# Patient Record
Sex: MALE | Race: WHITE | NOT HISPANIC OR LATINO | Employment: OTHER | URBAN - METROPOLITAN AREA
[De-identification: names, ages, dates, MRNs, and addresses within clinical notes are randomized per-mention and may not be internally consistent; named-entity substitution may affect disease eponyms.]

---

## 2020-10-14 ENCOUNTER — OFFICE VISIT (OUTPATIENT)
Dept: FAMILY MEDICINE | Facility: OTHER | Age: 75
End: 2020-10-14
Payer: MEDICARE

## 2020-10-14 VITALS
OXYGEN SATURATION: 97 % | RESPIRATION RATE: 16 BRPM | DIASTOLIC BLOOD PRESSURE: 84 MMHG | HEIGHT: 68 IN | BODY MASS INDEX: 29.25 KG/M2 | HEART RATE: 69 BPM | TEMPERATURE: 98 F | WEIGHT: 193 LBS | SYSTOLIC BLOOD PRESSURE: 128 MMHG

## 2020-10-14 DIAGNOSIS — L81.9 ATYPICAL PIGMENTED SKIN LESION: Primary | ICD-10-CM

## 2020-10-14 PROCEDURE — 99203 OFFICE O/P NEW LOW 30 MIN: CPT | Performed by: PHYSICIAN ASSISTANT

## 2020-10-14 RX ORDER — TRIAMCINOLONE ACETONIDE 1 MG/G
CREAM TOPICAL 2 TIMES DAILY
Qty: 30 G | Refills: 0 | Status: SHIPPED | OUTPATIENT
Start: 2020-10-14 | End: 2021-01-29

## 2020-10-14 RX ORDER — FLUVOXAMINE MALEATE 100 MG
TABLET ORAL
COMMUNITY
Start: 2020-10-14

## 2020-10-14 RX ORDER — QUETIAPINE FUMARATE 300 MG/1
150 TABLET, FILM COATED ORAL AT BEDTIME
COMMUNITY
Start: 2020-10-14

## 2020-10-14 ASSESSMENT — MIFFLIN-ST. JEOR: SCORE: 1598.91

## 2020-10-14 NOTE — PATIENT INSTRUCTIONS
Will have you see dermatology to further evaluate the skin lesion as I am unsure what this is but that fact that it is growing is concerning.  They will call to schedule ([facility] in [place]).  Will try some Kenalog steroid cream to help with the itching (up to twice daily).

## 2020-10-14 NOTE — PROGRESS NOTES
"Subjective     Ahmet Coleman is a 75 year old male who presents to clinic today for the following health issues:    HPI      Patient in clinic today to discuss spot on head. He says it's been there for a while (20-30 years), but it's starting to burn, itch, and sometimes feels \"like a bee sting all over his head.\" It is black in color. He previously had it biopsied in 1992 and \"it was benign at that time.\" It has been growing over the past year and he does not know what it is. [facility] but has come to this clinic in the past for other concerns.     Review of Systems   Constitutional, HEENT, cardiovascular, pulmonary, skin, gi and gu systems are negative, except as otherwise noted.      Objective    /84   Pulse 69   Temp 98  F (36.7  C) (Temporal)   Resp 16   Ht 1.734 m (5' 8.25\")   Wt 87.5 kg (193 lb)   SpO2 97%   BMI 29.13 kg/m    Body mass index is 29.13 kg/m .  Physical Exam   GENERAL: healthy, alert and no distress  RESP: lungs clear to auscultation - no rales, rhonchi or wheezes  CV: regular rate and rhythm, normal S1 S2, no S3 or S4, no murmur, click or rub  SKIN: 6.5 x 7.0 cm maculopapular plaque over the left side of the scalp into the hairline that is a dark grey in color. There are small papules throughout the plaque but most of it is macular without blanching. There are also several small, pinpint, black/purplish macules surrounding this lesion. There is mild tenderness when the lesion is palpated but no fluctuance, warm, erythema or discharge.        Assessment & Plan       ICD-10-CM    1. Atypical pigmented skin lesion  L81.9 DERMATOLOGY ADULT REFERRAL     triamcinolone (KENALOG) 0.1 % external cream       I am quite concerned about this skin lesion especially since it is so large and has been growing over the past year. It is honestly like nothing I have ever seen before so I have a fairly high suspicion for malignancy given its appearance  Will " get him set up with [facility] ASAP for evaluation and biopsy.  In the meantime, will prescribe low dose Kenalog to use as needed for itching.     Elias Alonzo PA-C  [facility]

## 2020-10-26 ENCOUNTER — TRANSFERRED RECORDS (OUTPATIENT)
Dept: HEALTH INFORMATION MANAGEMENT | Facility: CLINIC | Age: 75
End: 2020-10-26

## 2020-11-09 ENCOUNTER — TRANSFERRED RECORDS (OUTPATIENT)
Dept: HEALTH INFORMATION MANAGEMENT | Facility: CLINIC | Age: 75
End: 2020-11-09

## 2020-11-10 ENCOUNTER — TRANSCRIBE ORDERS (OUTPATIENT)
Dept: OTHER | Age: 75
End: 2020-11-10

## 2020-11-10 DIAGNOSIS — C43.4 MALIGNANT MELANOMA OF SCALP (H): Primary | ICD-10-CM

## 2020-11-12 ENCOUNTER — TELEPHONE (OUTPATIENT)
Dept: OTOLARYNGOLOGY | Facility: CLINIC | Age: 75
End: 2020-11-12

## 2020-11-12 NOTE — TELEPHONE ENCOUNTER
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 [phone]  Fx. [phone] 10/26/2020 note from Dr Niurka Skinner    10/25/2020 left central parietal scalp shave (Case: [id number])     *trackin Scanned in Epic    req     Received     [facility] imaging  2019 CT Head   2018 CT Chest Abdomen Pelvis [facility] everywhere  req 2020    req     PACS                             2020 2:01PM sent a fax to [facility] for images and [facility] for path send out - Amay   2020 11:46AM path slides received and sent to 5th floor [facility] with consult form. Still waiting on [facility] images - Amay   2020 9:32AM sent a 2nd fax to [facility] for images - Amay   *images in PACS - Amay

## 2020-11-12 NOTE — TELEPHONE ENCOUNTER
[facility] Call Center    Phone Message    May a detailed message be left on voicemail: yes     Reason for Call: Other: Called patient and scheduled first available with Dr. Garcia. The appointment is currently scheduled more than 5 business days out. Per our protocols, we are to send a message for that appointment to be moved up. Please advise.     Action Taken: Message routed to:  [facility] ([facility] ENT    Travel Screening: Not Applicable

## 2020-11-16 ENCOUNTER — TELEPHONE (OUTPATIENT)
Dept: ONCOLOGY | Facility: CLINIC | Age: 75
End: 2020-11-16

## 2020-11-16 NOTE — TELEPHONE ENCOUNTER
[facility] Call Center    Phone Message    May a detailed message be left on voicemail: yes     Reason for Call: Other: referring doctor is following up on 11/10 referral to oncology.  Please contact Pt daughter to schedule at [phone]    This is in addition to the ENT referral    Action Taken: Message routed to:  [facility] oncology    Travel Screening: Not Applicable

## 2020-11-17 ENCOUNTER — TELEPHONE (OUTPATIENT)
Dept: OTOLARYNGOLOGY | Facility: CLINIC | Age: 75
End: 2020-11-17

## 2020-11-19 PROCEDURE — 999N001032 HC STATISTIC REVIEW OUTSIDE SLIDES TC 88321: Performed by: OTOLARYNGOLOGY

## 2020-11-19 PROCEDURE — 88321 CONSLTJ&REPRT SLD PREP ELSWR: CPT | Performed by: DERMATOLOGY

## 2020-11-19 PROCEDURE — 88360 TUMOR IMMUNOHISTOCHEM/MANUAL: CPT | Mod: 26 | Performed by: DERMATOLOGY

## 2020-11-19 PROCEDURE — 88360 TUMOR IMMUNOHISTOCHEM/MANUAL: CPT | Mod: TC | Performed by: OTOLARYNGOLOGY

## 2020-11-19 PROCEDURE — 999N001020 HC STATISTIC H-SEND OUTS PREP: Performed by: OTOLARYNGOLOGY

## 2020-11-25 ENCOUNTER — OFFICE VISIT (OUTPATIENT)
Dept: OTOLARYNGOLOGY | Facility: CLINIC | Age: 75
End: 2020-11-25
Payer: MEDICARE

## 2020-11-25 VITALS
RESPIRATION RATE: 18 BRPM | TEMPERATURE: 98.9 F | HEIGHT: 68 IN | SYSTOLIC BLOOD PRESSURE: 165 MMHG | WEIGHT: 195 LBS | DIASTOLIC BLOOD PRESSURE: 91 MMHG | OXYGEN SATURATION: 97 % | BODY MASS INDEX: 29.55 KG/M2 | HEART RATE: 56 BPM

## 2020-11-25 DIAGNOSIS — C43.4 MALIGNANT MELANOMA OF SCALP (H): Primary | ICD-10-CM

## 2020-11-25 PROCEDURE — 99203 OFFICE O/P NEW LOW 30 MIN: CPT | Mod: 25 | Performed by: OTOLARYNGOLOGY

## 2020-11-25 PROCEDURE — 11104 PUNCH BX SKIN SINGLE LESION: CPT | Performed by: OTOLARYNGOLOGY

## 2020-11-25 PROCEDURE — 88342 IMHCHEM/IMCYTCHM 1ST ANTB: CPT | Performed by: DERMATOLOGY

## 2020-11-25 PROCEDURE — 88305 TISSUE EXAM BY PATHOLOGIST: CPT | Performed by: DERMATOLOGY

## 2020-11-25 PROCEDURE — 11105 PUNCH BX SKIN EA SEP/ADDL: CPT | Performed by: OTOLARYNGOLOGY

## 2020-11-25 PROCEDURE — 88341 IMHCHEM/IMCYTCHM EA ADD ANTB: CPT | Performed by: DERMATOLOGY

## 2020-11-25 RX ORDER — ACETAMINOPHEN 325 MG/1
TABLET ORAL
Status: ON HOLD | COMMUNITY
Start: 2019-07-17 | End: 2021-05-28

## 2020-11-25 RX ORDER — POLYETHYLENE GLYCOL 3350 17 G/17G
POWDER, FOR SOLUTION ORAL
COMMUNITY
Start: 2020-05-15 | End: 2021-01-29

## 2020-11-25 RX ORDER — DOCUSATE SODIUM 100 MG/1
CAPSULE, LIQUID FILLED ORAL
COMMUNITY
Start: 2020-05-15 | End: 2021-01-29

## 2020-11-25 RX ORDER — PREGABALIN 150 MG/1
CAPSULE ORAL
COMMUNITY
Start: 2020-06-24 | End: 2021-01-29

## 2020-11-25 ASSESSMENT — PATIENT HEALTH QUESTIONNAIRE - PHQ9: SUM OF ALL RESPONSES TO PHQ QUESTIONS 1-9: 14

## 2020-11-25 ASSESSMENT — PAIN SCALES - GENERAL: PAINLEVEL: MILD PAIN (3)

## 2020-11-25 ASSESSMENT — MIFFLIN-ST. JEOR: SCORE: 1604.01

## 2020-11-25 NOTE — PROGRESS NOTES
Depression Response    Patient completed the PHQ-9 assessment for depression and scored >9? Yes  Question 9 on the PHQ-9 was positive for suicidality? No  Does patient have current mental health provider? Yes    Is this a virtual visit? Yes   Does patient have suicidal ideation (positive question 9)? No - offer to place Mental Health Referral.  Patient declined referral/not needed    I personally notified the following: clinic nurse       Patient PHQ 9 score is 14. Denies suicidal ideation. Currently has telephone visits with a mental health professional.  Mauri Jarquin LPN

## 2020-11-25 NOTE — NURSING NOTE
"Chief Complaint   Patient presents with     Consult     melanoma of scalp      Blood pressure (!) 165/91, pulse 56, temperature 98.9  F (37.2  C), resp. rate 18, height 1.727 m (5' 8\"), weight 88.5 kg (195 lb), SpO2 97 %.    Mauri Jarquin LPN    "

## 2020-11-25 NOTE — LETTER
11/25/2020       RE: Ahmet Coleman  [address] [address]     Dear Colleague,    Thank you for referring your patient, Ahmet Coleman, to the [facility] [place] [facility]. Please see a copy of my visit note below.    Depression Response    Patient completed the PHQ-9 assessment for depression and scored >9? Yes  Question 9 on the PHQ-9 was positive for suicidality? No  Does patient have current mental health provider? Yes    Is this a virtual visit? Yes   Does patient have suicidal ideation (positive question 9)? No - offer to place Mental Health Referral.  Patient declined referral/not needed    I personally notified the following: clinic nurse       Patient PHQ 9 score is 14. Denies suicidal ideation. Currently has telephone visits with a mental health professional.  Mauri Jarquin LPN                      November 25, 2020          Niurka Skinner MD, MS, FAAD   [facility]   [address] [address]         Dear Dr. Skinner,      Thank you for this consultation on Ahmet Coleman.  As you know, he is a patient who had a lesion on his scalp for some time that he describes as quite small, but over the last several months has grown dramatically in size.  It is now approximately 6 x 9 cm in size, starting at the midline of his vertex scalp and going all the way down to the lateral temporal scalp.  He had a punch biopsy was performed in four sites, three of which were positive.  The patient himself has not noticed any lumps or bumps in his neck and the biopsies were noted to have a depth of at least 1.2 mm without ulceration and mitoses that varied from 1-3 per mm2.  The patient has never had a skin cancer in the past.  He is generally otherwise healthy.  No significant history of sun exposure.     Past Medical History:   Diagnosis Date     Anxiety      Benign hypertension  12/11/2013     Cervicalgia      Depressive disorder, not elsewhere classified      Esophageal reflux      Lumbago      Migraine, unspecified, without mention of intractable migraine without mention of status migrainosus      Other kyphoscoliosis and scoliosis      Other specified gastritis without mention of hemorrhage      PTSD (post-traumatic stress disorder)      Tobacco abuse since 1965    on and off     Past Surgical History:   Procedure Laterality Date     C APPENDECTOMY       COLONOSCOPY N/A 6/8/2016    Procedure: COMBINED COLONOSCOPY, SINGLE OR MULTIPLE BIOPSY/POLYPECTOMY BY BIOPSY;  Surgeon: Mahesh Amanda MD;  Location: [facility]     ESOPHAGOSCOPY, GASTROSCOPY, DUODENOSCOPY (EGD), COMBINED  9/6/2011    Procedure:COMBINED ESOPHAGOSCOPY, GASTROSCOPY, DUODENOSCOPY (EGD), BIOPSY SINGLE OR MULTIPLE; Esophagogastroduodenoscopy with multiple Biopsy's; Surgeon:MARINA DYE; Location:[facility]     ZZC NONSPECIFIC PROCEDURE      nasal septal fx and a devitation needing plastic surgery       Current Outpatient Medications:      acetaminophen (TYLENOL) 325 MG tablet, TAKE TWO TABLETS BY MOUTH THREE TIMES A DAY AS NEEDED, Disp: , Rfl:      albuterol (PROAIR HFA, PROVENTIL HFA, VENTOLIN HFA) 108 (90 BASE) MCG/ACT inhaler, Inhale  into the lungs as needed., Disp: , Rfl:      cholecalciferol (VITAMIN D3) 1000 UNIT tablet, Take  by mouth daily., Disp: , Rfl:      diclofenac (VOLTAREN) 1 % topical gel, Apply 4 g topically, Disp: , Rfl:      docusate sodium (DSS) 100 MG capsule, TAKE ONE CAPSULE BY MOUTH EVERY DAY FOR STOOL SOFTENING, Disp: , Rfl:      fluvoxaMINE (LUVOX) 100 MG tablet, Take 150 mg at bedtime, Disp: , Rfl:      hydrochlorothiazide (HYDRODIURIL) 25 MG tablet, Take 25 mg by mouth daily., Disp: , Rfl:      Hypromellose (ARTIFICIAL TEARS OP), Apply  to eye. 2 drops in each eye twice a day as needed, Disp: , Rfl:      Lactobacillus-Inulin ([employer] DIGESTIVE HEALTH) CAPS, 1 tab daily, Disp: 30 capsule, Rfl: 1      lisinopril (PRINIVIL,ZESTRIL) 10 MG tablet, Take 10 mg by mouth daily., Disp: , Rfl:      omeprazole (PRILOSEC) 20 MG capsule, Take 1 capsule by mouth 2 times daily., Disp: 60 capsule, Rfl: prn     polyethylene glycol (MIRALAX) 17 GM/Dose powder, TAKE 17 GRAMS BY MOUTH EVERY DAY ** MIXED IN JUICE OR WATER AS DIRECTED ** USE THE COVER AS A MEASURE **, Disp: , Rfl:      pregabalin (LYRICA) 150 MG capsule, TAKE 1 CAPSULE BY MOUTH TWICE A DAY, Disp: , Rfl:      QUEtiapine (SEROQUEL) 25 MG tablet, 1/2 tablet nightly, Disp: , Rfl:      simvastatin (ZOCOR) 20 MG tablet, Take  by mouth At Bedtime., Disp: , Rfl:      TRAZodone (DESYREL) 50 MG tablet, Take 50 mg by mouth At Bedtime., Disp: , Rfl:      triamcinolone (KENALOG) 0.1 % external cream, Apply topically 2 times daily, Disp: 30 g, Rfl: 0     vortioxetine (TRINTELLIX) 20 MG tablet, TAKE ONE TABLET BY MOUTH EVERY MORNING, Disp: , Rfl:   Allergies   Allergen Reactions     Aspirin      Motrin [Ibuprofen Micronized]      And ASA  Cramps  diarrhea     Social History     Tobacco Use     Smoking status: Former Smoker     Packs/day: 0.50     Years: 45.00     Pack years: 22.50     Types: Cigarettes     Quit date: 2013     Years since quittin.3     Smokeless tobacco: Never Used     Tobacco comment: since , on and off in there 1/2 pack per day   Substance Use Topics     Alcohol use: No     Review Of Systems  Skin: negative  Eyes: negative  Ears/Nose/Throat: negative  Respiratory: No shortness of breath, dyspnea on exertion, cough, or hemoptysis  Cardiovascular: negative  Gastrointestinal: negative  Genitourinary: negative  Musculoskeletal: negative  Neurologic: negative  Psychiatric: negative  Hematologic/Lymphatic/Immunologic: negative  Endocrine: negative        PHYSICAL EXAMINATION:  He is well-appearing, in no distress.  Skin exam reveals he has a large darkly pigmented, but with irregular borders lesion of the nearly 70% of the left side of his scalp.  Some of  it is covered with hair, but he does also appear to have some satellite pigmented lesions.  I palpated his parotid and neck.  I do not palpate any lymphadenopathy.  Facial nerve function is normal.        Please note, I did take photographs of the lesion and the possible satellite and they are stored in [facility].      PROCEDURE:  Biopsy of satellites to determine what the actual margins of surgery need to be.  We took a biopsy at three different sites, one was anterior to the lesion in the temporal scalp.  The second one was posterior to the lesion just off the midline to the left and the last one was in the left occipital scalp.  These were all taken with 3 mm punch biopsies and closed with chromic and nylon stitches and sent for permanent pathology.      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      PLAN:   1.  The patient will require a large resection of the scalp and depending on the nature of the biopsies we took today, he may require a larger resection to get around the satellite that we can see.  The reconstruction could either be free tissue or skin graft.  The question of how to treat his neck is an open question.  I do not believe sentinel lymph node biopsy will be efficacious in this case because the injection would be over such a wide area that we would end up having to make multiple incision sites and biopsy sites, in which case an elective neck dissection may be the most appropriate option in this case.   2.  I would like to get a PET scan on him.   3.  We will wait for the biopsy results and determine the nature of surgery. If we do need free tissue reconstruction,  I will ask Dr. Machado to assist with that.      Thank you for allowing me to participate in the care of this patient.  If you have any further questions, please do not hesitate to contact me.       Sincerely,         Paulo Garcia M.D.         Otolaryngology-Head & Neck Surgery    [phone]

## 2020-11-25 NOTE — PATIENT INSTRUCTIONS
1. We will call you to arrange PET scan and call you after tumor board next Friday.   2. Please call the ENT clinic with any questions,concerns, new or worsening symptoms.    -Clinic number is [phone]   - Rekha's direct line (Dr. Garcia's nurse) [phone]

## 2020-11-25 NOTE — PROGRESS NOTES
November 25, 2020          Niurka Skinner MD, MS, FAAD   [facility]   [address] [facility]   [address] [place] [address]         Dear Dr. Skinner,      Thank you for this consultation on Ahmet Coleman.  As you know, he is a patient who had a lesion on his scalp for some time that he describes as quite small, but over the last several months has grown dramatically in size.  It is now approximately 6 x 9 cm in size, starting at the midline of his vertex scalp and going all the way down to the lateral temporal scalp.  He had a punch biopsy was performed in four sites, three of which were positive.  The patient himself has not noticed any lumps or bumps in his neck and the biopsies were noted to have a depth of at least 1.2 mm without ulceration and mitoses that varied from 1-3 per mm2.  The patient has never had a skin cancer in the past.  He is generally otherwise healthy.  No significant history of sun exposure.             Past Medical History:   Diagnosis Date     Anxiety      Benign hypertension 12/11/2013     Cervicalgia      Depressive disorder, not elsewhere classified      Esophageal reflux      Lumbago      Migraine, unspecified, without mention of intractable migraine without mention of status migrainosus      Other kyphoscoliosis and scoliosis      Other specified gastritis without mention of hemorrhage      PTSD (post-traumatic stress disorder)      Tobacco abuse since 1965    on and off     Past Surgical History:   Procedure Laterality Date     C APPENDECTOMY       COLONOSCOPY N/A 6/8/2016    Procedure: COMBINED COLONOSCOPY, SINGLE OR MULTIPLE BIOPSY/POLYPECTOMY BY BIOPSY;  Surgeon: Mahesh Amanda MD;  Location: [facility]     ESOPHAGOSCOPY, GASTROSCOPY, DUODENOSCOPY (EGD), COMBINED  9/6/2011    Procedure:COMBINED ESOPHAGOSCOPY, GASTROSCOPY, DUODENOSCOPY (EGD), BIOPSY SINGLE OR MULTIPLE; Esophagogastroduodenoscopy with multiple Biopsy's; Surgeon:MARINA DYE; Location:[facility]  NONSPECIFIC PROCEDURE      nasal septal fx and a devitation needing plastic surgery       Current Outpatient Medications:      acetaminophen (TYLENOL) 325 MG tablet, TAKE TWO TABLETS BY MOUTH THREE TIMES A DAY AS NEEDED, Disp: , Rfl:      albuterol (PROAIR HFA, PROVENTIL HFA, VENTOLIN HFA) 108 (90 BASE) MCG/ACT inhaler, Inhale  into the lungs as needed., Disp: , Rfl:      cholecalciferol (VITAMIN D3) 1000 UNIT tablet, Take  by mouth daily., Disp: , Rfl:      diclofenac (VOLTAREN) 1 % topical gel, Apply 4 g topically, Disp: , Rfl:      docusate sodium (DSS) 100 MG capsule, TAKE ONE CAPSULE BY MOUTH EVERY DAY FOR STOOL SOFTENING, Disp: , Rfl:      fluvoxaMINE (LUVOX) 100 MG tablet, Take 150 mg at bedtime, Disp: , Rfl:      hydrochlorothiazide (HYDRODIURIL) 25 MG tablet, Take 25 mg by mouth daily., Disp: , Rfl:      Hypromellose (ARTIFICIAL TEARS OP), Apply  to eye. 2 drops in each eye twice a day as needed, Disp: , Rfl:      Lactobacillus-Inulin ([facility] DIGESTIVE HEALTH) CAPS, 1 tab daily, Disp: 30 capsule, Rfl: 1     lisinopril (PRINIVIL,ZESTRIL) 10 MG tablet, Take 10 mg by mouth daily., Disp: , Rfl:      omeprazole (PRILOSEC) 20 MG capsule, Take 1 capsule by mouth 2 times daily., Disp: 60 capsule, Rfl: prn     polyethylene glycol (MIRALAX) 17 GM/Dose powder, TAKE 17 GRAMS BY MOUTH EVERY DAY ** MIXED IN JUICE OR WATER AS DIRECTED ** USE THE COVER AS A MEASURE **, Disp: , Rfl:      pregabalin (LYRICA) 150 MG capsule, TAKE 1 CAPSULE BY MOUTH TWICE A DAY, Disp: , Rfl:      QUEtiapine (SEROQUEL) 25 MG tablet, 1/2 tablet nightly, Disp: , Rfl:      simvastatin (ZOCOR) 20 MG tablet, Take  by mouth At Bedtime., Disp: , Rfl:      TRAZodone (DESYREL) 50 MG tablet, Take 50 mg by mouth At Bedtime., Disp: , Rfl:      triamcinolone (KENALOG) 0.1 % external cream, Apply topically 2 times daily, Disp: 30 g, Rfl: 0     vortioxetine (TRINTELLIX) 20 MG tablet, TAKE ONE TABLET BY MOUTH EVERY MORNING, Disp: , Rfl:   Allergies    Allergen Reactions     Aspirin      Motrin [Ibuprofen Micronized]      And ASA  Cramps  diarrhea     Social History     Tobacco Use     Smoking status: Former Smoker     Packs/day: 0.50     Years: 45.00     Pack years: 22.50     Types: Cigarettes     Quit date: 2013     Years since quittin.3     Smokeless tobacco: Never Used     Tobacco comment: since , on and off in there 1/2 pack per day   Substance Use Topics     Alcohol use: No     Review Of Systems  Skin: negative  Eyes: negative  Ears/Nose/Throat: negative  Respiratory: No shortness of breath, dyspnea on exertion, cough, or hemoptysis  Cardiovascular: negative  Gastrointestinal: negative  Genitourinary: negative  Musculoskeletal: negative  Neurologic: negative  Psychiatric: negative  Hematologic/Lymphatic/Immunologic: negative  Endocrine: negative        PHYSICAL EXAMINATION:  He is well-appearing, in no distress.  Skin exam reveals he has a large darkly pigmented, but with irregular borders lesion of the nearly 70% of the left side of his scalp.  Some of it is covered with hair, but he does also appear to have some satellite pigmented lesions.  I palpated his parotid and neck.  I do not palpate any lymphadenopathy.  Facial nerve function is normal.        Please note, I did take photographs of the lesion and the possible satellite and they are stored in [facility].      PROCEDURE:  Biopsy of satellites to determine what the actual margins of surgery need to be.  We took a biopsy at three different sites, one was anterior to the lesion in the temporal scalp.  The second one was posterior to the lesion just off the midline to the left and the last one was in the left occipital scalp.  These were all taken with 3 mm punch biopsies and closed with chromic and nylon stitches and sent for permanent pathology.      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      PLAN:   1.  The patient will  require a large resection of the scalp and depending on the nature of the biopsies we took today, he may require a larger resection to get around the satellite that we can see.  The reconstruction could either be free tissue or skin graft.  The question of how to treat his neck is an open question.  I do not believe sentinel lymph node biopsy will be efficacious in this case because the injection would be over such a wide area that we would end up having to make multiple incision sites and biopsy sites, in which case an elective neck dissection may be the most appropriate option in this case.   2.  I would like to get a PET scan on him.   3.  We will wait for the biopsy results and determine the nature of surgery. If we do need free tissue reconstruction,  I will ask Dr. Machado to assist with that.      Thank you for allowing me to participate in the care of this patient.  If you have any further questions, please do not hesitate to contact me.       Sincerely,         Paulo Garcia M.D.        Otolaryngology-Head & Neck Surgery    [phone]

## 2020-11-30 NOTE — PROGRESS NOTES
Head & Neck Tumor Conference Note        Status: New  Staff: Dr. Garcia    Tumor Site: Left scalp  Tumor Pathology: Melanoma  Tumor Stage: [id number]  Tumor Treatment: None    Reason for Review: Review imaging, path, and POC    Brief History: This is a 75 year old male recently diagnosed with a melanoma of the scalp. He reports an area of discoloration on the left scalp for the past year that more recently has started to grow in size. He was seen by a dermatologist, and a 12 cm x 7 cm lesion was identified. Biopsies were obtained and were noted to be positive for melanoma. He was referred to Dr. Garcia for definitive management. There was concern for satellitosis and additional biopsies were taken. Staging imaging was obtained as well.     Pertinent PMH:   Past Medical History:   Diagnosis Date     Anxiety      Benign hypertension 2013     Cervicalgia      Depressive disorder, not elsewhere classified      Esophageal reflux      Lumbago      Migraine, unspecified, without mention of intractable migraine without mention of status migrainosus      Other kyphoscoliosis and scoliosis      Other specified gastritis without mention of hemorrhage      PTSD (post-traumatic stress disorder)      Tobacco abuse since     on and off        Smoking Hx:   Social History     Tobacco Use     Smoking status: Former Smoker     Packs/day: 0.50     Years: 45.00     Pack years: 22.50     Types: Cigarettes     Quit date: 2013     Years since quittin.3     Smokeless tobacco: Never Used     Tobacco comment: since , on and off in there 1/2 pack per day   Substance Use Topics     Alcohol use: No     Drug use: Yes     Types: Marijuana     Comment: Marijuana       Imaging:   PET/CT Neck: (20):  Pending    PET/CT Whole Body (20):  Pending     Pathology:   Pathology (20):  Pending    Tumor Board Recommendation:   Discussion: Reviewed photos, much of the left side of the scalp is involved with melanoma and  there is concern for satellite disease. PET/CT is pending for staging. Biopsies of satellite lesions pending as well.    Plan:   - discuss when PET/CT and biopsies are finalized    Irineo Cook MD PGY-3  Otolaryngology- Head and Neck Surgery    Documentation / Disclaimer Cancer Tumor Board Note  Cancer tumor board recommendations do not override what is determined to be reasonable care and treatment, which is dependent on the circumstances of a patient's case; the patient's medical, social, and personal concerns; and the clinical judgment of the oncologist [physician].

## 2020-12-02 ENCOUNTER — PRE VISIT (OUTPATIENT)
Dept: OTOLARYNGOLOGY | Facility: CLINIC | Age: 75
End: 2020-12-02

## 2020-12-04 ENCOUNTER — TUMOR CONFERENCE (OUTPATIENT)
Dept: ONCOLOGY | Facility: CLINIC | Age: 75
End: 2020-12-04
Payer: MEDICARE

## 2020-12-05 DIAGNOSIS — C43.9 MELANOMA OF SKIN (H): Primary | ICD-10-CM

## 2020-12-07 LAB — COPATH REPORT: NORMAL

## 2020-12-07 NOTE — PROGRESS NOTES
Called and spoke with patient regarding possibility of clinical trial with medical oncology. Reviewed with patient that pathology results from in clinic biopsies are still pending as well as he will need to proceed with PET scan scheduled on 12/8. He will receive a call from clinic trial coordinator to arrange appointment with Dr. Hernández. Patient in agreement with plan and we will contact patient to further discuss following PET scan, pathology results and consult with Dr. Hernández.     Patient has direct line to call with further questions or concerns.     Rekha Reynolds, RN, BSN

## 2020-12-08 ENCOUNTER — HOSPITAL ENCOUNTER (OUTPATIENT)
Dept: PET IMAGING | Facility: CLINIC | Age: 75
End: 2020-12-08
Attending: OTOLARYNGOLOGY
Payer: MEDICARE

## 2020-12-08 DIAGNOSIS — C43.4 MALIGNANT MELANOMA OF SCALP (H): ICD-10-CM

## 2020-12-08 LAB
CREAT BLD-MCNC: 1.4 MG/DL (ref 0.66–1.25)
GFR SERPL CREATININE-BSD FRML MDRD: 50 ML/MIN/{1.73_M2}

## 2020-12-08 PROCEDURE — 250N000011 HC RX IP 250 OP 636: Performed by: OTOLARYNGOLOGY

## 2020-12-08 PROCEDURE — 71260 CT THORAX DX C+: CPT | Mod: 26 | Performed by: RADIOLOGY

## 2020-12-08 PROCEDURE — 74177 CT ABD & PELVIS W/CONTRAST: CPT | Mod: 26 | Performed by: RADIOLOGY

## 2020-12-08 PROCEDURE — 343N000001 HC RX 343: Performed by: OTOLARYNGOLOGY

## 2020-12-08 PROCEDURE — 71260 CT THORAX DX C+: CPT

## 2020-12-08 PROCEDURE — 78813 PET IMAGE FULL BODY: CPT | Mod: 26 | Performed by: RADIOLOGY

## 2020-12-08 PROCEDURE — 70491 CT SOFT TISSUE NECK W/DYE: CPT | Mod: 26 | Performed by: RADIOLOGY

## 2020-12-08 PROCEDURE — 70491 CT SOFT TISSUE NECK W/DYE: CPT

## 2020-12-08 PROCEDURE — 82565 ASSAY OF CREATININE: CPT

## 2020-12-08 PROCEDURE — A9552 F18 FDG: HCPCS | Performed by: OTOLARYNGOLOGY

## 2020-12-08 RX ORDER — IOPAMIDOL 755 MG/ML
45-135 INJECTION, SOLUTION INTRAVASCULAR ONCE
Status: COMPLETED | OUTPATIENT
Start: 2020-12-08 | End: 2020-12-08

## 2020-12-08 RX ADMIN — FLUDEOXYGLUCOSE F-18 11.66 MCI.: 500 INJECTION, SOLUTION INTRAVENOUS at 14:30

## 2020-12-08 RX ADMIN — IOPAMIDOL 119 ML: 755 INJECTION, SOLUTION INTRAVENOUS at 15:35

## 2020-12-10 ENCOUNTER — PATIENT OUTREACH (OUTPATIENT)
Dept: OTOLARYNGOLOGY | Facility: CLINIC | Age: 75
End: 2020-12-10

## 2020-12-10 NOTE — PROGRESS NOTES
Called and left message for patient to review PET scan results. Left direct line for return call to discuss.     Rekha Reynolds, RN, BSN

## 2020-12-10 NOTE — PROGRESS NOTES
Called patient with the following PET scan results:    Impression:      FDG avid irregular skin thickening overlying the left parietal region  compatible with diagnosis of malignant melanoma. No FDG avid  metastatic lymphadenopathy in the neck.    IMPRESSION: In this patient with a recent diagnosis of malignant  melanoma of the scalp:     1. No evidence of metastatic disease in the body.     2. Incidental findings include colonic diverticulosis without evidence  of acute diverticulitis.     3. Please see separate dictation for the high resolution PET-CT of the  head and neck performed at the same time for further discussion.         Reviewed with patient that he should proceed with Dr Hernández appointment next week to discuss clinical trial. Patient agreeable and was encouraged to call with further questions or concerns.     Rekha Reynolds, RN, BSN

## 2020-12-15 ENCOUNTER — APPOINTMENT (OUTPATIENT)
Dept: LAB | Facility: CLINIC | Age: 75
End: 2020-12-15
Attending: INTERNAL MEDICINE
Payer: MEDICARE

## 2020-12-15 PROCEDURE — 81445 SO NEO GSAP 5-50DNA/DNA&RNA: CPT | Mod: GZ | Performed by: INTERNAL MEDICINE

## 2020-12-15 PROCEDURE — G0452 MOLECULAR PATHOLOGY INTERPR: HCPCS | Mod: 26 | Performed by: PATHOLOGY

## 2020-12-16 LAB — COPATH REPORT: NORMAL

## 2020-12-17 LAB — PD-L1 BY IMMUNOHISTOCHEMISTRY: NORMAL

## 2020-12-21 ENCOUNTER — ONCOLOGY VISIT (OUTPATIENT)
Dept: ONCOLOGY | Facility: CLINIC | Age: 75
End: 2020-12-21
Attending: INTERNAL MEDICINE
Payer: MEDICARE

## 2020-12-21 VITALS
DIASTOLIC BLOOD PRESSURE: 78 MMHG | HEART RATE: 63 BPM | TEMPERATURE: 98.7 F | RESPIRATION RATE: 18 BRPM | WEIGHT: 195.3 LBS | BODY MASS INDEX: 29.7 KG/M2 | OXYGEN SATURATION: 93 % | SYSTOLIC BLOOD PRESSURE: 132 MMHG

## 2020-12-21 DIAGNOSIS — C43.4 MALIGNANT MELANOMA OF SCALP (H): ICD-10-CM

## 2020-12-21 PROCEDURE — 99205 OFFICE O/P NEW HI 60 MIN: CPT | Performed by: INTERNAL MEDICINE

## 2020-12-21 PROCEDURE — G0463 HOSPITAL OUTPT CLINIC VISIT: HCPCS

## 2020-12-21 ASSESSMENT — PAIN SCALES - GENERAL: PAINLEVEL: MODERATE PAIN (4)

## 2020-12-21 NOTE — PROGRESS NOTES
MEDICAL ONCOLOGY CONSULT  Melanoma Clinic  Dec 21, 2020      ASSESSMENT AND PLAN  #1 Cutaneous melanoma, scalp primary, pT2a cN1c, clinical Stage IIIB  It was a pleasure to meet Mr. Coleman today. He is a 75 year old  with agent orange cutaneous exposures in the [place] war and a diagnosis of malignant melanoma arising from a pre-existing pigmented lesion on the scalp. He has clinical evidence of satellitosis with separation of a left temporal satellite from the main tumor mass, and possibly in-transit metastasis to the helix of the left ear. He was reviewed for participation in the NeoActivate study, but he does not meet criteria as he does not have regional lymph node disease.    I reviewed other potential options including neoadjuvant approaches using checkpoint inhibitors like nivolumab and pembrolizumab. We also discussed the potential use of BRAF/MEK inhbition strategies in the event of a somatic BRAF mutation. Patient is interested in these approaches.    Neoadjuvant nivolumab or pembrolizumab are associated with pCR or pnCR rates of 35-45%. We reviewed the impact of negative PD-L1 expression, which is generally associated with decreased responses approaching 25% rather than 40-50% for PD-L1 positive advanced melanoma. Higher overall response would be expected for neoadjuvant ipi/nivo, though notably, that study excluded those with in-transit disease.    BRAF V600K mutation, a course of pre-operative oral targeted therapy would also be reasonable. Studies have typically used dabrafenib/trametinib for 8-12 weeks pre-operatively followed by adjuvant therapy for the balance of 1 year.     At the time of our visit not all results had yet returned, so we planned to speak again in early January to re-review, discuss the side effects associated with the approaches, and establish a start date for treatment and potential surgery.    Multiple questions answered.    Oksana Peñaloza M.D.  Assistant  Professor of Medicine  Hematology, Oncology and Transplantation      ------------------------------------------------------------    CHIEF COMPLAINT: Scalp melanoma    HISTORY OF PRESENT ILLNESS  Ahmet Coleman is a 75 year old  with agent orange skin exposures in [place]. He is referred by Dr. Garcia for evaluation of an enlargening malignant melanoma of the scalp.    The lesion has been present for over 30 years, but was much smaller. The lesion was biopsied in 1992 and was benign. However, in October 2020 he presented to [facility] with 1 year of progressive enlargement of the lesion and new onset burning, itching, and stinging sensation in the affected scalp.    He was seen at [facility] and he had shave biopsies of A. left central parietal scalp, B. left central occipital scalp, C. Left posterior parietal scalp, and D. punch biopsy of the left superior occipital scalp on 10/26/20. Pathology (specimen: [id number]) showed a nodular and nevoid type melanoma, non-ulcerated, Breslow depth up to 1.3 mm, Saurabh's level IV, and up to 3 mitoses per mm2. All margins were involved. Ki-67 10-20%. Sox10 stain is positive and highlights an atypical compound melanocytic proliferation with significant overlying confluence and pagetosis of intraepidermal melanocytes. T-stage: at least pT2a. PD-L1 staining shows PD-L1 TPS <1%. 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    On 11/25/20 he was seen by Dr. Garcia. He took three 3mm punch biopsies, which returned as dermal melanocytic proliferation with melanophages and fibrosis, consistent with locoregional metastatic melanoma.    On 12/8/2020 he had PET-CT, which showed FDG avid irregular skin thickening overlying the left parietal region, with no FDG avid lymphadenopathy in the neck and no evidence of metastasis elsewhere in the body.    Patient is a former smoker and quit in 2013. Today he feels physically well aside from the growing,  painful scalp tumor. He notes baseline PTSD from [place] War, but denies any shortness of breath, chest pain, nausea, vomiting, fevers or chills. He has had no significant weight loss.       No results found for any visits on 12/21/20.      REVIEW OF SYSTEMS  A 12-point ROS negative except as in HPI    Current Outpatient Medications   Medication Sig Dispense Refill     acetaminophen (TYLENOL) 325 MG tablet TAKE TWO TABLETS BY MOUTH THREE TIMES A DAY AS NEEDED       albuterol (PROAIR HFA, PROVENTIL HFA, VENTOLIN HFA) 108 (90 BASE) MCG/ACT inhaler Inhale  into the lungs as needed.       cholecalciferol (VITAMIN D3) 1000 UNIT tablet Take  by mouth daily.       diclofenac (VOLTAREN) 1 % topical gel Apply 4 g topically       docusate sodium (DSS) 100 MG capsule TAKE ONE CAPSULE BY MOUTH EVERY DAY FOR STOOL SOFTENING       fluvoxaMINE (LUVOX) 100 MG tablet Take 150 mg at bedtime       hydrochlorothiazide (HYDRODIURIL) 25 MG tablet Take 25 mg by mouth daily.       Hypromellose (ARTIFICIAL TEARS OP) Apply  to eye. 2 drops in each eye twice a day as needed       Lactobacillus-Inulin ([facility] DIGESTIVE [facility]) CAPS 1 tab daily 30 capsule 1     lisinopril (PRINIVIL,ZESTRIL) 10 MG tablet Take 10 mg by mouth daily.       omeprazole (PRILOSEC) 20 MG capsule Take 1 capsule by mouth 2 times daily. 60 capsule prn     polyethylene glycol (MIRALAX) 17 GM/Dose powder TAKE 17 GRAMS BY MOUTH EVERY DAY ** MIXED IN JUICE OR WATER AS DIRECTED ** USE THE COVER AS A MEASURE **       pregabalin (LYRICA) 150 MG capsule TAKE 1 CAPSULE BY MOUTH TWICE A DAY       QUEtiapine (SEROQUEL) 25 MG tablet 1/2 tablet nightly       simvastatin (ZOCOR) 20 MG tablet Take  by mouth At Bedtime.       TRAZodone (DESYREL) 50 MG tablet Take 50 mg by mouth At Bedtime.       triamcinolone (KENALOG) 0.1 % external cream Apply topically 2 times daily 30 g 0     vortioxetine (TRINTELLIX) 20 MG tablet TAKE ONE TABLET BY MOUTH EVERY MORNING         Allergies    Allergen Reactions     Aspirin      Motrin [Ibuprofen Micronized]      And ASA  Cramps  diarrhea     Immunization History   Administered Date(s) Administered     Influenza (IIV3) PF 10/24/2012     Pneumo Conj 13-V (2010&after) 02/01/2016     Pneumococcal 23 valent 04/07/2015     TDAP Vaccine (Adacel) 07/29/2011       Past Medical History:   Diagnosis Date     Anxiety      Benign hypertension 12/11/2013     Cervicalgia      Depressive disorder, not elsewhere classified      Diverticulitis of colon 7/10/2013     Esophageal reflux      Irritable bowel syndrome 10/3/2003     Lumbago      Migraine, unspecified, without mention of intractable migraine without mention of status migrainosus      Other kyphoscoliosis and scoliosis      Other specified gastritis without mention of hemorrhage      PTSD (post-traumatic stress disorder)      Tobacco abuse since 1965    on and off       Past Surgical History:   Procedure Laterality Date     C APPENDECTOMY       COLONOSCOPY N/A 6/8/2016    Procedure: COMBINED COLONOSCOPY, SINGLE OR MULTIPLE BIOPSY/POLYPECTOMY BY BIOPSY;  Surgeon: Mahesh Amanda MD;  Location: [facility]     ESOPHAGOSCOPY, GASTROSCOPY, DUODENOSCOPY (EGD), COMBINED  9/6/2011    Procedure:COMBINED ESOPHAGOSCOPY, GASTROSCOPY, DUODENOSCOPY (EGD), BIOPSY SINGLE OR MULTIPLE; Esophagogastroduodenoscopy with multiple Biopsy's; Surgeon:MARINA DYE; Location:[facility]     ZZC NONSPECIFIC PROCEDURE      nasal septal fx and a devitation needing plastic surgery       SOCIAL HISTORY  History   Smoking Status     Former Smoker     Packs/day: 0.50     Years: 45.00     Types: Cigarettes     Quit date: 7/26/2013   Smokeless Tobacco     Never Used     Comment: since 1965, on and off in there 1/2 pack per day    Social History    Substance and Sexual Activity      Alcohol use: No     History   Drug Use     Types: Marijuana     Comment: Marijuana       FAMILY HISTORY  No family history of melanoma. Father had BCC and prostate cancer,  and  at 94 yo from heart failure. Mother is alive, currently 92 yo.  Family History   Problem Relation Age of Onset     Diabetes Father      Heart Disease Brother        PHYSICAL EXAMINATION  /78   Pulse 63   Temp 98.7  F (37.1  C) (Oral)   Resp 18   Wt 88.6 kg (195 lb 4.8 oz)   SpO2 93%   BMI 29.70 kg/m    Wt Readings from Last 2 Encounters:   20 88.6 kg (195 lb 4.8 oz)   20 88.5 kg (195 lb)     GENERAL: Healthy, alert and no distress  EYES: Eyes grossly normal to inspection.  No discharge or erythema, or obvious scleral/conjunctival abnormalities.  HENT: The large mid-scalp pigmented lesion extends from right of midline to the left side of the head, roughly measuring 10 x 5 cm, though a smaller separate satellite lesion measures 3 x 1.5 cm in size in the left temporal scalp. Left ear pinna lesion measures 2mm, possible in-transit metastasis. Otherwise, external ears, nose and mouth without ulcers or lesions.  No nasal drainage visible.  NECK: No asymmetry, visible masses or scars  RESP: No audible wheeze, cough, or visible cyanosis.  No visible retractions or increased work of breathing.    CV: regular rates and rhythm, normal S1 S2, no S3 or S4, no murmur, click or rub and no peripheral edema  MS: No gross musculoskeletal defects noted.  Normal range of motion.  No visible edema.  NEURO: Cranial nerves grossly intact.  Mentation and speech appropriate for age.  PSYCH: Mentation appears normal, affect normal/bright, judgement and insight intact, normal speech and appearance well-groomed.    The rest of a comprehensive physical examination is deferred due to PHE (public health emergency) video visit restrictions.

## 2020-12-21 NOTE — LETTER
12/21/2020         RE: Ahmet Coleman  [address] [place] [id number]        Dear Colleague,    Thank you for referring your patient, Ahmet Coleman, to the [facility]. Please see a copy of my visit note below.    MEDICAL ONCOLOGY CONSULT  Melanoma Clinic  Dec 21, 2020      ASSESSMENT AND PLAN  #1 Cutaneous melanoma, scalp primary, pT2a cN1c, clinical Stage IIIB  It was a pleasure to meet Mr. Coleman today. He is a 75 year old  with agent orange cutaneous exposures in the [place] and a diagnosis of malignant melanoma arising from a pre-existing pigmented lesion on the scalp. He has clinical evidence of satellitosis with separation of a left temporal satellite from the main tumor mass, and possibly in-transit metastasis to the helix of the left ear. He was reviewed for participation in the NeoActivate study, but he does not meet criteria as he does not have regional lymph node disease.    I reviewed other potential options including neoadjuvant approaches using checkpoint inhibitors like nivolumab and pembrolizumab. We also discussed the potential use of BRAF/MEK inhbition strategies in the event of a somatic BRAF mutation. Patient is interested in these approaches.    Neoadjuvant nivolumab or pembrolizumab are associated with pCR or pnCR rates of 35-45%. We reviewed the impact of negative PD-L1 expression, which is generally associated with decreased responses approaching 25% rather than 40-50% for PD-L1 positive advanced melanoma. Higher overall response would be expected for neoadjuvant ipi/nivo, though notably, that study excluded those with in-transit disease.    BRAF V600K mutation, a course of pre-operative oral targeted therapy would also be reasonable. Studies have typically used dabrafenib/trametinib for 8-12 weeks pre-operatively followed by adjuvant therapy for the balance of 1 year.     At the time of our visit not all results had yet  returned, so we planned to speak again in early January to re-review, discuss the side effects associated with the approaches, and establish a start date for treatment and potential surgery.    Multiple questions answered.    Oksana Peñaloza M.D.   of Medicine  Hematology, Oncology and Transplantation      ------------------------------------------------------------    CHIEF COMPLAINT: Scalp melanoma    HISTORY OF PRESENT ILLNESS  Ahmet Coleman is a 75 year old  with agent orange skin exposures in [place]. He is referred by Dr. Garcia for evaluation of an enlargening malignant melanoma of the scalp.    The lesion has been present for over 30 years, but was much smaller. The lesion was biopsied in 1992 and was benign. However, in October 2020 he presented to [facility] with 1 year of progressive enlargement of the lesion and new onset burning, itching, and stinging sensation in the affected scalp.    He was seen at [facility] and he had shave biopsies of A. left central parietal scalp, B. left central occipital scalp, C. Left posterior parietal scalp, and D. punch biopsy of the left superior occipital scalp on 10/26/20. Pathology (specimen: [id number]) showed a nodular and nevoid type melanoma, non-ulcerated, Breslow depth up to 1.3 mm, Saurabh's level IV, and up to 3 mitoses per mm2. All margins were involved. Ki-67 10-20%. Sox10 stain is positive and highlights an atypical compound melanocytic proliferation with significant overlying confluence and pagetosis of intraepidermal melanocytes. T-stage: at least pT2a. PD-L1 staining shows PD-L1 TPS <1%. 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    On 11/25/20 he was seen by Dr. Garcia. He took three 3mm punch biopsies, which returned as dermal melanocytic proliferation with melanophages and fibrosis, consistent with locoregional metastatic melanoma.    On 12/8/2020 he had PET-CT, which showed FDG  avid irregular skin thickening overlying the left parietal region, with no FDG avid lymphadenopathy in the neck and no evidence of metastasis elsewhere in the body.    Patient is a former smoker and quit in 2013. Today he feels physically well aside from the growing, painful scalp tumor. He notes baseline PTSD from [place], but denies any shortness of breath, chest pain, nausea, vomiting, fevers or chills. He has had no significant weight loss.       No results found for any visits on 12/21/20.      REVIEW OF SYSTEMS  A 12-point ROS negative except as in HPI    Current Outpatient Medications   Medication Sig Dispense Refill     acetaminophen (TYLENOL) 325 MG tablet TAKE TWO TABLETS BY MOUTH THREE TIMES A DAY AS NEEDED       albuterol (PROAIR HFA, PROVENTIL HFA, VENTOLIN HFA) 108 (90 BASE) MCG/ACT inhaler Inhale  into the lungs as needed.       cholecalciferol (VITAMIN D3) 1000 UNIT tablet Take  by mouth daily.       diclofenac (VOLTAREN) 1 % topical gel Apply 4 g topically       docusate sodium (DSS) 100 MG capsule TAKE ONE CAPSULE BY MOUTH EVERY DAY FOR STOOL SOFTENING       fluvoxaMINE (LUVOX) 100 MG tablet Take 150 mg at bedtime       hydrochlorothiazide (HYDRODIURIL) 25 MG tablet Take 25 mg by mouth daily.       Hypromellose (ARTIFICIAL TEARS OP) Apply  to eye. 2 drops in each eye twice a day as needed       Lactobacillus-Inulin ([facility] DIGESTIVE [facility]) CAPS 1 tab daily 30 capsule 1     lisinopril (PRINIVIL,ZESTRIL) 10 MG tablet Take 10 mg by mouth daily.       omeprazole (PRILOSEC) 20 MG capsule Take 1 capsule by mouth 2 times daily. 60 capsule prn     polyethylene glycol (MIRALAX) 17 GM/Dose powder TAKE 17 GRAMS BY MOUTH EVERY DAY ** MIXED IN JUICE OR WATER AS DIRECTED ** USE THE COVER AS A MEASURE **       pregabalin (LYRICA) 150 MG capsule TAKE 1 CAPSULE BY MOUTH TWICE A DAY       QUEtiapine (SEROQUEL) 25 MG tablet 1/2 tablet nightly       simvastatin (ZOCOR) 20 MG tablet Take  by mouth At Bedtime.        TRAZodone (DESYREL) 50 MG tablet Take 50 mg by mouth At Bedtime.       triamcinolone (KENALOG) 0.1 % external cream Apply topically 2 times daily 30 g 0     vortioxetine (TRINTELLIX) 20 MG tablet TAKE ONE TABLET BY MOUTH EVERY MORNING         Allergies   Allergen Reactions     Aspirin      Motrin [Ibuprofen Micronized]      And ASA  Cramps  diarrhea     Immunization History   Administered Date(s) Administered     Influenza (IIV3) PF 10/24/2012     Pneumo Conj 13-V (2010&after) 02/01/2016     Pneumococcal 23 valent 04/07/2015     TDAP Vaccine (Adacel) 07/29/2011       Past Medical History:   Diagnosis Date     Anxiety      Benign hypertension 12/11/2013     Cervicalgia      Depressive disorder, not elsewhere classified      Diverticulitis of colon 7/10/2013     Esophageal reflux      Irritable bowel syndrome 10/3/2003     Lumbago      Migraine, unspecified, without mention of intractable migraine without mention of status migrainosus      Other kyphoscoliosis and scoliosis      Other specified gastritis without mention of hemorrhage      PTSD (post-traumatic stress disorder)      Tobacco abuse since 1965    on and off       Past Surgical History:   Procedure Laterality Date     C APPENDECTOMY       COLONOSCOPY N/A 6/8/2016    Procedure: COMBINED COLONOSCOPY, SINGLE OR MULTIPLE BIOPSY/POLYPECTOMY BY BIOPSY;  Surgeon: Mahesh Amanda MD;  Location: [facility]     ESOPHAGOSCOPY, GASTROSCOPY, DUODENOSCOPY (EGD), COMBINED  9/6/2011    Procedure:COMBINED ESOPHAGOSCOPY, GASTROSCOPY, DUODENOSCOPY (EGD), BIOPSY SINGLE OR MULTIPLE; Esophagogastroduodenoscopy with multiple Biopsy's; Surgeon:MARINA DYE; Location:[facility]     ZZC NONSPECIFIC PROCEDURE      nasal septal fx and a devitation needing plastic surgery       SOCIAL HISTORY  History   Smoking Status     Former Smoker     Packs/day: 0.50     Years: 45.00     Types: Cigarettes     Quit date: 7/26/2013   Smokeless Tobacco     Never Used     Comment: since 1965, on and  off in there 1/2 pack per day    Social History    Substance and Sexual Activity      Alcohol use: No     History   Drug Use     Types: Marijuana     Comment: Marijuana       FAMILY HISTORY  No family history of melanoma. Father had BCC and prostate cancer, and  at 94 yo from heart failure. Mother is alive, currently 94 yo.  Family History   Problem Relation Age of Onset     Diabetes Father      Heart Disease Brother        PHYSICAL EXAMINATION  /78   Pulse 63   Temp 98.7  F (37.1  C) (Oral)   Resp 18   Wt 88.6 kg (195 lb 4.8 oz)   SpO2 93%   BMI 29.70 kg/m    Wt Readings from Last 2 Encounters:   20 88.6 kg (195 lb 4.8 oz)   20 88.5 kg (195 lb)     GENERAL: Healthy, alert and no distress  EYES: Eyes grossly normal to inspection.  No discharge or erythema, or obvious scleral/conjunctival abnormalities.  HENT: The large mid-scalp pigmented lesion extends from right of midline to the left side of the head, roughly measuring 10 x 5 cm, though a smaller separate satellite lesion measures 3 x 1.5 cm in size in the left temporal scalp. Left ear pinna lesion measures 2mm, possible in-transit metastasis. Otherwise, external ears, nose and mouth without ulcers or lesions.  No nasal drainage visible.  NECK: No asymmetry, visible masses or scars  RESP: No audible wheeze, cough, or visible cyanosis.  No visible retractions or increased work of breathing.    CV: regular rates and rhythm, normal S1 S2, no S3 or S4, no murmur, click or rub and no peripheral edema  MS: No gross musculoskeletal defects noted.  Normal range of motion.  No visible edema.  NEURO: Cranial nerves grossly intact.  Mentation and speech appropriate for age.  PSYCH: Mentation appears normal, affect normal/bright, judgement and insight intact, normal speech and appearance well-groomed.    The rest of a comprehensive physical examination is deferred due to PHE (public health emergency) video visit restrictions.                    Again, thank you for allowing me to participate in the care of your patient.        Sincerely,        Oksana Hernández MD

## 2020-12-21 NOTE — NURSING NOTE
"Oncology Rooming Note    December 21, 2020 12:49 PM   Ahmet Coleman is a 75 year old male who presents for:    Chief Complaint   Patient presents with     Oncology Clinic Visit     Pt is here for a new Eval for      Initial Vitals: Blood Pressure 132/78   Pulse 63   Temperature 98.7  F (37.1  C) (Oral)   Respiration 18   Weight 88.6 kg (195 lb 4.8 oz)   Oxygen Saturation 93%   Body Mass Index 29.70 kg/m   Estimated body mass index is 29.7 kg/m  as calculated from the following:    Height as of 11/25/20: 1.727 m (5' 8\").    Weight as of this encounter: 88.6 kg (195 lb 4.8 oz). Body surface area is 2.06 meters squared.  Moderate Pain (4) Comment: Data Unavailable   No LMP for male patient.  Allergies reviewed: Yes  Medications reviewed: Yes    Medications: Medication refills not needed today.  Pharmacy name entered into [employer]:    [facility] [address] [place], [place] - [address] [facility] [id number] [place], [place] - [address] [facility]    Clinical concerns: none       Jyoti Bolivar MA            "

## 2020-12-22 LAB — COPATH REPORT: NORMAL

## 2020-12-29 PROBLEM — H26.9 CATARACT: Status: ACTIVE | Noted: 2020-12-29

## 2020-12-29 PROBLEM — H52.4 PRESBYOPIA: Status: ACTIVE | Noted: 2020-12-29

## 2020-12-29 PROBLEM — H90.3 SENSORINEURAL HEARING LOSS (SNHL) OF BOTH EARS: Status: ACTIVE | Noted: 2020-12-29

## 2020-12-29 PROBLEM — K57.30 DIVERTICULOSIS OF COLON: Status: ACTIVE | Noted: 2020-12-29

## 2020-12-29 PROBLEM — E78.1 PURE HYPERTRIGLYCERIDEMIA: Status: ACTIVE | Noted: 2020-12-29

## 2020-12-29 PROBLEM — Z87.828: Status: ACTIVE | Noted: 2020-12-29

## 2020-12-29 PROBLEM — F51.04 PSYCHOPHYSIOLOGIC INSOMNIA: Status: ACTIVE | Noted: 2020-12-29

## 2020-12-29 PROBLEM — N52.9 IMPOTENCE OF ORGANIC ORIGIN: Status: ACTIVE | Noted: 2020-12-29

## 2020-12-29 PROBLEM — F32.9 MAJOR DEPRESSIVE DISORDER WITH SINGLE EPISODE: Status: ACTIVE | Noted: 2020-12-29

## 2020-12-29 PROBLEM — E78.00 PURE HYPERCHOLESTEROLEMIA: Status: ACTIVE | Noted: 2020-12-29

## 2020-12-29 PROBLEM — F60.9 PERSONALITY DISORDER (H): Status: ACTIVE | Noted: 2020-12-29

## 2020-12-29 PROBLEM — K52.9 OTHER AND UNSPECIFIED NONINFECTIOUS GASTROENTERITIS AND COLITIS: Status: ACTIVE | Noted: 2020-12-29

## 2020-12-29 PROBLEM — R69 OTHER ILL-DEFINED AND UNKNOWN CAUSES OF MORBIDITY AND MORTALITY: Status: ACTIVE | Noted: 2020-12-29

## 2020-12-29 PROBLEM — D12.6 BENIGN NEOPLASM OF COLON: Status: RESOLVED | Noted: 2020-12-29 | Resolved: 2020-12-29

## 2020-12-29 PROBLEM — M19.049 ARTHROPATHY OF HAND: Status: ACTIVE | Noted: 2020-12-29

## 2020-12-29 PROBLEM — R10.9 ABDOMINAL DISCOMFORT: Status: ACTIVE | Noted: 2020-12-29

## 2020-12-29 PROBLEM — D12.6 BENIGN NEOPLASM OF COLON: Status: ACTIVE | Noted: 2020-12-29

## 2020-12-29 PROBLEM — F12.10 CANNABIS ABUSE, EPISODIC USE: Status: ACTIVE | Noted: 2020-12-29

## 2020-12-29 PROBLEM — M47.812 SPONDYLOSIS OF CERVICAL SPINE WITHOUT MYELOPATHY: Status: ACTIVE | Noted: 2020-12-29

## 2020-12-29 PROBLEM — B02.30 HERPES ZOSTER OPHTHALMICUS OF LEFT EYE: Status: ACTIVE | Noted: 2018-04-26

## 2020-12-29 PROBLEM — Z83.3 FAMILY HISTORY OF TYPE 2 DIABETES MELLITUS: Status: ACTIVE | Noted: 2020-12-29

## 2020-12-29 PROBLEM — H93.19 SUBJECTIVE TINNITUS: Status: ACTIVE | Noted: 2020-12-29

## 2020-12-29 PROBLEM — M79.7 FIBROMYOSITIS: Status: ACTIVE | Noted: 2020-12-29

## 2020-12-29 PROBLEM — M25.50 ARTHRALGIA: Status: ACTIVE | Noted: 2020-12-29

## 2020-12-29 RX ORDER — MAGNESIUM OXIDE 420 MG/1
420 TABLET ORAL DAILY
COMMUNITY
Start: 2020-12-15

## 2021-01-06 ENCOUNTER — VIRTUAL VISIT (OUTPATIENT)
Dept: ONCOLOGY | Facility: CLINIC | Age: 76
End: 2021-01-06
Attending: INTERNAL MEDICINE
Payer: MEDICARE

## 2021-01-06 DIAGNOSIS — C43.4 MALIGNANT MELANOMA OF SCALP (H): Primary | ICD-10-CM

## 2021-01-06 PROCEDURE — 99442 PR PHYSICIAN TELEPHONE EVALUATION 11-20 MIN: CPT | Mod: 95 | Performed by: INTERNAL MEDICINE

## 2021-01-06 PROCEDURE — 999N001193 HC VIDEO/TELEPHONE VISIT; NO CHARGE

## 2021-01-06 NOTE — LETTER
"    1/6/2021         RE: Ahmet Coleman  [address] [place] [id number]        Dear Colleague,    Thank you for referring your patient, Ahmet Coleman, to [facility]. Please see a copy of my visit note below.    Ahmet Coleman is a 75 year old male who is being evaluated via a billable telephone visit.      What phone number would you like to be contacted at? [phone]  How would you like to obtain your AVS? Mail a copy     Vitals - Patient Reported  Weight (Patient Reported): 88.5 kg (195 lb)  Height (Patient Reported): 173.4 cm (5' 8.25\")  BMI (Based on Pt Reported Ht/Wt): 29.43  Pain Score: No Pain (0)    Tess COELLO  Phone call duration: 25 minutes    MEDICAL ONCOLOGY PROGRESS NOTE  Melanoma Clinic  Jan 6, 2021    CHIEF COMPLAINT: Scalp melanoma    Melanoma History:  1. He has a small pigmented scalp lesion present for over 30 years. The lesion was biopsied in 1992 and was benign.  2. October 2020, he presented to [facility] with 1 year of progressive enlargement of the lesion and new onset burning, itching, and stinging sensation in the affected scalp.  3. 10/26/20, He was seen at [facility] and he had shave biopsies of A. left central parietal scalp, B. left central occipital scalp, C. Left posterior parietal scalp, and D. punch biopsy of the left superior occipital scalp. Pathology (specimen: [id number]) showed a nodular and nevoid type melanoma, non-ulcerated, Breslow depth up to 1.3 mm, Saurabh's level IV, and up to 3 mitoses per mm2. All margins were involved. Ki-67 10-20%. Sox10 stain is positive and highlights an atypical compound melanocytic proliferation with significant overlying confluence and pagetosis of intraepidermal melanocytes. T-stage: at least pT2a. PD-L1 staining shows PD-L1 TPS <1%. 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  4. 11/25/20, he was seen by Dr. Garcia and he took three 3mm punch " biopsies, which returned as dermal melanocytic proliferations with melanophages and fibrosis, consistent with locoregional metastatic melanoma.  5. 12/8/2020 he had PET-CT, which showed FDG avid irregular skin thickening overlying the left parietal region, with no FDG avid lymphadenopathy in the neck and no evidence of metastasis elsewhere in the body.    HISTORY OF PRESENT ILLNESS  Ahmet Coleman is a 75 year old  with agent orange skin exposures in [place]. He is referred by Dr. Garcia for evaluation of an enlargening malignant melanoma of the scalp.    He is doing well, but anxious to start on his treatments. We reviewed that his molecular testing result returned with a BRAF mutation, making him eligible for BRAF inhibitor therapy. He denies any shortness of breath, chest pain, nausea, vomiting, fevers or chills. He has had no significant weight loss. No labs obtained today.    Performance status is 0.    REVIEW OF SYSTEMS  A 12-point ROS negative except as in HPI    Current Outpatient Medications   Medication Sig Dispense Refill     acetaminophen (TYLENOL) 325 MG tablet TAKE TWO TABLETS BY MOUTH THREE TIMES A DAY AS NEEDED       albuterol (PROAIR HFA, PROVENTIL HFA, VENTOLIN HFA) 108 (90 BASE) MCG/ACT inhaler Inhale  into the lungs as needed.       cholecalciferol (VITAMIN D3) 1000 UNIT tablet Take  by mouth daily.       diclofenac (VOLTAREN) 1 % topical gel Apply 4 g topically       docusate sodium (DSS) 100 MG capsule TAKE ONE CAPSULE BY MOUTH EVERY DAY FOR STOOL SOFTENING       fluvoxaMINE (LUVOX) 100 MG tablet Take 150 mg at bedtime       hydrochlorothiazide (HYDRODIURIL) 25 MG tablet Take 25 mg by mouth daily.       Hypromellose (ARTIFICIAL TEARS OP) Apply  to eye. 2 drops in each eye twice a day as needed       Lactobacillus-Inulin ([facility] DIGESTIVE [facility]) CAPS 1 tab daily 30 capsule 1     lisinopril (PRINIVIL,ZESTRIL) 10 MG tablet Take 10 mg by mouth daily.       Magnesium Oxide 420 MG  TABS Take 420 mg by mouth daily       omeprazole (PRILOSEC) 20 MG capsule Take 1 capsule by mouth 2 times daily. 60 capsule prn     polyethylene glycol (MIRALAX) 17 GM/Dose powder TAKE 17 GRAMS BY MOUTH EVERY DAY ** MIXED IN JUICE OR WATER AS DIRECTED ** USE THE COVER AS A MEASURE **       pregabalin (LYRICA) 150 MG capsule TAKE 1 CAPSULE BY MOUTH TWICE A DAY       QUEtiapine (SEROQUEL) 25 MG tablet 1/2 tablet nightly       simvastatin (ZOCOR) 20 MG tablet Take  by mouth At Bedtime.       TRAZodone (DESYREL) 50 MG tablet Take 50 mg by mouth At Bedtime.       triamcinolone (KENALOG) 0.1 % external cream Apply topically 2 times daily 30 g 0     vortioxetine (TRINTELLIX) 20 MG tablet TAKE ONE TABLET BY MOUTH EVERY MORNING         Past Medical History:   Diagnosis Date     Anxiety      Benign hypertension 12/11/2013     Cervicalgia      Depressive disorder, not elsewhere classified      Diverticulitis of colon 7/10/2013     Esophageal reflux      Irritable bowel syndrome 10/3/2003     Lumbago      Malignant melanoma of scalp (H) 12/29/2020     Migraine, unspecified, without mention of intractable migraine without mention of status migrainosus      Other kyphoscoliosis and scoliosis      Other specified gastritis without mention of hemorrhage      PTSD (post-traumatic stress disorder)      Tobacco abuse since 1965    on and off       Past Surgical History:   Procedure Laterality Date     C APPENDECTOMY       COLONOSCOPY N/A 6/8/2016    Procedure: COMBINED COLONOSCOPY, SINGLE OR MULTIPLE BIOPSY/POLYPECTOMY BY BIOPSY;  Surgeon: Mahesh Amanda MD;  Location: [facility]     ESOPHAGOSCOPY, GASTROSCOPY, DUODENOSCOPY (EGD), COMBINED  9/6/2011    Procedure:COMBINED ESOPHAGOSCOPY, GASTROSCOPY, DUODENOSCOPY (EGD), BIOPSY SINGLE OR MULTIPLE; Esophagogastroduodenoscopy with multiple Biopsy's; Surgeon:MARINA DYE; Location: GI     ZZC NONSPECIFIC PROCEDURE      nasal septal fx and a devitation needing plastic surgery        PHYSICAL EXAMINATION  There were no vitals taken for this visit.  Wt Readings from Last 2 Encounters:   12/21/20 88.6 kg (195 lb 4.8 oz)   11/25/20 88.5 kg (195 lb)     GENERAL: Healthy, alert and no distress  EYES: Eyes grossly normal to inspection.  No discharge or erythema, or obvious scleral/conjunctival abnormalities.  HENT: The large mid-scalp pigmented lesion extends from right of midline to the left side of the head, roughly measuring 10 x 5 cm. External ears, nose and mouth without ulcers or lesions.  No nasal drainage visible.  NECK: No asymmetry, visible masses or scars  RESP: No audible wheeze, cough, or visible cyanosis.  No visible retractions or increased work of breathing.    CV: regular rates and rhythm, normal S1 S2, no S3 or S4, no murmur, click or rub and no peripheral edema  MS: No gross musculoskeletal defects noted.  Normal range of motion.  No visible edema.  NEURO: Cranial nerves grossly intact.  Mentation and speech appropriate for age.  PSYCH: Mentation appears normal, affect normal/bright, judgement and insight intact, normal speech and appearance well-groomed.    The rest of a comprehensive physical examination is deferred due to PHE (public health emergency) video visit restrictions.      ASSESSMENT AND PLAN  #1 Cutaneous melanoma, scalp primary, pT2a cN1c, clinical Stage IIIB, advanced unresectable  It was a pleasure to meet Mr. Coleman today. He is a 75 year old  with agent orange cutaneous exposures in the [place] war and a diagnosis of malignant melanoma arising from a pre-existing pigmented lesion on the scalp. He has clinical evidence of satellitosis with separation of a left temporal satellite from the main tumor mass, and possibly in-transit metastasis to the helix of the left ear.    We again discussed systemic therapy. We will start with systemic therapy for advanced unresectable disease and watch his response for the potential of aattempting surgery. While surgery  would not necessarily be technically any more difficult, it may allow the surgeon to obtain clear margins.    We discussed checkpoint inhibitor monotherapy, with rates of response of about 20-25% for PD-L1 negative disease, and BRAF/MEK-inhibition with response rates of 75-80%. We also discussed the recently FDA approved combination of atezolizumab, vemurafenib, and cobimetinib for advanced unresectable or metastatic disease.  This is the same therapy being studies in the NeoActivate study and has had success rate of 75-80% in line with rates for BRAF/MEKi, but with potential for long term disease free survival.    He elects to proceed with atezolizumab, vemurafenib, and cobimetinib. We will plan to see him back virtually about 2  weeks into therapy (MD/LEVY) to assess tolerability, and then I will see him back prior to atezolizumab infusion 1 month after starting his treatments.    Multiple questions answered.     Oksana Peñaloza M.D.   of Medicine  Hematology, Oncology and Transplantation

## 2021-01-06 NOTE — PROGRESS NOTES
"Ahmet Coleman is a 75 year old male who is being evaluated via a billable telephone visit.      What phone number would you like to be contacted at? [phone]  How would you like to obtain your AVS? Mail a copy     Vitals - Patient Reported  Weight (Patient Reported): 88.5 kg (195 lb)  Height (Patient Reported): 173.4 cm (5' 8.25\")  BMI (Based on Pt Reported Ht/Wt): 29.43  Pain Score: No Pain (0)    Tess COELLO  Phone call duration: 25 minutes    MEDICAL ONCOLOGY PROGRESS NOTE  Melanoma Clinic  Jan 6, 2021    CHIEF COMPLAINT: Scalp melanoma    Melanoma History:  1. He has a small pigmented scalp lesion present for over 30 years. The lesion was biopsied in 1992 and was benign.  2. October 2020, he presented to [facility] with 1 year of progressive enlargement of the lesion and new onset burning, itching, and stinging sensation in the affected scalp.  3. 10/26/20, He was seen at [facility] and he had shave biopsies of A. left central parietal scalp, B. left central occipital scalp, C. Left posterior parietal scalp, and D. punch biopsy of the left superior occipital scalp. Pathology (specimen: [id number]) showed a nodular and nevoid type melanoma, non-ulcerated, Breslow depth up to 1.3 mm, Saurabh's level IV, and up to 3 mitoses per mm2. All margins were involved. Ki-67 10-20%. Sox10 stain is positive and highlights an atypical compound melanocytic proliferation with significant overlying confluence and pagetosis of intraepidermal melanocytes. T-stage: at least pT2a. PD-L1 staining shows PD-L1 TPS <1%. 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  4. 11/25/20, he was seen by Dr. Garcia and he took three 3mm punch biopsies, which returned as dermal melanocytic proliferations with melanophages and fibrosis, consistent with locoregional metastatic melanoma.  5. 12/8/2020 he had PET-CT, which showed FDG avid irregular skin thickening overlying the left parietal region, with no FDG " avid lymphadenopathy in the neck and no evidence of metastasis elsewhere in the body.    HISTORY OF PRESENT ILLNESS  Ahmet Coleman is a 75 year old  with agent orange skin exposures in [place]. He is referred by Dr. Garcia for evaluation of an enlargening malignant melanoma of the scalp.    He is doing well, but anxious to start on his treatments. We reviewed that his molecular testing result returned with a BRAF mutation, making him eligible for BRAF inhibitor therapy. He denies any shortness of breath, chest pain, nausea, vomiting, fevers or chills. He has had no significant weight loss. No labs obtained today.    Performance status is 0.    REVIEW OF SYSTEMS  A 12-point ROS negative except as in HPI    Current Outpatient Medications   Medication Sig Dispense Refill     acetaminophen (TYLENOL) 325 MG tablet TAKE TWO TABLETS BY MOUTH THREE TIMES A DAY AS NEEDED       albuterol (PROAIR HFA, PROVENTIL HFA, VENTOLIN HFA) 108 (90 BASE) MCG/ACT inhaler Inhale  into the lungs as needed.       cholecalciferol (VITAMIN D3) 1000 UNIT tablet Take  by mouth daily.       diclofenac (VOLTAREN) 1 % topical gel Apply 4 g topically       docusate sodium (DSS) 100 MG capsule TAKE ONE CAPSULE BY MOUTH EVERY DAY FOR STOOL SOFTENING       fluvoxaMINE (LUVOX) 100 MG tablet Take 150 mg at bedtime       hydrochlorothiazide (HYDRODIURIL) 25 MG tablet Take 25 mg by mouth daily.       Hypromellose (ARTIFICIAL TEARS OP) Apply  to eye. 2 drops in each eye twice a day as needed       Lactobacillus-Inulin ([facility] DIGESTIVE [facility]) CAPS 1 tab daily 30 capsule 1     lisinopril (PRINIVIL,ZESTRIL) 10 MG tablet Take 10 mg by mouth daily.       Magnesium Oxide 420 MG TABS Take 420 mg by mouth daily       omeprazole (PRILOSEC) 20 MG capsule Take 1 capsule by mouth 2 times daily. 60 capsule prn     polyethylene glycol (MIRALAX) 17 GM/Dose powder TAKE 17 GRAMS BY MOUTH EVERY DAY ** MIXED IN JUICE OR WATER AS DIRECTED ** USE THE COVER AS  A MEASURE **       pregabalin (LYRICA) 150 MG capsule TAKE 1 CAPSULE BY MOUTH TWICE A DAY       QUEtiapine (SEROQUEL) 25 MG tablet 1/2 tablet nightly       simvastatin (ZOCOR) 20 MG tablet Take  by mouth At Bedtime.       TRAZodone (DESYREL) 50 MG tablet Take 50 mg by mouth At Bedtime.       triamcinolone (KENALOG) 0.1 % external cream Apply topically 2 times daily 30 g 0     vortioxetine (TRINTELLIX) 20 MG tablet TAKE ONE TABLET BY MOUTH EVERY MORNING         Past Medical History:   Diagnosis Date     Anxiety      Benign hypertension 12/11/2013     Cervicalgia      Depressive disorder, not elsewhere classified      Diverticulitis of colon 7/10/2013     Esophageal reflux      Irritable bowel syndrome 10/3/2003     Lumbago      Malignant melanoma of scalp (H) 12/29/2020     Migraine, unspecified, without mention of intractable migraine without mention of status migrainosus      Other kyphoscoliosis and scoliosis      Other specified gastritis without mention of hemorrhage      PTSD (post-traumatic stress disorder)      Tobacco abuse since 1965    on and off       Past Surgical History:   Procedure Laterality Date     C APPENDECTOMY       COLONOSCOPY N/A 6/8/2016    Procedure: COMBINED COLONOSCOPY, SINGLE OR MULTIPLE BIOPSY/POLYPECTOMY BY BIOPSY;  Surgeon: Mahesh Amanda MD;  Location: [facility]     ESOPHAGOSCOPY, GASTROSCOPY, DUODENOSCOPY (EGD), COMBINED  9/6/2011    Procedure:COMBINED ESOPHAGOSCOPY, GASTROSCOPY, DUODENOSCOPY (EGD), BIOPSY SINGLE OR MULTIPLE; Esophagogastroduodenoscopy with multiple Biopsy's; Surgeon:MARINA DYE; Location:[facility]     ZZC NONSPECIFIC PROCEDURE      nasal septal fx and a devitation needing plastic surgery       PHYSICAL EXAMINATION  There were no vitals taken for this visit.  Wt Readings from Last 2 Encounters:   12/21/20 88.6 kg (195 lb 4.8 oz)   11/25/20 88.5 kg (195 lb)     GENERAL: Healthy, alert and no distress  EYES: Eyes grossly normal to inspection.  No discharge or erythema,  or obvious scleral/conjunctival abnormalities.  HENT: The large mid-scalp pigmented lesion extends from right of midline to the left side of the head, roughly measuring 10 x 5 cm. External ears, nose and mouth without ulcers or lesions.  No nasal drainage visible.  NECK: No asymmetry, visible masses or scars  RESP: No audible wheeze, cough, or visible cyanosis.  No visible retractions or increased work of breathing.    CV: regular rates and rhythm, normal S1 S2, no S3 or S4, no murmur, click or rub and no peripheral edema  MS: No gross musculoskeletal defects noted.  Normal range of motion.  No visible edema.  NEURO: Cranial nerves grossly intact.  Mentation and speech appropriate for age.  PSYCH: Mentation appears normal, affect normal/bright, judgement and insight intact, normal speech and appearance well-groomed.    The rest of a comprehensive physical examination is deferred due to PHE (public health emergency) video visit restrictions.      ASSESSMENT AND PLAN  #1 Cutaneous melanoma, scalp primary, pT2a cN1c, clinical Stage IIIB, advanced unresectable  It was a pleasure to meet Mr. Coleman today. He is a 75 year old  with agent orange cutaneous exposures in the [place] war and a diagnosis of malignant melanoma arising from a pre-existing pigmented lesion on the scalp. He has clinical evidence of satellitosis with separation of a left temporal satellite from the main tumor mass, and possibly in-transit metastasis to the helix of the left ear.    We again discussed systemic therapy. We will start with systemic therapy for advanced unresectable disease and watch his response for the potential of aattempting surgery. While surgery would not necessarily be technically any more difficult, it may allow the surgeon to obtain clear margins.    We discussed checkpoint inhibitor monotherapy, with rates of response of about 20-25% for PD-L1 negative disease, and BRAF/MEK-inhibition with response rates of 75-80%. We  also discussed the recently FDA approved combination of atezolizumab, vemurafenib, and cobimetinib for advanced unresectable or metastatic disease.  This is the same therapy being studies in the NeoActivate study and has had success rate of 75-80% in line with rates for BRAF/MEKi, but with potential for long term disease free survival.    He elects to proceed with atezolizumab, vemurafenib, and cobimetinib. We will plan to see him back virtually about 2  weeks into therapy (MD/LEVY) to assess tolerability, and then I will see him back prior to atezolizumab infusion 1 month after starting his treatments.    Multiple questions answered.     Oksana Peñaloza M.D.   of Medicine  Hematology, Oncology and Transplantation

## 2021-01-07 DIAGNOSIS — C43.4 MALIGNANT MELANOMA OF SCALP (H): Primary | ICD-10-CM

## 2021-01-07 DIAGNOSIS — E78.00 PURE HYPERCHOLESTEROLEMIA: ICD-10-CM

## 2021-01-07 DIAGNOSIS — D12.6 BENIGN NEOPLASM OF COLON: ICD-10-CM

## 2021-01-07 DIAGNOSIS — C43.9 METASTATIC MELANOMA (H): ICD-10-CM

## 2021-01-08 ENCOUNTER — TELEPHONE (OUTPATIENT)
Dept: ONCOLOGY | Facility: CLINIC | Age: 76
End: 2021-01-08

## 2021-01-08 DIAGNOSIS — C43.4 MALIGNANT MELANOMA OF SCALP (H): Primary | ICD-10-CM

## 2021-01-08 RX ORDER — PROCHLORPERAZINE MALEATE 10 MG
10 TABLET ORAL EVERY 6 HOURS PRN
Qty: 30 TABLET | Refills: 2 | Status: SHIPPED | OUTPATIENT
Start: 2021-01-13 | End: 2023-07-24

## 2021-01-08 NOTE — ORAL ONC MGMT
"Oral Chemotherapy Monitoring Program    Lab Monitoring Plan  CMP/CBC/Mg/Phos monthly; EKG at baseline and d15 and then monthly x3 and then q3m; CPK at baseline and periodically; LVEF monitoring at baseline, 1m, and q3m   Labs drawn outside of [facility]: labs with infusion appointments after first month  Subjective/Objective:  Ahmet Coleman is a 75 year old male contacted by phone for an initial visit for oral chemotherapy education.     ORAL CHEMOTHERAPY 1/8/2021 1/8/2021   Assessment Type New Teach -   Diagnosis Code Melanoma -   Providers Dr. Bowen Hernández -   Clinic Name/Location [facility] -   Drug Name Zelboraf (Vemurafenib) Cotellic (Cobimetinib)   Dose (No Data) 60 mg   Current Schedule BID Daily   Cycle Details Continuous 3 weeks on, 1 week off       Last PHQ-2 Score on record:   PHQ-2 ( 1999 Pf[employer]) 11/25/2020 2/1/2016   Q1: Little interest or pleasure in doing things 1 1   Q2: Feeling down, depressed or hopeless 3 1   PHQ-2 Score 4 2       Vitals:  BP:   BP Readings from Last 1 Encounters:   12/21/20 132/78     Wt Readings from Last 1 Encounters:   12/21/20 88.6 kg (195 lb 4.8 oz)     Estimated body surface area is 2.06 meters squared as calculated from the following:    Height as of 11/25/20: 1.727 m (5' 8\").    Weight as of 12/21/20: 88.6 kg (195 lb 4.8 oz).    Labs:  No results found for NA within last 30 days.     No results found for K within last 30 days.     No results found for CA within last 30 days.     No results found for Mag within last 30 days.     No results found for Phos within last 30 days.     No results found for ALBUMIN within last 30 days.     No results found for BUN within last 30 days.     No results found for CR within last 30 days.     No results found for AST within last 30 days.     No results found for ALT within last 30 days.     No results found for BILITOTAL within last 30 days.     No results found for WBC within last 30 days.     No results found for HGB within last 30 " days.     No results found for PLT within last 30 days.     No results found for ANC within last 30 days.       Assessment:  Patient is appropriate to start therapy pending baseline labs, ECHO and EKG.    Plan:  Basic chemotherapy teaching was reviewed with the patient including indication, start date of therapy, dose, administration, adverse effects, missed doses, food and drug interactions, monitoring, side effect management, office contact information, and safe handling. Written materials were provided and all questions answered.    Follow-Up:  One week after starting treatment to assess tolerance     Haley Payne, PharmD, [facility]  Hematology/Oncology Clinical Pharmacist  [facility]  [facility]  [phone]

## 2021-01-12 ENCOUNTER — ANCILLARY PROCEDURE (OUTPATIENT)
Dept: CARDIOLOGY | Facility: CLINIC | Age: 76
End: 2021-01-12
Attending: INTERNAL MEDICINE
Payer: MEDICARE

## 2021-01-12 DIAGNOSIS — E78.00 PURE HYPERCHOLESTEROLEMIA: ICD-10-CM

## 2021-01-12 DIAGNOSIS — C43.4 MALIGNANT MELANOMA OF SCALP (H): ICD-10-CM

## 2021-01-12 DIAGNOSIS — C43.9 METASTATIC MELANOMA (H): ICD-10-CM

## 2021-01-12 DIAGNOSIS — D12.6 BENIGN NEOPLASM OF COLON: ICD-10-CM

## 2021-01-12 LAB
ALBUMIN SERPL-MCNC: 3.8 G/DL (ref 3.4–5)
ALP SERPL-CCNC: 53 U/L (ref 40–150)
ALT SERPL W P-5'-P-CCNC: 23 U/L (ref 0–70)
ANION GAP SERPL CALCULATED.3IONS-SCNC: 1 MMOL/L (ref 3–14)
AST SERPL W P-5'-P-CCNC: 13 U/L (ref 0–45)
BASOPHILS # BLD AUTO: 0 10E9/L (ref 0–0.2)
BASOPHILS NFR BLD AUTO: 0.6 %
BILIRUB SERPL-MCNC: 0.4 MG/DL (ref 0.2–1.3)
BUN SERPL-MCNC: 23 MG/DL (ref 7–30)
CALCIUM SERPL-MCNC: 9.5 MG/DL (ref 8.5–10.1)
CHLORIDE SERPL-SCNC: 104 MMOL/L (ref 94–109)
CK SERPL-CCNC: 78 U/L (ref 30–300)
CO2 SERPL-SCNC: 35 MMOL/L (ref 20–32)
CREAT SERPL-MCNC: 1.27 MG/DL (ref 0.66–1.25)
DIFFERENTIAL METHOD BLD: ABNORMAL
EOSINOPHIL # BLD AUTO: 0.1 10E9/L (ref 0–0.7)
EOSINOPHIL NFR BLD AUTO: 2.2 %
ERYTHROCYTE [DISTWIDTH] IN BLOOD BY AUTOMATED COUNT: 12.4 % (ref 10–15)
GFR SERPL CREATININE-BSD FRML MDRD: 55 ML/MIN/{1.73_M2}
GGT SERPL-CCNC: 18 U/L (ref 0–75)
GLUCOSE SERPL-MCNC: 81 MG/DL (ref 70–99)
HCT VFR BLD AUTO: 38.7 % (ref 40–53)
HGB BLD-MCNC: 13.6 G/DL (ref 13.3–17.7)
IMM GRANULOCYTES # BLD: 0 10E9/L (ref 0–0.4)
IMM GRANULOCYTES NFR BLD: 0.2 %
LYMPHOCYTES # BLD AUTO: 1.9 10E9/L (ref 0.8–5.3)
LYMPHOCYTES NFR BLD AUTO: 37.3 %
MAGNESIUM SERPL-MCNC: 2.1 MG/DL (ref 1.6–2.3)
MCH RBC QN AUTO: 29.2 PG (ref 26.5–33)
MCHC RBC AUTO-ENTMCNC: 35.1 G/DL (ref 31.5–36.5)
MCV RBC AUTO: 83 FL (ref 78–100)
MONOCYTES # BLD AUTO: 0.5 10E9/L (ref 0–1.3)
MONOCYTES NFR BLD AUTO: 10.2 %
NEUTROPHILS # BLD AUTO: 2.5 10E9/L (ref 1.6–8.3)
NEUTROPHILS NFR BLD AUTO: 49.5 %
PHOSPHATE SERPL-MCNC: 3.3 MG/DL (ref 2.5–4.5)
PLATELET # BLD AUTO: 219 10E9/L (ref 150–450)
POTASSIUM SERPL-SCNC: 4.1 MMOL/L (ref 3.4–5.3)
PROT SERPL-MCNC: 7.3 G/DL (ref 6.8–8.8)
RBC # BLD AUTO: 4.66 10E12/L (ref 4.4–5.9)
SODIUM SERPL-SCNC: 140 MMOL/L (ref 133–144)
TSH SERPL DL<=0.005 MIU/L-ACNC: 1.71 MU/L (ref 0.4–4)
WBC # BLD AUTO: 5 10E9/L (ref 4–11)

## 2021-01-12 PROCEDURE — 36415 COLL VENOUS BLD VENIPUNCTURE: CPT | Performed by: INTERNAL MEDICINE

## 2021-01-12 PROCEDURE — 82550 ASSAY OF CK (CPK): CPT | Performed by: INTERNAL MEDICINE

## 2021-01-12 PROCEDURE — 85025 COMPLETE CBC W/AUTO DIFF WBC: CPT | Performed by: INTERNAL MEDICINE

## 2021-01-12 PROCEDURE — 82977 ASSAY OF GGT: CPT | Performed by: INTERNAL MEDICINE

## 2021-01-12 PROCEDURE — 84443 ASSAY THYROID STIM HORMONE: CPT | Performed by: INTERNAL MEDICINE

## 2021-01-12 PROCEDURE — 93306 TTE W/DOPPLER COMPLETE: CPT | Performed by: STUDENT IN AN ORGANIZED HEALTH CARE EDUCATION/TRAINING PROGRAM

## 2021-01-12 PROCEDURE — 84100 ASSAY OF PHOSPHORUS: CPT | Performed by: INTERNAL MEDICINE

## 2021-01-12 PROCEDURE — 80053 COMPREHEN METABOLIC PANEL: CPT | Performed by: INTERNAL MEDICINE

## 2021-01-12 PROCEDURE — 83735 ASSAY OF MAGNESIUM: CPT | Performed by: INTERNAL MEDICINE

## 2021-01-12 RX ADMIN — Medication 5 ML: at 14:00

## 2021-01-13 ENCOUNTER — TELEPHONE (OUTPATIENT)
Dept: ONCOLOGY | Facility: CLINIC | Age: 76
End: 2021-01-13

## 2021-01-13 DIAGNOSIS — C43.4 MALIGNANT MELANOMA OF SCALP (H): Primary | ICD-10-CM

## 2021-01-13 NOTE — TELEPHONE ENCOUNTER
This is not used as the prescription - rx will be e-scribed. This is for enrollment purposes only.    Rigo Gant CPhT  [facility]  Oncology Pharmacy Liaison   [email or web address]  [phone] (phone  [phone] (fax

## 2021-01-22 ENCOUNTER — TELEPHONE (OUTPATIENT)
Dept: ONCOLOGY | Facility: CLINIC | Age: 76
End: 2021-01-22

## 2021-01-22 NOTE — TELEPHONE ENCOUNTER
"Oral Chemotherapy Monitoring Program    Subjective/Objective:  Ahmet Coleman is a 75 year old male contacted by phone for a follow-up visit for oral chemotherapy.  Pt states he started the Zelboraf/Cotellic today.  He took two tablets of Zelboraf and 2 tablets of Cotellic this morning.  He states that he read the number of tablets on the bottle and got overwhelmed with the number of tablets it was telling him to take, and thought it must be a typo.  He was very grateful for the call to clarify dosing.    ORAL CHEMOTHERAPY 1/8/2021 1/8/2021 1/22/2021   Assessment Type New Teach - Initial Follow up   Diagnosis Code Melanoma - Melanoma   Providers Dr. Bowen Hernández - Dr. Bowen Hernández   [facility]   Drug Name Zelboraf (Vemurafenib) Cotellic (Cobimetinib) Cotellic (Cobimetinib)   Dose (No Data) 60 mg 60 mg   Current Schedule BID Daily Daily   Cycle Details Continuous 3 weeks on, 1 week off 3 weeks on, 1 week off   Start Date of Last Cycle - - 1/22/2021       Last PHQ-2 Score on record:   PHQ-2 ( 1999 [employer]) 11/25/2020 2/1/2016   Q1: Little interest or pleasure in doing things 1 1   Q2: Feeling down, depressed or hopeless 3 1   PHQ-2 Score 4 2       Vitals:  BP:   BP Readings from Last 1 Encounters:   12/21/20 132/78     Wt Readings from Last 1 Encounters:   12/21/20 88.6 kg (195 lb 4.8 oz)     Estimated body surface area is 2.06 meters squared as calculated from the following:    Height as of 11/25/20: 1.727 m (5' 8\").    Weight as of 12/21/20: 88.6 kg (195 lb 4.8 oz).    Labs:  _  Result Component Current Result Ref Range   Sodium 140 (1/12/2021) 133 - 144 mmol/L     _  Result Component Current Result Ref Range   Potassium 4.1 (1/12/2021) 3.4 - 5.3 mmol/L     _  Result Component Current Result Ref Range   Calcium 9.5 (1/12/2021) 8.5 - 10.1 mg/dL     _  Result Component Current Result Ref Range   Magnesium 2.1 (1/12/2021) 1.6 - 2.3 mg/dL     _  Result Component Current Result Ref " Range   Phosphorus 3.3 (1/12/2021) 2.5 - 4.5 mg/dL     _  Result Component Current Result Ref Range   Albumin 3.8 (1/12/2021) 3.4 - 5.0 g/dL     _  Result Component Current Result Ref Range   Urea Nitrogen 23 (1/12/2021) 7 - 30 mg/dL     _  Result Component Current Result Ref Range   Creatinine 1.27 (H) (1/12/2021) 0.66 - 1.25 mg/dL     _  Result Component Current Result Ref Range   AST 13 (1/12/2021) 0 - 45 U/L     _  Result Component Current Result Ref Range   ALT 23 (1/12/2021) 0 - 70 U/L     _  Result Component Current Result Ref Range   Bilirubin Total 0.4 (1/12/2021) 0.2 - 1.3 mg/dL     _  Result Component Current Result Ref Range   WBC 5.0 (1/12/2021) 4.0 - 11.0 10e9/L     _  Result Component Current Result Ref Range   Hemoglobin 13.6 (1/12/2021) 13.3 - 17.7 g/dL     _  Result Component Current Result Ref Range   Platelet Count 219 (1/12/2021) 150 - 450 10e9/L     _  Result Component Current Result Ref Range   Absolute Neutrophil 2.5 (1/12/2021) 1.6 - 8.3 10e9/L     Assessment/Plan:  Pt started Zelboraf and Cotellic today.  Spoke at length about the appropriate dosing for each medication.  Pt will take the full PM dose for Zelboraf tonight (4 tablets), and will take the full dose of Cotellic starting tomorrow (3 tablets)    Zelboraf: 960mg (4 tablets) twice a day for 21 days, then 720mg (3 tablets) twice a day for 7 days  Cotellic: 60mg (3 tablets) once daily for 21 days.    Gave pt the phone number for the [facility] pharmacist line again and encouraged him to call with any questions he has about the dosing for these two medications.    Follow-Up:  01/29: Initial follow-up assessment    Grace Myhre, PharmD  Hematology/Oncology Pharmacist  [facility]  [phone]

## 2021-01-29 ENCOUNTER — APPOINTMENT (OUTPATIENT)
Dept: LAB | Facility: CLINIC | Age: 76
End: 2021-01-29
Attending: INTERNAL MEDICINE
Payer: MEDICARE

## 2021-01-29 ENCOUNTER — TELEPHONE (OUTPATIENT)
Dept: ONCOLOGY | Facility: CLINIC | Age: 76
End: 2021-01-29

## 2021-01-29 ENCOUNTER — ONCOLOGY VISIT (OUTPATIENT)
Dept: ONCOLOGY | Facility: CLINIC | Age: 76
End: 2021-01-29
Attending: PHYSICIAN ASSISTANT
Payer: MEDICARE

## 2021-01-29 VITALS
DIASTOLIC BLOOD PRESSURE: 98 MMHG | TEMPERATURE: 98.1 F | RESPIRATION RATE: 16 BRPM | OXYGEN SATURATION: 96 % | WEIGHT: 199.7 LBS | HEART RATE: 56 BPM | SYSTOLIC BLOOD PRESSURE: 167 MMHG | BODY MASS INDEX: 30.36 KG/M2

## 2021-01-29 DIAGNOSIS — H04.123 DRY EYES: ICD-10-CM

## 2021-01-29 DIAGNOSIS — N18.9 CHRONIC KIDNEY DISEASE, UNSPECIFIED CKD STAGE: ICD-10-CM

## 2021-01-29 DIAGNOSIS — K62.89 RECTAL IRRITATION: ICD-10-CM

## 2021-01-29 DIAGNOSIS — C43.4 MALIGNANT MELANOMA OF SCALP (H): Primary | ICD-10-CM

## 2021-01-29 DIAGNOSIS — R19.7 DIARRHEA, UNSPECIFIED TYPE: ICD-10-CM

## 2021-01-29 DIAGNOSIS — I10 ESSENTIAL HYPERTENSION: ICD-10-CM

## 2021-01-29 LAB
ALBUMIN SERPL-MCNC: 3.5 G/DL (ref 3.4–5)
ALP SERPL-CCNC: 76 U/L (ref 40–150)
ALT SERPL W P-5'-P-CCNC: 25 U/L (ref 0–70)
ANION GAP SERPL CALCULATED.3IONS-SCNC: 5 MMOL/L (ref 3–14)
AST SERPL W P-5'-P-CCNC: 16 U/L (ref 0–45)
BASOPHILS # BLD AUTO: 0.1 10E9/L (ref 0–0.2)
BASOPHILS NFR BLD AUTO: 1.3 %
BILIRUB SERPL-MCNC: 0.6 MG/DL (ref 0.2–1.3)
BUN SERPL-MCNC: 21 MG/DL (ref 7–30)
CALCIUM SERPL-MCNC: 8.4 MG/DL (ref 8.5–10.1)
CHLORIDE SERPL-SCNC: 101 MMOL/L (ref 94–109)
CO2 SERPL-SCNC: 31 MMOL/L (ref 20–32)
CREAT SERPL-MCNC: 1.56 MG/DL (ref 0.66–1.25)
DIFFERENTIAL METHOD BLD: ABNORMAL
EOSINOPHIL # BLD AUTO: 0.1 10E9/L (ref 0–0.7)
EOSINOPHIL NFR BLD AUTO: 2.2 %
ERYTHROCYTE [DISTWIDTH] IN BLOOD BY AUTOMATED COUNT: 12.3 % (ref 10–15)
GFR SERPL CREATININE-BSD FRML MDRD: 43 ML/MIN/{1.73_M2}
GLUCOSE SERPL-MCNC: 92 MG/DL (ref 70–99)
HCT VFR BLD AUTO: 37.8 % (ref 40–53)
HGB BLD-MCNC: 13.3 G/DL (ref 13.3–17.7)
IMM GRANULOCYTES # BLD: 0 10E9/L (ref 0–0.4)
IMM GRANULOCYTES NFR BLD: 0 %
LYMPHOCYTES # BLD AUTO: 2 10E9/L (ref 0.8–5.3)
LYMPHOCYTES NFR BLD AUTO: 44.4 %
MCH RBC QN AUTO: 29 PG (ref 26.5–33)
MCHC RBC AUTO-ENTMCNC: 35.2 G/DL (ref 31.5–36.5)
MCV RBC AUTO: 82 FL (ref 78–100)
MONOCYTES # BLD AUTO: 0.3 10E9/L (ref 0–1.3)
MONOCYTES NFR BLD AUTO: 6.3 %
NEUTROPHILS # BLD AUTO: 2 10E9/L (ref 1.6–8.3)
NEUTROPHILS NFR BLD AUTO: 45.8 %
NRBC # BLD AUTO: 0 10*3/UL
NRBC BLD AUTO-RTO: 0 /100
PLATELET # BLD AUTO: 204 10E9/L (ref 150–450)
POTASSIUM SERPL-SCNC: 3.9 MMOL/L (ref 3.4–5.3)
PROT SERPL-MCNC: 6.9 G/DL (ref 6.8–8.8)
RBC # BLD AUTO: 4.59 10E12/L (ref 4.4–5.9)
SODIUM SERPL-SCNC: 137 MMOL/L (ref 133–144)
WBC # BLD AUTO: 4.5 10E9/L (ref 4–11)

## 2021-01-29 PROCEDURE — 99214 OFFICE O/P EST MOD 30 MIN: CPT | Performed by: PHYSICIAN ASSISTANT

## 2021-01-29 PROCEDURE — 85025 COMPLETE CBC W/AUTO DIFF WBC: CPT | Performed by: INTERNAL MEDICINE

## 2021-01-29 PROCEDURE — G0463 HOSPITAL OUTPT CLINIC VISIT: HCPCS

## 2021-01-29 PROCEDURE — 36415 COLL VENOUS BLD VENIPUNCTURE: CPT

## 2021-01-29 PROCEDURE — 80053 COMPREHEN METABOLIC PANEL: CPT | Performed by: INTERNAL MEDICINE

## 2021-01-29 ASSESSMENT — PAIN SCALES - GENERAL: PAINLEVEL: NO PAIN (0)

## 2021-01-29 NOTE — NURSING NOTE
"Oncology Rooming Note    January 29, 2021 1:30 PM   Ahmet Coleman is a 75 year old male who presents for:    Chief Complaint   Patient presents with     Blood Draw     Labs drawn via  by RN. VS taken     Oncology Clinic Visit     Malignant melanoma of scalp (H)      Initial Vitals: BP (!) 167/98   Pulse 56   Temp 98.1  F (36.7  C) (Oral)   Resp 16   Wt 90.6 kg (199 lb 11.2 oz)   SpO2 96%   BMI 30.36 kg/m   Estimated body mass index is 30.36 kg/m  as calculated from the following:    Height as of 11/25/20: 1.727 m (5' 8\").    Weight as of this encounter: 90.6 kg (199 lb 11.2 oz). Body surface area is 2.08 meters squared.  No Pain (0) Comment: Data Unavailable   No LMP for male patient.  Allergies reviewed: Yes  Medications reviewed: Yes    Medications: Medication refills not needed today.  Pharmacy name entered into [employer]:    [id number] - [place], [place] - [address] [id number] - [place], [place] - [address] [facility] - [place], [place] - [address]  [facility] - [place], [place] - [address]    Clinical concerns: Pt states that today he has been feeling dizzy and tired. Also has been having diarrhea for 3 days.       Cinthia Pierce MA            "

## 2021-01-29 NOTE — TELEPHONE ENCOUNTER
Oral Chemotherapy Monitoring Program     Placed call to Ahmet Coleman in follow up of Zelboraf/Cotellic oral chemotherapy.     Left a message requesting a call back. No drug names were mentioned in the voicemail.       Giovani WalkerD  Oral Chemotherapy Monitoring Program  [facility]  [phone]  January 29, 2021

## 2021-01-29 NOTE — PROGRESS NOTES
MEDICAL ONCOLOGY CONSULT  Melanoma Clinic  Jan 29, 2021    CHIEF COMPLAINT: Scalp melanoma    Melanoma History:  1. He has a small pigmented scalp lesion present for over 30 years. The lesion was biopsied in 1992 and was benign.  2. October 2020, he presented to [facility] with 1 year of progressive enlargement of the lesion and new onset burning, itching, and stinging sensation in the affected scalp.  3. 10/26/20, He was seen at [facility] and he had shave biopsies of A. left central parietal scalp, B. left central occipital scalp, C. Left posterior parietal scalp, and D. punch biopsy of the left superior occipital scalp. Pathology (specimen: [id number]) showed a nodular and nevoid type melanoma, non-ulcerated, Breslow depth up to 1.3 mm, Saurabh's level IV, and up to 3 mitoses per mm2. All margins were involved. Ki-67 10-20%. Sox10 stain is positive and highlights an atypical compound melanocytic proliferation with significant overlying confluence and pagetosis of intraepidermal melanocytes. T-stage: at least pT2a. PD-L1 staining shows PD-L1 TPS <1%. 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  4. 11/25/20, he was seen by Dr. Garcia and he took three 3mm punch biopsies, which returned as dermal melanocytic proliferations with melanophages and fibrosis, consistent with locoregional metastatic melanoma.  5. 12/8/2020 he had PET-CT, which showed FDG avid irregular skin thickening overlying the left parietal region, with no FDG avid lymphadenopathy in the neck and no evidence of metastasis elsewhere in the body.  6. 1/22/2021 start vemurafenib and cobimetinib.    INTERVAL HISTORY  Patient reports that he has a history of irritable bowel with alternating constipation and diarrhea.  2 nights ago while he was sleeping, he did have an episode of stool incontinence.  He did have 2 loose stools yesterday and then took Imodium.  He has not had any further loose stools since yesterday.  He has  been drinking about 64 ounces of fluid per day.  He reports that his appetite is fair.  He does have some abdominal bloating.  He reports that he has a home care nurse come out every 2 weeks and that his home blood pressure has been in the 130s over 70s.  He has mild headaches that resolve on their own.  He has dry eyes which have gotten a little bit worse and is using eyedrops for this.  He also has some light sensitivity.  His energy is fair and unchanged.  He has a history of nightmares chronically that are unchanged.  He denies any rashes or itchy skin.  He does note that he has some discomfort in his rectal area following diarrhea.  Patient does admit to [place] . He denies other concerns.    Current Outpatient Medications   Medication Sig Dispense Refill     cholecalciferol (VITAMIN D3) 1000 UNIT tablet Take  by mouth daily.       cobimetinib (COTELLIC) 20 MG tablet Take 3 tablets (60 mg) by mouth daily for 21 days Take on Days 1 through 21 of each 28 day cycle. 63 tablet 0     fluvoxaMINE (LUVOX) 100 MG tablet Take 150 mg at bedtime       hydrochlorothiazide (HYDRODIURIL) 25 MG tablet Take 25 mg by mouth daily.       Hypromellose (ARTIFICIAL TEARS OP) Apply  to eye. 2 drops in each eye twice a day as needed       Lactobacillus-Inulin (CULTURELLE DIGESTIVE HEALTH) CAPS 1 tab daily 30 capsule 1     lisinopril (PRINIVIL,ZESTRIL) 10 MG tablet Take 10 mg by mouth daily.       Magnesium Oxide 420 MG TABS Take 420 mg by mouth daily       omeprazole (PRILOSEC) 20 MG capsule Take 1 capsule by mouth 2 times daily. 60 capsule prn     prochlorperazine (COMPAZINE) 10 MG tablet Take 1 tablet (10 mg) by mouth every 6 hours as needed (Nausea/Vomiting) 30 tablet 2     QUEtiapine (SEROQUEL) 25 MG tablet 1/2 tablet nightly       simvastatin (ZOCOR) 20 MG tablet Take  by mouth At Bedtime.       TRAZodone (DESYREL) 50 MG tablet Take 50 mg by mouth At Bedtime.       vemurafenib (ZELBORAF) 240  MG TABS tablet Take 4 tablets (960 mg) by mouth every 12 hours for 21 days, THEN 3 tablets (720 mg) every 12 hours for 7 days. 210 tablet 0     acetaminophen (TYLENOL) 325 MG tablet TAKE TWO TABLETS BY MOUTH THREE TIMES A DAY AS NEEDED       albuterol (PROAIR HFA, PROVENTIL HFA, VENTOLIN HFA) 108 (90 BASE) MCG/ACT inhaler Inhale  into the lungs as needed.       diclofenac (VOLTAREN) 1 % topical gel Apply 4 g topically       docusate sodium (DSS) 100 MG capsule TAKE ONE CAPSULE BY MOUTH EVERY DAY FOR STOOL SOFTENING       polyethylene glycol (MIRALAX) 17 GM/Dose powder TAKE 17 GRAMS BY MOUTH EVERY DAY ** MIXED IN JUICE OR WATER AS DIRECTED ** USE THE COVER AS A MEASURE **       pregabalin (LYRICA) 150 MG capsule TAKE 1 CAPSULE BY MOUTH TWICE A DAY       triamcinolone (KENALOG) 0.1 % external cream Apply topically 2 times daily (Patient not taking: Reported on 1/29/2021) 30 g 0     vortioxetine (TRINTELLIX) 20 MG tablet TAKE ONE TABLET BY MOUTH EVERY MORNING       PHYSICAL EXAMINATION  General: The patient is a pleasant male in no acute distress.  BP (!) 167/98   Pulse 56   Temp 98.1  F (36.7  C) (Oral)   Resp 16   Wt 90.6 kg (199 lb 11.2 oz)   SpO2 96%   BMI 30.36 kg/m    Wt Readings from Last 10 Encounters:   01/29/21 90.6 kg (199 lb 11.2 oz)   12/21/20 88.6 kg (195 lb 4.8 oz)   11/25/20 88.5 kg (195 lb)   10/14/20 87.5 kg (193 lb)   06/09/16 74.4 kg (164 lb)   05/31/16 75.8 kg (167 lb)   04/13/16 75.8 kg (167 lb)   04/06/16 75.1 kg (165 lb 8 oz)   02/01/16 76.3 kg (168 lb 4.8 oz)   12/18/15 70.3 kg (155 lb)   HEENT: EOMI, PERRL. Sclerae are anicteric.   Lymph: Neck is supple with no lymphadenopathy in the cervical or supraclavicular areas.   Heart: Regular rate and rhythm.   Lungs: Clear to auscultation bilaterally.   Abdomen: Bowel sounds present, soft, nontender with no palpable hepatosplenomegaly or masses.   Extremities: No lower extremity edema noted bilaterally.   Neuro: Cranial nerves II through XII  are grossly intact.  Skin: The large mid-scalp pigmented lesion extends from right of midline to the left side of the head, roughly measuring 10 x 5 cm, though a smaller separate satellite lesion measures 3 x 1.5 cm in size in the left temporal scalp. Left ear pinna lesion measures 2mm, possible in-transit metastasis.     LABS   1/29/2021 13:23   Sodium 137   Potassium 3.9   Chloride 101   Carbon Dioxide 31   Urea Nitrogen 21   Creatinine 1.56 (H)   GFR Estimate 43 (L)   GFR Estimate If Black 50 (L)   Calcium 8.4 (L)   Anion Gap 5   Albumin 3.5   Protein Total 6.9   Bilirubin Total 0.6   Alkaline Phosphatase 76   ALT 25   AST 16   Glucose 92   WBC 4.5   Hemoglobin 13.3   Hematocrit 37.8 (L)   Platelet Count 204   RBC Count 4.59   MCV 82   MCH 29.0   MCHC 35.2   RDW 12.3   Diff Method Automated Method   % Neutrophils 45.8   % Lymphocytes 44.4   % Monocytes 6.3   % Eosinophils 2.2   % Basophils 1.3   % Immature Granulocytes 0.0   Nucleated RBCs 0   Absolute Neutrophil 2.0   Absolute Lymphocytes 2.0   Absolute Monocytes 0.3   Absolute Eosinophils 0.1   Absolute Basophils 0.1   Abs Immature Granulocytes 0.0   Absolute Nucleated RBC 0.0     ASSESSMENT AND PLAN  Cutaneous melanoma, scalp primary, pT2a cN1c, clinical Stage IIIB. It was a pleasure to meet Mr. Coleman today. He is a 75 year old  with agent orange cutaneous exposures in the [place] war and a diagnosis of malignant melanoma arising from a pre-existing pigmented lesion on the scalp. He has clinical evidence of satellitosis with separation of a left temporal satellite from the main tumor mass, and possibly in-transit metastasis to the helix of the left ear. He was reviewed for participation in the NeoActivate study, but he does not meet criteria as he does not have regional lymph node disease. He started on neoadjuvant vemurafenib and cobimetinib on 1/22/21. He is tolerating this fairly well thus far with diarrhea as his main concern. He will start on  atezolizumab in addition to vemurafenib and cobimetinib on 2/12/21. We will plan to get an EKG at that time. I will see him back at that time. The goal is to try to get a sufficient response to allow for surgical resection with clear margins. We discussed that his sun exposure in high younger years with sunburns is a risk factor for him developing melanoma. He would like to get some of his infusions in [place]. Will tentatively plan for his next imaging the end of April 2021.     Diarrhea. Seems most likely secondary to his treatment, complicated by possible IBS. Recommend taking 1 Imodium every morning to try to prevent the diarrhea and then as needed during the day, up to 8 tablets/day.     Rectal irritation. Secondary to diarrhea. Recommend using Desitin, soaking rectal area in the tub 1-2 times per day, and using witch hazel wipes.     CKD. Suspect secondary to hypertension. Patient creatinine appears slightly worse than his recent baseline, upon review of care everywhere. Suspect secondary to recent diarrhea. Recommend pushing fluids with a goal of 64-80 oz of fluid per day.     Hypertension. BP is elevated today in clinic, but patient reports good control at home.  He remains on hydrochlorothiazide and lisinopril. Recommend [facility] since [facility] was more than 1 year ago, per his report. Will continue to monitor in clinic.     Dry eyes. Had at baseline, but worse with vem/jackson. Recommend continuing with lubricating eye drops like Refresh, Systane, or Blink.    Sherine Cardoza PA-C  [facility]  [address] [place] [address]  [phone]    35 minutes spent on the date of the encounter doing chart review, review of test results, interpretation of tests, patient visit and documentation

## 2021-01-29 NOTE — PATIENT INSTRUCTIONS
Diarrhea and skin management  Use Imodium as needed along with 1 Imodium every morning, preventatively.  Apply Desitin (zinc oxide) to bottom  Try soaking bottom tub daily to twice daily until pain resolves.  Try witch hazel wipes (Tucks) to bottom.   -Drink at least 64 oz fluid/day.     Some other tips:   It may be helpful to   - Eat small, frequent meals   - Avoid greasy foods, dairy, bran, raw fruits and vegetables, and caffeine   - Hydrate adequately and eat foods that are high in potassium, such as bananas   - Eat small amounts of foods that are easy to digest, such as rice, bananas, applesauce, yogurt,mashed potatoes, and dry toast, when diarrhea starts to improve   -Avoid milk or milk products if they seem to make diarrhea worse

## 2021-01-29 NOTE — LETTER
1/29/2021         RE: Ahmet Coleman  [address] [place] [id number]      MEDICAL ONCOLOGY CONSULT  Melanoma Clinic  Jan 29, 2021    CHIEF COMPLAINT: Scalp melanoma    Melanoma History:  1. He has a small pigmented scalp lesion present for over 30 years. The lesion was biopsied in 1992 and was benign.  2. October 2020, he presented to [facility] with 1 year of progressive enlargement of the lesion and new onset burning, itching, and stinging sensation in the affected scalp.  3. 10/26/20, He was seen at [facility] and he had shave biopsies of A. left central parietal scalp, B. left central occipital scalp, C. Left posterior parietal scalp, and D. punch biopsy of the left superior occipital scalp. Pathology (specimen: [id number]) showed a nodular and nevoid type melanoma, non-ulcerated, Breslow depth up to 1.3 mm, Saurabh's level IV, and up to 3 mitoses per mm2. All margins were involved. Ki-67 10-20%. Sox10 stain is positive and highlights an atypical compound melanocytic proliferation with significant overlying confluence and pagetosis of intraepidermal melanocytes. T-stage: at least pT2a. PD-L1 staining shows PD-L1 TPS <1%. 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  4. 11/25/20, he was seen by Dr. Garcia and he took three 3mm punch biopsies, which returned as dermal melanocytic proliferations with melanophages and fibrosis, consistent with locoregional metastatic melanoma.  5. 12/8/2020 he had PET-CT, which showed FDG avid irregular skin thickening overlying the left parietal region, with no FDG avid lymphadenopathy in the neck and no evidence of metastasis elsewhere in the body.  6. 1/22/2021 start vemurafenib and cobimetinib.    INTERVAL HISTORY  Patient reports that he has a history of irritable bowel with alternating constipation and diarrhea.  2 nights ago while he was sleeping, he did have an episode of stool incontinence.  He did have 2 loose stools  yesterday and then took Imodium.  He has not had any further loose stools since yesterday.  He has been drinking about 64 ounces of fluid per day.  He reports that his appetite is fair.  He does have some abdominal bloating.  He reports that he has a home care nurse come out every 2 weeks and that his home blood pressure has been in the 130s over 70s.  He has mild headaches that resolve on their own.  He has dry eyes which have gotten a little bit worse and is using eyedrops for this.  He also has some light sensitivity.  His energy is fair and unchanged.  He has a history of nightmares chronically that are unchanged.  He denies any rashes or itchy skin.  He does note that he has some discomfort in his rectal area following diarrhea.  Patient does admit to several [place] . He denies other concerns.    Current Outpatient Medications   Medication Sig Dispense Refill     cholecalciferol (VITAMIN D3) 1000 UNIT tablet Take  by mouth daily.       cobimetinib (COTELLIC) 20 MG tablet Take 3 tablets (60 mg) by mouth daily for 21 days Take on Days 1 through 21 of each 28 day cycle. 63 tablet 0     fluvoxaMINE (LUVOX) 100 MG tablet Take 150 mg at bedtime       hydrochlorothiazide (HYDRODIURIL) 25 MG tablet Take 25 mg by mouth daily.       Hypromellose (ARTIFICIAL TEARS OP) Apply  to eye. 2 drops in each eye twice a day as needed       Lactobacillus-Inulin (CULTURELLE DIGESTIVE HEALTH) CAPS 1 tab daily 30 capsule 1     lisinopril (PRINIVIL,ZESTRIL) 10 MG tablet Take 10 mg by mouth daily.       Magnesium Oxide 420 MG TABS Take 420 mg by mouth daily       omeprazole (PRILOSEC) 20 MG capsule Take 1 capsule by mouth 2 times daily. 60 capsule prn     prochlorperazine (COMPAZINE) 10 MG tablet Take 1 tablet (10 mg) by mouth every 6 hours as needed (Nausea/Vomiting) 30 tablet 2     QUEtiapine (SEROQUEL) 25 MG tablet 1/2 tablet nightly       simvastatin (ZOCOR) 20 MG tablet Take  by mouth At Bedtime.        TRAZodone (DESYREL) 50 MG tablet Take 50 mg by mouth At Bedtime.       vemurafenib (ZELBORAF) 240 MG TABS tablet Take 4 tablets (960 mg) by mouth every 12 hours for 21 days, THEN 3 tablets (720 mg) every 12 hours for 7 days. 210 tablet 0     acetaminophen (TYLENOL) 325 MG tablet TAKE TWO TABLETS BY MOUTH THREE TIMES A DAY AS NEEDED       albuterol (PROAIR HFA, PROVENTIL HFA, VENTOLIN HFA) 108 (90 BASE) MCG/ACT inhaler Inhale  into the lungs as needed.       diclofenac (VOLTAREN) 1 % topical gel Apply 4 g topically       docusate sodium (DSS) 100 MG capsule TAKE ONE CAPSULE BY MOUTH EVERY DAY FOR STOOL SOFTENING       polyethylene glycol (MIRALAX) 17 GM/Dose powder TAKE 17 GRAMS BY MOUTH EVERY DAY ** MIXED IN JUICE OR WATER AS DIRECTED ** USE THE COVER AS A MEASURE **       pregabalin (LYRICA) 150 MG capsule TAKE 1 CAPSULE BY MOUTH TWICE A DAY       triamcinolone (KENALOG) 0.1 % external cream Apply topically 2 times daily (Patient not taking: Reported on 1/29/2021) 30 g 0     vortioxetine (TRINTELLIX) 20 MG tablet TAKE ONE TABLET BY MOUTH EVERY MORNING       PHYSICAL EXAMINATION  General: The patient is a pleasant male in no acute distress.  BP (!) 167/98   Pulse 56   Temp 98.1  F (36.7  C) (Oral)   Resp 16   Wt 90.6 kg (199 lb 11.2 oz)   SpO2 96%   BMI 30.36 kg/m    Wt Readings from Last 10 Encounters:   01/29/21 90.6 kg (199 lb 11.2 oz)   12/21/20 88.6 kg (195 lb 4.8 oz)   11/25/20 88.5 kg (195 lb)   10/14/20 87.5 kg (193 lb)   06/09/16 74.4 kg (164 lb)   05/31/16 75.8 kg (167 lb)   04/13/16 75.8 kg (167 lb)   04/06/16 75.1 kg (165 lb 8 oz)   02/01/16 76.3 kg (168 lb 4.8 oz)   12/18/15 70.3 kg (155 lb)   HEENT: EOMI, PERRL. Sclerae are anicteric.   Lymph: Neck is supple with no lymphadenopathy in the cervical or supraclavicular areas.   Heart: Regular rate and rhythm.   Lungs: Clear to auscultation bilaterally.   Abdomen: Bowel sounds present, soft, nontender with no palpable hepatosplenomegaly or  masses.   Extremities: No lower extremity edema noted bilaterally.   Neuro: Cranial nerves II through XII are grossly intact.  Skin: The large mid-scalp pigmented lesion extends from right of midline to the left side of the head, roughly measuring 10 x 5 cm, though a smaller separate satellite lesion measures 3 x 1.5 cm in size in the left temporal scalp. Left ear pinna lesion measures 2mm, possible in-transit metastasis.     LABS   1/29/2021 13:23   Sodium 137   Potassium 3.9   Chloride 101   Carbon Dioxide 31   Urea Nitrogen 21   Creatinine 1.56 (H)   GFR Estimate 43 (L)   GFR Estimate If Black 50 (L)   Calcium 8.4 (L)   Anion Gap 5   Albumin 3.5   Protein Total 6.9   Bilirubin Total 0.6   Alkaline Phosphatase 76   ALT 25   AST 16   Glucose 92   WBC 4.5   Hemoglobin 13.3   Hematocrit 37.8 (L)   Platelet Count 204   RBC Count 4.59   MCV 82   MCH 29.0   MCHC 35.2   RDW 12.3   Diff Method Automated Method   % Neutrophils 45.8   % Lymphocytes 44.4   % Monocytes 6.3   % Eosinophils 2.2   % Basophils 1.3   % Immature Granulocytes 0.0   Nucleated RBCs 0   Absolute Neutrophil 2.0   Absolute Lymphocytes 2.0   Absolute Monocytes 0.3   Absolute Eosinophils 0.1   Absolute Basophils 0.1   Abs Immature Granulocytes 0.0   Absolute Nucleated RBC 0.0     ASSESSMENT AND PLAN  Cutaneous melanoma, scalp primary, pT2a cN1c, clinical Stage IIIB. It was a pleasure to meet Mr. Coleman today. He is a 75 year old  with agent orange cutaneous exposures in the [place] war and a diagnosis of malignant melanoma arising from a pre-existing pigmented lesion on the scalp. He has clinical evidence of satellitosis with separation of a left temporal satellite from the main tumor mass, and possibly in-transit metastasis to the helix of the left ear. He was reviewed for participation in the NeoActivate study, but he does not meet criteria as he does not have regional lymph node disease. He started on neoadjuvant vemurafenib and cobimetinib on  1/22/21. He is tolerating this fairly well thus far with diarrhea as his main concern. He will start on atezolizumab in addition to vemurafenib and cobimetinib on 2/12/21. We will plan to get an EKG at that time. I will see him back at that time. The goal is to try to get a sufficient response to allow for surgical resection with clear margins. We discussed that his sun exposure in high younger years with sunburns is a risk factor for him developing melanoma. He would like to get some of his infusions in [place]. Will tentatively plan for his next imaging the end of April 2021.     Diarrhea. Seems most likely secondary to his treatment, complicated by possible IBS. Recommend taking 1 Imodium every morning to try to prevent the diarrhea and then as needed during the day, up to 8 tablets/day.     Rectal irritation. Secondary to diarrhea. Recommend using Desitin, soaking rectal area in the tub 1-2 times per day, and using witch hazel wipes.     CKD. Suspect secondary to hypertension. Patient creatinine appears slightly worse than his recent baseline, upon review of care everywhere. Suspect secondary to recent diarrhea. Recommend pushing fluids with a goal of 64-80 oz of fluid per day.     Hypertension. BP is elevated today in clinic, but patient reports good control at home.  He remains on hydrochlorothiazide and lisinopril. [facility] was more than 1 year ago, per his report. Will continue to monitor in clinic.     Dry eyes. Had at baseline, but worse with vem/jackson. Recommend continuing with lubricating eye drops like Refresh, Systane, or Blink.    Sherine Cardoza PA-C  [facility]  [address] [place] [id number]  [phone]    35 minutes spent on the date of the encounter doing chart review, review of test results, interpretation of tests, patient visit and documentation           Sherine Cardoza PA-C

## 2021-02-02 ENCOUNTER — TELEPHONE (OUTPATIENT)
Dept: ONCOLOGY | Facility: CLINIC | Age: 76
End: 2021-02-02

## 2021-02-02 NOTE — ORAL ONC MGMT
Oral Chemotherapy Monitoring Program    Subjective/Objective:  Ahmet Coleman is a 75 year old male contacted by phone for a follow-up visit for oral chemotherapy. Ahmet confirms taking Zelboraf 960 mg twice daily - he knows that he will take this for 3 weeks, then decrease the dose to 720 mg twice daily. Also taking Cotellic 60 mg daily for 3 weeks on, then 1 week off. He states that he is tolerating these well overall - his only adverse effect is mild diarrhea, which he takes loperamide for. He does not feel the need for any other intervention at this time. No recent medication changes or missed doses of either Zelboraf or Cotellic.      ORAL CHEMOTHERAPY 1/8/2021 1/8/2021 1/22/2021 1/29/2021 1/29/2021 2/2/2021 2/2/2021   Assessment Type New Teach - Initial Follow up Other Other Initial Follow up Initial Follow up   Diagnosis Code Melanoma - Melanoma Melanoma Melanoma Melanoma Melanoma   Providers Dr. Bowen Hernández - Dr. Bowen Hernández   [facility]   Drug Name Zelboraf (Vemurafenib) Cotellic (Cobimetinib) Cotellic (Cobimetinib) Zelboraf (Vemurafenib) Cotellic (Cobimetinib) Zelboraf (Vemurafenib) Cotellic (Cobimetinib)   Dose (No Data) 60 mg 60 mg (No Data) 60 mg (No Data) 60 mg   Current Schedule BID Daily Daily Daily Daily BID Daily   Cycle Details Continuous 3 weeks on, 1 week off 3 weeks on, 1 week off Continuous 3 weeks on, 1 week off Continuous 3 weeks on, 1 week off   Start Date of Last Cycle - - 1/22/2021 - - 1/22/2021 1/22/2021   Planned next cycle start date - - - - - 2/19/2021 2/19/2021   Doses missed in last 2 weeks - - - - - 0 0   Adherence Assessment - - - - - Adherent -   Adverse Effects - - - - - Diarrhea -   Diarrhea - - - - - Grade 1 -   Pharmacist Intervention(diarrhea) - - - - - Yes -   Intervention(s) - - - - - Patient education -   Any new drug  "interactions? - - - - - No -   Is the dose as ordered appropriate for the patient? - - - - - Yes -       Last PHQ-2 Score on record:   PHQ-2 ( 1999 Pf[employer]) 11/25/2020 2/1/2016   Q1: Little interest or pleasure in doing things 1 1   Q2: Feeling down, depressed or hopeless 3 1   PHQ-2 Score 4 2       Vitals:  BP:   BP Readings from Last 1 Encounters:   01/29/21 (!) 167/98     Wt Readings from Last 1 Encounters:   01/29/21 90.6 kg (199 lb 11.2 oz)     Estimated body surface area is 2.08 meters squared as calculated from the following:    Height as of 11/25/20: 1.727 m (5' 8\").    Weight as of 1/29/21: 90.6 kg (199 lb 11.2 oz).    Labs:  _  Result Component Current Result Ref Range   Sodium 137 (1/29/2021) 133 - 144 mmol/L     _  Result Component Current Result Ref Range   Potassium 3.9 (1/29/2021) 3.4 - 5.3 mmol/L     _  Result Component Current Result Ref Range   Calcium 8.4 (L) (1/29/2021) 8.5 - 10.1 mg/dL     _  Result Component Current Result Ref Range   Magnesium 2.1 (1/12/2021) 1.6 - 2.3 mg/dL     _  Result Component Current Result Ref Range   Phosphorus 3.3 (1/12/2021) 2.5 - 4.5 mg/dL     _  Result Component Current Result Ref Range   Albumin 3.5 (1/29/2021) 3.4 - 5.0 g/dL     _  Result Component Current Result Ref Range   Urea Nitrogen 21 (1/29/2021) 7 - 30 mg/dL     _  Result Component Current Result Ref Range   Creatinine 1.56 (H) (1/29/2021) 0.66 - 1.25 mg/dL     _  Result Component Current Result Ref Range   AST 16 (1/29/2021) 0 - 45 U/L     _  Result Component Current Result Ref Range   ALT 25 (1/29/2021) 0 - 70 U/L     _  Result Component Current Result Ref Range   Bilirubin Total 0.6 (1/29/2021) 0.2 - 1.3 mg/dL     _  Result Component Current Result Ref Range   WBC 4.5 (1/29/2021) 4.0 - 11.0 10e9/L     _  Result Component Current Result Ref Range   Hemoglobin 13.3 (1/29/2021) 13.3 - 17.7 g/dL     _  Result Component Current Result Ref Range   Platelet Count 204 (1/29/2021) 150 - 450 10e9/L     _  Result " Component Current Result Ref Range   Absolute Neutrophil 2.0 (1/29/2021) 1.6 - 8.3 10e9/L         Assessment/Plan:  Ahmet is tolerating Zelboraf and Cotellic well. Continue current therapy (knows to decrease Zelboraf dose after first 3 weeks of therapy).    Follow-Up:  2/12: labs, [facility] appt, and infusion appt    Refill Due:  Next cycle starts 2/19    Haley Payne, PharmD, BCOP  Hematology/Oncology Clinical Pharmacist  [facility]  [facility]  [phone]

## 2021-02-03 ENCOUNTER — DOCUMENTATION ONLY (OUTPATIENT)
Dept: ONCOLOGY | Facility: CLINIC | Age: 76
End: 2021-02-03

## 2021-02-03 NOTE — PROGRESS NOTES
Family Leave forms received via pt from [facility]       Forms to be completed and put in folder for provider to approve.    Fax #:  [phone]  Claim:     Jyoti Bolivar MA

## 2021-02-04 DIAGNOSIS — Z15.89 MONOALLELIC MUTATION OF BRAF GENE: Primary | ICD-10-CM

## 2021-02-04 DIAGNOSIS — Z15.09 MONOALLELIC MUTATION OF BRAF GENE: Primary | ICD-10-CM

## 2021-02-04 DIAGNOSIS — C43.4 MALIGNANT MELANOMA OF SCALP (H): ICD-10-CM

## 2021-02-04 RX ORDER — ALBUTEROL SULFATE 90 UG/1
1-2 AEROSOL, METERED RESPIRATORY (INHALATION)
Status: CANCELLED
Start: 2021-02-18

## 2021-02-04 RX ORDER — EPINEPHRINE 1 MG/ML
0.3 INJECTION, SOLUTION INTRAMUSCULAR; SUBCUTANEOUS EVERY 5 MIN PRN
Status: CANCELLED | OUTPATIENT
Start: 2021-02-18

## 2021-02-04 RX ORDER — SODIUM CHLORIDE 9 MG/ML
1000 INJECTION, SOLUTION INTRAVENOUS CONTINUOUS PRN
Status: CANCELLED
Start: 2021-02-18

## 2021-02-04 RX ORDER — METHYLPREDNISOLONE SODIUM SUCCINATE 125 MG/2ML
125 INJECTION, POWDER, LYOPHILIZED, FOR SOLUTION INTRAMUSCULAR; INTRAVENOUS
Status: CANCELLED
Start: 2021-03-04

## 2021-02-04 RX ORDER — ALBUTEROL SULFATE 0.83 MG/ML
2.5 SOLUTION RESPIRATORY (INHALATION)
Status: CANCELLED | OUTPATIENT
Start: 2021-03-04

## 2021-02-04 RX ORDER — EPINEPHRINE 1 MG/ML
0.3 INJECTION, SOLUTION INTRAMUSCULAR; SUBCUTANEOUS EVERY 5 MIN PRN
Status: CANCELLED | OUTPATIENT
Start: 2021-03-04

## 2021-02-04 RX ORDER — DIPHENHYDRAMINE HYDROCHLORIDE 50 MG/ML
50 INJECTION INTRAMUSCULAR; INTRAVENOUS
Status: CANCELLED
Start: 2021-03-04

## 2021-02-04 RX ORDER — SODIUM CHLORIDE 9 MG/ML
1000 INJECTION, SOLUTION INTRAVENOUS CONTINUOUS PRN
Status: CANCELLED
Start: 2021-03-04

## 2021-02-04 RX ORDER — NALOXONE HYDROCHLORIDE 0.4 MG/ML
.1-.4 INJECTION, SOLUTION INTRAMUSCULAR; INTRAVENOUS; SUBCUTANEOUS
Status: CANCELLED | OUTPATIENT
Start: 2021-02-18

## 2021-02-04 RX ORDER — DIPHENHYDRAMINE HYDROCHLORIDE 50 MG/ML
50 INJECTION INTRAMUSCULAR; INTRAVENOUS
Status: CANCELLED
Start: 2021-02-18

## 2021-02-04 RX ORDER — NALOXONE HYDROCHLORIDE 0.4 MG/ML
.1-.4 INJECTION, SOLUTION INTRAMUSCULAR; INTRAVENOUS; SUBCUTANEOUS
Status: CANCELLED | OUTPATIENT
Start: 2021-03-04

## 2021-02-04 RX ORDER — MEPERIDINE HYDROCHLORIDE 25 MG/ML
25 INJECTION INTRAMUSCULAR; INTRAVENOUS; SUBCUTANEOUS EVERY 30 MIN PRN
Status: CANCELLED | OUTPATIENT
Start: 2021-03-04

## 2021-02-04 RX ORDER — ALBUTEROL SULFATE 90 UG/1
1-2 AEROSOL, METERED RESPIRATORY (INHALATION)
Status: CANCELLED
Start: 2021-03-04

## 2021-02-04 RX ORDER — ALBUTEROL SULFATE 0.83 MG/ML
2.5 SOLUTION RESPIRATORY (INHALATION)
Status: CANCELLED | OUTPATIENT
Start: 2021-02-18

## 2021-02-04 RX ORDER — MEPERIDINE HYDROCHLORIDE 25 MG/ML
25 INJECTION INTRAMUSCULAR; INTRAVENOUS; SUBCUTANEOUS EVERY 30 MIN PRN
Status: CANCELLED | OUTPATIENT
Start: 2021-02-18

## 2021-02-04 RX ORDER — METHYLPREDNISOLONE SODIUM SUCCINATE 125 MG/2ML
125 INJECTION, POWDER, LYOPHILIZED, FOR SOLUTION INTRAMUSCULAR; INTRAVENOUS
Status: CANCELLED
Start: 2021-02-18

## 2021-02-04 NOTE — TELEPHONE ENCOUNTER
FUTURE VISIT INFORMATION      FUTURE VISIT INFORMATION:    Date: 2.5.21    Time: 1:00    Location: [facility]  REFERRAL INFORMATION:    Referring provider:  Dr. Oksana Hernández    Referring providers clinic: [facility]    Reason for visit/diagnosis  New: Malignant melanoma of scalp     RECORDS REQUESTED FROM:       Clinic name Comments Records Status Photos Status   [facility] 1.29.21  Sherine Cardoza PA-C    1.6.21, 12.21.20  Dr. Hernández [facility] 11.25.20  Dr. Paulo Garcia [facility]   [facility] 10.26.20  Dr. Dorothy Skinner [facility] 10.14.20  Elias Alonzo PA-C Epic na

## 2021-02-05 ENCOUNTER — PRE VISIT (OUTPATIENT)
Dept: SURGERY | Facility: CLINIC | Age: 76
End: 2021-02-05

## 2021-02-05 ENCOUNTER — OFFICE VISIT (OUTPATIENT)
Dept: SURGERY | Facility: CLINIC | Age: 76
End: 2021-02-05
Attending: INTERNAL MEDICINE
Payer: MEDICARE

## 2021-02-05 VITALS
RESPIRATION RATE: 16 BRPM | SYSTOLIC BLOOD PRESSURE: 107 MMHG | OXYGEN SATURATION: 95 % | DIASTOLIC BLOOD PRESSURE: 76 MMHG | HEART RATE: 72 BPM | WEIGHT: 196.3 LBS | HEIGHT: 68 IN | BODY MASS INDEX: 29.75 KG/M2

## 2021-02-05 DIAGNOSIS — L82.1 SEBORRHEIC KERATOSIS: ICD-10-CM

## 2021-02-05 DIAGNOSIS — C43.4 MALIGNANT MELANOMA OF SCALP (H): Primary | ICD-10-CM

## 2021-02-05 DIAGNOSIS — L81.4 SOLAR LENTIGO: ICD-10-CM

## 2021-02-05 DIAGNOSIS — L57.8 SUN-DAMAGED SKIN: ICD-10-CM

## 2021-02-05 DIAGNOSIS — R23.8 VENOUS LAKE: ICD-10-CM

## 2021-02-05 DIAGNOSIS — D18.01 CHERRY ANGIOMA: ICD-10-CM

## 2021-02-05 DIAGNOSIS — D22.9 MULTIPLE BENIGN NEVI: ICD-10-CM

## 2021-02-05 DIAGNOSIS — D48.5 NEOPLASM OF UNCERTAIN BEHAVIOR OF SKIN: ICD-10-CM

## 2021-02-05 PROCEDURE — 11103 TANGNTL BX SKIN EA SEP/ADDL: CPT | Performed by: DERMATOLOGY

## 2021-02-05 PROCEDURE — 88341 IMHCHEM/IMCYTCHM EA ADD ANTB: CPT | Mod: 26 | Performed by: DERMATOLOGY

## 2021-02-05 PROCEDURE — 250N000009 HC RX 250: Performed by: DERMATOLOGY

## 2021-02-05 PROCEDURE — 11102 TANGNTL BX SKIN SINGLE LES: CPT | Performed by: DERMATOLOGY

## 2021-02-05 PROCEDURE — 88341 IMHCHEM/IMCYTCHM EA ADD ANTB: CPT | Mod: TC | Performed by: DERMATOLOGY

## 2021-02-05 PROCEDURE — G0463 HOSPITAL OUTPT CLINIC VISIT: HCPCS | Mod: 25

## 2021-02-05 PROCEDURE — 88342 IMHCHEM/IMCYTCHM 1ST ANTB: CPT | Mod: 26 | Performed by: DERMATOLOGY

## 2021-02-05 PROCEDURE — 88305 TISSUE EXAM BY PATHOLOGIST: CPT | Mod: 26 | Performed by: DERMATOLOGY

## 2021-02-05 PROCEDURE — 88342 IMHCHEM/IMCYTCHM 1ST ANTB: CPT | Mod: TC | Performed by: DERMATOLOGY

## 2021-02-05 PROCEDURE — 88305 TISSUE EXAM BY PATHOLOGIST: CPT | Mod: TC | Performed by: DERMATOLOGY

## 2021-02-05 PROCEDURE — G0463 HOSPITAL OUTPT CLINIC VISIT: HCPCS

## 2021-02-05 PROCEDURE — 99204 OFFICE O/P NEW MOD 45 MIN: CPT | Mod: 25 | Performed by: DERMATOLOGY

## 2021-02-05 RX ORDER — LIDOCAINE HYDROCHLORIDE AND EPINEPHRINE 10; 10 MG/ML; UG/ML
1.5 INJECTION, SOLUTION INFILTRATION; PERINEURAL ONCE
Status: COMPLETED | OUTPATIENT
Start: 2021-02-05 | End: 2021-02-05

## 2021-02-05 RX ADMIN — LIDOCAINE HYDROCHLORIDE,EPINEPHRINE BITARTRATE 1.5 ML: 10; .01 INJECTION, SOLUTION INFILTRATION; PERINEURAL at 14:16

## 2021-02-05 ASSESSMENT — MIFFLIN-ST. JEOR: SCORE: 1609.91

## 2021-02-05 NOTE — NURSING NOTE
"Oncology Rooming Note    February 5, 2021 12:51 PM   Ahmet Coleman is a 75 year old male who presents for:    Chief Complaint   Patient presents with     Oncology Clinic Visit     MELANOMA OF THE SCALP      Initial Vitals: /76   Pulse 72   Resp 16   Ht 1.727 m (5' 8\")   Wt 89 kg (196 lb 4.8 oz)   SpO2 95%   BMI 29.85 kg/m   Estimated body mass index is 29.85 kg/m  as calculated from the following:    Height as of this encounter: 1.727 m (5' 8\").    Weight as of this encounter: 89 kg (196 lb 4.8 oz). Body surface area is 2.07 meters squared.  Data Unavailable Comment: Data Unavailable   No LMP for male patient.  Allergies reviewed: Yes  Medications reviewed: Yes    Medications: Medication refills not needed today.  Pharmacy name entered into [employer]:    [id number] - [place], [place] - [address] [id number] - [place], [place] - [address] [facility] - [place], [place] - [address] [facility]  [facility], [place] - [address]    Clinical concerns: No new concerns today  Dr Esteban  was NOT notified.      Kiesha Munson            "

## 2021-02-05 NOTE — PROGRESS NOTES
[facility] Dermatology Note  Encounter Date: Feb 5, 2021  Office Visit     Dermatology Problem List:  1. Melanoma, left side of the scalp, stage IIIB  - Biopsies at [facility] on 10/26/20 of the A. left central parietal scalp, B. left central occipital scalp, C. Left posterior parietal scalp, and D. left superior occipital scalp which showed nodular and nevoid melanoma, no ulceration, Breslow 1.3mm (deep and lateral margins positive), 3 mitoses per mm2. PD-L1 TPS <1%. BRAF V600K mutation.  - Biopsies by Dr. Garcia 11/25/20 showed locoregional metastatic melanoma  - PET negative for distant disease 12/8/20  - Start neoadjuvant vemurafenib and cobimetinib 1/202. Plan to add on atezolizumab in February 2021. The goal is to try to get a sufficient response to allow for surgical resection with clear margins.   2. NUB x2, s/p biopsies 2/5/21  - Left superior shoulder (concerning for more distant in transit met)  - central inferior abdomen (concerning for MM, BCC, SK)  3. Possible in transit met on left earlobe - will monitor clinically and consider biopsy if does not resolve with neoadjuvant treatment    ____________________________________________    Assessment & Plan:     #. Melanoma, stage IIIB, left scalp. Currently on neoadjuvant treatment with goal to get melanoma smaller in size so surgical resection with clear margins is possible. Explained role of dermatology in care - to help with cutaneous side effects of medications and screen for new primary melanomas. Explained routine schedule for follow up examinations in office and need for self skin checks monthly.   - ABCDs of melanoma were discussed and self skin checks were advised.   - Sun precaution was advised including the use of sunscreens of SPF 30 or higher, sun protective clothing, and avoidance of tanning beds.  - Recommended first degree relatives get yearly skin exams as well.  - *Reviewed last onc note from 1/29/21  - *Reviewed ENT  note from 11/25/20  - *Reviewed most recent imaging PET and CT on 12/8/20 - neg for distant metastatic disease  - No skin findings concerning for adverse reactions to current melanoma meds  - Next skin check in 3 months.    # Sun damaged skin with solar lentigines: Chronic, stable.  - Recommend sunscreens SPF #30 or greater, protective clothing and avoidance of tanning beds.    # Benign skin findings including: seborrheic keratoses, cherry angioma - minor, self limited problem  - No further intervention required. Patient to report changes.   - Patient reassured of the benign nature of these lesions.    # Multiple clinically benign nevi: Chronic, stable  - No further intervention required. Patient to report changes.   - Patient reassured of the benign nature of these lesions.    # Possible in transit met, left earlobe: Considered biopsy, but likely would not . Will monitor and consider biopsy if fails to improve with neoadjuvant treatment.    # NUB, left superior shoulder: This appears concerning for in transit met. This is more distant from scalp (>5cm), so may change prognosis. For this reason, I recommended biopsy  - See procedure note below    # NUB, central inferior abdomen: Concerning for melanoma primary vs pigmented BCC vs flat SK.  - See procedure note below    Procedures Performed:   - Shave biopsy procedure note, location(s): left superior shoulder, central inferior abdomen. After discussion of benefits and risks including but not limited to bleeding, infection, scar, incomplete removal, recurrence, and non-diagnostic biopsy, written consent and photographs were obtained. The area was cleaned with isopropyl alcohol. 2mL of 1% lidocaine with epinephrine was injected to obtain adequate anesthesia of lesion(s). Shave biopsy at site(s) performed. Hemostasis was achieved with aluminium chloride. Petrolatum ointment and a sterile dressing were applied. The patient tolerated the procedure and no  "complications were noted. The patient was provided with verbal and written post care instructions.     Follow-up: 3 month(s) in-person, or earlier for new or changing lesions    Staff:     Sidra Esteban MD    Department of Dermatology  [facility]: Phone: [phone], Fax:[phone]  [facility]: Phone: [phone], Fax: [phone]    ____________________________________________    CC: Oncology Clinic Visit (MELANOMA OF THE SCALP )    HPI:  Mr. Ahmet Coleman is a(n) 75 year old male who presents today as a new patient for skin check.    He is new to our department. He has seen a dermatologist prior - Dr. Skinner at [facility] did initial biopsies in diagnosis of melanoma. He has no history of skin cancer, atypical moles, or precancerous lesions to his knowledge prior to the melanoma on the scalp.    Today, the patient notes concerns about no specific skin lesions. He notes he has a lot of elevated brown spots which he thinks are age spots. No tender or bleeding skin lesions.    Having a lot of diarrhea and some chills with melanoma meds.    Patient is otherwise feeling well, without additional concerns.     Labs:  NA    Physical Exam:  Vitals: /76   Pulse 72   Resp 16   Ht 1.727 m (5' 8\")   Wt 89 kg (196 lb 4.8 oz)   SpO2 95%   BMI 29.85 kg/m    SKIN: Total skin excluding the undergarment areas was performed. The exam included the head/face, neck, both arms, chest, back, abdomen, both legs, digits and/or nails. Buttocks examined. Declined genital concerns.  - Ortiz Type II  - Scattered brown macules on sun exposed areas.  - On the left frontal, parietal, and temporal scalp, there is a well defined blue-black plaque. There is a nickel sized blue black plaque on the left parietal scalp that is separate from larger plaque.  - Blue black macule at left lateral " earlobe.  - Venous lake on left superior helix.  - Blue gray macule at the left superior medial shoulder.  - There are dome shaped bright red papules on the trunk.   - Multiple regular brown pigmented macules and papules are identified on the body. About 40 nevi in all. None have significant atypia except below.  - 5mm blue black macule with lobules of pigment on the central inferior abdomen.   - There are waxy stuck on tan to brown papules on the trunk, extremities.  - No other lesions of concern on areas examined.   LYMPH NODES: No submandibular, cervical, supraclavicular, axillary, or inguinal lymphadenopathy palpable on examination.       Medications:  Current Outpatient Medications   Medication     acetaminophen (TYLENOL) 325 MG tablet     cholecalciferol (VITAMIN D3) 1000 UNIT tablet     fluvoxaMINE (LUVOX) 100 MG tablet     hydrochlorothiazide (HYDRODIURIL) 25 MG tablet     Hypromellose (ARTIFICIAL TEARS OP)     Lactobacillus-Inulin (CULTURELLE DIGESTIVE HEALTH) CAPS     lisinopril (PRINIVIL,ZESTRIL) 10 MG tablet     Magnesium Oxide 420 MG TABS     omeprazole (PRILOSEC) 20 MG capsule     prochlorperazine (COMPAZINE) 10 MG tablet     QUEtiapine (SEROQUEL) 25 MG tablet     simvastatin (ZOCOR) 20 MG tablet     TRAZodone (DESYREL) 50 MG tablet     vemurafenib (ZELBORAF) 240 MG TABS tablet     vortioxetine (TRINTELLIX) 20 MG tablet     Current Facility-Administered Medications   Medication     lidocaine 1% with EPINEPHrine 1:100,000 injection 1.5 mL     lidocaine 1% with EPINEPHrine 1:100,000 injection 1.5 mL     Facility-Administered Medications Ordered in Other Visits   Medication     sodium chloride (PF) 0.9% PF flush 10 mL      Past Medical History:   Patient Active Problem List   Diagnosis     Musculoskeletal disorder and symptoms referable to neck     Degeneration of intervertebral disc of cervical region     Displacement of cervical intervertebral disc without myelopathy     Traumatic shock (H)      Irritable bowel syndrome     Hiatal hernia     GERD (gastroesophageal reflux disease)     Personal history of tobacco use, presenting hazards to health     PTSD (post-traumatic stress disorder)     CARDIOVASCULAR SCREENING; LDL GOAL LESS THAN 130     Obesity     Advanced directives, counseling/discussion     Major depressive disorder, recurrent episode, moderate (H)     Anxiety     Amnesia     Impaired fasting glucose     History of colonic polyps     Hypertension     Hyperlipidemia     Diverticulosis of colon     Cataract     Cannabis abuse, episodic use     Benign neoplasm of colon     Arthropathy of hand     Arthralgia     Other ill-defined and unknown causes of morbidity and mortality     Subjective tinnitus     Spondylosis of cervical spine without myelopathy     Sensorineural hearing loss (SNHL) of both ears     Pure hypertriglyceridemia     Pure hypercholesterolemia     Psychophysiologic insomnia     Presbyopia     Personality disorder (H)     Other and unspecified noninfectious gastroenteritis and colitis     Impotence of organic origin     History of other injury     History of cervical fracture     Malignant melanoma of scalp (H)     Fibromyositis     Herpes zoster ophthalmicus of left eye     Family history of type 2 diabetes mellitus     Monoallelic mutation of BRAF gene     Past Medical History:   Diagnosis Date     Anxiety      Benign hypertension 12/11/2013     Cervicalgia      Depressive disorder, not elsewhere classified      Diverticulitis of colon 7/10/2013     Esophageal reflux      Irritable bowel syndrome 10/3/2003     Lumbago      Malignant melanoma of scalp (H) 12/29/2020     Migraine, unspecified, without mention of intractable migraine without mention of status migrainosus      Other kyphoscoliosis and scoliosis      Other specified gastritis without mention of hemorrhage      PTSD (post-traumatic stress disorder)      Tobacco abuse since 1965    on and off       CC Oksana Hernández,  [place] [address] on close of this encounter.

## 2021-02-05 NOTE — PROGRESS NOTES
STD paperwork completed, checked for accuracy, signed and faxed to [facility] @ [phone]. A copy was made, sent to scanning and original mailed to patient at home address.    Successful transmission verified in Right Fax.      Jyoti Bolivar MA

## 2021-02-05 NOTE — LETTER
2/5/2021         RE: Ahmet Coleman  [address] [id number]        Dear Colleague,    Thank you for referring your patient, Ahmet Coleman, to the [facility]. Please see a copy of my visit note below.    [facility] Dermatology Note  Encounter Date: Feb 5, 2021  Office Visit     Dermatology Problem List:  1. Melanoma, left side of the scalp, stage IIIB  - Biopsies at [facility] on 10/26/20 of the A. left central parietal scalp, B. left central occipital scalp, C. Left posterior parietal scalp, and D. left superior occipital scalp which showed nodular and nevoid melanoma, no ulceration, Breslow 1.3mm (deep and lateral margins positive), 3 mitoses per mm2. PD-L1 TPS <1%. BRAF V600K mutation.  - Biopsies by Dr. Garcia 11/25/20 showed locoregional metastatic melanoma  - PET negative for distant disease 12/8/20  - Start neoadjuvant vemurafenib and cobimetinib 1/202. Plan to add on atezolizumab in February 2021. The goal is to try to get a sufficient response to allow for surgical resection with clear margins.   2. NUB x2, s/p biopsies 2/5/21  - Left superior shoulder (concerning for more distant in transit met)  - central inferior abdomen (concerning for MM, BCC, SK)  3. Possible in transit met on left earlobe - will monitor clinically and consider biopsy if does not resolve with neoadjuvant treatment    ____________________________________________    Assessment & Plan:     #. Melanoma, stage IIIB, left scalp. Currently on neoadjuvant treatment with goal to get melanoma smaller in size so surgical resection with clear margins is possible. Explained role of dermatology in care - to help with cutaneous side effects of medications and screen for new primary melanomas. Explained routine schedule for follow up examinations in office and need for self skin checks monthly.   - ABCDs of melanoma were discussed and self skin checks were advised.    - Sun precaution was advised including the use of sunscreens of SPF 30 or higher, sun protective clothing, and avoidance of tanning beds.  - Recommended first degree relatives get yearly skin exams as well.  - *Reviewed last onc note from 1/29/21  - *Reviewed ENT note from 11/25/20  - *Reviewed most recent imaging PET and CT on 12/8/20 - neg for distant metastatic disease  - No skin findings concerning for adverse reactions to current melanoma meds  - Next skin check in 3 months.    # Sun damaged skin with solar lentigines: Chronic, stable.  - Recommend sunscreens SPF #30 or greater, protective clothing and avoidance of tanning beds.    # Benign skin findings including: seborrheic keratoses, cherry angioma - minor, self limited problem  - No further intervention required. Patient to report changes.   - Patient reassured of the benign nature of these lesions.    # Multiple clinically benign nevi: Chronic, stable  - No further intervention required. Patient to report changes.   - Patient reassured of the benign nature of these lesions.    # Possible in transit met, left earlobe: Considered biopsy, but likely would not . Will monitor and consider biopsy if fails to improve with neoadjuvant treatment.    # NUB, left superior shoulder: This appears concerning for in transit met. This is more distant from scalp (>5cm), so may change prognosis. For this reason, I recommended biopsy  - See procedure note below    # NUB, central inferior abdomen: Concerning for melanoma primary vs pigmented BCC vs flat SK.  - See procedure note below    Procedures Performed:   - Shave biopsy procedure note, location(s): left superior shoulder, central inferior abdomen. After discussion of benefits and risks including but not limited to bleeding, infection, scar, incomplete removal, recurrence, and non-diagnostic biopsy, written consent and photographs were obtained. The area was cleaned with isopropyl alcohol. 2mL of 1%  "lidocaine with epinephrine was injected to obtain adequate anesthesia of lesion(s). Shave biopsy at site(s) performed. Hemostasis was achieved with aluminium chloride. Petrolatum ointment and a sterile dressing were applied. The patient tolerated the procedure and no complications were noted. The patient was provided with verbal and written post care instructions.     Follow-up: 3 month(s) in-person, or earlier for new or changing lesions    Staff:     Sidra Esteban MD    Department of Dermatology  [facility]: Phone: [phone], Fax:[phone]  [facility]: Phone: [phone], Fax: [phone]    ____________________________________________    CC: Oncology Clinic Visit (MELANOMA OF THE SCALP )    HPI:  Mr. Ahmet Coleman is a(n) 75 year old male who presents today as a new patient for skin check.    He is new to our department. He has seen a dermatologist prior - Dr. Skinner at [facility] did initial biopsies in diagnosis of melanoma. He has no history of skin cancer, atypical moles, or precancerous lesions to his knowledge prior to the melanoma on the scalp.    Today, the patient notes concerns about no specific skin lesions. He notes he has a lot of elevated brown spots which he thinks are age spots. No tender or bleeding skin lesions.    Having a lot of diarrhea and some chills with melanoma meds.    Patient is otherwise feeling well, without additional concerns.     Labs:  NA    Physical Exam:  Vitals: /76   Pulse 72   Resp 16   Ht 1.727 m (5' 8\")   Wt 89 kg (196 lb 4.8 oz)   SpO2 95%   BMI 29.85 kg/m    SKIN: Total skin excluding the undergarment areas was performed. The exam included the head/face, neck, both arms, chest, back, abdomen, both legs, digits and/or nails. Buttocks examined. Declined genital concerns.  - Ortiz Type II  - Scattered brown " macules on sun exposed areas.  - On the left frontal, parietal, and temporal scalp, there is a well defined blue-black plaque. There is a nickel sized blue black plaque on the left parietal scalp that is separate from larger plaque.  - Blue black macule at left lateral earlobe.  - Venous lake on left superior helix.  - Blue gray macule at the left superior medial shoulder.  - There are dome shaped bright red papules on the trunk.   - Multiple regular brown pigmented macules and papules are identified on the body. About 40 nevi in all. None have significant atypia except below.  - 5mm blue black macule with lobules of pigment on the central inferior abdomen.   - There are waxy stuck on tan to brown papules on the trunk, extremities.  - No other lesions of concern on areas examined.   LYMPH NODES: No submandibular, cervical, supraclavicular, axillary, or inguinal lymphadenopathy palpable on examination.       Medications:  Current Outpatient Medications   Medication     acetaminophen (TYLENOL) 325 MG tablet     cholecalciferol (VITAMIN D3) 1000 UNIT tablet     fluvoxaMINE (LUVOX) 100 MG tablet     hydrochlorothiazide (HYDRODIURIL) 25 MG tablet     Hypromellose (ARTIFICIAL TEARS OP)     Lactobacillus-Inulin (CULTURELLE DIGESTIVE HEALTH) CAPS     lisinopril (PRINIVIL,ZESTRIL) 10 MG tablet     Magnesium Oxide 420 MG TABS     omeprazole (PRILOSEC) 20 MG capsule     prochlorperazine (COMPAZINE) 10 MG tablet     QUEtiapine (SEROQUEL) 25 MG tablet     simvastatin (ZOCOR) 20 MG tablet     TRAZodone (DESYREL) 50 MG tablet     vemurafenib (ZELBORAF) 240 MG TABS tablet     vortioxetine (TRINTELLIX) 20 MG tablet     Current Facility-Administered Medications   Medication     lidocaine 1% with EPINEPHrine 1:100,000 injection 1.5 mL     lidocaine 1% with EPINEPHrine 1:100,000 injection 1.5 mL     Facility-Administered Medications Ordered in Other Visits   Medication     sodium chloride (PF) 0.9% PF flush 10 mL      Past Medical  History:   Patient Active Problem List   Diagnosis     Musculoskeletal disorder and symptoms referable to neck     Degeneration of intervertebral disc of cervical region     Displacement of cervical intervertebral disc without myelopathy     Traumatic shock (H)     Irritable bowel syndrome     Hiatal hernia     GERD (gastroesophageal reflux disease)     Personal history of tobacco use, presenting hazards to health     PTSD (post-traumatic stress disorder)     CARDIOVASCULAR SCREENING; LDL GOAL LESS THAN 130     Obesity     Advanced directives, counseling/discussion     Major depressive disorder, recurrent episode, moderate (H)     Anxiety     Amnesia     Impaired fasting glucose     History of colonic polyps     Hypertension     Hyperlipidemia     Diverticulosis of colon     Cataract     Cannabis abuse, episodic use     Benign neoplasm of colon     Arthropathy of hand     Arthralgia     Other ill-defined and unknown causes of morbidity and mortality     Subjective tinnitus     Spondylosis of cervical spine without myelopathy     Sensorineural hearing loss (SNHL) of both ears     Pure hypertriglyceridemia     Pure hypercholesterolemia     Psychophysiologic insomnia     Presbyopia     Personality disorder (H)     Other and unspecified noninfectious gastroenteritis and colitis     Impotence of organic origin     History of other injury     History of cervical fracture     Malignant melanoma of scalp (H)     Fibromyositis     Herpes zoster ophthalmicus of left eye     Family history of type 2 diabetes mellitus     Monoallelic mutation of BRAF gene     Past Medical History:   Diagnosis Date     Anxiety      Benign hypertension 12/11/2013     Cervicalgia      Depressive disorder, not elsewhere classified      Diverticulitis of colon 7/10/2013     Esophageal reflux      Irritable bowel syndrome 10/3/2003     Lumbago      Malignant melanoma of scalp (H) 12/29/2020     Migraine, unspecified, without mention of intractable  migraine without mention of status migrainosus      Other kyphoscoliosis and scoliosis      Other specified gastritis without mention of hemorrhage      PTSD (post-traumatic stress disorder)      Tobacco abuse since 1965    on and off       CC Oksana Hernández MD  [address] [place] [phone] on close of this encounter.        Again, thank you for allowing me to participate in the care of your patient.        Sincerely,        Sidra Esteban MD

## 2021-02-05 NOTE — PATIENT INSTRUCTIONS

## 2021-02-09 ENCOUNTER — PATIENT OUTREACH (OUTPATIENT)
Dept: ONCOLOGY | Facility: CLINIC | Age: 76
End: 2021-02-09

## 2021-02-09 DIAGNOSIS — C43.4 MALIGNANT MELANOMA OF SCALP (H): Primary | ICD-10-CM

## 2021-02-09 NOTE — PROGRESS NOTES
Patient called in yesterday c/o 10-15 diarrhea episodes per day.  They are also keeping him up at night.  Dr. Hernández consulted.  C-diff and enteric stool studies ordered and chemotherapy will

## 2021-02-10 DIAGNOSIS — C43.4 MALIGNANT MELANOMA OF SCALP (H): ICD-10-CM

## 2021-02-10 LAB
C DIFF TOX B STL QL: NEGATIVE
SPECIMEN SOURCE: NORMAL

## 2021-02-10 PROCEDURE — 87493 C DIFF AMPLIFIED PROBE: CPT | Mod: 59 | Performed by: INTERNAL MEDICINE

## 2021-02-10 PROCEDURE — 87506 IADNA-DNA/RNA PROBE TQ 6-11: CPT | Performed by: INTERNAL MEDICINE

## 2021-02-12 ENCOUNTER — PATIENT OUTREACH (OUTPATIENT)
Dept: ONCOLOGY | Facility: CLINIC | Age: 76
End: 2021-02-12

## 2021-02-12 DIAGNOSIS — C43.4 MALIGNANT MELANOMA OF SCALP (H): Primary | ICD-10-CM

## 2021-02-12 RX ORDER — PREDNISONE 20 MG/1
TABLET ORAL
Qty: 158 TABLET | Refills: 0 | Status: SHIPPED | OUTPATIENT
Start: 2021-02-12 | End: 2021-02-18

## 2021-02-15 NOTE — PROGRESS NOTES
Connected with patient regarding diarrhea.  Per Dr. Hernández, patient had been taken off of his chemo meds and stool studies were ordered.  These came back negative.  Prednisone taper started by Sherine Cardoza PA-C.  Rx ordered and sent to pharmacy of choice.  Patient advised of plan.  Patient indicated understanding.      Paige Ambriz MBA, MSN, RN, ONC  RN Care Coordinator  [facility]

## 2021-02-16 LAB — COPATH REPORT: NORMAL

## 2021-02-18 ENCOUNTER — INFUSION THERAPY VISIT (OUTPATIENT)
Dept: ONCOLOGY | Facility: CLINIC | Age: 76
End: 2021-02-18
Attending: PHYSICIAN ASSISTANT
Payer: MEDICARE

## 2021-02-18 ENCOUNTER — ONCOLOGY VISIT (OUTPATIENT)
Dept: ONCOLOGY | Facility: CLINIC | Age: 76
End: 2021-02-18
Attending: INTERNAL MEDICINE
Payer: MEDICARE

## 2021-02-18 ENCOUNTER — APPOINTMENT (OUTPATIENT)
Dept: LAB | Facility: CLINIC | Age: 76
End: 2021-02-18
Attending: INTERNAL MEDICINE
Payer: MEDICARE

## 2021-02-18 VITALS
BODY MASS INDEX: 29.53 KG/M2 | WEIGHT: 194.2 LBS | SYSTOLIC BLOOD PRESSURE: 146 MMHG | HEART RATE: 82 BPM | DIASTOLIC BLOOD PRESSURE: 83 MMHG | TEMPERATURE: 98.1 F | OXYGEN SATURATION: 97 % | RESPIRATION RATE: 16 BRPM

## 2021-02-18 DIAGNOSIS — Z15.09 MONOALLELIC MUTATION OF BRAF GENE: ICD-10-CM

## 2021-02-18 DIAGNOSIS — Z15.89 MONOALLELIC MUTATION OF BRAF GENE: ICD-10-CM

## 2021-02-18 DIAGNOSIS — R19.7 DIARRHEA, UNSPECIFIED TYPE: ICD-10-CM

## 2021-02-18 DIAGNOSIS — C43.4 MALIGNANT MELANOMA OF SCALP (H): Primary | ICD-10-CM

## 2021-02-18 DIAGNOSIS — I10 ESSENTIAL HYPERTENSION: ICD-10-CM

## 2021-02-18 DIAGNOSIS — C43.4 MALIGNANT MELANOMA OF SCALP (H): ICD-10-CM

## 2021-02-18 DIAGNOSIS — K62.89 RECTAL IRRITATION: ICD-10-CM

## 2021-02-18 DIAGNOSIS — E83.39 HYPOPHOSPHATEMIA: ICD-10-CM

## 2021-02-18 LAB
ALBUMIN SERPL-MCNC: 3.1 G/DL (ref 3.4–5)
ALP SERPL-CCNC: 75 U/L (ref 40–150)
ALT SERPL W P-5'-P-CCNC: 40 U/L (ref 0–70)
ANION GAP SERPL CALCULATED.3IONS-SCNC: 4 MMOL/L (ref 3–14)
AST SERPL W P-5'-P-CCNC: 24 U/L (ref 0–45)
BASOPHILS # BLD AUTO: 0 10E9/L (ref 0–0.2)
BASOPHILS NFR BLD AUTO: 0.7 %
BILIRUB SERPL-MCNC: 0.6 MG/DL (ref 0.2–1.3)
BUN SERPL-MCNC: 21 MG/DL (ref 7–30)
CALCIUM SERPL-MCNC: 8.8 MG/DL (ref 8.5–10.1)
CHLORIDE SERPL-SCNC: 102 MMOL/L (ref 94–109)
CO2 SERPL-SCNC: 34 MMOL/L (ref 20–32)
CREAT SERPL-MCNC: 1.16 MG/DL (ref 0.66–1.25)
DIFFERENTIAL METHOD BLD: ABNORMAL
EOSINOPHIL # BLD AUTO: 0.1 10E9/L (ref 0–0.7)
EOSINOPHIL NFR BLD AUTO: 1.7 %
ERYTHROCYTE [DISTWIDTH] IN BLOOD BY AUTOMATED COUNT: 12.3 % (ref 10–15)
GFR SERPL CREATININE-BSD FRML MDRD: 62 ML/MIN/{1.73_M2}
GLUCOSE SERPL-MCNC: 113 MG/DL (ref 70–99)
HCT VFR BLD AUTO: 37.3 % (ref 40–53)
HGB BLD-MCNC: 12.8 G/DL (ref 13.3–17.7)
IMM GRANULOCYTES # BLD: 0 10E9/L (ref 0–0.4)
IMM GRANULOCYTES NFR BLD: 0.3 %
LYMPHOCYTES # BLD AUTO: 1.5 10E9/L (ref 0.8–5.3)
LYMPHOCYTES NFR BLD AUTO: 26.1 %
MAGNESIUM SERPL-MCNC: 2.3 MG/DL (ref 1.6–2.3)
MCH RBC QN AUTO: 28.9 PG (ref 26.5–33)
MCHC RBC AUTO-ENTMCNC: 34.3 G/DL (ref 31.5–36.5)
MCV RBC AUTO: 84 FL (ref 78–100)
MONOCYTES # BLD AUTO: 0.5 10E9/L (ref 0–1.3)
MONOCYTES NFR BLD AUTO: 7.8 %
NEUTROPHILS # BLD AUTO: 3.7 10E9/L (ref 1.6–8.3)
NEUTROPHILS NFR BLD AUTO: 63.4 %
NRBC # BLD AUTO: 0 10*3/UL
NRBC BLD AUTO-RTO: 0 /100
PHOSPHATE SERPL-MCNC: 1.9 MG/DL (ref 2.5–4.5)
PLATELET # BLD AUTO: 304 10E9/L (ref 150–450)
POTASSIUM SERPL-SCNC: 3.4 MMOL/L (ref 3.4–5.3)
PROT SERPL-MCNC: 7 G/DL (ref 6.8–8.8)
RBC # BLD AUTO: 4.43 10E12/L (ref 4.4–5.9)
SODIUM SERPL-SCNC: 140 MMOL/L (ref 133–144)
WBC # BLD AUTO: 5.8 10E9/L (ref 4–11)

## 2021-02-18 PROCEDURE — 99207 PR NO CHARGE NURSE ONLY: CPT

## 2021-02-18 PROCEDURE — 250N000011 HC RX IP 250 OP 636: Performed by: INTERNAL MEDICINE

## 2021-02-18 PROCEDURE — 83735 ASSAY OF MAGNESIUM: CPT | Performed by: INTERNAL MEDICINE

## 2021-02-18 PROCEDURE — G0463 HOSPITAL OUTPT CLINIC VISIT: HCPCS

## 2021-02-18 PROCEDURE — 258N000003 HC RX IP 258 OP 636: Performed by: INTERNAL MEDICINE

## 2021-02-18 PROCEDURE — 96413 CHEMO IV INFUSION 1 HR: CPT

## 2021-02-18 PROCEDURE — 99214 OFFICE O/P EST MOD 30 MIN: CPT | Performed by: PHYSICIAN ASSISTANT

## 2021-02-18 PROCEDURE — 84100 ASSAY OF PHOSPHORUS: CPT | Performed by: INTERNAL MEDICINE

## 2021-02-18 PROCEDURE — 85025 COMPLETE CBC W/AUTO DIFF WBC: CPT | Performed by: INTERNAL MEDICINE

## 2021-02-18 PROCEDURE — 999N000127 HC STATISTIC PERIPHERAL IV START W US GUIDANCE

## 2021-02-18 PROCEDURE — 80053 COMPREHEN METABOLIC PANEL: CPT | Performed by: INTERNAL MEDICINE

## 2021-02-18 RX ORDER — DIPHENOXYLATE HCL/ATROPINE 2.5-.025MG
1-2 TABLET ORAL 4 TIMES DAILY PRN
Qty: 120 TABLET | Refills: 5 | Status: SHIPPED | OUTPATIENT
Start: 2021-02-18 | End: 2022-06-23

## 2021-02-18 RX ADMIN — ATEZOLIZUMAB 840 MG: 840 INJECTION, SOLUTION INTRAVENOUS at 15:48

## 2021-02-18 NOTE — Clinical Note
2/18/2021         RE: Ahmet Coleman  [address] [place] [id number]        Dear Colleague,    Thank you for referring your patient, Ahmet Coleman, to the [facility]. Please see a copy of my visit note below.    MEDICAL ONCOLOGY CONSULT  Melanoma Clinic  Feb 18, 2021    CHIEF COMPLAINT: Scalp melanoma    Melanoma History:  1. He has a small pigmented scalp lesion present for over 30 years. The lesion was biopsied in 1992 and was benign.  2. October 2020, he presented to [facility] with 1 year of progressive enlargement of the lesion and new onset burning, itching, and stinging sensation in the affected scalp.  3. 10/26/20, He was seen at [facility] and he had shave biopsies of A. left central parietal scalp, B. left central occipital scalp, C. Left posterior parietal scalp, and D. punch biopsy of the left superior occipital scalp. Pathology (specimen: [id number]) showed a nodular and nevoid type melanoma, non-ulcerated, Breslow depth up to 1.3 mm, Saurabh's level IV, and up to 3 mitoses per mm2. All margins were involved. Ki-67 10-20%. Sox10 stain is positive and highlights an atypical compound melanocytic proliferation with significant overlying confluence and pagetosis of intraepidermal melanocytes. T-stage: at least pT2a. PD-L1 staining shows PD-L1 TPS <1%. 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  4. 11/25/20, he was seen by Dr. Garcia and he took three 3mm punch biopsies, which returned as dermal melanocytic proliferations with melanophages and fibrosis, consistent with locoregional metastatic melanoma.  5. 12/8/2020 he had PET-CT, which showed FDG avid irregular skin thickening overlying the left parietal region, with no FDG avid lymphadenopathy in the neck and no evidence of metastasis elsewhere in the body.  6. 1/22/2021 start vemurafenib and cobimetinib.    INTERVAL HISTORY  Patient reports that he has a history of  irritable bowel with alternating constipation and diarrhea.  2 nights ago while he was sleeping, he did have an episode of stool incontinence.  He did have 2 loose stools yesterday and then took Imodium.  He has not had any further loose stools since yesterday.  He has been drinking about 64 ounces of fluid per day.  He reports that his appetite is fair.  He does have some abdominal bloating.  He reports that he has a home care nurse come out every 2 weeks and that his home blood pressure has been in the 130s over 70s.  He has mild headaches that resolve on their own.  He has dry eyes which have gotten a little bit worse and is using eyedrops for this.  He also has some light sensitivity.  His energy is fair and unchanged.  He has a history of nightmares chronically that are unchanged.  He denies any rashes or itchy skin.  He does note that he has some discomfort in his rectal area following diarrhea.  Patient does admit to several [place] . He denies other concerns.    Current Outpatient Medications   Medication Sig Dispense Refill     acetaminophen (TYLENOL) 325 MG tablet TAKE TWO TABLETS BY MOUTH THREE TIMES A DAY AS NEEDED       cholecalciferol (VITAMIN D3) 1000 UNIT tablet Take  by mouth daily.       fluvoxaMINE (LUVOX) 100 MG tablet Take 150 mg at bedtime       hydrochlorothiazide (HYDRODIURIL) 25 MG tablet Take 25 mg by mouth daily.       Hypromellose (ARTIFICIAL TEARS OP) Apply  to eye. 2 drops in each eye twice a day as needed       Lactobacillus-Inulin (C[employer]URE[employer]LE DIGESTIVE HEALTH) CAPS 1 tab daily 30 capsule 1     lisinopril (PRINIVIL,ZESTRIL) 10 MG tablet Take 10 mg by mouth daily.       Magnesium Oxide 420 MG TABS Take 420 mg by mouth daily       omeprazole (PRILOSEC) 20 MG capsule Take 1 capsule by mouth 2 times daily. 60 capsule prn     predniSONE (DELTASONE) 20 MG tablet Take 4.5 tablets (90 mg) by mouth daily for 7 days, THEN 4 tablets (80 mg) daily for 7 days, THEN  3.5 tablets (70 mg) daily for 7 days, THEN 3 tablets (60 mg) daily for 7 days, THEN 2.5 tablets (50 mg) daily for 7 days, THEN 2 tablets (40 mg) daily for 7 days, THEN 1.5 tablets (30 mg) daily for 7 days, THEN 1 tablet (20 mg) daily for 7 days, THEN 0.5 tablets (10 mg) daily for 7 days. 158 tablet 0     prochlorperazine (COMPAZINE) 10 MG tablet Take 1 tablet (10 mg) by mouth every 6 hours as needed (Nausea/Vomiting) 30 tablet 2     QUEtiapine (SEROQUEL) 25 MG tablet 1/2 tablet nightly       simvastatin (ZOCOR) 20 MG tablet Take  by mouth At Bedtime.       TRAZodone (DESYREL) 50 MG tablet Take 50 mg by mouth At Bedtime.       vortioxetine (TRINTELLIX) 20 MG tablet TAKE ONE TABLET BY MOUTH EVERY MORNING       vemurafenib (ZELBORAF) 240 MG TABS tablet Take 4 tablets (960 mg) by mouth every 12 hours for 21 days, THEN 3 tablets (720 mg) every 12 hours for 7 days. 210 tablet 0     PHYSICAL EXAMINATION  General: The patient is a pleasant male in no acute distress.  BP (!) 146/83   Pulse 82   Temp 98.1  F (36.7  C) (Tympanic)   Resp 16   Wt 88.1 kg (194 lb 3.2 oz)   SpO2 97%   BMI 29.53 kg/m    Wt Readings from Last 10 Encounters:   02/18/21 88.1 kg (194 lb 3.2 oz)   02/05/21 89 kg (196 lb 4.8 oz)   01/29/21 90.6 kg (199 lb 11.2 oz)   12/21/20 88.6 kg (195 lb 4.8 oz)   11/25/20 88.5 kg (195 lb)   10/14/20 87.5 kg (193 lb)   06/09/16 74.4 kg (164 lb)   05/31/16 75.8 kg (167 lb)   04/13/16 75.8 kg (167 lb)   04/06/16 75.1 kg (165 lb 8 oz)   HEENT: EOMI, PERRL. Sclerae are anicteric.   Lymph: Neck is supple with no lymphadenopathy in the cervical or supraclavicular areas.   Heart: Regular rate and rhythm.   Lungs: Clear to auscultation bilaterally.   Abdomen: Bowel sounds present, soft, nontender with no palpable hepatosplenomegaly or masses.   Extremities: No lower extremity edema noted bilaterally.   Neuro: Cranial nerves II through XII are grossly intact.  Skin: The large mid-scalp pigmented lesion extends from right  of midline to the left side of the head, roughly measuring 10 x 5 cm, though a smaller separate satellite lesion measures 3 x 1.5 cm in size in the left temporal scalp. Left ear pinna lesion measures 2mm, possible in-transit metastasis.     LABS    ASSESSMENT AND PLAN  Cutaneous melanoma, scalp primary, pT2a cN1c, clinical Stage IIIB. It was a pleasure to meet Mr. Coleman today. He is a 75 year old  with agent orange cutaneous exposures in the [place] war and a diagnosis of malignant melanoma arising from a pre-existing pigmented lesion on the scalp. He has clinical evidence of satellitosis with separation of a left temporal satellite from the main tumor mass, and possibly in-transit metastasis to the helix of the left ear. He was reviewed for participation in the NeoActivate study, but he does not meet criteria as he does not have regional lymph node disease. He started on neoadjuvant vemurafenib and cobimetinib on 1/22/21. He is tolerating this fairly well thus far with diarrhea as his main concern. He will start on atezolizumab in addition to vemurafenib and cobimetinib on 2/12/21. We will plan to get an EKG at that time. I will see him back at that time. The goal is to try to get a sufficient response to allow for surgical resection with clear margins. We discussed that his sun exposure in high younger years with sunburns is a risk factor for him developing melanoma. He would like to get some of his infusions in [place]. Will tentatively plan for his next imaging the end of April 2021.     Therefore, he/she is starting on adjuvant immune therapy with nivolumab today. We reviewed the potential for fatigue, mild nausea, and the potential risk for immune mediated side effects including inflammation of the pituitary gland, thyroid, lungs, heart, colon, pancreas, liver, kidneys, or skin. We reviewed that he/she should call if he develops profound fatigue, dyspnea, cough, chest pain, diarrhea, abdominal  pain, decrease in urine output, or very itchy skin.       Diarrhea. Seems most likely secondary to his treatment, complicated by possible IBS. Recommend taking 1 Imodium every morning to try to prevent the diarrhea and then as needed during the day, up to 8 tablets/day.   -Lomotil    Rectal irritation. Secondary to diarrhea. Recommend using Desitin, soaking rectal area in the tub 1-2 times per day, and using witch hazel wipes.     CKD. Suspect secondary to hypertension. Patient creatinine appears slightly worse than his recent baseline, upon review of care everywhere. Suspect secondary to recent diarrhea. Recommend pushing fluids with a goal of 64-80 oz of fluid per day.     Hypertension. BP is elevated today in clinic, but patient reports good control at home.  He remains on hydrochlorothiazide and lisinopril. [facility] more than 1 year [facility], per his report. Will continue to monitor in clinic.     Dry eyes. Had at baseline, but worse with vem/jackson. Recommend continuing with lubricating eye drops like Refresh, Systane, or Blink.    Sherine Cardoza PA-C  [facility]  [address] [place] [id number]  [phone]        Again, thank you for allowing me to participate in the care of your patient.        Sincerely,        Sherine Cardoza PA-C

## 2021-02-18 NOTE — NURSING NOTE
"Oncology Rooming Note    February 18, 2021 2:03 PM   Ahmet Coleman is a 75 year old male who presents for:    Chief Complaint   Patient presents with     Blood Draw     Labs drawn via PIV placed by RN in lab. IV did not flush. Dc'd.     Oncology Clinic Visit     Patient with Malignant melanoma of scalp here for provider visit      Initial Vitals: BP (!) 146/83   Pulse 82   Temp 98.1  F (36.7  C) (Tympanic)   Resp 16   Wt 88.1 kg (194 lb 3.2 oz)   SpO2 97%   BMI 29.53 kg/m   Estimated body mass index is 29.53 kg/m  as calculated from the following:    Height as of 2/5/21: 1.727 m (5' 8\").    Weight as of this encounter: 88.1 kg (194 lb 3.2 oz). Body surface area is 2.06 meters squared.  Data Unavailable Comment: Data Unavailable   No LMP for male patient.  Allergies reviewed: Yes  Medications reviewed: Yes    Medications: Medication refills not needed today.  Pharmacy name entered into [employer]:    [facility] - [place], [place] - [address] [id number] - [place], [place] - [address] [facility] - [place], [place] - [address]  [facility] - [place], [place] - [address]    Clinical concerns:      Aretha Dailey CMA              " Infant assessment WNL, VSS. Infant voiding and stooling, attempting breastfeeding q2-3 hours, supplementing with Similac if no latch obtained. MOB is pumping to protect milk supply. Bulb syringe in crib. Cuddles verified activated with lights flashing, will continue to provide  care.

## 2021-02-18 NOTE — PROGRESS NOTES
MEDICAL ONCOLOGY CONSULT  Melanoma Clinic  Feb 18, 2021    CHIEF COMPLAINT: Scalp melanoma    Melanoma History:  1. He has a small pigmented scalp lesion present for over 30 years. The lesion was biopsied in 1992 and was benign.  2. October 2020, he presented to [facility] with 1 year of progressive enlargement of the lesion and new onset burning, itching, and stinging sensation in the affected scalp.  3. 10/26/20, He was seen at [facility] and he had shave biopsies of A. left central parietal scalp, B. left central occipital scalp, C. Left posterior parietal scalp, and D. punch biopsy of the left superior occipital scalp. Pathology (specimen: [id number]) showed a nodular and nevoid type melanoma, non-ulcerated, Breslow depth up to 1.3 mm, Saurabh's level IV, and up to 3 mitoses per mm2. All margins were involved. Ki-67 10-20%. Sox10 stain is positive and highlights an atypical compound melanocytic proliferation with significant overlying confluence and pagetosis of intraepidermal melanocytes. T-stage: at least pT2a. PD-L1 staining shows PD-L1 TPS <1%. 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  4. 11/25/20, he was seen by Dr. Garcia and he took three 3mm punch biopsies, which returned as dermal melanocytic proliferations with melanophages and fibrosis, consistent with locoregional metastatic melanoma.  5. 12/8/2020 he had PET-CT, which showed FDG avid irregular skin thickening overlying the left parietal region, with no FDG avid lymphadenopathy in the neck and no evidence of metastasis elsewhere in the body.  6. 1/22/2021 start vemurafenib and cobimetinib, ~2/12/21 held for diarrhea.    INTERVAL HISTORY  Patient reports that his mood has been down for the past few days.  He reports some life stressors.  He has been holding his chemo pills due to diarrhea.  He was having 7-8 stools per day and taking 1 Imodium per day.  Since holding his pills, he has now not had a bowel movement in  the last 3 to 4 days.  He is still passing gas.  He has had some rectal irritation for which she has been applying cortisone.  He reports drinking about 4 pints of juice per day.  He notes his home blood pressure has been good around 114/70.  He has been using lubricating eyedrops for his dry eyes.  He notes that his last psych appointment was about 4 weeks ago.  He denies other concerns.    Current Outpatient Medications   Medication Sig Dispense Refill     acetaminophen (TYLENOL) 325 MG tablet TAKE TWO TABLETS BY MOUTH THREE TIMES A DAY AS NEEDED       cholecalciferol (VITAMIN D3) 1000 UNIT tablet Take  by mouth daily.       diphenoxylate-atropine (LOMOTIL) 2.5-0.025 MG tablet Take 1-2 tablets by mouth 4 times daily as needed for diarrhea 120 tablet 5     fluvoxaMINE (LUVOX) 100 MG tablet Take 150 mg at bedtime       hydrochlorothiazide (HYDRODIURIL) 25 MG tablet Take 25 mg by mouth daily.       Hypromellose (ARTIFICIAL TEARS OP) Apply  to eye. 2 drops in each eye twice a day as needed       Lactobacillus-Inulin ([facility] DIGESTIVE [facility]) CAPS 1 tab daily 30 capsule 1     lisinopril (PRINIVIL,ZESTRIL) 10 MG tablet Take 10 mg by mouth daily.       Magnesium Oxide 420 MG TABS Take 420 mg by mouth daily       omeprazole (PRILOSEC) 20 MG capsule Take 1 capsule by mouth 2 times daily. 60 capsule prn     potassium phosphate, monobasic, (K-PHOS) 500 MG tablet Take 1 tablet (500 mg) by mouth 2 times daily 60 tablet 3     prochlorperazine (COMPAZINE) 10 MG tablet Take 1 tablet (10 mg) by mouth every 6 hours as needed (Nausea/Vomiting) 30 tablet 2     QUEtiapine (SEROQUEL) 25 MG tablet 1/2 tablet nightly       simvastatin (ZOCOR) 20 MG tablet Take  by mouth At Bedtime.       TRAZodone (DESYREL) 50 MG tablet Take 50 mg by mouth At Bedtime.       vortioxetine (TRINTELLIX) 20 MG tablet TAKE ONE TABLET BY MOUTH EVERY MORNING       cobimetinib (COTELLIC) 20 MG tablet Take 2 tablets (40 mg) by mouth daily for 21 days Take on  Days 1 through 21 of each 28 day cycle. 42 tablet 0     vemurafenib (ZELBORAF) 240 MG TABS tablet Take 3 tablets (720 mg) by mouth every 12 hours for 28 days 168 tablet 0     vemurafenib (ZELBORAF) 240 MG TABS tablet Take 4 tablets (960 mg) by mouth every 12 hours for 21 days, THEN 3 tablets (720 mg) every 12 hours for 7 days. 210 tablet 0     PHYSICAL EXAMINATION  General: The patient is a pleasant male in no acute distress.  BP (!) 146/83   Pulse 82   Temp 98.1  F (36.7  C) (Tympanic)   Resp 16   Wt 88.1 kg (194 lb 3.2 oz)   SpO2 97%   BMI 29.53 kg/m    Wt Readings from Last 10 Encounters:   02/18/21 88.1 kg (194 lb 3.2 oz)   02/05/21 89 kg (196 lb 4.8 oz)   01/29/21 90.6 kg (199 lb 11.2 oz)   12/21/20 88.6 kg (195 lb 4.8 oz)   11/25/20 88.5 kg (195 lb)   10/14/20 87.5 kg (193 lb)   06/09/16 74.4 kg (164 lb)   05/31/16 75.8 kg (167 lb)   04/13/16 75.8 kg (167 lb)   04/06/16 75.1 kg (165 lb 8 oz)   HEENT: EOMI, PERRL. Sclerae are anicteric.   Lymph: Neck is supple with no lymphadenopathy in the cervical or supraclavicular areas.   Heart: Regular rate and rhythm.   Lungs: Clear to auscultation bilaterally.   Abdomen: Bowel sounds present, soft, nontender with no palpable hepatosplenomegaly or masses.   Extremities: No lower extremity edema noted bilaterally.   Neuro: Cranial nerves II through XII are grossly intact.  Skin: The large mid-scalp pigmented lesion extends from right of midline to the left side of the head, roughly measuring 10 x 5 cm, though a smaller separate satellite lesion measures 3 x 1.5 cm in size in the left temporal scalp. Left ear pinna lesion measures 2mm, possible in-transit metastasis.         LABS   2/18/2021 13:32   Sodium 140   Potassium 3.4   Chloride 102   Carbon Dioxide 34 (H)   Urea Nitrogen 21   Creatinine 1.16   GFR Estimate 62   GFR Estimate If Black 72   Calcium 8.8   Anion Gap 4   Magnesium 2.3   Phosphorus 1.9 (L)   Albumin 3.1 (L)   Protein Total 7.0   Bilirubin Total 0.6    Alkaline Phosphatase 75   ALT 40   AST 24   Glucose 113 (H)   WBC 5.8   Hemoglobin 12.8 (L)   Hematocrit 37.3 (L)   Platelet Count 304   RBC Count 4.43   MCV 84   MCH 28.9   MCHC 34.3   RDW 12.3   Diff Method Automated Method   % Neutrophils 63.4   % Lymphocytes 26.1   % Monocytes 7.8   % Eosinophils 1.7   % Basophils 0.7   % Immature Granulocytes 0.3   Nucleated RBCs 0   Absolute Neutrophil 3.7   Absolute Lymphocytes 1.5   Absolute Monocytes 0.5   Absolute Eosinophils 0.1   Absolute Basophils 0.0   Abs Immature Granulocytes 0.0   Absolute Nucleated RBC 0.0     Dermatopathology from 2/5/21 shows the following:  A. Skin, left superior shoulder, shave:   - Band-like collection of superficial dermal melanophages consistent with   tumoral melanosis    B. Skin, central inferior abdomen, shave:   - Band-like collection of superficial dermal melanophages consistent with   tumoral melanosis    ASSESSMENT AND PLAN  Cutaneous melanoma, scalp primary, pT2a cN1c, clinical Stage IIIB. It was a pleasure to meet Mr. Coleman today. He is a 75 year old  with agent orange cutaneous exposures in the [place] war and a diagnosis of malignant melanoma arising from a pre-existing pigmented lesion on the scalp. He has clinical evidence of satellitosis with separation of a left temporal satellite from the main tumor mass, and possibly in-transit metastasis to the helix of the left ear. He was reviewed for participation in the NeoActivate study, but he does not meet criteria as he does not have regional lymph node disease. He started on neoadjuvant vemurafenib and cobimetinib on 1/22/21. He was tolerating this fairly well, but then developed worsening diarrhea, so his treatment has been on hold. He will now resume vemurafenib at 720 mg and cobimetinib at 40 mg reduced dosing due to the diarrhea. He will also start on atezolizumab today.  We reviewed the potential for fatigue, mild nausea, and the potential risk for immune mediated  side effects including inflammation of the pituitary gland, thyroid, lungs, heart, colon, pancreas, liver, kidneys, or skin. We reviewed that he should call if he develops profound fatigue, dyspnea, cough, chest pain, diarrhea, abdominal pain, decrease in urine output, or very itchy skin. He will follow-up with me in 2 weeks virtually. He would like to get some of his infusions in [place]. Will tentatively plan for his next imaging the end of April 2021.  Of note, he did have a distant site on his abdomen biopsied that showed tumoral melanosis. We will need to keep this in mid as we consider future plans for surgery.     Diarrhea. Seems most likely secondary to his treatment, complicated by possible IBS. Will try Lomotil in place of Imodium and recommend taking with each loose stool.     Rectal irritation. Secondary to diarrhea. Recommend using Desitin, soaking rectal area in the tub 1-2 times per day, and using witch hazel wipes.     CKD. Suspect secondary to hypertension. Better today. Will continue to monitor. Seems easily affected by mild dehydration with diarrhea.     Hypertension. BP is under reasonably control. He remains on hydrochlorothiazide and lisinopril. [facility] was more than 1 year ago, per his report. Will continue to monitor in clinic.     Dry eyes. Had at baseline, but worse with vem/jackson. Recommend continuing with lubricating eye drops like Refresh, Systane, or Blink.    Hypophosphatemia. Secondary to cobimetinib (68% reported). Will start him on KPhos 500 mg bid and recheck again in 2 weeks.     Sherine Cardoza PA-C  [facility]  [address], [place] [id number] [phone]

## 2021-02-18 NOTE — PATIENT INSTRUCTIONS
Side Effect Management of Immune Therapy    Fatigue  Exercise daily. Call if fatigue so severe unable to get out of bed. Limit naps to less than 1 hour per day to avoid affecting nighttime sleep quality.    Loose stools or diarrhea  Okay to take Imodium (loperamide) up to 8 tablets/day. If having 4 or more loose stools per day or if having blood in stool or dark/tarry stools, call the clinic.     Dry or itchy skin  Try Aveeno lotion or Eucerin cream at least twice daily. Okay to take Benadryl orally or use topically. Avoid topical steroids if possible (hydrocortisone). Call if these measures ineffective.    Joint or muscle aches  Okay to try Tylenol (acetaminophen) no more than 1000 mg three times/day or Advil (ibuprofen) with food no more than 800 mg three times/day. Okay to try topical lidocaine patches or cream or creams like TigerBalm or IcyHot. May also try ice or heat to affected areas.  Call clinic if ineffective.    Nausea  Eat small, frequent meals. Avoid spicy and acidic foods. Some people find a benefit from wearing SeaBands (wrist bands with acupressure) or drinking gordon tea. Take prescribed nausea medications. If still nauseated despite these measures, call clinic.    Call if you develop  -Difficulty breathing  -Persistent dry cough  -Chest pain  -Moderate to severe abdominal pain  -Decrease in urine output despite good fluid intake  -Fever of 100.4F or higher     Call [phone] and ask to speak with triage nurse for any medical concerns. Use [employer] messaging only for non-urgent concerns and not for symptom concerns.

## 2021-02-18 NOTE — PROGRESS NOTES
Infusion Nursing Note:  Ahmet Coleman presents today for Cycle 2 Day 1 Atezolizumab.    Patient seen by provider today: Yes: Sherine Cardoza PA-C   present during visit today: Not Applicable.    Note:   Patient is new to the infusion room today and is receiving Atezolizumab for the first time.  Patient oriented to infusion room, location of bathrooms and nutrition stations, and call light.  Verified that patient recieved written chemotherapy information previously.  Verbally reviewed chemotherapy teaching, side effects, take-home medications, and follow-up schedule with patient. Patient instructed to call triage with any questions or if he experiences a temperature >100.5, shaking chills, uncontrolled nausea/vomiting/diarrhea, dizziness, shortness of breath, bleeding not relieved with pressure, or with any other concerns.  Instructed patient to call the after hours nurse line or [phone] on nights/weekends/holidays.    Intravenous Access:  Peripheral IV placed by vascular access.    Treatment Conditions:  Lab Results   Component Value Date    HGB 12.8 02/18/2021     Lab Results   Component Value Date    WBC 5.8 02/18/2021      Lab Results   Component Value Date    ANEU 3.7 02/18/2021     Lab Results   Component Value Date     02/18/2021      Lab Results   Component Value Date     02/18/2021                   Lab Results   Component Value Date    POTASSIUM 3.4 02/18/2021           Lab Results   Component Value Date    MAG 2.3 02/18/2021            Lab Results   Component Value Date    CR 1.16 02/18/2021                   Lab Results   Component Value Date    STEPHEN 8.8 02/18/2021                Lab Results   Component Value Date    BILITOTAL 0.6 02/18/2021           Lab Results   Component Value Date    ALBUMIN 3.1 02/18/2021                    Lab Results   Component Value Date    ALT 40 02/18/2021           Lab Results   Component Value Date    AST 24 02/18/2021       Results reviewed, labs  MET treatment parameters, ok to proceed with treatment.    Post Infusion Assessment:  Patient tolerated infusion without incident.  Blood return noted pre and post infusion.  Site patent and intact, free from redness, edema or discomfort.  No evidence of extravasations.  Access discontinued per protocol.     Per patient, Sherine/Dr. Hernández may be decreasing his PO chemotherapy doses.  Writer nba Riley in pharmacy send a message to the oral chemotherapy team as doses had not yet been adjusted and orders haven't been released.    Discharge Plan:   Patient declined prescription refills.  Discharge instructions reviewed with: Patient.  Patient and/or family verbalized understanding of discharge instructions and all questions answered.  Copy of AVS reviewed with patient and/or family.  Patient will return in 2 weeks for next appointment (message sent to scheduling to schedule patient per orders from Sherine Cardoza today).  Patient discharged in stable condition accompanied by: self.  Departure Mode: Ambulatory.  Face to Face time: 0.    KAVON JENNINGS RN

## 2021-02-18 NOTE — NURSING NOTE
Chief Complaint   Patient presents with     Blood Draw     Labs drawn via PIV placed by RN in lab. IV did not flush. Dc'd.     Labs drawn from PIV placed by RN. Line flushed with saline. Vitals taken. Pt checked in for appointment(s).    Aleida HUBBARD RN PHN BSN  BMT/Oncology Lab

## 2021-02-18 NOTE — PATIENT INSTRUCTIONS
Contact Numbers    [facility] Main Line (for Scheduling/Triage/After Hours Nurse Line): [phone]    Please call the [facility] nurse triage or the after hours nurse line if you experience a temperature greater than or equal to 100.5, shaking chills, have uncontrolled nausea, vomiting and/or diarrhea, dizziness, lightheadedness, shortness of breath, chest pain, bleeding, unexplained bruising, or if you have any other new/concerning symptoms, questions or concerns.     If you are having any concerning symptoms or wish to speak to a provider before your next infusion visit, please call your care coordinator or triage to notify them so we can adequately serve you.     If you need any refills on medications (narcotics or other medications), please call before your infusion appointment.       February 2021 Sunday Monday Tuesday Wednesday Thursday Friday Saturday        1     2     3     4     5    [facility] NEW  12:45 PM   (30 min.)   Sidra Esteban MD   [facility] 6       7     8     9     10    LAB   4:30 PM   (15 min.)   [facility] 11     12     13       14     15     16     17     18    [facility] NURSE VISIT   1:00 PM   (10 min.)   Nurse,  Oncology   [facility]    LAB PERIPHERAL   1:30 PM   (15 min.)   [facility] LAB DRAW   [facility] RETURN   1:55 PM   (50 min.)   Sherine Cardoza PA-C   [facility] ONC INFUSION 60   3:30 PM   (60 min.)   [facility]   [facility] 19     20       21     22     23     24     25     26     27       28                                               March 2021 Sunday Monday Tuesday Wednesday Thursday Friday Saturday        1     2     3     4     5     6       7     8     9     10     11     12     13       14     15     16     17     18     19     20       21     22     23      24  Happy Birthday!     25     26     27       28     29     30     31                                   Recent Results (from the past 24 hour(s))   EKG 12-lead complete w/read - Clinics    Collection Time: 02/18/21  1:09 PM   Result Value Ref Range    Interpretation ECG Click View Image link to view waveform and result    Comprehensive metabolic panel    Collection Time: 02/18/21  1:32 PM   Result Value Ref Range    Sodium 140 133 - 144 mmol/L    Potassium 3.4 3.4 - 5.3 mmol/L    Chloride 102 94 - 109 mmol/L    Carbon Dioxide 34 (H) 20 - 32 mmol/L    Anion Gap 4 3 - 14 mmol/L    Glucose 113 (H) 70 - 99 mg/dL    Urea Nitrogen 21 7 - 30 mg/dL    Creatinine 1.16 0.66 - 1.25 mg/dL    GFR Estimate 62 >60 mL/min/[1.73_m2]    GFR Estimate If Black 72 >60 mL/min/[1.73_m2]    Calcium 8.8 8.5 - 10.1 mg/dL    Bilirubin Total 0.6 0.2 - 1.3 mg/dL    Albumin 3.1 (L) 3.4 - 5.0 g/dL    Protein Total 7.0 6.8 - 8.8 g/dL    Alkaline Phosphatase 75 40 - 150 U/L    ALT 40 0 - 70 U/L    AST 24 0 - 45 U/L   *CBC with platelets differential    Collection Time: 02/18/21  1:32 PM   Result Value Ref Range    WBC 5.8 4.0 - 11.0 10e9/L    RBC Count 4.43 4.4 - 5.9 10e12/L    Hemoglobin 12.8 (L) 13.3 - 17.7 g/dL    Hematocrit 37.3 (L) 40.0 - 53.0 %    MCV 84 78 - 100 fl    MCH 28.9 26.5 - 33.0 pg    MCHC 34.3 31.5 - 36.5 g/dL    RDW 12.3 10.0 - 15.0 %    Platelet Count 304 150 - 450 10e9/L    Diff Method Automated Method     % Neutrophils 63.4 %    % Lymphocytes 26.1 %    % Monocytes 7.8 %    % Eosinophils 1.7 %    % Basophils 0.7 %    % Immature Granulocytes 0.3 %    Nucleated RBCs 0 0 /100    Absolute Neutrophil 3.7 1.6 - 8.3 10e9/L    Absolute Lymphocytes 1.5 0.8 - 5.3 10e9/L    Absolute Monocytes 0.5 0.0 - 1.3 10e9/L    Absolute Eosinophils 0.1 0.0 - 0.7 10e9/L    Absolute Basophils 0.0 0.0 - 0.2 10e9/L    Abs Immature Granulocytes 0.0 0 - 0.4 10e9/L    Absolute Nucleated RBC 0.0    Magnesium    Collection Time: 02/18/21  1:32 PM   Result  Value Ref Range    Magnesium 2.3 1.6 - 2.3 mg/dL   Phosphorus    Collection Time: 02/18/21  1:32 PM   Result Value Ref Range    Phosphorus 1.9 (L) 2.5 - 4.5 mg/dL

## 2021-02-18 NOTE — LETTER
2/18/2021         RE: Ahmet Coleman  [address] [place] [id number]      MEDICAL ONCOLOGY CONSULT  Melanoma Clinic  Feb 18, 2021    CHIEF COMPLAINT: Scalp melanoma    Melanoma History:  1. He has a small pigmented scalp lesion present for over 30 years. The lesion was biopsied in 1992 and was benign.  2. October 2020, he presented to [facility] with 1 year of progressive enlargement of the lesion and new onset burning, itching, and stinging sensation in the affected scalp.  3. 10/26/20, He was seen at [facility] and he had shave biopsies of A. left central parietal scalp, B. left central occipital scalp, C. Left posterior parietal scalp, and D. punch biopsy of the left superior occipital scalp. Pathology (specimen: [id number]) showed a nodular and nevoid type melanoma, non-ulcerated, Breslow depth up to 1.3 mm, Saurabh's level IV, and up to 3 mitoses per mm2. All margins were involved. Ki-67 10-20%. Sox10 stain is positive and highlights an atypical compound melanocytic proliferation with significant overlying confluence and pagetosis of intraepidermal melanocytes. T-stage: at least pT2a. PD-L1 staining shows PD-L1 TPS <1%. 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  4. 11/25/20, he was seen by Dr. Garcia and he took three 3mm punch biopsies, which returned as dermal melanocytic proliferations with melanophages and fibrosis, consistent with locoregional metastatic melanoma.  5. 12/8/2020 he had PET-CT, which showed FDG avid irregular skin thickening overlying the left parietal region, with no FDG avid lymphadenopathy in the neck and no evidence of metastasis elsewhere in the body.  6. 1/22/2021 start vemurafenib and cobimetinib, ~2/12/21 held for diarrhea.    INTERVAL HISTORY  Patient reports that his mood has been down for the past few days.  He reports some life stressors.  He has been holding his chemo pills due to diarrhea.  He was having 7-8 stools per day  and taking 1 Imodium per day.  Since holding his pills, he has now not had a bowel movement in the last 3 to 4 days.  He is still passing gas.  He has had some rectal irritation for which she has been applying cortisone.  He reports drinking about 4 pints of juice per day.  He notes his home blood pressure has been good around 114/70.  He has been using lubricating eyedrops for his dry eyes.  He notes that his last psych appointment was about 4 weeks ago.  He denies other concerns.    Current Outpatient Medications   Medication Sig Dispense Refill     acetaminophen (TYLENOL) 325 MG tablet TAKE TWO TABLETS BY MOUTH THREE TIMES A DAY AS NEEDED       cholecalciferol (VITAMIN D3) 1000 UNIT tablet Take  by mouth daily.       diphenoxylate-atropine (LOMOTIL) 2.5-0.025 MG tablet Take 1-2 tablets by mouth 4 times daily as needed for diarrhea 120 tablet 5     fluvoxaMINE (LUVOX) 100 MG tablet Take 150 mg at bedtime       hydrochlorothiazide (HYDRODIURIL) 25 MG tablet Take 25 mg by mouth daily.       Hypromellose (ARTIFICIAL TEARS OP) Apply  to eye. 2 drops in each eye twice a day as needed       Lactobacillus-Inulin ([facility] DIGESTIVE [facility]) CAPS 1 tab daily 30 capsule 1     lisinopril (PRINIVIL,ZESTRIL) 10 MG tablet Take 10 mg by mouth daily.       Magnesium Oxide 420 MG TABS Take 420 mg by mouth daily       omeprazole (PRILOSEC) 20 MG capsule Take 1 capsule by mouth 2 times daily. 60 capsule prn     potassium phosphate, monobasic, (K-PHOS) 500 MG tablet Take 1 tablet (500 mg) by mouth 2 times daily 60 tablet 3     prochlorperazine (COMPAZINE) 10 MG tablet Take 1 tablet (10 mg) by mouth every 6 hours as needed (Nausea/Vomiting) 30 tablet 2     QUEtiapine (SEROQUEL) 25 MG tablet 1/2 tablet nightly       simvastatin (ZOCOR) 20 MG tablet Take  by mouth At Bedtime.       TRAZodone (DESYREL) 50 MG tablet Take 50 mg by mouth At Bedtime.       vortioxetine (TRINTELLIX) 20 MG tablet TAKE ONE TABLET BY MOUTH EVERY MORNING        cobimetinib (COTELLIC) 20 MG tablet Take 2 tablets (40 mg) by mouth daily for 21 days Take on Days 1 through 21 of each 28 day cycle. 42 tablet 0     vemurafenib (ZELBORAF) 240 MG TABS tablet Take 3 tablets (720 mg) by mouth every 12 hours for 28 days 168 tablet 0     vemurafenib (ZELBORAF) 240 MG TABS tablet Take 4 tablets (960 mg) by mouth every 12 hours for 21 days, THEN 3 tablets (720 mg) every 12 hours for 7 days. 210 tablet 0     PHYSICAL EXAMINATION  General: The patient is a pleasant male in no acute distress.  BP (!) 146/83   Pulse 82   Temp 98.1  F (36.7  C) (Tympanic)   Resp 16   Wt 88.1 kg (194 lb 3.2 oz)   SpO2 97%   BMI 29.53 kg/m    Wt Readings from Last 10 Encounters:   02/18/21 88.1 kg (194 lb 3.2 oz)   02/05/21 89 kg (196 lb 4.8 oz)   01/29/21 90.6 kg (199 lb 11.2 oz)   12/21/20 88.6 kg (195 lb 4.8 oz)   11/25/20 88.5 kg (195 lb)   10/14/20 87.5 kg (193 lb)   06/09/16 74.4 kg (164 lb)   05/31/16 75.8 kg (167 lb)   04/13/16 75.8 kg (167 lb)   04/06/16 75.1 kg (165 lb 8 oz)   HEENT: EOMI, PERRL. Sclerae are anicteric.   Lymph: Neck is supple with no lymphadenopathy in the cervical or supraclavicular areas.   Heart: Regular rate and rhythm.   Lungs: Clear to auscultation bilaterally.   Abdomen: Bowel sounds present, soft, nontender with no palpable hepatosplenomegaly or masses.   Extremities: No lower extremity edema noted bilaterally.   Neuro: Cranial nerves II through XII are grossly intact.  Skin: The large mid-scalp pigmented lesion extends from right of midline to the left side of the head, roughly measuring 10 x 5 cm, though a smaller separate satellite lesion measures 3 x 1.5 cm in size in the left temporal scalp. Left ear pinna lesion measures 2mm, possible in-transit metastasis.         LABS   2/18/2021 13:32   Sodium 140   Potassium 3.4   Chloride 102   Carbon Dioxide 34 (H)   Urea Nitrogen 21   Creatinine 1.16   GFR Estimate 62   GFR Estimate If Black 72   Calcium 8.8   Anion Gap 4    Magnesium 2.3   Phosphorus 1.9 (L)   Albumin 3.1 (L)   Protein Total 7.0   Bilirubin Total 0.6   Alkaline Phosphatase 75   ALT 40   AST 24   Glucose 113 (H)   WBC 5.8   Hemoglobin 12.8 (L)   Hematocrit 37.3 (L)   Platelet Count 304   RBC Count 4.43   MCV 84   MCH 28.9   MCHC 34.3   RDW 12.3   Diff Method Automated Method   % Neutrophils 63.4   % Lymphocytes 26.1   % Monocytes 7.8   % Eosinophils 1.7   % Basophils 0.7   % Immature Granulocytes 0.3   Nucleated RBCs 0   Absolute Neutrophil 3.7   Absolute Lymphocytes 1.5   Absolute Monocytes 0.5   Absolute Eosinophils 0.1   Absolute Basophils 0.0   Abs Immature Granulocytes 0.0   Absolute Nucleated RBC 0.0     Dermatopathology from 2/5/21 shows the following:  A. Skin, left superior shoulder, shave:   - Band-like collection of superficial dermal melanophages consistent with   tumoral melanosis    B. Skin, central inferior abdomen, shave:   - Band-like collection of superficial dermal melanophages consistent with   tumoral melanosis    ASSESSMENT AND PLAN  Cutaneous melanoma, scalp primary, pT2a cN1c, clinical Stage IIIB. It was a pleasure to meet Mr. Coleman today. He is a 75 year old  with agent orange cutaneous exposures in the [place] war and a diagnosis of malignant melanoma arising from a pre-existing pigmented lesion on the scalp. He has clinical evidence of satellitosis with separation of a left temporal satellite from the main tumor mass, and possibly in-transit metastasis to the helix of the left ear. He was reviewed for participation in the NeoActivate study, but he does not meet criteria as he does not have regional lymph node disease. He started on neoadjuvant vemurafenib and cobimetinib on 1/22/21. He was tolerating this fairly well, but then developed worsening diarrhea, so his treatment has been on hold. He will now resume vemurafenib at 720 mg and cobimetinib at 40 mg reduced dosing due to the diarrhea. He will also start on atezolizumab today.   We reviewed the potential for fatigue, mild nausea, and the potential risk for immune mediated side effects including inflammation of the pituitary gland, thyroid, lungs, heart, colon, pancreas, liver, kidneys, or skin. We reviewed that he should call if he develops profound fatigue, dyspnea, cough, chest pain, diarrhea, abdominal pain, decrease in urine output, or very itchy skin. He will follow-up with me in 2 weeks virtually. He would like to get some of his infusions in [place]. Will tentatively plan for his next imaging the end of April 2021.  Of note, he did have a distant site on his abdomen biopsied that showed tumoral melanosis. We will need to keep this in mid as we consider future plans for surgery.     Diarrhea. Seems most likely secondary to his treatment, complicated by possible IBS. Will try Lomotil in place of Imodium and recommend taking with each loose stool.     Rectal irritation. Secondary to diarrhea. Recommend using Desitin, soaking rectal area in the tub 1-2 times per day, and using witch hazel wipes.     CKD. Suspect secondary to hypertension. Better today. Will continue to monitor. Seems easily affected by mild dehydration with diarrhea.     Hypertension. BP is under reasonably control. He remains on hydrochlorothiazide and lisinopril. Recommend [facility] was more than 1 year ago, per his report. Will continue to monitor in clinic.     Dry eyes. Had at baseline, but worse with vem/jackson. Recommend continuing with lubricating eye drops like Refresh, Systane, or Blink.    Hypophosphatemia. Secondary to cobimetinib (68% reported). Will start him on KPhos 500 mg bid and recheck again in 2 weeks.     Sherine Cardoza PA-C  [facility]  [address] [place] [address] [phone]

## 2021-02-18 NOTE — PROGRESS NOTES
Chief Complaint   Patient presents with     EKG     Patient with Malignant melanoma of scalp here for an EKG     EKG was done and submitted per order. Patient tolerated EKG with no incident.      Aretha Dailey CMA February 18, 2021

## 2021-02-19 ENCOUNTER — TELEPHONE (OUTPATIENT)
Dept: ONCOLOGY | Facility: CLINIC | Age: 76
End: 2021-02-19

## 2021-02-19 DIAGNOSIS — C43.4 MALIGNANT MELANOMA OF SCALP (H): ICD-10-CM

## 2021-02-19 DIAGNOSIS — Z15.09 MONOALLELIC MUTATION OF BRAF GENE: Primary | ICD-10-CM

## 2021-02-19 DIAGNOSIS — Z15.89 MONOALLELIC MUTATION OF BRAF GENE: Primary | ICD-10-CM

## 2021-02-19 LAB — INTERPRETATION ECG - MUSE: NORMAL

## 2021-02-19 NOTE — TELEPHONE ENCOUNTER
Oral Chemotherapy Monitoring Program     Placed call to Ahmet Coleman in follow up of Cotellix and Zelboraf oral chemotherapy.     Per Sherine Cardoza PA-C patient will be dose decreasing his chemotherapy due to diarrhea. Emphasized new dosing regimen with the patient who was understanding of the plan. Reiterated importance of taking Lomotil therapy with the first episode of diarrhea as he was limiting use of loperamide previously due to cost concerns.     Will reach out to patient in 1 week to assess dose changes and status of diarrhea episodes.       Makenzie Carr, PharmD  Oral Chemotherapy Monitoring Program  [facility]  [phone]  February 19, 2021

## 2021-02-26 ENCOUNTER — TELEPHONE (OUTPATIENT)
Dept: ONCOLOGY | Facility: CLINIC | Age: 76
End: 2021-02-26

## 2021-02-26 DIAGNOSIS — C43.4 MALIGNANT MELANOMA OF SCALP (H): Primary | ICD-10-CM

## 2021-02-26 NOTE — TELEPHONE ENCOUNTER
Oral Chemotherapy Monitoring Program     Placed call to patient in follow up of oral chemotherapy. Calling to assess how reduced Cotellic and Zelboraf dosing is going for him. Ahmet lets me know he never received the new doses of the medications and has not been taking them. His diarrhea has improved since he has been off of the medications.      It appears the prescriptions were sent to [facility] on 2/19. Asked Rigo to investigate what is going on.     Routing to Dr. Peñaloza and team as FYI that patient has been off med.     Sherine Wiley, PharmD  Hematology/Oncology Clinical Pharmacist  [facility]  [facility]

## 2021-03-02 ENCOUNTER — TELEPHONE (OUTPATIENT)
Dept: ONCOLOGY | Facility: CLINIC | Age: 76
End: 2021-03-02

## 2021-03-02 DIAGNOSIS — C43.4 MALIGNANT MELANOMA OF SCALP (H): ICD-10-CM

## 2021-03-02 LAB
ALBUMIN SERPL-MCNC: 3.8 G/DL (ref 3.4–5)
ALP SERPL-CCNC: 77 U/L (ref 40–150)
ALT SERPL W P-5'-P-CCNC: 25 U/L (ref 0–70)
ANION GAP SERPL CALCULATED.3IONS-SCNC: 3 MMOL/L (ref 3–14)
AST SERPL W P-5'-P-CCNC: 12 U/L (ref 0–45)
BASOPHILS # BLD AUTO: 0 10E9/L (ref 0–0.2)
BASOPHILS NFR BLD AUTO: 0.9 %
BILIRUB SERPL-MCNC: 0.5 MG/DL (ref 0.2–1.3)
BUN SERPL-MCNC: 20 MG/DL (ref 7–30)
CALCIUM SERPL-MCNC: 9.1 MG/DL (ref 8.5–10.1)
CHLORIDE SERPL-SCNC: 103 MMOL/L (ref 94–109)
CK SERPL-CCNC: 44 U/L (ref 30–300)
CO2 SERPL-SCNC: 32 MMOL/L (ref 20–32)
CREAT SERPL-MCNC: 1.22 MG/DL (ref 0.66–1.25)
DIFFERENTIAL METHOD BLD: NORMAL
EOSINOPHIL # BLD AUTO: 0.1 10E9/L (ref 0–0.7)
EOSINOPHIL NFR BLD AUTO: 2 %
ERYTHROCYTE [DISTWIDTH] IN BLOOD BY AUTOMATED COUNT: 13.6 % (ref 10–15)
GFR SERPL CREATININE-BSD FRML MDRD: 58 ML/MIN/{1.73_M2}
GLUCOSE SERPL-MCNC: 102 MG/DL (ref 70–99)
HCT VFR BLD AUTO: 40.2 % (ref 40–53)
HGB BLD-MCNC: 13.5 G/DL (ref 13.3–17.7)
LYMPHOCYTES # BLD AUTO: 1.6 10E9/L (ref 0.8–5.3)
LYMPHOCYTES NFR BLD AUTO: 34.9 %
MAGNESIUM SERPL-MCNC: 2 MG/DL (ref 1.6–2.3)
MCH RBC QN AUTO: 29.5 PG (ref 26.5–33)
MCHC RBC AUTO-ENTMCNC: 33.6 G/DL (ref 31.5–36.5)
MCV RBC AUTO: 88 FL (ref 78–100)
MONOCYTES # BLD AUTO: 0.5 10E9/L (ref 0–1.3)
MONOCYTES NFR BLD AUTO: 10.9 %
NEUTROPHILS # BLD AUTO: 2.4 10E9/L (ref 1.6–8.3)
NEUTROPHILS NFR BLD AUTO: 51.3 %
PHOSPHATE SERPL-MCNC: 3.2 MG/DL (ref 2.5–4.5)
PLATELET # BLD AUTO: 238 10E9/L (ref 150–450)
POTASSIUM SERPL-SCNC: 4.3 MMOL/L (ref 3.4–5.3)
PROT SERPL-MCNC: 7.5 G/DL (ref 6.8–8.8)
RBC # BLD AUTO: 4.57 10E12/L (ref 4.4–5.9)
SODIUM SERPL-SCNC: 138 MMOL/L (ref 133–144)
WBC # BLD AUTO: 4.6 10E9/L (ref 4–11)

## 2021-03-02 PROCEDURE — 36415 COLL VENOUS BLD VENIPUNCTURE: CPT | Performed by: INTERNAL MEDICINE

## 2021-03-02 PROCEDURE — 80053 COMPREHEN METABOLIC PANEL: CPT | Performed by: INTERNAL MEDICINE

## 2021-03-02 PROCEDURE — 83735 ASSAY OF MAGNESIUM: CPT | Performed by: INTERNAL MEDICINE

## 2021-03-02 PROCEDURE — 84100 ASSAY OF PHOSPHORUS: CPT | Performed by: INTERNAL MEDICINE

## 2021-03-02 PROCEDURE — 82550 ASSAY OF CK (CPK): CPT | Performed by: INTERNAL MEDICINE

## 2021-03-02 PROCEDURE — 85025 COMPLETE CBC W/AUTO DIFF WBC: CPT | Performed by: INTERNAL MEDICINE

## 2021-03-03 ENCOUNTER — VIRTUAL VISIT (OUTPATIENT)
Dept: ONCOLOGY | Facility: CLINIC | Age: 76
End: 2021-03-03
Attending: INTERNAL MEDICINE
Payer: MEDICARE

## 2021-03-03 DIAGNOSIS — C43.4 MALIGNANT MELANOMA OF SCALP (H): Primary | ICD-10-CM

## 2021-03-03 PROCEDURE — 999N001193 HC VIDEO/TELEPHONE VISIT; NO CHARGE

## 2021-03-03 PROCEDURE — 99214 OFFICE O/P EST MOD 30 MIN: CPT | Mod: 95 | Performed by: PHYSICIAN ASSISTANT

## 2021-03-03 NOTE — Clinical Note
"    3/3/2021         RE: Ahmet Coleman  [address] [place] [id number]        Dear Colleague,    Thank you for referring your patient, Ahmet Coleman, to the [facility]. Please see a copy of my visit note below.    Ahmet is a 75 year old who is being evaluated via a billable video visit.      How would you like to obtain your AVS? MyChart  If the video visit is dropped, the invitation should be resent by: Send to e-mail at: [email or web address]  Will anyone else be joining your video visit? No    I have reviewed and updated the patient's allergies and medication list.    Concerns: No new concerns.   Refills: None needed.     Vitals - Patient Reported  Weight (Patient Reported): 88 kg (194 lb)  Height (Patient Reported): 172.7 cm (5' 8\")  BMI (Based on Pt Reported Ht/Wt): 29.5  Pain Score: No Pain (0)      Judit Marsh CMA      Video-Visit Details    Type of service:  Video Visit      Video Start Time: 11:11 AM    Video End Time:11:30 AM    Originating Location (pt. Location): Home    Distant Location (provider location):  [facility]     Platform used for Video Visit: [facility]     MEDICAL ONCOLOGY CONSULT  Melanoma Clinic  Mar 3, 2021    CHIEF COMPLAINT: Scalp melanoma    Melanoma History:  1. He has a small pigmented scalp lesion present for over 30 years. The lesion was biopsied in 1992 and was benign.  2. October 2020, he presented to [facility] with 1 year of progressive enlargement of the lesion and new onset burning, itching, and stinging sensation in the affected scalp.  3. 10/26/20, He was seen at [facility] and he had shave biopsies of A. left central parietal scalp, B. left central occipital scalp, C. Left posterior parietal scalp, and D. punch biopsy of the left superior occipital scalp. Pathology (specimen: [id number]) showed a nodular and nevoid type melanoma, non-ulcerated, Breslow depth up to 1.3 " mm, Saurabh's level IV, and up to 3 mitoses per mm2. All margins were involved. Ki-67 10-20%. Sox10 stain is positive and highlights an atypical compound melanocytic proliferation with significant overlying confluence and pagetosis of intraepidermal melanocytes. T-stage: at least pT2a. PD-L1 staining shows PD-L1 TPS <1%. 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  4. 11/25/20, he was seen by Dr. Garcia and he took three 3mm punch biopsies, which returned as dermal melanocytic proliferations with melanophages and fibrosis, consistent with locoregional metastatic melanoma.  5. 12/8/2020 he had PET-CT, which showed FDG avid irregular skin thickening overlying the left parietal region, with no FDG avid lymphadenopathy in the neck and no evidence of metastasis elsewhere in the body.  6. 1/22/2021 start vemurafenib and cobimetinib, ~2/12/21 held for diarrhea.    INTERVAL HISTORY  -Has been feeling okay.  -Stools have been doing okay. Has stools every couple of days.   -Denies any new lumps or bumps.  -Has felt tired. Occasionally takes naps.   -Not getting regular exercise.   -Notes appetite has been low. Weight was down to 184 lb.  -Has been trying to eat some snacks.  -Had a headache yesterday, managed with rest.   -Has intermittent issues with sleep chronically.   -Has occasional lightheadedness that is mild.       Review of Systems:  Patient denies any of the following except if noted above: fevers, chills, vision or hearing changes, chest pain, dyspnea, abdominal pain, nausea, vomiting, diarrhea, constipation, urinary concerns, headaches, numbness, tingling, issues with sleep or mood. He also denies rashes or skin lesions, bleeding or bruising issues.      Daily walks  Declines dietician      Current Outpatient Medications   Medication Sig Dispense Refill     acetaminophen (TYLENOL) 325 MG tablet TAKE TWO TABLETS BY MOUTH THREE TIMES A DAY AS NEEDED       cholecalciferol (VITAMIN D3) 1000 UNIT tablet Take  by  mouth daily.       cobimetinib (COTELLIC) 20 MG tablet Take 2 tablets (40 mg) by mouth daily for 21 days Take on Days 1 through 21 of each 28 day cycle. 42 tablet 0     diphenoxylate-atropine (LOMOTIL) 2.5-0.025 MG tablet Take 1-2 tablets by mouth 4 times daily as needed for diarrhea 120 tablet 5     fluvoxaMINE (LUVOX) 100 MG tablet Take 150 mg at bedtime       hydrochlorothiazide (HYDRODIURIL) 25 MG tablet Take 25 mg by mouth daily.       Hypromellose (ARTIFICIAL TEARS OP) Apply  to eye. 2 drops in each eye twice a day as needed       Lactobacillus-Inulin (CULTURELLE DIGESTIVE HEALTH) CAPS 1 tab daily 30 capsule 1     lisinopril (PRINIVIL,ZESTRIL) 10 MG tablet Take 10 mg by mouth daily.       Magnesium Oxide 420 MG TABS Take 420 mg by mouth daily       omeprazole (PRILOSEC) 20 MG capsule Take 1 capsule by mouth 2 times daily. 60 capsule prn     potassium phosphate, monobasic, (K-PHOS) 500 MG tablet Take 1 tablet (500 mg) by mouth 2 times daily 60 tablet 3     prochlorperazine (COMPAZINE) 10 MG tablet Take 1 tablet (10 mg) by mouth every 6 hours as needed (Nausea/Vomiting) 30 tablet 2     QUEtiapine (SEROQUEL) 25 MG tablet 1/2 tablet nightly       simvastatin (ZOCOR) 20 MG tablet Take  by mouth At Bedtime.       TRAZodone (DESYREL) 50 MG tablet Take 50 mg by mouth At Bedtime.       vemurafenib (ZELBORAF) 240 MG TABS tablet Take 3 tablets (720 mg) by mouth every 12 hours for 28 days 168 tablet 0     vortioxetine (TRINTELLIX) 20 MG tablet TAKE ONE TABLET BY MOUTH EVERY MORNING       vemurafenib (ZELBORAF) 240 MG TABS tablet Take 4 tablets (960 mg) by mouth every 12 hours for 21 days, THEN 3 tablets (720 mg) every 12 hours for 7 days. 210 tablet 0     Video physical exam  General: Patient appears well in no acute distress.   Skin: No visualized rash or lesions on visualized skin  Eyes: EOMI, no erythema, sclera icterus or discharge noted  Resp: Appears to be breathing comfortably without accessory muscle usage,  speaking in full sentences, no cough  MSK: Appears to have normal range of motion based on visualized movements  Neurologic: No apparent tremors, facial movements symmetric  Psych: affect normal, alert and oriented    The rest of a comprehensive physical examination is deferred due to PHE (public health emergency) video restrictions      LABS   3/2/2021 14:09   Sodium 138   Potassium 4.3   Chloride 103   Carbon Dioxide 32   Urea Nitrogen 20   Creatinine 1.22   GFR Estimate 58 (L)   GFR Estimate If Black 67   Calcium 9.1   Anion Gap 3   Magnesium 2.0   Phosphorus 3.2   Albumin 3.8   Protein Total 7.5   Bilirubin Total 0.5   Alkaline Phosphatase 77   ALT 25   AST 12   CK Total 44   Glucose 102 (H)   WBC 4.6   Hemoglobin 13.5   Hematocrit 40.2   Platelet Count 238   RBC Count 4.57   MCV 88   MCH 29.5   MCHC 33.6   RDW 13.6   Diff Method Automated Method   % Neutrophils 51.3   % Lymphocytes 34.9   % Monocytes 10.9   % Eosinophils 2.0   % Basophils 0.9   Absolute Neutrophil 2.4   Absolute Lymphocytes 1.6   Absolute Monocytes 0.5   Absolute Eosinophils 0.1   Absolute Basophils 0.0       ASSESSMENT AND PLAN  Cutaneous melanoma, scalp primary, pT2a cN1c, clinical Stage IIIB. It was a pleasure to meet Mr. Coleman today. He is a 75 year old  with agent orange cutaneous exposures in the [place] war and a diagnosis of malignant melanoma arising from a pre-existing pigmented lesion on the scalp. He has clinical evidence of satellitosis with separation of a left temporal satellite from the main tumor mass, and possibly in-transit metastasis to the helix of the left ear. He was reviewed for participation in the NeoActivate study, but he does not meet criteria as he does not have regional lymph node disease. He started on neoadjuvant vemurafenib and cobimetinib on 1/22/21. He was tolerating this fairly well, but then developed worsening diarrhea, so his treatment has been on hold. He will now resume vemurafenib at 720 mg and  cobimetinib at 40 mg reduced dosing due to the diarrhea. He will also start on atezolizumab today.  We reviewed the potential for fatigue, mild nausea, and the potential risk for immune mediated side effects including inflammation of the pituitary gland, thyroid, lungs, heart, colon, pancreas, liver, kidneys, or skin. We reviewed that he should call if he develops profound fatigue, dyspnea, cough, chest pain, diarrhea, abdominal pain, decrease in urine output, or very itchy skin. He will follow-up with me in 2 weeks virtually. He would like to get some of his infusions in [place]. Will tentatively plan for his next imaging the end of April 2021.  Of note, he did have a distant site on his abdomen biopsied that showed tumoral melanosis. We will need to keep this in mid as we consider future plans for surgery.     Diarrhea. Seems most likely secondary to his treatment, complicated by possible IBS. Will try Lomotil in place of Imodium and recommend taking with each loose stool.     Rectal irritation. Secondary to diarrhea. Recommend using Desitin, soaking rectal area in the tub 1-2 times per day, and using witch hazel wipes.     CKD. Suspect secondary to hypertension. Better today. Will continue to monitor. Seems easily affected by mild dehydration with diarrhea.     Hypertension. BP is under reasonably control. Yesterday 114/74.  He remains on hydrochlorothiazide and lisinopril. Recommend [facility], since last visit was more than 1 year ago, per his report. Will continue to monitor in clinic.     Dry eyes. Had at baseline, but worse with vem/jackson. Recommend continuing with lubricating eye drops like Refresh, Systane, or Blink.    Hypophosphatemia. Secondary to cobimetinib (68% reported). Will start him on KPhos 500 mg bid and recheck again in 2 weeks. Decrease Kphos to qday    Add TSH  Restart vem/jackson  [facility]    Sherine Cardoza PA-C  [facility]  [address] [address]  [phone]        Again, thank you for allowing me to participate in the care of your patient.        Sincerely,        Sherine Cardoza PA-C

## 2021-03-03 NOTE — PROGRESS NOTES
"Ahmet is a 75 year old who is being evaluated via a billable video visit.      How would you like to obtain your AVS? MyChart  If the video visit is dropped, the invitation should be resent by: Send to e-mail at: [email or web address]  Will anyone else be joining your video visit? No    I have reviewed and updated the patient's allergies and medication list.    Concerns: No new concerns.   Refills: None needed.     Vitals - Patient Reported  Weight (Patient Reported): 88 kg (194 lb)  Height (Patient Reported): 172.7 cm (5' 8\")  BMI (Based on Pt Reported Ht/Wt): 29.5  Pain Score: No Pain (0)      Judit Marsh CMA      Video-Visit Details    Type of service:  Video Visit      Video Start Time: 11:11 AM    Video End Time:11:30 AM    Originating Location (pt. Location): Home    Distant Location (provider location):  [facility]     Platform used for Video Visit: [facility] ONCOLOGY CONSULT  Melanoma Clinic  Mar 3, 2021    CHIEF COMPLAINT: Scalp melanoma    Melanoma History:  1. He has a small pigmented scalp lesion present for over 30 years. The lesion was biopsied in 1992 and was benign.  2. October 2020, he presented to [facility] with 1 year of progressive enlargement of the lesion and new onset burning, itching, and stinging sensation in the affected scalp.  3. 10/26/20, He was seen at [facility] and he had shave biopsies of A. left central parietal scalp, B. left central occipital scalp, C. Left posterior parietal scalp, and D. punch biopsy of the left superior occipital scalp. Pathology (specimen: [id number]) showed a nodular and nevoid type melanoma, non-ulcerated, Breslow depth up to 1.3 mm, Saurabh's level IV, and up to 3 mitoses per mm2. All margins were involved. Ki-67 10-20%. Sox10 stain is positive and highlights an atypical compound melanocytic proliferation with significant overlying confluence and pagetosis of intraepidermal melanocytes. " T-stage: at least pT2a. PD-L1 staining shows PD-L1 TPS <1%. 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  4. 11/25/20, he was seen by Dr. Garcia and he took three 3mm punch biopsies, which returned as dermal melanocytic proliferations with melanophages and fibrosis, consistent with locoregional metastatic melanoma.  5. 12/8/2020 he had PET-CT, which showed FDG avid irregular skin thickening overlying the left parietal region, with no FDG avid lymphadenopathy in the neck and no evidence of metastasis elsewhere in the body.  6. 1/22/2021 start vemurafenib and cobimetinib, ~2/12/21 held for diarrhea.    INTERVAL HISTORY  -Has been feeling okay.  -Stools have been doing okay. Has stools every couple of days.   -Denies any new lumps or bumps.  -Has felt tired. Occasionally takes naps.   -Not getting regular exercise.   -Notes appetite has been low. Weight was down to 184 lb.  -Has been trying to eat some snacks.  -Had a headache yesterday, managed with rest.   -Has intermittent issues with sleep chronically.   -Has occasional lightheadedness that is mild.     Review of Systems:  Patient denies any of the following except if noted above: fevers, chills, vision or hearing changes, chest pain, dyspnea, abdominal pain, nausea, vomiting, diarrhea, constipation, urinary concerns, headaches, numbness, tingling, issues with sleep or mood. He also denies rashes or skin lesions, bleeding or bruising issues.    Current Outpatient Medications   Medication Sig Dispense Refill     acetaminophen (TYLENOL) 325 MG tablet TAKE TWO TABLETS BY MOUTH THREE TIMES A DAY AS NEEDED       cholecalciferol (VITAMIN D3) 1000 UNIT tablet Take  by mouth daily.       cobimetinib (COTELLIC) 20 MG tablet Take 2 tablets (40 mg) by mouth daily for 21 days Take on Days 1 through 21 of each 28 day cycle. 42 tablet 0     diphenoxylate-atropine (LOMOTIL) 2.5-0.025 MG tablet Take 1-2 tablets by mouth 4 times daily as needed for diarrhea 120 tablet 5      fluvoxaMINE (LUVOX) 100 MG tablet Take 150 mg at bedtime       hydrochlorothiazide (HYDRODIURIL) 25 MG tablet Take 25 mg by mouth daily.       Hypromellose (ARTIFICIAL TEARS OP) Apply  to eye. 2 drops in each eye twice a day as needed       Lactobacillus-Inulin ([facility] DIGESTIVE [facility]) CAPS 1 tab daily 30 capsule 1     lisinopril (PRINIVIL,ZESTRIL) 10 MG tablet Take 10 mg by mouth daily.       Magnesium Oxide 420 MG TABS Take 420 mg by mouth daily       omeprazole (PRILOSEC) 20 MG capsule Take 1 capsule by mouth 2 times daily. 60 capsule prn     potassium phosphate, monobasic, (K-PHOS) 500 MG tablet Take 1 tablet (500 mg) by mouth 2 times daily 60 tablet 3     prochlorperazine (COMPAZINE) 10 MG tablet Take 1 tablet (10 mg) by mouth every 6 hours as needed (Nausea/Vomiting) 30 tablet 2     QUEtiapine (SEROQUEL) 25 MG tablet 1/2 tablet nightly       simvastatin (ZOCOR) 20 MG tablet Take  by mouth At Bedtime.       TRAZodone (DESYREL) 50 MG tablet Take 50 mg by mouth At Bedtime.       vemurafenib (ZELBORAF) 240 MG TABS tablet Take 3 tablets (720 mg) by mouth every 12 hours for 28 days 168 tablet 0     vortioxetine (TRINTELLIX) 20 MG tablet TAKE ONE TABLET BY MOUTH EVERY MORNING       vemurafenib (ZELBORAF) 240 MG TABS tablet Take 4 tablets (960 mg) by mouth every 12 hours for 21 days, THEN 3 tablets (720 mg) every 12 hours for 7 days. 210 tablet 0     Video physical exam  General: Patient appears well in no acute distress.   Skin: No visualized rash or lesions on visualized skin  Eyes: EOMI, no erythema, sclera icterus or discharge noted  Resp: Appears to be breathing comfortably without accessory muscle usage, speaking in full sentences, no cough  MSK: Appears to have normal range of motion based on visualized movements  Neurologic: No apparent tremors, facial movements symmetric  Psych: affect normal, alert and oriented    The rest of a comprehensive physical examination is deferred due to PHE (public health  emergency) video restrictions      LABS   3/2/2021 14:09   Sodium 138   Potassium 4.3   Chloride 103   Carbon Dioxide 32   Urea Nitrogen 20   Creatinine 1.22   GFR Estimate 58 (L)   GFR Estimate If Black 67   Calcium 9.1   Anion Gap 3   Magnesium 2.0   Phosphorus 3.2   Albumin 3.8   Protein Total 7.5   Bilirubin Total 0.5   Alkaline Phosphatase 77   ALT 25   AST 12   CK Total 44   Glucose 102 (H)   WBC 4.6   Hemoglobin 13.5   Hematocrit 40.2   Platelet Count 238   RBC Count 4.57   MCV 88   MCH 29.5   MCHC 33.6   RDW 13.6   Diff Method Automated Method   % Neutrophils 51.3   % Lymphocytes 34.9   % Monocytes 10.9   % Eosinophils 2.0   % Basophils 0.9   Absolute Neutrophil 2.4   Absolute Lymphocytes 1.6   Absolute Monocytes 0.5   Absolute Eosinophils 0.1   Absolute Basophils 0.0       ASSESSMENT AND PLAN  Cutaneous melanoma, scalp primary, pT2a cN1c, clinical Stage IIIB. It was a pleasure to meet Mr. Coleman today. He is a 75 year old  with agent orange cutaneous exposures in the [place] war and a diagnosis of malignant melanoma arising from a pre-existing pigmented lesion on the scalp. He has clinical evidence of satellitosis with separation of a left temporal satellite from the main tumor mass, and possibly in-transit metastasis to the helix of the left ear. He was reviewed for participation in the NeoActivate study, but he does not meet criteria as he does not have regional lymph node disease. He started on neoadjuvant vemurafenib and cobimetinib on 1/22/21. He was tolerating this fairly well, but then developed worsening diarrhea, so his treatment has been on hold. He will now resume vemurafenib at 720 mg and cobimetinib at 40 mg reduced dosing due to the diarrhea. He also started on atezolizumab on 2/18/21 and tolerated this well.  He will continue with cycle 1 day 15 later this week. He would like to get some of his infusions in [place]. Will tentatively plan for his next imaging the end of April 2021.   Of note, he did have a distant site on his abdomen biopsied that showed tumoral melanosis. We will need to keep this in mid as we consider future plans for surgery.     Diarrhea. Seems most likely secondary to his treatment, complicated by possible IBS. Encouraged him to use Lomotil and/or Imodium as needed.    CKD. Suspect secondary to hypertension. Better the last 2 checks. Will continue to monitor. Seems easily affected by mild dehydration with diarrhea.     Hypertension. BP is under reasonably control.  He remains on hydrochlorothiazide and lisinopril. [facility], since last visit was more than 1 year ago. Will continue to monitor in clinic.     Dry eyes. Had at baseline, but worse with vem/jackson. Recommend continuing with lubricating eye drops like Refresh, Systane, or Blink.    Hypophosphatemia. Secondary to cobimetinib (68% reported). Now improving. Will decrease Kphos to 500 mg daily.    COVID vaccine. Discussed okay to get once available for him. [facility] be [facility].     Deconditioning. Recommend daily walks.    Weight loss. Declines dietician referral. Recommend nutritional supplements. Will continue to monitor.     Sherine Cardoza PA-C  [facility]  [address] [place] [address] [phone]

## 2021-03-03 NOTE — LETTER
"    3/3/2021         RE: Ahmet Coleman  [address] [place] [id number]      Ahmet is a 75 year old who is being evaluated via a billable video visit.      How would you like to obtain your AVS? MyChart  If the video visit is dropped, the invitation should be resent by: Send to e-mail at: [email or web address]  Will anyone else be joining your video visit? No    I have reviewed and updated the patient's allergies and medication list.    Concerns: No new concerns.   Refills: None needed.     Vitals - Patient Reported  Weight (Patient Reported): 88 kg (194 lb)  Height (Patient Reported): 172.7 cm (5' 8\")  BMI (Based on Pt Reported Ht/Wt): 29.5  Pain Score: No Pain (0)      Judit Marsh CMA      Video-Visit Details    Type of service:  Video Visit      Video Start Time: 11:11 AM    Video End Time:11:30 AM    Originating Location (pt. Location): Home    Distant Location (provider location):  [facility] CANCER [facility]     Platform used for Video Visit: [facility] ONCOLOGY CONSULT  Melanoma Clinic  Mar 3, 2021    CHIEF COMPLAINT: Scalp melanoma    Melanoma History:  1. He has a small pigmented scalp lesion present for over 30 years. The lesion was biopsied in 1992 and was benign.  2. October 2020, he presented to [facility] with 1 year of progressive enlargement of the lesion and new onset burning, itching, and stinging sensation in the affected scalp.  3. 10/26/20, He was seen at [facility] and he had shave biopsies of A. left central parietal scalp, B. left central occipital scalp, C. Left posterior parietal scalp, and D. punch biopsy of the left superior occipital scalp. Pathology (specimen: [id number]) showed a nodular and nevoid type melanoma, non-ulcerated, Breslow depth up to 1.3 mm, Saurabh's level IV, and up to 3 mitoses per mm2. All margins were involved. Ki-67 10-20%. Sox10 stain is positive and highlights an atypical compound melanocytic " proliferation with significant overlying confluence and pagetosis of intraepidermal melanocytes. T-stage: at least pT2a. PD-L1 staining shows PD-L1 TPS <1%. 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  4. 11/25/20, he was seen by Dr. Garcia and he took three 3mm punch biopsies, which returned as dermal melanocytic proliferations with melanophages and fibrosis, consistent with locoregional metastatic melanoma.  5. 12/8/2020 he had PET-CT, which showed FDG avid irregular skin thickening overlying the left parietal region, with no FDG avid lymphadenopathy in the neck and no evidence of metastasis elsewhere in the body.  6. 1/22/2021 start vemurafenib and cobimetinib, ~2/12/21 held for diarrhea.    INTERVAL HISTORY  -Has been feeling okay.  -Stools have been doing okay. Has stools every couple of days.   -Denies any new lumps or bumps.  -Has felt tired. Occasionally takes naps.   -Not getting regular exercise.   -Notes appetite has been low. Weight was down to 184 lb.  -Has been trying to eat some snacks.  -Had a headache yesterday, managed with rest.   -Has intermittent issues with sleep chronically.   -Has occasional lightheadedness that is mild.     Review of Systems:  Patient denies any of the following except if noted above: fevers, chills, vision or hearing changes, chest pain, dyspnea, abdominal pain, nausea, vomiting, diarrhea, constipation, urinary concerns, headaches, numbness, tingling, issues with sleep or mood. He also denies rashes or skin lesions, bleeding or bruising issues.    Current Outpatient Medications   Medication Sig Dispense Refill     acetaminophen (TYLENOL) 325 MG tablet TAKE TWO TABLETS BY MOUTH THREE TIMES A DAY AS NEEDED       cholecalciferol (VITAMIN D3) 1000 UNIT tablet Take  by mouth daily.       cobimetinib (COTELLIC) 20 MG tablet Take 2 tablets (40 mg) by mouth daily for 21 days Take on Days 1 through 21 of each 28 day cycle. 42 tablet 0     diphenoxylate-atropine (LOMOTIL)  2.5-0.025 MG tablet Take 1-2 tablets by mouth 4 times daily as needed for diarrhea 120 tablet 5     fluvoxaMINE (LUVOX) 100 MG tablet Take 150 mg at bedtime       hydrochlorothiazide (HYDRODIURIL) 25 MG tablet Take 25 mg by mouth daily.       Hypromellose (ARTIFICIAL TEARS OP) Apply  to eye. 2 drops in each eye twice a day as needed       Lactobacillus-Inulin ([facility] DIGESTIVE [facility]) CAPS 1 tab daily 30 capsule 1     lisinopril (PRINIVIL,ZESTRIL) 10 MG tablet Take 10 mg by mouth daily.       Magnesium Oxide 420 MG TABS Take 420 mg by mouth daily       omeprazole (PRILOSEC) 20 MG capsule Take 1 capsule by mouth 2 times daily. 60 capsule prn     potassium phosphate, monobasic, (K-PHOS) 500 MG tablet Take 1 tablet (500 mg) by mouth 2 times daily 60 tablet 3     prochlorperazine (COMPAZINE) 10 MG tablet Take 1 tablet (10 mg) by mouth every 6 hours as needed (Nausea/Vomiting) 30 tablet 2     QUEtiapine (SEROQUEL) 25 MG tablet 1/2 tablet nightly       simvastatin (ZOCOR) 20 MG tablet Take  by mouth At Bedtime.       TRAZodone (DESYREL) 50 MG tablet Take 50 mg by mouth At Bedtime.       vemurafenib (ZELBORAF) 240 MG TABS tablet Take 3 tablets (720 mg) by mouth every 12 hours for 28 days 168 tablet 0     vortioxetine (TRINTELLIX) 20 MG tablet TAKE ONE TABLET BY MOUTH EVERY MORNING       vemurafenib (ZELBORAF) 240 MG TABS tablet Take 4 tablets (960 mg) by mouth every 12 hours for 21 days, THEN 3 tablets (720 mg) every 12 hours for 7 days. 210 tablet 0     Video physical exam  General: Patient appears well in no acute distress.   Skin: No visualized rash or lesions on visualized skin  Eyes: EOMI, no erythema, sclera icterus or discharge noted  Resp: Appears to be breathing comfortably without accessory muscle usage, speaking in full sentences, no cough  MSK: Appears to have normal range of motion based on visualized movements  Neurologic: No apparent tremors, facial movements symmetric  Psych: affect normal, alert and  oriented    The rest of a comprehensive physical examination is deferred due to PHE (public health emergency) video restrictions      LABS   3/2/2021 14:09   Sodium 138   Potassium 4.3   Chloride 103   Carbon Dioxide 32   Urea Nitrogen 20   Creatinine 1.22   GFR Estimate 58 (L)   GFR Estimate If Black 67   Calcium 9.1   Anion Gap 3   Magnesium 2.0   Phosphorus 3.2   Albumin 3.8   Protein Total 7.5   Bilirubin Total 0.5   Alkaline Phosphatase 77   ALT 25   AST 12   CK Total 44   Glucose 102 (H)   WBC 4.6   Hemoglobin 13.5   Hematocrit 40.2   Platelet Count 238   RBC Count 4.57   MCV 88   MCH 29.5   MCHC 33.6   RDW 13.6   Diff Method Automated Method   % Neutrophils 51.3   % Lymphocytes 34.9   % Monocytes 10.9   % Eosinophils 2.0   % Basophils 0.9   Absolute Neutrophil 2.4   Absolute Lymphocytes 1.6   Absolute Monocytes 0.5   Absolute Eosinophils 0.1   Absolute Basophils 0.0       ASSESSMENT AND PLAN  Cutaneous melanoma, scalp primary, pT2a cN1c, clinical Stage IIIB. It was a pleasure to meet Mr. Coleman today. He is a 75 year old  with agent orange cutaneous exposures in the [place] war and a diagnosis of malignant melanoma arising from a pre-existing pigmented lesion on the scalp. He has clinical evidence of satellitosis with separation of a left temporal satellite from the main tumor mass, and possibly in-transit metastasis to the helix of the left ear. He was reviewed for participation in the NeoActivate study, but he does not meet criteria as he does not have regional lymph node disease. He started on neoadjuvant vemurafenib and cobimetinib on 1/22/21. He was tolerating this fairly well, but then developed worsening diarrhea, so his treatment has been on hold. He will now resume vemurafenib at 720 mg and cobimetinib at 40 mg reduced dosing due to the diarrhea. He also started on atezolizumab on 2/18/21 and tolerated this well.  He will continue with cycle 1 day 15 later this week. He would like to get some  of his infusions in [place]. Will tentatively plan for his next imaging the end of April 2021.  Of note, he did have a distant site on his abdomen biopsied that showed tumoral melanosis. We will need to keep this in mid as we consider future plans for surgery.     Diarrhea. Seems most likely secondary to his treatment, complicated by possible IBS. Encouraged him to use Lomotil and/or Imodium as needed.    CKD. Suspect secondary to hypertension. Better the last 2 checks. Will continue to monitor. Seems easily affected by mild dehydration with diarrhea.     Hypertension. BP is under reasonably control.  He remains on hydrochlorothiazide and lisinopril. [facility] since last visit was more than 1 year ago. Will continue to monitor in clinic.     Dry eyes. Had at baseline, but worse with vem/jackson. Recommend continuing with lubricating eye drops like Refresh, Systane, or Blink.    Hypophosphatemia. Secondary to cobimetinib (68% reported). Now improving. Will decrease Kphos to 500 mg daily.    COVID vaccine. Discussed okay to get once available for him. [facility] may be [facility].     Deconditioning. Recommend daily walks.    Weight loss. Declines dietician referral. Recommend nutritional supplements. Will continue to monitor.     Sherine Cardoza PA-C  [facility]  [address] [place] [address] [phone]

## 2021-03-05 ENCOUNTER — INFUSION THERAPY VISIT (OUTPATIENT)
Dept: ONCOLOGY | Facility: CLINIC | Age: 76
End: 2021-03-05
Attending: PHYSICIAN ASSISTANT
Payer: MEDICARE

## 2021-03-05 ENCOUNTER — TELEPHONE (OUTPATIENT)
Dept: ONCOLOGY | Facility: CLINIC | Age: 76
End: 2021-03-05

## 2021-03-05 ENCOUNTER — ALLIED HEALTH/NURSE VISIT (OUTPATIENT)
Dept: ONCOLOGY | Facility: CLINIC | Age: 76
End: 2021-03-05
Attending: INTERNAL MEDICINE
Payer: MEDICARE

## 2021-03-05 VITALS
OXYGEN SATURATION: 95 % | SYSTOLIC BLOOD PRESSURE: 116 MMHG | DIASTOLIC BLOOD PRESSURE: 88 MMHG | TEMPERATURE: 98 F | HEART RATE: 64 BPM | RESPIRATION RATE: 16 BRPM

## 2021-03-05 DIAGNOSIS — Z15.09 MONOALLELIC MUTATION OF BRAF GENE: ICD-10-CM

## 2021-03-05 DIAGNOSIS — Z15.89 MONOALLELIC MUTATION OF BRAF GENE: ICD-10-CM

## 2021-03-05 DIAGNOSIS — C43.9 MELANOMA OF SKIN (H): Primary | ICD-10-CM

## 2021-03-05 DIAGNOSIS — C43.4 MALIGNANT MELANOMA OF SCALP (H): ICD-10-CM

## 2021-03-05 LAB
INTERPRETATION ECG - MUSE: NORMAL
TSH SERPL DL<=0.005 MIU/L-ACNC: 0.88 MU/L (ref 0.4–4)

## 2021-03-05 PROCEDURE — 999N000127 HC STATISTIC PERIPHERAL IV START W US GUIDANCE

## 2021-03-05 PROCEDURE — 250N000011 HC RX IP 250 OP 636: Performed by: INTERNAL MEDICINE

## 2021-03-05 PROCEDURE — 258N000003 HC RX IP 258 OP 636: Performed by: INTERNAL MEDICINE

## 2021-03-05 PROCEDURE — 84443 ASSAY THYROID STIM HORMONE: CPT | Performed by: PHYSICIAN ASSISTANT

## 2021-03-05 PROCEDURE — 96413 CHEMO IV INFUSION 1 HR: CPT

## 2021-03-05 PROCEDURE — 93005 ELECTROCARDIOGRAM TRACING: CPT

## 2021-03-05 RX ADMIN — ATEZOLIZUMAB 840 MG: 840 INJECTION, SOLUTION INTRAVENOUS at 14:25

## 2021-03-05 NOTE — ORAL ONC MGMT
Oral Chemotherapy Monitoring Program     Placed call to patient in follow up of  therapy.     Left message to please call back in follow-up of therapy. No patient or drug names were mentioned.     Justice Sellers Pharm D.  Clinical Oncology Pharmacist- Oral Chemotherapy Monitoring Program  [phone]    March 5, 2021

## 2021-03-05 NOTE — PATIENT INSTRUCTIONS
[facility] Triage and after hours / weekends / holidays:  [phone]    Please call the triage or after hours line if you experience a temperature greater than or equal to 100.5, shaking chills, have uncontrolled nausea, vomiting and/or diarrhea, dizziness, shortness of breath, chest pain, bleeding, unexplained bruising, or if you have any other new/concerning symptoms, questions or concerns.      If you are having any concerning symptoms or wish to speak to a provider before your next infusion visit, please call your care coordinator or triage to notify them so we can adequately serve you.     If you need a refill on a narcotic prescription or other medication, please call before your infusion appointment.                 March 2021 Sunday Monday Tuesday Wednesday Thursday Friday Saturday        1     2    LAB   2:00 PM   (15 min.)   [facility]    VIDEO VISIT RETURN  10:55 AM   (50 min.)   Sherine Cardoza PA-C   [facility] 4     5    [facility] NURSE VISIT   1:20 PM   (10 min.)   Nurse,  Oncology   [facility] ONC INFUSION 60   2:00 PM   (60 min.)    ONCOLOGY INFUSION   [facility] 6       7     8     9     10     11     12     13       14     15     16     17    VIDEO VISIT RETURN  10:05 AM   (50 min.)   Emmanuel Miranda PA   [facility] 18    LAB   1:30 PM   (15 min.)   LAB ONC [facility] Laboratory    LEVEL 1   2:00 PM   (60 min.)   BAY 6 INFUSION   [facility] 19     20       21     22     23     24  Happy Birthday!     25     26     27       28     29     30     31 April 2021 Sunday Monday Tuesday Wednesday Thursday Friday Saturday                       1     2     3       4     5     6     7     8     9     10       11     12      13     14     15     16     17       18     19     20     21     22     23     24       25     26     27     28     29     30                          Lab Results:  Recent Results (from the past 12 hour(s))   EKG 12-lead complete w/read - Clinics    Collection Time: 03/05/21  1:17 PM   Result Value Ref Range    Interpretation ECG Click View Image link to view waveform and result

## 2021-03-05 NOTE — PROGRESS NOTES
Infusion Nursing Note:  Ahmet Coleman presents today for Day 15 Cycle 2 Tecentriq.    Patient seen by provider today: No   present during visit today: Not Applicable.    Note: Patient presents to infusion feeling ok. Patient states continued fatigue requiring naps throughout the day and decrease appetite do to food tasting different; issues discussed with Sherine FERNANDO. Patient denies acute discomfort and states no acute complaints or concerns not addressed during virtual visit with Sherine FERNANDO on 3/3/2021. Specifically, patient denies s/s of infection such as fever, sore throat, cough, chest pain, shortness of breath, or changes in taste/smell. Patient did meet criteria for an asymptomatic covid-19 PCR test in infusion today. Patient declined the covid-19 test today.    Intravenous Access:  Peripheral IV placed via Vascular Access    Treatment Conditions:  Lab Results   Component Value Date     03/02/2021                   Lab Results   Component Value Date    POTASSIUM 4.3 03/02/2021           Lab Results   Component Value Date    MAG 2.0 03/02/2021            Lab Results   Component Value Date    CR 1.22 03/02/2021                   Lab Results   Component Value Date    STEPHEN 9.1 03/02/2021                Lab Results   Component Value Date    BILITOTAL 0.5 03/02/2021           Lab Results   Component Value Date    ALBUMIN 3.8 03/02/2021                    Lab Results   Component Value Date    ALT 25 03/02/2021           Lab Results   Component Value Date    AST 12 03/02/2021       Results reviewed from 3/2, labs MET treatment parameters, ok to proceed with treatment.      Post Infusion Assessment:  Patient tolerated infusion over 30 minutes without incident  Blood return noted pre and post infusion.  Site patent and intact, free from redness, edema or discomfort.  No evidence of extravasations.  Access discontinued per protocol.       Discharge Plan:   Patient declined prescription  refills.  Discharge instructions reviewed with: Patient.  Patient and/or family verbalized understanding of discharge instructions and all questions answered.  Copy of AVS reviewed with patient and/or family.  Patient will return 3/18 for next appointment In [place]  Patient discharged in stable condition accompanied by: self.  Departure Mode: Ambulatory.  Face to Face time: 0 minutes.    Bandar Giraldo RN

## 2021-03-10 ENCOUNTER — TELEPHONE (OUTPATIENT)
Dept: ONCOLOGY | Facility: CLINIC | Age: 76
End: 2021-03-10

## 2021-03-10 NOTE — TELEPHONE ENCOUNTER
"Oral Chemotherapy Monitoring Program    Subjective/Objective:  Ahmet Coleman is a 75 year old male contacted by phone for a follow-up visit for oral chemotherapy.  Pt confirms taking the appropriate dose of Zelboraf, 720mg (3 tablets) twice daily continuously and Cotellic 40mg (2 tablets) daily for 21 days on, 7 days off. Denies missed doses, and recent hospital or ED visits. Patient has not had any recent medication changes.     Pt states his diarrhea is much improved on this dose.  He has needed to take lomotil twice since starting, once over the weekend and then once today.  He says this is much improved from previous.  Pt states he has been feeling some fatigue on the medications, but is able to stay active and work through it with some rest. Pt also states that these medications give him something of a \"buzz\" or \"high\" that he describes as a \"euphoric feeling\".  He is more talkative and feels more optimistic when he takes these medications.      ORAL CHEMOTHERAPY 2/19/2021 2/19/2021 2/26/2021 2/26/2021 3/5/2021 3/10/2021 3/10/2021   Assessment Type Refill Refill Other Other Other Initial Follow up Initial Follow up   Diagnosis Code Melanoma Melanoma Melanoma Melanoma Melanoma Melanoma Melanoma   Providers Dr. Bowen Hernández   [facility]   Drug Name Cotellic (Cobimetinib) Zelboraf (Vemurafenib) Zelboraf (Vemurafenib) Cotellic (Cobimetinib) - Zelboraf (Vemurafenib) Cotellic (Cobimetinib)   Dose 40 mg 720 mg 720 mg 40 mg - 720 mg 40 mg   Current Schedule Daily BID BID Daily - BID Daily   Cycle Details 3 weeks on, 1 week off Continuous Continuous 3 weeks on, 1 week off - Continuous 3 weeks on, 1 week off   Start Date of Last Cycle - - - - - 3/6/2021 3/6/2021   Planned next cycle start date - - - - - 4/3/2021 4/3/2021   Doses " "missed in last 2 weeks - - - - - 0 -   Adherence Assessment - - - - - Adherent -   Adverse Effects - - - - - Fatigue Fatigue   Diarrhea - - - - - Grade 1 Grade 1   Pharmacist Intervention(diarrhea) - - - - - No No   Intervention(s) - - - - - - -   Fatigue - - - - - Grade 1 Grade 1   Pharmacist Intervention(fatigue) - - - - - No No   Any new drug interactions? - - - - - No No   Is the dose as ordered appropriate for the patient? - - - - - Yes Yes   Since the last time we talked, have you been hospitalized or used the emergency room? - - - - - No No       Last PHQ-2 Score on record:   PHQ-2 ( 1999 Pfizer) 11/25/2020 2/1/2016   Q1: Little interest or pleasure in doing things 1 1   Q2: Feeling down, depressed or hopeless 3 1   PHQ-2 Score 4 2       Vitals:  BP:   BP Readings from Last 1 Encounters:   03/05/21 116/88     Wt Readings from Last 1 Encounters:   02/18/21 88.1 kg (194 lb 3.2 oz)     Estimated body surface area is 2.06 meters squared as calculated from the following:    Height as of 2/5/21: 1.727 m (5' 8\").    Weight as of 2/18/21: 88.1 kg (194 lb 3.2 oz).    Labs:  _  Result Component Current Result Ref Range   Sodium 138 (3/2/2021) 133 - 144 mmol/L     _  Result Component Current Result Ref Range   Potassium 4.3 (3/2/2021) 3.4 - 5.3 mmol/L     _  Result Component Current Result Ref Range   Calcium 9.1 (3/2/2021) 8.5 - 10.1 mg/dL     _  Result Component Current Result Ref Range   Magnesium 2.0 (3/2/2021) 1.6 - 2.3 mg/dL     _  Result Component Current Result Ref Range   Phosphorus 3.2 (3/2/2021) 2.5 - 4.5 mg/dL     _  Result Component Current Result Ref Range   Albumin 3.8 (3/2/2021) 3.4 - 5.0 g/dL     _  Result Component Current Result Ref Range   Urea Nitrogen 20 (3/2/2021) 7 - 30 mg/dL     _  Result Component Current Result Ref Range   Creatinine 1.22 (3/2/2021) 0.66 - 1.25 mg/dL     _  Result Component Current Result Ref Range   AST 12 (3/2/2021) 0 - 45 U/L     _  Result Component Current Result Ref Range "   ALT 25 (3/2/2021) 0 - 70 U/L     _  Result Component Current Result Ref Range   Bilirubin Total 0.5 (3/2/2021) 0.2 - 1.3 mg/dL     _  Result Component Current Result Ref Range   WBC 4.6 (3/2/2021) 4.0 - 11.0 10e9/L     _  Result Component Current Result Ref Range   Hemoglobin 13.5 (3/2/2021) 13.3 - 17.7 g/dL     _  Result Component Current Result Ref Range   Platelet Count 238 (3/2/2021) 150 - 450 10e9/L     _  Result Component Current Result Ref Range   Absolute Neutrophil 2.4 (3/2/2021) 1.6 - 8.3 10e9/L     Assessment/Plan:  Pt is tolerating the reduced dose of Zelboraf and Cotellic well with improved diarrhea and some fatigue. Continue current therapy as planned.    Follow-Up:  03/18: labs and review appt with Alexis Grace Myhre, PharmD  Hematology/Oncology Pharmacist  [facility]  [phone]

## 2021-03-13 ENCOUNTER — HEALTH MAINTENANCE LETTER (OUTPATIENT)
Age: 76
End: 2021-03-13

## 2021-03-15 NOTE — PROGRESS NOTES
Ahmet is a 75 year old who is being evaluated via a billable video visit.      How would you like to obtain your AVS? MyChart  If the video visit is dropped, the invitation should be resent by: Send to e-mail at: [email or web address]  Will anyone else be joining your video visit? No      Video Start Time:10:20am    Video-Visit Details    Type of service:  Video Visit    Video End Time: 10:30am    Originating Location (pt. Location): Home    Distant Location (provider location):  [facility]     Platform used for Video Visit: [facility]    Oncology/Hematology Visit Note  Mar 17, 2021    Reason for Visit: Follow up of scalp melanoma clinical stage IIIB    History of Present Illness: Ahmet Coleman is a 75 year old male with PMH anxiety, HTN, GERD, IBS, migraines with scalp melanoma. His history is as follows:    Melanoma History:  1. He has a small pigmented scalp lesion present for over 30 years. The lesion was biopsied in 1992 and was benign.  2. October 2020, he presented to [facility] with 1 year of progressive enlargement of the lesion and new onset burning, itching, and stinging sensation in the affected scalp.  3. 10/26/20, He was seen at [facility] and he had shave biopsies of A. left central parietal scalp, B. left central occipital scalp, C. Left posterior parietal scalp, and D. punch biopsy of the left superior occipital scalp. Pathology (specimen: [id number]) showed a nodular and nevoid type melanoma, non-ulcerated, Breslow depth up to 1.3 mm, Saurabh's level IV, and up to 3 mitoses per mm2. All margins were involved. Ki-67 10-20%. Sox10 stain is positive and highlights an atypical compound melanocytic proliferation with significant overlying confluence and pagetosis of intraepidermal melanocytes. T-stage: at least pT2a. PD-L1 staining shows PD-L1 TPS <1%. 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  4. 11/25/20, he was seen by Dr. Garcia and he took  three 3mm punch biopsies, which returned as dermal melanocytic proliferations with melanophages and fibrosis, consistent with locoregional metastatic melanoma.  5. 12/8/2020 he had PET-CT, which showed FDG avid irregular skin thickening overlying the left parietal region, with no FDG avid lymphadenopathy in the neck and no evidence of metastasis elsewhere in the body.  6. 1/22/2021 started neoadjuvant vemurafenib and cobimetinib, ~2/12/21 held for diarrhea. Restarted 2/18/21 at reduced dose. Added Atezolizumab 2/18/21.     Interval History:  Mr. Coleman was called today for oncology follow-up. He is overall feeling well. Diarrhea is much better with dose reduction, now only 1 episode per day and using Lomotil PRN. He still has dry eyes but well managed with eye drops. He has some fatigue but is still able to do his day to day activities. No issues after Atezolizumab. He denies fevers, severe headaches, dizziness (has some lightheadedness and we discussed pushing fluids), chest pain, SOB, cough, nausea, vomiting, abdominal pain, urinary issues, edema, new skin concerns. He is eating and drinking well.    Current Outpatient Medications   Medication Sig Dispense Refill     acetaminophen (TYLENOL) 325 MG tablet TAKE TWO TABLETS BY MOUTH THREE TIMES A DAY AS NEEDED       cholecalciferol (VITAMIN D3) 1000 UNIT tablet Take  by mouth daily.       diphenoxylate-atropine (LOMOTIL) 2.5-0.025 MG tablet Take 1-2 tablets by mouth 4 times daily as needed for diarrhea 120 tablet 5     fluvoxaMINE (LUVOX) 100 MG tablet Take 150 mg at bedtime       hydrochlorothiazide (HYDRODIURIL) 25 MG tablet Take 25 mg by mouth daily.       Hypromellose (ARTIFICIAL TEARS OP) Apply  to eye. 2 drops in each eye twice a day as needed       Lactobacillus-Inulin ([facility] DIGESTIVE [facility]) CAPS 1 tab daily 30 capsule 1     lisinopril (PRINIVIL,ZESTRIL) 10 MG tablet Take 10 mg by mouth daily.       Magnesium Oxide 420 MG TABS Take 420 mg by mouth daily        omeprazole (PRILOSEC) 20 MG capsule Take 1 capsule by mouth 2 times daily. 60 capsule prn     potassium phosphate, monobasic, (K-PHOS) 500 MG tablet Take 1 tablet (500 mg) by mouth 2 times daily (Patient taking differently: Take 500 mg by mouth daily ) 60 tablet 3     prochlorperazine (COMPAZINE) 10 MG tablet Take 1 tablet (10 mg) by mouth every 6 hours as needed (Nausea/Vomiting) 30 tablet 2     QUEtiapine (SEROQUEL) 25 MG tablet 1/2 tablet nightly       simvastatin (ZOCOR) 20 MG tablet Take  by mouth At Bedtime.       TRAZodone (DESYREL) 50 MG tablet Take 50 mg by mouth At Bedtime.       vemurafenib (ZELBORAF) 240 MG TABS tablet Take 3 tablets (720 mg) by mouth every 12 hours for 28 days 168 tablet 0     vortioxetine (TRINTELLIX) 20 MG tablet TAKE ONE TABLET BY MOUTH EVERY MORNING       Physical Examination:  There were no vitals taken for this visit.  Wt Readings from Last 10 Encounters:   02/18/21 88.1 kg (194 lb 3.2 oz)   02/05/21 89 kg (196 lb 4.8 oz)   01/29/21 90.6 kg (199 lb 11.2 oz)   12/21/20 88.6 kg (195 lb 4.8 oz)   11/25/20 88.5 kg (195 lb)   10/14/20 87.5 kg (193 lb)   06/09/16 74.4 kg (164 lb)   05/31/16 75.8 kg (167 lb)   04/13/16 75.8 kg (167 lb)   04/06/16 75.1 kg (165 lb 8 oz)     Video physical exam  General: Patient appears well in no acute distress.   Skin: No visualized rash or lesions on visualized skin  Eyes: EOMI, no erythema, sclera icterus or discharge noted  Resp: Appears to be breathing comfortably without accessory muscle usage, speaking in full sentences, no cough  MSK: Appears to have normal range of motion based on visualized movements  Neurologic: No apparent tremors, facial movements symmetric  Psych: affect normal, alert and oriented    The rest of a comprehensive physical examination is deferred due to PHE (public health emergency) video restrictions    Laboratory Data:  Results for SADIA FUENTES (MRN [id number]) as of 3/17/2021 10:58   3/2/2021 14:09 3/5/2021 13:17  3/5/2021 14:30   Sodium 138     Potassium 4.3     Chloride 103     Carbon Dioxide 32     Urea Nitrogen 20     Creatinine 1.22     GFR Estimate 58 (L)     GFR Estimate If Black 67     Calcium 9.1     Anion Gap 3     Magnesium 2.0     Phosphorus 3.2     Albumin 3.8     Protein Total 7.5     Bilirubin Total 0.5     Alkaline Phosphatase 77     ALT 25     AST 12     CK Total 44     TSH   0.88   Glucose 102 (H)     WBC 4.6     Hemoglobin 13.5     Hematocrit 40.2     Platelet Count 238     RBC Count 4.57     MCV 88     MCH 29.5     MCHC 33.6     RDW 13.6     Diff Method Automated Method     % Neutrophils 51.3     % Lymphocytes 34.9     % Monocytes 10.9     % Eosinophils 2.0     % Basophils 0.9     Absolute Neutrophil 2.4     Absolute Lymphocytes 1.6     Absolute Monocytes 0.5     Absolute Eosinophils 0.1     Absolute Basophils 0.0       3/5/21 QTc 438    Assessment and Plan:  1. Onc  Cutaneous melanoma, scalp primary, pT2a cN1c, clinical Stage IIIB. He was reviewed for participation in the NeoActivate study, but he does not meet criteria as he does not have regional lymph node disease. He started on neoadjuvant vemurafenib and cobimetinib on 1/22/21, required dose reduction for diarrhea, now on vemurafenib at 720 mg and cobimetinib at 40 mg. Tolerating well with improvement in diarrhea. Added Atezolizumab 2/18/21 and tolerating well. Will receive cycle 3 day 1 this week.    Will continue treatment and message Dr. Hernández about timing of next imaging, likely end of April per prior notes.    2. GI  Diarrhea 2/2 treatment complicated by possible IBS, improved with dose reduction. Continue Lomotil PRN.     3. Renal  CKD, likely 2/2 HTN. Has been better. Discussed pushing fluids. Continue to monitor.    4. Cards  HTN, BP under good control on hydrochlorothiazide and lisinopril. EKG reviewed and QTc WNL.    5. Optho  Dry eyes well controlled with eye drops. Monitor with vem/jackson for any worsening symptoms.    6. FEN  Monitor  phos, has been improved on PO phos supplement 500mg daily.     7. Other  [facility] COVID vaccine 4/10/21 and [facility]    30 minutes spent on the date of the encounter doing chart review, review of test results, patient visit and documentation     Emmanuel Miranda PA-C  [facility]  [address], [place] [address] [phone]

## 2021-03-17 ENCOUNTER — VIRTUAL VISIT (OUTPATIENT)
Dept: ONCOLOGY | Facility: CLINIC | Age: 76
End: 2021-03-17
Attending: INTERNAL MEDICINE
Payer: MEDICARE

## 2021-03-17 DIAGNOSIS — Z15.89 MONOALLELIC MUTATION OF BRAF GENE: ICD-10-CM

## 2021-03-17 DIAGNOSIS — Z15.09 MONOALLELIC MUTATION OF BRAF GENE: ICD-10-CM

## 2021-03-17 DIAGNOSIS — C43.4 MALIGNANT MELANOMA OF SCALP (H): Primary | ICD-10-CM

## 2021-03-17 PROCEDURE — 99214 OFFICE O/P EST MOD 30 MIN: CPT | Mod: 95 | Performed by: PHYSICIAN ASSISTANT

## 2021-03-17 PROCEDURE — 999N001193 HC VIDEO/TELEPHONE VISIT; NO CHARGE

## 2021-03-17 RX ORDER — ALBUTEROL SULFATE 0.83 MG/ML
2.5 SOLUTION RESPIRATORY (INHALATION)
Status: CANCELLED | OUTPATIENT
Start: 2021-03-18

## 2021-03-17 RX ORDER — METHYLPREDNISOLONE SODIUM SUCCINATE 125 MG/2ML
125 INJECTION, POWDER, LYOPHILIZED, FOR SOLUTION INTRAMUSCULAR; INTRAVENOUS
Status: CANCELLED
Start: 2021-03-18

## 2021-03-17 RX ORDER — ALBUTEROL SULFATE 90 UG/1
1-2 AEROSOL, METERED RESPIRATORY (INHALATION)
Status: CANCELLED
Start: 2021-04-01

## 2021-03-17 RX ORDER — SODIUM CHLORIDE 9 MG/ML
1000 INJECTION, SOLUTION INTRAVENOUS CONTINUOUS PRN
Status: CANCELLED
Start: 2021-04-01

## 2021-03-17 RX ORDER — MEPERIDINE HYDROCHLORIDE 25 MG/ML
25 INJECTION INTRAMUSCULAR; INTRAVENOUS; SUBCUTANEOUS EVERY 30 MIN PRN
Status: CANCELLED | OUTPATIENT
Start: 2021-03-18

## 2021-03-17 RX ORDER — ALBUTEROL SULFATE 90 UG/1
1-2 AEROSOL, METERED RESPIRATORY (INHALATION)
Status: CANCELLED
Start: 2021-03-18

## 2021-03-17 RX ORDER — DIPHENHYDRAMINE HYDROCHLORIDE 50 MG/ML
50 INJECTION INTRAMUSCULAR; INTRAVENOUS
Status: CANCELLED
Start: 2021-04-01

## 2021-03-17 RX ORDER — SODIUM CHLORIDE 9 MG/ML
1000 INJECTION, SOLUTION INTRAVENOUS CONTINUOUS PRN
Status: CANCELLED
Start: 2021-03-18

## 2021-03-17 RX ORDER — DIPHENHYDRAMINE HYDROCHLORIDE 50 MG/ML
50 INJECTION INTRAMUSCULAR; INTRAVENOUS
Status: CANCELLED
Start: 2021-03-18

## 2021-03-17 RX ORDER — NALOXONE HYDROCHLORIDE 0.4 MG/ML
.1-.4 INJECTION, SOLUTION INTRAMUSCULAR; INTRAVENOUS; SUBCUTANEOUS
Status: CANCELLED | OUTPATIENT
Start: 2021-04-01

## 2021-03-17 RX ORDER — NALOXONE HYDROCHLORIDE 0.4 MG/ML
.1-.4 INJECTION, SOLUTION INTRAMUSCULAR; INTRAVENOUS; SUBCUTANEOUS
Status: CANCELLED | OUTPATIENT
Start: 2021-03-18

## 2021-03-17 RX ORDER — MEPERIDINE HYDROCHLORIDE 25 MG/ML
25 INJECTION INTRAMUSCULAR; INTRAVENOUS; SUBCUTANEOUS EVERY 30 MIN PRN
Status: CANCELLED | OUTPATIENT
Start: 2021-04-01

## 2021-03-17 RX ORDER — ALBUTEROL SULFATE 0.83 MG/ML
2.5 SOLUTION RESPIRATORY (INHALATION)
Status: CANCELLED | OUTPATIENT
Start: 2021-04-01

## 2021-03-17 RX ORDER — EPINEPHRINE 1 MG/ML
0.3 INJECTION, SOLUTION INTRAMUSCULAR; SUBCUTANEOUS EVERY 5 MIN PRN
Status: CANCELLED | OUTPATIENT
Start: 2021-04-01

## 2021-03-17 RX ORDER — EPINEPHRINE 1 MG/ML
0.3 INJECTION, SOLUTION INTRAMUSCULAR; SUBCUTANEOUS EVERY 5 MIN PRN
Status: CANCELLED | OUTPATIENT
Start: 2021-03-18

## 2021-03-17 RX ORDER — METHYLPREDNISOLONE SODIUM SUCCINATE 125 MG/2ML
125 INJECTION, POWDER, LYOPHILIZED, FOR SOLUTION INTRAMUSCULAR; INTRAVENOUS
Status: CANCELLED
Start: 2021-04-01

## 2021-03-17 NOTE — LETTER
3/17/2021         RE: Ahmet Coleman  [address] [place] [id number]        Dear Colleague,    Thank you for referring your patient, Ahmet Coleman, to the [facility]ER [facility]. Please see a copy of my visit note below.    Ahmet is a 75 year old who is being evaluated via a billable video visit.      How would you like to obtain your AVS? MyChart  If the video visit is dropped, the invitation should be resent by: Send to e-mail at: [email or web address]  Will anyone else be joining your video visit? No      Video Start Time:10:20am    Video-Visit Details    Type of service:  Video Visit    Video End Time: 10:30am    Originating Location (pt. Location): Home    Distant Location (provider location):  [facility]     Platform used for Video Visit: [employer]    Oncology/Hematology Visit Note  Mar 17, 2021    Reason for Visit: Follow up of scalp melanoma clinical stage IIIB    History of Present Illness: Ahmet Coleman is a 75 year old male with PMH anxiety, HTN, GERD, IBS, migraines with scalp melanoma. His history is as follows:    Melanoma History:  1. He has a small pigmented scalp lesion present for over 30 years. The lesion was biopsied in 1992 and was benign.  2. October 2020, he presented to [facility] with 1 year of progressive enlargement of the lesion and new onset burning, itching, and stinging sensation in the affected scalp.  3. 10/26/20, He was seen at [facility] and he had shave biopsies of A. left central parietal scalp, B. left central occipital scalp, C. Left posterior parietal scalp, and D. punch biopsy of the left superior occipital scalp. Pathology (specimen: [id number]) showed a nodular and nevoid type melanoma, non-ulcerated, Breslow depth up to 1.3 mm, Saurabh's level IV, and up to 3 mitoses per mm2. All margins were involved. Ki-67 10-20%. Sox10 stain is positive and highlights an atypical compound  melanocytic proliferation with significant overlying confluence and pagetosis of intraepidermal melanocytes. T-stage: at least pT2a. PD-L1 staining shows PD-L1 TPS <1%. 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  4. 11/25/20, he was seen by Dr. Garcia and he took three 3mm punch biopsies, which returned as dermal melanocytic proliferations with melanophages and fibrosis, consistent with locoregional metastatic melanoma.  5. 12/8/2020 he had PET-CT, which showed FDG avid irregular skin thickening overlying the left parietal region, with no FDG avid lymphadenopathy in the neck and no evidence of metastasis elsewhere in the body.  6. 1/22/2021 started neoadjuvant vemurafenib and cobimetinib, ~2/12/21 held for diarrhea. Restarted 2/18/21 at reduced dose. Added Atezolizumab 2/18/21.     Interval History:  Mr. Coleman was called today for oncology follow-up. He is overall feeling well. Diarrhea is much better with dose reduction, now only 1 episode per day and using Lomotil PRN. He still has dry eyes but well managed with eye drops. He has some fatigue but is still able to do his day to day activities. No issues after Atezolizumab. He denies fevers, severe headaches, dizziness (has some lightheadedness and we discussed pushing fluids), chest pain, SOB, cough, nausea, vomiting, abdominal pain, urinary issues, edema, new skin concerns. He is eating and drinking well.    Current Outpatient Medications   Medication Sig Dispense Refill     acetaminophen (TYLENOL) 325 MG tablet TAKE TWO TABLETS BY MOUTH THREE TIMES A DAY AS NEEDED       cholecalciferol (VITAMIN D3) 1000 UNIT tablet Take  by mouth daily.       diphenoxylate-atropine (LOMOTIL) 2.5-0.025 MG tablet Take 1-2 tablets by mouth 4 times daily as needed for diarrhea 120 tablet 5     fluvoxaMINE (LUVOX) 100 MG tablet Take 150 mg at bedtime       hydrochlorothiazide (HYDRODIURIL) 25 MG tablet Take 25 mg by mouth daily.       Hypromellose (ARTIFICIAL TEARS OP) Apply   to eye. 2 drops in each eye twice a day as needed       Lactobacillus-Inulin ([facility] DIGESTIVE [facility]) CAPS 1 tab daily 30 capsule 1     lisinopril (PRINIVIL,ZESTRIL) 10 MG tablet Take 10 mg by mouth daily.       Magnesium Oxide 420 MG TABS Take 420 mg by mouth daily       omeprazole (PRILOSEC) 20 MG capsule Take 1 capsule by mouth 2 times daily. 60 capsule prn     potassium phosphate, monobasic, (K-PHOS) 500 MG tablet Take 1 tablet (500 mg) by mouth 2 times daily (Patient taking differently: Take 500 mg by mouth daily ) 60 tablet 3     prochlorperazine (COMPAZINE) 10 MG tablet Take 1 tablet (10 mg) by mouth every 6 hours as needed (Nausea/Vomiting) 30 tablet 2     QUEtiapine (SEROQUEL) 25 MG tablet 1/2 tablet nightly       simvastatin (ZOCOR) 20 MG tablet Take  by mouth At Bedtime.       TRAZodone (DESYREL) 50 MG tablet Take 50 mg by mouth At Bedtime.       vemurafenib (ZELBORAF) 240 MG TABS tablet Take 3 tablets (720 mg) by mouth every 12 hours for 28 days 168 tablet 0     vortioxetine (TRINTELLIX) 20 MG tablet TAKE ONE TABLET BY MOUTH EVERY MORNING       Physical Examination:  There were no vitals taken for this visit.  Wt Readings from Last 10 Encounters:   02/18/21 88.1 kg (194 lb 3.2 oz)   02/05/21 89 kg (196 lb 4.8 oz)   01/29/21 90.6 kg (199 lb 11.2 oz)   12/21/20 88.6 kg (195 lb 4.8 oz)   11/25/20 88.5 kg (195 lb)   10/14/20 87.5 kg (193 lb)   06/09/16 74.4 kg (164 lb)   05/31/16 75.8 kg (167 lb)   04/13/16 75.8 kg (167 lb)   04/06/16 75.1 kg (165 lb 8 oz)     Video physical exam  General: Patient appears well in no acute distress.   Skin: No visualized rash or lesions on visualized skin  Eyes: EOMI, no erythema, sclera icterus or discharge noted  Resp: Appears to be breathing comfortably without accessory muscle usage, speaking in full sentences, no cough  MSK: Appears to have normal range of motion based on visualized movements  Neurologic: No apparent tremors, facial movements symmetric  Psych:  affect normal, alert and oriented    The rest of a comprehensive physical examination is deferred due to PHE (public health emergency) video restrictions    Laboratory Data:  Results for SADIA FUENTES (MRN [id number]) as of 3/17/2021 10:58   3/2/2021 14:09 3/5/2021 13:17 3/5/2021 14:30   Sodium 138     Potassium 4.3     Chloride 103     Carbon Dioxide 32     Urea Nitrogen 20     Creatinine 1.22     GFR Estimate 58 (L)     GFR Estimate If Black 67     Calcium 9.1     Anion Gap 3     Magnesium 2.0     Phosphorus 3.2     Albumin 3.8     Protein Total 7.5     Bilirubin Total 0.5     Alkaline Phosphatase 77     ALT 25     AST 12     CK Total 44     TSH   0.88   Glucose 102 (H)     WBC 4.6     Hemoglobin 13.5     Hematocrit 40.2     Platelet Count 238     RBC Count 4.57     MCV 88     MCH 29.5     MCHC 33.6     RDW 13.6     Diff Method Automated Method     % Neutrophils 51.3     % Lymphocytes 34.9     % Monocytes 10.9     % Eosinophils 2.0     % Basophils 0.9     Absolute Neutrophil 2.4     Absolute Lymphocytes 1.6     Absolute Monocytes 0.5     Absolute Eosinophils 0.1     Absolute Basophils 0.0       3/5/21 QTc 438    Assessment and Plan:  1. Onc  Cutaneous melanoma, scalp primary, pT2a cN1c, clinical Stage IIIB. He was reviewed for participation in the NeoActivate study, but he does not meet criteria as he does not have regional lymph node disease. He started on neoadjuvant vemurafenib and cobimetinib on 1/22/21, required dose reduction for diarrhea, now on vemurafenib at 720 mg and cobimetinib at 40 mg. Tolerating well with improvement in diarrhea. Added Atezolizumab 2/18/21 and tolerating well. Will receive cycle 3 day 1 this week.    Will continue treatment and message Dr. Hernández about timing of next imaging, likely end of April per prior notes.    2. GI  Diarrhea 2/2 treatment complicated by possible IBS, improved with dose reduction. Continue Lomotil PRN.     3. Renal  CKD, likely 2/2 HTN. Has been better.  Discussed pushing fluids. Continue to monitor.    4. Cards  HTN, BP under good control on hydrochlorothiazide and lisinopril. EKG reviewed and QTc WNL.    5. Optho  Dry eyes well controlled with eye drops. Monitor with vem/jackson for any worsening symptoms.    6. FEN  Monitor phos, has been improved on PO phos supplement 500mg daily.     7. Other  [facility] COVID vaccine 4/10/21 and [facility]    30 minutes spent on the date of the encounter doing chart review, review of test results, patient visit and documentation     Emmanuel Miranda PA-C  [facility]  [address], [place] [id number] [phone]

## 2021-03-18 ENCOUNTER — INFUSION THERAPY VISIT (OUTPATIENT)
Dept: INFUSION THERAPY | Facility: CLINIC | Age: 76
End: 2021-03-18
Payer: MEDICARE

## 2021-03-18 ENCOUNTER — DOCUMENTATION ONLY (OUTPATIENT)
Dept: ONCOLOGY | Facility: CLINIC | Age: 76
End: 2021-03-18

## 2021-03-18 VITALS
BODY MASS INDEX: 29.66 KG/M2 | TEMPERATURE: 98.2 F | WEIGHT: 195.1 LBS | RESPIRATION RATE: 14 BRPM | OXYGEN SATURATION: 96 % | DIASTOLIC BLOOD PRESSURE: 79 MMHG | HEART RATE: 54 BPM | SYSTOLIC BLOOD PRESSURE: 143 MMHG

## 2021-03-18 DIAGNOSIS — C43.4 MALIGNANT MELANOMA OF SCALP (H): ICD-10-CM

## 2021-03-18 DIAGNOSIS — Z15.89 MONOALLELIC MUTATION OF BRAF GENE: Primary | ICD-10-CM

## 2021-03-18 DIAGNOSIS — Z15.09 MONOALLELIC MUTATION OF BRAF GENE: Primary | ICD-10-CM

## 2021-03-18 LAB
ALBUMIN SERPL-MCNC: 3.6 G/DL (ref 3.4–5)
ALP SERPL-CCNC: 86 U/L (ref 40–150)
ALT SERPL W P-5'-P-CCNC: 25 U/L (ref 0–70)
ANION GAP SERPL CALCULATED.3IONS-SCNC: 2 MMOL/L (ref 3–14)
AST SERPL W P-5'-P-CCNC: 11 U/L (ref 0–45)
BASOPHILS # BLD AUTO: 0 10E9/L (ref 0–0.2)
BASOPHILS NFR BLD AUTO: 0.8 %
BILIRUB SERPL-MCNC: 0.6 MG/DL (ref 0.2–1.3)
BUN SERPL-MCNC: 19 MG/DL (ref 7–30)
CALCIUM SERPL-MCNC: 8.7 MG/DL (ref 8.5–10.1)
CHLORIDE SERPL-SCNC: 103 MMOL/L (ref 94–109)
CK SERPL-CCNC: 96 U/L (ref 30–300)
CO2 SERPL-SCNC: 32 MMOL/L (ref 20–32)
CREAT SERPL-MCNC: 1.5 MG/DL (ref 0.66–1.25)
DIFFERENTIAL METHOD BLD: ABNORMAL
EOSINOPHIL # BLD AUTO: 0.1 10E9/L (ref 0–0.7)
EOSINOPHIL NFR BLD AUTO: 1.3 %
ERYTHROCYTE [DISTWIDTH] IN BLOOD BY AUTOMATED COUNT: 13 % (ref 10–15)
GFR SERPL CREATININE-BSD FRML MDRD: 45 ML/MIN/{1.73_M2}
GGT SERPL-CCNC: 17 U/L (ref 0–75)
GLUCOSE SERPL-MCNC: 99 MG/DL (ref 70–99)
HCT VFR BLD AUTO: 38.9 % (ref 40–53)
HGB BLD-MCNC: 13.1 G/DL (ref 13.3–17.7)
IMM GRANULOCYTES # BLD: 0 10E9/L (ref 0–0.4)
IMM GRANULOCYTES NFR BLD: 0 %
LYMPHOCYTES # BLD AUTO: 1.7 10E9/L (ref 0.8–5.3)
LYMPHOCYTES NFR BLD AUTO: 43.1 %
MAGNESIUM SERPL-MCNC: 2.3 MG/DL (ref 1.6–2.3)
MCH RBC QN AUTO: 29 PG (ref 26.5–33)
MCHC RBC AUTO-ENTMCNC: 33.7 G/DL (ref 31.5–36.5)
MCV RBC AUTO: 86 FL (ref 78–100)
MONOCYTES # BLD AUTO: 0.4 10E9/L (ref 0–1.3)
MONOCYTES NFR BLD AUTO: 10 %
NEUTROPHILS # BLD AUTO: 1.8 10E9/L (ref 1.6–8.3)
NEUTROPHILS NFR BLD AUTO: 44.8 %
PHOSPHATE SERPL-MCNC: 2.6 MG/DL (ref 2.5–4.5)
PLATELET # BLD AUTO: 186 10E9/L (ref 150–450)
POTASSIUM SERPL-SCNC: 3.9 MMOL/L (ref 3.4–5.3)
PROT SERPL-MCNC: 7.1 G/DL (ref 6.8–8.8)
RBC # BLD AUTO: 4.52 10E12/L (ref 4.4–5.9)
SODIUM SERPL-SCNC: 137 MMOL/L (ref 133–144)
TSH SERPL DL<=0.005 MIU/L-ACNC: 2.58 MU/L (ref 0.4–4)
WBC # BLD AUTO: 3.9 10E9/L (ref 4–11)

## 2021-03-18 PROCEDURE — 82550 ASSAY OF CK (CPK): CPT | Performed by: PHYSICIAN ASSISTANT

## 2021-03-18 PROCEDURE — 83735 ASSAY OF MAGNESIUM: CPT | Performed by: PHYSICIAN ASSISTANT

## 2021-03-18 PROCEDURE — 80053 COMPREHEN METABOLIC PANEL: CPT | Performed by: PHYSICIAN ASSISTANT

## 2021-03-18 PROCEDURE — 82977 ASSAY OF GGT: CPT | Performed by: PHYSICIAN ASSISTANT

## 2021-03-18 PROCEDURE — 36415 COLL VENOUS BLD VENIPUNCTURE: CPT | Performed by: PHYSICIAN ASSISTANT

## 2021-03-18 PROCEDURE — 84100 ASSAY OF PHOSPHORUS: CPT | Performed by: PHYSICIAN ASSISTANT

## 2021-03-18 PROCEDURE — 84443 ASSAY THYROID STIM HORMONE: CPT | Performed by: PHYSICIAN ASSISTANT

## 2021-03-18 PROCEDURE — 99207 PR NO CHARGE LOS: CPT

## 2021-03-18 PROCEDURE — 96413 CHEMO IV INFUSION 1 HR: CPT | Performed by: INTERNAL MEDICINE

## 2021-03-18 PROCEDURE — 85025 COMPLETE CBC W/AUTO DIFF WBC: CPT | Performed by: PHYSICIAN ASSISTANT

## 2021-03-18 NOTE — PROGRESS NOTES
Oral Chemotherapy Monitoring Program  Lab Follow Up    Reviewed lab results from today and sent a [employer] message to the patient on the results.    ORAL CHEMOTHERAPY 2/26/2021 2/26/2021 3/5/2021 3/10/2021 3/10/2021 3/18/2021 3/18/2021   Assessment Type Other Other Other Initial Follow up Initial Follow up Lab Monitoring Lab Monitoring   Diagnosis Code Melanoma Melanoma Melanoma Melanoma Melanoma Melanoma Melanoma   Providers Dr. Bowen Hernández   [facility]   Drug Name Zelboraf (Vemurafenib) Cotellic (Cobimetinib) - Zelboraf (Vemurafenib) Cotellic (Cobimetinib) Zelboraf (vemurafenib) Cotellic (cobimetinib)   Dose 720 mg 40 mg - 720 mg 40 mg 720 mg 40 mg   Current Schedule BID Daily - BID Daily BID Daily   Cycle Details Continuous 3 weeks on, 1 week off - Continuous 3 weeks on, 1 week off Continuous 3 weeks on, 1 week off   Start Date of Last Cycle - - - 3/6/2021 3/6/2021 3/6/2021 3/6/2021   Planned next cycle start date - - - 4/3/2021 4/3/2021 4/3/2021 4/3/2021   Doses missed in last 2 weeks - - - 0 - - -   Adherence Assessment - - - Adherent - - -   Adverse Effects - - - Fatigue Fatigue - -   Diarrhea - - - Grade 1 Grade 1 - -   Pharmacist Intervention(diarrhea) - - - No No - -   Intervention(s) - - - - - - -   Fatigue - - - Grade 1 Grade 1 - -   Pharmacist Intervention(fatigue) - - - No No - -   Any new drug interactions? - - - No No - -   Is the dose as ordered appropriate for the patient? - - - Yes Yes - -   Since the last time we talked, have you been hospitalized or used the emergency room? - - - No No - -       Labs:  _  Result Component Current Result Ref Range   Sodium 137 (3/18/2021) 133 - 144 mmol/L     _  Result Component Current Result Ref Range   Potassium 3.9 (3/18/2021) 3.4 - 5.3 mmol/L     _  Result Component Current  Result Ref Range   Calcium 8.7 (3/18/2021) 8.5 - 10.1 mg/dL     _  Result Component Current Result Ref Range   Magnesium 2.3 (3/18/2021) 1.6 - 2.3 mg/dL     _  Result Component Current Result Ref Range   Phosphorus 2.6 (3/18/2021) 2.5 - 4.5 mg/dL     _  Result Component Current Result Ref Range   Albumin 3.6 (3/18/2021) 3.4 - 5.0 g/dL     _  Result Component Current Result Ref Range   Urea Nitrogen 19 (3/18/2021) 7 - 30 mg/dL     _  Result Component Current Result Ref Range   Creatinine 1.50 (H) (3/18/2021) 0.66 - 1.25 mg/dL     _  Result Component Current Result Ref Range   AST 11 (3/18/2021) 0 - 45 U/L     _  Result Component Current Result Ref Range   ALT 25 (3/18/2021) 0 - 70 U/L     _  Result Component Current Result Ref Range   Bilirubin Total 0.6 (3/18/2021) 0.2 - 1.3 mg/dL     _  Result Component Current Result Ref Range   WBC 3.9 (L) (3/18/2021) 4.0 - 11.0 10e9/L     _  Result Component Current Result Ref Range   Hemoglobin 13.1 (L) (3/18/2021) 13.3 - 17.7 g/dL     _  Result Component Current Result Ref Range   Platelet Count 186 (3/18/2021) 150 - 450 10e9/L     _  Result Component Current Result Ref Range   Absolute Neutrophil 1.8 (3/18/2021) 1.6 - 8.3 10e9/L       Assessment & Plan:  No concerning abnormalities. Continue Zelboraf and Cotellic therapy as planned.     Follow-Up:  4/1: labs      Makenzie Carr, PharmD, BCACP  Oral Chemotherapy Monitoring Program  [facility]  [phone]

## 2021-03-18 NOTE — PROGRESS NOTES
Infusion Nursing Note:  Ahmet Coleman presents today for C3D1 Atezolizumab.    Patient seen by provider today: No   present during visit today: Not Applicable.    Note:   Reminded patient about side effect of photosensitivity and to limit sun exposure.    Discussed elevated creat with pharmacists.  Okay to proceed with today's infusion.  Encouraged patient to drink 6-8 glasses of water a day.  Patient admits to probably not drinking enough water a day.    Intravenous Access:  Peripheral IV placed. Patient states he is contemplating getting a port a cath.  Talked with patient about what to expect with a port on the day of surgery, pros (able to draw labs and give infusion through with one needle stick,) cons (risk of infection or clot.)  Patient states he will think about this further and let his oncologist know if he would like to proceed with this.    Treatment Conditions:  Not Applicable.      Post Infusion Assessment:  Patient tolerated infusion without incident.  Blood return noted pre and post infusion.  Site patent and intact, free from redness, edema or discomfort.  No evidence of extravasations.  Access discontinued per protocol.       Discharge Plan:   AVS to patient via MYCHART.  Patient will return 4/1/21 for next appointment.   Patient discharged in stable condition accompanied by: self. Son in law is .  Departure Mode: Ambulatory.    Misty Talavera RN

## 2021-03-23 DIAGNOSIS — C43.4 MALIGNANT MELANOMA OF SCALP (H): ICD-10-CM

## 2021-03-23 DIAGNOSIS — Z15.89 MONOALLELIC MUTATION OF BRAF GENE: Primary | ICD-10-CM

## 2021-03-23 DIAGNOSIS — Z15.09 MONOALLELIC MUTATION OF BRAF GENE: Primary | ICD-10-CM

## 2021-03-25 DIAGNOSIS — C43.4 MALIGNANT MELANOMA OF SCALP (H): Primary | ICD-10-CM

## 2021-03-26 ENCOUNTER — ANCILLARY PROCEDURE (OUTPATIENT)
Dept: PET IMAGING | Facility: CLINIC | Age: 76
End: 2021-03-26
Attending: INTERNAL MEDICINE
Payer: MEDICARE

## 2021-03-26 DIAGNOSIS — C43.4 MALIGNANT MELANOMA OF SCALP (H): ICD-10-CM

## 2021-03-26 LAB
CREAT BLD-MCNC: 1.7 MG/DL (ref 0.66–1.25)
GFR SERPL CREATININE-BSD FRML MDRD: 40 ML/MIN/{1.73_M2}

## 2021-03-26 PROCEDURE — G1004 CDSM NDSC: HCPCS

## 2021-03-26 PROCEDURE — 78816 PET IMAGE W/CT FULL BODY: CPT | Mod: MG

## 2021-03-26 PROCEDURE — A9552 F18 FDG: HCPCS

## 2021-03-26 PROCEDURE — 71250 CT THORAX DX C-: CPT | Mod: 59

## 2021-03-26 PROCEDURE — 74176 CT ABD & PELVIS W/O CONTRAST: CPT | Mod: 59

## 2021-03-26 RX ORDER — IOPAMIDOL 755 MG/ML
10-135 INJECTION, SOLUTION INTRAVASCULAR ONCE
Status: DISCONTINUED | OUTPATIENT
Start: 2021-03-26 | End: 2021-03-26 | Stop reason: CLARIF

## 2021-04-01 ENCOUNTER — INFUSION THERAPY VISIT (OUTPATIENT)
Dept: INFUSION THERAPY | Facility: CLINIC | Age: 76
End: 2021-04-01
Payer: MEDICARE

## 2021-04-01 VITALS
HEIGHT: 69 IN | TEMPERATURE: 98 F | DIASTOLIC BLOOD PRESSURE: 74 MMHG | WEIGHT: 188.2 LBS | HEART RATE: 75 BPM | OXYGEN SATURATION: 95 % | SYSTOLIC BLOOD PRESSURE: 112 MMHG | BODY MASS INDEX: 27.88 KG/M2 | RESPIRATION RATE: 16 BRPM

## 2021-04-01 DIAGNOSIS — C43.4 MALIGNANT MELANOMA OF SCALP (H): ICD-10-CM

## 2021-04-01 DIAGNOSIS — Z15.89 MONOALLELIC MUTATION OF BRAF GENE: Primary | ICD-10-CM

## 2021-04-01 DIAGNOSIS — C43.4 MALIGNANT MELANOMA OF SCALP (H): Primary | ICD-10-CM

## 2021-04-01 DIAGNOSIS — Z15.09 MONOALLELIC MUTATION OF BRAF GENE: Primary | ICD-10-CM

## 2021-04-01 LAB
BASOPHILS # BLD AUTO: 0 10E9/L (ref 0–0.2)
BASOPHILS NFR BLD AUTO: 1 %
DIFFERENTIAL METHOD BLD: ABNORMAL
EOSINOPHIL # BLD AUTO: 0.1 10E9/L (ref 0–0.7)
EOSINOPHIL NFR BLD AUTO: 4.6 %
ERYTHROCYTE [DISTWIDTH] IN BLOOD BY AUTOMATED COUNT: 13 % (ref 10–15)
HCT VFR BLD AUTO: 39.3 % (ref 40–53)
HGB BLD-MCNC: 13.4 G/DL (ref 13.3–17.7)
IMM GRANULOCYTES # BLD: 0 10E9/L (ref 0–0.4)
IMM GRANULOCYTES NFR BLD: 0 %
LYMPHOCYTES # BLD AUTO: 1 10E9/L (ref 0.8–5.3)
LYMPHOCYTES NFR BLD AUTO: 32.7 %
MCH RBC QN AUTO: 29.1 PG (ref 26.5–33)
MCHC RBC AUTO-ENTMCNC: 34.1 G/DL (ref 31.5–36.5)
MCV RBC AUTO: 85 FL (ref 78–100)
MONOCYTES # BLD AUTO: 0.5 10E9/L (ref 0–1.3)
MONOCYTES NFR BLD AUTO: 15 %
NEUTROPHILS # BLD AUTO: 1.4 10E9/L (ref 1.6–8.3)
NEUTROPHILS NFR BLD AUTO: 46.7 %
PHOSPHATE SERPL-MCNC: 2.7 MG/DL (ref 2.5–4.5)
PLATELET # BLD AUTO: 180 10E9/L (ref 150–450)
RBC # BLD AUTO: 4.61 10E12/L (ref 4.4–5.9)
TSH SERPL DL<=0.005 MIU/L-ACNC: 1.6 MU/L (ref 0.4–4)
WBC # BLD AUTO: 3.1 10E9/L (ref 4–11)

## 2021-04-01 PROCEDURE — 36415 COLL VENOUS BLD VENIPUNCTURE: CPT | Performed by: PHYSICIAN ASSISTANT

## 2021-04-01 PROCEDURE — 84443 ASSAY THYROID STIM HORMONE: CPT | Performed by: PHYSICIAN ASSISTANT

## 2021-04-01 PROCEDURE — 99207 PR NO CHARGE NURSE ONLY: CPT

## 2021-04-01 PROCEDURE — 99207 PR NO CHARGE LOS: CPT

## 2021-04-01 PROCEDURE — 96413 CHEMO IV INFUSION 1 HR: CPT | Performed by: NURSE PRACTITIONER

## 2021-04-01 PROCEDURE — 84100 ASSAY OF PHOSPHORUS: CPT | Performed by: PHYSICIAN ASSISTANT

## 2021-04-01 PROCEDURE — 85025 COMPLETE CBC W/AUTO DIFF WBC: CPT | Performed by: PHYSICIAN ASSISTANT

## 2021-04-01 ASSESSMENT — PAIN SCALES - GENERAL: PAINLEVEL: MILD PAIN (2)

## 2021-04-01 ASSESSMENT — MIFFLIN-ST. JEOR: SCORE: 1584.05

## 2021-04-01 NOTE — PROGRESS NOTES
Infusion Nursing Note:  Ahmet Coleman presents today for C4D1 Tecentriq.    Patient seen by provider today: No   present during visit today: Not Applicable.    Note: N/A.  Patient did meet criteria for an asymptomatic covid-19 PCR test in infusion today. Patient declined the covid-19 test.    Intravenous Access:  Peripheral IV placed.    Treatment Conditions:  Not Applicable.    Post Infusion Assessment:  Patient tolerated infusion without incident.  Blood return noted pre and post infusion.  Site patent and intact, free from redness, edema or discomfort.  No evidence of extravasations.  Access discontinued per protocol.       Discharge Plan:   Patient will return 4/15/2021 for next appointment.   Patient discharged in stable condition accompanied by: self.  Departure Mode: Ambulatory.    Elaine Sheikh RN-BSN, PHN, OCN  [facility]

## 2021-04-01 NOTE — NURSING NOTE
Patient here today for an EKG.  EKG completed and sent to Cardiology to be read.    Edith Merrill CMA

## 2021-04-01 NOTE — PROGRESS NOTES
"Infusion Nursing Note:  Ahmet Coleman presents today for ***.    Patient seen by provider today: {YES (EXPLAIN)/NO:266931}   present during visit today: {UMANGUAGE:571768}    Note: {Not Applicable or free text:551444:s:\"N/A\"}.  Patient *** did/did not meet criteria for an asymptomatic covid-19 PCR test in infusion today. Patient *** accepted/declined the covid-19 test.    Intravenous Access:  {UMHIVACCESS:835419}    Treatment Conditions:  {UMHTXCONDITIONS:614763}    Post Infusion Assessment:  {UMHPOSTINFUSION:769736}       Discharge Plan:   Patient will return *** for next appointment.   Patient discharged in stable condition accompanied by: {umhindividuals:122841}.  Departure Mode: {umeparture:810478}.    Elaine Sheikh RN-BSN, PHN, OCN  [facility]  "

## 2021-04-02 ENCOUNTER — ONCOLOGY VISIT (OUTPATIENT)
Dept: ONCOLOGY | Facility: CLINIC | Age: 76
End: 2021-04-02
Attending: INTERNAL MEDICINE
Payer: MEDICARE

## 2021-04-02 VITALS
BODY MASS INDEX: 27.81 KG/M2 | TEMPERATURE: 98.5 F | SYSTOLIC BLOOD PRESSURE: 137 MMHG | WEIGHT: 188.3 LBS | HEART RATE: 80 BPM | DIASTOLIC BLOOD PRESSURE: 83 MMHG | OXYGEN SATURATION: 97 %

## 2021-04-02 DIAGNOSIS — Z15.89 MONOALLELIC MUTATION OF BRAF GENE: Primary | ICD-10-CM

## 2021-04-02 DIAGNOSIS — C43.4 MALIGNANT MELANOMA OF SCALP (H): ICD-10-CM

## 2021-04-02 DIAGNOSIS — Z15.09 MONOALLELIC MUTATION OF BRAF GENE: Primary | ICD-10-CM

## 2021-04-02 PROCEDURE — 99214 OFFICE O/P EST MOD 30 MIN: CPT | Performed by: INTERNAL MEDICINE

## 2021-04-02 PROCEDURE — G0463 HOSPITAL OUTPT CLINIC VISIT: HCPCS

## 2021-04-02 ASSESSMENT — PAIN SCALES - GENERAL: PAINLEVEL: NO PAIN (0)

## 2021-04-02 NOTE — LETTER
4/2/2021         RE: Ahmet Coleman  [address] [place] [id number]        Dear Colleague,    Thank you for referring your patient, Ahmet Coleman, to the [facility]. Please see a copy of my visit note below.    MEDICAL ONCOLOGY PROGRESS NOTE  Melanoma Clinic  Apr 2, 2021    CHIEF COMPLAINT: Scalp melanoma    Oncologic History:  1. The lesion is present for over 30 years, but was much smaller. Biopsied in 1992 and benign.  2. October 2020, he presented to [facility] with 1 year of progressive enlargement of the lesion and new onset burning, itching, and stinging sensation in the affected scalp.  3. 10/26/20, seen at [facility] and he had shave biopsies of A. left central parietal scalp, B. left central occipital scalp, C. Left posterior parietal scalp, and D. punch biopsy of the left superior occipital scalp on . Pathology (specimen: [id number]) showed a nodular and nevoid type melanoma, non-ulcerated, Breslow depth up to 1.3 mm, Saurabh's level IV, and up to 3 mitoses per mm2. All margins involved. Ki-67 10-20%. Sox10 stain positive and highlights an atypical compound melanocytic proliferation with significant overlying confluence and pagetosis of intraepidermal melanocytes. T-stage: at least pT2a. PD-L1 staining shows PD-L1 TPS <1%. 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  4. 11/25/20, seen by Dr. Garcia. He took three 3mm punch biopsies, which returned as dermal melanocytic proliferation with melanophages and fibrosis, consistent with locoregional metastatic melanoma.  5. 12/8/2020, PET-CT shows FDG avid irregular skin thickening overlying the left parietal region, with no FDG avid lymphadenopathy in the neck and no evidence of metastasis elsewhere in the body.  6. 1/22/2021 started vemurafenib and cobimetinib, ~2/12/21 held for diarrhea. Restarted 2/18/21 at reduced dose of 40 mg Cotellic daily. Added Atezolizumab 2/18/21.      HISTORY OF PRESENT ILLNESS  Ahmet Coleman is a 76 year old  with agent orange skin exposures in [place]. He is referred by Dr. Garcia. He started vemurafenib and cobimetinib in January, and we added in atezolizumab in February. He is currently midway through cycle 3.    Pain and tenderness in the tumor has improved. He and family can also tell it has improved somewhat. On vem 720 bid/jackson 40 qday he is still noticing some side effects, but they are more manageable. No major additional toxicity noticeable after addition of the atezolizumab except fatigue.    He is taking 2 Lomitils every few days to slow down his diarrhea, and he has a loose stool every 1-2 days. In general, food does not taste good, and all that seems good is sweet in taste, so he has a lot of grape jelly and toast. He still enjoys apple cider, apple sauce, rice pudding and other sweets, too. He has supper a few times a week, and he has maintained his weight.    Since starting treatment with these medications he notes an increase in depressive and sad thoughts. He does not have thoughts of hurting himself or others. He is taking Trintellix and Trazodone. He notes intermittent muscle pains have worsened, whereas joint pains are about the same as they have been chronically. He is able to tend to ADLs and no concerns there. He denies any vision changes, rashes, shortness of breath, chest pain, nausea, vomiting, fevers or chills.    He has been getting EKGs and last EKG from 3/5/21 was stable, QTc 438. Most recent labs shows TSH of 1.6. WBC 3.1 with ANC 1.4, hemoglobin 13.4, and platelet count of 180.    No results found for any visits on 04/02/21.      REVIEW OF SYSTEMS  A 12-point ROS negative except as in HPI    Current Outpatient Medications   Medication Sig Dispense Refill     acetaminophen (TYLENOL) 325 MG tablet TAKE TWO TABLETS BY MOUTH THREE TIMES A DAY AS NEEDED       cholecalciferol (VITAMIN D3) 1000 UNIT tablet Take  by  mouth daily.       cobimetinib (COTELLIC) 20 MG tablet Take 2 tablets (40 mg) by mouth daily for 21 days Take on Days 1 through 21 of each 28 day cycle. 42 tablet 0     diphenoxylate-atropine (LOMOTIL) 2.5-0.025 MG tablet Take 1-2 tablets by mouth 4 times daily as needed for diarrhea 120 tablet 5     fluvoxaMINE (LUVOX) 100 MG tablet Take 150 mg at bedtime       hydrochlorothiazide (HYDRODIURIL) 25 MG tablet Take 25 mg by mouth daily.       Hypromellose (ARTIFICIAL TEARS OP) Apply  to eye. 2 drops in each eye twice a day as needed       Lactobacillus-Inulin (CULTURELLE DIGESTIVE HEALTH) CAPS 1 tab daily 30 capsule 1     lisinopril (PRINIVIL,ZESTRIL) 10 MG tablet Take 10 mg by mouth daily.       Magnesium Oxide 420 MG TABS Take 420 mg by mouth daily       omeprazole (PRILOSEC) 20 MG capsule Take 1 capsule by mouth 2 times daily. 60 capsule prn     potassium phosphate, monobasic, (K-PHOS) 500 MG tablet Take 1 tablet (500 mg) by mouth 2 times daily 60 tablet 3     prochlorperazine (COMPAZINE) 10 MG tablet Take 1 tablet (10 mg) by mouth every 6 hours as needed (Nausea/Vomiting) 30 tablet 2     QUEtiapine (SEROQUEL) 25 MG tablet 1/2 tablet nightly       simvastatin (ZOCOR) 20 MG tablet Take  by mouth At Bedtime.       TRAZodone (DESYREL) 50 MG tablet Take 50 mg by mouth At Bedtime.       vemurafenib (ZELBORAF) 240 MG TABS tablet Take 3 tablets (720 mg) by mouth every 12 hours for 28 days 168 tablet 0     vortioxetine (TRINTELLIX) 20 MG tablet TAKE ONE TABLET BY MOUTH EVERY MORNING         Allergies   Allergen Reactions     Aspirin      Motrin [Ibuprofen Micronized]      And ASA  Cramps  diarrhea     Immunization History   Administered Date(s) Administered     Flu, Unspecified 11/05/1996, 09/07/2012, 10/27/2015     Influenza (High Dose) 3 valent vaccine 10/01/2014     Influenza (IIV3) PF 10/24/2012     Pneumo Conj 13-V (2010&after) 02/01/2016, 02/01/2018     Pneumococcal 23 valent 04/07/2015     Pneumococcal, Unspecified  2015     TDAP Vaccine (Adacel) 2011     Tdap (Adult) Unspecified Formulation 2005, 2013     Zoster vaccine, live 2012       Past Medical History:   Diagnosis Date     Anxiety      Benign hypertension 2013     Cervicalgia      Depressive disorder, not elsewhere classified      Diverticulitis of colon 7/10/2013     Esophageal reflux      Irritable bowel syndrome 10/3/2003     Lumbago      Malignant melanoma of scalp (H) 2020     Migraine, unspecified, without mention of intractable migraine without mention of status migrainosus      Other kyphoscoliosis and scoliosis      Other specified gastritis without mention of hemorrhage      PTSD (post-traumatic stress disorder)      Tobacco abuse since 1965    on and off       Past Surgical History:   Procedure Laterality Date     C APPENDECTOMY       COLONOSCOPY N/A 2016    Procedure: COMBINED COLONOSCOPY, SINGLE OR MULTIPLE BIOPSY/POLYPECTOMY BY BIOPSY;  Surgeon: Mahesh Amanda MD;  Location: [facility]     ESOPHAGOSCOPY, GASTROSCOPY, DUODENOSCOPY (EGD), COMBINED  2011    Procedure:COMBINED ESOPHAGOSCOPY, GASTROSCOPY, DUODENOSCOPY (EGD), BIOPSY SINGLE OR MULTIPLE; Esophagogastroduodenoscopy with multiple Biopsy's; Surgeon:MARINA DYE; Location:[facility]     ZZC NONSPECIFIC PROCEDURE      nasal septal fx and a devitation needing plastic surgery       SOCIAL HISTORY  He is a former smoker and quit in . Baseline PTSD from [place] War.  History   Smoking Status     Former Smoker     Packs/day: 0.50     Years: 45.00     Types: Cigarettes     Quit date: 2013   Smokeless Tobacco     Never Used     Comment: since , on and off in there 1/2 pack per day    Social History    Substance and Sexual Activity      Alcohol use: No     History   Drug Use     Types: Marijuana     Comment: Marijuana       FAMILY HISTORY  No family history of melanoma. Father had BCC and prostate cancer, and  at 96 yo from heart failure. Mother is  alive, currently 92 yo.     Family History   Problem Relation Age of Onset     Diabetes Father      Heart Disease Brother        PHYSICAL EXAMINATION  /83   Pulse 80   Temp 98.5  F (36.9  C) (Oral)   Wt 85.4 kg (188 lb 4.8 oz)   SpO2 97%   BMI 27.81 kg/m    Wt Readings from Last 2 Encounters:   04/02/21 85.4 kg (188 lb 4.8 oz)   04/01/21 85.4 kg (188 lb 3.2 oz)   GENERAL: Healthy, alert and no distress  EYES: Eyes grossly normal to inspection.  No discharge or erythema, or obvious scleral/conjunctival abnormalities.  HENT: The large mid-scalp pigmented lesion extends from right of midline to the left side of the head, roughly measuring 10 x 5 cm, though a smaller separate satellite lesion measures 3 x 1.5 cm in size in the left temporal scalp. Overall dimensions are unchanged. Left ear pinna lesion measures 2mm, possible in-transit metastasis. Otherwise, external ears, nose and mouth without ulcers or lesions.  No nasal drainage visible.  NECK: No asymmetry, visible masses or scars  RESP: No audible wheeze, cough, or visible cyanosis.  No visible retractions or increased work of breathing.    CV: regular rates and rhythm, normal S1 S2, no S3 or S4, no murmur, click or rub and no peripheral edema  MS: No gross musculoskeletal defects noted.  Normal range of motion.  No visible edema.  NEURO: Cranial nerves grossly intact.  Mentation and speech appropriate for age.  PSYCH: Mentation appears normal, affect normal/bright, judgement and insight intact, normal speech and appearance well-groomed.    LABS  Orders Only on 04/01/2021   Component Date Value Ref Range Status     WBC 04/01/2021 3.1* 4.0 - 11.0 10e9/L Final     RBC Count 04/01/2021 4.61  4.4 - 5.9 10e12/L Final     Hemoglobin 04/01/2021 13.4  13.3 - 17.7 g/dL Final     Hematocrit 04/01/2021 39.3* 40.0 - 53.0 % Final     MCV 04/01/2021 85  78 - 100 fl Final     MCH 04/01/2021 29.1  26.5 - 33.0 pg Final     MCHC 04/01/2021 34.1  31.5 - 36.5 g/dL Final      RDW 04/01/2021 13.0  10.0 - 15.0 % Final     Platelet Count 04/01/2021 180  150 - 450 10e9/L Final     Diff Method 04/01/2021 Automated Method   Final     % Neutrophils 04/01/2021 46.7  % Final     % Lymphocytes 04/01/2021 32.7  % Final     % Monocytes 04/01/2021 15.0  % Final     % Eosinophils 04/01/2021 4.6  % Final     % Basophils 04/01/2021 1.0  % Final     % Immature Granulocytes 04/01/2021 0.0  % Final     Absolute Neutrophil 04/01/2021 1.4* 1.6 - 8.3 10e9/L Final     Absolute Lymphocytes 04/01/2021 1.0  0.8 - 5.3 10e9/L Final     Absolute Monocytes 04/01/2021 0.5  0.0 - 1.3 10e9/L Final     Absolute Eosinophils 04/01/2021 0.1  0.0 - 0.7 10e9/L Final     Absolute Basophils 04/01/2021 0.0  0.0 - 0.2 10e9/L Final     Abs Immature Granulocytes 04/01/2021 0.0  0 - 0.4 10e9/L Final     TSH 04/01/2021 1.60  0.40 - 4.00 mU/L Final     Phosphorus 04/01/2021 2.7  2.5 - 4.5 mg/dL Final         IMAGES        12/21/2020 4/1//2021         ASSESSMENT AND PLAN    #1 Cutaneous melanoma, scalp primary, pT2a cN1c, clinical Stage IIIB    It was a pleasure to meet Mr. Coleman today. He is a 75 year old  with agent orange cutaneous exposures in the [place] war and a diagnosis of malignant melanoma arising from a pre-existing pigmented lesion on the scalp. He has clinical evidence of satellitosis with separation of a left temporal satellite from the main tumor mass, and possibly in-transit metastasis to the helix of the left ear.     He started treatment with vemurafenib and cobimetinib plus atezolizumab. He is currently C3D16, and he has had a partial response to therapy with decrease in thickening of the large scalp tumor and lightening of the periphery and in the middle part of the tumor. Overall dimensions, however, are currently about the same.     I will send a message to Dr. Garcia to gauge resectability now versus after some additional time on therapy. I will plan to keep him on triplet therapy  neoadjuvantly, but de-escalate to atezolizumab monotherapy after surgery for the balance of a year of therapy.    #2 Diarrhea, grade 1, likely due to cobimetinib  #3 History of IBS  This has improved from severe to grade 1 diarrhea with dose reductions and use of Imodium. Possible contribution from IBS.  -continue Lomotil as needed    #4 Neutropenia, mild, ANC 1400  Monitor, and consider dose reducing vemurafenib to 480 mg bid for any additional decrease in neutrophil counts.    Multiple questions answered.    Oksana Peañloza M.D.   of Medicine  Hematology, Oncology and Transplantation      Again, thank you for allowing me to participate in the care of your patient.      Sincerely,    Oksana Hernández MD

## 2021-04-02 NOTE — PROGRESS NOTES
MEDICAL ONCOLOGY PROGRESS NOTE  Melanoma Clinic  Apr 2, 2021    CHIEF COMPLAINT: Scalp melanoma    Oncologic History:  1. The lesion is present for over 30 years, but was much smaller. Biopsied in 1992 and benign.  2. October 2020, he presented to [facility] with 1 year of progressive enlargement of the lesion and new onset burning, itching, and stinging sensation in the affected scalp.  3. 10/26/20, seen at [facility] and he had shave biopsies of A. left central parietal scalp, B. left central occipital scalp, C. Left posterior parietal scalp, and D. punch biopsy of the left superior occipital scalp on . Pathology (specimen: [id number]) showed a nodular and nevoid type melanoma, non-ulcerated, Breslow depth up to 1.3 mm, Saurabh's level IV, and up to 3 mitoses per mm2. All margins involved. Ki-67 10-20%. Sox10 stain positive and highlights an atypical compound melanocytic proliferation with significant overlying confluence and pagetosis of intraepidermal melanocytes. T-stage: at least pT2a. PD-L1 staining shows PD-L1 TPS <1%. 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  4. 11/25/20, seen by Dr. Garcia. He took three 3mm punch biopsies, which returned as dermal melanocytic proliferation with melanophages and fibrosis, consistent with locoregional metastatic melanoma.  5. 12/8/2020, PET-CT shows FDG avid irregular skin thickening overlying the left parietal region, with no FDG avid lymphadenopathy in the neck and no evidence of metastasis elsewhere in the body.  6. 1/22/2021 started vemurafenib and cobimetinib, ~2/12/21 held for diarrhea. Restarted 2/18/21 at reduced dose of 40 mg Cotellic daily. Added Atezolizumab 2/18/21.     HISTORY OF PRESENT ILLNESS  Ahmet Coleman is a 76 year old  with agent orange skin exposures in [place]. He is referred by Dr. Garcia. He started vemurafenib and cobimetinib in January, and we added in atezolizumab in February. He is currently midway  through cycle 3.    Pain and tenderness in the tumor has improved. He and family can also tell it has improved somewhat. On vem 720 bid/jackson 40 qday he is still noticing some side effects, but they are more manageable. No major additional toxicity noticeable after addition of the atezolizumab except fatigue.    He is taking 2 Lomitils every few days to slow down his diarrhea, and he has a loose stool every 1-2 days. In general, food does not taste good, and all that seems good is sweet in taste, so he has a lot of grape jelly and toast. He still enjoys apple cider, apple sauce, rice pudding and other sweets, too. He has supper a few times a week, and he has maintained his weight.    Since starting treatment with these medications he notes an increase in depressive and sad thoughts. He does not have thoughts of hurting himself or others. He is taking Trintellix and Trazodone. He notes intermittent muscle pains have worsened, whereas joint pains are about the same as they have been chronically. He is able to tend to ADLs and no concerns there. He denies any vision changes, rashes, shortness of breath, chest pain, nausea, vomiting, fevers or chills.    He has been getting EKGs and last EKG from 3/5/21 was stable, QTc 438. Most recent labs shows TSH of 1.6. WBC 3.1 with ANC 1.4, hemoglobin 13.4, and platelet count of 180.    No results found for any visits on 04/02/21.      REVIEW OF SYSTEMS  A 12-point ROS negative except as in HPI    Current Outpatient Medications   Medication Sig Dispense Refill     acetaminophen (TYLENOL) 325 MG tablet TAKE TWO TABLETS BY MOUTH THREE TIMES A DAY AS NEEDED       cholecalciferol (VITAMIN D3) 1000 UNIT tablet Take  by mouth daily.       cobimetinib (COTELLIC) 20 MG tablet Take 2 tablets (40 mg) by mouth daily for 21 days Take on Days 1 through 21 of each 28 day cycle. 42 tablet 0     diphenoxylate-atropine (LOMOTIL) 2.5-0.025 MG tablet Take 1-2 tablets by mouth 4 times daily as needed  for diarrhea 120 tablet 5     fluvoxaMINE (LUVOX) 100 MG tablet Take 150 mg at bedtime       hydrochlorothiazide (HYDRODIURIL) 25 MG tablet Take 25 mg by mouth daily.       Hypromellose (ARTIFICIAL TEARS OP) Apply  to eye. 2 drops in each eye twice a day as needed       Lactobacillus-Inulin ([facility] DIGESTIVE [facility]) CAPS 1 tab daily 30 capsule 1     lisinopril (PRINIVIL,ZESTRIL) 10 MG tablet Take 10 mg by mouth daily.       Magnesium Oxide 420 MG TABS Take 420 mg by mouth daily       omeprazole (PRILOSEC) 20 MG capsule Take 1 capsule by mouth 2 times daily. 60 capsule prn     potassium phosphate, monobasic, (K-PHOS) 500 MG tablet Take 1 tablet (500 mg) by mouth 2 times daily 60 tablet 3     prochlorperazine (COMPAZINE) 10 MG tablet Take 1 tablet (10 mg) by mouth every 6 hours as needed (Nausea/Vomiting) 30 tablet 2     QUEtiapine (SEROQUEL) 25 MG tablet 1/2 tablet nightly       simvastatin (ZOCOR) 20 MG tablet Take  by mouth At Bedtime.       TRAZodone (DESYREL) 50 MG tablet Take 50 mg by mouth At Bedtime.       vemurafenib (ZELBORAF) 240 MG TABS tablet Take 3 tablets (720 mg) by mouth every 12 hours for 28 days 168 tablet 0     vortioxetine (TRINTELLIX) 20 MG tablet TAKE ONE TABLET BY MOUTH EVERY MORNING         Allergies   Allergen Reactions     Aspirin      Motrin [Ibuprofen Micronized]      And ASA  Cramps  diarrhea     Immunization History   Administered Date(s) Administered     Flu, Unspecified 11/05/1996, 09/07/2012, 10/27/2015     Influenza (High Dose) 3 valent vaccine 10/01/2014     Influenza (IIV3) PF 10/24/2012     Pneumo Conj 13-V (2010&after) 02/01/2016, 02/01/2018     Pneumococcal 23 valent 04/07/2015     Pneumococcal, Unspecified 04/07/2015     TDAP Vaccine (Adacel) 07/29/2011     Tdap (Adult) Unspecified Formulation 04/08/2005, 05/08/2013     Zoster vaccine, live 01/17/2012       Past Medical History:   Diagnosis Date     Anxiety      Benign hypertension 12/11/2013     Cervicalgia      Depressive  disorder, not elsewhere classified      Diverticulitis of colon 7/10/2013     Esophageal reflux      Irritable bowel syndrome 10/3/2003     Lumbago      Malignant melanoma of scalp (H) 2020     Migraine, unspecified, without mention of intractable migraine without mention of status migrainosus      Other kyphoscoliosis and scoliosis      Other specified gastritis without mention of hemorrhage      PTSD (post-traumatic stress disorder)      Tobacco abuse since 1965    on and off       Past Surgical History:   Procedure Laterality Date     C APPENDECTOMY       COLONOSCOPY N/A 2016    Procedure: COMBINED COLONOSCOPY, SINGLE OR MULTIPLE BIOPSY/POLYPECTOMY BY BIOPSY;  Surgeon: Mahesh Amanda MD;  Location: [facility]     ESOPHAGOSCOPY, GASTROSCOPY, DUODENOSCOPY (EGD), COMBINED  2011    Procedure:COMBINED ESOPHAGOSCOPY, GASTROSCOPY, DUODENOSCOPY (EGD), BIOPSY SINGLE OR MULTIPLE; Esophagogastroduodenoscopy with multiple Biopsy's; Surgeon:MARINA DYE; Location:[facility]     ZZC NONSPECIFIC PROCEDURE      nasal septal fx and a devitation needing plastic surgery       SOCIAL HISTORY  He is a former smoker and quit in . Baseline PTSD from [place].  History   Smoking Status     Former Smoker     Packs/day: 0.50     Years: 45.00     Types: Cigarettes     Quit date: 2013   Smokeless Tobacco     Never Used     Comment: since , on and off in there 1/2 pack per day    Social History    Substance and Sexual Activity      Alcohol use: No     History   Drug Use     Types: Marijuana     Comment: Marijuana       FAMILY HISTORY  No family history of melanoma. Father had BCC and prostate cancer, and  at 94 yo from heart failure. Mother is alive, currently 92 yo.     Family History   Problem Relation Age of Onset     Diabetes Father      Heart Disease Brother        PHYSICAL EXAMINATION  /83   Pulse 80   Temp 98.5  F (36.9  C) (Oral)   Wt 85.4 kg (188 lb 4.8 oz)   SpO2 97%   BMI 27.81 kg/m    Wt  Readings from Last 2 Encounters:   04/02/21 85.4 kg (188 lb 4.8 oz)   04/01/21 85.4 kg (188 lb 3.2 oz)   GENERAL: Healthy, alert and no distress  EYES: Eyes grossly normal to inspection.  No discharge or erythema, or obvious scleral/conjunctival abnormalities.  HENT: The large mid-scalp pigmented lesion extends from right of midline to the left side of the head, roughly measuring 10 x 5 cm, though a smaller separate satellite lesion measures 3 x 1.5 cm in size in the left temporal scalp. Overall dimensions are unchanged. Left ear pinna lesion measures 2mm, possible in-transit metastasis. Otherwise, external ears, nose and mouth without ulcers or lesions.  No nasal drainage visible.  NECK: No asymmetry, visible masses or scars  RESP: No audible wheeze, cough, or visible cyanosis.  No visible retractions or increased work of breathing.    CV: regular rates and rhythm, normal S1 S2, no S3 or S4, no murmur, click or rub and no peripheral edema  MS: No gross musculoskeletal defects noted.  Normal range of motion.  No visible edema.  NEURO: Cranial nerves grossly intact.  Mentation and speech appropriate for age.  PSYCH: Mentation appears normal, affect normal/bright, judgement and insight intact, normal speech and appearance well-groomed.    LABS  Orders Only on 04/01/2021   Component Date Value Ref Range Status     WBC 04/01/2021 3.1* 4.0 - 11.0 10e9/L Final     RBC Count 04/01/2021 4.61  4.4 - 5.9 10e12/L Final     Hemoglobin 04/01/2021 13.4  13.3 - 17.7 g/dL Final     Hematocrit 04/01/2021 39.3* 40.0 - 53.0 % Final     MCV 04/01/2021 85  78 - 100 fl Final     MCH 04/01/2021 29.1  26.5 - 33.0 pg Final     MCHC 04/01/2021 34.1  31.5 - 36.5 g/dL Final     RDW 04/01/2021 13.0  10.0 - 15.0 % Final     Platelet Count 04/01/2021 180  150 - 450 10e9/L Final     Diff Method 04/01/2021 Automated Method   Final     % Neutrophils 04/01/2021 46.7  % Final     % Lymphocytes 04/01/2021 32.7  % Final     % Monocytes 04/01/2021 15.0   % Final     % Eosinophils 04/01/2021 4.6  % Final     % Basophils 04/01/2021 1.0  % Final     % Immature Granulocytes 04/01/2021 0.0  % Final     Absolute Neutrophil 04/01/2021 1.4* 1.6 - 8.3 10e9/L Final     Absolute Lymphocytes 04/01/2021 1.0  0.8 - 5.3 10e9/L Final     Absolute Monocytes 04/01/2021 0.5  0.0 - 1.3 10e9/L Final     Absolute Eosinophils 04/01/2021 0.1  0.0 - 0.7 10e9/L Final     Absolute Basophils 04/01/2021 0.0  0.0 - 0.2 10e9/L Final     Abs Immature Granulocytes 04/01/2021 0.0  0 - 0.4 10e9/L Final     TSH 04/01/2021 1.60  0.40 - 4.00 mU/L Final     Phosphorus 04/01/2021 2.7  2.5 - 4.5 mg/dL Final         IMAGES        12/21/2020 4/1//2021         ASSESSMENT AND PLAN    #1 Cutaneous melanoma, scalp primary, pT2a cN1c, clinical Stage IIIB    It was a pleasure to meet Mr. Coleman today. He is a 75 year old  with agent orange cutaneous exposures in the [place] war and a diagnosis of malignant melanoma arising from a pre-existing pigmented lesion on the scalp. He has clinical evidence of satellitosis with separation of a left temporal satellite from the main tumor mass, and possibly in-transit metastasis to the helix of the left ear.     He started treatment with vemurafenib and cobimetinib plus atezolizumab. He is currently C3D16, and he has had a partial response to therapy with decrease in thickening of the large scalp tumor and lightening of the periphery and in the middle part of the tumor. Overall dimensions, however, are currently about the same.     I will send a message to Dr. Garcia to gauge resectability now versus after some additional time on therapy. I will plan to keep him on triplet therapy neoadjuvantly, but de-escalate to atezolizumab monotherapy after surgery for the balance of a year of therapy.    #2 Diarrhea, grade 1, likely due to cobimetinib  #3 History of IBS  This has improved from severe to grade 1 diarrhea with dose reductions and use of Imodium. Possible  contribution from IBS.  -continue Lomotil as needed    #4 Neutropenia, mild, ANC 1400  Monitor, and consider dose reducing vemurafenib to 480 mg bid for any additional decrease in neutrophil counts.    Multiple questions answered.    Oksana Peñaloza M.D.   of Medicine  Hematology, Oncology and Transplantation

## 2021-04-02 NOTE — NURSING NOTE
"Oncology Rooming Note    April 2, 2021 12:57 PM   Ahmet Coleman is a 76 year old male who presents for:    Chief Complaint   Patient presents with     Oncology Clinic Visit     malignant melanoma of scalp     Initial Vitals: /83   Pulse 80   Temp 98.5  F (36.9  C) (Oral)   Wt 85.4 kg (188 lb 4.8 oz)   SpO2 97%   BMI 27.81 kg/m   Estimated body mass index is 27.81 kg/m  as calculated from the following:    Height as of 4/1/21: 1.753 m (5' 9\").    Weight as of this encounter: 85.4 kg (188 lb 4.8 oz). Body surface area is 2.04 meters squared.  No Pain (0) Comment: Data Unavailable   No LMP for male patient.  Allergies reviewed: Yes  Medications reviewed: Yes    Medications: Medication refills not needed today.  Pharmacy name entered into Kuddle:    [id number] - [place], [place] - [address] [facility]  [id number] - [place], [place] - [address] [facility] - [place], [place] - [address] [facility]  [facility] - [place], [place] - [facility]    Clinical concerns: none       Candelaria Birch CMA            "

## 2021-04-11 RX ORDER — MEPERIDINE HYDROCHLORIDE 25 MG/ML
25 INJECTION INTRAMUSCULAR; INTRAVENOUS; SUBCUTANEOUS EVERY 30 MIN PRN
Status: CANCELLED | OUTPATIENT
Start: 2021-04-15

## 2021-04-11 RX ORDER — DIPHENHYDRAMINE HYDROCHLORIDE 50 MG/ML
50 INJECTION INTRAMUSCULAR; INTRAVENOUS
Status: CANCELLED
Start: 2021-04-15

## 2021-04-11 RX ORDER — NALOXONE HYDROCHLORIDE 0.4 MG/ML
.1-.4 INJECTION, SOLUTION INTRAMUSCULAR; INTRAVENOUS; SUBCUTANEOUS
Status: CANCELLED | OUTPATIENT
Start: 2021-04-29

## 2021-04-11 RX ORDER — EPINEPHRINE 1 MG/ML
0.3 INJECTION, SOLUTION INTRAMUSCULAR; SUBCUTANEOUS EVERY 5 MIN PRN
Status: CANCELLED | OUTPATIENT
Start: 2021-04-15

## 2021-04-11 RX ORDER — MEPERIDINE HYDROCHLORIDE 25 MG/ML
25 INJECTION INTRAMUSCULAR; INTRAVENOUS; SUBCUTANEOUS EVERY 30 MIN PRN
Status: CANCELLED | OUTPATIENT
Start: 2021-04-29

## 2021-04-11 RX ORDER — ALBUTEROL SULFATE 0.83 MG/ML
2.5 SOLUTION RESPIRATORY (INHALATION)
Status: CANCELLED | OUTPATIENT
Start: 2021-04-15

## 2021-04-11 RX ORDER — METHYLPREDNISOLONE SODIUM SUCCINATE 125 MG/2ML
125 INJECTION, POWDER, LYOPHILIZED, FOR SOLUTION INTRAMUSCULAR; INTRAVENOUS
Status: CANCELLED
Start: 2021-04-15

## 2021-04-11 RX ORDER — ALBUTEROL SULFATE 90 UG/1
1-2 AEROSOL, METERED RESPIRATORY (INHALATION)
Status: CANCELLED
Start: 2021-04-29

## 2021-04-11 RX ORDER — EPINEPHRINE 1 MG/ML
0.3 INJECTION, SOLUTION INTRAMUSCULAR; SUBCUTANEOUS EVERY 5 MIN PRN
Status: CANCELLED | OUTPATIENT
Start: 2021-04-29

## 2021-04-11 RX ORDER — ALBUTEROL SULFATE 0.83 MG/ML
2.5 SOLUTION RESPIRATORY (INHALATION)
Status: CANCELLED | OUTPATIENT
Start: 2021-04-29

## 2021-04-11 RX ORDER — SODIUM CHLORIDE 9 MG/ML
1000 INJECTION, SOLUTION INTRAVENOUS CONTINUOUS PRN
Status: CANCELLED
Start: 2021-04-15

## 2021-04-11 RX ORDER — NALOXONE HYDROCHLORIDE 0.4 MG/ML
.1-.4 INJECTION, SOLUTION INTRAMUSCULAR; INTRAVENOUS; SUBCUTANEOUS
Status: CANCELLED | OUTPATIENT
Start: 2021-04-15

## 2021-04-11 RX ORDER — SODIUM CHLORIDE 9 MG/ML
1000 INJECTION, SOLUTION INTRAVENOUS CONTINUOUS PRN
Status: CANCELLED
Start: 2021-04-29

## 2021-04-11 RX ORDER — METHYLPREDNISOLONE SODIUM SUCCINATE 125 MG/2ML
125 INJECTION, POWDER, LYOPHILIZED, FOR SOLUTION INTRAMUSCULAR; INTRAVENOUS
Status: CANCELLED
Start: 2021-04-29

## 2021-04-11 RX ORDER — ALBUTEROL SULFATE 90 UG/1
1-2 AEROSOL, METERED RESPIRATORY (INHALATION)
Status: CANCELLED
Start: 2021-04-15

## 2021-04-11 RX ORDER — DIPHENHYDRAMINE HYDROCHLORIDE 50 MG/ML
50 INJECTION INTRAMUSCULAR; INTRAVENOUS
Status: CANCELLED
Start: 2021-04-29

## 2021-04-13 NOTE — PROGRESS NOTES
MEDICAL ONCOLOGY CONSULT  Melanoma Clinic  Apr 14, 2021    CHIEF COMPLAINT: Scalp melanoma    Melanoma History:  1. He has a small pigmented scalp lesion present for over 30 years. The lesion was biopsied in 1992 and was benign.  2. October 2020, he presented to [facility] with 1 year of progressive enlargement of the lesion and new onset burning, itching, and stinging sensation in the affected scalp.  3. 10/26/20, He was seen at [facility] and he had shave biopsies of A. left central parietal scalp, B. left central occipital scalp, C. Left posterior parietal scalp, and D. punch biopsy of the left superior occipital scalp. Pathology (specimen: [id number]) showed a nodular and nevoid type melanoma, non-ulcerated, Breslow depth up to 1.3 mm, Saurabh's level IV, and up to 3 mitoses per mm2. All margins were involved. Ki-67 10-20%. Sox10 stain is positive and highlights an atypical compound melanocytic proliferation with significant overlying confluence and pagetosis of intraepidermal melanocytes. T-stage: at least pT2a. PD-L1 staining shows PD-L1 TPS <1%. 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  4. 11/25/20, he was seen by Dr. Garcia and he took three 3mm punch biopsies, which returned as dermal melanocytic proliferations with melanophages and fibrosis, consistent with locoregional metastatic melanoma.  5. 12/8/2020 he had PET-CT, which showed FDG avid irregular skin thickening overlying the left parietal region, with no FDG avid lymphadenopathy in the neck and no evidence of metastasis elsewhere in the body.  6. 1/22/2021 start vemurafenib and cobimetinib, ~2/12/21 held for diarrhea.    INTERVAL HISTORY  Patient reports that he received his first Covid vaccine last Saturday.  He did develop headache and eye pain since yesterday that improved with Tylenol.  He has also noticed some light sensitivity.  He denies a history of migraine headaches.  He has stools about every other day that  are loose and managed with Lomotil.  He reports eating and drinking okay.  He does have some dry mouth that he manages with drinking water.  He reports his energy is fair and he is taking occasional naps.  He reports a normal blood pressure at home around 120s over 70s.  He has been taking has phosphorus supplement.  He has not been going for walks recently.  He denies other concerns.    Review of Systems:  Patient denies any of the following except if noted above: fevers, chills, vision or hearing changes, chest pain, dyspnea, abdominal pain, nausea, vomiting, constipation, urinary concerns, numbness, tingling, issues with sleep or mood. He also denies rashes or new skin lesions, bleeding or bruising issues.    Current Outpatient Medications   Medication Sig Dispense Refill     acetaminophen (TYLENOL) 325 MG tablet TAKE TWO TABLETS BY MOUTH THREE TIMES A DAY AS NEEDED       cholecalciferol (VITAMIN D3) 1000 UNIT tablet Take  by mouth daily.       cobimetinib (COTELLIC) 20 MG tablet Take 2 tablets (40 mg) by mouth daily for 21 days Take on Days 1 through 21 of each 28 day cycle. 42 tablet 0     diphenoxylate-atropine (LOMOTIL) 2.5-0.025 MG tablet Take 1-2 tablets by mouth 4 times daily as needed for diarrhea 120 tablet 5     fluvoxaMINE (LUVOX) 100 MG tablet Take 150 mg at bedtime       hydrochlorothiazide (HYDRODIURIL) 25 MG tablet Take 25 mg by mouth daily.       Hypromellose (ARTIFICIAL TEARS OP) Apply  to eye. 2 drops in each eye twice a day as needed       Lactobacillus-Inulin ([facility] DIG[facility]) CAPS 1 tab daily 30 capsule 1     lisinopril (PRINIVIL,ZESTRIL) 10 MG tablet Take 10 mg by mouth daily.       Magnesium Oxide 420 MG TABS Take 420 mg by mouth daily       omeprazole (PRILOSEC) 20 MG capsule Take 1 capsule by mouth 2 times daily. 60 capsule prn     potassium phosphate, monobasic, (K-PHOS) 500 MG tablet Take 1 tablet (500 mg) by mouth 2 times daily 60 tablet 3     prochlorperazine (COMPAZINE)  10 MG tablet Take 1 tablet (10 mg) by mouth every 6 hours as needed (Nausea/Vomiting) 30 tablet 2     QUEtiapine (SEROQUEL) 25 MG tablet 1/2 tablet nightly       simvastatin (ZOCOR) 20 MG tablet Take  by mouth At Bedtime.       TRAZodone (DESYREL) 50 MG tablet Take 50 mg by mouth At Bedtime.       vemurafenib (ZELBORAF) 240 MG TABS tablet Take 3 tablets (720 mg) by mouth every 12 hours for 28 days 168 tablet 0     vortioxetine (TRINTELLIX) 20 MG tablet TAKE ONE TABLET BY MOUTH EVERY MORNING       Physical Exam:  General: The patient is a pleasant male in no acute distress.  /78   Pulse 57   Temp 98.1  F (36.7  C) (Oral)   Resp 16   Wt 86.2 kg (190 lb)   SpO2 96%   BMI 28.06 kg/m    Wt Readings from Last 10 Encounters:   04/15/21 85.7 kg (188 lb 14.4 oz)   04/14/21 86.2 kg (190 lb)   04/02/21 85.4 kg (188 lb 4.8 oz)   04/01/21 85.4 kg (188 lb 3.2 oz)   03/18/21 88.5 kg (195 lb 1.6 oz)   02/18/21 88.1 kg (194 lb 3.2 oz)   02/05/21 89 kg (196 lb 4.8 oz)   01/29/21 90.6 kg (199 lb 11.2 oz)   12/21/20 88.6 kg (195 lb 4.8 oz)   11/25/20 88.5 kg (195 lb)   HEENT: EOMI, PERRL. Sclerae are anicteric. Oral mucosa is pink and moist with no lesions or thrush.   Lymph: Neck is supple with no lymphadenopathy in the cervical or supraclavicular areas.   Heart: Regular rate and rhythm.   Lungs: Clear to auscultation bilaterally.   Abdomen: Bowel sounds present, soft, nontender with no palpable hepatosplenomegaly or masses.   Extremities: No lower extremity edema noted bilaterally.   Neuro: Cranial nerves II through XII are grossly intact.  Skin: The large mid-scalp pigmented lesion extends from right of midline to the left side of the head, roughly measuring 10 x 5 cm, though a smaller separate satellite lesion measures 3 x 1.5 cm in size in the left temporal scalp. Left ear pinna lesion measures 2mm, possible in-transit metastasis.     LABS  Will review later this week.     ASSESSMENT AND PLAN  Cutaneous melanoma, scalp  primary, pT2a cN1c, clinical Stage IIIB. It was a pleasure to see Mr. Coleman today. He is a 76 year old  with agent orange cutaneous exposures in the [place] war and a diagnosis of malignant melanoma arising from a pre-existing pigmented lesion on the scalp. He has clinical evidence of satellitosis with separation of a left temporal satellite from the main tumor mass, and possibly in-transit metastasis to the helix of the left ear. He was reviewed for participation in the NeoActivate study, but he does not meet criteria as he does not have regional lymph node disease. He started on neoadjuvant vemurafenib and cobimetinib on 1/22/21. He was tolerating this fairly well now, but then developed worsening diarrhea, so his treatment was held and then dose reduced. He is tolerating treatment better with vemurafenib at 720 mg and cobimetinib at 40 mg.  He also started on atezolizumab on 2/18/21 and tolerated this well.  He will continue with cycle 4 day 1 later this week. He would like to get some of his infusions in [place]. He has had a partial response to therapy with decrease in thickening of the large scalp tumor and lightening of the periphery and in the middle part of the tumor. Overall dimensions, however, are currently about the same. This was reviewed with Dr. Garcia. Plan is to complete cycle 4 through 5/12 and then to hold until surgery around 6/1. Will reach out to the team about getting this scheduled.     Of note, he did have a distant site on his abdomen biopsied that showed tumoral melanosis. We will need to keep this in mid as we consider future plans for surgery. Will likely repeat imaging the end of June. Will plan to deescalate therapy to atezolizumab monotherapy after surgery to complete one year of treatment.    Diarrhea. Under control. Seems most likely secondary to his treatment, complicated by possible IBS. Encouraged him to use Lomotil and/or Imodium as needed.    CKD. Suspect  secondary to hypertension. Will continue to monitor. Seems easily affected by mild dehydration with diarrhea. Encouraged pushing fluids.     Hypertension. BP is under reasonably control.  He remains on hydrochlorothiazide and lisinopril. [facility] Will continue to monitor in clinic.     Dry eyes. Had at baseline, but worse with vem/jackson. Recommend continuing with lubricating eye drops like Refresh, Systane, or Blink.    Hypophosphatemia. Secondary to cobimetinib (68% reported). Now improved. Okay to stop Kphos.     COVID vaccine. [facility].    Deconditioning. Recommend daily walks.    Weight loss. Declines dietician referral. Now stabilized. Previosly, recommended nutritional supplements. Will continue to monitor.     Sherine Cardoza PA-C  [facility]  [address], [place] [address] [phone]

## 2021-04-14 ENCOUNTER — OFFICE VISIT (OUTPATIENT)
Dept: ONCOLOGY | Facility: CLINIC | Age: 76
End: 2021-04-14
Attending: PHYSICIAN ASSISTANT
Payer: MEDICARE

## 2021-04-14 VITALS
DIASTOLIC BLOOD PRESSURE: 78 MMHG | WEIGHT: 190 LBS | SYSTOLIC BLOOD PRESSURE: 123 MMHG | OXYGEN SATURATION: 96 % | BODY MASS INDEX: 28.06 KG/M2 | RESPIRATION RATE: 16 BRPM | HEART RATE: 57 BPM | TEMPERATURE: 98.1 F

## 2021-04-14 DIAGNOSIS — E83.39 HYPOPHOSPHATEMIA: ICD-10-CM

## 2021-04-14 DIAGNOSIS — C43.4 MALIGNANT MELANOMA OF SCALP (H): Primary | ICD-10-CM

## 2021-04-14 DIAGNOSIS — H04.123 DRY EYES: ICD-10-CM

## 2021-04-14 DIAGNOSIS — N18.9 CHRONIC KIDNEY DISEASE, UNSPECIFIED CKD STAGE: ICD-10-CM

## 2021-04-14 DIAGNOSIS — R19.7 DIARRHEA, UNSPECIFIED TYPE: ICD-10-CM

## 2021-04-14 PROCEDURE — G0463 HOSPITAL OUTPT CLINIC VISIT: HCPCS

## 2021-04-14 PROCEDURE — 99214 OFFICE O/P EST MOD 30 MIN: CPT | Performed by: PHYSICIAN ASSISTANT

## 2021-04-14 ASSESSMENT — PAIN SCALES - GENERAL: PAINLEVEL: MILD PAIN (2)

## 2021-04-14 NOTE — LETTER
4/14/2021         RE: Ahmet Coleman  [address] [place] [id number]      MEDICAL ONCOLOGY CONSULT  Melanoma Clinic  Apr 14, 2021    CHIEF COMPLAINT: Scalp melanoma    Melanoma History:  1. He has a small pigmented scalp lesion present for over 30 years. The lesion was biopsied in 1992 and was benign.  2. October 2020, he presented to [facility] with 1 year of progressive enlargement of the lesion and new onset burning, itching, and stinging sensation in the affected scalp.  3. 10/26/20, He was seen at [facility] and he had shave biopsies of A. left central parietal scalp, B. left central occipital scalp, C. Left posterior parietal scalp, and D. punch biopsy of the left superior occipital scalp. Pathology (specimen: [id number]) showed a nodular and nevoid type melanoma, non-ulcerated, Breslow depth up to 1.3 mm, Saurabh's level IV, and up to 3 mitoses per mm2. All margins were involved. Ki-67 10-20%. Sox10 stain is positive and highlights an atypical compound melanocytic proliferation with significant overlying confluence and pagetosis of intraepidermal melanocytes. T-stage: at least pT2a. PD-L1 staining shows PD-L1 TPS <1%. 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  4. 11/25/20, he was seen by Dr. Garcia and he took three 3mm punch biopsies, which returned as dermal melanocytic proliferations with melanophages and fibrosis, consistent with locoregional metastatic melanoma.  5. 12/8/2020 he had PET-CT, which showed FDG avid irregular skin thickening overlying the left parietal region, with no FDG avid lymphadenopathy in the neck and no evidence of metastasis elsewhere in the body.  6. 1/22/2021 start vemurafenib and cobimetinib, ~2/12/21 held for diarrhea.    INTERVAL HISTORY  Patient reports that he received his first Covid vaccine last Saturday.  He did develop headache and eye pain since yesterday that improved with Tylenol.  He has also noticed some light  sensitivity.  He denies a history of migraine headaches.  He has stools about every other day that are loose and managed with Lomotil.  He reports eating and drinking okay.  He does have some dry mouth that he manages with drinking water.  He reports his energy is fair and he is taking occasional naps.  He reports a normal blood pressure at home around 120s over 70s.  He has been taking has phosphorus supplement.  He has not been going for walks recently.  He denies other concerns.    Review of Systems:  Patient denies any of the following except if noted above: fevers, chills, vision or hearing changes, chest pain, dyspnea, abdominal pain, nausea, vomiting, constipation, urinary concerns, numbness, tingling, issues with sleep or mood. He also denies rashes or new skin lesions, bleeding or bruising issues.    Current Outpatient Medications   Medication Sig Dispense Refill     acetaminophen (TYLENOL) 325 MG tablet TAKE TWO TABLETS BY MOUTH THREE TIMES A DAY AS NEEDED       cholecalciferol (VITAMIN D3) 1000 UNIT tablet Take  by mouth daily.       cobimetinib (COTELLIC) 20 MG tablet Take 2 tablets (40 mg) by mouth daily for 21 days Take on Days 1 through 21 of each 28 day cycle. 42 tablet 0     diphenoxylate-atropine (LOMOTIL) 2.5-0.025 MG tablet Take 1-2 tablets by mouth 4 times daily as needed for diarrhea 120 tablet 5     fluvoxaMINE (LUVOX) 100 MG tablet Take 150 mg at bedtime       hydrochlorothiazide (HYDRODIURIL) 25 MG tablet Take 25 mg by mouth daily.       Hypromellose (ARTIFICIAL TEARS OP) Apply  to eye. 2 drops in each eye twice a day as needed       Lactobacillus-Inulin ([facility] DIGESTIVE [facility]) CAPS 1 tab daily 30 capsule 1     lisinopril (PRINIVIL,ZESTRIL) 10 MG tablet Take 10 mg by mouth daily.       Magnesium Oxide 420 MG TABS Take 420 mg by mouth daily       omeprazole (PRILOSEC) 20 MG capsule Take 1 capsule by mouth 2 times daily. 60 capsule prn     potassium phosphate, monobasic, (K-PHOS) 500 MG  tablet Take 1 tablet (500 mg) by mouth 2 times daily 60 tablet 3     prochlorperazine (COMPAZINE) 10 MG tablet Take 1 tablet (10 mg) by mouth every 6 hours as needed (Nausea/Vomiting) 30 tablet 2     QUEtiapine (SEROQUEL) 25 MG tablet 1/2 tablet nightly       simvastatin (ZOCOR) 20 MG tablet Take  by mouth At Bedtime.       TRAZodone (DESYREL) 50 MG tablet Take 50 mg by mouth At Bedtime.       vemurafenib (ZELBORAF) 240 MG TABS tablet Take 3 tablets (720 mg) by mouth every 12 hours for 28 days 168 tablet 0     vortioxetine (TRINTELLIX) 20 MG tablet TAKE ONE TABLET BY MOUTH EVERY MORNING       Physical Exam:  General: The patient is a pleasant male in no acute distress.  /78   Pulse 57   Temp 98.1  F (36.7  C) (Oral)   Resp 16   Wt 86.2 kg (190 lb)   SpO2 96%   BMI 28.06 kg/m    Wt Readings from Last 10 Encounters:   04/15/21 85.7 kg (188 lb 14.4 oz)   04/14/21 86.2 kg (190 lb)   04/02/21 85.4 kg (188 lb 4.8 oz)   04/01/21 85.4 kg (188 lb 3.2 oz)   03/18/21 88.5 kg (195 lb 1.6 oz)   02/18/21 88.1 kg (194 lb 3.2 oz)   02/05/21 89 kg (196 lb 4.8 oz)   01/29/21 90.6 kg (199 lb 11.2 oz)   12/21/20 88.6 kg (195 lb 4.8 oz)   11/25/20 88.5 kg (195 lb)   HEENT: EOMI, PERRL. Sclerae are anicteric. Oral mucosa is pink and moist with no lesions or thrush.   Lymph: Neck is supple with no lymphadenopathy in the cervical or supraclavicular areas.   Heart: Regular rate and rhythm.   Lungs: Clear to auscultation bilaterally.   Abdomen: Bowel sounds present, soft, nontender with no palpable hepatosplenomegaly or masses.   Extremities: No lower extremity edema noted bilaterally.   Neuro: Cranial nerves II through XII are grossly intact.  Skin: The large mid-scalp pigmented lesion extends from right of midline to the left side of the head, roughly measuring 10 x 5 cm, though a smaller separate satellite lesion measures 3 x 1.5 cm in size in the left temporal scalp. Left ear pinna lesion measures 2mm, possible in-transit  metastasis.     LABS  Will review later this week.     ASSESSMENT AND PLAN  Cutaneous melanoma, scalp primary, pT2a cN1c, clinical Stage IIIB. It was a pleasure to see Mr. Coleman today. He is a 76 year old  with agent orange cutaneous exposures in the [place] war and a diagnosis of malignant melanoma arising from a pre-existing pigmented lesion on the scalp. He has clinical evidence of satellitosis with separation of a left temporal satellite from the main tumor mass, and possibly in-transit metastasis to the helix of the left ear. He was reviewed for participation in the NeoActivate study, but he does not meet criteria as he does not have regional lymph node disease. He started on neoadjuvant vemurafenib and cobimetinib on 1/22/21. He was tolerating this fairly well now, but then developed worsening diarrhea, so his treatment was held and then dose reduced. He is tolerating treatment better with vemurafenib at 720 mg and cobimetinib at 40 mg.  He also started on atezolizumab on 2/18/21 and tolerated this well.  He will continue with cycle 4 day 1 later this week. He would like to get some of his infusions in [place]. He has had a partial response to therapy with decrease in thickening of the large scalp tumor and lightening of the periphery and in the middle part of the tumor. Overall dimensions, however, are currently about the same. This was reviewed with Dr. Garcia. Plan is to complete cycle 4 through 5/12 and then to hold until surgery around 6/1. Will reach out to the team about getting this scheduled.     Of note, he did have a distant site on his abdomen biopsied that showed tumoral melanosis. We will need to keep this in mid as we consider future plans for surgery. Will likely repeat imaging the end of June. Will plan to deescalate therapy to atezolizumab monotherapy after surgery to complete one year of treatment.    Diarrhea. Under control. Seems most likely secondary to his treatment,  complicated by possible IBS. Encouraged him to use Lomotil and/or Imodium as needed.    CKD. Suspect secondary to hypertension. Will continue to monitor. Seems easily affected by mild dehydration with diarrhea. Encouraged pushing fluids.     Hypertension. BP is under reasonably control.  He remains on hydrochlorothiazide and lisinopril. [facility] Will continue to monitor in clinic.     Dry eyes. Had at baseline, but worse with vem/jackson. Recommend continuing with lubricating eye drops like Refresh, Systane, or Blink.    Hypophosphatemia. Secondary to cobimetinib (68% reported). Now improved. Okay to stop Kphos.     COVID vaccine. [facility].    Deconditioning. Recommend daily walks.    Weight loss. Declines dietician referral. Now stabilized. Previosly, recommended nutritional supplements. Will continue to monitor.     Sherine Cardoza PA-C  [facility]  [address] [place] [address]  [phone]        Sherine Cardoza PA-C

## 2021-04-14 NOTE — Clinical Note
4/14/2021         RE: Ahmet Coleman  [address] [place] [id number]        Dear Colleague,    Thank you for referring your patient, Ahmet Coleman, to the [facility]. Please see a copy of my visit note below.    MEDICAL ONCOLOGY CONSULT  Melanoma Clinic  Apr 14, 2021    CHIEF COMPLAINT: Scalp melanoma    Melanoma History:  1. He has a small pigmented scalp lesion present for over 30 years. The lesion was biopsied in 1992 and was benign.  2. October 2020, he presented to [facility] with 1 year of progressive enlargement of the lesion and new onset burning, itching, and stinging sensation in the affected scalp.  3. 10/26/20, He was seen at [facility] and he had shave biopsies of A. left central parietal scalp, B. left central occipital scalp, C. Left posterior parietal scalp, and D. punch biopsy of the left superior occipital scalp. Pathology (specimen: [id number]) showed a nodular and nevoid type melanoma, non-ulcerated, Breslow depth up to 1.3 mm, Saurabh's level IV, and up to 3 mitoses per mm2. All margins were involved. Ki-67 10-20%. Sox10 stain is positive and highlights an atypical compound melanocytic proliferation with significant overlying confluence and pagetosis of intraepidermal melanocytes. T-stage: at least pT2a. PD-L1 staining shows PD-L1 TPS <1%. 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  4. 11/25/20, he was seen by Dr. Garcia and he took three 3mm punch biopsies, which returned as dermal melanocytic proliferations with melanophages and fibrosis, consistent with locoregional metastatic melanoma.  5. 12/8/2020 he had PET-CT, which showed FDG avid irregular skin thickening overlying the left parietal region, with no FDG avid lymphadenopathy in the neck and no evidence of metastasis elsewhere in the body.  6. 1/22/2021 start vemurafenib and cobimetinib, ~2/12/21 held for diarrhea.    INTERVAL HISTORY            -Has  been feeling okay.  -Stools have been doing okay. Has stools every couple of days.   -Denies any new lumps or bumps.  -Has felt tired. Occasionally takes naps.   -Not getting regular exercise.   -Notes appetite has been low. Weight was down to 184 lb.  -Has been trying to eat some snacks.  -Had a headache yesterday, managed with rest.   -Has intermittent issues with sleep chronically.   -Has occasional lightheadedness that is mild.     Review of Systems:  Patient denies any of the following except if noted above: fevers, chills, vision or hearing changes, chest pain, dyspnea, abdominal pain, nausea, vomiting, diarrhea, constipation, urinary concerns, headaches, numbness, tingling, issues with sleep or mood. He also denies rashes or skin lesions, bleeding or bruising issues.    Current Outpatient Medications   Medication Sig Dispense Refill     acetaminophen (TYLENOL) 325 MG tablet TAKE TWO TABLETS BY MOUTH THREE TIMES A DAY AS NEEDED       cholecalciferol (VITAMIN D3) 1000 UNIT tablet Take  by mouth daily.       cobimetinib (COTELLIC) 20 MG tablet Take 2 tablets (40 mg) by mouth daily for 21 days Take on Days 1 through 21 of each 28 day cycle. 42 tablet 0     diphenoxylate-atropine (LOMOTIL) 2.5-0.025 MG tablet Take 1-2 tablets by mouth 4 times daily as needed for diarrhea 120 tablet 5     fluvoxaMINE (LUVOX) 100 MG tablet Take 150 mg at bedtime       hydrochlorothiazide (HYDRODIURIL) 25 MG tablet Take 25 mg by mouth daily.       Hypromellose (ARTIFICIAL TEARS OP) Apply  to eye. 2 drops in each eye twice a day as needed       Lactobacillus-Inulin ([facility] DIGESTIVE [facility]) CAPS 1 tab daily 30 capsule 1     lisinopril (PRINIVIL,ZESTRIL) 10 MG tablet Take 10 mg by mouth daily.       Magnesium Oxide 420 MG TABS Take 420 mg by mouth daily       omeprazole (PRILOSEC) 20 MG capsule Take 1 capsule by mouth 2 times daily. 60 capsule prn     potassium phosphate, monobasic, (K-PHOS) 500 MG tablet Take 1 tablet (500 mg) by  mouth 2 times daily 60 tablet 3     prochlorperazine (COMPAZINE) 10 MG tablet Take 1 tablet (10 mg) by mouth every 6 hours as needed (Nausea/Vomiting) 30 tablet 2     QUEtiapine (SEROQUEL) 25 MG tablet 1/2 tablet nightly       simvastatin (ZOCOR) 20 MG tablet Take  by mouth At Bedtime.       TRAZodone (DESYREL) 50 MG tablet Take 50 mg by mouth At Bedtime.       vemurafenib (ZELBORAF) 240 MG TABS tablet Take 3 tablets (720 mg) by mouth every 12 hours for 28 days 168 tablet 0     vortioxetine (TRINTELLIX) 20 MG tablet TAKE ONE TABLET BY MOUTH EVERY MORNING       Physical Exam:  General: The patient is a pleasant male in no acute distress.  There were no vitals taken for this visit.  Wt Readings from Last 10 Encounters:   04/02/21 85.4 kg (188 lb 4.8 oz)   04/01/21 85.4 kg (188 lb 3.2 oz)   03/18/21 88.5 kg (195 lb 1.6 oz)   02/18/21 88.1 kg (194 lb 3.2 oz)   02/05/21 89 kg (196 lb 4.8 oz)   01/29/21 90.6 kg (199 lb 11.2 oz)   12/21/20 88.6 kg (195 lb 4.8 oz)   11/25/20 88.5 kg (195 lb)   10/14/20 87.5 kg (193 lb)   06/09/16 74.4 kg (164 lb)   HEENT: EOMI, PERRL. Sclerae are anicteric. Oral mucosa is pink and moist with no lesions or thrush.   Lymph: Neck is supple with no lymphadenopathy in the cervical or supraclavicular areas.   Heart: Regular rate and rhythm.   Lungs: Clear to auscultation bilaterally.   Abdomen: Bowel sounds present, soft, nontender with no palpable hepatosplenomegaly or masses.   Extremities: No lower extremity edema noted bilaterally.   Neuro: Cranial nerves II through XII are grossly intact.  Skin: No rashes, petechiae, or bruising noted on exposed skin.      LABS      ASSESSMENT AND PLAN  Cutaneous melanoma, scalp primary, pT2a cN1c, clinical Stage IIIB. It was a pleasure to meet Mr. Coleman today. He is a 76 year old  with agent orange cutaneous exposures in the [place] war and a diagnosis of malignant melanoma arising from a pre-existing pigmented lesion on the scalp. He has clinical  evidence of satellitosis with separation of a left temporal satellite from the main tumor mass, and possibly in-transit metastasis to the helix of the left ear. He was reviewed for participation in the NeoActivate study, but he does not meet criteria as he does not have regional lymph node disease. He started on neoadjuvant vemurafenib and cobimetinib on 1/22/21. He was tolerating this fairly well, but then developed worsening diarrhea, so his treatment has been on hold. He will now resume vemurafenib at 720 mg and cobimetinib at 40 mg reduced dosing due to the diarrhea. He also started on atezolizumab on 2/18/21 and tolerated this well.  He will continue with cycle 1 day 15 later this week. He would like to get some of his infusions in [place]. Will tentatively plan for his next imaging the end of April 2021.  Of note, he did have a distant site on his abdomen biopsied that showed tumoral melanosis. We will need to keep this in mid as we consider future plans for surgery.     Diarrhea. Seems most likely secondary to his treatment, complicated by possible IBS. Encouraged him to use Lomotil and/or Imodium as needed.    CKD. Suspect secondary to hypertension. Better the last 2 checks. Will continue to monitor. Seems easily affected by mild dehydration with diarrhea.     Hypertension. BP is under reasonably control.  He remains on hydrochlorothiazide and lisinopril. [facility] since last visit was more than 1 year ago. Will continue to monitor in clinic.     Dry eyes. Had at baseline, but worse with vem/jackson. Recommend continuing with lubricating eye drops like Refresh, Systane, or Blink.    Hypophosphatemia. Secondary to cobimetinib (68% reported). Now improving. Will decrease Kphos to 500 mg daily.    COVID vaccine. Discussed okay to get once available for him. [facility].     Deconditioning. Recommend daily walks.    Weight loss. Declines  dietician referral. Recommend nutritional supplements. Will continue to monitor.     Sherine Cardoza PA-C  [facility] [address] [address] [phone]      Again, thank you for allowing me to participate in the care of your patient.        Sincerely,        Sherine Cardoza PA-C

## 2021-04-15 ENCOUNTER — INFUSION THERAPY VISIT (OUTPATIENT)
Dept: INFUSION THERAPY | Facility: CLINIC | Age: 76
End: 2021-04-15
Payer: MEDICARE

## 2021-04-15 VITALS
HEART RATE: 51 BPM | WEIGHT: 188.9 LBS | TEMPERATURE: 98.4 F | DIASTOLIC BLOOD PRESSURE: 85 MMHG | SYSTOLIC BLOOD PRESSURE: 151 MMHG | OXYGEN SATURATION: 95 % | RESPIRATION RATE: 14 BRPM | BODY MASS INDEX: 27.9 KG/M2

## 2021-04-15 DIAGNOSIS — C43.4 MALIGNANT MELANOMA OF SCALP (H): ICD-10-CM

## 2021-04-15 DIAGNOSIS — Z11.59 ENCOUNTER FOR SCREENING FOR OTHER VIRAL DISEASES: Primary | ICD-10-CM

## 2021-04-15 DIAGNOSIS — Z15.09 MONOALLELIC MUTATION OF BRAF GENE: ICD-10-CM

## 2021-04-15 DIAGNOSIS — Z15.89 MONOALLELIC MUTATION OF BRAF GENE: ICD-10-CM

## 2021-04-15 DIAGNOSIS — Z15.09 MONOALLELIC MUTATION OF BRAF GENE: Primary | ICD-10-CM

## 2021-04-15 DIAGNOSIS — Z15.89 MONOALLELIC MUTATION OF BRAF GENE: Primary | ICD-10-CM

## 2021-04-15 LAB
ALBUMIN SERPL-MCNC: 3.6 G/DL (ref 3.4–5)
ALP SERPL-CCNC: 111 U/L (ref 40–150)
ALT SERPL W P-5'-P-CCNC: 32 U/L (ref 0–70)
ANION GAP SERPL CALCULATED.3IONS-SCNC: 3 MMOL/L (ref 3–14)
AST SERPL W P-5'-P-CCNC: 18 U/L (ref 0–45)
BASOPHILS # BLD AUTO: 0 10E9/L (ref 0–0.2)
BASOPHILS NFR BLD AUTO: 0.3 %
BILIRUB SERPL-MCNC: 0.6 MG/DL (ref 0.2–1.3)
BUN SERPL-MCNC: 20 MG/DL (ref 7–30)
CALCIUM SERPL-MCNC: 8.7 MG/DL (ref 8.5–10.1)
CHLORIDE SERPL-SCNC: 102 MMOL/L (ref 94–109)
CK SERPL-CCNC: 119 U/L (ref 30–300)
CO2 SERPL-SCNC: 33 MMOL/L (ref 20–32)
CREAT SERPL-MCNC: 1.63 MG/DL (ref 0.66–1.25)
DIFFERENTIAL METHOD BLD: ABNORMAL
EOSINOPHIL # BLD AUTO: 0.1 10E9/L (ref 0–0.7)
EOSINOPHIL NFR BLD AUTO: 2.3 %
ERYTHROCYTE [DISTWIDTH] IN BLOOD BY AUTOMATED COUNT: 13.1 % (ref 10–15)
GFR SERPL CREATININE-BSD FRML MDRD: 41 ML/MIN/{1.73_M2}
GGT SERPL-CCNC: 46 U/L (ref 0–75)
GLUCOSE SERPL-MCNC: 114 MG/DL (ref 70–99)
HCT VFR BLD AUTO: 37.6 % (ref 40–53)
HGB BLD-MCNC: 13.1 G/DL (ref 13.3–17.7)
LYMPHOCYTES # BLD AUTO: 1.4 10E9/L (ref 0.8–5.3)
LYMPHOCYTES NFR BLD AUTO: 40.5 %
MAGNESIUM SERPL-MCNC: 2.3 MG/DL (ref 1.6–2.3)
MCH RBC QN AUTO: 29.8 PG (ref 26.5–33)
MCHC RBC AUTO-ENTMCNC: 34.8 G/DL (ref 31.5–36.5)
MCV RBC AUTO: 86 FL (ref 78–100)
MONOCYTES # BLD AUTO: 0.3 10E9/L (ref 0–1.3)
MONOCYTES NFR BLD AUTO: 9 %
NEUTROPHILS # BLD AUTO: 1.6 10E9/L (ref 1.6–8.3)
NEUTROPHILS NFR BLD AUTO: 47.9 %
PHOSPHATE SERPL-MCNC: 3 MG/DL (ref 2.5–4.5)
PLATELET # BLD AUTO: 200 10E9/L (ref 150–450)
POTASSIUM SERPL-SCNC: 4.1 MMOL/L (ref 3.4–5.3)
PROT SERPL-MCNC: 7.3 G/DL (ref 6.8–8.8)
RBC # BLD AUTO: 4.4 10E12/L (ref 4.4–5.9)
SODIUM SERPL-SCNC: 138 MMOL/L (ref 133–144)
TSH SERPL DL<=0.005 MIU/L-ACNC: 1.79 MU/L (ref 0.4–4)
WBC # BLD AUTO: 3.4 10E9/L (ref 4–11)

## 2021-04-15 PROCEDURE — 84100 ASSAY OF PHOSPHORUS: CPT | Performed by: INTERNAL MEDICINE

## 2021-04-15 PROCEDURE — U0005 INFEC AGEN DETEC AMPLI PROBE: HCPCS | Performed by: INTERNAL MEDICINE

## 2021-04-15 PROCEDURE — U0003 INFECTIOUS AGENT DETECTION BY NUCLEIC ACID (DNA OR RNA); SEVERE ACUTE RESPIRATORY SYNDROME CORONAVIRUS 2 (SARS-COV-2) (CORONAVIRUS DISEASE [COVID-19]), AMPLIFIED PROBE TECHNIQUE, MAKING USE OF HIGH THROUGHPUT TECHNOLOGIES AS DESCRIBED BY CMS-2020-01-R: HCPCS | Performed by: INTERNAL MEDICINE

## 2021-04-15 PROCEDURE — 84443 ASSAY THYROID STIM HORMONE: CPT | Performed by: INTERNAL MEDICINE

## 2021-04-15 PROCEDURE — 96413 CHEMO IV INFUSION 1 HR: CPT | Performed by: INTERNAL MEDICINE

## 2021-04-15 PROCEDURE — 82550 ASSAY OF CK (CPK): CPT | Performed by: INTERNAL MEDICINE

## 2021-04-15 PROCEDURE — 36415 COLL VENOUS BLD VENIPUNCTURE: CPT | Performed by: INTERNAL MEDICINE

## 2021-04-15 PROCEDURE — 99207 PR NO CHARGE LOS: CPT

## 2021-04-15 PROCEDURE — 85025 COMPLETE CBC W/AUTO DIFF WBC: CPT | Performed by: INTERNAL MEDICINE

## 2021-04-15 PROCEDURE — 83735 ASSAY OF MAGNESIUM: CPT | Performed by: INTERNAL MEDICINE

## 2021-04-15 PROCEDURE — 80053 COMPREHEN METABOLIC PANEL: CPT | Performed by: INTERNAL MEDICINE

## 2021-04-15 PROCEDURE — 82977 ASSAY OF GGT: CPT | Performed by: INTERNAL MEDICINE

## 2021-04-15 NOTE — PROGRESS NOTES
Infusion Nursing Note:  Ahmet Coleman presents today for C4D1 Tecentriq.    Patient seen by provider today: No   present during visit today: Not Applicable.    Note:   Patient saw Sherine Cardoza PA-C yesterday.  Notes not completed yet.  Paged Sherine Cardoza PA-C whom states okay to proceed with today's treatment..    Patient met criteria for an asymptomatic covid-19 PCR test in infusion today. Patient accepted the covid-19 test. Specimen collected and delivered to lab.    Patient states he might do surgery to his scalp in June.  States he has hesitation about this.    Intravenous Access:  Peripheral IV placed.    Treatment Conditions:  Lab Results   Component Value Date    HGB 13.1 04/15/2021     Lab Results   Component Value Date    WBC 3.4 04/15/2021      Lab Results   Component Value Date    ANEU 1.6 04/15/2021     Lab Results   Component Value Date     04/15/2021      Lab Results   Component Value Date    NA PENDING 04/15/2021                   Lab Results   Component Value Date    POTASSIUM 4.1 04/15/2021           Lab Results   Component Value Date    MAG 2.3 04/15/2021            Lab Results   Component Value Date    CR 1.63 04/15/2021                   Lab Results   Component Value Date    STEPHEN 8.7 04/15/2021                Lab Results   Component Value Date    BILITOTAL 0.6 04/15/2021           Lab Results   Component Value Date    ALBUMIN 3.6 04/15/2021                    Lab Results   Component Value Date    ALT 32 04/15/2021           Lab Results   Component Value Date    AST 18 04/15/2021       Results reviewed, labs MET treatment parameters, ok to proceed with treatment.      Post Infusion Assessment:  Patient tolerated infusion without incident.  Blood return noted pre and post infusion.  Site patent and intact, free from redness, edema or discomfort.  No evidence of extravasations.  Access discontinued per protocol.       Discharge Plan:   AVS to patient via [employer].  Patient will  return 4/29/21 for next appointment.   Patient discharged in stable condition accompanied by: self. Daughter is .  Departure Mode: Ambulatory.    Misty Talavera RN

## 2021-04-16 LAB
SARS-COV-2 RNA RESP QL NAA+PROBE: NOT DETECTED
SPECIMEN SOURCE: NORMAL

## 2021-04-20 DIAGNOSIS — Z15.09 MONOALLELIC MUTATION OF BRAF GENE: Primary | ICD-10-CM

## 2021-04-20 DIAGNOSIS — Z15.89 MONOALLELIC MUTATION OF BRAF GENE: Primary | ICD-10-CM

## 2021-04-20 DIAGNOSIS — C43.4 MALIGNANT MELANOMA OF SCALP (H): ICD-10-CM

## 2021-04-24 ENCOUNTER — TELEPHONE (OUTPATIENT)
Dept: ONCOLOGY | Facility: CLINIC | Age: 76
End: 2021-04-24

## 2021-04-29 ENCOUNTER — PATIENT OUTREACH (OUTPATIENT)
Dept: OTOLARYNGOLOGY | Facility: CLINIC | Age: 76
End: 2021-04-29

## 2021-04-29 ENCOUNTER — INFUSION THERAPY VISIT (OUTPATIENT)
Dept: INFUSION THERAPY | Facility: CLINIC | Age: 76
End: 2021-04-29
Payer: MEDICARE

## 2021-04-29 ENCOUNTER — APPOINTMENT (OUTPATIENT)
Dept: ONCOLOGY | Facility: CLINIC | Age: 76
End: 2021-04-29
Payer: MEDICARE

## 2021-04-29 VITALS
HEART RATE: 57 BPM | OXYGEN SATURATION: 94 % | DIASTOLIC BLOOD PRESSURE: 78 MMHG | TEMPERATURE: 98.2 F | RESPIRATION RATE: 16 BRPM | SYSTOLIC BLOOD PRESSURE: 149 MMHG

## 2021-04-29 DIAGNOSIS — Z15.89 MONOALLELIC MUTATION OF BRAF GENE: Primary | ICD-10-CM

## 2021-04-29 DIAGNOSIS — C43.4 MALIGNANT MELANOMA OF SCALP (H): ICD-10-CM

## 2021-04-29 DIAGNOSIS — Z15.09 MONOALLELIC MUTATION OF BRAF GENE: Primary | ICD-10-CM

## 2021-04-29 LAB
ALBUMIN SERPL-MCNC: 3.7 G/DL (ref 3.4–5)
ALP SERPL-CCNC: 110 U/L (ref 40–150)
ALT SERPL W P-5'-P-CCNC: 30 U/L (ref 0–70)
ANION GAP SERPL CALCULATED.3IONS-SCNC: 4 MMOL/L (ref 3–14)
AST SERPL W P-5'-P-CCNC: 16 U/L (ref 0–45)
BASOPHILS # BLD AUTO: 0 10E9/L (ref 0–0.2)
BASOPHILS NFR BLD AUTO: 0.6 %
BILIRUB SERPL-MCNC: 0.7 MG/DL (ref 0.2–1.3)
BUN SERPL-MCNC: 20 MG/DL (ref 7–30)
CALCIUM SERPL-MCNC: 8.8 MG/DL (ref 8.5–10.1)
CHLORIDE SERPL-SCNC: 102 MMOL/L (ref 94–109)
CO2 SERPL-SCNC: 32 MMOL/L (ref 20–32)
CREAT SERPL-MCNC: 1.62 MG/DL (ref 0.66–1.25)
DIFFERENTIAL METHOD BLD: ABNORMAL
EOSINOPHIL # BLD AUTO: 0.2 10E9/L (ref 0–0.7)
EOSINOPHIL NFR BLD AUTO: 4.9 %
ERYTHROCYTE [DISTWIDTH] IN BLOOD BY AUTOMATED COUNT: 12.9 % (ref 10–15)
GFR SERPL CREATININE-BSD FRML MDRD: 41 ML/MIN/{1.73_M2}
GLUCOSE SERPL-MCNC: 102 MG/DL (ref 70–99)
HCT VFR BLD AUTO: 38.5 % (ref 40–53)
HGB BLD-MCNC: 13.1 G/DL (ref 13.3–17.7)
IMM GRANULOCYTES # BLD: 0 10E9/L (ref 0–0.4)
IMM GRANULOCYTES NFR BLD: 0.3 %
LYMPHOCYTES # BLD AUTO: 0.9 10E9/L (ref 0.8–5.3)
LYMPHOCYTES NFR BLD AUTO: 29.1 %
MCH RBC QN AUTO: 29 PG (ref 26.5–33)
MCHC RBC AUTO-ENTMCNC: 34 G/DL (ref 31.5–36.5)
MCV RBC AUTO: 85 FL (ref 78–100)
MONOCYTES # BLD AUTO: 0.4 10E9/L (ref 0–1.3)
MONOCYTES NFR BLD AUTO: 13.6 %
NEUTROPHILS # BLD AUTO: 1.6 10E9/L (ref 1.6–8.3)
NEUTROPHILS NFR BLD AUTO: 51.5 %
PHOSPHATE SERPL-MCNC: 2.5 MG/DL (ref 2.5–4.5)
PLATELET # BLD AUTO: 193 10E9/L (ref 150–450)
POTASSIUM SERPL-SCNC: 3.9 MMOL/L (ref 3.4–5.3)
PROT SERPL-MCNC: 7.4 G/DL (ref 6.8–8.8)
RBC # BLD AUTO: 4.52 10E12/L (ref 4.4–5.9)
SODIUM SERPL-SCNC: 138 MMOL/L (ref 133–144)
TSH SERPL DL<=0.005 MIU/L-ACNC: 2.43 MU/L (ref 0.4–4)
WBC # BLD AUTO: 3.1 10E9/L (ref 4–11)

## 2021-04-29 PROCEDURE — 96413 CHEMO IV INFUSION 1 HR: CPT | Performed by: NURSE PRACTITIONER

## 2021-04-29 PROCEDURE — 84100 ASSAY OF PHOSPHORUS: CPT | Performed by: INTERNAL MEDICINE

## 2021-04-29 PROCEDURE — 80050 GENERAL HEALTH PANEL: CPT | Performed by: INTERNAL MEDICINE

## 2021-04-29 PROCEDURE — 99207 PR NO CHARGE LOS: CPT

## 2021-04-29 PROCEDURE — 93000 ELECTROCARDIOGRAM COMPLETE: CPT | Performed by: NURSE PRACTITIONER

## 2021-04-29 PROCEDURE — 36415 COLL VENOUS BLD VENIPUNCTURE: CPT | Performed by: INTERNAL MEDICINE

## 2021-04-29 ASSESSMENT — PAIN SCALES - GENERAL: PAINLEVEL: NO PAIN (0)

## 2021-04-29 NOTE — PROGRESS NOTES
Called and spoke with patient regarding plan on scheduling surgery with Dr. Garcia. Advised patient he should return to clinic to discuss and plan for upcoming surgery. Patient in agreement and will return to clinic on 5/5 with Dr. Garcia.     Discussed with patient that we are potentially planning for surgery at the end of May pending his chemo plan. Patient verbalized understanding and will call with further questions or concerns.       Rekha Reynolds, RN, BSN

## 2021-04-29 NOTE — PROGRESS NOTES
Infusion Nursing Note:  Ahmet Coleman presents today for: Tecentriq.    Patient seen by provider today: No   present during visit today: Not Applicable.    Note: patient noting more fatigue recently.  Resting more often.  No other new symptoms.      Intravenous Access:  Peripheral IV placed.    Treatment Conditions:  Results reviewed, labs MET treatment parameters, ok to proceed with treatment.      Post Infusion Assessment:  Patient tolerated infusion without incident.       Discharge Plan:   RTC as scheduled for provider visit .    BARRY COLE RN

## 2021-05-05 ENCOUNTER — OFFICE VISIT (OUTPATIENT)
Dept: OTOLARYNGOLOGY | Facility: CLINIC | Age: 76
End: 2021-05-05
Payer: MEDICARE

## 2021-05-05 VITALS
BODY MASS INDEX: 28.07 KG/M2 | RESPIRATION RATE: 15 BRPM | WEIGHT: 185.19 LBS | TEMPERATURE: 98.8 F | HEIGHT: 68 IN | HEART RATE: 65 BPM | OXYGEN SATURATION: 96 % | SYSTOLIC BLOOD PRESSURE: 154 MMHG | DIASTOLIC BLOOD PRESSURE: 89 MMHG

## 2021-05-05 DIAGNOSIS — C43.4 MALIGNANT MELANOMA OF SCALP (H): Primary | ICD-10-CM

## 2021-05-05 PROCEDURE — 99213 OFFICE O/P EST LOW 20 MIN: CPT | Performed by: OTOLARYNGOLOGY

## 2021-05-05 ASSESSMENT — MIFFLIN-ST. JEOR: SCORE: 1554.5

## 2021-05-05 ASSESSMENT — PAIN SCALES - GENERAL: PAINLEVEL: NO PAIN (0)

## 2021-05-05 NOTE — NURSING NOTE
Photodocumentation obtained.    Virtual visit scheduled with Dr. Machado on 5/10 @ 8193.    Rozina Ascencio RN  5/5/2021 5:21 PM

## 2021-05-05 NOTE — NURSING NOTE
"Chief Complaint   Patient presents with     RECHECK     melanoma of scalp      Blood pressure (!) 154/89, pulse 65, temperature 98.8  F (37.1  C), resp. rate 15, height 1.727 m (5' 8\"), weight 84 kg (185 lb 3 oz), SpO2 96 %.    Mauri Jarquin LPN    "

## 2021-05-05 NOTE — PROGRESS NOTES
May 5, 2021    Niurka Skinner MD  [facility]  [address], [address]  [place]   [address]    Oksana [place] [address]    Dear Doctors:    Mr. Coleman returns. He is a patient with a large left scalp melanoma approximately 6 x 9 cm in size.  He also had satellite lesions at the time of presentation with me.  When I biopsied him in November, he did have an occipital satellite.  Two of the others came back as dermal melanocytic proliferations with melanophages and fibrosis, but these were still considered suspicious for local regional metastases.  The patient was then seen by Dr. Hernández who put the patient on a trial for vemurafenib and Cobimetinib with a plan for surgery after approximately four months of treatment, which would mean surgery at the end of May of this year.  The patient tolerated the treatments fairly well.  He had a PET scan in March which showed a decrease in FDG uptake and no obvious distant metastasis.  The patient is here today to discuss surgery.      PHYSICAL EXAMINATION:  On examination, the pigmented lesion on his left scalp remains pigmented.  It is not ulcerative grossly.  The satellites are present both laterally and posteriorly with a small one anteriorly as well.  I do not palpate any lymphadenopathy in his parotid or his neck and carefully checked his occipital area as well.    IMPRESSION:  Large scalp melanoma, status post neoadjuvant therapy and now getting prepared for surgical intervention.    PLAN:      1.  The patient will require wide local excision of this area, but I think I would likely hold off on parotidectomy or neck dissection at this point given that he has no evidence of positive nodes, and he is going to be getting more adjuvant therapy after surgery.  I would like to get a CT neck prior to surgery to confirm that there is no evidence of any suspicious lymph nodes which would change the plan at the  time of surgery.  2.  I would like to meet with one of the reconstructive surgeons to discuss the reconstructive effort which could consist of a latissimus and a skin graft.  This will be scheduled in the near future.    Thank you for allowing me to participate in the care of this patient.  If you have any questions or concerns, please do not hesitate to contact me.    Sincerely,    Paulo Garcia M.D.      Otolaryngology-Head & Neck Surgery   [phone]

## 2021-05-05 NOTE — LETTER
5/5/2021       RE: Ahmet Coleman  [address] [address]     Dear Colleague,    Thank you for referring your patient, Ahmet Coleman, to the [facility] [place] at [facility]. Please see a copy of my visit note below.    May 5, 2021    Niurka Skinner MD  [facility]  [address] [facility], [address] [place], [place] [address]    Oksana Peñaloza MD  [place] [id number]    Dear Doctors:    Mr. Coleman returns. He is a patient with a large left scalp melanoma approximately 6 x 9 cm in size.  He also had satellite lesions at the time of presentation with me.  When I biopsied him in November, he did have an occipital satellite.  Two of the others came back as dermal melanocytic proliferations with melanophages and fibrosis, but these were still considered suspicious for local regional metastases.  The patient was then seen by Dr. Hernández who put the patient on a trial for vemurafenib and Cobimetinib with a plan for surgery after approximately four months of treatment, which would mean surgery at the end of May of this year.  The patient tolerated the treatments fairly well.  He had a PET scan in March which showed a decrease in FDG uptake and no obvious distant metastasis.  The patient is here today to discuss surgery.      PHYSICAL EXAMINATION:  On examination, the pigmented lesion on his left scalp remains pigmented.  It is not ulcerative grossly.  The satellites are present both laterally and posteriorly with a small one anteriorly as well.  I do not palpate any lymphadenopathy in his parotid or his neck and carefully checked his occipital area as well.    IMPRESSION:  Large scalp melanoma, status post neoadjuvant therapy and now getting prepared for surgical intervention.    PLAN:      1.  The patient will require wide local excision of this area, but I think I would  likely hold off on parotidectomy or neck dissection at this point given that he has no evidence of positive nodes, and he is going to be getting more adjuvant therapy after surgery.  I would like to get a CT neck prior to surgery to confirm that there is no evidence of any suspicious lymph nodes which would change the plan at the time of surgery.  2.  I would like to meet with one of the reconstructive surgeons to discuss the reconstructive effort which could consist of a latissimus and a skin graft.  This will be scheduled in the near future.    Thank you for allowing me to participate in the care of this patient.  If you have any questions or concerns, please do not hesitate to contact me.    Sincerely,    Paulo Garcia M.D.      Otolaryngology-Head & Neck Surgery   [phone]

## 2021-05-06 RX ORDER — LIDOCAINE HYDROCHLORIDE AND EPINEPHRINE 10; 10 MG/ML; UG/ML
1.5 INJECTION, SOLUTION INFILTRATION; PERINEURAL ONCE
Status: DISCONTINUED | OUTPATIENT
Start: 2021-05-07 | End: 2021-05-07

## 2021-05-06 NOTE — PROGRESS NOTES
[facility] Dermatology Note  Encounter Date: May 7, 2021  Office Visit     Dermatology Problem List:  1. Melanoma, left side of the scalp, stage IIIB  - Biopsies at [facility] on 10/26/20 of the A. left central parietal scalp, B. left central occipital scalp, C. Left posterior parietal scalp, and D. left superior occipital scalp which showed nodular and nevoid melanoma, no ulceration, Breslow 1.3mm (deep and lateral margins positive), 3 mitoses per mm2. PD-L1 TPS <1%. BRAF V600K mutation.  - Biopsies by Dr. Garcia 11/25/20 showed locoregional metastatic melanoma  - PET negative for distant disease 12/8/20  - Started neoadjuvant vemurafenib and cobimetinib 1/2021. Dose reduced due to diarrhea. Atezolizumab added on in February 2021. The goal is to try to get a sufficient response to allow for surgical resection with clear margins. Surgery planned tentatively for 6/2021  2. 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 Possible in transit met on left earlobe - flat - suspect tumor melanosis at present    Social history: .  Family history: Father had skin cancer, unsure of type.  ____________________________________________    Assessment & Plan:     # Melanoma, stage IIIB, left scalp. Currently on neoadjuvant treatment with goal to get melanoma smaller in size so surgical resection with clear margins is possible. Current on BRAF + MEK inhibitors + immunotherapy. Plan for surgery in June 2021. Explained role of dermatology in care - to help with cutaneous side effects of medications and screen for new primary melanomas. Explained routine schedule for follow up examinations in office and need for self skin checks monthly.   - ABCDs of melanoma were discussed and self skin checks were advised.   - Sun precaution was advised including the use of sunscreens of SPF 30 or higher, sun protective clothing, and avoidance of tanning beds.  - Recommended  first degree relatives get yearly skin exams as well.  - *Reviewed last onc note from 4/14/21  - *Reviewed ENT note from 5/5/21  - *Reviewed PET and CT from 3/26/21. Showed positive response to treatment with possible residual disease at the high left cranial vertex, no evidence of metastatic disease   - No skin findings concerning for adverse reactions to current melanoma meds  - Next skin check in 3 months.     # Sun damaged skin with solar lentigines: Chronic, stable.  - Recommend sunscreens SPF #30 or greater, protective clothing and avoidance of tanning beds.     # Benign skin findings including: seborrheic keratoses, cherry angioma - minor, self limited problem  - No further intervention required. Patient to report changes.   - Patient reassured of the benign nature of these lesions.     # Multiple clinically benign nevi: Chronic, stable  - No further intervention required. Patient to report changes.   - Patient reassured of the benign nature of these lesions.     # Possible in transit met, left earlobe: Flat today, appearance similar to two spots biopsied at last visit. Suspect tumor melanosis at present. I do not think biopsy will . Will continue to monitor.    Procedures Performed:   None    Follow-up: 3 month(s) in-person, or earlier for new or changing lesions    Staff:     Sidra Esetban MD    Department of Dermatology  [facility]: Phone: [phone], Fax:[phone]  [facility]: Phone: [phone], Fax: [phone]    ____________________________________________    CC: No chief complaint on file.    HPI:  Mr. Ahmet Coleman is a(n) 76 year old male who presents today as a return patient for skin check.    Last visit 2/5/21. Biopsies at that time showed tumor melanosis on left superior shoulder and central inferior abdomen.    Saw Dr. Garcia yesterday.  "Plan for WLE +/- parotidectomy or neck dissection depending on what preop CT shows.    Today, patient notes today that he does not have any skin concerns but feels a bit off. He wonders if this is from his COVID vaccine second shot Saturday. He has been a bit dizzy, had a bit of trouble finding words and focusing, had some light sensitivity. He says all are minor.    Patient is otherwise feeling well, without additional skin concerns. Denies any fevers, chills, cough, shortness of breath, or weight loss.       Labs Reviewed:  N/A    Physical Exam:  Vitals: /72 (BP Location: Right arm, Patient Position: Sitting, Cuff Size: Adult Regular)   Pulse 73   Temp 97.7  F (36.5  C) (Tympanic)   Resp 16   Ht 1.727 m (5' 8\")   Wt 84.9 kg (187 lb 3.2 oz)   SpO2 97%   BMI 28.46 kg/m    SKIN: Full skin, which includes the head/face, both arms, chest, back, abdomen,both legs, genitalia and/or groin buttocks, digits and/or nails, was examined.  - Ortiz Type II  - Scattered brown macules on sun exposed areas.  - On the left frontal, parietal, and temporal scalp, there is a well defined blue-black patch with only central slight elevation to plaque. There is a nickel sized blue black plaque on the left parietal scalp that is separate from larger patch.  - Blue black macule at left lateral earlobe.  - Venous lake on left superior helix.  - There are dome shaped bright red papules on the trunk.   - Multiple regular brown pigmented macules and papules are identified on the body. About 40 nevi in all. None have significant atypia. There is a two component mole on the central lower back.  - There are waxy stuck on tan to brown papules on the trunk, extremities.  - No other lesions of concern on areas examined.   LYMPH NODES: No submandibular, cervical, supraclavicular, axillary, or inguinal lymphadenopathy palpable on examination.       Medications:  Current Outpatient Medications   Medication     acetaminophen (TYLENOL) " 325 MG tablet     cholecalciferol (VITAMIN D3) 1000 UNIT tablet     cobimetinib (COTELLIC) 20 MG tablet     diphenoxylate-atropine (LOMOTIL) 2.5-0.025 MG tablet     fluvoxaMINE (LUVOX) 100 MG tablet     hydrochlorothiazide (HYDRODIURIL) 25 MG tablet     Hypromellose (ARTIFICIAL TEARS OP)     Lactobacillus-Inulin (CULTURELLE DIGESTIVE HEALTH) CAPS     lisinopril (PRINIVIL,ZESTRIL) 10 MG tablet     Magnesium Oxide 420 MG TABS     omeprazole (PRILOSEC) 20 MG capsule     potassium phosphate, monobasic, (K-PHOS) 500 MG tablet     prochlorperazine (COMPAZINE) 10 MG tablet     QUEtiapine (SEROQUEL) 25 MG tablet     simvastatin (ZOCOR) 20 MG tablet     TRAZodone (DESYREL) 50 MG tablet     vemurafenib (ZELBORAF) 240 MG TABS tablet     vortioxetine (TRINTELLIX) 20 MG tablet     Current Facility-Administered Medications   Medication     lidocaine 1% with EPINEPHrine 1:100,000 injection 1.5 mL     lidocaine 1% with EPINEPHrine 1:100,000 injection 1.5 mL     Facility-Administered Medications Ordered in Other Visits   Medication     sodium chloride (PF) 0.9% PF flush 10 mL      Past Medical History:   Patient Active Problem List   Diagnosis     Musculoskeletal disorder and symptoms referable to neck     Degeneration of intervertebral disc of cervical region     Displacement of cervical intervertebral disc without myelopathy     Traumatic shock (H)     Irritable bowel syndrome     Hiatal hernia     GERD (gastroesophageal reflux disease)     Personal history of tobacco use, presenting hazards to health     PTSD (post-traumatic stress disorder)     CARDIOVASCULAR SCREENING; LDL GOAL LESS THAN 130     Obesity     Advanced directives, counseling/discussion     Major depressive disorder, recurrent episode, moderate (H)     Anxiety     Amnesia     Impaired fasting glucose     History of colonic polyps     Hypertension     Hyperlipidemia     Diverticulosis of colon     Cataract     Cannabis abuse, episodic use     Benign neoplasm of  colon     Arthropathy of hand     Arthralgia     Other ill-defined and unknown causes of morbidity and mortality     Subjective tinnitus     Spondylosis of cervical spine without myelopathy     Sensorineural hearing loss (SNHL) of both ears     Pure hypertriglyceridemia     Pure hypercholesterolemia     Psychophysiologic insomnia     Presbyopia     Personality disorder (H)     Other and unspecified noninfectious gastroenteritis and colitis     Impotence of organic origin     History of other injury     History of cervical fracture     Malignant melanoma of scalp (H)     Fibromyositis     Herpes zoster ophthalmicus of left eye     Family history of type 2 diabetes mellitus     Monoallelic mutation of BRAF gene     Past Medical History:   Diagnosis Date     Anxiety      Benign hypertension 12/11/2013     Cervicalgia      Depressive disorder, not elsewhere classified      Diverticulitis of colon 7/10/2013     Esophageal reflux      Irritable bowel syndrome 10/3/2003     Lumbago      Malignant melanoma of scalp (H) 12/29/2020     Migraine, unspecified, without mention of intractable migraine without mention of status migrainosus      Other kyphoscoliosis and scoliosis      Other specified gastritis without mention of hemorrhage      PTSD (post-traumatic stress disorder)      Tobacco abuse since 1965    on and off       CC Oksana Hernández MD  [address] [place] [address] on close of this encounter.

## 2021-05-06 NOTE — TELEPHONE ENCOUNTER
FUTURE VISIT INFORMATION      SURGERY INFORMATION:    Date:  BETI Garcia/Serjio    Consult: ov 21    RECORDS REQUESTED FROM:       [facility]    Pertinent Medical History: hypertension    Most recent EKG+ Tracin21    Most recent ECHO: 21    Most recent Cardiac Stress Test: 14

## 2021-05-07 ENCOUNTER — PREP FOR PROCEDURE (OUTPATIENT)
Dept: OTOLARYNGOLOGY | Facility: CLINIC | Age: 76
End: 2021-05-07

## 2021-05-07 ENCOUNTER — OFFICE VISIT (OUTPATIENT)
Dept: SURGERY | Facility: CLINIC | Age: 76
End: 2021-05-07
Attending: DERMATOLOGY
Payer: MEDICARE

## 2021-05-07 VITALS
HEART RATE: 73 BPM | BODY MASS INDEX: 28.37 KG/M2 | SYSTOLIC BLOOD PRESSURE: 108 MMHG | HEIGHT: 68 IN | TEMPERATURE: 97.7 F | OXYGEN SATURATION: 97 % | DIASTOLIC BLOOD PRESSURE: 72 MMHG | RESPIRATION RATE: 16 BRPM | WEIGHT: 187.2 LBS

## 2021-05-07 DIAGNOSIS — L82.1 SEBORRHEIC KERATOSIS: ICD-10-CM

## 2021-05-07 DIAGNOSIS — D18.01 CHERRY ANGIOMA: ICD-10-CM

## 2021-05-07 DIAGNOSIS — L57.8 SUN-DAMAGED SKIN: ICD-10-CM

## 2021-05-07 DIAGNOSIS — C43.9 METASTATIC MELANOMA (H): Primary | ICD-10-CM

## 2021-05-07 DIAGNOSIS — C43.4 MELANOMA OF SCALP (H): Primary | ICD-10-CM

## 2021-05-07 DIAGNOSIS — D22.9 MULTIPLE BENIGN NEVI: ICD-10-CM

## 2021-05-07 DIAGNOSIS — L81.4 SOLAR LENTIGO: ICD-10-CM

## 2021-05-07 PROCEDURE — 99214 OFFICE O/P EST MOD 30 MIN: CPT | Performed by: DERMATOLOGY

## 2021-05-07 PROCEDURE — G0463 HOSPITAL OUTPT CLINIC VISIT: HCPCS

## 2021-05-07 ASSESSMENT — PAIN SCALES - GENERAL: PAINLEVEL: NO PAIN (0)

## 2021-05-07 ASSESSMENT — MIFFLIN-ST. JEOR: SCORE: 1563.63

## 2021-05-07 NOTE — NURSING NOTE
"Oncology Rooming Note    May 7, 2021 1:17 PM   Ahmet Coleman is a 76 year old male who presents for:    Chief Complaint   Patient presents with     Oncology Clinic Visit     Return: Metastatic melanoma     Initial Vitals: /72 (BP Location: Right arm, Patient Position: Sitting, Cuff Size: Adult Regular)   Pulse 73   Temp 97.7  F (36.5  C) (Tympanic)   Resp 16   Ht 1.727 m (5' 8\")   Wt 84.9 kg (187 lb 3.2 oz)   SpO2 97%   BMI 28.46 kg/m   Estimated body mass index is 28.46 kg/m  as calculated from the following:    Height as of this encounter: 1.727 m (5' 8\").    Weight as of this encounter: 84.9 kg (187 lb 3.2 oz). Body surface area is 2.02 meters squared.  No Pain (0) Comment: Data Unavailable   No LMP for male patient.  Allergies reviewed: Yes  Medications reviewed: Yes    Medications: Medication refills not needed today.  Pharmacy name entered into [facility]:    [id number] - [place], [place] - [address] [id number] - [place], [place] - [address] [facility] - [place], [place] - [address]  [facility] - [place], [place] - [address]    Clinical concerns: N/A       Tess Goodman CMA              "

## 2021-05-07 NOTE — LETTER
5/7/2021         RE: Ahmet Coleman  [address] [address]        Dear Colleague,    Thank you for referring your patient, Ahmet Coleman, to the [facility]. Please see a copy of my visit note below.    [facility] Dermatology Note  Encounter Date: May 7, 2021  Office Visit     Dermatology Problem List:  1. Melanoma, left side of the scalp, stage IIIB  - Biopsies at [facility] on 10/26/20 of the A. left central parietal scalp, B. left central occipital scalp, C. Left posterior parietal scalp, and D. left superior occipital scalp which showed nodular and nevoid melanoma, no ulceration, Breslow 1.3mm (deep and lateral margins positive), 3 mitoses per mm2. PD-L1 TPS <1%. BRAF V600K mutation.  - Biopsies by Dr. Garcia 11/25/20 showed locoregional metastatic melanoma  - PET negative for distant disease 12/8/20  - Started neoadjuvant vemurafenib and cobimetinib 1/2021. Dose reduced due to diarrhea. Atezolizumab added on in February 2021. The goal is to try to get a sufficient response to allow for surgical resection with clear margins. Surgery planned tentatively for 6/2021  2. 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 Possible in transit met on left earlobe - flat - suspect tumor melanosis at present    Social history: [place].  Family history: Father had skin cancer, unsure of type.  ____________________________________________    Assessment & Plan:     # Melanoma, stage IIIB, left scalp. Currently on neoadjuvant treatment with goal to get melanoma smaller in size so surgical resection with clear margins is possible. Current on BRAF + MEK inhibitors + immunotherapy. Plan for surgery in June 2021. Explained role of dermatology in care - to help with cutaneous side effects of medications and screen for new primary melanomas. Explained routine schedule for follow up examinations in office  and need for self skin checks monthly.   - ABCDs of melanoma were discussed and self skin checks were advised.   - Sun precaution was advised including the use of sunscreens of SPF 30 or higher, sun protective clothing, and avoidance of tanning beds.  - Recommended first degree relatives get yearly skin exams as well.  - *Reviewed last onc note from 4/14/21  - *Reviewed ENT note from 5/5/21  - *Reviewed PET and CT from 3/26/21. Showed positive response to treatment with possible residual disease at the high left cranial vertex, no evidence of metastatic disease   - No skin findings concerning for adverse reactions to current melanoma meds  - Next skin check in 3 months.     # Sun damaged skin with solar lentigines: Chronic, stable.  - Recommend sunscreens SPF #30 or greater, protective clothing and avoidance of tanning beds.     # Benign skin findings including: seborrheic keratoses, cherry angioma - minor, self limited problem  - No further intervention required. Patient to report changes.   - Patient reassured of the benign nature of these lesions.     # Multiple clinically benign nevi: Chronic, stable  - No further intervention required. Patient to report changes.   - Patient reassured of the benign nature of these lesions.     # Possible in transit met, left earlobe: Flat today, appearance similar to two spots biopsied at last visit. Suspect tumor melanosis at present. I do not think biopsy will . Will continue to monitor.    Procedures Performed:   None    Follow-up: 3 month(s) in-person, or earlier for new or changing lesions    Staff:     Sidra Esteban MD    Department of Dermatology  [facility]: Phone: [phone], Fax:[phone]  [facility]: Phone: [phone], Fax: [phone]    ____________________________________________    CC: No chief complaint on  "file.    HPI:  Mr. Ahmet Coleman is a(n) 76 year old male who presents today as a return patient for skin check.    Last visit 2/5/21. Biopsies at that time showed tumor melanosis on left superior shoulder and central inferior abdomen.    Saw Dr. Garcia yesterday. Plan for WLE +/- parotidectomy or neck dissection depending on what preop CT shows.    Today, patient notes today that he does not have any skin concerns but feels a bit off. He wonders if this is from his COVID vaccine second shot Saturday. He has been a bit dizzy, had a bit of trouble finding words and focusing, had some light sensitivity. He says all are minor.    Patient is otherwise feeling well, without additional skin concerns. Denies any fevers, chills, cough, shortness of breath, or weight loss.       Labs Reviewed:  N/A    Physical Exam:  Vitals: /72 (BP Location: Right arm, Patient Position: Sitting, Cuff Size: Adult Regular)   Pulse 73   Temp 97.7  F (36.5  C) (Tympanic)   Resp 16   Ht 1.727 m (5' 8\")   Wt 84.9 kg (187 lb 3.2 oz)   SpO2 97%   BMI 28.46 kg/m    SKIN: Full skin, which includes the head/face, both arms, chest, back, abdomen,both legs, genitalia and/or groin buttocks, digits and/or nails, was examined.  - Ortiz Type II  - Scattered brown macules on sun exposed areas.  - On the left frontal, parietal, and temporal scalp, there is a well defined blue-black patch with only central slight elevation to plaque. There is a nickel sized blue black plaque on the left parietal scalp that is separate from larger patch.  - Blue black macule at left lateral earlobe.  - Venous lake on left superior helix.  - There are dome shaped bright red papules on the trunk.   - Multiple regular brown pigmented macules and papules are identified on the body. About 40 nevi in all. None have significant atypia. There is a two component mole on the central lower back.  - There are waxy stuck on tan to brown papules on the trunk, " extremities.  - No other lesions of concern on areas examined.   LYMPH NODES: No submandibular, cervical, supraclavicular, axillary, or inguinal lymphadenopathy palpable on examination.       Medications:  Current Outpatient Medications   Medication     acetaminophen (TYLENOL) 325 MG tablet     cholecalciferol (VITAMIN D3) 1000 UNIT tablet     cobimetinib (COTELLIC) 20 MG tablet     diphenoxylate-atropine (LOMOTIL) 2.5-0.025 MG tablet     fluvoxaMINE (LUVOX) 100 MG tablet     hydrochlorothiazide (HYDRODIURIL) 25 MG tablet     Hypromellose (ARTIFICIAL TEARS OP)     Lactobacillus-Inulin (CULTURELLE DIGESTIVE HEALTH) CAPS     lisinopril (PRINIVIL,ZESTRIL) 10 MG tablet     Magnesium Oxide 420 MG TABS     omeprazole (PRILOSEC) 20 MG capsule     potassium phosphate, monobasic, (K-PHOS) 500 MG tablet     prochlorperazine (COMPAZINE) 10 MG tablet     QUEtiapine (SEROQUEL) 25 MG tablet     simvastatin (ZOCOR) 20 MG tablet     TRAZodone (DESYREL) 50 MG tablet     vemurafenib (ZELBORAF) 240 MG TABS tablet     vortioxetine (TRINTELLIX) 20 MG tablet     Current Facility-Administered Medications   Medication     lidocaine 1% with EPINEPHrine 1:100,000 injection 1.5 mL     lidocaine 1% with EPINEPHrine 1:100,000 injection 1.5 mL     Facility-Administered Medications Ordered in Other Visits   Medication     sodium chloride (PF) 0.9% PF flush 10 mL      Past Medical History:   Patient Active Problem List   Diagnosis     Musculoskeletal disorder and symptoms referable to neck     Degeneration of intervertebral disc of cervical region     Displacement of cervical intervertebral disc without myelopathy     Traumatic shock (H)     Irritable bowel syndrome     Hiatal hernia     GERD (gastroesophageal reflux disease)     Personal history of tobacco use, presenting hazards to health     PTSD (post-traumatic stress disorder)     CARDIOVASCULAR SCREENING; LDL GOAL LESS THAN 130     Obesity     Advanced directives, counseling/discussion      Major depressive disorder, recurrent episode, moderate (H)     Anxiety     Amnesia     Impaired fasting glucose     History of colonic polyps     Hypertension     Hyperlipidemia     Diverticulosis of colon     Cataract     Cannabis abuse, episodic use     Benign neoplasm of colon     Arthropathy of hand     Arthralgia     Other ill-defined and unknown causes of morbidity and mortality     Subjective tinnitus     Spondylosis of cervical spine without myelopathy     Sensorineural hearing loss (SNHL) of both ears     Pure hypertriglyceridemia     Pure hypercholesterolemia     Psychophysiologic insomnia     Presbyopia     Personality disorder (H)     Other and unspecified noninfectious gastroenteritis and colitis     Impotence of organic origin     History of other injury     History of cervical fracture     Malignant melanoma of scalp (H)     Fibromyositis     Herpes zoster ophthalmicus of left eye     Family history of type 2 diabetes mellitus     Monoallelic mutation of BRAF gene     Past Medical History:   Diagnosis Date     Anxiety      Benign hypertension 12/11/2013     Cervicalgia      Depressive disorder, not elsewhere classified      Diverticulitis of colon 7/10/2013     Esophageal reflux      Irritable bowel syndrome 10/3/2003     Lumbago      Malignant melanoma of scalp (H) 12/29/2020     Migraine, unspecified, without mention of intractable migraine without mention of status migrainosus      Other kyphoscoliosis and scoliosis      Other specified gastritis without mention of hemorrhage      PTSD (post-traumatic stress disorder)      Tobacco abuse since 1965    on and off       CC Oksana Hernández MD  [address] [place] [id number] on close of this encounter.                Again, thank you for allowing me to participate in the care of your patient.        Sincerely,        Sidra Esteban MD

## 2021-05-09 DIAGNOSIS — Z11.59 ENCOUNTER FOR SCREENING FOR OTHER VIRAL DISEASES: Primary | ICD-10-CM

## 2021-05-10 ENCOUNTER — DOCUMENTATION ONLY (OUTPATIENT)
Dept: ONCOLOGY | Facility: CLINIC | Age: 76
End: 2021-05-10

## 2021-05-12 ENCOUNTER — HOSPITAL ENCOUNTER (OUTPATIENT)
Dept: CT IMAGING | Facility: CLINIC | Age: 76
End: 2021-05-12
Attending: OTOLARYNGOLOGY
Payer: MEDICARE

## 2021-05-12 ENCOUNTER — ONCOLOGY VISIT (OUTPATIENT)
Dept: ONCOLOGY | Facility: CLINIC | Age: 76
End: 2021-05-12
Attending: PHYSICIAN ASSISTANT
Payer: MEDICARE

## 2021-05-12 VITALS
HEART RATE: 66 BPM | OXYGEN SATURATION: 96 % | BODY MASS INDEX: 28.55 KG/M2 | DIASTOLIC BLOOD PRESSURE: 82 MMHG | TEMPERATURE: 98 F | WEIGHT: 187.8 LBS | SYSTOLIC BLOOD PRESSURE: 139 MMHG

## 2021-05-12 DIAGNOSIS — N18.9 CHRONIC KIDNEY DISEASE, UNSPECIFIED CKD STAGE: ICD-10-CM

## 2021-05-12 DIAGNOSIS — C43.4 MALIGNANT MELANOMA OF SCALP (H): Primary | ICD-10-CM

## 2021-05-12 DIAGNOSIS — H57.12 LEFT EYE PAIN: ICD-10-CM

## 2021-05-12 DIAGNOSIS — C43.4 MALIGNANT MELANOMA OF SCALP (H): ICD-10-CM

## 2021-05-12 DIAGNOSIS — H04.123 DRY EYES: ICD-10-CM

## 2021-05-12 PROCEDURE — 70491 CT SOFT TISSUE NECK W/DYE: CPT | Mod: 26 | Performed by: RADIOLOGY

## 2021-05-12 PROCEDURE — G0463 HOSPITAL OUTPT CLINIC VISIT: HCPCS

## 2021-05-12 PROCEDURE — 250N000011 HC RX IP 250 OP 636

## 2021-05-12 PROCEDURE — G1004 CDSM NDSC: HCPCS | Performed by: RADIOLOGY

## 2021-05-12 PROCEDURE — G1004 CDSM NDSC: HCPCS

## 2021-05-12 PROCEDURE — 99214 OFFICE O/P EST MOD 30 MIN: CPT | Performed by: PHYSICIAN ASSISTANT

## 2021-05-12 PROCEDURE — G0463 HOSPITAL OUTPT CLINIC VISIT: HCPCS | Mod: 25

## 2021-05-12 RX ORDER — IOPAMIDOL 755 MG/ML
100 INJECTION, SOLUTION INTRAVASCULAR ONCE
Status: COMPLETED | OUTPATIENT
Start: 2021-05-12 | End: 2021-05-12

## 2021-05-12 RX ADMIN — IOPAMIDOL 100 ML: 755 INJECTION, SOLUTION INTRAVENOUS at 11:04

## 2021-05-12 ASSESSMENT — PAIN SCALES - GENERAL: PAINLEVEL: NO PAIN (0)

## 2021-05-12 NOTE — LETTER
5/12/2021         RE: Ahmet Coleman  [address] [place] [id number]      MEDICAL ONCOLOGY CONSULT  Melanoma Clinic  May 12, 2021    CHIEF COMPLAINT: Scalp melanoma    Melanoma History:  1. He has a small pigmented scalp lesion present for over 30 years. The lesion was biopsied in 1992 and was benign.  2. October 2020, he presented to [facility] with 1 year of progressive enlargement of the lesion and new onset burning, itching, and stinging sensation in the affected scalp.  3. 10/26/20, He was seen at [facility] and he had shave biopsies of A. left central parietal scalp, B. left central occipital scalp, C. Left posterior parietal scalp, and D. punch biopsy of the left superior occipital scalp. Pathology (specimen: [id number]) showed a nodular and nevoid type melanoma, non-ulcerated, Breslow depth up to 1.3 mm, Saurabh's level IV, and up to 3 mitoses per mm2. All margins were involved. Ki-67 10-20%. Sox10 stain is positive and highlights an atypical compound melanocytic proliferation with significant overlying confluence and pagetosis of intraepidermal melanocytes. T-stage: at least pT2a. PD-L1 staining shows PD-L1 TPS <1%. 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  4. 11/25/20, he was seen by Dr. Garcia and he took three 3mm punch biopsies, which returned as dermal melanocytic proliferations with melanophages and fibrosis, consistent with locoregional metastatic melanoma.  5. 12/8/2020 he had PET-CT, which showed FDG avid irregular skin thickening overlying the left parietal region, with no FDG avid lymphadenopathy in the neck and no evidence of metastasis elsewhere in the body.  6. 1/22/2021 start vemurafenib and cobimetinib, ~2/12/21 held for diarrhea.    INTERVAL HISTORY  Patient reports that overall he is doing okay.  He has noticed some scratchiness in his left eye over the last 3 days.  He has not really found relief from eyedrops.  He denies any  drainage or vision changes.  He reports having bowel movements 1-2 times per week with occasional loose stools managed with Lomotil.  He reports eating and drinking fair.  He notes his appetite is low and he has taste changes that impacts his ability to eat.  He reports his energy remains low, but he is not taking any naps.  His sleep quality varies.  He describes his mood is flat over the last 2 weeks as he is anticipating his upcoming surgery.  He denies other concerns.    Review of Systems:  Patient denies any of the following except if noted above: fevers, chills, vision or hearing changes, chest pain, dyspnea, abdominal pain, nausea, vomiting, urinary concerns, numbness, or tingling. He also denies rashes or new skin lesions, bleeding or bruising issues.    Current Outpatient Medications   Medication Sig Dispense Refill     acetaminophen (TYLENOL) 325 MG tablet TAKE TWO TABLETS BY MOUTH THREE TIMES A DAY AS NEEDED       cholecalciferol (VITAMIN D3) 1000 UNIT tablet Take  by mouth daily.       diphenoxylate-atropine (LOMOTIL) 2.5-0.025 MG tablet Take 1-2 tablets by mouth 4 times daily as needed for diarrhea 120 tablet 5     fluvoxaMINE (LUVOX) 100 MG tablet Take 150 mg at bedtime       hydrochlorothiazide (HYDRODIURIL) 25 MG tablet Take 25 mg by mouth daily.       Hypromellose (ARTIFICIAL TEARS OP) Apply  to eye. 2 drops in each eye twice a day as needed       Lactobacillus-Inulin ([facility] DIGESTIVE [facility]) CAPS 1 tab daily 30 capsule 1     lisinopril (PRINIVIL,ZESTRIL) 10 MG tablet Take 10 mg by mouth daily.       Magnesium Oxide 420 MG TABS Take 420 mg by mouth daily       omeprazole (PRILOSEC) 20 MG capsule Take 1 capsule by mouth 2 times daily. 60 capsule prn     potassium phosphate, monobasic, (K-PHOS) 500 MG tablet Take 1 tablet (500 mg) by mouth 2 times daily 60 tablet 3     prochlorperazine (COMPAZINE) 10 MG tablet Take 1 tablet (10 mg) by mouth every 6 hours as needed (Nausea/Vomiting) 30 tablet 2      QUEtiapine (SEROQUEL) 25 MG tablet 1/2 tablet nightly       simvastatin (ZOCOR) 20 MG tablet Take  by mouth At Bedtime.       TRAZodone (DESYREL) 50 MG tablet Take 50 mg by mouth At Bedtime.       vemurafenib (ZELBORAF) 240 MG TABS tablet Take 3 tablets (720 mg) by mouth every 12 hours for 28 days 168 tablet 0     vortioxetine (TRINTELLIX) 20 MG tablet TAKE ONE TABLET BY MOUTH EVERY MORNING       Physical Exam:  General: The patient is a pleasant male in no acute distress.  /82   Pulse 66   Temp 98  F (36.7  C) (Oral)   Wt 85.2 kg (187 lb 12.8 oz)   SpO2 96%   BMI 28.55 kg/m    Wt Readings from Last 10 Encounters:   05/12/21 85.2 kg (187 lb 12.8 oz)   05/07/21 84.9 kg (187 lb 3.2 oz)   05/05/21 84 kg (185 lb 3 oz)   04/15/21 85.7 kg (188 lb 14.4 oz)   04/14/21 86.2 kg (190 lb)   04/02/21 85.4 kg (188 lb 4.8 oz)   04/01/21 85.4 kg (188 lb 3.2 oz)   03/18/21 88.5 kg (195 lb 1.6 oz)   02/18/21 88.1 kg (194 lb 3.2 oz)   02/05/21 89 kg (196 lb 4.8 oz)   HEENT: EOMI, PERRL. Sclerae are anicteric. Left sclera is mildly erythematous. No drainage noted from either eye. Multiple very small brown dots in left sclera (chronic per patient). Oral mucosa is pink and moist with no lesions or thrush.   Lymph: Neck is supple with no lymphadenopathy in the cervical or supraclavicular areas.   Heart: Regular rate and rhythm.   Lungs: Clear to auscultation bilaterally.   Abdomen: Bowel sounds present, soft, nontender with no palpable hepatosplenomegaly or masses.   Extremities: No lower extremity edema noted bilaterally.   Neuro: Cranial nerves II through XII are grossly intact.  Skin: The large mid-scalp pigmented lesion extends from right of midline to the left side of the head with a smaller separate satellite lesion. Left ear pinna lesion measures 2mm, possible in-transit metastasis. See photos below of scalp and lower abdominal lesion.            LABS   4/29/2021 13:57   Sodium 138   Potassium 3.9   Chloride 102    Carbon Dioxide 32   Urea Nitrogen 20   Creatinine 1.62 (H)   GFR Estimate 41 (L)   GFR Estimate If Black 48 (L)   Calcium 8.8   Anion Gap 4   Phosphorus 2.5   Albumin 3.7   Protein Total 7.4   Bilirubin Total 0.7   Alkaline Phosphatase 110   ALT 30   AST 16   TSH 2.43   Glucose 102 (H)   WBC 3.1 (L)   Hemoglobin 13.1 (L)   Hematocrit 38.5 (L)   Platelet Count 193   RBC Count 4.52   MCV 85   MCH 29.0   MCHC 34.0   RDW 12.9   Diff Method Automated Method   % Neutrophils 51.5   % Lymphocytes 29.1   % Monocytes 13.6   % Eosinophils 4.9   % Basophils 0.6   % Immature Granulocytes 0.3   Absolute Neutrophil 1.6   Absolute Lymphocytes 0.9   Absolute Monocytes 0.4   Absolute Eosinophils 0.2   Absolute Basophils 0.0   Abs Immature Granulocytes 0.0     ASSESSMENT AND PLAN  Cutaneous melanoma, scalp primary, pT2a cN1c, clinical Stage IIIB. It was a pleasure to see Mr. Coleman today. He is a 76 year old  with agent orange cutaneous exposures in the [place] war and a diagnosis of malignant melanoma arising from a pre-existing pigmented lesion on the scalp. He has clinical evidence of satellitosis with separation of a left temporal satellite from the main tumor mass, and possibly in-transit metastasis to the helix of the left ear. He was reviewed for participation in the NeoActivate study, but he does not meet criteria as he does not have regional lymph node disease. He started on neoadjuvant vemurafenib and cobimetinib on 1/22/21. He was tolerating this fairly well now, but then developed worsening diarrhea, so his treatment was held and then dose reduced. He is tolerating treatment better with vemurafenib at 720 mg and cobimetinib at 40 mg.  He also started on atezolizumab on 2/18/21 and tolerated this well.  He completed 4 cycles of atezolizumab, last on 4/29/21.  He has had a partial response to therapy with decrease in thickening of the large scalp tumor and lightening of the periphery and in the middle part of the  tumor. He will hold vem/jackson now in anticipation of surgery. He will undergo surgical resection the end of May and have follow-up with Dr. Hernández in June.     Of note, he did have a distant site on his abdomen biopsied that showed tumoral melanosis. We will need to keep this in mid as we consider future plans for surgery. Will plan to deescalate therapy to atezolizumab monotherapy after surgery to complete one year of treatment. This plan was reviewed with the patient again today.     Diarrhea. Under control. Seems most likely secondary to his treatment, complicated by possible IBS. Encouraged him to use Lomotil and/or Imodium as needed.    CKD. Suspect secondary to hypertension. Will continue to monitor. Seems easily affected by mild dehydration with diarrhea. Previously, encouraged pushing fluids.     Hypertension. BP is under reasonably control.  He remains on hydrochlorothiazide and lisinopril. [facility] Will continue to monitor in clinic.     Dry eyes, now with left eye pain. Had at baseline, but worse with vem/jackson. Again, recommend continuing with lubricating eye drops like Refresh, Systane, or Blink. Given new eye pain, recommend seeing an eye doctor to assess further. Vemurafenib can cause iritis and uveitis.     COVID vaccine. [facility]. Did not confirm that he received the second dose. Will check again in the future.     Sherine Cardoza PA-C  [facility]  [address] [place] [address]  [phone]        Sherine Cardoza PA-C

## 2021-05-12 NOTE — PROGRESS NOTES
MEDICAL ONCOLOGY CONSULT  Melanoma Clinic  May 12, 2021    CHIEF COMPLAINT: Scalp melanoma    Melanoma History:  1. He has a small pigmented scalp lesion present for over 30 years. The lesion was biopsied in 1992 and was benign.  2. October 2020, he presented to [facility] with 1 year of progressive enlargement of the lesion and new onset burning, itching, and stinging sensation in the affected scalp.  3. 10/26/20, He was seen at [facility] and he had shave biopsies of A. left central parietal scalp, B. left central occipital scalp, C. Left posterior parietal scalp, and D. punch biopsy of the left superior occipital scalp. Pathology (specimen: [id number]) showed a nodular and nevoid type melanoma, non-ulcerated, Breslow depth up to 1.3 mm, Saurabh's level IV, and up to 3 mitoses per mm2. All margins were involved. Ki-67 10-20%. Sox10 stain is positive and highlights an atypical compound melanocytic proliferation with significant overlying confluence and pagetosis of intraepidermal melanocytes. T-stage: at least pT2a. PD-L1 staining shows PD-L1 TPS <1%. 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  4. 11/25/20, he was seen by Dr. Garcia and he took three 3mm punch biopsies, which returned as dermal melanocytic proliferations with melanophages and fibrosis, consistent with locoregional metastatic melanoma.  5. 12/8/2020 he had PET-CT, which showed FDG avid irregular skin thickening overlying the left parietal region, with no FDG avid lymphadenopathy in the neck and no evidence of metastasis elsewhere in the body.  6. 1/22/2021 start vemurafenib and cobimetinib, ~2/12/21 held for diarrhea.    INTERVAL HISTORY  Patient reports that overall he is doing okay.  He has noticed some scratchiness in his left eye over the last 3 days.  He has not really found relief from eyedrops.  He denies any drainage or vision changes.  He reports having bowel movements 1-2 times per week with occasional loose  stools managed with Lomotil.  He reports eating and drinking fair.  He notes his appetite is low and he has taste changes that impacts his ability to eat.  He reports his energy remains low, but he is not taking any naps.  His sleep quality varies.  He describes his mood is flat over the last 2 weeks as he is anticipating his upcoming surgery.  He denies other concerns.    Review of Systems:  Patient denies any of the following except if noted above: fevers, chills, vision or hearing changes, chest pain, dyspnea, abdominal pain, nausea, vomiting, urinary concerns, numbness, or tingling. He also denies rashes or new skin lesions, bleeding or bruising issues.    Current Outpatient Medications   Medication Sig Dispense Refill     acetaminophen (TYLENOL) 325 MG tablet TAKE TWO TABLETS BY MOUTH THREE TIMES A DAY AS NEEDED       cholecalciferol (VITAMIN D3) 1000 UNIT tablet Take  by mouth daily.       diphenoxylate-atropine (LOMOTIL) 2.5-0.025 MG tablet Take 1-2 tablets by mouth 4 times daily as needed for diarrhea 120 tablet 5     fluvoxaMINE (LUVOX) 100 MG tablet Take 150 mg at bedtime       hydrochlorothiazide (HYDRODIURIL) 25 MG tablet Take 25 mg by mouth daily.       Hypromellose (ARTIFICIAL TEARS OP) Apply  to eye. 2 drops in each eye twice a day as needed       Lactobacillus-Inulin ([facility] DIG[facility]) CAPS 1 tab daily 30 capsule 1     lisinopril (PRINIVIL,ZESTRIL) 10 MG tablet Take 10 mg by mouth daily.       Magnesium Oxide 420 MG TABS Take 420 mg by mouth daily       omeprazole (PRILOSEC) 20 MG capsule Take 1 capsule by mouth 2 times daily. 60 capsule prn     potassium phosphate, monobasic, (K-PHOS) 500 MG tablet Take 1 tablet (500 mg) by mouth 2 times daily 60 tablet 3     prochlorperazine (COMPAZINE) 10 MG tablet Take 1 tablet (10 mg) by mouth every 6 hours as needed (Nausea/Vomiting) 30 tablet 2     QUEtiapine (SEROQUEL) 25 MG tablet 1/2 tablet nightly       simvastatin (ZOCOR) 20 MG tablet Take  by  mouth At Bedtime.       TRAZodone (DESYREL) 50 MG tablet Take 50 mg by mouth At Bedtime.       vemurafenib (ZELBORAF) 240 MG TABS tablet Take 3 tablets (720 mg) by mouth every 12 hours for 28 days 168 tablet 0     vortioxetine (TRINTELLIX) 20 MG tablet TAKE ONE TABLET BY MOUTH EVERY MORNING       Physical Exam:  General: The patient is a pleasant male in no acute distress.  /82   Pulse 66   Temp 98  F (36.7  C) (Oral)   Wt 85.2 kg (187 lb 12.8 oz)   SpO2 96%   BMI 28.55 kg/m    Wt Readings from Last 10 Encounters:   05/12/21 85.2 kg (187 lb 12.8 oz)   05/07/21 84.9 kg (187 lb 3.2 oz)   05/05/21 84 kg (185 lb 3 oz)   04/15/21 85.7 kg (188 lb 14.4 oz)   04/14/21 86.2 kg (190 lb)   04/02/21 85.4 kg (188 lb 4.8 oz)   04/01/21 85.4 kg (188 lb 3.2 oz)   03/18/21 88.5 kg (195 lb 1.6 oz)   02/18/21 88.1 kg (194 lb 3.2 oz)   02/05/21 89 kg (196 lb 4.8 oz)   HEENT: EOMI, PERRL. Sclerae are anicteric. Left sclera is mildly erythematous. No drainage noted from either eye. Multiple very small brown dots in left sclera (chronic per patient). Oral mucosa is pink and moist with no lesions or thrush.   Lymph: Neck is supple with no lymphadenopathy in the cervical or supraclavicular areas.   Heart: Regular rate and rhythm.   Lungs: Clear to auscultation bilaterally.   Abdomen: Bowel sounds present, soft, nontender with no palpable hepatosplenomegaly or masses.   Extremities: No lower extremity edema noted bilaterally.   Neuro: Cranial nerves II through XII are grossly intact.  Skin: The large mid-scalp pigmented lesion extends from right of midline to the left side of the head with a smaller separate satellite lesion. Left ear pinna lesion measures 2mm, possible in-transit metastasis. See photos below of scalp and lower abdominal lesion.            LABS   4/29/2021 13:57   Sodium 138   Potassium 3.9   Chloride 102   Carbon Dioxide 32   Urea Nitrogen 20   Creatinine 1.62 (H)   GFR Estimate 41 (L)   GFR Estimate If Black 48  (L)   Calcium 8.8   Anion Gap 4   Phosphorus 2.5   Albumin 3.7   Protein Total 7.4   Bilirubin Total 0.7   Alkaline Phosphatase 110   ALT 30   AST 16   TSH 2.43   Glucose 102 (H)   WBC 3.1 (L)   Hemoglobin 13.1 (L)   Hematocrit 38.5 (L)   Platelet Count 193   RBC Count 4.52   MCV 85   MCH 29.0   MCHC 34.0   RDW 12.9   Diff Method Automated Method   % Neutrophils 51.5   % Lymphocytes 29.1   % Monocytes 13.6   % Eosinophils 4.9   % Basophils 0.6   % Immature Granulocytes 0.3   Absolute Neutrophil 1.6   Absolute Lymphocytes 0.9   Absolute Monocytes 0.4   Absolute Eosinophils 0.2   Absolute Basophils 0.0   Abs Immature Granulocytes 0.0     ASSESSMENT AND PLAN  Cutaneous melanoma, scalp primary, pT2a cN1c, clinical Stage IIIB. It was a pleasure to see Mr. Coleman today. He is a 76 year old  with agent orange cutaneous exposures in the [place] war and a diagnosis of malignant melanoma arising from a pre-existing pigmented lesion on the scalp. He has clinical evidence of satellitosis with separation of a left temporal satellite from the main tumor mass, and possibly in-transit metastasis to the helix of the left ear. He was reviewed for participation in the NeoActivate study, but he does not meet criteria as he does not have regional lymph node disease. He started on neoadjuvant vemurafenib and cobimetinib on 1/22/21. He was tolerating this fairly well now, but then developed worsening diarrhea, so his treatment was held and then dose reduced. He is tolerating treatment better with vemurafenib at 720 mg and cobimetinib at 40 mg.  He also started on atezolizumab on 2/18/21 and tolerated this well.  He completed 4 cycles of atezolizumab, last on 4/29/21.  He has had a partial response to therapy with decrease in thickening of the large scalp tumor and lightening of the periphery and in the middle part of the tumor. He will hold vem/jackson now in anticipation of surgery. He will undergo surgical resection the end of May  and have follow-up with Dr. Hernández in June.     Of note, he did have a distant site on his abdomen biopsied that showed tumoral melanosis. We will need to keep this in mid as we consider future plans for surgery. Will plan to deescalate therapy to atezolizumab monotherapy after surgery to complete one year of treatment. This plan was reviewed with the patient again today.     Diarrhea. Under control. Seems most likely secondary to his treatment, complicated by possible IBS. Encouraged him to use Lomotil and/or Imodium as needed.    CKD. Suspect secondary to hypertension. Will continue to monitor. Seems easily affected by mild dehydration with diarrhea. Previously, encouraged pushing fluids.     Hypertension. BP is under reasonably control.  He remains on hydrochlorothiazide and lisinopril. [facility] Will continue to monitor in clinic.     Dry eyes, now with left eye pain. Had at baseline, but worse with vem/jackson. Again, recommend continuing with lubricating eye drops like Refresh, Systane, or Blink. Given new eye pain, recommend seeing an eye doctor to assess further. Vemurafenib can cause iritis and uveitis.     COVID vaccine. [facility]. Did not confirm that he received the second dose. Will check again in the future.     Sherine Cardoza PA-C  [facility]  [address] [place] [address] [phone]

## 2021-05-12 NOTE — NURSING NOTE
"Oncology Rooming Note    May 12, 2021 9:55 AM   Ahmet Coleman is a 76 year old male who presents for:    Chief Complaint   Patient presents with     Oncology Clinic Visit     malignant melanoma of scalp     Initial Vitals: /82   Pulse 66   Temp 98  F (36.7  C) (Oral)   Wt 85.2 kg (187 lb 12.8 oz)   SpO2 96%   BMI 28.55 kg/m   Estimated body mass index is 28.55 kg/m  as calculated from the following:    Height as of 5/7/21: 1.727 m (5' 8\").    Weight as of this encounter: 85.2 kg (187 lb 12.8 oz). Body surface area is 2.02 meters squared.  No Pain (0) Comment: Data Unavailable   No LMP for male patient.  Allergies reviewed: Yes  Medications reviewed: Yes    Medications: Medication refills not needed today.  Pharmacy name entered into EQ works:    [id number] - [place], [place] - [address] [facility] [id number] - [place], [place] - [address] [facility] - [place], [place] - [facility]  [facility] - [place], [place] - [facility]    Clinical concerns: none       Candelaria Birch CMA            "

## 2021-05-13 ENCOUNTER — ANESTHESIA EVENT (OUTPATIENT)
Dept: SURGERY | Facility: CLINIC | Age: 76
DRG: 578 | End: 2021-05-13
Payer: MEDICARE

## 2021-05-13 ENCOUNTER — PRE VISIT (OUTPATIENT)
Dept: SURGERY | Facility: CLINIC | Age: 76
End: 2021-05-13

## 2021-05-13 ENCOUNTER — DOCUMENTATION ONLY (OUTPATIENT)
Dept: ONCOLOGY | Facility: CLINIC | Age: 76
End: 2021-05-13

## 2021-05-13 ASSESSMENT — LIFESTYLE VARIABLES: TOBACCO_USE: 1

## 2021-05-13 NOTE — H&P (VIEW-ONLY)
Pre-Operative H & P     CC:  Preoperative exam to assess for increased cardiopulmonary risk while undergoing surgery and anesthesia.    Date of Encounter: 2021  Primary Care Physician:  No Ref-Primary, Physician  Reason for visit: Melanoma of scalp (H) [C43.4]  HPI  Ahmet Coleman is a 76 year old male who presents for pre-operative H & P in preparation for wide local excision of scalp melanoma, left latissimus free flap with Maggy Garcia and Carter on 21 at [facility]. History is obtained from the patient and medical records.    At the beginning of this visit patient ID was confirmed using name and .     Video-Visit Details    Type of service:  Video Visit    Patient verbally consented to video service today: YES      Video Start Time: 8:56am  Video End Time (time video stopped): 9:08am    Originating Location (pt. Location): Home    Distant Location (provider location):  [facility]OPER[facility]    Mode of Communication:  Video Conference via [employer]    Patient who was recently evaluated by Dr. Garcia for a large left scalp melanoma, approximately 6 X 9 cm in size with satellite lesions. He was evaluated by Oncology and underwent a trial with vemurfenib, and cobimetinib which he tolerated generally well. A repeat PET scan in 3/2021 showed a decreased in the FDG uptake and no obvious distant metastasis. He has been counseled for above surgical resection by Dr. Garcia with left latissimus free flap by Dr. Machado.     Patient s history is otherwise significant for HLD, HTN, GERD, migraines, neuropathy, CRI, anxiety, depression, and PTSD. Today patient denies fever, cough, shortness of breath, chest pain, irregular HR, or ankle edema. He denies pain. When asked about stroke history he describes an episode several years ago where he was taken to the ED with concern for stroke. He had a work up but reports that he was never  told anything. Shingles involving his eye was discovered at that time. Review of available records with nothing that matches the description exactly. He has been seen for syncopal episodes in the past. Available brain scans were negative for stroke. He reports that afterwards he had neuralgias or neuropathy of legs and feet. He has been followed by unsteady gait. He takes Lyrica.       Past Medical History  Past Medical History:   Diagnosis Date     Anxiety      Benign hypertension 12/11/2013     Cervicalgia      Chronic kidney disease      Depressive disorder, not elsewhere classified      Diverticulitis of colon 07/10/2013     Esophageal reflux      Irritable bowel syndrome 10/03/2003     Lumbago      Malignant melanoma of scalp (H) 12/29/2020     Migraine, unspecified, without mention of intractable migraine without mention of status migrainosus      Other kyphoscoliosis and scoliosis      Other specified gastritis without mention of hemorrhage      PTSD (post-traumatic stress disorder)      Tobacco abuse since 1965    on and off       Past Surgical History  Past Surgical History:   Procedure Laterality Date     C APPENDECTOMY       COLONOSCOPY N/A 6/8/2016    Procedure: COMBINED COLONOSCOPY, SINGLE OR MULTIPLE BIOPSY/POLYPECTOMY BY BIOPSY;  Surgeon: Mahesh Amanda MD;  Location:  GI     ESOPHAGOSCOPY, GASTROSCOPY, DUODENOSCOPY (EGD), COMBINED  9/6/2011    Procedure:COMBINED ESOPHAGOSCOPY, GASTROSCOPY, DUODENOSCOPY (EGD), BIOPSY SINGLE OR MULTIPLE; Esophagogastroduodenoscopy with multiple Biopsy's; Surgeon:MARINA DYE; Location: GI     [facility] NONSPECIFIC PROCEDURE      nasal septal fx and a devitation needing plastic surgery       Hx of Blood transfusions/reactions: Denies.      Hx of abnormal bleeding or anti-platelet use: Denies.     Menstrual history: No LMP for male patient.    Steroid use in the last year: Denies.     Personal or FH with difficulty with Anesthesia:  Denies.     Prior to Admission  Medications  Current Outpatient Medications   Medication Sig Dispense Refill     acetaminophen (TYLENOL) 325 MG tablet TAKE TWO TABLETS BY MOUTH THREE TIMES A DAY AS NEEDED       cholecalciferol (VITAMIN D3) 1000 UNIT tablet Take 1,000 Units by mouth daily        fluvoxaMINE (LUVOX) 100 MG tablet Take 150 mg at bedtime       hydrochlorothiazide (HYDRODIURIL) 25 MG tablet Take 25 mg by mouth daily.       Hypromellose (ARTIFICIAL TEARS OP) Apply  to eye. 2 drops in each eye twice a day as needed       Lactobacillus-Inulin ([facility] DIGESTIVE [facility]) CAPS 1 tab daily (Patient taking differently: Take 1 tablet by mouth daily ) 30 capsule 1     lisinopril (ZESTRIL) 40 MG tablet Take 40 mg by mouth daily        Magnesium Oxide 420 MG TABS Take 420 mg by mouth daily       omeprazole (PRILOSEC) 20 MG capsule Take 1 capsule by mouth 2 times daily. 60 capsule prn     potassium phosphate, monobasic, (K-PHOS) 500 MG tablet Take 1 tablet (500 mg) by mouth 2 times daily (Patient taking differently: Take 500 mg by mouth daily ) 60 tablet 3     pregabalin (LYRICA) 150 MG capsule Take 150 mg by mouth 2 times daily       QUEtiapine (SEROQUEL) 300 MG tablet Take 150 mg by mouth At Bedtime        simvastatin (ZOCOR) 80 MG tablet Take 40 mg by mouth At Bedtime        traZODone (DESYREL) 100 MG tablet Take 100 mg by mouth At Bedtime        vortioxetine (TRINTELLIX) 20 MG tablet TAKE ONE TABLET BY MOUTH EVERY MORNING       diphenoxylate-atropine (LOMOTIL) 2.5-0.025 MG tablet Take 1-2 tablets by mouth 4 times daily as needed for diarrhea (Patient not taking: Reported on 5/12/2021) 120 tablet 5     prochlorperazine (COMPAZINE) 10 MG tablet Take 1 tablet (10 mg) by mouth every 6 hours as needed (Nausea/Vomiting) (Patient not taking: Reported on 5/12/2021) 30 tablet 2     vemurafenib (ZELBORAF) 240 MG TABS tablet Take 3 tablets (720 mg) by mouth every 12 hours for 28 days (Patient not taking: Reported on 5/12/2021) 168 tablet 0        Allergies  Allergies   Allergen Reactions     Aspirin      Motrin [Ibuprofen Micronized]      And ASA  Cramps  diarrhea       Social History  Social History     Socioeconomic History     Marital status:      Spouse name: Not on file     Number of children: Not on file     Years of education: Not on file     Highest education level: Not on file   Occupational History     Not on file   Social Needs     Financial resource strain: Not on file     Food insecurity     Worry: Not on file     Inability: Not on file     Transportation needs     Medical: Not on file     Non-medical: Not on file   Tobacco Use     Smoking status: Former Smoker     Packs/day: 0.50     Years: 45.00     Pack years: 22.50     Types: Cigarettes     Quit date: 2013     Years since quittin.8     Smokeless tobacco: Never Used     Tobacco comment: since , on and off in there 1/2 pack per day   Substance and Sexual Activity     Alcohol use: No     Drug use: Not Currently     Types: Marijuana     Sexual activity: Never     Partners: Female     Comment: not currently active   Lifestyle     Physical activity     Days per week: Not on file     Minutes per session: Not on file     Stress: Not on file   Relationships     Social connections     Talks on phone: Not on file     Gets together: Not on file     Attends Mandaen service: Not on file     Active member of club or organization: Not on file     Attends meetings of clubs or organizations: Not on file     Relationship status: Not on file     Intimate partner violence     Fear of current or ex partner: Not on file     Emotionally abused: Not on file     Physically abused: Not on file     Forced sexual activity: Not on file   Other Topics Concern     Parent/sibling w/ CABG, MI or angioplasty before 65F 55M? No   Social History Narrative     Not on file       Family History  Family History   Problem Relation Age of Onset     Diabetes Father      Heart Disease Brother        ROS/MED  HISTORY  The complete review of systems is negative other than noted in the HPI or here.    ENT/Pulmonary:     (+) tobacco use, Past use,     Neurologic: Comment: Cervicalgia  Unsteady gait, neuropathy    (+) migraines,     Cardiovascular:     (+) Dyslipidemia hypertension-----Previous cardiac testing   Echo: Date: 2019 Results:    Stress Test: Date: 2014 Results:    ECG Reviewed: Date: 4/29/21 Results:  SB, nonspecific anterior T wave abnormality, no changes from previous  Cath: Date: Results:   (-) taking anticoagulants/antiplatelets   METS/Exercise Tolerance: 3 - Able to walk 1-2 blocks without stopping    Hematologic:  - neg hematologic  ROS     Musculoskeletal:  - neg musculoskeletal ROS     GI/Hepatic:     (+) GERD, Asymptomatic on medication, hiatal hernia,     Renal/Genitourinary:  - neg Renal ROS     Endo:  - neg endo ROS     Psychiatric/Substance Use:     (+) psychiatric history anxiety, depression and other (comment) (PTSD)     Infectious Disease:  - neg infectious disease ROS     Malignancy:   (+) Malignancy, History of Skin.Skin CA Active status post Surgery.        Other:  - neg other ROS        LABS: Personally reviewed  CBC:   Lab Results   Component Value Date    WBC 3.1 (L) 04/29/2021    WBC 3.4 (L) 04/15/2021    HGB 13.1 (L) 04/29/2021    HGB 13.1 (L) 04/15/2021    HCT 38.5 (L) 04/29/2021    HCT 37.6 (L) 04/15/2021     04/29/2021     04/15/2021     BMP:   Lab Results   Component Value Date     04/29/2021     04/15/2021    POTASSIUM 3.9 04/29/2021    POTASSIUM 4.1 04/15/2021    CHLORIDE 102 04/29/2021    CHLORIDE 102 04/15/2021    CO2 32 04/29/2021    CO2 33 (H) 04/15/2021    BUN 20 04/29/2021    BUN 20 04/15/2021    CR 1.62 (H) 04/29/2021    CR 1.63 (H) 04/15/2021     (H) 04/29/2021     (H) 04/15/2021     COAGS: No results found for: PTT, INR, FIBR  POC: No results found for: BGM, HCG, HCGS  HEPATIC:   Lab Results   Component Value Date    ALBUMIN 3.7  04/29/2021    PROTTOTAL 7.4 04/29/2021    ALT 30 04/29/2021    AST 16 04/29/2021    GGT 46 04/15/2021    ALKPHOS 110 04/29/2021    BILITOTAL 0.7 04/29/2021     OTHER:   Lab Results   Component Value Date    LACT 0.8 08/11/2011    STEPHEN 8.8 04/29/2021    PHOS 2.5 04/29/2021    MAG 2.3 04/15/2021    LIPASE 138 04/13/2016    TSH 2.43 04/29/2021    CRP <2.9 04/13/2016    SED 9 04/13/2016        Physical Exam  Constitutional: Awake, alert, no apparent distress, and appears stated age.  HENT: Long beard.  Respiratory: No cough or obvious dyspnea.  Neuropsychiatric: Calm, cooperative. Normal affect.   Please refer to the physical examination documented by the anesthesiologist in the anesthesia record on the day of surgery    EKG: Personally reviewed 4/29/21 Sinus bradycardia, nonspecific anterior T wave abnormality, no changes from previous  Cardiac echo: 1/12/21  Interpretation Summary     Technically difficult study. Poor acoustic windows.  Global and regional left ventricular function is normal with an EF of 60-65%.  Global right ventricular function is normal. The right ventricle is normal  size.  The valvular examination was very limited due to the limited acoustic windows.     IVC diameter <2.1 cm collapsing >50% with sniff suggests a normal RA pressure  of 3 mmHg.  This study was compared with the study from 10/03/2011. There has been no  change.  Stress test: 2014 Stress echo  Interpretation Summary  Normal resting wall motion and no stress-induced wall motion abnormality.   This was a normal stress echocardiogram.    CT Soft tissue neck 5/12/21                                                                   Impression:  Normal CT study of the neck with contrast.  No evident mass or  adenopathy within the neck.     CT CAP 3/26/21                                                                  IMPRESSION: In this patient with a history of malignant melanoma of  the scalp undergoing chemotherapy:     1. Diffusely  decreased uptake and skin thickening in the left parietal  scalp consistent with positive response to therapy. There a small  focus of residual skin thickening at the high left cranial vertex,  possibly mild residual disease.     2. No evidence of metastatic disease.     3. Incidental findings include colonic diverticulosis without evidence  of acute diverticulitis.    PET Oncology 3/26/21  IMPRESSION: In this patient with a history of malignant melanoma of  the scalp undergoing chemotherapy:     1. Diffusely decreased uptake and skin thickening in the left parietal  scalp consistent with positive response to therapy. There a small  focus of residual skin thickening at the high left cranial vertex,  possibly mild residual disease.     2. No evidence of metastatic disease.     3. Incidental findings include colonic diverticulosis without evidence  of acute diverticulitis.    Imaging and cardiac testing reviewed by this provider      Outside records reviewed from: Care Everywhere    ASSESSMENT and PLAN  Ahmet Coleman is a 76 year old male scheduled to undergo wide local excision of scalp melanoma, left latissimus free flap with Maggy Garcia and Carter on 5/24/21. He has the following specific operative considerations:   - RCRI : No serious cardiac risks.    - Anesthesia considerations:  Refer to PAC assessment in anesthesia records  - VTE risk: 3%  - NEELIMA # of risks 4/8 = Intermediate risk  - Risk of PONV score = 2.  If > 2, anti-emetic intervention recommended.    --Melanoma of scalp, s/p trial with vemurfenib and cobimetinib. Now preparing for above surgeries.   --No history of problems with anesthesia.  --HLD simvastatin at HS. HTN. Will hold HCTZ and lisinopril on DOS. No other cardiac history, symptoms or meds. EKG SB, Last echo EF 60-65%, limited exam due to poor acoustic windows, last stress test normal 2014. Able to walk some distance.   --Former smoker Quit in 2013. Denies pulmonary symptoms.   --GERD  Will take omeprazole on DOS.   --History of migraines. No recent issues. History of unsteady gait, neuropathy. Will take Lyrica on DOS.  --CRI Cr 1.62.   --Anxiety/depression/PTSD. [place]. Will take Trintellix on DOS. Seroquel and Luvox at HS.   --Denies history of blood transfusion. Type and screen will be drawn on DOS.    Arrival time, NPO, shower and medication instructions provided by nursing staff today.       Patient was discussed with Dr Teague.    RAE Ross CNS  Preoperative Assessment Center  [facility]  Phone: [phone]  Fax: [phone]

## 2021-05-14 ENCOUNTER — PATIENT OUTREACH (OUTPATIENT)
Dept: OTOLARYNGOLOGY | Facility: CLINIC | Age: 76
End: 2021-05-14

## 2021-05-17 ENCOUNTER — PRE VISIT (OUTPATIENT)
Dept: SURGERY | Facility: CLINIC | Age: 76
End: 2021-05-17

## 2021-05-21 DIAGNOSIS — Z11.59 ENCOUNTER FOR SCREENING FOR OTHER VIRAL DISEASES: ICD-10-CM

## 2021-05-21 LAB
LABORATORY COMMENT REPORT: NORMAL
SARS-COV-2 RNA RESP QL NAA+PROBE: NEGATIVE
SARS-COV-2 RNA RESP QL NAA+PROBE: NORMAL
SPECIMEN SOURCE: NORMAL
SPECIMEN SOURCE: NORMAL

## 2021-05-21 PROCEDURE — U0005 INFEC AGEN DETEC AMPLI PROBE: HCPCS | Performed by: OTOLARYNGOLOGY

## 2021-05-21 PROCEDURE — U0003 INFECTIOUS AGENT DETECTION BY NUCLEIC ACID (DNA OR RNA); SEVERE ACUTE RESPIRATORY SYNDROME CORONAVIRUS 2 (SARS-COV-2) (CORONAVIRUS DISEASE [COVID-19]), AMPLIFIED PROBE TECHNIQUE, MAKING USE OF HIGH THROUGHPUT TECHNOLOGIES AS DESCRIBED BY CMS-2020-01-R: HCPCS | Performed by: OTOLARYNGOLOGY

## 2021-05-21 RX ORDER — CEFAZOLIN SODIUM 2 G/100ML
2 INJECTION, SOLUTION INTRAVENOUS
Status: CANCELLED | OUTPATIENT
Start: 2021-05-21

## 2021-05-21 RX ORDER — CEFAZOLIN SODIUM 2 G/100ML
2 INJECTION, SOLUTION INTRAVENOUS SEE ADMIN INSTRUCTIONS
Status: CANCELLED | OUTPATIENT
Start: 2021-05-21

## 2021-05-21 NOTE — PROGRESS NOTES
Called and spoke with patient regarding the following CT scan results:                                                                    Impression:  Normal CT study of the neck with contrast.  No evident mass or  adenopathy within the neck.        Reviewed with patient that CT scan of the neck shows no evidence of any lymphadenopathy or concerning findings. Reviewed with patient that there is no indication for parotidectomy or neck dissection at this time.  Dr. Garcia will plan to proceed as scheduled on 5/24 with scalp resection and Dr. Machado will proceed with reconstruction.     Patient verbalized understanding and was encouraged to call with any questions or concerns.     Rekha Reynolds, RN, BSN

## 2021-05-24 ENCOUNTER — HOSPITAL ENCOUNTER (INPATIENT)
Facility: CLINIC | Age: 76
LOS: 4 days | Discharge: HOME HEALTH | DRG: 578 | End: 2021-05-28
Attending: OTOLARYNGOLOGY | Admitting: OTOLARYNGOLOGY
Payer: MEDICARE

## 2021-05-24 ENCOUNTER — ANESTHESIA (OUTPATIENT)
Dept: SURGERY | Facility: CLINIC | Age: 76
DRG: 578 | End: 2021-05-24
Payer: MEDICARE

## 2021-05-24 DIAGNOSIS — C43.4 MELANOMA OF SCALP (H): ICD-10-CM

## 2021-05-24 LAB
ABO + RH BLD: NORMAL
ABO + RH BLD: NORMAL
ANION GAP SERPL CALCULATED.3IONS-SCNC: 5 MMOL/L (ref 3–14)
BASE EXCESS BLDA CALC-SCNC: 3.4 MMOL/L
BASE EXCESS BLDA CALC-SCNC: 4.5 MMOL/L
BLD GP AB SCN SERPL QL: NORMAL
BLOOD BANK CMNT PATIENT-IMP: NORMAL
BUN SERPL-MCNC: 14 MG/DL (ref 7–30)
CA-I BLD-MCNC: 4.4 MG/DL (ref 4.4–5.2)
CA-I BLD-MCNC: 4.6 MG/DL (ref 4.4–5.2)
CALCIUM SERPL-MCNC: 8 MG/DL (ref 8.5–10.1)
CHLORIDE SERPL-SCNC: 105 MMOL/L (ref 94–109)
CO2 SERPL-SCNC: 29 MMOL/L (ref 20–32)
CREAT SERPL-MCNC: 1.12 MG/DL (ref 0.66–1.25)
CREAT SERPL-MCNC: 1.3 MG/DL (ref 0.66–1.25)
ERYTHROCYTE [DISTWIDTH] IN BLOOD BY AUTOMATED COUNT: 13.2 % (ref 10–15)
GFR SERPL CREATININE-BSD FRML MDRD: 54 ML/MIN/{1.73_M2}
GFR SERPL CREATININE-BSD FRML MDRD: 64 ML/MIN/{1.73_M2}
GLUCOSE BLD-MCNC: 132 MG/DL (ref 70–99)
GLUCOSE BLD-MCNC: 142 MG/DL (ref 70–99)
GLUCOSE BLDC GLUCOMTR-MCNC: 112 MG/DL (ref 70–99)
GLUCOSE BLDC GLUCOMTR-MCNC: 128 MG/DL (ref 70–99)
GLUCOSE SERPL-MCNC: 119 MG/DL (ref 70–99)
HCO3 BLD-SCNC: 28 MMOL/L (ref 21–28)
HCO3 BLD-SCNC: 30 MMOL/L (ref 21–28)
HCT VFR BLD AUTO: 27.2 % (ref 40–53)
HGB BLD-MCNC: 10.7 G/DL (ref 13.3–17.7)
HGB BLD-MCNC: 12 G/DL (ref 13.3–17.7)
HGB BLD-MCNC: 13 G/DL (ref 13.3–17.7)
HGB BLD-MCNC: 9.4 G/DL (ref 13.3–17.7)
LACTATE BLD-SCNC: 1 MMOL/L (ref 0.7–2)
LACTATE BLD-SCNC: 1.1 MMOL/L (ref 0.7–2)
LACTATE BLD-SCNC: 1.5 MMOL/L (ref 0.7–2)
MAGNESIUM SERPL-MCNC: 1.5 MG/DL (ref 1.6–2.3)
MCH RBC QN AUTO: 29.3 PG (ref 26.5–33)
MCHC RBC AUTO-ENTMCNC: 34.6 G/DL (ref 31.5–36.5)
MCV RBC AUTO: 85 FL (ref 78–100)
O2/TOTAL GAS SETTING VFR VENT: 40 %
O2/TOTAL GAS SETTING VFR VENT: 40 %
OXYHGB MFR BLD: 97 % (ref 92–100)
OXYHGB MFR BLD: 97 % (ref 92–100)
PCO2 BLD: 43 MM HG (ref 35–45)
PCO2 BLD: 47 MM HG (ref 35–45)
PH BLD: 7.41 PH (ref 7.35–7.45)
PH BLD: 7.42 PH (ref 7.35–7.45)
PHOSPHATE SERPL-MCNC: 3.1 MG/DL (ref 2.5–4.5)
PLATELET # BLD AUTO: 146 10E9/L (ref 150–450)
PO2 BLD: 153 MM HG (ref 80–105)
PO2 BLD: 171 MM HG (ref 80–105)
POTASSIUM BLD-SCNC: 3.1 MMOL/L (ref 3.4–5.3)
POTASSIUM BLD-SCNC: 3.4 MMOL/L (ref 3.4–5.3)
POTASSIUM SERPL-SCNC: 3.4 MMOL/L (ref 3.4–5.3)
POTASSIUM SERPL-SCNC: 3.4 MMOL/L (ref 3.4–5.3)
RBC # BLD AUTO: 3.21 10E12/L (ref 4.4–5.9)
SODIUM BLD-SCNC: 141 MMOL/L (ref 133–144)
SODIUM BLD-SCNC: 141 MMOL/L (ref 133–144)
SODIUM SERPL-SCNC: 139 MMOL/L (ref 133–144)
SPECIMEN EXP DATE BLD: NORMAL
WBC # BLD AUTO: 4.6 10E9/L (ref 4–11)

## 2021-05-24 PROCEDURE — 88305 TISSUE EXAM BY PATHOLOGIST: CPT | Mod: 26 | Performed by: PATHOLOGY

## 2021-05-24 PROCEDURE — 84100 ASSAY OF PHOSPHORUS: CPT | Performed by: STUDENT IN AN ORGANIZED HEALTH CARE EDUCATION/TRAINING PROGRAM

## 2021-05-24 PROCEDURE — 84295 ASSAY OF SERUM SODIUM: CPT

## 2021-05-24 PROCEDURE — 278N000051 HC OR IMPLANT GENERAL: Performed by: OTOLARYNGOLOGY

## 2021-05-24 PROCEDURE — 83605 ASSAY OF LACTIC ACID: CPT

## 2021-05-24 PROCEDURE — 250N000011 HC RX IP 250 OP 636: Performed by: OTOLARYNGOLOGY

## 2021-05-24 PROCEDURE — 250N000011 HC RX IP 250 OP 636: Performed by: NURSE ANESTHETIST, CERTIFIED REGISTERED

## 2021-05-24 PROCEDURE — 250N000013 HC RX MED GY IP 250 OP 250 PS 637: Performed by: STUDENT IN AN ORGANIZED HEALTH CARE EDUCATION/TRAINING PROGRAM

## 2021-05-24 PROCEDURE — 250N000011 HC RX IP 250 OP 636: Performed by: STUDENT IN AN ORGANIZED HEALTH CARE EDUCATION/TRAINING PROGRAM

## 2021-05-24 PROCEDURE — 36415 COLL VENOUS BLD VENIPUNCTURE: CPT | Performed by: CLINICAL NURSE SPECIALIST

## 2021-05-24 PROCEDURE — 0KTG0ZZ RESECTION OF LEFT TRUNK MUSCLE, OPEN APPROACH: ICD-10-PCS | Performed by: OTOLARYNGOLOGY

## 2021-05-24 PROCEDURE — 999N001017 HC STATISTIC GLUCOSE BY METER IP

## 2021-05-24 PROCEDURE — 84132 ASSAY OF SERUM POTASSIUM: CPT

## 2021-05-24 PROCEDURE — 82803 BLOOD GASES ANY COMBINATION: CPT

## 2021-05-24 PROCEDURE — 84132 ASSAY OF SERUM POTASSIUM: CPT | Performed by: STUDENT IN AN ORGANIZED HEALTH CARE EDUCATION/TRAINING PROGRAM

## 2021-05-24 PROCEDURE — 0JB00ZZ EXCISION OF SCALP SUBCUTANEOUS TISSUE AND FASCIA, OPEN APPROACH: ICD-10-PCS | Performed by: OTOLARYNGOLOGY

## 2021-05-24 PROCEDURE — 82330 ASSAY OF CALCIUM: CPT

## 2021-05-24 PROCEDURE — 87640 STAPH A DNA AMP PROBE: CPT | Performed by: STUDENT IN AN ORGANIZED HEALTH CARE EDUCATION/TRAINING PROGRAM

## 2021-05-24 PROCEDURE — 86900 BLOOD TYPING SEROLOGIC ABO: CPT | Performed by: CLINICAL NURSE SPECIALIST

## 2021-05-24 PROCEDURE — 250N000012 HC RX MED GY IP 250 OP 636 PS 637: Performed by: OTOLARYNGOLOGY

## 2021-05-24 PROCEDURE — 36415 COLL VENOUS BLD VENIPUNCTURE: CPT | Performed by: ANESTHESIOLOGY

## 2021-05-24 PROCEDURE — 82565 ASSAY OF CREATININE: CPT | Performed by: ANESTHESIOLOGY

## 2021-05-24 PROCEDURE — 88305 TISSUE EXAM BY PATHOLOGIST: CPT | Mod: TC | Performed by: OTOLARYNGOLOGY

## 2021-05-24 PROCEDURE — 82947 ASSAY GLUCOSE BLOOD QUANT: CPT

## 2021-05-24 PROCEDURE — 85027 COMPLETE CBC AUTOMATED: CPT | Performed by: STUDENT IN AN ORGANIZED HEALTH CARE EDUCATION/TRAINING PROGRAM

## 2021-05-24 PROCEDURE — 85018 HEMOGLOBIN: CPT | Performed by: ANESTHESIOLOGY

## 2021-05-24 PROCEDURE — P9041 ALBUMIN (HUMAN),5%, 50ML: HCPCS | Performed by: STUDENT IN AN ORGANIZED HEALTH CARE EDUCATION/TRAINING PROGRAM

## 2021-05-24 PROCEDURE — 370N000017 HC ANESTHESIA TECHNICAL FEE, PER MIN: Performed by: OTOLARYNGOLOGY

## 2021-05-24 PROCEDURE — 999N000128 HC STATISTIC PERIPHERAL IV START W/O US GUIDANCE

## 2021-05-24 PROCEDURE — 82810 BLOOD GASES O2 SAT ONLY: CPT

## 2021-05-24 PROCEDURE — 87641 MR-STAPH DNA AMP PROBE: CPT | Performed by: STUDENT IN AN ORGANIZED HEALTH CARE EDUCATION/TRAINING PROGRAM

## 2021-05-24 PROCEDURE — 250N000024 HC ISOFLURANE, PER MIN: Performed by: OTOLARYNGOLOGY

## 2021-05-24 PROCEDURE — 250N000009 HC RX 250: Performed by: OTOLARYNGOLOGY

## 2021-05-24 PROCEDURE — 250N000009 HC RX 250: Performed by: NURSE ANESTHETIST, CERTIFIED REGISTERED

## 2021-05-24 PROCEDURE — 999N000141 HC STATISTIC PRE-PROCEDURE NURSING ASSESSMENT: Performed by: OTOLARYNGOLOGY

## 2021-05-24 PROCEDURE — 258N000003 HC RX IP 258 OP 636: Performed by: OTOLARYNGOLOGY

## 2021-05-24 PROCEDURE — 360N000078 HC SURGERY LEVEL 5, PER MIN: Performed by: OTOLARYNGOLOGY

## 2021-05-24 PROCEDURE — 0JR007Z REPLACEMENT OF SCALP SUBCUTANEOUS TISSUE AND FASCIA WITH AUTOLOGOUS TISSUE SUBSTITUTE, OPEN APPROACH: ICD-10-PCS | Performed by: OTOLARYNGOLOGY

## 2021-05-24 PROCEDURE — 0HB6XZZ EXCISION OF BACK SKIN, EXTERNAL APPROACH: ICD-10-PCS | Performed by: OTOLARYNGOLOGY

## 2021-05-24 PROCEDURE — 83605 ASSAY OF LACTIC ACID: CPT | Performed by: STUDENT IN AN ORGANIZED HEALTH CARE EDUCATION/TRAINING PROGRAM

## 2021-05-24 PROCEDURE — 258N000003 HC RX IP 258 OP 636: Performed by: STUDENT IN AN ORGANIZED HEALTH CARE EDUCATION/TRAINING PROGRAM

## 2021-05-24 PROCEDURE — 86850 RBC ANTIBODY SCREEN: CPT | Performed by: CLINICAL NURSE SPECIALIST

## 2021-05-24 PROCEDURE — 0HR0X73 REPLACEMENT OF SCALP SKIN WITH AUTOLOGOUS TISSUE SUBSTITUTE, FULL THICKNESS, EXTERNAL APPROACH: ICD-10-PCS | Performed by: OTOLARYNGOLOGY

## 2021-05-24 PROCEDURE — 200N000002 HC R&B ICU UMMC

## 2021-05-24 PROCEDURE — 88342 IMHCHEM/IMCYTCHM 1ST ANTB: CPT | Mod: TC | Performed by: OTOLARYNGOLOGY

## 2021-05-24 PROCEDURE — 80048 BASIC METABOLIC PNL TOTAL CA: CPT | Performed by: STUDENT IN AN ORGANIZED HEALTH CARE EDUCATION/TRAINING PROGRAM

## 2021-05-24 PROCEDURE — 88342 IMHCHEM/IMCYTCHM 1ST ANTB: CPT | Mod: 26 | Performed by: PATHOLOGY

## 2021-05-24 PROCEDURE — 272N000001 HC OR GENERAL SUPPLY STERILE: Performed by: OTOLARYNGOLOGY

## 2021-05-24 PROCEDURE — 83735 ASSAY OF MAGNESIUM: CPT | Performed by: STUDENT IN AN ORGANIZED HEALTH CARE EDUCATION/TRAINING PROGRAM

## 2021-05-24 PROCEDURE — 258N000003 HC RX IP 258 OP 636: Performed by: NURSE ANESTHETIST, CERTIFIED REGISTERED

## 2021-05-24 PROCEDURE — P9041 ALBUMIN (HUMAN),5%, 50ML: HCPCS | Performed by: NURSE ANESTHETIST, CERTIFIED REGISTERED

## 2021-05-24 PROCEDURE — 86901 BLOOD TYPING SEROLOGIC RH(D): CPT | Performed by: CLINICAL NURSE SPECIALIST

## 2021-05-24 PROCEDURE — 84132 ASSAY OF SERUM POTASSIUM: CPT | Performed by: ANESTHESIOLOGY

## 2021-05-24 DEVICE — IMP PROBE DOPPLER FLOW STD CUFF DP-SDP001: Type: IMPLANTABLE DEVICE | Site: CRANIAL | Status: FUNCTIONAL

## 2021-05-24 DEVICE — IMP DEVICE ANASTOMOTIC 2.0MM COUPLER GREEN GEM2752: Type: IMPLANTABLE DEVICE | Site: CRANIAL | Status: FUNCTIONAL

## 2021-05-24 RX ORDER — PROPOFOL 10 MG/ML
INJECTION, EMULSION INTRAVENOUS PRN
Status: DISCONTINUED | OUTPATIENT
Start: 2021-05-24 | End: 2021-05-24

## 2021-05-24 RX ORDER — LABETALOL HYDROCHLORIDE 5 MG/ML
INJECTION, SOLUTION INTRAVENOUS PRN
Status: DISCONTINUED | OUTPATIENT
Start: 2021-05-24 | End: 2021-05-24

## 2021-05-24 RX ORDER — PROCHLORPERAZINE MALEATE 5 MG
5 TABLET ORAL EVERY 6 HOURS PRN
Status: DISCONTINUED | OUTPATIENT
Start: 2021-05-24 | End: 2021-05-28 | Stop reason: HOSPADM

## 2021-05-24 RX ORDER — NALOXONE HYDROCHLORIDE 0.4 MG/ML
0.4 INJECTION, SOLUTION INTRAMUSCULAR; INTRAVENOUS; SUBCUTANEOUS
Status: DISCONTINUED | OUTPATIENT
Start: 2021-05-24 | End: 2021-05-28 | Stop reason: HOSPADM

## 2021-05-24 RX ORDER — PAPAVERINE HYDROCHLORIDE 30 MG/ML
INJECTION INTRAMUSCULAR; INTRAVENOUS PRN
Status: DISCONTINUED | OUTPATIENT
Start: 2021-05-24 | End: 2021-05-24 | Stop reason: HOSPADM

## 2021-05-24 RX ORDER — PROCHLORPERAZINE 25 MG
12.5 SUPPOSITORY, RECTAL RECTAL EVERY 12 HOURS PRN
Status: DISCONTINUED | OUTPATIENT
Start: 2021-05-24 | End: 2021-05-28 | Stop reason: HOSPADM

## 2021-05-24 RX ORDER — SODIUM CHLORIDE, SODIUM LACTATE, POTASSIUM CHLORIDE, CALCIUM CHLORIDE 600; 310; 30; 20 MG/100ML; MG/100ML; MG/100ML; MG/100ML
INJECTION, SOLUTION INTRAVENOUS CONTINUOUS
Status: DISCONTINUED | OUTPATIENT
Start: 2021-05-24 | End: 2021-05-24

## 2021-05-24 RX ORDER — LIDOCAINE 40 MG/G
CREAM TOPICAL
Status: DISCONTINUED | OUTPATIENT
Start: 2021-05-24 | End: 2021-05-24 | Stop reason: HOSPADM

## 2021-05-24 RX ORDER — SODIUM CHLORIDE, SODIUM LACTATE, POTASSIUM CHLORIDE, CALCIUM CHLORIDE 600; 310; 30; 20 MG/100ML; MG/100ML; MG/100ML; MG/100ML
INJECTION, SOLUTION INTRAVENOUS CONTINUOUS
Status: DISCONTINUED | OUTPATIENT
Start: 2021-05-24 | End: 2021-05-25

## 2021-05-24 RX ORDER — ONDANSETRON 2 MG/ML
INJECTION INTRAMUSCULAR; INTRAVENOUS PRN
Status: DISCONTINUED | OUTPATIENT
Start: 2021-05-24 | End: 2021-05-24

## 2021-05-24 RX ORDER — NICOTINE POLACRILEX 4 MG
15-30 LOZENGE BUCCAL
Status: DISCONTINUED | OUTPATIENT
Start: 2021-05-24 | End: 2021-05-28 | Stop reason: HOSPADM

## 2021-05-24 RX ORDER — ASPIRIN 325 MG
325 TABLET ORAL ONCE
Status: COMPLETED | OUTPATIENT
Start: 2021-05-24 | End: 2021-05-24

## 2021-05-24 RX ORDER — AMPICILLIN AND SULBACTAM 2; 1 G/1; G/1
3 INJECTION, POWDER, FOR SOLUTION INTRAMUSCULAR; INTRAVENOUS EVERY 6 HOURS
Status: COMPLETED | OUTPATIENT
Start: 2021-05-24 | End: 2021-05-26

## 2021-05-24 RX ORDER — FENTANYL CITRATE 50 UG/ML
25-50 INJECTION, SOLUTION INTRAMUSCULAR; INTRAVENOUS
Status: DISCONTINUED | OUTPATIENT
Start: 2021-05-24 | End: 2021-05-24

## 2021-05-24 RX ORDER — AMOXICILLIN 250 MG
1 CAPSULE ORAL 2 TIMES DAILY PRN
Status: DISCONTINUED | OUTPATIENT
Start: 2021-05-24 | End: 2021-05-27

## 2021-05-24 RX ORDER — ONDANSETRON 2 MG/ML
4 INJECTION INTRAMUSCULAR; INTRAVENOUS EVERY 30 MIN PRN
Status: DISCONTINUED | OUTPATIENT
Start: 2021-05-24 | End: 2021-05-24

## 2021-05-24 RX ORDER — DEXTROSE MONOHYDRATE 25 G/50ML
25-50 INJECTION, SOLUTION INTRAVENOUS
Status: DISCONTINUED | OUTPATIENT
Start: 2021-05-24 | End: 2021-05-28 | Stop reason: HOSPADM

## 2021-05-24 RX ORDER — CALCIUM CHLORIDE 100 MG/ML
INJECTION INTRAVENOUS; INTRAVENTRICULAR PRN
Status: DISCONTINUED | OUTPATIENT
Start: 2021-05-24 | End: 2021-05-24

## 2021-05-24 RX ORDER — ONDANSETRON 4 MG/1
4 TABLET, ORALLY DISINTEGRATING ORAL EVERY 30 MIN PRN
Status: DISCONTINUED | OUTPATIENT
Start: 2021-05-24 | End: 2021-05-24

## 2021-05-24 RX ORDER — LIDOCAINE HYDROCHLORIDE 20 MG/ML
INJECTION, SOLUTION INFILTRATION; PERINEURAL PRN
Status: DISCONTINUED | OUTPATIENT
Start: 2021-05-24 | End: 2021-05-24

## 2021-05-24 RX ORDER — NALOXONE HYDROCHLORIDE 0.4 MG/ML
0.2 INJECTION, SOLUTION INTRAMUSCULAR; INTRAVENOUS; SUBCUTANEOUS
Status: DISCONTINUED | OUTPATIENT
Start: 2021-05-24 | End: 2021-05-28 | Stop reason: HOSPADM

## 2021-05-24 RX ORDER — HYDROMORPHONE HYDROCHLORIDE 1 MG/ML
.3-.5 INJECTION, SOLUTION INTRAMUSCULAR; INTRAVENOUS; SUBCUTANEOUS
Status: DISCONTINUED | OUTPATIENT
Start: 2021-05-24 | End: 2021-05-26

## 2021-05-24 RX ORDER — SODIUM CHLORIDE, SODIUM LACTATE, POTASSIUM CHLORIDE, CALCIUM CHLORIDE 600; 310; 30; 20 MG/100ML; MG/100ML; MG/100ML; MG/100ML
INJECTION, SOLUTION INTRAVENOUS CONTINUOUS PRN
Status: DISCONTINUED | OUTPATIENT
Start: 2021-05-24 | End: 2021-05-24

## 2021-05-24 RX ORDER — OXYCODONE HYDROCHLORIDE 5 MG/1
5-10 TABLET ORAL EVERY 4 HOURS PRN
Status: DISCONTINUED | OUTPATIENT
Start: 2021-05-24 | End: 2021-05-26

## 2021-05-24 RX ORDER — ESMOLOL HYDROCHLORIDE 10 MG/ML
INJECTION INTRAVENOUS PRN
Status: DISCONTINUED | OUTPATIENT
Start: 2021-05-24 | End: 2021-05-24

## 2021-05-24 RX ORDER — AMPICILLIN AND SULBACTAM 2; 1 G/1; G/1
3 INJECTION, POWDER, FOR SOLUTION INTRAMUSCULAR; INTRAVENOUS EVERY 6 HOURS
Status: DISCONTINUED | OUTPATIENT
Start: 2021-05-24 | End: 2021-05-24

## 2021-05-24 RX ORDER — GLYCOPYRROLATE 0.2 MG/ML
INJECTION, SOLUTION INTRAMUSCULAR; INTRAVENOUS PRN
Status: DISCONTINUED | OUTPATIENT
Start: 2021-05-24 | End: 2021-05-24

## 2021-05-24 RX ORDER — ALBUMIN, HUMAN INJ 5% 5 %
500 SOLUTION INTRAVENOUS ONCE
Status: COMPLETED | OUTPATIENT
Start: 2021-05-24 | End: 2021-05-24

## 2021-05-24 RX ORDER — ONDANSETRON 2 MG/ML
4 INJECTION INTRAMUSCULAR; INTRAVENOUS EVERY 6 HOURS PRN
Status: DISCONTINUED | OUTPATIENT
Start: 2021-05-24 | End: 2021-05-28 | Stop reason: HOSPADM

## 2021-05-24 RX ORDER — ACETAMINOPHEN 500 MG
1000 TABLET ORAL 3 TIMES DAILY
Status: DISCONTINUED | OUTPATIENT
Start: 2021-05-24 | End: 2021-05-28 | Stop reason: HOSPADM

## 2021-05-24 RX ORDER — HYDROMORPHONE HYDROCHLORIDE 1 MG/ML
.3-.5 INJECTION, SOLUTION INTRAMUSCULAR; INTRAVENOUS; SUBCUTANEOUS EVERY 5 MIN PRN
Status: DISCONTINUED | OUTPATIENT
Start: 2021-05-24 | End: 2021-05-24

## 2021-05-24 RX ORDER — EPHEDRINE SULFATE 50 MG/ML
INJECTION, SOLUTION INTRAMUSCULAR; INTRAVENOUS; SUBCUTANEOUS PRN
Status: DISCONTINUED | OUTPATIENT
Start: 2021-05-24 | End: 2021-05-24

## 2021-05-24 RX ORDER — ALBUMIN, HUMAN INJ 5% 5 %
SOLUTION INTRAVENOUS CONTINUOUS PRN
Status: DISCONTINUED | OUTPATIENT
Start: 2021-05-24 | End: 2021-05-24

## 2021-05-24 RX ORDER — AMPICILLIN AND SULBACTAM 2; 1 G/1; G/1
3 INJECTION, POWDER, FOR SOLUTION INTRAMUSCULAR; INTRAVENOUS
Status: COMPLETED | OUTPATIENT
Start: 2021-05-24 | End: 2021-05-24

## 2021-05-24 RX ORDER — FENTANYL CITRATE 50 UG/ML
INJECTION, SOLUTION INTRAMUSCULAR; INTRAVENOUS PRN
Status: DISCONTINUED | OUTPATIENT
Start: 2021-05-24 | End: 2021-05-24

## 2021-05-24 RX ORDER — AMOXICILLIN 250 MG
2 CAPSULE ORAL 2 TIMES DAILY PRN
Status: DISCONTINUED | OUTPATIENT
Start: 2021-05-24 | End: 2021-05-27

## 2021-05-24 RX ORDER — ONDANSETRON 4 MG/1
4 TABLET, ORALLY DISINTEGRATING ORAL EVERY 6 HOURS PRN
Status: DISCONTINUED | OUTPATIENT
Start: 2021-05-24 | End: 2021-05-28 | Stop reason: HOSPADM

## 2021-05-24 RX ORDER — AMPICILLIN AND SULBACTAM 1; .5 G/1; G/1
INJECTION, POWDER, FOR SOLUTION INTRAMUSCULAR; INTRAVENOUS PRN
Status: DISCONTINUED | OUTPATIENT
Start: 2021-05-24 | End: 2021-05-24

## 2021-05-24 RX ADMIN — ALBUMIN HUMAN 500 ML: 0.05 INJECTION, SOLUTION INTRAVENOUS at 23:27

## 2021-05-24 RX ADMIN — ROCURONIUM BROMIDE 80 MG: 10 INJECTION INTRAVENOUS at 10:26

## 2021-05-24 RX ADMIN — HYDROMORPHONE HYDROCHLORIDE 0.5 MG: 1 INJECTION, SOLUTION INTRAMUSCULAR; INTRAVENOUS; SUBCUTANEOUS at 22:40

## 2021-05-24 RX ADMIN — FENTANYL CITRATE 100 MCG: 50 INJECTION, SOLUTION INTRAMUSCULAR; INTRAVENOUS at 11:31

## 2021-05-24 RX ADMIN — LABETALOL HYDROCHLORIDE 10 MG: 5 INJECTION INTRAVENOUS at 16:59

## 2021-05-24 RX ADMIN — GLYCOPYRROLATE 0.2 MG: 0.2 INJECTION, SOLUTION INTRAMUSCULAR; INTRAVENOUS at 12:56

## 2021-05-24 RX ADMIN — HYDROMORPHONE HYDROCHLORIDE 0.5 MG: 1 INJECTION, SOLUTION INTRAMUSCULAR; INTRAVENOUS; SUBCUTANEOUS at 17:01

## 2021-05-24 RX ADMIN — FENTANYL CITRATE 150 MCG: 50 INJECTION, SOLUTION INTRAMUSCULAR; INTRAVENOUS at 13:28

## 2021-05-24 RX ADMIN — PHENYLEPHRINE HYDROCHLORIDE 50 MCG: 10 INJECTION INTRAVENOUS at 11:44

## 2021-05-24 RX ADMIN — LIDOCAINE HYDROCHLORIDE 60 MG: 20 INJECTION, SOLUTION INFILTRATION; PERINEURAL at 10:26

## 2021-05-24 RX ADMIN — AMPICILLIN SODIUM AND SULBACTAM SODIUM 1.5 G: 1; .5 INJECTION, POWDER, FOR SOLUTION INTRAMUSCULAR; INTRAVENOUS at 15:04

## 2021-05-24 RX ADMIN — Medication 10 MG: at 12:25

## 2021-05-24 RX ADMIN — SODIUM CHLORIDE, POTASSIUM CHLORIDE, SODIUM LACTATE AND CALCIUM CHLORIDE: 600; 310; 30; 20 INJECTION, SOLUTION INTRAVENOUS at 17:48

## 2021-05-24 RX ADMIN — ONDANSETRON 4 MG: 2 INJECTION INTRAMUSCULAR; INTRAVENOUS at 19:59

## 2021-05-24 RX ADMIN — PROPOFOL 50 MG: 10 INJECTION, EMULSION INTRAVENOUS at 11:35

## 2021-05-24 RX ADMIN — ROCURONIUM BROMIDE 20 MG: 10 INJECTION INTRAVENOUS at 11:30

## 2021-05-24 RX ADMIN — FENTANYL CITRATE 50 MCG: 50 INJECTION, SOLUTION INTRAMUSCULAR; INTRAVENOUS at 14:52

## 2021-05-24 RX ADMIN — ACETAMINOPHEN 1000 MG: 500 TABLET, FILM COATED ORAL at 20:26

## 2021-05-24 RX ADMIN — ASPIRIN 325 MG ORAL TABLET 325 MG: 325 PILL ORAL at 20:26

## 2021-05-24 RX ADMIN — FENTANYL CITRATE 100 MCG: 50 INJECTION, SOLUTION INTRAMUSCULAR; INTRAVENOUS at 10:40

## 2021-05-24 RX ADMIN — PHENYLEPHRINE HYDROCHLORIDE 100 MCG: 10 INJECTION INTRAVENOUS at 11:42

## 2021-05-24 RX ADMIN — ALBUMIN (HUMAN): 12.5 SOLUTION INTRAVENOUS at 13:14

## 2021-05-24 RX ADMIN — FENTANYL CITRATE 50 MCG: 50 INJECTION, SOLUTION INTRAMUSCULAR; INTRAVENOUS at 16:35

## 2021-05-24 RX ADMIN — AMPICILLIN AND SULBACTAM 3 G: 1; 2 INJECTION, POWDER, FOR SOLUTION INTRAMUSCULAR; INTRAVENOUS at 11:10

## 2021-05-24 RX ADMIN — AMPICILLIN SODIUM AND SULBACTAM SODIUM 1.5 G: 1; .5 INJECTION, POWDER, FOR SOLUTION INTRAMUSCULAR; INTRAVENOUS at 17:01

## 2021-05-24 RX ADMIN — CALCIUM CHLORIDE 500 MG: 100 INJECTION INTRAVENOUS; INTRAVENTRICULAR at 15:45

## 2021-05-24 RX ADMIN — FENTANYL CITRATE 50 MCG: 50 INJECTION, SOLUTION INTRAMUSCULAR; INTRAVENOUS at 13:06

## 2021-05-24 RX ADMIN — SODIUM CHLORIDE, POTASSIUM CHLORIDE, SODIUM LACTATE AND CALCIUM CHLORIDE: 600; 310; 30; 20 INJECTION, SOLUTION INTRAVENOUS at 11:30

## 2021-05-24 RX ADMIN — ALBUMIN (HUMAN): 12.5 SOLUTION INTRAVENOUS at 13:30

## 2021-05-24 RX ADMIN — SODIUM CHLORIDE, POTASSIUM CHLORIDE, SODIUM LACTATE AND CALCIUM CHLORIDE: 600; 310; 30; 20 INJECTION, SOLUTION INTRAVENOUS at 10:05

## 2021-05-24 RX ADMIN — LABETALOL HYDROCHLORIDE 15 MG: 5 INJECTION INTRAVENOUS at 17:41

## 2021-05-24 RX ADMIN — FENTANYL CITRATE 50 MCG: 50 INJECTION, SOLUTION INTRAMUSCULAR; INTRAVENOUS at 13:18

## 2021-05-24 RX ADMIN — Medication 5 MG: at 15:48

## 2021-05-24 RX ADMIN — AMPICILLIN SODIUM AND SULBACTAM SODIUM 3 G: 2; 1 INJECTION, POWDER, FOR SOLUTION INTRAMUSCULAR; INTRAVENOUS at 22:56

## 2021-05-24 RX ADMIN — MIDAZOLAM 2 MG: 1 INJECTION INTRAMUSCULAR; INTRAVENOUS at 10:05

## 2021-05-24 RX ADMIN — SODIUM CHLORIDE, POTASSIUM CHLORIDE, SODIUM LACTATE AND CALCIUM CHLORIDE: 600; 310; 30; 20 INJECTION, SOLUTION INTRAVENOUS at 14:01

## 2021-05-24 RX ADMIN — SODIUM CHLORIDE, POTASSIUM CHLORIDE, SODIUM LACTATE AND CALCIUM CHLORIDE 500 ML: 600; 310; 30; 20 INJECTION, SOLUTION INTRAVENOUS at 22:51

## 2021-05-24 RX ADMIN — LABETALOL HYDROCHLORIDE 15 MG: 5 INJECTION INTRAVENOUS at 17:13

## 2021-05-24 RX ADMIN — Medication 5 MG: at 11:44

## 2021-05-24 RX ADMIN — PROPOFOL 150 MG: 10 INJECTION, EMULSION INTRAVENOUS at 10:26

## 2021-05-24 RX ADMIN — PROPOFOL 30 MG: 10 INJECTION, EMULSION INTRAVENOUS at 14:23

## 2021-05-24 RX ADMIN — HYDROMORPHONE HYDROCHLORIDE 0.5 MG: 1 INJECTION, SOLUTION INTRAMUSCULAR; INTRAVENOUS; SUBCUTANEOUS at 16:34

## 2021-05-24 RX ADMIN — Medication 10 MG: at 11:41

## 2021-05-24 RX ADMIN — ESMOLOL HYDROCHLORIDE 40 MG: 10 INJECTION, SOLUTION INTRAVENOUS at 10:27

## 2021-05-24 RX ADMIN — HYDROMORPHONE HYDROCHLORIDE 0.3 MG: 1 INJECTION, SOLUTION INTRAMUSCULAR; INTRAVENOUS; SUBCUTANEOUS at 19:16

## 2021-05-24 RX ADMIN — FENTANYL CITRATE 50 MCG: 50 INJECTION, SOLUTION INTRAMUSCULAR; INTRAVENOUS at 11:35

## 2021-05-24 RX ADMIN — AMPICILLIN SODIUM AND SULBACTAM SODIUM 1.5 G: 1; .5 INJECTION, POWDER, FOR SOLUTION INTRAMUSCULAR; INTRAVENOUS at 13:10

## 2021-05-24 RX ADMIN — ONDANSETRON 4 MG: 2 INJECTION INTRAMUSCULAR; INTRAVENOUS at 16:57

## 2021-05-24 RX ADMIN — LABETALOL HYDROCHLORIDE 10 MG: 5 INJECTION INTRAVENOUS at 17:46

## 2021-05-24 RX ADMIN — FENTANYL CITRATE 50 MCG: 50 INJECTION, SOLUTION INTRAMUSCULAR; INTRAVENOUS at 15:19

## 2021-05-24 RX ADMIN — Medication 5 MG: at 15:47

## 2021-05-24 RX ADMIN — Medication 10 MG: at 12:00

## 2021-05-24 ASSESSMENT — MIFFLIN-ST. JEOR
SCORE: 1566.5
SCORE: 1573.5

## 2021-05-24 ASSESSMENT — ACTIVITIES OF DAILY LIVING (ADL): ADLS_ACUITY_SCORE: 17

## 2021-05-24 NOTE — PROGRESS NOTES
[facility] Note  5/24/2021    A/P: 76M s/p WLE scalp melanoma and Left latissimus free flap    Neuro:  - Schedule tylenol; PRN oxy  HEENT:   - ENT flap checks per protocol.   - HOB 30 degrees, head/neck in midline position. Room temp 72.   - No ties around neck   - Half strength H202 or saline to incisoin PRN per nursing.   - Bacitracin 24h, then aquaphor q8h. LLOYD drain cares.   Resp:   - ORA   CV:   - SBP >90, preferably >110, keep MAPs >60.   - No vasopressors and if required, please contact ENT resident on call.   FEN/GI:   - Regular diet  :   - Ashraf out when able  Endo:   - Monitor BG  ID:   - Unasyn 48hr postop  Heme:   - Monitor CBC  MSK:   - Left latissimus free flap. Ok for PT consult, limited ROM of Left arm  Ppx:   - lovenox 40mg/d POD1   Consults:   - PT/OT   AM Labs: TSH, albumin, pre-albumin, CBC, BMP, Mg, Phos  Dispo: ICU for 72 hours postop, then 6A    Sumaya Ortiz MD  [facility] PGY5

## 2021-05-24 NOTE — H&P
SURGICAL ICU ADMISSION NOTE  5/24/2021    PRIMARY TEAM: ENT  PRIMARY PHYSICIAN: Dr. Paulo Garcia    REASON FOR CRITICAL CARE ADMISSION: Flap monitoring  ADMITTING PHYSICIAN: Dr. David Navarro    ASSESSMENT: Ahmet Coleman is a 76 year old male with PMH of HLD, HTN, GERD, chronic renal insufficiency (not dialysis dependent) and neuropathy that is now s/p wide excision of melanoma of the scalp with left latissimus free flap on 5/24/2021.     PLAN:    Neuro/ pain/ sedation:  #hx of anxiety  #hx of depression  #hx of PTSD  #hx of migraines  #hx of neuropathy  -Monitor neurological status. Notify the MD for any acute changes in exam.  - give PTA fluvoxamine, vortioxetine  - Held PTA meds: trazodone 100mg qHS pregabalin 150mg BID, quetiapine 150mg at bedtime  -S. Tylenol, oxycodone PRN, dilaudid PRN     HEENT  #status post wide excision of melanoma of the scalp w/left latissimus flap  -Flap cares per ENT  -scalp tissue pathology pending     Pulmonary care:   -Nasal cannula on 5 LPM  -Titrate SpO2 to above 92 %.     Cardiovascular:    #HLD/HTN  -Monitor hemodynamic status.   -Held PTA meds: hydrochlorothiazide 25mg daily, simvastatin 40mg at bedtime, lisinopril 40 mg daily  -MAP goal >65 mm Hg  - Needed phenyl intraoperatively, no post surgical pressor needs at this time     GI/Nutrition:   #GERD    -regular  -PTA omeprazole 20mg BID     Renal/FEN:    #hx of chronic renal insufficiency, baseline creatinine at ~1.7  -ICU electrolyte replacement protocol  -BMP daily     Endocrine:    -Sliding scale insulin for diabetes management.      ID/ Antibiotics:  -Complete perioperative unasyn  -Monitor white count and temperature.     Heme:     -Hemoglobin stable. Monitor for HgB <7.0 g/dl.  -CBC daily     Prophylaxis:    -Mechanical prophylaxis for DVT. Bilateral SCDs.  -Start lovenox tomorrow AM  -Hold ASA d/t listed allergy     MSK:    -PT and OT consulted. Appreciate recs.     Lines/ tubes/ drains:  -Ashraf  -JPx2 in left  posterolateral chest wall     Disposition:  -Surgical ICU.     Patient seen, findings and plan discussed with surgical ICU staff.    Sg Miller, MS4    Angel Luis Girard MD, [facility]  Plastic Surgery PGY-2  Pager: [phone]      - - - - - - - - - - - - - - - - - - - - - - - - - - - - - - - - - - - - - - - - - - - - - - - - - - - - - - - - - - - - - - - - - - - - - - - -     HISTORY PRESENTING ILLNESS: Ahmet Coleman is a 76 year old male with PMH of HLD/HTN, GERD, renal insufficiency, and neuropathy who was recently evaluated for a large left scalp melanoma approximately 6 x 9 cm in size with satellite lesions. He received a trial of vemurfenib and cobimetinib prior to surgery. PET scan on 3/2021 showed decreased FDG uptake from previous PET with no obvious metastasis. He also has a psychiatric history signficiant for anxiety, depression and PTSD. He takes lyrica for neuralgias/neuropathy of legs and feet. He is now s/p wide excision of melanoma with left latissimus free flap with Dr. Garcia and Dr. Machado.      REVIEW OF SYSTEMS: 10 point ROS neg other than the symptoms noted above in the HPI.    PAST MEDICAL HISTORY:    has a past medical history of Anxiety, Benign hypertension (12/11/2013), Cervicalgia, Chronic kidney disease, Depressive disorder, not elsewhere classified, Diverticulitis of colon (07/10/2013), Esophageal reflux, Irritable bowel syndrome (10/03/2003), Lumbago, Malignant melanoma of scalp (H) (12/29/2020), Migraine, unspecified, without mention of intractable migraine without mention of status migrainosus, Other kyphoscoliosis and scoliosis, Other specified gastritis without mention of hemorrhage, PTSD (post-traumatic stress disorder), and Tobacco abuse (since 1965).    SURGICAL HISTORY:    has a past surgical history that includes APPENDECTOMY; NONSPECIFIC PROCEDURE; Esophagoscopy, gastroscopy, duodenoscopy (EGD), combined (9/6/2011); and Colonoscopy (N/A, 6/8/2016).    SOCIAL HISTORY:     reports that he quit smoking about 7 years ago. His smoking use included cigarettes. He has a 22.50 pack-year smoking history. He has never used smokeless tobacco. He reports previous drug use. Drug: Marijuana. He reports that he does not drink alcohol.    FAMILY HISTORY: No bleeding/clotting disorders nor problems with anesthesia.     ALLERGIES:      Allergies   Allergen Reactions     Aspirin      Motrin [Ibuprofen Micronized]      And ASA  Cramps  diarrhea       MEDICATIONS:  sodium chloride (PF) 0.9% PF flush 10 mL    acetaminophen (TYLENOL) 325 MG tablet, TAKE TWO TABLETS BY MOUTH THREE TIMES A DAY AS NEEDED  cholecalciferol (VITAMIN D3) 1000 UNIT tablet, Take 1,000 Units by mouth daily   diphenoxylate-atropine (LOMOTIL) 2.5-0.025 MG tablet, Take 1-2 tablets by mouth 4 times daily as needed for diarrhea  fluvoxaMINE (LUVOX) 100 MG tablet, Take 150 mg at bedtime  hydrochlorothiazide (HYDRODIURIL) 25 MG tablet, Take 25 mg by mouth daily.  Hypromellose (ARTIFICIAL TEARS OP), Apply  to eye. 2 drops in each eye twice a day as needed  Lactobacillus-Inulin ([facility] DIGESTIVE [facility]) CAPS, 1 tab daily (Patient taking differently: Take 1 tablet by mouth daily )  lisinopril (ZESTRIL) 40 MG tablet, Take 40 mg by mouth daily   Magnesium Oxide 420 MG TABS, Take 420 mg by mouth daily  omeprazole (PRILOSEC) 20 MG capsule, Take 1 capsule by mouth 2 times daily.  potassium phosphate, monobasic, (K-PHOS) 500 MG tablet, Take 1 tablet (500 mg) by mouth 2 times daily (Patient taking differently: Take 500 mg by mouth daily )  QUEtiapine (SEROQUEL) 300 MG tablet, Take 150 mg by mouth At Bedtime   simvastatin (ZOCOR) 80 MG tablet, Take 40 mg by mouth At Bedtime   traZODone (DESYREL) 100 MG tablet, Take 100 mg by mouth At Bedtime   vortioxetine (TRINTELLIX) 20 MG tablet, TAKE ONE TABLET BY MOUTH EVERY MORNING  prochlorperazine (COMPAZINE) 10 MG tablet, Take 1 tablet (10 mg) by mouth every 6 hours as needed (Nausea/Vomiting)  (Patient not taking: Reported on 5/12/2021)  vemurafenib (ZELBORAF) 240 MG TABS tablet, Take 3 tablets (720 mg) by mouth every 12 hours for 28 days (Patient not taking: Reported on 5/12/2021)      PHYSICAL EXAMINATION:  Temp:  [98.1  F (36.7  C)] 98.1  F (36.7  C)  Pulse:  [64] 64  Resp:  [18] 18  BP: (129)/(90) 129/90  SpO2:  [97 %] 97 %    Exam:   Constitutional: alert, no distress, and cooperative  Head: Flap pink and well perfused. Pulsatile flow to flap demonstrated by continuous doppler.   Cardiovascular: noncyanotic, RRR by tele  Chest: JPx2 in left posterolateral chest wall with serosanguinous drainage. Incision c/d/i  Respiratory: Normal work of breathing  Gastrointestinal: Abdomen soft, non-tender.  No masses, organomegaly.  : Ashraf  Musculoskeletal: extremities normal- no gross deformities noted, normal, and peripheral pulses normal  Skin: no suspicious lesions or rashes    LABS: Reviewed.   Arterial Blood Gases   No lab results found in last 7 days.  Complete Blood Count   Recent Labs   Lab 05/24/21  0917   HGB 13.0*     Basic Metabolic Panel  Recent Labs   Lab 05/24/21  0917   POTASSIUM 3.4   CR 1.30*     Liver Function Tests  No lab results found in last 7 days.  Pancreatic Enzymes  No lab results found in last 7 days.  Coagulation Profile  No lab results found in last 7 days.  Lactate  Invalid input(s): LACTATE    IMAGING:  No results found for this or any previous visit (from the past 24 hour(s)).    Sg Miller, MS4

## 2021-05-24 NOTE — BRIEF OP NOTE
[facility]    Brief Operative Note    Pre-operative diagnosis: Melanoma of scalp (H) [C43.4]  Post-operative diagnosis Same as pre-operative diagnosis    Procedure: Procedure(s):  wide local excision of scalp melanoma  left latissimus free flap for scalp reconstruction\. skin graft from left back to scalp  Surgeon: Surgeon(s) and Role:  Panel 1:     * Paulo Garcia MD - Primary     * Jackson Sen MD - Resident - Assisting  Panel 2:     * Kathryn Machado MD - Primary     * Kelechi Jiménez MD - Fellow - Assisting  Anesthesia: General   Estimated blood loss: 200 ml  Drains: [place]  Specimens:   ID Type Source Tests Collected by Time Destination   A : Wide Local Excision Left Scalp, double green anterior, blue posterior, long black lateral, short black medial Tissue Scalp SURGICAL PATHOLOGY EXAM Paulo Garcia MD 5/24/2021 11:44 AM      Findings:   Left lat free flap; aquaplast splint sutured to scalp reconstruction.  Complications: None.  Implants:   Implant Name Type Inv. Item Serial No.  Lot No. LRB No. Used Action   IMP DEVICE ANASTOMOTIC 2.0MM  GREEN [id number] - [id number] Other IMP DEVICE ANASTOMOTIC 2.0MM  GREEN [id number]  [employer] [id number] Left 1 Implanted   IMP PROBE DOPPLER FLOW STD CUFF [id number] - [id number] Other IMP PROBE DOPPLER FLOW STD CUFF [id number]  [employer] [id number] Left 1 Implanted

## 2021-05-24 NOTE — ANESTHESIA CARE TRANSFER NOTE
Patient: Ahmet Coleman    Procedure(s):  wide local excision of scalp melanoma  left latissimus free flap for scalp reconstruction\. skin graft from left back to scalp    Diagnosis: Melanoma of scalp (H) [C43.4]  Diagnosis Additional Information: No value filed.    Anesthesia Type:   General     Note:    Oropharynx: oropharynx clear of all foreign objects and spontaneously breathing  Level of Consciousness: awake  Oxygen Supplementation: nasal cannula  Level of Supplemental Oxygen (L/min / FiO2): 4  Independent Airway: airway patency satisfactory and stable  Dentition: dentition unchanged  Vital Signs Stable: post-procedure vital signs reviewed and stable  Report to RN Given: handoff report given  Patient transferred to: ICU  Comments: Extubated after awake in room. VSS. No c/o,  waited 15 mins for 4A bed before leaving OR9.   ICU Handoff: Call for PAUSE to initiate/utilize ICU HANDOFF, Identified Patient, Identified Responsible Provider, Reviewed the Pertinent Medical History, Discussed Surgical Course, Reviewed Intra-OP Anesthesia Management and Issues during Anesthesia, Set Expectations for Post Procedure Period and Allowed Opportunity for Questions and Acknowledgement of Understanding      Vitals: (Last set prior to Anesthesia Care Transfer)  CRNA VITALS  5/24/2021 1708 - 5/24/2021 1758      5/24/2021             ART BP:  164/82    ART Mean:  116        Electronically Signed By: RAE Leon CRNA  May 24, 2021  5:58 PM

## 2021-05-24 NOTE — ANESTHESIA POSTPROCEDURE EVALUATION
Patient: Ahmet Coleman    Procedure(s):  wide local excision of scalp melanoma  left latissimus free flap for scalp reconstruction\. skin graft from left back to scalp    Diagnosis:Melanoma of scalp (H) [C43.4]  Diagnosis Additional Information: No value filed.    Anesthesia Type:  General    Note:  Disposition: Admission   Postop Pain Control: Uneventful            Sign Out: Well controlled pain   PONV: No   Neuro/Psych: Uneventful            Sign Out: Acceptable/Baseline neuro status   Airway/Respiratory: Uneventful            Sign Out: Acceptable/Baseline resp. status   CV/Hemodynamics: Uneventful            Sign Out: Acceptable CV status; No obvious hypovolemia; No obvious fluid overload   Other NRE: NONE   DID A NON-ROUTINE EVENT OCCUR? No           Last vitals:  Vitals:    05/24/21 0836 05/24/21 1800   BP: (!) 129/90 (!) 113/92   Pulse: 64 81   Resp: 18 10   Temp: 36.7  C (98.1  F) 36.4  C (97.5  F)   SpO2: 97% 100%       Last vitals prior to Anesthesia Care Transfer:  CRNA VITALS  5/24/2021 1708 - 5/24/2021 1808      5/24/2021             ART BP:  164/82    ART Mean:  116          Electronically Signed By: Kelechi Rome MD  May 24, 2021  6:57 PM

## 2021-05-25 ENCOUNTER — APPOINTMENT (OUTPATIENT)
Dept: OCCUPATIONAL THERAPY | Facility: CLINIC | Age: 76
DRG: 578 | End: 2021-05-25
Attending: STUDENT IN AN ORGANIZED HEALTH CARE EDUCATION/TRAINING PROGRAM
Payer: MEDICARE

## 2021-05-25 LAB
ALBUMIN SERPL-MCNC: 3.3 G/DL (ref 3.4–5)
ANION GAP SERPL CALCULATED.3IONS-SCNC: 7 MMOL/L (ref 3–14)
BUN SERPL-MCNC: 13 MG/DL (ref 7–30)
CALCIUM SERPL-MCNC: 8 MG/DL (ref 8.5–10.1)
CHLORIDE SERPL-SCNC: 102 MMOL/L (ref 94–109)
CO2 SERPL-SCNC: 30 MMOL/L (ref 20–32)
CREAT SERPL-MCNC: 1.21 MG/DL (ref 0.66–1.25)
ERYTHROCYTE [DISTWIDTH] IN BLOOD BY AUTOMATED COUNT: 13.2 % (ref 10–15)
GFR SERPL CREATININE-BSD FRML MDRD: 59 ML/MIN/{1.73_M2}
GLUCOSE BLDC GLUCOMTR-MCNC: 105 MG/DL (ref 70–99)
GLUCOSE SERPL-MCNC: 119 MG/DL (ref 70–99)
HCT VFR BLD AUTO: 24.7 % (ref 40–53)
HGB BLD-MCNC: 8.6 G/DL (ref 13.3–17.7)
MAGNESIUM SERPL-MCNC: 1.5 MG/DL (ref 1.6–2.3)
MCH RBC QN AUTO: 30.1 PG (ref 26.5–33)
MCHC RBC AUTO-ENTMCNC: 34.8 G/DL (ref 31.5–36.5)
MCV RBC AUTO: 86 FL (ref 78–100)
MRSA DNA SPEC QL NAA+PROBE: NEGATIVE
PHOSPHATE SERPL-MCNC: 2.5 MG/DL (ref 2.5–4.5)
PLATELET # BLD AUTO: 144 10E9/L (ref 150–450)
POTASSIUM SERPL-SCNC: 3.4 MMOL/L (ref 3.4–5.3)
POTASSIUM SERPL-SCNC: 3.4 MMOL/L (ref 3.4–5.3)
PREALB SERPL IA-MCNC: 15 MG/DL (ref 15–45)
RBC # BLD AUTO: 2.86 10E12/L (ref 4.4–5.9)
SODIUM SERPL-SCNC: 139 MMOL/L (ref 133–144)
SPECIMEN SOURCE: NORMAL
TSH SERPL DL<=0.005 MIU/L-ACNC: 0.68 MU/L (ref 0.4–4)
WBC # BLD AUTO: 4.1 10E9/L (ref 4–11)

## 2021-05-25 PROCEDURE — 85027 COMPLETE CBC AUTOMATED: CPT | Performed by: STUDENT IN AN ORGANIZED HEALTH CARE EDUCATION/TRAINING PROGRAM

## 2021-05-25 PROCEDURE — 97165 OT EVAL LOW COMPLEX 30 MIN: CPT | Mod: GO | Performed by: OCCUPATIONAL THERAPIST

## 2021-05-25 PROCEDURE — 250N000013 HC RX MED GY IP 250 OP 250 PS 637: Performed by: STUDENT IN AN ORGANIZED HEALTH CARE EDUCATION/TRAINING PROGRAM

## 2021-05-25 PROCEDURE — 80069 RENAL FUNCTION PANEL: CPT | Performed by: STUDENT IN AN ORGANIZED HEALTH CARE EDUCATION/TRAINING PROGRAM

## 2021-05-25 PROCEDURE — 97530 THERAPEUTIC ACTIVITIES: CPT | Mod: GO | Performed by: OCCUPATIONAL THERAPIST

## 2021-05-25 PROCEDURE — 83735 ASSAY OF MAGNESIUM: CPT | Performed by: STUDENT IN AN ORGANIZED HEALTH CARE EDUCATION/TRAINING PROGRAM

## 2021-05-25 PROCEDURE — 99233 SBSQ HOSP IP/OBS HIGH 50: CPT | Mod: 24 | Performed by: SURGERY

## 2021-05-25 PROCEDURE — 250N000011 HC RX IP 250 OP 636: Performed by: STUDENT IN AN ORGANIZED HEALTH CARE EDUCATION/TRAINING PROGRAM

## 2021-05-25 PROCEDURE — 84134 ASSAY OF PREALBUMIN: CPT | Performed by: STUDENT IN AN ORGANIZED HEALTH CARE EDUCATION/TRAINING PROGRAM

## 2021-05-25 PROCEDURE — 250N000013 HC RX MED GY IP 250 OP 250 PS 637: Performed by: DIETITIAN, REGISTERED

## 2021-05-25 PROCEDURE — 97535 SELF CARE MNGMENT TRAINING: CPT | Mod: GO | Performed by: OCCUPATIONAL THERAPIST

## 2021-05-25 PROCEDURE — 250N000009 HC RX 250: Performed by: OTOLARYNGOLOGY

## 2021-05-25 PROCEDURE — 84132 ASSAY OF SERUM POTASSIUM: CPT | Performed by: OTOLARYNGOLOGY

## 2021-05-25 PROCEDURE — 84443 ASSAY THYROID STIM HORMONE: CPT | Performed by: STUDENT IN AN ORGANIZED HEALTH CARE EDUCATION/TRAINING PROGRAM

## 2021-05-25 PROCEDURE — 999N001017 HC STATISTIC GLUCOSE BY METER IP

## 2021-05-25 PROCEDURE — 200N000002 HC R&B ICU UMMC

## 2021-05-25 PROCEDURE — 250N000009 HC RX 250: Performed by: STUDENT IN AN ORGANIZED HEALTH CARE EDUCATION/TRAINING PROGRAM

## 2021-05-25 PROCEDURE — 250N000011 HC RX IP 250 OP 636: Performed by: OTOLARYNGOLOGY

## 2021-05-25 PROCEDURE — 258N000003 HC RX IP 258 OP 636: Performed by: OTOLARYNGOLOGY

## 2021-05-25 PROCEDURE — 250N000013 HC RX MED GY IP 250 OP 250 PS 637: Performed by: OTOLARYNGOLOGY

## 2021-05-25 RX ORDER — ASPIRIN 325 MG
325 TABLET ORAL DAILY
Status: DISCONTINUED | OUTPATIENT
Start: 2021-05-25 | End: 2021-05-28 | Stop reason: HOSPADM

## 2021-05-25 RX ORDER — SIMVASTATIN 20 MG
40 TABLET ORAL AT BEDTIME
Status: DISCONTINUED | OUTPATIENT
Start: 2021-05-25 | End: 2021-05-28 | Stop reason: HOSPADM

## 2021-05-25 RX ORDER — MAGNESIUM OXIDE 400 MG/1
400 TABLET ORAL 3 TIMES DAILY
Status: DISCONTINUED | OUTPATIENT
Start: 2021-05-25 | End: 2021-05-25

## 2021-05-25 RX ORDER — PREGABALIN 75 MG/1
150 CAPSULE ORAL 2 TIMES DAILY
Status: DISCONTINUED | OUTPATIENT
Start: 2021-05-25 | End: 2021-05-28 | Stop reason: HOSPADM

## 2021-05-25 RX ORDER — POTASSIUM CHLORIDE 750 MG/1
40 TABLET, EXTENDED RELEASE ORAL ONCE
Status: COMPLETED | OUTPATIENT
Start: 2021-05-25 | End: 2021-05-25

## 2021-05-25 RX ORDER — FLUVOXAMINE MALEATE 50 MG
150 TABLET ORAL AT BEDTIME
Status: DISCONTINUED | OUTPATIENT
Start: 2021-05-25 | End: 2021-05-28 | Stop reason: HOSPADM

## 2021-05-25 RX ORDER — GINSENG 100 MG
CAPSULE ORAL 2 TIMES DAILY
Status: DISCONTINUED | OUTPATIENT
Start: 2021-05-25 | End: 2021-05-28 | Stop reason: HOSPADM

## 2021-05-25 RX ORDER — MAGNESIUM SULFATE HEPTAHYDRATE 40 MG/ML
4 INJECTION, SOLUTION INTRAVENOUS ONCE
Status: COMPLETED | OUTPATIENT
Start: 2021-05-25 | End: 2021-05-25

## 2021-05-25 RX ADMIN — OXYCODONE HYDROCHLORIDE 5 MG: 5 TABLET ORAL at 03:24

## 2021-05-25 RX ADMIN — OXYCODONE HYDROCHLORIDE 10 MG: 5 TABLET ORAL at 16:52

## 2021-05-25 RX ADMIN — AMPICILLIN SODIUM AND SULBACTAM SODIUM 3 G: 2; 1 INJECTION, POWDER, FOR SOLUTION INTRAMUSCULAR; INTRAVENOUS at 23:26

## 2021-05-25 RX ADMIN — AMPICILLIN SODIUM AND SULBACTAM SODIUM 3 G: 2; 1 INJECTION, POWDER, FOR SOLUTION INTRAMUSCULAR; INTRAVENOUS at 11:37

## 2021-05-25 RX ADMIN — HYDROMORPHONE HYDROCHLORIDE 0.5 MG: 1 INJECTION, SOLUTION INTRAMUSCULAR; INTRAVENOUS; SUBCUTANEOUS at 23:45

## 2021-05-25 RX ADMIN — SIMVASTATIN 40 MG: 20 TABLET, FILM COATED ORAL at 22:40

## 2021-05-25 RX ADMIN — AMPICILLIN SODIUM AND SULBACTAM SODIUM 3 G: 2; 1 INJECTION, POWDER, FOR SOLUTION INTRAMUSCULAR; INTRAVENOUS at 05:21

## 2021-05-25 RX ADMIN — OXYCODONE HYDROCHLORIDE 10 MG: 5 TABLET ORAL at 08:34

## 2021-05-25 RX ADMIN — OMEPRAZOLE 20 MG: 20 CAPSULE, DELAYED RELEASE ORAL at 16:52

## 2021-05-25 RX ADMIN — PREGABALIN 150 MG: 100 CAPSULE ORAL at 11:31

## 2021-05-25 RX ADMIN — ACETAMINOPHEN 1000 MG: 500 TABLET, FILM COATED ORAL at 08:31

## 2021-05-25 RX ADMIN — ACETAMINOPHEN 1000 MG: 500 TABLET, FILM COATED ORAL at 20:02

## 2021-05-25 RX ADMIN — HYDROMORPHONE HYDROCHLORIDE 0.5 MG: 1 INJECTION, SOLUTION INTRAMUSCULAR; INTRAVENOUS; SUBCUTANEOUS at 05:21

## 2021-05-25 RX ADMIN — ACETAMINOPHEN 1000 MG: 500 TABLET, FILM COATED ORAL at 14:54

## 2021-05-25 RX ADMIN — ONDANSETRON 4 MG: 2 INJECTION INTRAMUSCULAR; INTRAVENOUS at 03:24

## 2021-05-25 RX ADMIN — MAGNESIUM SULFATE IN WATER 4 G: 40 INJECTION, SOLUTION INTRAVENOUS at 15:28

## 2021-05-25 RX ADMIN — AMPICILLIN SODIUM AND SULBACTAM SODIUM 3 G: 2; 1 INJECTION, POWDER, FOR SOLUTION INTRAMUSCULAR; INTRAVENOUS at 18:39

## 2021-05-25 RX ADMIN — ENOXAPARIN SODIUM 40 MG: 40 INJECTION, SOLUTION INTRAVENOUS; SUBCUTANEOUS at 08:33

## 2021-05-25 RX ADMIN — QUETIAPINE FUMARATE 150 MG: 100 TABLET ORAL at 22:39

## 2021-05-25 RX ADMIN — OXYCODONE HYDROCHLORIDE 5 MG: 5 TABLET ORAL at 04:15

## 2021-05-25 RX ADMIN — PREGABALIN 150 MG: 100 CAPSULE ORAL at 20:02

## 2021-05-25 RX ADMIN — POTASSIUM CHLORIDE 40 MEQ: 750 TABLET, EXTENDED RELEASE ORAL at 14:53

## 2021-05-25 RX ADMIN — HYDROMORPHONE HYDROCHLORIDE 0.5 MG: 1 INJECTION, SOLUTION INTRAMUSCULAR; INTRAVENOUS; SUBCUTANEOUS at 20:02

## 2021-05-25 RX ADMIN — HYDROMORPHONE HYDROCHLORIDE 0.3 MG: 1 INJECTION, SOLUTION INTRAMUSCULAR; INTRAVENOUS; SUBCUTANEOUS at 15:22

## 2021-05-25 RX ADMIN — ONDANSETRON 4 MG: 4 TABLET, ORALLY DISINTEGRATING ORAL at 08:32

## 2021-05-25 RX ADMIN — ASPIRIN 325 MG ORAL TABLET 325 MG: 325 PILL ORAL at 08:31

## 2021-05-25 RX ADMIN — VORTIOXETINE 20 MG: 20 TABLET, FILM COATED ORAL at 11:31

## 2021-05-25 RX ADMIN — OXYCODONE HYDROCHLORIDE 10 MG: 5 TABLET ORAL at 21:12

## 2021-05-25 RX ADMIN — FLUVOXAMINE MALEATE 150 MG: 50 TABLET, COATED ORAL at 22:40

## 2021-05-25 RX ADMIN — BACITRACIN ZINC: 500 OINTMENT TOPICAL at 20:02

## 2021-05-25 RX ADMIN — POTASSIUM & SODIUM PHOSPHATES POWDER PACK 280-160-250 MG 1 PACKET: 280-160-250 PACK at 08:34

## 2021-05-25 RX ADMIN — POTASSIUM CHLORIDE 40 MEQ: 750 TABLET, EXTENDED RELEASE ORAL at 05:21

## 2021-05-25 RX ADMIN — Medication 400 MG: at 08:31

## 2021-05-25 RX ADMIN — BACITRACIN ZINC: 500 OINTMENT TOPICAL at 08:38

## 2021-05-25 RX ADMIN — OMEPRAZOLE 20 MG: 20 CAPSULE, DELAYED RELEASE ORAL at 08:30

## 2021-05-25 RX ADMIN — SODIUM PHOSPHATE, MONOBASIC, MONOHYDRATE 9 MMOL: 276; 142 INJECTION, SOLUTION INTRAVENOUS at 15:36

## 2021-05-25 RX ADMIN — POTASSIUM & SODIUM PHOSPHATES POWDER PACK 280-160-250 MG 1 PACKET: 280-160-250 PACK at 20:02

## 2021-05-25 RX ADMIN — OXYCODONE HYDROCHLORIDE 10 MG: 5 TABLET ORAL at 12:48

## 2021-05-25 ASSESSMENT — ACTIVITIES OF DAILY LIVING (ADL)
ADLS_ACUITY_SCORE: 19
ADLS_ACUITY_SCORE: 19
PREVIOUS_RESPONSIBILITIES: MEAL PREP;HOUSEKEEPING;LAUNDRY;SHOPPING;MEDICATION MANAGEMENT;DRIVING
ADLS_ACUITY_SCORE: 19
ADLS_ACUITY_SCORE: 18
IADL_COMMENTS: FAMILY ABLE TO ASSIST PRN
ADLS_ACUITY_SCORE: 20
ADLS_ACUITY_SCORE: 20

## 2021-05-25 ASSESSMENT — MIFFLIN-ST. JEOR: SCORE: 1580.5

## 2021-05-25 NOTE — PROGRESS NOTES
05/25/21 0951   Quick Adds   Type of Visit Initial Occupational Therapy Evaluation       Present no   Language english   Living Environment   People in home child(cliff), adult;grandchild(cliff)   Current Living Arrangements house   Home Accessibility stairs to enter home;stairs within home   Number of Stairs, Main Entrance 2   Stair Railings, Main Entrance   (yes)   Number of Stairs, Within Home, Primary   (split level home)   Stair Railings, Within Home, Primary   (yes)   Transportation Anticipated car, drives self;family or friend will provide   Living Environment Comments Pt reports that he lives in a split level home with his daughter, son-in-law and grandchildren, drives at baseline, was able to easily access all areas of the home without concern   Self-Care   Usual Activity Tolerance good   Current Activity Tolerance fair   Regular Exercise No   Equipment Currently Used at Home none   Activity/Exercise/Self-Care Comment Pt reports being independent with all mobility and basic self cares at baseline.  pt is retired.  Pt daughter works from home and son-in-law owns his own [employer] business and has flexibility to assist prn.  Pt served in the [employer].  Reports he has had some deconditioning but could still complete daily activity, errands, etc. without concern   Disability/Function   Hearing Difficulty or Deaf no   Wear Glasses or Blind yes   Vision Management glasses   Concentrating, Remembering or Making Decisions Difficulty no   Difficulty Communicating no   Difficulty Eating/Swallowing no   Walking or Climbing Stairs Difficulty no   Dressing/Bathing Difficulty no   Toileting issues no   Doing Errands Independently Difficulty (such as shopping) no   Fall history within last six months no   Change in Functional Status Since Onset of Current Illness/Injury yes   General Information   Onset of Illness/Injury or Date of Surgery 05/24/21   Referring Physician Angel Luis Girard MD    Patient/Family Therapy Goal Statement (OT) Return to home   Additional Occupational Profile Info/Pertinent History of Current Problem pt is a 76M s/p WLE scalp melanoma and Left latissimus free flap   Existing Precautions/Restrictions   (limit LUE ROM; head neutral)   General Observations and Info Activity: up ad josh   Cognitive Status Examination   Orientation Status orientation to person, place and time   Affect/Mental Status (Cognitive) WNL   Follows Commands WFL   Cognitive Status Comments pt is alert, oriented, appropriate in conversation; no acute cognitive concerns noted   Visual Perception   Impact of Vision Impairment on Function (Vision) Pt denies acute visual changes; wears glasses at baseline   Sensory   Sensory Comments medical chart indicates BLE neuropathy baseline; however, pt denies n/t and reports he feels his neuropathy has resolved   Pain Assessment   Patient Currently in Pain   (yes; incisional pain)   Range of Motion Comprehensive   Comment, General Range of Motion RUE WFL; LUE elbow/wrist/hand WFL - L shoulder ROM not assesed 2/2 post surgical precautions   Strength Comprehensive (MMT)   Comment, General Manual Muscle Testing (MMT) Assessment not formally assessed 2/2 post surgical precautions; pt demonstrates appropriate functional strength in all extremities; reports below baseline activity tolerance   Bed Mobility   Bed Mobility supine-sit   Supine-Sit [place] (Bed Mobility) moderate assist (50% patient effort)   Transfers   Transfers bed-chair transfer;sit-stand transfer   Transfer Skill: Bed to Chair/Chair to Bed   Bed-Chair [place] (Transfers) contact guard   Sit-Stand Transfer   Sit-Stand [place] (Transfers) contact guard   Balance   Balance Comments no LOB with standing and transfers at bedside   Activities of Daily Living   BADL Assessment/Intervention lower body dressing   Lower Body Dressing Assessment/Training   [place]k Level (Lower Body Dressing) moderate  assist (50% patient effort)   Instrumental Activities of Daily Living (IADL)   Previous Responsibilities meal prep;housekeeping;laundry;shopping;medication management;driving   IADL Comments family able to assist prn   Clinical Impression   Criteria for Skilled Therapeutic Interventions Met (OT) yes;meets criteria   OT Diagnosis decreased activity tolerance and independence with ADLs   OT Problem List-Impairments impacting ADL problems related to;activity tolerance impaired;mobility;range of motion (ROM);strength;pain;post-surgical precautions   Assessment of Occupational Performance 5 or more Performance Deficits   Identified Performance Deficits bed mobility, transfers, toileting, dressing, bathing, mobility, home management   Planned Therapy Interventions (OT) ADL retraining;IADL retraining;bed mobility training;ROM;strengthening;transfer training;home program guidelines   Clinical Decision Making Complexity (OT) low complexity   Therapy Frequency (OT) 5x/week   Predicted Duration of Therapy 5/31/21   Risk & Benefits of therapy have been explained evaluation/treatment results reviewed;care plan/treatment goals reviewed;current/potential barriers reviewed;participants voiced agreement with care plan;participants included;patient   OT Discharge Planning    OT Discharge Recommendation (DC Rec) Home with assist   OT Rationale for DC Rec While pt is currently below baseline with regards to mobility and independence with ADLs; pt is progressing as expected post op and anticipate with continued skilled therapy intervention pt will be able to safely discharge to home once medically appropriate   OT Brief overview of current status  Mod A supine > EOB, CGA sit to stand and bedside transfers   Total Evaluation Time (Minutes)   Total Evaluation Time (Minutes) 5

## 2021-05-25 NOTE — PROGRESS NOTES
"Otolaryngology - Head & Neck Surgery Flap Check Note  5/25/2021 - 0145    S: No acute events since last flap check. More comfortable after Dilaudid administration. Given 500 mL LR bolus at 2251 and 500 mL albumin bolus at 2327 for soft pressures. MAPs 59-70 by arterial line since last bolus.    O:  /57   Pulse 89   Temp 99.3  F (37.4  C) (Oral)   Resp (!) 5   Ht 1.727 m (5' 8\")   Wt 85.2 kg (187 lb 13.3 oz)   SpO2 96%   BMI 28.56 kg/m     Gen - resting comfortably in bed, rousable  HEENT - aquaplast splint sutured in place, all sutures intact. Small amount of bloody oozing on pillow, no terrence bleeding. Left pre-auricular incision sutures intact, no adjacent fluctuance or induration. Implantable Doppler is secured to the skin, strong arterial signal present. Strong handheld Doppler signal can be found at the superior aspect of the pre-auricular incision. Nasal cannula in place.  MSK - left flank incision clean, dry, sutures intact. Minimal amount of bruising present. Two LLOYD drains with small amount of mostly sanguinous drainage. No fluctuance or evidence of hematoma.    Drains:  LLOYD #1: 90 mL  LLOYD #2: 65 mL    A/P: 75 yo M POD #0 WLE scalp melanoma and left latissimus free flap reconstruction.    - Stable flap.  - Next MD flap check on AM rounds.    Diane Sesay MD PGY-3  Otolaryngology - Head & Neck Surgery  [phone]  "

## 2021-05-25 NOTE — PHARMACY-ADMISSION MEDICATION HISTORY
Patient's medication history was obtained pre-operatively, see below:    Preoperative Assessment Center Medication History Note    Medication history completed on May 12, 2021 by this writer. See Epic admission navigator for prior to admission medications. Operating room staff will still need to confirm medications and last dose information on day of surgery.      Medication history interview sources:  Patient Interview     Changes made to PTA medication list (reason)  Added:   -- pregabalin      Deleted: None     Changed:   -- updated lisinopril [facility] list   -- udpated quetiapine dose per [facility] medication list  -- updated simvastatin dose per [facility] medication list  -- updated trazodone [facility] medication list     Additional medication history information (including reliability of information, actions taken by pharmacist):  -- No recent (within 30 days) course of antibiotics  -- No recent (within 30 days) course of systemic steroids  -- Patient declines being on any other prescription or over-the-counter medications  -- patient was told by his oncologist to stop vemurafenib and cobimetinib for his upcoming procedure             Prior to Admission medications    Medication Sig Last Dose Taking? Auth Provider   acetaminophen (TYLENOL) 325 MG tablet TAKE TWO TABLETS BY MOUTH THREE TIMES A DAY AS NEEDED Taking Yes Reported, Patient   cholecalciferol (VITAMIN D3) 1000 UNIT tablet Take 1,000 Units by mouth daily  Taking Yes Reported, Patient   fluvoxaMINE (LUVOX) 100 MG tablet Take 150 mg at bedtime Taking Yes Elias Alonzo PA-C   hydrochlorothiazide (HYDRODIURIL) 25 MG tablet Take 25 mg by mouth daily. Taking Yes Reported, Patient   Hypromellose (ARTIFICIAL TEARS OP) Apply  to eye. 2 drops in each eye twice a day as needed Taking Yes Reported, Patient   Lactobacillus-Inulin ([facility]) CAPS 1 tab daily  Patient taking differently: Take 1 tablet by mouth daily  Taking Yes Mckayla Rockwell,  MD   lisinopril (ZESTRIL) 40 MG tablet Take 40 mg by mouth daily  Taking Yes Reported, Patient   Magnesium Oxide 420 MG TABS Take 420 mg by mouth daily Taking Yes Reported, Patient   omeprazole (PRILOSEC) 20 MG capsule Take 1 capsule by mouth 2 times daily. Taking Yes Mckayla Rockwell MD   potassium phosphate, monobasic, (K-PHOS) 500 MG tablet Take 1 tablet (500 mg) by mouth 2 times daily Taking Yes Sherine Cardoza PA-C   pregabalin (LYRICA) 150 MG capsule Take 150 mg by mouth 2 times daily Taking Yes Unknown, Entered By History   QUEtiapine (SEROQUEL) 300 MG tablet Take 150 mg by mouth At Bedtime  Taking Yes Elias Alonzo PA-C   simvastatin (ZOCOR) 80 MG tablet Take 40 mg by mouth At Bedtime  Taking Yes Reported, Patient   traZODone (DESYREL) 100 MG tablet Take 100 mg by mouth At Bedtime  Taking Yes Reported, Patient   vortioxetine (TRINTELLIX) 20 MG tablet TAKE ONE TABLET BY MOUTH EVERY MORNING Taking Yes Reported, Patient   diphenoxylate-atropine (LOMOTIL) 2.5-0.025 MG tablet Take 1-2 tablets by mouth 4 times daily as needed for diarrhea  Patient not taking: Reported on 5/12/2021 Not Taking   Sherine Cardoza PA-C   prochlorperazine (COMPAZINE) 10 MG tablet Take 1 tablet (10 mg) by mouth every 6 hours as needed (Nausea/Vomiting)  Patient not taking: Reported on 5/12/2021 Not Taking   Oksana Ashton MD   vemurafenib (ZELBORAF) 240 MG TABS tablet Take 3 tablets (720 mg) by mouth every 12 hours for 28 days  Patient not taking: Reported on 5/12/2021 Not Taking   Oksana Ashton MD     Medication history completed by: Vin Sarmiento, PharmD  Reviewed by: Elsi Marin, PharmD

## 2021-05-25 NOTE — PLAN OF CARE
Reason for admission: S/P flap 5/24  Shift events: Pt hypotensive start of shift, MAPs in 50s - 500cc of albumin & 500cc LR given with improvement in BPs.     Neuro: Intact, a/o x4, follows all commands. Good equal strength in all extremities. Pupils equal/reactive.   CV: HR mostly in 70s, sinus rhythm. MAP goal>60, MAPs improved after fluid administration last PM. BPs noted to be slightly softer this AM but still above goal. Afebrile.   Respiratory: 2L NC, LS clear/equal bilaterally.   Pain: Oxycodone given x1 with minimal relief, PRN dilaudid then administered with good relief. C/o headache pain and also slight back incisional pain.   GI/: Ashraf in place, good UO. No BM, pt reports passing no gas. Regular diet, drinks PO fluid adequately.   Lines/Gtts:   LR infusing @ 130cc/hr. IV abx also administered.   Skin: Flap on L head appears WDL. Mesh intact on skull, sutured in place. Back incision appears WDL, scant amt of  Bloody drainage.   Drains: x2 LLOYD drains on back, small/moderate output from each.   Labs: K+ replace, recheck ordered later this AM. Phos  & Mag to be replaced today.     Recommendations/POC: Monitor flap. Follow POC & notify MD w/ concerns.     For vital signs and complete assessments, please see documentation flowsheets.

## 2021-05-25 NOTE — PLAN OF CARE
Admitted/transferred from: direct from OR  Reason for admission/transfer: post operative cares of flap  2 RN skin assessment: completed by Sherlyn SCHAEFER and Sergio SAEED  Result of skin assessment and interventions/actions: sacral mepilex applied  Height, weight, drug calc weight: Done  Patient belongings (see Flowsheet)  MDRO education added to care planN/A  ?  D/I: patient remains in the Surgical/Neuro ICU.   Shift Events  Writer assumed cares [id number]  Q1hr flap checks  O2 weaned from 4L to 2L  Hypotension noted around 2200 -MD notified, 500cc LR bolus given    Neuro- AAOx4. PERRLA. Moves all extremities with equal strength. Denies numbness/tingling  CV- sinus rhtyjm on monitor, HR 70s. BP 90s-110/40s-60s, -MD notified of low BP/MAP, goal parameters defined as MAP >60. Around 2200 BP 78/46 MAP 56 -MD notified, 500cc LR bolus given per orders  Pulm- lungs clear. 2L O2 via nasal cannula -sats drop to 87% on room air while sleeping. Good cough with encouragement  GI- clear liquid diet -tolerating well. Passed bedside swallow eval by this RN. Reports history of nausea/vomiting/diarrhea with Acetaminophen and Asprin, premedicated with 4mg Zofran prior to PO meds, currently denies nausea. Reports last BM 5/22/21  - bo in place, urine output around 60cc/hr  Skin- left back incision -CDI. Left face and head flap -warm, pink/red, soft.  Gtts- LR @ 130cc/hr  Labs- Hgb 9.4. .  Pain- reports pain well controlled with scheduled Acetaminophen and 0.3mg PRN Dilaudid x1  Activity- bedrest overnight with frequent turns.   Drains- two left JPs in place to bulb suction with small amounts of sanguinous output  Social- daughter updated x1 via telephone. Questions answered, reassurance provided.  See flow sheets for further details and assessments.  A: stable  P: continue to monitor, notify MD of significant changes. Q1hr flap checks. Advance diet as tolerated

## 2021-05-25 NOTE — PLAN OF CARE
PT 4A: Hold- Per discussion with OT, patient is mobilizing well post-op, may only require one therapy discipline while inpatient. PT will hold at this time. OT will further assess mobility at next session and notify PT to initiate if indicated.

## 2021-05-25 NOTE — PROGRESS NOTES
"Otolaryngology - Head & Neck Surgery Flap Check Note  5/24/2021 - 2230    S: No acute events since arrival to the SICU. MAPs slowly drifted down to mid-50's with arterial line and cuff pressures correlating. 500 mL bolus of LR was discussed and then held due to improvement in pressures. MAPs again steady in the high 50's. UOP 50-60 mL/hr. Patient is doing well overall. He reports 7/10 pain, most significantly of the left flank. No nausea or vomiting. No lightheadedness.    O:  BP (!) 86/52   Pulse 84   Temp 98.5  F (36.9  C) (Oral)   Resp (!) 6   Ht 1.727 m (5' 8\")   Wt 85.2 kg (187 lb 13.3 oz)   SpO2 97%   BMI 28.56 kg/m     Gen - resting comfortably in bed, awakens easily and responds appropriately to questions  HEENT - aquaplast splint sutured in place, all sutures intact. Left pre-auricular incision sutures intact, no adjacent fluctuance or induration. Implantable Doppler is secured to the skin, strong arterial signal present. Strong handheld Doppler signal can be found at the superior aspect of the pre-auricular incision. Nasal cannula in place. HB I/VI bilaterally.  MSK - left flank incision clean, dry, sutures intact. Minimal amount of bruising present. Two LLOYD drains with mostly sanguinous drainage. No fluctuance or evidence of hematoma.    A/P: 77 yo M POD #0 WLE scalp melanoma and left latissimus free flap reconstruction.    - Stable flap with good arterial and handheld Doppler signal.  - MAPs trending slightly below goal with LR bolus planned, please notify ENT as further boluses are given (preferably LR or albumin).    Diane Sesay MD PGY-3  Otolaryngology - Head & Neck Surgery  [phone]  "

## 2021-05-25 NOTE — PROGRESS NOTES
"Otolaryngology Progress Note       May 25, 2021    S: No acute events overnight. MAP 56 so 500cc LR given and 500 ml albumin given with subsequent improvement into 60s. Hemodynamically stable on 2L NC overnight. Pain adequately controlled with Tylenol and Oxycodone. Strong flap signals overnight. Regular diet ordered but no PO food intake yet; last BM prior to surgery.     O: Tm 99.3, MAPs >60 other than one episode at 56. HR 80.   /57   Pulse 79   Temp 98.9  F (37.2  C) (Oral)   Resp 12   Ht 1.727 m (5' 8\")   Wt 86.6 kg (190 lb 14.7 oz)   SpO2 95%   BMI 29.03 kg/m       Gen - resting comfortably in bed, arousable  HEENT - aquaplast splint sutured in place, all sutures intact. Left pre-auricular incision sutures intact, no adjacent fluctuance or induration. Implantable Doppler is secured to the skin, strong arterial signal present. Strong handheld Doppler signal can be found at the superior aspect of the pre-auricular incision. Nasal cannula in place.  MSK - Left chest incision clean, dry, sutures intact. Minimal amount of bruising present. Two LLOYD drains with sanguinous drainage. No fluctuance or evidence of hematoma.    LLOYD drain output(s): (last 24 hours)/(last shift)  1 L chest: 90 90  2 L chest 65 23     LABS: WBC 4.1, Hgb 8.6, Plt 144, Na 139, K 3.4, Glu 119, Cr 1.21, Mg 1.5 (L), phos 2.5, albumin 3.3, pre-albumin pending, TSH 0.68    A/P: 76M s/p WLE scalp melanoma and Left latissimus free flap 5/24 recovering as expected.      Neuro: Schedule tylenol; PRN oxycodone/dilaudid.   HEENT: ENT flap checks per protocol.   - HOB 30 degrees, head/neck in midline position. Room temp 72.   - No ties around neck   - Half strength H202 or saline to incisions PRN.  - Bacitracin 24h, then aquaphor q8h. LLOYD drain cares.   Resp: O2 to keep sats >92%.  CV: SBP >90, preferably >110, keep MAPs >60.   - No vasopressors and if required, please contact ENT resident on call.   FEN/GI: clear liquid diet tolerating, can go " to regular diet from ENT standpoint. LBM 5/22. IVF per ICU0- /h. Mg replaced.   : Ashraf out when able  Endo:   - Monitor BG  ID: Unasyn 48hr postop  Heme: Monitor hgb.  MSK: Left latissimus free flap. Ok for PT consult, limited ROM of Left arm  Ppx: lovenox 40mg/d POD1,  daily   Consults: PT/OT   Dispo: ICU for 72 hours postop, then 6A    Patient and above plan discussed with Dr. Garcia and Dr. Carter Delgadillo MD  Otolaryngology-Head & Neck Surgery PGY-3    Please contact ENT with questions by dialing [phone] and entering job code [phone] when prompted.

## 2021-05-25 NOTE — PROGRESS NOTES
SURGICAL ICU PROGRESS NOTE  May 25, 2021      CO-MORBIDITIES:   Melanoma of scalp (H)  Melanoma of scalp (H)    ASSESSMENT: Ahmet Coleman is a 76 year old male with PMH of HLD, HTN, GERD, chronic renal insufficiency (not dialysis dependent) and neuropathy that is now s/p wide excision of melanoma of the scalp with left latissimus free flap reconstruction on 5/24/2021.     Events overnight  BP below MAP goal- 500 mL bolus of albumin/500 mL bolus of LR with good response      TODAY'S PROGRESS/PLANS:   Start lovenox/ASA today   Resume PTA psych medications  Resume PTA statin  Remove arterial line.  Keep in bed   Discontinued MIVF  PT/OT    PLAN:  Neuro/ pain/ sedation:  #hx of anxiety  #hx of depression  #hx of PTSD  #hx of migraines  #hx of neuropathy  -Monitor neurological status. Notify the MD for any acute changes in exam.  - Held PTA meds: trazodone 100mg qHS  - S. Tylenol, oxycodone PRN, dilaudid PRN  - PTA pregabalin 150mg BID, quetiapine 150mg at bedtime, fluvoxamine, vortioxetine      HEENT  #status post wide excision of melanoma of the scalp w/left free latissimus flap  -Flap viable on exam   -Flap cares per ENT:   - HOB 30 degrees, head/neck in midline position. Room temp 72.    - No ties around neck    - Half strength H202 or saline to incisoin PRN per nursing.    - Bacitracin 24h, then aquaphor q8h. LLOYD drain cares  -scalp tissue pathology pending  -start ASA 325mg this morning      Pulmonary care:   -Nasal cannula on 2 LPM  -Titrate SpO2 to above 92 %.      Cardiovascular:    #HLD/HTN  -MAP goal >65 mm Hg  -500/500 mL bolus of LR and Albumin to meet MAP goals overnight with good response   -Another 500mL bolus of LR given in AM due to soft pressures  -Held PTA meds: hydrochlorothiazide 25mg daily, lisinopril 40 mg daily  -, simvistatin 40mg qHS started 5/25  -keep in bed for now      GI/Nutrition:   #GERD    -Regular diet   -PTA omeprazole 20mg BID     Renal/FEN:    #hx of chronic renal  insufficiency, baseline creatinine at ~1.7  -Cr 1.21 (1.12)   -ICU electrolyte replacement protocol  -BMP daily     Endocrine:    - Glucose 119, 119, 132 (no need for insulin)       ID/ Antibiotics:  -Complete perioperative unasyn(through 5/27)  -Afebrile, WBC 4.1      Heme:     -Hemoglobin 8.6 (9.4)  -CBC daily      Prophylaxis:    -Mechanical prophylaxis for DVT. Bilateral SCDs.  -Start lovenox (5/25)      MSK:    -Up ad josh  -PT and OT consulted. Appreciate recs.      Lines/ tubes/ drains:  -Ashraf  -JPx2 in left posterolateral chest wall      Disposition:  -Surgical ICU for 72 hours for flap monitoring       Patient seen, findings and plan discussed with surgical ICU staff.    Sg Miller, MS4    Efrain Wilkins MD   Surgery PGY-1        ====================================    SUBJECTIVE:   -NAD. Reports headache and back consistent with incisional pain. Expresses difficulty with moving around due to pain. Otherwise no concerns. Has not yet ambulated. Ashraf in place. No BMs yet. Has not had a meal since surgery.       OBJECTIVE:   1. VITAL SIGNS:   Temp:  [97.5  F (36.4  C)-99.3  F (37.4  C)] 98.9  F (37.2  C)  Pulse:  [64-91] 79  Resp:  [5-30] 12  BP: ()/(52-92) 101/57  MAP:  [56 mmHg-120 mmHg] 61 mmHg  Arterial Line BP: ()/() 91/43  SpO2:  [91 %-100 %] 95 %  Resp: 12      2. INTAKE/ OUTPUT:   I/O last 3 completed shifts:  In: 6558.67 [P.O.:560; I.V.:4498.67; IV Piggyback:500]  Out: 2303 [Urine:2035; Drains:268]    3. PHYSICAL EXAMINATION:   Constitutional: Cooperate, alert  Head: Flap pink and well perfused. Pulsatile flow to flap demonstrated by continuous doppler. Reports light headness when sitting in chair.  Cardiovascular: noncyanotic, RRR. Fluid responsive responsive based on passive leg raise.  Chest: JPx2 in left posterolateral chest wall with serosanguinous drainage. Incision c/d/i  Respiratory: Normal work of breathing, CTAB  Gastrointestinal: Abdomen soft, non-tender.  No masses,  organomegaly.  : Ashraf  Musculoskeletal: extremities normal- no gross deformities noted, normal, and peripheral pulses normal  Skin: no suspicious lesions or rashes    4. INVESTIGATIONS:   Arterial Blood Gases   Recent Labs   Lab 05/24/21  1521 05/24/21  1255   PH 7.41 7.42   PCO2 47* 43   PO2 171* 153*   HCO3 30* 28     Complete Blood Count   Recent Labs   Lab 05/25/21  0334 05/24/21  2034 05/24/21  1521 05/24/21  1255   WBC 4.1 4.6  --   --    HGB 8.6* 9.4* 10.7* 12.0*   * 146*  --   --      Basic Metabolic Panel  Recent Labs   Lab 05/25/21  0334 05/24/21 2101 05/24/21  1521 05/24/21  1255 05/24/21  0917    139 141 141  --    POTASSIUM 3.4 3.4 3.4 3.1* 3.4   CHLORIDE 102 105  --   --   --    CO2 30 29  --   --   --    BUN 13 14  --   --   --    CR 1.21 1.12  --   --  1.30*   * 119* 132* 142*  --      Liver Function Tests  Recent Labs   Lab 05/25/21  0334   ALBUMIN 3.3*     Pancreatic Enzymes  No lab results found in last 7 days.  Coagulation Profile  No lab results found in last 7 days.  Lactate  Invalid input(s): LACTATE    5. RADIOLOGY:   No results found for this or any previous visit (from the past 24 hour(s)).    =========================================      Patient seen, findings and plan discussed with surgical ICU staff Dr. Navarro.

## 2021-05-25 NOTE — PROGRESS NOTES
"CLINICAL NUTRITION SERVICES - ASSESSMENT NOTE     Nutrition Prescription    RECOMMENDATIONS FOR MDs/PROVIDERS TO ORDER:  Continue to encourage PO intakes and higher protein foods for wound healing.     Malnutrition Status:    None    Recommendations already ordered by Registered Dietitian (RD):  None at this time    Future/Additional Recommendations:  Continue to monitor PO intakes and need for calorie counts and supplements. Pt somehwat confused at this time and having difficulty answering RD questions.      REASON FOR ASSESSMENT  Ahmet Coleman is a/an 76 year old male assessed by the dietitian for Admission Nutrition Risk Screen for positive    NUTRITION HISTORY  Pt reports that he did not eat the day of his surgery, but was eating well PTA. Reports no decreased appetite or PO intakes PTA, nor any weight loss. Pt is slightly confused this AM. He reports that he knows what is going on but is having trouble expressing what he wants to say. He was drinking water upon RD visit with some soft foods at bedside. Reports no appetite this AM.     CURRENT NUTRITION ORDERS  Diet: Regular    LABS  Labs reviewed    MEDICATIONS  Medications reviewed  Pt is on home K-phos. Replacement protocol ordered.     ANTHROPOMETRICS  Height: 172.7 cm (5' 8\")  Most Recent Weight: 86.6 kg (190 lb 14.7 oz)    IBW: 70 kg  BMI: Overweight BMI 25-29.9  Weight History: weight fluctuations noted over the past 3 months, from 185-195#.  Wt Readings from Last 10 Encounters:   05/25/21 86.6 kg (190 lb 14.7 oz)   05/12/21 85.2 kg (187 lb 12.8 oz)   05/07/21 84.9 kg (187 lb 3.2 oz)   05/05/21 84 kg (185 lb 3 oz)   04/15/21 85.7 kg (188 lb 14.4 oz)   04/14/21 86.2 kg (190 lb)   04/02/21 85.4 kg (188 lb 4.8 oz)   04/01/21 85.4 kg (188 lb 3.2 oz)   03/18/21 88.5 kg (195 lb 1.6 oz)   02/18/21 88.1 kg (194 lb 3.2 oz)     Dosing Weight: 74 kg (adjusted based on weight of 86.6 kg)    ASSESSED NUTRITION NEEDS  Estimated Energy Needs: 5831-8156 kcals/day (25 " - 30 kcals/kg)  Justification: Maintenance  Estimated Protein Needs:  grams protein/day (1.3 - 1.8 grams of pro/kg)  Justification: Increased needs and Post-op  Estimated Fluid Needs: 1 mL/kcal/day   Justification: Maintenance and Per provider pending fluid status    PHYSICAL FINDINGS  See malnutrition section below.    MALNUTRITION  % Intake: No decreased intake noted  % Weight Loss: None noted  Subcutaneous Fat Loss: None observed  Muscle Loss: None observed  Fluid Accumulation/Edema: Does not meet criteria  Malnutrition Diagnosis: Patient does not meet two of the established criteria necessary for diagnosing malnutrition but is at risk for malnutrition    NUTRITION DIAGNOSIS  Predicted inadequate nutrient intake kcals/pro related to post op surgery possibly inhibiting [place] PO intakes/ decrease appetite.      INTERVENTIONS  Implementation  Nutrition Education: Provided education on role of RD and nutrition POC     Goals  Patient to consume % of nutritionally adequate meal trays TID, or the equivalent with supplements/snacks.     Monitoring/Evaluation  Progress toward goals will be monitored and evaluated per protocol.      Christianne Izquierdo RD, MS, LD  SICU: [id number]

## 2021-05-26 ENCOUNTER — APPOINTMENT (OUTPATIENT)
Dept: OCCUPATIONAL THERAPY | Facility: CLINIC | Age: 76
DRG: 578 | End: 2021-05-26
Attending: OTOLARYNGOLOGY
Payer: MEDICARE

## 2021-05-26 LAB
ANION GAP SERPL CALCULATED.3IONS-SCNC: 1 MMOL/L (ref 3–14)
BUN SERPL-MCNC: 9 MG/DL (ref 7–30)
CALCIUM SERPL-MCNC: 7.8 MG/DL (ref 8.5–10.1)
CHLORIDE SERPL-SCNC: 107 MMOL/L (ref 94–109)
CO2 SERPL-SCNC: 32 MMOL/L (ref 20–32)
CREAT SERPL-MCNC: 1.29 MG/DL (ref 0.66–1.25)
ERYTHROCYTE [DISTWIDTH] IN BLOOD BY AUTOMATED COUNT: 13.8 % (ref 10–15)
GFR SERPL CREATININE-BSD FRML MDRD: 54 ML/MIN/{1.73_M2}
GLUCOSE SERPL-MCNC: 117 MG/DL (ref 70–99)
HCT VFR BLD AUTO: 23.9 % (ref 40–53)
HGB BLD-MCNC: 8.1 G/DL (ref 13.3–17.7)
MAGNESIUM SERPL-MCNC: 2.4 MG/DL (ref 1.6–2.3)
MCH RBC QN AUTO: 29.8 PG (ref 26.5–33)
MCHC RBC AUTO-ENTMCNC: 33.9 G/DL (ref 31.5–36.5)
MCV RBC AUTO: 88 FL (ref 78–100)
PHOSPHATE SERPL-MCNC: 2.6 MG/DL (ref 2.5–4.5)
PLATELET # BLD AUTO: 145 10E9/L (ref 150–450)
POTASSIUM SERPL-SCNC: 3.6 MMOL/L (ref 3.4–5.3)
RBC # BLD AUTO: 2.72 10E12/L (ref 4.4–5.9)
SODIUM SERPL-SCNC: 140 MMOL/L (ref 133–144)
WBC # BLD AUTO: 3.6 10E9/L (ref 4–11)

## 2021-05-26 PROCEDURE — 258N000003 HC RX IP 258 OP 636: Performed by: STUDENT IN AN ORGANIZED HEALTH CARE EDUCATION/TRAINING PROGRAM

## 2021-05-26 PROCEDURE — 120N000002 HC R&B MED SURG/OB UMMC

## 2021-05-26 PROCEDURE — 250N000011 HC RX IP 250 OP 636: Performed by: OTOLARYNGOLOGY

## 2021-05-26 PROCEDURE — 250N000013 HC RX MED GY IP 250 OP 250 PS 637: Performed by: OTOLARYNGOLOGY

## 2021-05-26 PROCEDURE — 80048 BASIC METABOLIC PNL TOTAL CA: CPT | Performed by: OTOLARYNGOLOGY

## 2021-05-26 PROCEDURE — 250N000013 HC RX MED GY IP 250 OP 250 PS 637: Performed by: STUDENT IN AN ORGANIZED HEALTH CARE EDUCATION/TRAINING PROGRAM

## 2021-05-26 PROCEDURE — 97530 THERAPEUTIC ACTIVITIES: CPT | Mod: GO | Performed by: OCCUPATIONAL THERAPIST

## 2021-05-26 PROCEDURE — 250N000013 HC RX MED GY IP 250 OP 250 PS 637: Performed by: DIETITIAN, REGISTERED

## 2021-05-26 PROCEDURE — 85027 COMPLETE CBC AUTOMATED: CPT | Performed by: OTOLARYNGOLOGY

## 2021-05-26 PROCEDURE — 84100 ASSAY OF PHOSPHORUS: CPT | Performed by: OTOLARYNGOLOGY

## 2021-05-26 PROCEDURE — 83735 ASSAY OF MAGNESIUM: CPT | Performed by: OTOLARYNGOLOGY

## 2021-05-26 PROCEDURE — 250N000011 HC RX IP 250 OP 636: Performed by: STUDENT IN AN ORGANIZED HEALTH CARE EDUCATION/TRAINING PROGRAM

## 2021-05-26 PROCEDURE — 36415 COLL VENOUS BLD VENIPUNCTURE: CPT | Performed by: OTOLARYNGOLOGY

## 2021-05-26 RX ORDER — MAGNESIUM OXIDE 400 MG/1
420 TABLET ORAL DAILY
Status: DISCONTINUED | OUTPATIENT
Start: 2021-05-27 | End: 2021-05-28 | Stop reason: HOSPADM

## 2021-05-26 RX ORDER — METHOCARBAMOL 500 MG/1
500 TABLET, FILM COATED ORAL 3 TIMES DAILY
Status: DISCONTINUED | OUTPATIENT
Start: 2021-05-26 | End: 2021-05-28 | Stop reason: HOSPADM

## 2021-05-26 RX ORDER — TRAZODONE HYDROCHLORIDE 100 MG/1
100 TABLET ORAL AT BEDTIME
Status: DISCONTINUED | OUTPATIENT
Start: 2021-05-26 | End: 2021-05-28 | Stop reason: HOSPADM

## 2021-05-26 RX ORDER — POTASSIUM CHLORIDE 750 MG/1
20 TABLET, EXTENDED RELEASE ORAL ONCE
Status: COMPLETED | OUTPATIENT
Start: 2021-05-26 | End: 2021-05-26

## 2021-05-26 RX ADMIN — SIMVASTATIN 40 MG: 20 TABLET, FILM COATED ORAL at 21:36

## 2021-05-26 RX ADMIN — OMEPRAZOLE 20 MG: 20 CAPSULE, DELAYED RELEASE ORAL at 15:44

## 2021-05-26 RX ADMIN — ACETAMINOPHEN 1000 MG: 500 TABLET, FILM COATED ORAL at 20:43

## 2021-05-26 RX ADMIN — OMEPRAZOLE 20 MG: 20 CAPSULE, DELAYED RELEASE ORAL at 08:36

## 2021-05-26 RX ADMIN — QUETIAPINE FUMARATE 150 MG: 100 TABLET ORAL at 21:36

## 2021-05-26 RX ADMIN — POTASSIUM & SODIUM PHOSPHATES POWDER PACK 280-160-250 MG 1 PACKET: 280-160-250 PACK at 20:41

## 2021-05-26 RX ADMIN — POTASSIUM & SODIUM PHOSPHATES POWDER PACK 280-160-250 MG 1 PACKET: 280-160-250 PACK at 08:33

## 2021-05-26 RX ADMIN — TRAZODONE HYDROCHLORIDE 100 MG: 100 TABLET ORAL at 21:36

## 2021-05-26 RX ADMIN — AMPICILLIN SODIUM AND SULBACTAM SODIUM 3 G: 2; 1 INJECTION, POWDER, FOR SOLUTION INTRAMUSCULAR; INTRAVENOUS at 05:14

## 2021-05-26 RX ADMIN — HYDROMORPHONE HYDROCHLORIDE 0.5 MG: 1 INJECTION, SOLUTION INTRAMUSCULAR; INTRAVENOUS; SUBCUTANEOUS at 06:36

## 2021-05-26 RX ADMIN — ENOXAPARIN SODIUM 40 MG: 40 INJECTION, SOLUTION INTRAVENOUS; SUBCUTANEOUS at 08:27

## 2021-05-26 RX ADMIN — OXYCODONE HYDROCHLORIDE 5 MG: 5 TABLET ORAL at 10:11

## 2021-05-26 RX ADMIN — ASPIRIN 325 MG ORAL TABLET 325 MG: 325 PILL ORAL at 08:23

## 2021-05-26 RX ADMIN — FLUVOXAMINE MALEATE 150 MG: 50 TABLET, COATED ORAL at 21:36

## 2021-05-26 RX ADMIN — METHOCARBAMOL 500 MG: 500 TABLET, FILM COATED ORAL at 13:03

## 2021-05-26 RX ADMIN — POTASSIUM CHLORIDE 20 MEQ: 750 TABLET, EXTENDED RELEASE ORAL at 05:14

## 2021-05-26 RX ADMIN — PREGABALIN 150 MG: 100 CAPSULE ORAL at 08:17

## 2021-05-26 RX ADMIN — VORTIOXETINE 20 MG: 20 TABLET, FILM COATED ORAL at 08:25

## 2021-05-26 RX ADMIN — BACITRACIN ZINC: 500 OINTMENT TOPICAL at 20:41

## 2021-05-26 RX ADMIN — AMPICILLIN SODIUM AND SULBACTAM SODIUM 3 G: 2; 1 INJECTION, POWDER, FOR SOLUTION INTRAMUSCULAR; INTRAVENOUS at 10:24

## 2021-05-26 RX ADMIN — PREGABALIN 150 MG: 100 CAPSULE ORAL at 20:41

## 2021-05-26 RX ADMIN — ACETAMINOPHEN 1000 MG: 500 TABLET, FILM COATED ORAL at 08:35

## 2021-05-26 RX ADMIN — SODIUM CHLORIDE, POTASSIUM CHLORIDE, SODIUM LACTATE AND CALCIUM CHLORIDE 500 ML: 600; 310; 30; 20 INJECTION, SOLUTION INTRAVENOUS at 03:17

## 2021-05-26 RX ADMIN — Medication 1 MG: at 20:58

## 2021-05-26 RX ADMIN — ACETAMINOPHEN 1000 MG: 500 TABLET, FILM COATED ORAL at 13:03

## 2021-05-26 RX ADMIN — METHOCARBAMOL 500 MG: 500 TABLET, FILM COATED ORAL at 20:41

## 2021-05-26 ASSESSMENT — VISUAL ACUITY
OU: NOT TESTABLE
OU: GLASSES
OU: NOT TESTABLE

## 2021-05-26 ASSESSMENT — ACTIVITIES OF DAILY LIVING (ADL)
ADLS_ACUITY_SCORE: 20
ADLS_ACUITY_SCORE: 19
ADLS_ACUITY_SCORE: 20
ADLS_ACUITY_SCORE: 19
ADLS_ACUITY_SCORE: 20
ADLS_ACUITY_SCORE: 20

## 2021-05-26 ASSESSMENT — MIFFLIN-ST. JEOR: SCORE: 1594.02

## 2021-05-26 NOTE — PROGRESS NOTES
"Otolaryngology - Head & Neck Surgery Flap Check Note  5/25/2021 - 2345    S: No acute events since last flap check. No concerns with the flap per nursing. Having on and off issues with pain control. He feels Dilaudid works better for him than oxycodone, which he doesn't notice much effect from. No shortness of breath, nausea, or vomiting. MAPs appropriate currently. His bedside RN has noted an errant surgical staple in the posterior neck.    O:  /59   Pulse 76   Temp 98.2  F (36.8  C)   Resp 10   Ht 1.727 m (5' 8\")   Wt 86.6 kg (190 lb 14.7 oz)   SpO2 97%   BMI 29.03 kg/m     Gen - resting comfortably in bed, awake, alert.  HEENT - aquaplast splint sutured in place, all sutures intact. Minimal drainage, no terrence bleeding. Left pre-auricular incision sutures intact, no adjacent fluctuance or induration. Implantable Doppler is secured to the skin, strong arterial signal present. Strong handheld Doppler signal can be found at the superior aspect of the pre-auricular incision. Posterior neck staple removed.  MSK - left flank incision clean, dry, sutures intact. Minimal amount of bruising present. Two LLOYD drains with appropriate amounts of serosanguinous drainage. No fluctuance or evidence of hematoma.    A/P: 77 yo M POD #1 WLE scalp melanoma and left latissimus free flap reconstruction.    - Stable flap.  - Next MD flap check on AM rounds.    Diane Sesay MD PGY-3  Otolaryngology - Head & Neck Surgery  [phone]  "

## 2021-05-26 NOTE — PROVIDER NOTIFICATION
Updated SICU at bedside, notified that patient seems confused/CAM-ICU +. Also notified that had fever of 101.1F axillary at 0800. MD verbalized understanding, no new orders at this time.

## 2021-05-26 NOTE — PLAN OF CARE
Major Shift Events:  Pt pain control. Given prn pain meds. OOB to void to bathroom.  Plan: Pain management, q  1 hour flap checks.   For vital signs and complete assessments, please see documentation flowsheets.

## 2021-05-26 NOTE — PLAN OF CARE
PT4AB: PT EVAL CX AND DEFER- per discussion with OT, pt up SBA with IV pole in bruno around entire unit. No concerns for balance. OT will continue to follow to progress functional activity, PT will complete orders.

## 2021-05-26 NOTE — PROGRESS NOTES
"Otolaryngology Progress Note       May 26, 2021    S: No issues overnight, afebrile and vitally stable. Tolerating PO, endorses minimal appetite.     O: Tm 99.3, MAPs >60 other than one episode at 56. HR 80.   /63   Pulse 92   Temp 101.1  F (38.4  C) (Axillary)   Resp 15   Ht 1.727 m (5' 8\")   Wt 88 kg (193 lb 14.4 oz)   SpO2 91%   BMI 29.48 kg/m       Gen - resting comfortably in bed, arousable  HEENT - aquaplast splint sutured in place, all sutures intact. Left pre-auricular incision sutures intact, no adjacent fluctuance or induration. Implantable Doppler is secured to the skin, strong arterial signal present. Strong handheld Doppler signal can be found at the superior aspect of the pre-auricular incision. Nasal cannula in place.  MSK - Left chest incision clean, dry, sutures intact. Minimal amount of bruising present. Two LLOYD drains with sanguinous drainage. No fluctuance or evidence of hematoma.    LLOYD drain output(s): (last 24 hours)/(last shift)  1 L chest: 90 / 68 / 54  2 L chest 23 / 62 / 37    LABS:   Na: 140  K: 3.6  M.4  Phos: 2.6  Cr: 1.29  Ca: 7.8    A/P: 76M s/p WLE scalp melanoma and Left latissimus free flap  recovering as expected.      Neuro:   - Schedule tylenol; PRN oxycodone/dilaudid.   HEENT: ENT flap checks per protocol.   - HOB 30 degrees, head/neck in midline position. Room temp 72.   - No ties around neck   - Half strength H202 or saline to incisions PRN.  - Bacitracin 24h, then aquaphor q8h. LLOYD drain cares.   Resp:   - O2 to keep sats >92%.  CV:   - SBP >90, preferably >110, keep MAPs >60.   - No vasopressors and if required, please contact ENT resident on call.   FEN/GI:  -  clear liquid diet tolerating, can go to regular diet from ENT standpoint.   - Encourage increased PO today  -  LBM .  - Recommend IVF given UO and decreased PO  :   -  Ashraf out when able  Endo:   - Monitor BG  ID:   - Unasyn 48hr postop  Heme:   - Monitor hgb.  MSK:   - Left latissimus free " flap. Ok for PT consult, limited ROM of Left arm x 1 week  Ppx:   - lovenox 40mg/d POD1,  daily   Consults:   - PT/OT   Dispo:   - ICU for 48 hours postop. Ok for transfer to  this afternoon (5pm)    Patient and above plan discussed with Dr. Garcia and Dr. Carter Ortiz MD  OtoHNS PGY5  Please contact ENT with questions by dialing [phone] and entering job code [phone] when prompted.

## 2021-05-26 NOTE — PROGRESS NOTES
SURGICAL ICU PROGRESS NOTE  May 25, 2021      CO-MORBIDITIES:   Melanoma of scalp (H)  Melanoma of scalp (H)    ASSESSMENT: Ahmet Coleman is a 76 year old male with PMH of HLD, HTN, GERD, chronic renal insufficiency (not dialysis dependent) and neuropathy that is now s/p wide excision of melanoma of the scalp with left latissimus free flap reconstruction on 5/24/2021.     Events overnight  500mL bolus LR overnight    TODAY'S PROGRESS/PLANS:   -schedule robaxin  -switched IV dilaudid to PO  -okay to transfer to floor later this PM per ENT     PLAN:  Neuro/ pain/ sedation:  #hx of anxiety  #hx of depression  #hx of PTSD  #hx of migraines  #hx of neuropathy  -Monitor neurological status. Notify the MD for any acute changes in exam.  - Held PTA meds: trazodone 100mg qHS  - S. Tylenol, S. Robaxin, Dilaudid PO PRN  - PTA pregabalin 150mg BID, quetiapine 150mg at bedtime, fluvoxamine, vortioxetine     HEENT  #status post wide excision of melanoma of the scalp w/left free latissimus flap  -Flap viable on exam   -Flap cares per ENT:   - HOB 30 degrees, head/neck in midline position. Room temp 72.    - No ties around neck    - Half strength H202 or saline to incisoin PRN per nursing.    - Bacitracin 24h, then aquaphor q8h. LLOYD drain cares  -scalp tissue pathology pending  -start ASA 325mg this morning      Pulmonary care:   -Room air  -Titrate SpO2 to above 92 %.      Cardiovascular:    #HLD/HTN  -MAP goal >65 mm Hg  -500mL bolus of LR given in AM due to soft pressures  -Held PTA meds: hydrochlorothiazide 25mg daily, lisinopril 40 mg daily  -, simvistatin 40mg qHS started 5/25     GI/Nutrition:   #GERD    -Regular diet   -PTA omeprazole 20mg BID     Renal/FEN:    #hx of chronic renal insufficiency, baseline creatinine at ~1.7  -Cr 1.29 (1.21)   -ICU electrolyte replacement protocol  -BMP daily     Endocrine:    - no need for blood glucose management     ID/ Antibiotics:  -Complete perioperative unasyn(through  "5/27)  -Afebrile, WBC 4.1      Heme:     -Hemoglobin 8.6 (9.4)  -CBC daily      Prophylaxis:    -Mechanical prophylaxis for DVT. Bilateral SCDs.  -Lovenox      MSK:    -Up ad josh  -PT and OT consulted. Appreciate recs.      Lines/ tubes/ drains:  -JPx2 in left posterolateral chest wall      Disposition:  -Transfer to floor this afternoon     Plan discussed with surgical ICU staff, Dr. Navarro.    Sg Miller, MS4    I, Efrain Wilkins MD, have personally seen and examined the patient. I have read and edited the medical student's documentation where appropriate, and agree with the stated findings and plan.    Efrain Wilkins MD   Surgery PGY-1        ====================================    SUBJECTIVE:   Somnolent, feels \"foggy\". Has not been eating a lot. He reports trying to drink water more. Using IS adequately. Voiding appropriately with the urinal.       OBJECTIVE:   1. VITAL SIGNS:   Temp:  [97.9  F (36.6  C)-101.1  F (38.4  C)] 101.1  F (38.4  C)  Pulse:  [71-99] 92  Resp:  [8-29] 15  BP: ()/(50-78) 112/63  SpO2:  [90 %-100 %] 91 %  Resp: 18      2. INTAKE/ OUTPUT:   I/O last 3 completed shifts:  In: 1090 [P.O.:300; I.V.:290; IV Piggyback:500]  Out: 2466 [Urine:2205; Drains:261]    3. PHYSICAL EXAMINATION:   Constitutional: Cooperate, somnolent  Head: Flap pink and well perfused. Pulsatile flow to flap demonstrated by continuous dopplerCardiovascular: noncyanotic, RRR.   Chest: JPx2 in left posterolateral chest wall with minimal serosanguinous drainage. Incision c/d/i.  Respiratory: Normal work of breathing on RA, CTAB  Gastrointestinal: Abdomen soft, non-tender.    Musculoskeletal: extremities normal- no gross deformities noted, normal, and peripheral pulses normal. Edematous lower extremities  Skin: no suspicious lesions or rashes    4. INVESTIGATIONS:   Arterial Blood Gases   Recent Labs   Lab 05/24/21  1521 05/24/21  1255   PH 7.41 7.42   PCO2 47* 43   PO2 171* 153*   HCO3 30* 28     Complete Blood " Count   Recent Labs   Lab 05/26/21  0323 05/25/21  0334 05/24/21 2034 05/24/21  1521   WBC 3.6* 4.1 4.6  --    HGB 8.1* 8.6* 9.4* 10.7*   * 144* 146*  --      Basic Metabolic Panel  Recent Labs   Lab 05/26/21  0323 05/25/21  1052 05/25/21  0334 05/24/21  2101 05/24/21  1521 05/24/21  0917 05/24/21 0917     --  139 139 141   < >  --    POTASSIUM 3.6 3.4 3.4 3.4 3.4   < > 3.4   CHLORIDE 107  --  102 105  --   --   --    CO2 32  --  30 29  --   --   --    BUN 9  --  13 14  --   --   --    CR 1.29*  --  1.21 1.12  --   --  1.30*   *  --  119* 119* 132*   < >  --     < > = values in this interval not displayed.     Liver Function Tests  Recent Labs   Lab 05/25/21 0334   ALBUMIN 3.3*     Pancreatic Enzymes  No lab results found in last 7 days.  Coagulation Profile  No lab results found in last 7 days.  Lactate  Invalid input(s): LACTATE    5. RADIOLOGY:   No results found for this or any previous visit (from the past 24 hour(s)).    =========================================      Patient seen, findings and plan discussed with surgical ICU staff Dr. Navarro.

## 2021-05-26 NOTE — PLAN OF CARE
"Reason for admission: S/P flap 5/24   Shift events: Pt  hypotensive around 0300, SBP <80 & MAP=60. 500cc bolus LR administered, BPs have appeared to resolve since.     Neuro: A/o x4, follows all commands, pupils equal/reactive, equal strength all extremities. Pt appearing slightly more lethargic this AM.   CV: MAP goal >60. BPs slightly soft, but still above MAP goal. HR 70s-80s sinus rhythm. Afebrile.   Respiratory: Fluctuated between RA & intermittently needing 1L NC. LS diminished bilaterally. IS given to pt, using appropriately.   Pain: Pt states oxycodone doesn't help with pain much, only given x1 last PM. IV dilaudid given x2 last PM w/ good relief. Pt states he has back pain that feels like it is \"ripping\", also intermittent headache. States pain is manageable when still but aggravated w/ movement.   GI/: No PO food intake, only had a few sips of H2o Overnight. Voiding appropriately w/ urinal at bedside. No BM overnight.   Lines/Gtts:  TKO w/ IV abx, PIV X2.   Skin:  Flap on left head appearing WDL, checks continue q1h. Dressing still sutured on top of scalp, scant sanguinous drainage. Incision site on back appearing WNL. x2 LLOYD drains on back with appropriate bright bloody output.   Labs: K+ replaced this AM, phos to be replaced today. Cr slightly elevated.     Recommendations/POC: Continue to monitor flap. Follow POC & notify MD w/ concerns.     For vital signs and complete assessments, please see documentation flowsheets.     "

## 2021-05-26 NOTE — PROGRESS NOTES
Arrived from:  4A  Belongings/meds:  Glasses, two bag with clothing, shoes  2 RN Skin Assessment Completed by:  Alec  Non-intact findings documented (yes/no/NA): Head flap with mesh covering, doppler site near L ear, preventative mepilex on coccyx (no redness underneath), L back incision sutured with LLOYD x2

## 2021-05-26 NOTE — PLAN OF CARE
Major Shift Events:  Flap pale pink, warm, strong doppler signals. Ate 2 meals, voiding. Utilizing call light.  For vital signs and complete assessments, please see documentation flowsheets.    Transferred to: 6A  Belongings: two bags sent with patient.  Chart and medications sent with patient.

## 2021-05-27 ENCOUNTER — APPOINTMENT (OUTPATIENT)
Dept: OCCUPATIONAL THERAPY | Facility: CLINIC | Age: 76
DRG: 578 | End: 2021-05-27
Attending: OTOLARYNGOLOGY
Payer: MEDICARE

## 2021-05-27 ENCOUNTER — PATIENT OUTREACH (OUTPATIENT)
Dept: OTOLARYNGOLOGY | Facility: CLINIC | Age: 76
End: 2021-05-27

## 2021-05-27 LAB
ANION GAP SERPL CALCULATED.3IONS-SCNC: 4 MMOL/L (ref 3–14)
BUN SERPL-MCNC: 9 MG/DL (ref 7–30)
CALCIUM SERPL-MCNC: 8.3 MG/DL (ref 8.5–10.1)
CHLORIDE SERPL-SCNC: 108 MMOL/L (ref 94–109)
CO2 SERPL-SCNC: 28 MMOL/L (ref 20–32)
CREAT SERPL-MCNC: 1.23 MG/DL (ref 0.66–1.25)
GFR SERPL CREATININE-BSD FRML MDRD: 57 ML/MIN/{1.73_M2}
GLUCOSE SERPL-MCNC: 100 MG/DL (ref 70–99)
MAGNESIUM SERPL-MCNC: 2.3 MG/DL (ref 1.6–2.3)
POTASSIUM SERPL-SCNC: 3.9 MMOL/L (ref 3.4–5.3)
POTASSIUM SERPL-SCNC: 4 MMOL/L (ref 3.4–5.3)
SODIUM SERPL-SCNC: 140 MMOL/L (ref 133–144)

## 2021-05-27 PROCEDURE — 250N000013 HC RX MED GY IP 250 OP 250 PS 637: Performed by: STUDENT IN AN ORGANIZED HEALTH CARE EDUCATION/TRAINING PROGRAM

## 2021-05-27 PROCEDURE — 36415 COLL VENOUS BLD VENIPUNCTURE: CPT | Performed by: OTOLARYNGOLOGY

## 2021-05-27 PROCEDURE — 120N000002 HC R&B MED SURG/OB UMMC

## 2021-05-27 PROCEDURE — 250N000011 HC RX IP 250 OP 636: Performed by: STUDENT IN AN ORGANIZED HEALTH CARE EDUCATION/TRAINING PROGRAM

## 2021-05-27 PROCEDURE — 97530 THERAPEUTIC ACTIVITIES: CPT | Mod: GO

## 2021-05-27 PROCEDURE — 250N000013 HC RX MED GY IP 250 OP 250 PS 637: Performed by: DIETITIAN, REGISTERED

## 2021-05-27 PROCEDURE — 250N000013 HC RX MED GY IP 250 OP 250 PS 637: Performed by: PHYSICIAN ASSISTANT

## 2021-05-27 PROCEDURE — 80048 BASIC METABOLIC PNL TOTAL CA: CPT | Performed by: PHYSICIAN ASSISTANT

## 2021-05-27 PROCEDURE — 97535 SELF CARE MNGMENT TRAINING: CPT | Mod: GO

## 2021-05-27 PROCEDURE — 83735 ASSAY OF MAGNESIUM: CPT | Performed by: OTOLARYNGOLOGY

## 2021-05-27 PROCEDURE — 250N000013 HC RX MED GY IP 250 OP 250 PS 637: Performed by: OTOLARYNGOLOGY

## 2021-05-27 RX ORDER — POLYETHYLENE GLYCOL 3350 17 G/17G
17 POWDER, FOR SOLUTION ORAL DAILY
Status: DISCONTINUED | OUTPATIENT
Start: 2021-05-27 | End: 2021-05-28 | Stop reason: HOSPADM

## 2021-05-27 RX ORDER — AMOXICILLIN 250 MG
2 CAPSULE ORAL 2 TIMES DAILY
Status: DISCONTINUED | OUTPATIENT
Start: 2021-05-27 | End: 2021-05-28 | Stop reason: HOSPADM

## 2021-05-27 RX ORDER — BISACODYL 10 MG
10 SUPPOSITORY, RECTAL RECTAL ONCE
Status: COMPLETED | OUTPATIENT
Start: 2021-05-27 | End: 2021-05-27

## 2021-05-27 RX ORDER — AMOXICILLIN 250 MG
1 CAPSULE ORAL 2 TIMES DAILY
Status: DISCONTINUED | OUTPATIENT
Start: 2021-05-27 | End: 2021-05-28 | Stop reason: HOSPADM

## 2021-05-27 RX ADMIN — BACITRACIN ZINC: 500 OINTMENT TOPICAL at 07:54

## 2021-05-27 RX ADMIN — METHOCARBAMOL 500 MG: 500 TABLET, FILM COATED ORAL at 07:43

## 2021-05-27 RX ADMIN — TRAZODONE HYDROCHLORIDE 100 MG: 100 TABLET ORAL at 22:18

## 2021-05-27 RX ADMIN — QUETIAPINE FUMARATE 150 MG: 100 TABLET ORAL at 22:18

## 2021-05-27 RX ADMIN — Medication 2 MG: at 23:35

## 2021-05-27 RX ADMIN — ACETAMINOPHEN 1000 MG: 500 TABLET, FILM COATED ORAL at 20:05

## 2021-05-27 RX ADMIN — METHOCARBAMOL 500 MG: 500 TABLET, FILM COATED ORAL at 20:05

## 2021-05-27 RX ADMIN — Medication 2 MG: at 20:27

## 2021-05-27 RX ADMIN — DOCUSATE SODIUM 50 MG AND SENNOSIDES 8.6 MG 2 TABLET: 8.6; 5 TABLET, FILM COATED ORAL at 20:11

## 2021-05-27 RX ADMIN — Medication 2 MG: at 07:43

## 2021-05-27 RX ADMIN — ACETAMINOPHEN 1000 MG: 500 TABLET, FILM COATED ORAL at 07:48

## 2021-05-27 RX ADMIN — VORTIOXETINE 20 MG: 20 TABLET, FILM COATED ORAL at 07:44

## 2021-05-27 RX ADMIN — OMEPRAZOLE 20 MG: 20 CAPSULE, DELAYED RELEASE ORAL at 17:01

## 2021-05-27 RX ADMIN — ASPIRIN 325 MG ORAL TABLET 325 MG: 325 PILL ORAL at 07:44

## 2021-05-27 RX ADMIN — METHOCARBAMOL 500 MG: 500 TABLET, FILM COATED ORAL at 14:10

## 2021-05-27 RX ADMIN — BACITRACIN ZINC: 500 OINTMENT TOPICAL at 20:11

## 2021-05-27 RX ADMIN — FLUVOXAMINE MALEATE 150 MG: 50 TABLET, COATED ORAL at 22:19

## 2021-05-27 RX ADMIN — PREGABALIN 150 MG: 100 CAPSULE ORAL at 20:05

## 2021-05-27 RX ADMIN — SIMVASTATIN 40 MG: 20 TABLET, FILM COATED ORAL at 22:18

## 2021-05-27 RX ADMIN — ENOXAPARIN SODIUM 40 MG: 40 INJECTION, SOLUTION INTRAVENOUS; SUBCUTANEOUS at 07:43

## 2021-05-27 RX ADMIN — OMEPRAZOLE 20 MG: 20 CAPSULE, DELAYED RELEASE ORAL at 07:43

## 2021-05-27 RX ADMIN — BISACODYL 10 MG: 10 SUPPOSITORY RECTAL at 13:02

## 2021-05-27 RX ADMIN — POLYETHYLENE GLYCOL 3350 17 G: 17 POWDER, FOR SOLUTION ORAL at 07:44

## 2021-05-27 RX ADMIN — ACETAMINOPHEN 1000 MG: 500 TABLET, FILM COATED ORAL at 14:11

## 2021-05-27 RX ADMIN — PREGABALIN 150 MG: 100 CAPSULE ORAL at 07:43

## 2021-05-27 RX ADMIN — Medication 2 MG: at 04:22

## 2021-05-27 RX ADMIN — Medication 400 MG: at 07:43

## 2021-05-27 RX ADMIN — DOCUSATE SODIUM 50 MG AND SENNOSIDES 8.6 MG 2 TABLET: 8.6; 5 TABLET, FILM COATED ORAL at 07:43

## 2021-05-27 ASSESSMENT — ACTIVITIES OF DAILY LIVING (ADL)
ADLS_ACUITY_SCORE: 19

## 2021-05-27 ASSESSMENT — VISUAL ACUITY: OU: GLASSES;BASELINE

## 2021-05-27 NOTE — ANESTHESIA PROCEDURE NOTES
Arterial Line Procedure Note  Pre-Procedure   Staff -        Anesthesiologist:  Samson Villatoro DO       Performed By: anesthesiologist       Location: OR       Pre-Anesthestic Checklist: patient identified, IV checked, risks and benefits discussed, informed consent, monitors and equipment checked, pre-op evaluation and at physician/surgeon's request  Timeout:       Correct Patient: Yes        Correct Procedure: Yes        Correct Site: Yes        Correct Position: Yes   Procedure   Procedure: arterial line       Laterality: right       Insertion Site: radial.  Sterile Prep        Standard elements of sterile barrier followed       Skin prep: Chloraprep  Insertion/Injection        1. Ultrasound was used to evaluate the access site.       2. Artery evaluated via ultrasound for patency/adequacy.       3. Using real-time ultrasound the needle/catheter was observed entering the artery/vein.       5. The visualized structures were anatomically normal.       6. There were no apparent abnormal pathologic findings.       Catheter Type/Size: 20 G, 1.75 in/4.5 cm quick cath (integral wire)  Narrative        Tegaderm dressing used.       Complications: None apparent,        Arterial waveform: Yes        IBP within 10% of NIBP: Yes

## 2021-05-27 NOTE — TELEPHONE ENCOUNTER
Received a call from Patient's daughter Huma indicating that she needs additional letter stating she will be out of work tomorrow and next week for the care of her father after he is discharged from the hospital.     Letter written and emailed to Huma per her request. She was encouraged to call with any further questions or concerns.       Rekha Reynolds RN, BSN

## 2021-05-27 NOTE — PROGRESS NOTES
"Otolaryngology - Head & Neck Surgery Flap Check Note  5/27/2021 - 0030    S: No acute events since arrival to . Flap checks stable. PTA magnesium oxide and trazodone re-added to patient's medication list. Pain under control currently.    O:  /57 (BP Location: Left arm)   Pulse 78   Temp 98.4  F (36.9  C) (Oral)   Resp 16   Ht 1.727 m (5' 8\")   Wt 88 kg (193 lb 14.4 oz)   SpO2 94%   BMI 29.48 kg/m     Gen - resting comfortably in bed, rousable.  HEENT - aquaplast splint sutured in place, all sutures intact. Left pre-auricular incision sutures intact, no adjacent fluctuance or induration. Implantable Doppler is secured to the skin, strong arterial signal present. Strong handheld Doppler signal can be found at the superior aspect of the pre-auricular incision.  MSK - left flank incision clean, dry, sutures intact. Minimal amount of bruising present. Two small LLOYD drains with appropriate amounts of serosanguinous drainage. No fluctuance or evidence of hematoma.    Drains:  LLOYD #1: 11 / 50 / 29 mL  LLOYD #2: 29 / 38 / 52 mL    A/P: 75 yo M POD #3 WLE scalp melanoma and left latissimus free flap reconstruction.    - Stable flap.  - Next MD flap check on AM rounds.    Diane Sesay MD PGY-3  Otolaryngology - Head & Neck Surgery  [phone]  "

## 2021-05-27 NOTE — PROGRESS NOTES
"Otolaryngology Progress Note       May 27, 2021    S: No issues overnight, afebrile and vitally stable. Tolerating PO (ate a complete dinner). Pain adequately controlled.     O: Tm 99.3, MAPs >60 other than one episode at 56. HR 80.   /67 (BP Location: Left arm)   Pulse 78   Temp 98.7  F (37.1  C) (Oral)   Resp 16   Ht 1.727 m (5' 8\")   Wt 88 kg (193 lb 14.4 oz)   SpO2 95%   BMI 29.48 kg/m       Gen - resting comfortably in bed, arousable  HEENT - aquaplast splint sutured in place, all sutures intact. Left pre-auricular incision sutures intact, no adjacent fluctuance or induration. Implantable Doppler is secured to the skin, strong arterial signal present. Strong handheld Doppler signal can be found at the superior aspect of the pre-auricular incision. Nasal cannula in place.  MSK - Left chest incision clean, dry, sutures intact. Minimal amount of bruising present. Two LLOYD drains with sanguinous drainage. No fluctuance or evidence of hematoma.    LLOYD drain output(s): (last 24 hours)/(last shift)  P 1 - 50/29/30; LLOYD 2 - 38/52/14    LABS:   Pending    A/P: 76M s/p WLE scalp melanoma and Left latissimus free flap 5/24 recovering as expected.      Neuro:   - Schedule tylenol; PRN oxycodone/dilaudid.   HEENT: ENT flap checks per protocol.   - HOB 30 degrees, head/neck in midline position. Room temp 72.   - No ties around neck   - Half strength H202 or saline to incisions PRN.  - Bacitracin 24h, then aquaphor q8h. LLOYD drain cares.   Resp:   - O2 to keep sats >92%.  CV:   - SBP >90, preferably >110, keep MAPs >60.   - No vasopressors and if required, please contact ENT resident on call.   FEN/GI:  -  Regular diet  -  Increased bowel meds    :   -  Voiding spontaneously     Endo:   - Monitor BG  ID:   - Unasyn 48hr postop completed    Heme:   - Monitor hgb.  MSK:   - Left latissimus free flap. Ok for PT consult, limited ROM of Left arm x 1 week  Ppx:   - lovenox 40mg/d POD1,  daily   Consults:   - " PT/OT   Dispo:   - Likely discharge to home tomorrow    Patient and above plan discussed with Dr. Garcia and Dr. Carter Ramirez MD  [facility] PGY-1  Please contact ENT with questions by dialing [phone] and entering job code [phone] when prompted.

## 2021-05-27 NOTE — OP NOTE
Procedure Date: 05/24/2021    PRIMARY SURGEON:  Paulo Garcia MD    RESIDENT SURGEON:  Jackson Sen MD    PREOPERATIVE DIAGNOSIS:  Scalp melanoma.    POSTOPERATIVE DIAGNOSIS:  Scalp melanoma.    PROCEDURE:    1. Wide local excision of scalp melanoma.  2.  Left face/neck exploration for vessels.    ANESTHESIA:  General.    ESTIMATED BLOOD LOSS:  50 mL.    INDICATIONS FOR PROCEDURE:  Mr. Ahmet Coleman is a 76-year-old male who was seen in the ENT clinic regarding melanoma of the scalp.  He had a significant melanoma noted at his initial evaluation, with satellite lesions.  Imaging did not show any nika metastases within the neck or distantly.  At the time, given the size of the lesion and the mass, it was determined that he should undergo neoadjuvant treatment prior to undergoing surgical management.  After he had undergone neoadjuvant treatment, there was notable shrinkage in size of the scalp lesion; thus, we decided to proceed with surgery.  Of note, given the negativity of the nika disease within the neck, parotid and neck dissections were not indicated at this time.  Given the size of the defect, it was determined that he should have soft tissue reconstruction with our colleague, Dr. Kathryn Machado.  Please see her dictation for her portions of this procedure.    FINDINGS:  There was a large, approximately 9 x 7 cm melanotic lesion on the left scalp at the parietal vertex regions.  We resected this, with a total defect measuring approximately 15 x 13 cm, down to the level of the periosteum.    DESCRIPTION OF PROCEDURE:  The patient was met in the preoperative area, and informed consent was obtained.  The patient was brought back to the operating room and placed supine on the operating table.  General anesthesia was induced, and the patient was orotracheally intubated.  Head of bed was rotated 180 degrees counterclockwise.  The patient was then prepped and draped in normal sterile fashion in the  lateral decubitus position for the left latissimus dorsi free flap.  Once the patient was fully draped, a timeout was performed to correctly identify the patient and the procedure.  Next, we identified the lesion on the scalp.  We marked out 2 cm margins all around the lesion.  Next, we then made an incision down through the galea to the level of the areolar fascia, not violating the periosteum.  Once we made the incision, we then used a 15 blade to dissect in the loose areolar plane and removed the lesion.  This was then demarcated and sent off to Surgical Pathology for further evaluation.  Hemostasis was then obtained using bipolar cautery.  Next, we then made an incision in the left preauricular region and identified the superficial temporal artery and veins.  Once these were identified and demarcated, this concluded our portion of the procedure.  At this point in time, Dr. Kathryn Machado took over for her portion of the reconstructive aspect of the case.  Dr. Paulo Garcia was present for all portions of the procedure.    Paulo Garcia MD    As Dictated by PIERRE OCONNELL MD        D: 2021   T: 2021   MT: BISI    Name:     SADIA FUENTES  MRN:      [id number]        Account:        [id number]   :      1945           Procedure Date: 2021     Document: [id number]

## 2021-05-27 NOTE — PLAN OF CARE
Status: Pt transferred to 6A today POD #2 s/p wide excision of melanoma of the scalp with L latissimus free flap reconstruction 5/24.  Vitals: VSS on RA.   Neuros: A&O x4. Denies N/T. Strengths 4/5.   IV: PIV SL.   Resp/trach: LS clear/dim. Denies SOB.   Diet: Regular, good intake.   Bowel status: BS+. Last BM 5/22 per report.   : Voids spontaneously via bedside urinal.   Skin: See previous note.   Pain: C/o HA and incisional pain, scheduled meds and PRN PO Dilaudid given, partially effective.   Activity: SBA. Not OOB since transfer.   Social: No visitors this shift.   Plan: Possible discharge tomorrow. Continue to monitor and follow plan of care.   Updates this shift: Pt upset about medications this evening. Stated that he has worked hard with his psychiatrist to find medications that work, and he was upset that we were not giving them to him. After re-listing medications ordered, it was determined that trazodone and magnesium were the only missing medications, and they were ordered.

## 2021-05-27 NOTE — LETTER
May 27, 2021      RE:Ahmet Coleman  [address]  [place] [address]        To Whom It May Concern:    Huma Rachel will be caring for her father Ahmet Coleman following a large scalp surgery and reconstruction that was done on May 24, 2021. Due to medical care and support needed following surgery, Huma Rachel is requesting to be out of work until June 7, 2021.    Please contact our clinic if you need any additional documentation to support this leave.        Sincerely,          Paulo Garcia MD

## 2021-05-27 NOTE — PLAN OF CARE
Status: wide excision of melanoma of the scalp with L latissimus free flap reconstruction 5/24  Vitals: VSS, soft BPs  Neuros: intact  IV: PIV saline locked  Labs/Electrolytes: WNL  Resp/trach: WNL  Diet: tolerated regular diet without difficulty, fair PO intake  Bowel status: no BM since 5/22, supp given this afternoon with no effectiveness  : voiding without difficulty, uses urinal  Skin: head flap covered with moist dressing, bacitracin to incisions, cont doppler and manual spot check doppler sites intact. LLOYD x 2  Pain: rates pain high consistently, treated effectively with PO dilaudid, scheduled tylenol and robaxin as well as scheduled lyrica.  Activity: up with 1, GB, walker  Social: updates family independently  Plan: discharging to home vs TCU  Updates this shift: pt more ataxic during OT this shift, continue to monitor and attempt to manage pain with 1 mg dilaudid instead of 2 mg if possible.

## 2021-05-27 NOTE — PLAN OF CARE
Alert and oriented x 4. Neuro's intact. Strength on extremities 4/5. Complained of headache and was given dilaudid 2 mg x 1. [place] on left ear. Doppler on left ear flap positive for pulse. Also has the continuous doppler machine on left side of neck going strong. Incisions cleaned with normal saline. Voiding in the urinal. Bed alarm on. Call light in reach. Tele in NSR.

## 2021-05-28 ENCOUNTER — APPOINTMENT (OUTPATIENT)
Dept: OCCUPATIONAL THERAPY | Facility: CLINIC | Age: 76
DRG: 578 | End: 2021-05-28
Attending: OTOLARYNGOLOGY
Payer: MEDICARE

## 2021-05-28 VITALS
OXYGEN SATURATION: 97 % | HEIGHT: 68 IN | HEART RATE: 75 BPM | RESPIRATION RATE: 16 BRPM | WEIGHT: 193.9 LBS | SYSTOLIC BLOOD PRESSURE: 129 MMHG | BODY MASS INDEX: 29.39 KG/M2 | DIASTOLIC BLOOD PRESSURE: 76 MMHG | TEMPERATURE: 98.4 F

## 2021-05-28 LAB
MAGNESIUM SERPL-MCNC: 2.2 MG/DL (ref 1.6–2.3)
POTASSIUM SERPL-SCNC: 4 MMOL/L (ref 3.4–5.3)

## 2021-05-28 PROCEDURE — 250N000013 HC RX MED GY IP 250 OP 250 PS 637: Performed by: STUDENT IN AN ORGANIZED HEALTH CARE EDUCATION/TRAINING PROGRAM

## 2021-05-28 PROCEDURE — 84132 ASSAY OF SERUM POTASSIUM: CPT | Performed by: OTOLARYNGOLOGY

## 2021-05-28 PROCEDURE — 999N000147 HC STATISTIC PT IP EVAL DEFER

## 2021-05-28 PROCEDURE — 250N000013 HC RX MED GY IP 250 OP 250 PS 637: Performed by: DIETITIAN, REGISTERED

## 2021-05-28 PROCEDURE — 36415 COLL VENOUS BLD VENIPUNCTURE: CPT | Performed by: OTOLARYNGOLOGY

## 2021-05-28 PROCEDURE — 250N000011 HC RX IP 250 OP 636: Performed by: STUDENT IN AN ORGANIZED HEALTH CARE EDUCATION/TRAINING PROGRAM

## 2021-05-28 PROCEDURE — 83735 ASSAY OF MAGNESIUM: CPT | Performed by: OTOLARYNGOLOGY

## 2021-05-28 PROCEDURE — 97530 THERAPEUTIC ACTIVITIES: CPT | Mod: GO

## 2021-05-28 RX ORDER — HYDROMORPHONE HYDROCHLORIDE 2 MG/1
1-2 TABLET ORAL
Qty: 30 TABLET | Refills: 0 | Status: SHIPPED | OUTPATIENT
Start: 2021-05-28 | End: 2021-05-28

## 2021-05-28 RX ORDER — ASPIRIN 325 MG
325 TABLET ORAL DAILY
Qty: 30 TABLET | Refills: 0 | Status: SHIPPED | OUTPATIENT
Start: 2021-05-29 | End: 2021-06-28

## 2021-05-28 RX ORDER — AMOXICILLIN 250 MG
1 CAPSULE ORAL 2 TIMES DAILY
Qty: 30 TABLET | Refills: 0 | Status: SHIPPED | OUTPATIENT
Start: 2021-05-28

## 2021-05-28 RX ORDER — OXYCODONE HYDROCHLORIDE 5 MG/1
5-10 TABLET ORAL EVERY 4 HOURS PRN
Qty: 30 TABLET | Refills: 0 | Status: SHIPPED | OUTPATIENT
Start: 2021-05-28 | End: 2021-05-31

## 2021-05-28 RX ORDER — METHOCARBAMOL 500 MG/1
500 TABLET, FILM COATED ORAL 3 TIMES DAILY PRN
Qty: 10 TABLET | Refills: 0 | Status: SHIPPED | OUTPATIENT
Start: 2021-05-28

## 2021-05-28 RX ORDER — POLYETHYLENE GLYCOL 3350 17 G/17G
17 POWDER, FOR SOLUTION ORAL DAILY
Qty: 510 G | Refills: 0 | Status: SHIPPED | OUTPATIENT
Start: 2021-05-28

## 2021-05-28 RX ORDER — ACETAMINOPHEN 500 MG
1000 TABLET ORAL EVERY 8 HOURS PRN
Qty: 100 TABLET | Refills: 0 | Status: SHIPPED | OUTPATIENT
Start: 2021-05-28

## 2021-05-28 RX ADMIN — ENOXAPARIN SODIUM 40 MG: 40 INJECTION, SOLUTION INTRAVENOUS; SUBCUTANEOUS at 08:28

## 2021-05-28 RX ADMIN — ACETAMINOPHEN 1000 MG: 500 TABLET, FILM COATED ORAL at 13:52

## 2021-05-28 RX ADMIN — METHOCARBAMOL 500 MG: 500 TABLET, FILM COATED ORAL at 08:28

## 2021-05-28 RX ADMIN — OMEPRAZOLE 20 MG: 20 CAPSULE, DELAYED RELEASE ORAL at 08:28

## 2021-05-28 RX ADMIN — POLYETHYLENE GLYCOL 3350 17 G: 17 POWDER, FOR SOLUTION ORAL at 08:29

## 2021-05-28 RX ADMIN — VORTIOXETINE 20 MG: 20 TABLET, FILM COATED ORAL at 08:28

## 2021-05-28 RX ADMIN — PREGABALIN 150 MG: 100 CAPSULE ORAL at 08:28

## 2021-05-28 RX ADMIN — ASPIRIN 325 MG ORAL TABLET 325 MG: 325 PILL ORAL at 08:28

## 2021-05-28 RX ADMIN — BACITRACIN ZINC: 500 OINTMENT TOPICAL at 08:29

## 2021-05-28 RX ADMIN — Medication 400 MG: at 08:28

## 2021-05-28 RX ADMIN — METHOCARBAMOL 500 MG: 500 TABLET, FILM COATED ORAL at 13:52

## 2021-05-28 RX ADMIN — DOCUSATE SODIUM 50 MG AND SENNOSIDES 8.6 MG 2 TABLET: 8.6; 5 TABLET, FILM COATED ORAL at 08:28

## 2021-05-28 RX ADMIN — ACETAMINOPHEN 1000 MG: 500 TABLET, FILM COATED ORAL at 08:28

## 2021-05-28 ASSESSMENT — ACTIVITIES OF DAILY LIVING (ADL)
ADLS_ACUITY_SCORE: 19

## 2021-05-28 NOTE — DISCHARGE SUMMARY
Discharge Summary  Ahmet Coleman  [id number]  1945    Date of Admission: 5/24/2021  Date of Discharge: 5/28/2021    Admission Diagnosis: Melanoma of scalp (H) [C43.4]  Discharge Diagnosis: Same    Procedures:  Date: 5/24/2021  Procedure(s):  wide local excision of scalp melanoma  left latissimus free flap for scalp reconstruction\. skin graft from left back to scalp    Pathology: Pending.     HPI:      Mr. Ahmet Coleman is a 76-year-old male who was seen in the ENT clinic regarding melanoma of the scalp.  He had a significant melanoma noted at his initial evaluation, with satellite lesions.  Imaging did not show any nika metastases within the neck or distantly.  At the time, given the size of the lesion and the mass, it was determined that he should undergo neoadjuvant treatment prior to undergoing surgical management.  After he had undergone neoadjuvant treatment, there was notable shrinkage in size of the scalp lesion; thus, we decided to proceed with wide local excision and subsequent free flap reconstruction. The risks, benefits, and alternatives of the procedure were discussed with the patient and written consent was obtained.     Hospital Course: The patient was admitted to the hospital and underwent the above mentioned procedure. He tolerated the procedure without any intra- or devan-operative complications. Please see the operative report for full details of the procedure. The patient was admitted to the ICU for routine postoperative monitoring. His flap demonstrated strong signals on handheld Doppler and based on visual inspection throughout his hospital course. He was transferred out of the ICU on postoperative day 2.  By the day of discharge, he was tolerating an oral diet, voiding spontaneously, and his pain was well controlled. He was deemed medically stable for discharge to home.     Discharge Exam:  Vitals:    05/27/21 2313 05/28/21 0447 05/28/21 0732 05/28/21 0804   BP: 114/66 110/61 129/76     BP Location: Left arm Left arm Right arm    Pulse: 79 70 75    Resp: 16 16 16    Temp: 97.9  F (36.6  C) 98.1  F (36.7  C)  98.4  F (36.9  C)   TempSrc: Oral Oral  Oral   SpO2: 95% 94% 97%    Weight:       Height:         General - resting comfortably in bed, arousable  HEENT - aquaplast splint sutured in place, all sutures intact. Left pre-auricular incision sutures intact, no adjacent fluctuance or induration. Implantable Doppler is secured to the skin, strong arterial signal present. Strong handheld Doppler signal can be found at the superior aspect of the pre-auricular incision. Nasal cannula in place.  MSK - Left chest incision clean, dry, sutures intact. Minimal amount of bruising present. Two LLOYD drains with sanguinous drainage. No fluctuance or evidence of hematoma.    Discharge Medications:   Ahmet Coleman   Home Medication Instructions NICOLAS:[id number]    Printed on:05/28/21 6770   Medication Information                      acetaminophen (TYLENOL) 325 MG tablet  TAKE TWO TABLETS BY MOUTH THREE TIMES A DAY AS NEEDED             cholecalciferol (VITAMIN D3) 1000 UNIT tablet  Take 1,000 Units by mouth daily              diphenoxylate-atropine (LOMOTIL) 2.5-0.025 MG tablet  Take 1-2 tablets by mouth 4 times daily as needed for diarrhea             fluvoxaMINE (LUVOX) 100 MG tablet  Take 150 mg at bedtime             hydrochlorothiazide (HYDRODIURIL) 25 MG tablet  Take 25 mg by mouth daily.             Hypromellose (ARTIFICIAL TEARS OP)  Apply  to eye. 2 drops in each eye twice a day as needed             Lactobacillus-Inulin ([facility] DIGESTIVE [facility]) CAPS  1 tab daily             lisinopril (ZESTRIL) 40 MG tablet  Take 40 mg by mouth daily              Magnesium Oxide 420 MG TABS  Take 420 mg by mouth daily             omeprazole (PRILOSEC) 20 MG capsule  Take 1 capsule by mouth 2 times daily.             potassium phosphate, monobasic, (K-PHOS) 500 MG tablet  Take 1 tablet (500 mg) by mouth 2 times  daily             pregabalin (LYRICA) 150 MG capsule  Take 150 mg by mouth 2 times daily             prochlorperazine (COMPAZINE) 10 MG tablet  Take 1 tablet (10 mg) by mouth every 6 hours as needed (Nausea/Vomiting)             QUEtiapine (SEROQUEL) 300 MG tablet  Take 150 mg by mouth At Bedtime              simvastatin (ZOCOR) 80 MG tablet  Take 40 mg by mouth At Bedtime              traZODone (DESYREL) 100 MG tablet  Take 100 mg by mouth At Bedtime              vemurafenib (ZELBORAF) 240 MG TABS tablet  Take 3 tablets (720 mg) by mouth every 12 hours for 28 days             vortioxetine (TRINTELLIX) 20 MG tablet  TAKE ONE TABLET BY MOUTH EVERY MORNING                 Discharge Procedure Orders   Home Care OT Referral for Hospital Discharge   Referral Priority: Routine   Number of Visits Requested: 1     Home care nursing referral   Referral Priority: Routine Referral Type: Home Health Therapies & Aides   Number of Visits Requested: 1     Reason for your hospital stay   Order Comments: Post-operative care     Adult [facility] Follow-up and recommended labs and tests   Order Comments: Follow up in ENT clinic with Dr. Machado on 6/2/21 at 3:00PM. Please call the clinic with questions/concerns: [phone].    Otolaryngology/ENT Clinic:  [facility]  [address] [place] [address]      Appointments on [place] and/or [facility] (with [facility] or [facility] provider or service). Call [phone] if you haven't heard regarding these appointments within 7 days of discharge.     Activity   Order Comments: Your activity upon discharge: No heavy lifting greater than 10 lbs and no strenuous exercise for 2 weeks or until follow up appointment. No driving while taking narcotic pain medications.     Order Specific Question Answer Comments   Is discharge order? Yes      When to contact your care team   Order Comments: Please notify your doctor if you experience  "wound breakdown, sustained bleeding from the wound site, or increasing redness, swelling, and/or purulent malorodorous discharge from the wound site which may indicate infection. If you feel it is acute, or experience sudden changes in breathing, chest pain, or excessive sleepiness/somnolence please return to the emergency department or call 911. If you have questions or concerns during the day please call ENT clinic and [phone]. If at night you can call [facility] at [phone] and ask for the \"ENT resident on call\".     Wound care and dressings   Order Comments: Instructions to care for your wound at home: Keep incisions clean and dry. Apply Aquaphor ointment to incision in front  incisions three times daily to keep moist. You may shower, do not soak, scrub, or submerge incisions under water. If you have a surgical drain, do not get the drain site wet until 24 hours after the drain has been removed.     Monitor and record   Order Comments: You are going home with surgical drains in place. Empty the drains and record output 3 times per day. Squeeze the bulb of the drain and replace cap.  If you have multiple drains, record the outputs separately. Once output is less than 30 mL in 24 hours, please contact the ENT clinic to schedule an appointment for drain removal.     MD face to face encounter   Order Comments: Documentation of Face to Face and Certification for Home Health Services    I certify that patient: Ahmet Coleman is under my care and that I, or a nurse practitioner or physician's assistant working with me, had a face-to-face encounter that meets the physician face-to-face encounter requirements with this patient on: 5/28/2021.    This encounter with the patient was in whole, or in part, for the following medical condition, which is the primary reason for home health care: physical deconditioning, melanoma of the scalp.    I certify that, based on my findings, the following services are " medically necessary home health services: Nursing and Occupational Therapy.    My clinical findings support the need for the above services because: Nurse is needed: For complex aftercare of surgical procedures because the patient needs instruction and cannot perform care on their own due to: limited range of motion of the left arm and inability to fully evaluate incision site independently.. and Occupational Therapy Services are needed to assess and treat cognitive ability and address ADL safety due to impairment in physical deconditioning.    Further, I certify that my clinical findings support that this patient is homebound (i.e. absences from home require considerable and taxing effort and are for medical reasons or [facility] services or infrequently or of short duration when for other reasons) because: Requires assistance of another person or specialized equipment to access medical services because patient: Has prohibitive pain during ambulation. and Range of motion limitations prevents ability to exit home safely...    Based on the above findings. I certify that this patient is confined to the home and needs intermittent skilled nursing care, physical therapy and/or speech therapy.  The patient is under my care, and I have initiated the establishment of the plan of care.  This patient will be followed by a physician who will periodically review the plan of care.  Physician/Provider to provide follow up care: Adama Pacheco    Attending hospital physician (the Medicare certified [facility] provider): Paulo Garcia MD  Physician Signature: See electronic signature associated with these discharge orders.  Date: 5/28/2021     Discharge Instructions   Order Comments: You may resume your hydrochlorothiazide medication. Continue to hold lisinopril until follow up with your primary care provider for a blood pressure check. Your blood pressure was low-normal range in the hospital and this medication is taken only for  high blood pressure.     Full Code     Order Specific Question Answer Comments   Code status determined by: Discussion with patient/ legal decision maker      Diet   Order Comments: Follow this diet upon discharge: Regular diet     Order Specific Question Answer Comments   Is discharge order? Yes        Dispo: To home in good condition. All of the patient's questions/concerns have been addressed at this time.     Gus Ramirez MD  Otolaryngology-Head & Neck Surgery, PGY-1  Please contact ENT by dialing [phone] and entering job code [phone].

## 2021-05-28 NOTE — PLAN OF CARE
"/65 (BP Location: Left arm)   Pulse 72   Temp 98.3  F (36.8  C) (Oral)   Resp 16   Ht 1.727 m (5' 8\")   Wt 88 kg (193 lb 14.4 oz)   SpO2 95%   BMI 29.48 kg/m      1930 - 2330    Neuro: A&Ox4.   Cardiac: SR. VSS.  On Tele.  Respiratory: Sating 95% on RA.  GI/:No bm.  Adequate urine output.   Diet/appetite: Tolerating regular diet.  Activity:  Stand by assist, up to chair and bathroom.  Pain: At acceptable level on current regimen.   Skin: No new deficits noted.  LDA's:PIV saline locked. Incision c/di, pulse present.  Plan: Continue with POC. Notify primary team with changes.  "

## 2021-05-28 NOTE — PLAN OF CARE
PT 6A: Deferral.  Per OT, pt mobilizing well, near baseline.  Will have family support going home.

## 2021-05-28 NOTE — PLAN OF CARE
[id number]  Status: Pt on 6A POD #3 s/p wide excision of melanoma of the scalp with L latissimus free flap reconstruction 5/24.  Vitals: VSS on RA.   Neuros: A&O x4. Denies N/T. Strengths 4/5.   IV: PIV SL.   Resp/trach: LS clear/dim. Denies SOB.   Diet: Regular, good intake.   Bowel status: BS+. Last BM 5/22 per report.   : Voids spontaneously via bedside urinal.   Skin: Head flap with mesh covering sutured in place. L back incision sutured with LLOYD x2. One continuous doppler, one spot check near L ear, both intact.   Pain: Denies.  Activity: SBA. Encourage more activity. No weight bearing or abduction past 90 degrees on L upper extremity until POD 7.  Social: No visitors this shift.   Plan: Possible discharge tomorrow, home vs TCU. Continue to monitor and follow plan of care.

## 2021-05-28 NOTE — PLAN OF CARE
Occupational Therapy Discharge Summary    Reason for therapy discharge:    Discharged to home with home therapy.    Progress towards therapy goal(s). See goals on Care Plan in [facility] electronic health record for goal details.  Goals partially met.  Barriers to achieving goals:   discharge from facility.    Therapy recommendation(s):    Continued therapy is recommended.  Rationale/Recommendations:  Pt would benefit from further therapy to progress functional independence with all mobility and ADLs while maintaining post-surgical precautions.

## 2021-05-28 NOTE — PROGRESS NOTES
"Otolaryngology - Head & Neck Surgery Flap Check Note  5/28/2021 - 0100    S: No acute events or issues with flap checks. Patient resting comfortably, no concerns.    O:  /66 (BP Location: Left arm)   Pulse 79   Temp 97.9  F (36.6  C) (Oral)   Resp 16   Ht 1.727 m (5' 8\")   Wt 88 kg (193 lb 14.4 oz)   SpO2 95%   BMI 29.48 kg/m     Gen - resting comfortably in bed, rousable.  HEENT - aquaplast splint sutured in place, all sutures intact. Left pre-auricular incision sutures intact, no adjacent fluctuance or induration. Implantable Doppler is secured to the skin, strong arterial signal present. Strong handheld Doppler signal can be found at the superior aspect of the pre-auricular incision.  MSK - left flank incision clean, dry, sutures intact. LLOYD drains x2.    A/P: 77 yo M POD #4 WLE scalp melanoma and left latissimus free flap reconstruction.    - Stable flap.  - Next MD flap check on AM rounds.    Diane Sesay MD PGY-3  Otolaryngology - Head & Neck Surgery  [phone]  "

## 2021-05-28 NOTE — PLAN OF CARE
Status: Pt on 6A POD #4 s/p wide excision of melanoma of the scalp with L latissimus free flap reconstruction 5/24.  Vitals: VSS on RA  Neuros: A&Ox4, denies N/T. Strengths 4/5.   IV: PIV SL  Resp/trach: LS clear and diminished. Denies SOB  Diet: Regular  Bowel status: BS+. Passing gas. LBM 5/22  : voids spontaneously via bedside urinal  Skin: head flap with mesh sutured in place. L back incision sutured with x2 LLOYD. Continuous doppler and spot check both intact  Pain: Dilaudid given x1 for incisional pain  Activity: SBA. No weight bearing or abduction past 90 degrees on LUE until POD 7  Plan: Likely discharge home tomorrow. Continue to monitor and follow POC  Updates this shift: gave dilaudid x1

## 2021-05-29 ENCOUNTER — PATIENT OUTREACH (OUTPATIENT)
Dept: CARE COORDINATION | Facility: CLINIC | Age: 76
End: 2021-05-29

## 2021-06-01 NOTE — PROGRESS NOTES
[facility]: Post-Discharge Note  SITUATION                                                      Admission:    Admission Date: 05/24/21   Reason for Admission: Melanoma of scalp  Discharge:   Discharge Date: 05/28/21  Discharge Diagnosis: Melanoma of scalp    BACKGROUND                                                      Mr. Ahmet Coleman is a 76-year-old male who was seen in the ENT clinic regarding melanoma of the scalp.  He had a significant melanoma noted at his initial evaluation, with satellite lesions.  Imaging did not show any nika metastases within the neck or distantly.  At the time, given the size of the lesion and the mass, it was determined that he should undergo neoadjuvant treatment prior to undergoing surgical management.  After he had undergone neoadjuvant treatment, there was notable shrinkage in size of the scalp lesion; thus, we decided to proceed with wide local excision and subsequent free flap reconstruction. The risks, benefits, and alternatives of the procedure were discussed with the patient and written consent was obtained.     ASSESSMENT      Discharge Assessment  Patient reports symptoms are: Improved  Does the patient have all of their medications?: Yes  Does patient know what their new medications are for?: Yes  Does patient have a follow-up appointment scheduled?: Yes  Does patient have any other questions or concerns?: No    Post-op  Did the patient have surgery or a procedure: Yes  Incision: healing  Drainage: No  Bleeding: none  Fever: No  Chills: No  Redness: No  Warmth: No  Swelling: No  Incision site pain: No  Eating & Drinking: eating and drinking without complaints/concerns  PO Intake: regular diet  Bowel Function: normal  Urinary Status: voiding without complaint/concerns        PLAN                                                      Outpatient Plan:  Follow up in ENT clinic with Dr. Machado on 6/2/21 at 3:00PM. Please call the clinic with questions/concerns:  [phone].     Otolaryngology/ENT Clinic:  [facility]  Clinics & Surgery Center  [address] [place] [address]    Future Appointments   Date Time Provider Department Center   6/2/2021  3:00 PM Kathryn Machado MD [facility]   6/11/2021  2:00 PM  [facility]   6/11/2021  2:30 PM Oksana Ashton MD [facility]   8/13/2021  1:00 PM Sidra Esteban MD [facility]           Dodie Dodson, [facility]

## 2021-06-02 ENCOUNTER — OFFICE VISIT (OUTPATIENT)
Dept: OTOLARYNGOLOGY | Facility: CLINIC | Age: 76
End: 2021-06-02
Payer: MEDICARE

## 2021-06-02 VITALS
WEIGHT: 187.61 LBS | TEMPERATURE: 97.9 F | HEART RATE: 91 BPM | OXYGEN SATURATION: 96 % | HEIGHT: 68 IN | BODY MASS INDEX: 28.43 KG/M2

## 2021-06-02 DIAGNOSIS — Z98.890 POSTOPERATIVE STATE: Primary | ICD-10-CM

## 2021-06-02 DIAGNOSIS — M62.838 MUSCLE SPASM: Primary | ICD-10-CM

## 2021-06-02 PROCEDURE — 99024 POSTOP FOLLOW-UP VISIT: CPT | Performed by: OTOLARYNGOLOGY

## 2021-06-02 RX ORDER — METHOCARBAMOL 500 MG/1
500 TABLET, FILM COATED ORAL 3 TIMES DAILY PRN
Qty: 15 TABLET | Refills: 0 | Status: SHIPPED | OUTPATIENT
Start: 2021-06-02 | End: 2023-07-24

## 2021-06-02 ASSESSMENT — MIFFLIN-ST. JEOR: SCORE: 1565.5

## 2021-06-02 ASSESSMENT — PAIN SCALES - GENERAL: PAINLEVEL: MODERATE PAIN (4)

## 2021-06-02 NOTE — OP NOTE
Procedure: 5/24/2021    SURGEON:  Kathryn Machado MD     Assistant Surgeon:  MD Jackson Chen MD      TITLE OF OPERATION:    Closure of full thickness scalp defect with left latissimus dorsi myogenous free flap and skin graft, [id number]  Full thickness skin graft from left back to scalp, 9cm x 5cm  Complex closure of the left back wound measuring 20 x 8 cm with bilateral advancement flaps.   Exploration of head and neck for vessels, left superficial temporal artery and vein  Use of operating microscope      INDICATIONS:  Mr. Ahmet Coleman is a 76-year-old male who was seen in the ENT clinic regarding melanoma of the scalp.  He had a significant melanoma noted at his initial evaluation, with satellite lesions.  Imaging did not show any nika metastases within the neck or distantly.  At the time, given the size of the lesion and the mass, it was determined that he should undergo neoadjuvant treatment prior to undergoing surgical management.  After he had undergone neoadjuvant treatment, there was notable shrinkage in size of the scalp lesion; thus, we decided to proceed with surgery.  Of note, given the negativity of the nika disease within the neck, parotid and neck dissections were not indicated at this time.  After discussing risks and benefits, patient agreed to have resection with Dr. Garcia and aforementioned reconstruction with Dr. Machado.     PREOPERATIVE DIAGNOSES: Scalp melanoma     POSTOPERATIVE DIAGNOSES:  same       ANESTHESIA:  GETA      IMPLANT DEVICES:   2.0mm venous   Tradono doppler  LLOYD drain x 2 left back and LLOYD drain scalp     SPECIMENS:  none      COMPLICATIONS:  None.       BLOOD LOSS: 100 mls     SURGEON'S NARRATIVE:       FINDINGS:    9cm x 7 cm melanotic lesion on the left scalp at the parietal vertex regions  Scalp resection defect 15cm x 13 cm, down to the level of the periosteum.  [id number] full thickness skin graft from left back, meshed, placed on  latissimus muscle on scalp.       DESCRIPTION OF PROCEDURE:    After obtaining informed consent,  the patient was brought back to the operating room and placed supine on the operating table.  General anesthesia was induced, and the patient was orotracheally intubated.  Head of bed was rotated 180 degrees counterclockwise.  The patient was then prepped and draped in normal sterile fashion in the lateral decubitus position for the left latissimus dorsi free flap.  Once the patient was fully draped, a timeout was performed to correctly identify the patient and the procedure.  Dr. Garcia proceed with the ablative portion of the case (see his dictation).    After, we began with reconstruction.  The defect was [id number] of the left posterior parietal scalp.  Calvarial bone and periosteum was intact.  Donor vessels were then obtained. Dissection and exploration of the superficial temporal artery and vein was performed. The dissection was carefully performed subcutaneously following the superficial temporal artery in the temporal parietal fascia. Both vessels were identified and deemed suitable in the preauricular region.    We began with the latissimus dorsi harvest.  The patient was previously in lateral decubitus position with the head in the Julian head boone.  The posterior axillary line was marked, and the latissimus dorsi muscle was palpated.  Incision was marked along the posterior axillary line along the anterior edge of the latissimus dorsi muscle.  The dissection was taken down onto the latissimus muscle, and skin flaps were elevated anteriorly and posteriorly to expose the breadth of the latissimus dorsi muscle.  Inferiorly, the latissimus was reflected off of the serratus anterior, and the sliding plane was bluntly opened.  The branch of the serratus anterior was identified and traced to the thoracodorsal artery.  The thoracodorsal artery was traced into the axilla, following the thoracodorsal nerve.  The  pedicle was dissected to the axillary vein and axillary artery.  The pedicle was completely dissected.  The branch to the serratus and the angular artery were divided.  The thoracodorsal nerve was divided.  The latissimus muscle was then released inferiorly off the iliac crest.  Posteriorly, the vertebral attachments were released and the humeral attachment of the latissimus dorsi was released.  The thoracodorsal pedicle was then clipped.  The flap was released and transferred to a back table for preparation of the vessels for microvascular anastomosis.    A full thickness skin graft was taken from the posterior incision edge; 9cm x 5cm.  The skin graft was thinned and meshed; where it was later placed over the latissimus dorsi myogenous free flap.      The donor site complex closure was performed.  After adequate undermining, hemostasis was obtained in the left flank.  Two 15 round drains were placed through stab incisions and secured using 2-0 nylon sutures.   This was thinned on the back table, .  The tissue was undermined and closed using 2-0 Polysorb suture and a 3-0 nylon suture.  The wound was dressed with exofin dressing.     Our attention turned to the scalp.  The latissimus was placed at the scalp defect with sufficient coverage.  The muscle was tucked under the remnant scalp skin with underlay and two layer closure of soft tissue.  The flap was inset with 3-0 vicryl sutures. The pedicle length was adequate in good proximity to the superficial temporal artery and vein.      After preparing both the donor and recipient vessels, microvascular anastomosis was performed. Under binocular microscopy, the thoracodorsal artery and superficial temporal artery anastamosed with 9-0 nylon suture. The veins werecoupled with a 2.0 venous . A [employer] Doppler was placed distal to the arterial anastomosis. Perfusion of the flap was confirmed.   Closure then continued with placing a skin graft that was meshed to cover  the extent of the flap. This was tacked in place with 4-0 chromic sutures. Xeroform and aquaplast bolster was placed over the flap with an opening for a monitoring segment.      The left preauricular incision was closed with 4-0 vicryl for deep and 6-0 monocryl for skin. A posterior scalp drain was placed.  This completed the case.  The head of the bed was turned towards the anesthesia team. The patient was successfully extubated and transferred to the surgical ICU.    Dr. Cole was present and scrubbed for the entire procedure.       MD GUERDA Chen MD      I was present, scrubbed for the entire procedure and performed key aspects. I agree with the note.     GUERDA COLE MD

## 2021-06-02 NOTE — LETTER
6/2/2021       RE: Ahmet Coleman  [address]  [place] [address]     Dear Colleague,    Thank you for referring your patient, Ahmet Coleman, to the [facility] E[facility] NOSE AND THROAT CLINIC [place] at [facility]. Please see a copy of my visit note below.    Facial Plastic and Reconstructive Surgery      Ahmet Coleman comes in for his postop check after latissimus free flap reconstruction for a scalp defect.  He has skin grafts made meshed from the posterior back skin.    The flap did well intraoperatively and in the postoperative.  He comes today for a postop check where we removed the Aquaplast protective lining.  The skin appears to have a good take.  There is evidence however of exposed muscle due to the machine.  We performed wound care today.  Would like him to have wound care with us in the next week.  We have given him a microcleanse spray bottle to have him spray over the area to keep it moist every day for the next week.    It will take some time to have the skin cover the area completely.        Again, thank you for allowing me to participate in the care of your patient.      Sincerely,    Kathryn Machado MD

## 2021-06-02 NOTE — PROGRESS NOTES
Facial Plastic and Reconstructive Surgery      Ahmet Coleman comes in for his postop check after latissimus free flap reconstruction for a scalp defect.  He has skin grafts made meshed from the posterior back skin.    The flap did well intraoperatively and in the postoperative.  He comes today for a postop check where we removed the Aquaplast protective lining.  The skin appears to have a good take.  There is evidence however of exposed muscle due to the machine.  We performed wound care today.  Would like him to have wound care with us in the next week.  We have given him a microcleanse spray bottle to have him spray over the area to keep it moist every day for the next week.    It will take some time to have the skin cover the area completely.

## 2021-06-02 NOTE — PATIENT INSTRUCTIONS
Wound Care Instructions:    1. Keep dressing on head intact at all times.  Do not remove.  2. Spray Microklenz on outside of dressing to keep it moist at all times.    Call Rozina with any questions/concerns [phone]

## 2021-06-03 LAB — COPATH REPORT: NORMAL

## 2021-06-04 ENCOUNTER — TUMOR CONFERENCE (OUTPATIENT)
Dept: ONCOLOGY | Facility: CLINIC | Age: 76
End: 2021-06-04
Payer: MEDICARE

## 2021-06-04 NOTE — PROGRESS NOTES
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 Tumoral melanosis (which extends to    a depth of around 1.1 mm) is   believed to be equivalent to a diagnosis of regressed melanoma; please   refer to prior reports for this   melanoma Breslow depth.      Patient will return to see Dr. Hernández to discuss additional treatment next week. He will also see Dr. Garcia as scheduled on Wednesday 6/9. He and his daughter were encouraged to call with further questions or concerns.       Rekha Reynolds, RN, BSN

## 2021-06-04 NOTE — PROGRESS NOTES
Head & Neck Tumor Conference Note         Status: New  Staff: Dr. Garcia     Tumor Site: Left scalp  Tumor Pathology: Melanoma  Tumor Stage: [id number]  Tumor Treatment: Neoadjuvant vemurafenib and cobimetinib      Reason for Review: Review path and POC     Brief History: This is a 76 year old male recently diagnosed with a melanoma of the scalp. He reports an area of discoloration on the left scalp for the past year that more recently has started to grow in size. He was seen by a dermatologist, and a 12 cm x 7 cm lesion was identified. Biopsies were obtained and were noted to be positive for melanoma. He was referred to Dr. Garcia and there was concern for satellitosis and additional biopsies were taken. Staging imaging was obtained as well. Much of the left side of the scalp is involved with melanoma and there is concern for satellite disease. PET/CT is pending for staging. Biopsies of satellite lesions pending as well. The patient had a resection and free flap reconstruction and we have pathology to review.      Pertinent PMH:   Past Medical History        Past Medical History:   Diagnosis Date     Anxiety       Benign hypertension 2013     Cervicalgia       Depressive disorder, not elsewhere classified       Esophageal reflux       Lumbago       Migraine, unspecified, without mention of intractable migraine without mention of status migrainosus       Other kyphoscoliosis and scoliosis       Other specified gastritis without mention of hemorrhage       PTSD (post-traumatic stress disorder)       Tobacco abuse since      on and off            Smoking Hx:   Social History            Tobacco Use     Smoking status: Former Smoker       Packs/day: 0.50       Years: 45.00       Pack years: 22.50       Types: Cigarettes       Quit date: 2013       Years since quittin.3     Smokeless tobacco: Never Used     Tobacco comment: since , on and off in there 1/2 pack per day   Substance Use Topics      "Alcohol use: No     Drug use: Yes       Types: Marijuana       Comment: Marijuana         Imaging: no new imaging    Pathology: FINAL DIAGNOSIS:   Skin, scalp, wide local excision:   - 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 Tumoral melanosis (which extends to    a depth of around 1.1 mm) is   believed to be equivalent to a diagnosis of regressed melanoma; please   refer to prior reports for this   melanoma Breslow depth.     I have personally reviewed all specimens and/or slides, including the   listed special stains, and used them   with my medical judgement to determine or confirm the final diagnosis.     Electronically signed out by:     Truman Baron M.D., PhD, [facility]     CLINICAL HISTORY:   The patient is a 76-year-old male     GROSS:   The specimen is received fresh with proper patient identification, labeled    \"wide local excision left scalp,   double green anterior, blue posterior, long black lateral, short black   medial\".  The specimen consists of an   oriented, ovoid skin resection that measures 15.1 cm anterior to   posterior, 3.6 cm medial to lateral, and is   excised to a depth of 0.8 cm. It is oriented per the container and   requisition as follows: Double green   stitch-anterior, blue stitch-posterior, long black stitch-lateral, short   black stitch-medial.     The skin surface is tan, hairbearing, and remarkable for a 10.2 x 5.5 cm   ill-defined, gray, discolored flat   lesion. This lesion is designated lesion #1. It is 1.8 cm from the medial   margin, 1.9 cm from the posterior   margin, 2.5 cm from anterior margin, and 5.2 cm from the lateral margin.   Within lesion #1 are 2 circular,   well-healed scars that measure 0.7 x 0.6 cm and 0.7 x 0.5 cm. There is an   additional 1.8 x 1.1 cm ill-defined,   gray, flat lesion that is 1.2 cm from lesion #1, 3.6 cm from the lateral   margin, 4.5 cm from anterior " margin,   6.1 cm from posterior margin, and 8.5 cm from the medial margin. This   lesion is designated lesion #2.     The specimen sectioned to show lesion #1 invades to a depth of 0.4 cm and   lesion #2 also invades to a depth of   0.4 cm. The remaining cut surfaces show tan-white dermis and yellow   subcutaneous tissue. Gross photographs are   taken. The specimen is entirely submitted in A1-A95.     The specimen is inked as follows: Anterior-green, posterior-blue,   medial-yellow, lateral-orange, deep-black.     Summary of Sections:   A1-A2 - anterior margin, en face, submitted from lateral to medial   A3-A4 - medial margin, en face, submitted from anterior to posterior   A5-A7 - posterior margin, en face, submitted from medial to lateral   A8-A10 - lateral margin, en face, submitted posterior to anterior   A11-A95 - remainder of specimen, submitted anterior to posterior, with   greatest depth of invasion of lesion #1   in A38 and greatest depth of invasion of lesion #2 in A53     (Dictated by: Ann FERNANDO 5/25/2021 10:40 AM)     MICROSCOPIC:   The specimen exhibits scale crust, flattened epidermis, dermal fibrosis   and lymphohistiocytic inflammation   consistent with scar from the prior procedure.  No definitive residual   melanoma is identified. There is an   extensive, band-like collection of superficial dermal melanophages,   extending to a depth of 1.1 mm, consistent   with tumoral melanosis.      Tumor Board Recommendation:   Discussion: We discussed pathology and dermpathology reviewed this. The cells have regressed to benign melanocytes.     Plan: He will follow up to receive more treatment with heme/onc.      Maximo Delgadillo MD   Otolaryngology-Head & Neck Surgery PGY3     Documentation / Disclaimer Cancer Tumor Board Note  Cancer tumor board recommendations do not override what is determined to be reasonable care and treatment, which is dependent on the circumstances of a patient's case; the  patient's medical, social, and personal concerns; and the clinical judgment of the oncologist [physician].

## 2021-06-09 ENCOUNTER — ALLIED HEALTH/NURSE VISIT (OUTPATIENT)
Dept: OTOLARYNGOLOGY | Facility: CLINIC | Age: 76
End: 2021-06-09
Payer: MEDICARE

## 2021-06-09 ENCOUNTER — OFFICE VISIT (OUTPATIENT)
Dept: OTOLARYNGOLOGY | Facility: CLINIC | Age: 76
End: 2021-06-09
Payer: MEDICARE

## 2021-06-09 VITALS — HEART RATE: 91 BPM | TEMPERATURE: 97.9 F | HEIGHT: 68 IN | BODY MASS INDEX: 28.34 KG/M2 | WEIGHT: 187 LBS

## 2021-06-09 DIAGNOSIS — Z51.89 VISIT FOR WOUND CARE: ICD-10-CM

## 2021-06-09 DIAGNOSIS — F33.1 MAJOR DEPRESSIVE DISORDER, RECURRENT EPISODE, MODERATE (H): ICD-10-CM

## 2021-06-09 DIAGNOSIS — J44.9 CHRONIC OBSTRUCTIVE PULMONARY DISEASE, UNSPECIFIED COPD TYPE (H): ICD-10-CM

## 2021-06-09 DIAGNOSIS — Z98.890 POSTOPERATIVE STATE: Primary | ICD-10-CM

## 2021-06-09 PROCEDURE — 99024 POSTOP FOLLOW-UP VISIT: CPT | Performed by: OTOLARYNGOLOGY

## 2021-06-09 PROCEDURE — 99207 PR NO CHARGE NURSE ONLY: CPT

## 2021-06-09 ASSESSMENT — PAIN SCALES - GENERAL: PAINLEVEL: MODERATE PAIN (4)

## 2021-06-09 ASSESSMENT — MIFFLIN-ST. JEOR: SCORE: 1562.73

## 2021-06-09 NOTE — PROGRESS NOTES
PRIOR ONCOLOGIC HISTORY:  Mr. Coleman is a 76-year-old gentleman who was diagnosed with a large left scalp melanoma with satellitosis.  He was put on a neoadjuvant study via Dr. Hernández and received four months of treatment with vemurafenib and cobimetinib.    He then had staged surgery via wide local excision on 05/24/2021.  Dr. Melissa Jackson performed a left sided latissimus myogenous free flap with a skin graft.  The patient did well in the hospital.  His pathology revealed only melanosis but no active malignant melanoma demonstrating an excellent response to the neoadjuvant immunotherapy.    INTERVAL HISTORY:  The patient has been doing fairly well at home.  He has been changing the dressing periodically.  He had the Aquaplast removed last week and covered the area with Xeroform, and Telfa.  He has not noticed any problems or bleeding from the site.  His donor site is well also.    PHYSICAL EXAMINATION:  He is well-appearing.  The latissimus flap is healthy and pink.  The mesh skin graft is in place with an 80-90% take.  There is no evidence of any hematoma or collection and no signs of any infection.  I palpated his parotid and neck and did not palpate any lymphadenopathy.    IMPRESSION:  Healing well.    PLAN:  The patient will require periodic dressing changes with either Aquaphor and Telfa or Aquaphor and Xeroform.  I will defer to Dr. Melissa Jackson in terms of what she would like to use for the dressing.      cc:    Kathryn Machado MD  [facility] 396    Oksana Peñaloza MD  [facility] 480

## 2021-06-09 NOTE — LETTER
6/9/2021       RE: Ahmet Coleman  [address]  [place] [address]     Dear Colleague,    Thank you for referring your patient, Ahmet Coleman, to the [facility] EAR NOSE AND THROAT CLINIC [place] at [facility]. Please see a copy of my visit note below.    PRIOR ONCOLOGIC HISTORY:  Mr. Coleman is a 76-year-old gentleman who was diagnosed with a large left scalp melanoma with satellitosis.  He was put on a neoadjuvant study via Dr. Hernández and received four months of treatment with vemurafenib and cobimetinib.    He then had staged surgery via wide local excision on 05/24/2021.  Dr. Melissa Jackson performed a left sided latissimus myogenous free flap with a skin graft.  The patient did well in the hospital.  His pathology revealed only melanosis but no active malignant melanoma demonstrating an excellent response to the neoadjuvant immunotherapy.    INTERVAL HISTORY:  The patient has been doing fairly well at home.  He has been changing the dressing periodically.  He had the Aquaplast removed last week and covered the area with Xeroform, and Telfa.  He has not noticed any problems or bleeding from the site.  His donor site is well also.    PHYSICAL EXAMINATION:  He is well-appearing.  The latissimus flap is healthy and pink.  The mesh skin graft is in place with an 80-90% take.  There is no evidence of any hematoma or collection and no signs of any infection.  I palpated his parotid and neck and did not palpate any lymphadenopathy.    IMPRESSION:  Healing well.    PLAN:  The patient will require periodic dressing changes with either Aquaphor and Telfa or Aquaphor and Xeroform.  I will defer to Dr. Melissa Jackson in terms of what she would like to use for the dressing.      cc:    Kathryn Machado MD  [facility] 396    Oksana Peñaloza MD  [facility] 480          Again, thank you for allowing me to participate in the care of your patient.      Sincerely,    Paulo  STEVIE Garcia MD

## 2021-06-09 NOTE — NURSING NOTE
"Chief Complaint   Patient presents with     RECHECK     post op      Pulse 91, temperature 97.9  F (36.6  C), height 1.727 m (5' 8\"), weight 84.8 kg (187 lb).    Mauri Jarquin LPN    "

## 2021-06-10 ENCOUNTER — TELEPHONE (OUTPATIENT)
Dept: OTOLARYNGOLOGY | Facility: CLINIC | Age: 76
End: 2021-06-10

## 2021-06-10 NOTE — TELEPHONE ENCOUNTER
[facility] Call Center    Phone Message    May a detailed message be left on voicemail: yes     Reason for Call: Other: Home care called with an update:  Patient has a Pocket of fluid that developed at the lower drain drain site near incision.  No symptoms of infection, no redness. A little pain and didn't do therapy today.  No fever, no chills. Unable to tolerate Aspirin as it tears his stomach up she said.  He is seeing Oncologist tomorrow.  Please call to discuss.      Action Taken: Message routed to:  [facility] ([facility]): ENT    Travel Screening: Not Applicable

## 2021-06-10 NOTE — TELEPHONE ENCOUNTER
I returned a call to pt's home care nurse, Mckayla, regarding the fluid collection.    Mckayla said that there is a small area of fluid collecting on pt's left back incision near where the old LLOYD drain site.  She said there is no pain associated with the fluid, no redness, no warmth, and no tenderness.  The fluid collection is about the diameter of the bottom of a soda can.    Based on Mckayla's assessment, this could be a seroma.    I called pt's daughter, Huma, and asked that she send me a photo of the fluid collection for further evaluation.    Huma agreeable to this plan.    Rozina Ascencio RN  6/10/2021 1:24 PM

## 2021-06-11 ENCOUNTER — ONCOLOGY VISIT (OUTPATIENT)
Dept: ONCOLOGY | Facility: CLINIC | Age: 76
End: 2021-06-11
Attending: INTERNAL MEDICINE
Payer: MEDICARE

## 2021-06-11 ENCOUNTER — APPOINTMENT (OUTPATIENT)
Dept: LAB | Facility: CLINIC | Age: 76
End: 2021-06-11
Attending: INTERNAL MEDICINE
Payer: MEDICARE

## 2021-06-11 VITALS
BODY MASS INDEX: 28.4 KG/M2 | RESPIRATION RATE: 16 BRPM | HEART RATE: 62 BPM | TEMPERATURE: 98.8 F | WEIGHT: 186.8 LBS | OXYGEN SATURATION: 95 % | DIASTOLIC BLOOD PRESSURE: 83 MMHG | SYSTOLIC BLOOD PRESSURE: 149 MMHG

## 2021-06-11 DIAGNOSIS — Z15.89 MONOALLELIC MUTATION OF BRAF GENE: ICD-10-CM

## 2021-06-11 DIAGNOSIS — C43.4 MALIGNANT MELANOMA OF SCALP (H): Primary | ICD-10-CM

## 2021-06-11 DIAGNOSIS — Z15.09 MONOALLELIC MUTATION OF BRAF GENE: ICD-10-CM

## 2021-06-11 LAB
ALBUMIN SERPL-MCNC: 3.5 G/DL (ref 3.4–5)
ALP SERPL-CCNC: 55 U/L (ref 40–150)
ALT SERPL W P-5'-P-CCNC: 21 U/L (ref 0–70)
ANION GAP SERPL CALCULATED.3IONS-SCNC: <1 MMOL/L (ref 3–14)
AST SERPL W P-5'-P-CCNC: 14 U/L (ref 0–45)
BASOPHILS # BLD AUTO: 0.1 10E9/L (ref 0–0.2)
BASOPHILS NFR BLD AUTO: 1.5 %
BILIRUB SERPL-MCNC: 0.4 MG/DL (ref 0.2–1.3)
BUN SERPL-MCNC: 17 MG/DL (ref 7–30)
CALCIUM SERPL-MCNC: 8.9 MG/DL (ref 8.5–10.1)
CHLORIDE SERPL-SCNC: 105 MMOL/L (ref 94–109)
CK SERPL-CCNC: 62 U/L (ref 30–300)
CO2 SERPL-SCNC: 30 MMOL/L (ref 20–32)
CREAT SERPL-MCNC: 1.17 MG/DL (ref 0.66–1.25)
DIFFERENTIAL METHOD BLD: ABNORMAL
EOSINOPHIL # BLD AUTO: 0.1 10E9/L (ref 0–0.7)
EOSINOPHIL NFR BLD AUTO: 2.6 %
ERYTHROCYTE [DISTWIDTH] IN BLOOD BY AUTOMATED COUNT: 14 % (ref 10–15)
GFR SERPL CREATININE-BSD FRML MDRD: 61 ML/MIN/{1.73_M2}
GLUCOSE SERPL-MCNC: 81 MG/DL (ref 70–99)
HCT VFR BLD AUTO: 33.7 % (ref 40–53)
HGB BLD-MCNC: 11 G/DL (ref 13.3–17.7)
IMM GRANULOCYTES # BLD: 0 10E9/L (ref 0–0.4)
IMM GRANULOCYTES NFR BLD: 0.2 %
LYMPHOCYTES # BLD AUTO: 1.8 10E9/L (ref 0.8–5.3)
LYMPHOCYTES NFR BLD AUTO: 40.3 %
MAGNESIUM SERPL-MCNC: 2.1 MG/DL (ref 1.6–2.3)
MCH RBC QN AUTO: 29 PG (ref 26.5–33)
MCHC RBC AUTO-ENTMCNC: 32.6 G/DL (ref 31.5–36.5)
MCV RBC AUTO: 89 FL (ref 78–100)
MONOCYTES # BLD AUTO: 0.7 10E9/L (ref 0–1.3)
MONOCYTES NFR BLD AUTO: 14.4 %
NEUTROPHILS # BLD AUTO: 1.9 10E9/L (ref 1.6–8.3)
NEUTROPHILS NFR BLD AUTO: 41 %
NRBC # BLD AUTO: 0 10*3/UL
NRBC BLD AUTO-RTO: 0 /100
PHOSPHATE SERPL-MCNC: 2.8 MG/DL (ref 2.5–4.5)
PLATELET # BLD AUTO: 389 10E9/L (ref 150–450)
POTASSIUM SERPL-SCNC: 3.7 MMOL/L (ref 3.4–5.3)
PROT SERPL-MCNC: 7.1 G/DL (ref 6.8–8.8)
RBC # BLD AUTO: 3.79 10E12/L (ref 4.4–5.9)
SODIUM SERPL-SCNC: 135 MMOL/L (ref 133–144)
WBC # BLD AUTO: 4.6 10E9/L (ref 4–11)

## 2021-06-11 PROCEDURE — G0463 HOSPITAL OUTPT CLINIC VISIT: HCPCS

## 2021-06-11 PROCEDURE — 99213 OFFICE O/P EST LOW 20 MIN: CPT | Performed by: INTERNAL MEDICINE

## 2021-06-11 PROCEDURE — 83735 ASSAY OF MAGNESIUM: CPT | Performed by: INTERNAL MEDICINE

## 2021-06-11 PROCEDURE — 82550 ASSAY OF CK (CPK): CPT | Performed by: INTERNAL MEDICINE

## 2021-06-11 PROCEDURE — 84100 ASSAY OF PHOSPHORUS: CPT | Performed by: INTERNAL MEDICINE

## 2021-06-11 PROCEDURE — 36415 COLL VENOUS BLD VENIPUNCTURE: CPT

## 2021-06-11 PROCEDURE — 80053 COMPREHEN METABOLIC PANEL: CPT | Performed by: INTERNAL MEDICINE

## 2021-06-11 PROCEDURE — 85025 COMPLETE CBC W/AUTO DIFF WBC: CPT | Performed by: INTERNAL MEDICINE

## 2021-06-11 ASSESSMENT — PAIN SCALES - GENERAL: PAINLEVEL: MILD PAIN (3)

## 2021-06-11 NOTE — PROGRESS NOTES
MEDICAL ONCOLOGY CONSULT  Melanoma Clinic  Jun 11, 2021    CHIEF COMPLAINT: Scalp melanoma    Melanoma History:  1. He has a small pigmented scalp lesion present for over 30 years. The lesion was biopsied in 1992 and was benign.  2. October 2020, he presented to [facility] with 1 year of progressive enlargement of the lesion and new onset burning, itching, and stinging sensation in the affected scalp.  3. 10/26/20, He was seen at [facility] and he had shave biopsies of A. left central parietal scalp, B. left central occipital scalp, C. Left posterior parietal scalp, and D. punch biopsy of the left superior occipital scalp. Pathology (specimen: [id number]) showed a nodular and nevoid type melanoma, non-ulcerated, Breslow depth up to 1.3 mm, Saurabh's level IV, and up to 3 mitoses per mm2. All margins were involved. Ki-67 10-20%. Sox10 stain is positive and highlights an atypical compound melanocytic proliferation with significant overlying confluence and pagetosis of intraepidermal melanocytes. T-stage: at least pT2a. PD-L1 staining shows PD-L1 TPS <1%. 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  4. 11/25/20, he was seen by Dr. Garcia and he took three 3mm punch biopsies, which returned as dermal melanocytic proliferations with melanophages and fibrosis, consistent with locoregional metastatic melanoma.  5. 12/8/2020 he had PET-CT, which showed FDG avid irregular skin thickening overlying the left parietal region, with no FDG avid lymphadenopathy in the neck and no evidence of metastasis elsewhere in the body.  6. 1/22/2021 start vemurafenib and cobimetinib, ~2/12/21 held for diarrhea.    INTERVAL HISTORY  Patient reports that overall he is doing okay. On 5/24 he underwent wide local excision of the scalp melanoma. He then had left latissimus free flap for scalp reconstruction. Two 15 round drains were placed at the donor site.  A posterior scalp drained was also placed.    On  pathology, the wide local excision specimen showed features consistent with scar and tumoral melanosis. No definite residual melanoma is seen. Tumoral melanosis (which extends to   a depth of around 1.1 mm) is believed to be equivalent to a diagnosis of regressed melanoma.    He is seen today. The areas are covered, but are healing well. Drains have been removed. There is a small pocket of fluid in the latissimus donor site. Otherwise, the scalp is doing fine and he has no complaints.    No fevers or chills. No infectious signs or symptoms.    Review of Systems:  Patient denies any of the following except if noted above: fevers, chills, vision or hearing changes, chest pain, dyspnea, abdominal pain, nausea, vomiting, urinary concerns, numbness, or tingling. He also denies rashes or new skin lesions, bleeding or bruising issues.    Current Outpatient Medications   Medication Sig Dispense Refill     acetaminophen (TYLENOL) 500 MG tablet Take 2 tablets (1,000 mg) by mouth every 8 hours as needed for mild pain 100 tablet 0     aspirin (ASA) 325 MG tablet Take 1 tablet (325 mg) by mouth daily 30 tablet 0     cholecalciferol (VITAMIN D3) 1000 UNIT tablet Take 1,000 Units by mouth daily        diphenoxylate-atropine (LOMOTIL) 2.5-0.025 MG tablet Take 1-2 tablets by mouth 4 times daily as needed for diarrhea 120 tablet 5     fluvoxaMINE (LUVOX) 100 MG tablet Take 150 mg at bedtime       hydrochlorothiazide (HYDRODIURIL) 25 MG tablet Take 25 mg by mouth daily.       Hypromellose (ARTIFICIAL TEARS OP) Apply  to eye. 2 drops in each eye twice a day as needed       Lactobacillus-Inulin ([facility] DIGESTIVE [facility]) CAPS 1 tab daily (Patient taking differently: Take 1 tablet by mouth daily ) 30 capsule 1     Magnesium Oxide 420 MG TABS Take 420 mg by mouth daily       methocarbamol (ROBAXIN) 500 MG tablet Take 1 tablet (500 mg) by mouth 3 times daily as needed for muscle spasms 15 tablet 0     methocarbamol (ROBAXIN) 500 MG  tablet Take 1 tablet (500 mg) by mouth 3 times daily as needed for muscle spasms 10 tablet 0     omeprazole (PRILOSEC) 20 MG capsule Take 1 capsule by mouth 2 times daily. 60 capsule prn     polyethylene glycol (MIRALAX) 17 GM/Dose powder Take 17 g by mouth daily 510 g 0     potassium phosphate, monobasic, (K-PHOS) 500 MG tablet Take 1 tablet (500 mg) by mouth 2 times daily (Patient taking differently: Take 500 mg by mouth daily ) 60 tablet 3     pregabalin (LYRICA) 150 MG capsule Take 150 mg by mouth 2 times daily       prochlorperazine (COMPAZINE) 10 MG tablet Take 1 tablet (10 mg) by mouth every 6 hours as needed (Nausea/Vomiting) 30 tablet 2     QUEtiapine (SEROQUEL) 300 MG tablet Take 150 mg by mouth At Bedtime        senna-docusate (SENOKOT-S/PERICOLACE) 8.6-50 MG tablet Take 1 tablet by mouth 2 times daily 30 tablet 0     simvastatin (ZOCOR) 80 MG tablet Take 40 mg by mouth At Bedtime        traZODone (DESYREL) 100 MG tablet Take 100 mg by mouth At Bedtime        vortioxetine (TRINTELLIX) 20 MG tablet TAKE ONE TABLET BY MOUTH EVERY MORNING       Physical Exam:  General: The patient is a pleasant male in no acute distress.  BP (!) 149/83 (BP Location: Right arm, Patient Position: Sitting, Cuff Size: Adult Regular)   Pulse 62   Temp 98.8  F (37.1  C) (Tympanic)   Resp 16   Wt 84.7 kg (186 lb 12.8 oz)   SpO2 95%   BMI 28.40 kg/m    Wt Readings from Last 10 Encounters:   06/11/21 84.7 kg (186 lb 12.8 oz)   06/09/21 84.8 kg (187 lb)   06/02/21 85.1 kg (187 lb 9.8 oz)   05/26/21 88 kg (193 lb 14.4 oz)   05/12/21 85.2 kg (187 lb 12.8 oz)   05/07/21 84.9 kg (187 lb 3.2 oz)   05/05/21 84 kg (185 lb 3 oz)   04/15/21 85.7 kg (188 lb 14.4 oz)   04/14/21 86.2 kg (190 lb)   04/02/21 85.4 kg (188 lb 4.8 oz)   HEENT: EOMI, PERRL. Sclerae are anicteric. Left sclera is mildly erythematous. No drainage noted from either eye. Multiple very small brown dots in left sclera (chronic per patient). Oral mucosa is pink and moist  with no lesions or thrush.   Lymph: Neck is supple with no lymphadenopathy in the cervical or supraclavicular areas.   Heart: Regular rate and rhythm.   Lungs: Clear to auscultation bilaterally.   Abdomen: Bowel sounds present, soft, nontender with no palpable hepatosplenomegaly or masses.   Extremities: No lower extremity edema noted bilaterally.   Neuro: Cranial nerves II through XII are grossly intact.  Skin: The large mid-scalp pigmented lesion extends from right of midline to the left side of the head with a smaller separate satellite lesion. Left ear pinna lesion measures 2mm, possible in-transit metastasis. See photos below of scalp and lower abdominal lesion.      LABS  Oncology Visit on 06/11/2021   Component Date Value Ref Range Status     Sodium 06/11/2021 135  133 - 144 mmol/L Final     Potassium 06/11/2021 3.7  3.4 - 5.3 mmol/L Final     Chloride 06/11/2021 105  94 - 109 mmol/L Final     Carbon Dioxide 06/11/2021 30  20 - 32 mmol/L Final     Anion Gap 06/11/2021 <1* 3 - 14 mmol/L Final     Glucose 06/11/2021 81  70 - 99 mg/dL Final     Urea Nitrogen 06/11/2021 17  7 - 30 mg/dL Final     Creatinine 06/11/2021 1.17  0.66 - 1.25 mg/dL Final     GFR Estimate 06/11/2021 61  >60 mL/min/[1.73_m2] Final     GFR Estimate If Black 06/11/2021 71  >60 mL/min/[1.73_m2] Final     Calcium 06/11/2021 8.9  8.5 - 10.1 mg/dL Final     Bilirubin Total 06/11/2021 0.4  0.2 - 1.3 mg/dL Final     Albumin 06/11/2021 3.5  3.4 - 5.0 g/dL Final     Protein Total 06/11/2021 7.1  6.8 - 8.8 g/dL Final     Alkaline Phosphatase 06/11/2021 55  40 - 150 U/L Final     ALT 06/11/2021 21  0 - 70 U/L Final     AST 06/11/2021 14  0 - 45 U/L Final     WBC 06/11/2021 4.6  4.0 - 11.0 10e9/L Final     RBC Count 06/11/2021 3.79* 4.4 - 5.9 10e12/L Final     Hemoglobin 06/11/2021 11.0* 13.3 - 17.7 g/dL Final     Hematocrit 06/11/2021 33.7* 40.0 - 53.0 % Final     MCV 06/11/2021 89  78 - 100 fl Final     MCH 06/11/2021 29.0  26.5 - 33.0 pg Final      MCHC 06/11/2021 32.6  31.5 - 36.5 g/dL Final     RDW 06/11/2021 14.0  10.0 - 15.0 % Final     Platelet Count 06/11/2021 389  150 - 450 10e9/L Final     Diff Method 06/11/2021 Automated Method   Final     % Neutrophils 06/11/2021 41.0  % Final     % Lymphocytes 06/11/2021 40.3  % Final     % Monocytes 06/11/2021 14.4  % Final     % Eosinophils 06/11/2021 2.6  % Final     % Basophils 06/11/2021 1.5  % Final     % Immature Granulocytes 06/11/2021 0.2  % Final     Nucleated RBCs 06/11/2021 0  0 /100 Final     Absolute Neutrophil 06/11/2021 1.9  1.6 - 8.3 10e9/L Final     Absolute Lymphocytes 06/11/2021 1.8  0.8 - 5.3 10e9/L Final     Absolute Monocytes 06/11/2021 0.7  0.0 - 1.3 10e9/L Final     Absolute Eosinophils 06/11/2021 0.1  0.0 - 0.7 10e9/L Final     Absolute Basophils 06/11/2021 0.1  0.0 - 0.2 10e9/L Final     Abs Immature Granulocytes 06/11/2021 0.0  0 - 0.4 10e9/L Final     Absolute Nucleated RBC 06/11/2021 0.0   Final     CK Total 06/11/2021 62  30 - 300 U/L Final     Phosphorus 06/11/2021 2.8  2.5 - 4.5 mg/dL Final     Magnesium 06/11/2021 2.1  1.6 - 2.3 mg/dL Final       ASSESSMENT AND PLAN    #1 Cutaneous melanoma, scalp primary, pT2a cN1c, clinical Stage IIIB.   It was a pleasure to see Mr. Coleman today. He is a 76 year old  with agent orange cutaneous exposures in the [place] war and a diagnosis of malignant melanoma arising from a pre-existing pigmented lesion on the scalp. He had a partial response to therapy with decrease in thickening of the large scalp tumor and lightening of the periphery and in the middle part of the tumor then had surgery, which showed only tumoral melanosis.    We will now plan to give him adjuvant nivolumab. Initially were going to give him atezolizumab monotherapy, but as he was resected as a stage III melanoma he would more appropriately anti-PD1 therapy, which is FDA approved. Atezolizumab is still not approved for resected stage III disease.    #2 Diarrhea  Resolved,  and likely secondary to his treatment, complicated by possible IBS.    Oksana Peñaloza M.D.   of Medicine  Hematology, Oncology and Transplantation

## 2021-06-11 NOTE — NURSING NOTE
"Oncology Rooming Note    June 11, 2021 2:56 PM   Ahmet Coleman is a 76 year old male who presents for:    Chief Complaint   Patient presents with     Blood Draw     labs drawn with vpt by rn.  vs taken     Oncology Clinic Visit     MALIGNANT MELANOMA OF SCALP     Initial Vitals: BP (!) 149/83 (BP Location: Right arm, Patient Position: Sitting, Cuff Size: Adult Regular)   Pulse 62   Temp 98.8  F (37.1  C) (Tympanic)   Resp 16   Wt 84.7 kg (186 lb 12.8 oz)   SpO2 95%   BMI 28.40 kg/m   Estimated body mass index is 28.4 kg/m  as calculated from the following:    Height as of 6/9/21: 1.727 m (5' 8\").    Weight as of this encounter: 84.7 kg (186 lb 12.8 oz). Body surface area is 2.02 meters squared.  Mild Pain (3) Comment: Data Unavailable   No LMP for male patient.  Allergies reviewed: Yes  Medications reviewed: Yes    Medications: Medication refills not needed today.  Pharmacy name entered into [employer]:    [facility] - [place], [place] - [address] [id number] - [place], [place] - [address] [facility] - [place], [place] - [address]  [facility] - [place], [place] - [phone]    Clinical concerns: Notes swelling and fluid buildup around incision.        Judit Marsh CMA              "

## 2021-06-11 NOTE — NURSING NOTE
Chief Complaint   Patient presents with     Blood Draw     labs drawn with vpt by rn.  vs taken     Labs drawn with vpt by rn.  Pt tolerated well.  VS taken.  Pt checked in for next appt.    Mary Jo Wolfe RN

## 2021-06-11 NOTE — LETTER
6/11/2021         RE: Ahmet Coleman  [address]  [place] [address]        Dear Colleague,    Thank you for referring your patient, Ahmet Coleman, to the [facility]. Please see a copy of my visit note below.    MEDICAL ONCOLOGY CONSULT  Melanoma Clinic  Jun 11, 2021    CHIEF COMPLAINT: Scalp melanoma    Melanoma History:  1. He has a small pigmented scalp lesion present for over 30 years. The lesion was biopsied in 1992 and was benign.  2. October 2020, he presented to [facility] with 1 year of progressive enlargement of the lesion and new onset burning, itching, and stinging sensation in the affected scalp.  3. 10/26/20, He was seen at [facility] and he had shave biopsies of A. left central parietal scalp, B. left central occipital scalp, C. Left posterior parietal scalp, and D. punch biopsy of the left superior occipital scalp. Pathology (specimen: [id number]) showed a nodular and nevoid type melanoma, non-ulcerated, Breslow depth up to 1.3 mm, Saurabh's level IV, and up to 3 mitoses per mm2. All margins were involved. Ki-67 10-20%. Sox10 stain is positive and highlights an atypical compound melanocytic proliferation with significant overlying confluence and pagetosis of intraepidermal melanocytes. T-stage: at least pT2a. PD-L1 staining shows PD-L1 TPS <1%. 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  4. 11/25/20, he was seen by Dr. Garcia and he took three 3mm punch biopsies, which returned as dermal melanocytic proliferations with melanophages and fibrosis, consistent with locoregional metastatic melanoma.  5. 12/8/2020 he had PET-CT, which showed FDG avid irregular skin thickening overlying the left parietal region, with no FDG avid lymphadenopathy in the neck and no evidence of metastasis elsewhere in the body.  6. 1/22/2021 start vemurafenib and cobimetinib, ~2/12/21 held for diarrhea.    INTERVAL HISTORY  Patient reports that  overall he is doing okay. On 5/24 he underwent wide local excision of the scalp melanoma. He then had left latissimus free flap for scalp reconstruction. Two 15 round drains were placed at the donor site.  A posterior scalp drained was also placed.    On pathology, the wide local excision specimen showed features consistent with scar and tumoral melanosis. No definite residual melanoma is seen. Tumoral melanosis (which extends to   a depth of around 1.1 mm) is believed to be equivalent to a diagnosis of regressed melanoma.    He is seen today. The areas are covered, but are healing well. Drains have been removed. There is a small pocket of fluid in the latissimus donor site. Otherwise, the scalp is doing fine and he has no complaints.    No fevers or chills. No infectious signs or symptoms.    Review of Systems:  Patient denies any of the following except if noted above: fevers, chills, vision or hearing changes, chest pain, dyspnea, abdominal pain, nausea, vomiting, urinary concerns, numbness, or tingling. He also denies rashes or new skin lesions, bleeding or bruising issues.    Current Outpatient Medications   Medication Sig Dispense Refill     acetaminophen (TYLENOL) 500 MG tablet Take 2 tablets (1,000 mg) by mouth every 8 hours as needed for mild pain 100 tablet 0     aspirin (ASA) 325 MG tablet Take 1 tablet (325 mg) by mouth daily 30 tablet 0     cholecalciferol (VITAMIN D3) 1000 UNIT tablet Take 1,000 Units by mouth daily        diphenoxylate-atropine (LOMOTIL) 2.5-0.025 MG tablet Take 1-2 tablets by mouth 4 times daily as needed for diarrhea 120 tablet 5     fluvoxaMINE (LUVOX) 100 MG tablet Take 150 mg at bedtime       hydrochlorothiazide (HYDRODIURIL) 25 MG tablet Take 25 mg by mouth daily.       Hypromellose (ARTIFICIAL TEARS OP) Apply  to eye. 2 drops in each eye twice a day as needed       Lactobacillus-Inulin ([facility] DIGESTIVE [facility]) CAPS 1 tab daily (Patient taking differently: Take 1 tablet by  mouth daily ) 30 capsule 1     Magnesium Oxide 420 MG TABS Take 420 mg by mouth daily       methocarbamol (ROBAXIN) 500 MG tablet Take 1 tablet (500 mg) by mouth 3 times daily as needed for muscle spasms 15 tablet 0     methocarbamol (ROBAXIN) 500 MG tablet Take 1 tablet (500 mg) by mouth 3 times daily as needed for muscle spasms 10 tablet 0     omeprazole (PRILOSEC) 20 MG capsule Take 1 capsule by mouth 2 times daily. 60 capsule prn     polyethylene glycol (MIRALAX) 17 GM/Dose powder Take 17 g by mouth daily 510 g 0     potassium phosphate, monobasic, (K-PHOS) 500 MG tablet Take 1 tablet (500 mg) by mouth 2 times daily (Patient taking differently: Take 500 mg by mouth daily ) 60 tablet 3     pregabalin (LYRICA) 150 MG capsule Take 150 mg by mouth 2 times daily       prochlorperazine (COMPAZINE) 10 MG tablet Take 1 tablet (10 mg) by mouth every 6 hours as needed (Nausea/Vomiting) 30 tablet 2     QUEtiapine (SEROQUEL) 300 MG tablet Take 150 mg by mouth At Bedtime        senna-docusate (SENOKOT-S/PERICOLACE) 8.6-50 MG tablet Take 1 tablet by mouth 2 times daily 30 tablet 0     simvastatin (ZOCOR) 80 MG tablet Take 40 mg by mouth At Bedtime        traZODone (DESYREL) 100 MG tablet Take 100 mg by mouth At Bedtime        vortioxetine (TRINTELLIX) 20 MG tablet TAKE ONE TABLET BY MOUTH EVERY MORNING       Physical Exam:  General: The patient is a pleasant male in no acute distress.  BP (!) 149/83 (BP Location: Right arm, Patient Position: Sitting, Cuff Size: Adult Regular)   Pulse 62   Temp 98.8  F (37.1  C) (Tympanic)   Resp 16   Wt 84.7 kg (186 lb 12.8 oz)   SpO2 95%   BMI 28.40 kg/m    Wt Readings from Last 10 Encounters:   06/11/21 84.7 kg (186 lb 12.8 oz)   06/09/21 84.8 kg (187 lb)   06/02/21 85.1 kg (187 lb 9.8 oz)   05/26/21 88 kg (193 lb 14.4 oz)   05/12/21 85.2 kg (187 lb 12.8 oz)   05/07/21 84.9 kg (187 lb 3.2 oz)   05/05/21 84 kg (185 lb 3 oz)   04/15/21 85.7 kg (188 lb 14.4 oz)   04/14/21 86.2 kg (190  lb)   04/02/21 85.4 kg (188 lb 4.8 oz)   HEENT: EOMI, PERRL. Sclerae are anicteric. Left sclera is mildly erythematous. No drainage noted from either eye. Multiple very small brown dots in left sclera (chronic per patient). Oral mucosa is pink and moist with no lesions or thrush.   Lymph: Neck is supple with no lymphadenopathy in the cervical or supraclavicular areas.   Heart: Regular rate and rhythm.   Lungs: Clear to auscultation bilaterally.   Abdomen: Bowel sounds present, soft, nontender with no palpable hepatosplenomegaly or masses.   Extremities: No lower extremity edema noted bilaterally.   Neuro: Cranial nerves II through XII are grossly intact.  Skin: The large mid-scalp pigmented lesion extends from right of midline to the left side of the head with a smaller separate satellite lesion. Left ear pinna lesion measures 2mm, possible in-transit metastasis. See photos below of scalp and lower abdominal lesion.      LABS  Oncology Visit on 06/11/2021   Component Date Value Ref Range Status     Sodium 06/11/2021 135  133 - 144 mmol/L Final     Potassium 06/11/2021 3.7  3.4 - 5.3 mmol/L Final     Chloride 06/11/2021 105  94 - 109 mmol/L Final     Carbon Dioxide 06/11/2021 30  20 - 32 mmol/L Final     Anion Gap 06/11/2021 <1* 3 - 14 mmol/L Final     Glucose 06/11/2021 81  70 - 99 mg/dL Final     Urea Nitrogen 06/11/2021 17  7 - 30 mg/dL Final     Creatinine 06/11/2021 1.17  0.66 - 1.25 mg/dL Final     GFR Estimate 06/11/2021 61  >60 mL/min/[1.73_m2] Final     GFR Estimate If Black 06/11/2021 71  >60 mL/min/[1.73_m2] Final     Calcium 06/11/2021 8.9  8.5 - 10.1 mg/dL Final     Bilirubin Total 06/11/2021 0.4  0.2 - 1.3 mg/dL Final     Albumin 06/11/2021 3.5  3.4 - 5.0 g/dL Final     Protein Total 06/11/2021 7.1  6.8 - 8.8 g/dL Final     Alkaline Phosphatase 06/11/2021 55  40 - 150 U/L Final     ALT 06/11/2021 21  0 - 70 U/L Final     AST 06/11/2021 14  0 - 45 U/L Final     WBC 06/11/2021 4.6  4.0 - 11.0 10e9/L Final      RBC Count 06/11/2021 3.79* 4.4 - 5.9 10e12/L Final     Hemoglobin 06/11/2021 11.0* 13.3 - 17.7 g/dL Final     Hematocrit 06/11/2021 33.7* 40.0 - 53.0 % Final     MCV 06/11/2021 89  78 - 100 fl Final     MCH 06/11/2021 29.0  26.5 - 33.0 pg Final     MCHC 06/11/2021 32.6  31.5 - 36.5 g/dL Final     RDW 06/11/2021 14.0  10.0 - 15.0 % Final     Platelet Count 06/11/2021 389  150 - 450 10e9/L Final     Diff Method 06/11/2021 Automated Method   Final     % Neutrophils 06/11/2021 41.0  % Final     % Lymphocytes 06/11/2021 40.3  % Final     % Monocytes 06/11/2021 14.4  % Final     % Eosinophils 06/11/2021 2.6  % Final     % Basophils 06/11/2021 1.5  % Final     % Immature Granulocytes 06/11/2021 0.2  % Final     Nucleated RBCs 06/11/2021 0  0 /100 Final     Absolute Neutrophil 06/11/2021 1.9  1.6 - 8.3 10e9/L Final     Absolute Lymphocytes 06/11/2021 1.8  0.8 - 5.3 10e9/L Final     Absolute Monocytes 06/11/2021 0.7  0.0 - 1.3 10e9/L Final     Absolute Eosinophils 06/11/2021 0.1  0.0 - 0.7 10e9/L Final     Absolute Basophils 06/11/2021 0.1  0.0 - 0.2 10e9/L Final     Abs Immature Granulocytes 06/11/2021 0.0  0 - 0.4 10e9/L Final     Absolute Nucleated RBC 06/11/2021 0.0   Final     CK Total 06/11/2021 62  30 - 300 U/L Final     Phosphorus 06/11/2021 2.8  2.5 - 4.5 mg/dL Final     Magnesium 06/11/2021 2.1  1.6 - 2.3 mg/dL Final       ASSESSMENT AND PLAN    #1 Cutaneous melanoma, scalp primary, pT2a cN1c, clinical Stage IIIB.   It was a pleasure to see Mr. Coleman today. He is a 76 year old  with agent orange cutaneous exposures in the [place] war and a diagnosis of malignant melanoma arising from a pre-existing pigmented lesion on the scalp. He had a partial response to therapy with decrease in thickening of the large scalp tumor and lightening of the periphery and in the middle part of the tumor then had surgery, which showed only tumoral melanosis.    We will now plan to give him adjuvant nivolumab. Initially were  going to give him atezolizumab monotherapy, but as he was resected as a stage III melanoma he would more appropriately anti-PD1 therapy, which is FDA approved. Atezolizumab is still not approved for resected stage III disease.    #2 Diarrhea  Resolved, and likely secondary to his treatment, complicated by possible IBS.    Oksana Peñaloza M.D.   of Medicine  Hematology, Oncology and Transplantation          Again, thank you for allowing me to participate in the care of your patient.        Sincerely,        Oksana Hernández MD

## 2021-06-14 ENCOUNTER — DOCUMENTATION ONLY (OUTPATIENT)
Dept: ONCOLOGY | Facility: CLINIC | Age: 76
End: 2021-06-14

## 2021-06-14 NOTE — PROGRESS NOTES
Pt comes in for a wound care follow up.    Scalp wound is healing nicely.  Dr. Machado debrided some of the grafted skin that was sloughing off.    Wound bed covered with copious amounts of Aquaphor.  Xeroform and Telfa applied and secured with a Stockinette.      Pt sent home with a couple more bottles of Microklenz and told to continue spraying the dressing 1-2 times daily prn to keep the dressing moist.      Pt will follow up next week for another wound care check.    Rozina Ascencio RN  6/14/2021 12:03 PM

## 2021-06-16 ENCOUNTER — ALLIED HEALTH/NURSE VISIT (OUTPATIENT)
Dept: OTOLARYNGOLOGY | Facility: CLINIC | Age: 76
End: 2021-06-16
Payer: MEDICARE

## 2021-06-16 DIAGNOSIS — Z51.89 VISIT FOR WOUND CARE: ICD-10-CM

## 2021-06-16 DIAGNOSIS — Z98.890 POSTOPERATIVE STATE: Primary | ICD-10-CM

## 2021-06-16 PROCEDURE — 99207 PR NO CHARGE NURSE ONLY: CPT

## 2021-06-20 ENCOUNTER — TELEPHONE (OUTPATIENT)
Dept: ONCOLOGY | Facility: CLINIC | Age: 76
End: 2021-06-20

## 2021-06-20 NOTE — ORAL ONC MGMT
[facility] Oral Chemotherapy Monitoring Program    Thank you for the opportunity to be a part in the care of this patient's oral chemotherapy. The oncology pharmacy will no longer be following this patient for oral chemotherapy. If there are any questions or the plan changes, feel free to contact us.    ORAL CHEMOTHERAPY 4/24/2021 5/13/2021 5/13/2021 6/14/2021 6/14/2021 6/20/2021 6/20/2021   Assessment Type Chart Review Chart Review Chart Review Chart Review Chart Review Discontinuation Discontinuation   Stop Date - - - - - 6/11/2021 6/11/2021   Reason for Discontinuation - - - - - Disease progression Disease progression   Diagnosis Code Melanoma Melanoma Melanoma Melanoma Melanoma Melanoma Melanoma   Providers Dr. Bowen VILLALOBOS   Clinic Name/Location [facility]   Drug Name Cotellic (cobimetinib) Zelboraf (vemurafenib) Cotellic (cobimetinib) Zelboraf (vemurafenib) Cotellic (cobimetinib) Cotellic (cobimetinib) Zelboraf (vemurafenib)   Dose 40 mg 720 mg 40 mg - - - -   Current Schedule Daily BID Daily - - - -   Cycle Details 3 weeks on, 1 week off Drug on Hold Drug on Hold - - - -   Start Date of Last Cycle - - - - - - -   Planned next cycle start date - - - - - - -   Doses missed in last 2 weeks - - - - - - -   Adherence Assessment - - - - - - -   Adverse Effects - - - - - - -   Diarrhea - - - - - - -   Pharmacist Intervention(diarrhea) - - - - - - -   Intervention(s) - - - - - - -   Fatigue - - - - - - -   Pharmacist Intervention(fatigue) - - - - - - -   Any new drug interactions? - - - - - - -   Is the dose as ordered appropriate for the patient? - - - - - - -   Since the last time we talked, have you been hospitalized or used the emergency room? - - - - - - -       Rosa M Ramesh  Pharmacy Intern  [facility]  [phone]

## 2021-06-23 ENCOUNTER — ALLIED HEALTH/NURSE VISIT (OUTPATIENT)
Dept: OTOLARYNGOLOGY | Facility: CLINIC | Age: 76
End: 2021-06-23
Payer: MEDICARE

## 2021-06-23 DIAGNOSIS — Z51.89 VISIT FOR WOUND CARE: Primary | ICD-10-CM

## 2021-06-23 PROBLEM — J44.9 COPD (CHRONIC OBSTRUCTIVE PULMONARY DISEASE) (H): Status: ACTIVE | Noted: 2021-06-23

## 2021-06-23 PROCEDURE — 99207 PR NO CHARGE NURSE ONLY: CPT

## 2021-06-23 NOTE — LETTER
[facility] EAR NOSE AND THROAT CLINIC                                                        [place]   Kathryn Machado MD     Facial Plastic and Reconstructive Surgery     [address] [address]  Phone: [phone]  Fax: [phone]             June 25, 2021        To whom it may concern:        Huma Rachel's father, Ahmet, remains been under my post-surgical care.  Ahmet continues to require weekly appointments every Wednesday for wound care follow up and immunotherapy treatment, of which he relies on Huma for transportation.  Please excuse her from work on Wednesdays until further notice.      Sincerely,     Kathryn Machado MD

## 2021-06-24 RX ORDER — LORAZEPAM 0.5 MG/1
0.5 TABLET ORAL EVERY 4 HOURS PRN
Qty: 30 TABLET | Refills: 3 | Status: SHIPPED | OUTPATIENT
Start: 2021-06-24 | End: 2021-11-22

## 2021-06-24 RX ORDER — PROCHLORPERAZINE MALEATE 10 MG
10 TABLET ORAL EVERY 6 HOURS PRN
Qty: 30 TABLET | Refills: 3 | Status: SHIPPED | OUTPATIENT
Start: 2021-06-24 | End: 2023-07-24

## 2021-06-25 ENCOUNTER — APPOINTMENT (OUTPATIENT)
Dept: LAB | Facility: CLINIC | Age: 76
End: 2021-06-25
Attending: INTERNAL MEDICINE
Payer: MEDICARE

## 2021-06-25 ENCOUNTER — INFUSION THERAPY VISIT (OUTPATIENT)
Dept: ONCOLOGY | Facility: CLINIC | Age: 76
End: 2021-06-25
Attending: INTERNAL MEDICINE
Payer: MEDICARE

## 2021-06-25 VITALS
TEMPERATURE: 98.3 F | OXYGEN SATURATION: 94 % | DIASTOLIC BLOOD PRESSURE: 86 MMHG | WEIGHT: 189.5 LBS | BODY MASS INDEX: 28.81 KG/M2 | RESPIRATION RATE: 16 BRPM | SYSTOLIC BLOOD PRESSURE: 139 MMHG | HEART RATE: 61 BPM

## 2021-06-25 DIAGNOSIS — Z15.09 MONOALLELIC MUTATION OF BRAF GENE: Primary | ICD-10-CM

## 2021-06-25 DIAGNOSIS — C43.4 MALIGNANT MELANOMA OF SCALP (H): ICD-10-CM

## 2021-06-25 DIAGNOSIS — Z15.89 MONOALLELIC MUTATION OF BRAF GENE: Primary | ICD-10-CM

## 2021-06-25 LAB
ALBUMIN SERPL-MCNC: 3.5 G/DL (ref 3.4–5)
ALP SERPL-CCNC: 51 U/L (ref 40–150)
ALT SERPL W P-5'-P-CCNC: 18 U/L (ref 0–70)
ANION GAP SERPL CALCULATED.3IONS-SCNC: 6 MMOL/L (ref 3–14)
AST SERPL W P-5'-P-CCNC: 14 U/L (ref 0–45)
BASOPHILS # BLD AUTO: 0.1 10E9/L (ref 0–0.2)
BASOPHILS NFR BLD AUTO: 1.3 %
BILIRUB SERPL-MCNC: 0.4 MG/DL (ref 0.2–1.3)
BUN SERPL-MCNC: 16 MG/DL (ref 7–30)
CALCIUM SERPL-MCNC: 8.4 MG/DL (ref 8.5–10.1)
CHLORIDE SERPL-SCNC: 106 MMOL/L (ref 94–109)
CO2 SERPL-SCNC: 26 MMOL/L (ref 20–32)
CREAT SERPL-MCNC: 1.09 MG/DL (ref 0.66–1.25)
DIFFERENTIAL METHOD BLD: ABNORMAL
EOSINOPHIL # BLD AUTO: 0.1 10E9/L (ref 0–0.7)
EOSINOPHIL NFR BLD AUTO: 2.7 %
ERYTHROCYTE [DISTWIDTH] IN BLOOD BY AUTOMATED COUNT: 13 % (ref 10–15)
GFR SERPL CREATININE-BSD FRML MDRD: 66 ML/MIN/{1.73_M2}
GLUCOSE SERPL-MCNC: 102 MG/DL (ref 70–99)
HCT VFR BLD AUTO: 36 % (ref 40–53)
HGB BLD-MCNC: 11.9 G/DL (ref 13.3–17.7)
IMM GRANULOCYTES # BLD: 0 10E9/L (ref 0–0.4)
IMM GRANULOCYTES NFR BLD: 0.2 %
LYMPHOCYTES # BLD AUTO: 1.6 10E9/L (ref 0.8–5.3)
LYMPHOCYTES NFR BLD AUTO: 32.7 %
MCH RBC QN AUTO: 29.2 PG (ref 26.5–33)
MCHC RBC AUTO-ENTMCNC: 33.1 G/DL (ref 31.5–36.5)
MCV RBC AUTO: 88 FL (ref 78–100)
MONOCYTES # BLD AUTO: 0.5 10E9/L (ref 0–1.3)
MONOCYTES NFR BLD AUTO: 10 %
NEUTROPHILS # BLD AUTO: 2.6 10E9/L (ref 1.6–8.3)
NEUTROPHILS NFR BLD AUTO: 53.1 %
NRBC # BLD AUTO: 0 10*3/UL
NRBC BLD AUTO-RTO: 0 /100
PLATELET # BLD AUTO: 236 10E9/L (ref 150–450)
POTASSIUM SERPL-SCNC: 3.8 MMOL/L (ref 3.4–5.3)
PROT SERPL-MCNC: 6.8 G/DL (ref 6.8–8.8)
RBC # BLD AUTO: 4.08 10E12/L (ref 4.4–5.9)
SODIUM SERPL-SCNC: 139 MMOL/L (ref 133–144)
TSH SERPL DL<=0.005 MIU/L-ACNC: 1.17 MU/L (ref 0.4–4)
WBC # BLD AUTO: 4.8 10E9/L (ref 4–11)

## 2021-06-25 PROCEDURE — 84443 ASSAY THYROID STIM HORMONE: CPT | Performed by: INTERNAL MEDICINE

## 2021-06-25 PROCEDURE — 96413 CHEMO IV INFUSION 1 HR: CPT

## 2021-06-25 PROCEDURE — 80053 COMPREHEN METABOLIC PANEL: CPT | Performed by: INTERNAL MEDICINE

## 2021-06-25 PROCEDURE — 250N000011 HC RX IP 250 OP 636: Performed by: INTERNAL MEDICINE

## 2021-06-25 PROCEDURE — 85025 COMPLETE CBC W/AUTO DIFF WBC: CPT | Performed by: INTERNAL MEDICINE

## 2021-06-25 PROCEDURE — 258N000003 HC RX IP 258 OP 636: Performed by: INTERNAL MEDICINE

## 2021-06-25 RX ORDER — ALBUTEROL SULFATE 5 MG/ML
2.5 SOLUTION RESPIRATORY (INHALATION)
Status: CANCELLED | OUTPATIENT
Start: 2021-06-25

## 2021-06-25 RX ORDER — LORAZEPAM 2 MG/ML
0.5 INJECTION INTRAMUSCULAR EVERY 4 HOURS PRN
Status: CANCELLED
Start: 2021-06-25

## 2021-06-25 RX ORDER — HEPARIN SODIUM,PORCINE 10 UNIT/ML
5 VIAL (ML) INTRAVENOUS
Status: CANCELLED | OUTPATIENT
Start: 2021-06-25

## 2021-06-25 RX ORDER — MEPERIDINE HYDROCHLORIDE 25 MG/ML
25 INJECTION INTRAMUSCULAR; INTRAVENOUS; SUBCUTANEOUS EVERY 30 MIN PRN
Status: CANCELLED | OUTPATIENT
Start: 2021-06-25

## 2021-06-25 RX ORDER — EPINEPHRINE 1 MG/ML
0.3 INJECTION, SOLUTION, CONCENTRATE INTRAVENOUS EVERY 5 MIN PRN
Status: CANCELLED | OUTPATIENT
Start: 2021-06-25

## 2021-06-25 RX ORDER — DIPHENHYDRAMINE HYDROCHLORIDE 50 MG/ML
50 INJECTION INTRAMUSCULAR; INTRAVENOUS
Status: CANCELLED
Start: 2021-06-25

## 2021-06-25 RX ORDER — ALBUTEROL SULFATE 90 UG/1
1-2 AEROSOL, METERED RESPIRATORY (INHALATION)
Status: CANCELLED
Start: 2021-06-25

## 2021-06-25 RX ORDER — NALOXONE HYDROCHLORIDE 0.4 MG/ML
0.2 INJECTION, SOLUTION INTRAMUSCULAR; INTRAVENOUS; SUBCUTANEOUS
Status: CANCELLED | OUTPATIENT
Start: 2021-06-25

## 2021-06-25 RX ORDER — HEPARIN SODIUM (PORCINE) LOCK FLUSH IV SOLN 100 UNIT/ML 100 UNIT/ML
5 SOLUTION INTRAVENOUS
Status: CANCELLED | OUTPATIENT
Start: 2021-06-25

## 2021-06-25 RX ADMIN — SODIUM CHLORIDE 480 MG: 9 INJECTION, SOLUTION INTRAVENOUS at 14:31

## 2021-06-25 RX ADMIN — SODIUM CHLORIDE 250 ML: 9 INJECTION, SOLUTION INTRAVENOUS at 14:29

## 2021-06-25 ASSESSMENT — PAIN SCALES - GENERAL: PAINLEVEL: NO PAIN (0)

## 2021-06-25 NOTE — PROGRESS NOTES
Infusion Nursing Note:  Ahmet Coleman presents today for Cycle 1, Day 1 Opdivo.    Patient seen by provider today: No   present during visit today: Not Applicable.    Note: Pt assessed upon arrival to infusion suite. Denies fever, chills, cough, SOB or other signs/symptoms of infection. Pt is receiving Opdivo today for the first time. Printed materials given and reviewed with patient prior to initiating treatment, including side effects, symptom management, and when to call provider. Pt verbalized understanding.     Intravenous Access:  Peripheral IV placed.    Treatment Conditions:  Lab Results   Component Value Date    HGB 11.9 06/25/2021     Lab Results   Component Value Date    WBC 4.8 06/25/2021      Lab Results   Component Value Date    ANEU 2.6 06/25/2021     Lab Results   Component Value Date     06/25/2021      Lab Results   Component Value Date     06/25/2021                   Lab Results   Component Value Date    POTASSIUM 3.8 06/25/2021           Lab Results   Component Value Date    MAG 2.1 06/11/2021            Lab Results   Component Value Date    CR 1.09 06/25/2021                   Lab Results   Component Value Date    STEPHEN 8.4 06/25/2021                Lab Results   Component Value Date    BILITOTAL 0.4 06/25/2021           Lab Results   Component Value Date    ALBUMIN 3.5 06/25/2021                    Lab Results   Component Value Date    ALT 18 06/25/2021           Lab Results   Component Value Date    AST 14 06/25/2021   Results reviewed, labs MET treatment parameters, ok to proceed with treatment.    Post Infusion Assessment:  Patient tolerated infusion without incident.  Blood return noted pre and post infusion.  Site patent and intact, free from redness, edema or discomfort.  No evidence of extravasations.  Access discontinued per protocol.     Discharge Plan:   Prescription refills given for Compazine, Ativan.  AVS to patient via [employer].  Scheduling working on  future appointment in 4 weeks.   Patient discharged in stable condition accompanied by: self.  Departure Mode: Ambulatory.      Rita Giraldo RN

## 2021-06-25 NOTE — NURSING NOTE
Chief Complaint   Patient presents with     Blood Draw     Labs drawn from placed PIV by RN in lab. Vitals taken. Checked into next appointment.      Labs drawn with IV start by RN in lab.  Vital signs taken.  Pt checked in to next appointment.    Rajani Diop RN

## 2021-06-25 NOTE — PROGRESS NOTES
Pt comes in for a wound care follow up and weekly dressing change.     Grafted skin to Left lateral head continues to have good take and looks even better than it did last week with more skin coverig the muscle.  Anterior scalp has patches of exposed muscle where the skin graft did not take.     Less drainage present on dressing this week and no odor present.    Pt seems encouraged by the improvement in the flap appearance from week to week.    Daughter, Huma, was present today and able to watch the wound care.     Wound bed was covered with copious amounts of Aquaphor.  Xeroform and Telfa applied and secured with a Stockinette.       Pt will continue to spray dressing with Microklenz 1-2 times daily prn to keep the dressing moist.       Fluid collection on distal end of Left back incision has remained stable.     Pt will follow up next week for another wound care check.       Rozina Ascencio RN  6/25/2021 10:18 AM

## 2021-06-30 ENCOUNTER — ALLIED HEALTH/NURSE VISIT (OUTPATIENT)
Dept: OTOLARYNGOLOGY | Facility: CLINIC | Age: 76
End: 2021-06-30
Payer: MEDICARE

## 2021-06-30 DIAGNOSIS — Z51.89 VISIT FOR WOUND CARE: Primary | ICD-10-CM

## 2021-06-30 PROCEDURE — 99207 PR NO CHARGE NURSE ONLY: CPT

## 2021-06-30 NOTE — PROGRESS NOTES
Left anterolateral Lat flap continues to granulate nicely.  Granulation tissue and skin coverage has improved since last week.  Dr. Machado available and assessed the flap as well.    Pt continues to endorse tenderness near his left back incision.  Fluid collection at distal end of incision has decreased since last week.    Aquaphor applied to scalp and covered with Telfa, secured in place with Stockinette.    Pt's daughter was present today and said that she can help with daily wound care at home.    Pt is to keep scalp covered with a sheen of Aquaphor at all times and cover with Telfa as needed.  I encouraged pt to cover with Telfa at bedtime to prevent sheering, otherwise he can keep it open to air when at home.     Sent home with extra wound care supplies.    F/u in one month.    Rozina Ascencio RN  6/30/2021 1:29 PM

## 2021-06-30 NOTE — TELEPHONE ENCOUNTER
6/30- withdrawn from [employer]'s free med program    Rigo Gant CPhT  [facility]  Oncology Pharmacy Liaison   [email or web address]  [phone] (phone  [phone] (fax

## 2021-07-07 DIAGNOSIS — Z15.89 MONOALLELIC MUTATION OF BRAF GENE: Primary | ICD-10-CM

## 2021-07-07 DIAGNOSIS — C43.4 MALIGNANT MELANOMA OF SCALP (H): ICD-10-CM

## 2021-07-07 DIAGNOSIS — Z15.09 MONOALLELIC MUTATION OF BRAF GENE: Primary | ICD-10-CM

## 2021-07-20 ENCOUNTER — VIRTUAL VISIT (OUTPATIENT)
Dept: ONCOLOGY | Facility: CLINIC | Age: 76
End: 2021-07-20
Attending: PHYSICIAN ASSISTANT
Payer: MEDICARE

## 2021-07-20 DIAGNOSIS — R45.89 DEPRESSED MOOD: ICD-10-CM

## 2021-07-20 DIAGNOSIS — C43.4 MALIGNANT MELANOMA OF SCALP (H): Primary | ICD-10-CM

## 2021-07-20 DIAGNOSIS — H04.123 DRY EYES: ICD-10-CM

## 2021-07-20 PROCEDURE — 999N001193 HC VIDEO/TELEPHONE VISIT; NO CHARGE

## 2021-07-20 PROCEDURE — 99215 OFFICE O/P EST HI 40 MIN: CPT | Mod: 95 | Performed by: PHYSICIAN ASSISTANT

## 2021-07-20 RX ORDER — EPINEPHRINE 1 MG/ML
0.3 INJECTION, SOLUTION INTRAMUSCULAR; SUBCUTANEOUS EVERY 5 MIN PRN
Status: CANCELLED | OUTPATIENT
Start: 2021-07-21

## 2021-07-20 RX ORDER — ALBUTEROL SULFATE 90 UG/1
1-2 AEROSOL, METERED RESPIRATORY (INHALATION)
Status: CANCELLED
Start: 2021-07-21

## 2021-07-20 RX ORDER — LORAZEPAM 2 MG/ML
0.5 INJECTION INTRAMUSCULAR EVERY 4 HOURS PRN
Status: CANCELLED
Start: 2021-07-21

## 2021-07-20 RX ORDER — HEPARIN SODIUM (PORCINE) LOCK FLUSH IV SOLN 100 UNIT/ML 100 UNIT/ML
5 SOLUTION INTRAVENOUS
Status: CANCELLED | OUTPATIENT
Start: 2021-07-21

## 2021-07-20 RX ORDER — METHYLPREDNISOLONE SODIUM SUCCINATE 125 MG/2ML
125 INJECTION, POWDER, LYOPHILIZED, FOR SOLUTION INTRAMUSCULAR; INTRAVENOUS
Status: CANCELLED
Start: 2021-07-21

## 2021-07-20 RX ORDER — MEPERIDINE HYDROCHLORIDE 25 MG/ML
25 INJECTION INTRAMUSCULAR; INTRAVENOUS; SUBCUTANEOUS EVERY 30 MIN PRN
Status: CANCELLED | OUTPATIENT
Start: 2021-07-21

## 2021-07-20 RX ORDER — ALBUTEROL SULFATE 0.83 MG/ML
2.5 SOLUTION RESPIRATORY (INHALATION)
Status: CANCELLED | OUTPATIENT
Start: 2021-07-21

## 2021-07-20 RX ORDER — DIPHENHYDRAMINE HYDROCHLORIDE 50 MG/ML
50 INJECTION INTRAMUSCULAR; INTRAVENOUS
Status: CANCELLED
Start: 2021-07-21

## 2021-07-20 RX ORDER — NALOXONE HYDROCHLORIDE 0.4 MG/ML
0.2 INJECTION, SOLUTION INTRAMUSCULAR; INTRAVENOUS; SUBCUTANEOUS
Status: CANCELLED | OUTPATIENT
Start: 2021-07-21

## 2021-07-20 RX ORDER — HEPARIN SODIUM,PORCINE 10 UNIT/ML
5 VIAL (ML) INTRAVENOUS
Status: CANCELLED | OUTPATIENT
Start: 2021-07-21

## 2021-07-20 NOTE — PROGRESS NOTES
Lloyd is a 76 year old who is being evaluated via a billable video visit.      How would you like to obtain your AVS? MyChart  If the video visit is dropped, the invitation should be resent by: Text to cell phone: [phone]  Will anyone else be joining your video visit? MODE Ferguson      Video-Visit Details    Type of service:  Video Visit    Video Start Time: 1:17 PM     Video End Time:1:45 PM    Originating Location (pt. Location): Home    Distant Location (provider location):  [facility]     Platform used for Video Visit: [facility] ONCOLOGY CONSULT  Melanoma Clinic  Jul 20, 2021    CHIEF COMPLAINT: Scalp melanoma    Melanoma History:  1. He has a small pigmented scalp lesion present for over 30 years. The lesion was biopsied in 1992 and was benign.  2. October 2020, he presented to [facility] with 1 year of progressive enlargement of the lesion and new onset burning, itching, and stinging sensation in the affected scalp.  3. 10/26/20, He was seen at [facility] and he had shave biopsies of A. left central parietal scalp, B. left central occipital scalp, C. Left posterior parietal scalp, and D. punch biopsy of the left superior occipital scalp. Pathology (specimen: [id number]) showed a nodular and nevoid type melanoma, non-ulcerated, Breslow depth up to 1.3 mm, Saurabh's level IV, and up to 3 mitoses per mm2. All margins were involved. Ki-67 10-20%. Sox10 stain is positive and highlights an atypical compound melanocytic proliferation with significant overlying confluence and pagetosis of intraepidermal melanocytes. T-stage: at least pT2a. PD-L1 staining shows PD-L1 TPS <1%. 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  4. 11/25/20, he was seen by Dr. Garcia and he took three 3mm punch biopsies, which returned as dermal melanocytic proliferations with melanophages and fibrosis, consistent with locoregional metastatic melanoma.  5.  "12/8/2020 he had PET-CT, which showed FDG avid irregular skin thickening overlying the left parietal region, with no FDG avid lymphadenopathy in the neck and no evidence of metastasis elsewhere in the body.  6. 1/22/2021 start vemurafenib and cobimetinib, ~2/12/21 held for diarrhea, then resumed.  7. 2/18/2021 Start atezolizumab, last on 4/29/21  8. 5/24/2021 Wide local excision of the scalp melanoma and left latissimus free flap for scalp reconstruction.    Surgical pathology showed features consistent with scar and tumoral melanosis. No definite residual melanoma is seen. Tumoral melanosis (which extends to a depth of around 1.1 mm) is believed to be equivalent to a diagnosis of regressed melanoma.   9. 6/25/21 Start adjuvant Nivolumab     Pt presents for oncology visit via video today prior to Cycle 2 Nivo on 7/22/21.     INTERVAL HISTORY  He is doing ok. Tolerated Cycle 1 Nivolumab all right. His eyes are bothering him. His eyes are itchy, \"feels like there is sand in it.\" Sometimes gets pinkish/red. He is using artificial tears 1-2x per day which helps. He is unsure when this started but thinks it has been going on for some time now. Pt thinks he is due for new eyeglasses. At night, his vision is a little blurred.   He reports feeling a little depressed. He is overwhelmed with his recent surgery and is finding the recovery period a little hard. He is grateful that a nurse goes to his house every week to help him with his dressings. He has been told that he is making progress and doing well. He feels he has a great support system at home. He lives with his daughter, son in law, and granddaughter. He always looks forward to talking to his nurse and feels better after. He thinks that his mood now is normal for his circumstance and he feels it is transient and will get better with time. He takes lorazepam occasionally for his anxiety. He feels safe at home. Currently no SI or thoughts of harming others.   Yesterday " he fell on his knees and elbows. He tripped over his dog. No head strike or LOC.   Has ongoing pain in his neck and shoulders. Says he broke his neck during the [place] and injured his shoulders while playing baseball a while ago. He saw doctors for this and was told there is nothing to do now. Previously had cortisone injections for his shoulders.   Occasional nausea, has medicine for this at home.   Constipated. Last BM 2 days ago. Taking miralax and senna.     Review of Systems:  Patient denies any of the following except if noted above: fevers, chills, hearing changes, chest pain, dyspnea, abdominal pain, vomiting, urinary concerns, numbness, or tingling. He also denies rashes or new skin lesions, bleeding or bruising issues.    Current Outpatient Medications   Medication Sig Dispense Refill     acetaminophen (TYLENOL) 500 MG tablet Take 2 tablets (1,000 mg) by mouth every 8 hours as needed for mild pain 100 tablet 0     cholecalciferol (VITAMIN D3) 1000 UNIT tablet Take 1,000 Units by mouth daily        diphenoxylate-atropine (LOMOTIL) 2.5-0.025 MG tablet Take 1-2 tablets by mouth 4 times daily as needed for diarrhea (Patient not taking: Reported on 6/25/2021) 120 tablet 5     fluvoxaMINE (LUVOX) 100 MG tablet Take 150 mg at bedtime       hydrochlorothiazide (HYDRODIURIL) 25 MG tablet Take 25 mg by mouth daily.       Hypromellose (ARTIFICIAL TEARS OP) Apply  to eye. 2 drops in each eye twice a day as needed       Lactobacillus-Inulin (CULTURELLE DIGESTIVE HEALTH) CAPS 1 tab daily (Patient taking differently: Take 1 tablet by mouth daily ) 30 capsule 1     LORazepam (ATIVAN) 0.5 MG tablet Take 1 tablet (0.5 mg) by mouth every 4 hours as needed (Anxiety, Nausea/Vomiting or Sleep) 30 tablet 3     Magnesium Oxide 420 MG TABS Take 420 mg by mouth daily       methocarbamol (ROBAXIN) 500 MG tablet Take 1 tablet (500 mg) by mouth 3 times daily as needed for muscle spasms 15 tablet 0     methocarbamol (ROBAXIN) 500  MG tablet Take 1 tablet (500 mg) by mouth 3 times daily as needed for muscle spasms 10 tablet 0     omeprazole (PRILOSEC) 20 MG capsule Take 1 capsule by mouth 2 times daily. 60 capsule prn     polyethylene glycol (MIRALAX) 17 GM/Dose powder Take 17 g by mouth daily 510 g 0     potassium phosphate, monobasic, (K-PHOS) 500 MG tablet Take 1 tablet (500 mg) by mouth 2 times daily (Patient taking differently: Take 500 mg by mouth daily ) 60 tablet 3     pregabalin (LYRICA) 150 MG capsule Take 150 mg by mouth 2 times daily       prochlorperazine (COMPAZINE) 10 MG tablet Take 1 tablet (10 mg) by mouth every 6 hours as needed (Nausea/Vomiting) (Patient not taking: Reported on 6/25/2021) 30 tablet 3     prochlorperazine (COMPAZINE) 10 MG tablet Take 1 tablet (10 mg) by mouth every 6 hours as needed (Nausea/Vomiting) (Patient not taking: Reported on 6/25/2021) 30 tablet 2     QUEtiapine (SEROQUEL) 300 MG tablet Take 150 mg by mouth At Bedtime        senna-docusate (SENOKOT-S/PERICOLACE) 8.6-50 MG tablet Take 1 tablet by mouth 2 times daily 30 tablet 0     simvastatin (ZOCOR) 80 MG tablet Take 40 mg by mouth At Bedtime        traZODone (DESYREL) 100 MG tablet Take 100 mg by mouth At Bedtime        vortioxetine (TRINTELLIX) 20 MG tablet TAKE ONE TABLET BY MOUTH EVERY MORNING       Objective:  General: patient appears well in no acute distress, alert and oriented, speech clear and fluid  Skin: no visualized rash or lesions on visualized skin, head with bandana wrap   Resp: Appears to be breathing comfortably without accessory muscle usage, speaking in full sentences, no audible wheezes or cough.  Psych: Coherent speech, normal rate and volume, able to articulate logical thoughts, able to abstract reason, no tangential thoughts, no hallucinations or delusions  Patient's affect is appropriate.    LABS: Labs reviewed below. Pt will have repeat labs on 6/22/21 prior to treatment.   Most Recent 3 CBC's:Recent Labs   Lab Test  06/25/21  1315 06/11/21  1421 05/26/21  0323   WBC 4.8 4.6 3.6*   HGB 11.9* 11.0* 8.1*   MCV 88 89 88    389 145*    Most Recent 3 BMP's:  Recent Labs   Lab Test 06/25/21  1315 06/11/21  1421 05/28/21  0640 05/27/21  0640    135  --  140   POTASSIUM 3.8 3.7 4.0 4.0  3.9   CHLORIDE 106 105  --  108   CO2 26 30  --  28   BUN 16 17  --  9   CR 1.09 1.17  --  1.23   ANIONGAP 6 <1*  --  4   STEPHEN 8.4* 8.9  --  8.3*   * 81  --  100*    Most Recent 2 LFT's:  Recent Labs   Lab Test 06/25/21  1315 06/11/21  1421   AST 14 14   ALT 18 21   ALKPHOS 51 55   BILITOTAL 0.4 0.4    Most Recent TSH and T4:  Recent Labs   Lab Test 06/25/21  1315   TSH 1.17     I reviewed the above labs today.    ASSESSMENT AND PLAN    Cutaneous melanoma, scalp primary, pT2a cN1c, clinical Stage IIIB.  It was a pleasure to see Mr. Coleman today. He is a 76 year old  with agent orange cutaneous exposures in the [place] war and a diagnosis of malignant melanoma arising from a pre-existing pigmented lesion on the scalp. He had a partial response to neoadjuvant therapy with atezolizumab (3 cycles), vemurafenib, and cobimetinib (4 cycles) with decrease in thickening of the large scalp tumor and lightening of the periphery and in the middle part of the tumor then had surgery, which showed only tumoral melanosis. He started on adjuvant nivolumab on 6/25/21. Overall plan: 9 total cycles of nivolumab q 4 weeks (=12 cycles of immunotherapy total including previous 3 cycles of atezo). Next follow-up with dermatology is scheduled for August.   -He tolerated cycle 1 adjuvant nivolumab well overall. Will plan for cycle 2 on 6/22/21 pending acceptable labs. Plan for PET/CT prior to cycle 3 as per Dr. Hernández.   -RTC in 4 weeks for follow-up with Dr. Hernández and possible cycle 3 w/ PET/CT prior     Irritated eyes, some blurry vision at night  -Possibly dry eyes? Relieved with artificial tears 1-2 x daily; has been ongoing for some time now. Pt  feels he needs new eye glasses. [facility]   -Also discussed trying OTC eye ointment at night to see if it helps   -Continue to monitor    Depressed mood, anxiety   -Likely a component of adjustment disorder from recent surgery. Sometimes taking ativan for anxiety. He denies SI or thoughts of harming others. Pt feels he has a great support system at home and that this is transient. He declined mental health counseling/therapy at this time but will let us know if he changes his mind. He feels it is manageable at this time.     Diarrhea  Resolved, and likely secondary to his treatment, complicated by possible IBS.    50 minutes spent on the date of the encounter doing chart review, review of test results, interpretation of tests, patient visit, documentation and discussion with other provider(s)     Patient seen and plan of care discussed with Sherine Cardoza PA-C.   ISABELLE Chambers PA-C  [facility] [address] [address] [phone]    The patient was seen in conjunction with Maegan Arrington PA-C who served as a scribe for today's visit. I have reviewed and edited the above note, and agree with the above findings and plan.

## 2021-07-20 NOTE — LETTER
7/20/2021         RE: Ahmet Coleman  [address]  [place] [address]      Lloyd is a 76 year old who is being evaluated via a billable video visit.      How would you like to obtain your AVS? MyChart  If the video visit is dropped, the invitation should be resent by: Text to cell phone: [phone]  Will anyone else be joining your video visit? No    MODE Britton      Video-Visit Details    Type of service:  Video Visit    Video Start Time: 1:17 PM     Video End Time:1:45 PM    Originating Location (pt. Location): Home    Distant Location (provider location):  [facility]     Platform used for Video Visit: [facility]    MEDICAL ONCOLOGY CONSULT  Melanoma Clinic  Jul 20, 2021    CHIEF COMPLAINT: Scalp melanoma    Melanoma History:  1. He has a small pigmented scalp lesion present for over 30 years. The lesion was biopsied in 1992 and was benign.  2. October 2020, he presented to [facility] with 1 year of progressive enlargement of the lesion and new onset burning, itching, and stinging sensation in the affected scalp.  3. 10/26/20, He was seen at [facility] and he had shave biopsies of A. left central parietal scalp, B. left central occipital scalp, C. Left posterior parietal scalp, and D. punch biopsy of the left superior occipital scalp. Pathology (specimen: [id number]) showed a nodular and nevoid type melanoma, non-ulcerated, Breslow depth up to 1.3 mm, Saurabh's level IV, and up to 3 mitoses per mm2. All margins were involved. Ki-67 10-20%. Sox10 stain is positive and highlights an atypical compound melanocytic proliferation with significant overlying confluence and pagetosis of intraepidermal melanocytes. T-stage: at least pT2a. PD-L1 staining shows PD-L1 TPS <1%. 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  4. 11/25/20, he was seen by Dr. Garcia and he took three 3mm punch biopsies, which returned as dermal melanocytic proliferations  "with melanophages and fibrosis, consistent with locoregional metastatic melanoma.  5. 12/8/2020 he had PET-CT, which showed FDG avid irregular skin thickening overlying the left parietal region, with no FDG avid lymphadenopathy in the neck and no evidence of metastasis elsewhere in the body.  6. 1/22/2021 start vemurafenib and cobimetinib, ~2/12/21 held for diarrhea, then resumed.  7. 2/18/2021 Start atezolizumab, last on 4/29/21  8. 5/24/2021 Wide local excision of the scalp melanoma and left latissimus free flap for scalp reconstruction.    Surgical pathology showed features consistent with scar and tumoral melanosis. No definite residual melanoma is seen. Tumoral melanosis (which extends to a depth of around 1.1 mm) is believed to be equivalent to a diagnosis of regressed melanoma.   9. 6/25/21 Start adjuvant Nivolumab     Pt presents for oncology visit via video today prior to Cycle 2 Nivo on 7/22/21.     INTERVAL HISTORY  He is doing ok. Tolerated Cycle 1 Nivolumab all right. His eyes are bothering him. His eyes are itchy, \"feels like there is sand in it.\" Sometimes gets pinkish/red. He is using artificial tears 1-2x per day which helps. He is unsure when this started but thinks it has been going on for some time now. Pt thinks he is due for new eyeglasses. At night, his vision is a little blurred.   He reports feeling a little depressed. He is overwhelmed with his recent surgery and is finding the recovery period a little hard. He is grateful that a nurse goes to his house every week to help him with his dressings. He has been told that he is making progress and doing well. He feels he has a great support system at home. He lives with his daughter, son in law, and granddaughter. He always looks forward to talking to his nurse and feels better after. He thinks that his mood now is normal for his circumstance and he feels it is transient and will get better with time. He takes lorazepam occasionally for his " anxiety. He feels safe at home. Currently no SI or thoughts of harming others.   Yesterday he fell on his knees and elbows. He tripped over his dog. No head strike or LOC.   Has ongoing pain in his neck and shoulders. Says he broke his neck during the [place] and injured his shoulders while playing baseball a while ago. He saw doctors for this and was told there is nothing to do now. Previously had cortisone injections for his shoulders.   Occasional nausea, has medicine for this at home.   Constipated. Last BM 2 days ago. Taking miralax and senna.     Review of Systems:  Patient denies any of the following except if noted above: fevers, chills, hearing changes, chest pain, dyspnea, abdominal pain, vomiting, urinary concerns, numbness, or tingling. He also denies rashes or new skin lesions, bleeding or bruising issues.    Current Outpatient Medications   Medication Sig Dispense Refill     acetaminophen (TYLENOL) 500 MG tablet Take 2 tablets (1,000 mg) by mouth every 8 hours as needed for mild pain 100 tablet 0     cholecalciferol (VITAMIN D3) 1000 UNIT tablet Take 1,000 Units by mouth daily        diphenoxylate-atropine (LOMOTIL) 2.5-0.025 MG tablet Take 1-2 tablets by mouth 4 times daily as needed for diarrhea (Patient not taking: Reported on 6/25/2021) 120 tablet 5     fluvoxaMINE (LUVOX) 100 MG tablet Take 150 mg at bedtime       hydrochlorothiazide (HYDRODIURIL) 25 MG tablet Take 25 mg by mouth daily.       Hypromellose (ARTIFICIAL TEARS OP) Apply  to eye. 2 drops in each eye twice a day as needed       Lactobacillus-Inulin ([facility] DIGESTIVE [facility]) CAPS 1 tab daily (Patient taking differently: Take 1 tablet by mouth daily ) 30 capsule 1     LORazepam (ATIVAN) 0.5 MG tablet Take 1 tablet (0.5 mg) by mouth every 4 hours as needed (Anxiety, Nausea/Vomiting or Sleep) 30 tablet 3     Magnesium Oxide 420 MG TABS Take 420 mg by mouth daily       methocarbamol (ROBAXIN) 500 MG tablet Take 1 tablet (500 mg) by  mouth 3 times daily as needed for muscle spasms 15 tablet 0     methocarbamol (ROBAXIN) 500 MG tablet Take 1 tablet (500 mg) by mouth 3 times daily as needed for muscle spasms 10 tablet 0     omeprazole (PRILOSEC) 20 MG capsule Take 1 capsule by mouth 2 times daily. 60 capsule prn     polyethylene glycol (MIRALAX) 17 GM/Dose powder Take 17 g by mouth daily 510 g 0     potassium phosphate, monobasic, (K-PHOS) 500 MG tablet Take 1 tablet (500 mg) by mouth 2 times daily (Patient taking differently: Take 500 mg by mouth daily ) 60 tablet 3     pregabalin (LYRICA) 150 MG capsule Take 150 mg by mouth 2 times daily       prochlorperazine (COMPAZINE) 10 MG tablet Take 1 tablet (10 mg) by mouth every 6 hours as needed (Nausea/Vomiting) (Patient not taking: Reported on 6/25/2021) 30 tablet 3     prochlorperazine (COMPAZINE) 10 MG tablet Take 1 tablet (10 mg) by mouth every 6 hours as needed (Nausea/Vomiting) (Patient not taking: Reported on 6/25/2021) 30 tablet 2     QUEtiapine (SEROQUEL) 300 MG tablet Take 150 mg by mouth At Bedtime        senna-docusate (SENOKOT-S/PERICOLACE) 8.6-50 MG tablet Take 1 tablet by mouth 2 times daily 30 tablet 0     simvastatin (ZOCOR) 80 MG tablet Take 40 mg by mouth At Bedtime        traZODone (DESYREL) 100 MG tablet Take 100 mg by mouth At Bedtime        vortioxetine (TRINTELLIX) 20 MG tablet TAKE ONE TABLET BY MOUTH EVERY MORNING       Objective:  General: patient appears well in no acute distress, alert and oriented, speech clear and fluid  Skin: no visualized rash or lesions on visualized skin, head with bandana wrap   Resp: Appears to be breathing comfortably without accessory muscle usage, speaking in full sentences, no audible wheezes or cough.  Psych: Coherent speech, normal rate and volume, able to articulate logical thoughts, able to abstract reason, no tangential thoughts, no hallucinations or delusions  Patient's affect is appropriate.    LABS: Labs reviewed below. Pt will have  repeat labs on 6/22/21 prior to treatment.   Most Recent 3 CBC's:Recent Labs   Lab Test 06/25/21  1315 06/11/21  1421 05/26/21  0323   WBC 4.8 4.6 3.6*   HGB 11.9* 11.0* 8.1*   MCV 88 89 88    389 145*    Most Recent 3 BMP's:  Recent Labs   Lab Test 06/25/21  1315 06/11/21  1421 05/28/21  0640 05/27/21  0640    135  --  140   POTASSIUM 3.8 3.7 4.0 4.0  3.9   CHLORIDE 106 105  --  108   CO2 26 30  --  28   BUN 16 17  --  9   CR 1.09 1.17  --  1.23   ANIONGAP 6 <1*  --  4   STEPHEN 8.4* 8.9  --  8.3*   * 81  --  100*    Most Recent 2 LFT's:  Recent Labs   Lab Test 06/25/21  1315 06/11/21  1421   AST 14 14   ALT 18 21   ALKPHOS 51 55   BILITOTAL 0.4 0.4    Most Recent TSH and T4:  Recent Labs   Lab Test 06/25/21  1315   TSH 1.17     I reviewed the above labs today.    ASSESSMENT AND PLAN    Cutaneous melanoma, scalp primary, pT2a cN1c, clinical Stage IIIB.  It was a pleasure to see Mr. Coleman today. He is a 76 year old  with agent orange cutaneous exposures in the [place] war and a diagnosis of malignant melanoma arising from a pre-existing pigmented lesion on the scalp. He had a partial response to neoadjuvant therapy with atezolizumab (3 cycles), vemurafenib, and cobimetinib (4 cycles) with decrease in thickening of the large scalp tumor and lightening of the periphery and in the middle part of the tumor then had surgery, which showed only tumoral melanosis. He started on adjuvant nivolumab on 6/25/21. Overall plan: 9 total cycles of nivolumab q 4 weeks (=12 cycles of immunotherapy total including previous 3 cycles of atezo). Next follow-up with dermatology is scheduled for August.   -He tolerated cycle 1 adjuvant nivolumab well overall. Will plan for cycle 2 on 6/22/21 pending acceptable labs. Plan for PET/CT prior to cycle 3 as per Dr. Hernández.   -RTC in 4 weeks for follow-up with Dr. Hernández and possible cycle 3 w/ PET/CT prior     Irritated eyes, some blurry vision at night  -Possibly dry  eyes? Relieved with artificial tears 1-2 x daily; has been ongoing for some time now. Pt feels he needs new eye glasses. [facility]   -Also discussed trying OTC eye ointment at night to see if it helps   -Continue to monitor    Depressed mood, anxiety   -Likely a component of adjustment disorder from recent surgery. Sometimes taking ativan for anxiety. He denies SI or thoughts of harming others. Pt feels he has a great support system at home and that this is transient. He declined mental health counseling/therapy at this time but will let us know if he changes his mind. He feels it is manageable at this time.     Diarrhea  Resolved, and likely secondary to his treatment, complicated by possible IBS.    50 minutes spent on the date of the encounter doing chart review, review of test results, interpretation of tests, patient visit, documentation and discussion with other provider(s)     Patient seen and plan of care discussed with Sherine Cardoza PA-C.   ISABELLE Chambers PA-C  [facility] [address] [address] [phone]    The patient was seen in conjunction with Maegan Arrington PA-C who served as a scribe for today's visit. I have reviewed and edited the above note, and agree with the above findings and plan.            Sherine Cardoza PA-C

## 2021-07-22 ENCOUNTER — APPOINTMENT (OUTPATIENT)
Dept: LAB | Facility: CLINIC | Age: 76
End: 2021-07-22
Attending: INTERNAL MEDICINE
Payer: MEDICARE

## 2021-07-22 ENCOUNTER — INFUSION THERAPY VISIT (OUTPATIENT)
Dept: ONCOLOGY | Facility: CLINIC | Age: 76
End: 2021-07-22
Attending: INTERNAL MEDICINE
Payer: MEDICARE

## 2021-07-22 VITALS
TEMPERATURE: 98.4 F | WEIGHT: 189.4 LBS | RESPIRATION RATE: 16 BRPM | SYSTOLIC BLOOD PRESSURE: 136 MMHG | DIASTOLIC BLOOD PRESSURE: 82 MMHG | HEART RATE: 57 BPM | BODY MASS INDEX: 28.8 KG/M2 | OXYGEN SATURATION: 96 %

## 2021-07-22 DIAGNOSIS — Z15.09 MONOALLELIC MUTATION OF BRAF GENE: Primary | ICD-10-CM

## 2021-07-22 DIAGNOSIS — C43.4 MALIGNANT MELANOMA OF SCALP (H): ICD-10-CM

## 2021-07-22 DIAGNOSIS — Z15.89 MONOALLELIC MUTATION OF BRAF GENE: Primary | ICD-10-CM

## 2021-07-22 LAB
ALBUMIN SERPL-MCNC: 3.5 G/DL (ref 3.4–5)
ALP SERPL-CCNC: 50 U/L (ref 40–150)
ALT SERPL W P-5'-P-CCNC: 17 U/L (ref 0–70)
ANION GAP SERPL CALCULATED.3IONS-SCNC: 5 MMOL/L (ref 3–14)
AST SERPL W P-5'-P-CCNC: 16 U/L (ref 0–45)
BILIRUB SERPL-MCNC: 0.3 MG/DL (ref 0.2–1.3)
BUN SERPL-MCNC: 17 MG/DL (ref 7–30)
CALCIUM SERPL-MCNC: 8.3 MG/DL (ref 8.5–10.1)
CHLORIDE BLD-SCNC: 105 MMOL/L (ref 94–109)
CO2 SERPL-SCNC: 30 MMOL/L (ref 20–32)
CREAT SERPL-MCNC: 1.23 MG/DL (ref 0.66–1.25)
GFR SERPL CREATININE-BSD FRML MDRD: 57 ML/MIN/1.73M2
GLUCOSE BLD-MCNC: 104 MG/DL (ref 70–99)
MAGNESIUM SERPL-MCNC: 2 MG/DL (ref 1.6–2.3)
PHOSPHATE SERPL-MCNC: 3.2 MG/DL (ref 2.5–4.5)
POTASSIUM BLD-SCNC: 3.7 MMOL/L (ref 3.4–5.3)
PROT SERPL-MCNC: 7 G/DL (ref 6.8–8.8)
SODIUM SERPL-SCNC: 140 MMOL/L (ref 133–144)
TSH SERPL DL<=0.005 MIU/L-ACNC: 1.5 MU/L (ref 0.4–4)

## 2021-07-22 PROCEDURE — 250N000011 HC RX IP 250 OP 636: Performed by: PHYSICIAN ASSISTANT

## 2021-07-22 PROCEDURE — 258N000003 HC RX IP 258 OP 636: Performed by: PHYSICIAN ASSISTANT

## 2021-07-22 PROCEDURE — 84100 ASSAY OF PHOSPHORUS: CPT

## 2021-07-22 PROCEDURE — 96413 CHEMO IV INFUSION 1 HR: CPT

## 2021-07-22 PROCEDURE — 82040 ASSAY OF SERUM ALBUMIN: CPT | Performed by: PHYSICIAN ASSISTANT

## 2021-07-22 PROCEDURE — 36415 COLL VENOUS BLD VENIPUNCTURE: CPT | Performed by: PHYSICIAN ASSISTANT

## 2021-07-22 PROCEDURE — 83735 ASSAY OF MAGNESIUM: CPT

## 2021-07-22 PROCEDURE — 84443 ASSAY THYROID STIM HORMONE: CPT | Performed by: PHYSICIAN ASSISTANT

## 2021-07-22 PROCEDURE — 999N000127 HC STATISTIC PERIPHERAL IV START W US GUIDANCE

## 2021-07-22 RX ADMIN — SODIUM CHLORIDE 480 MG: 9 INJECTION, SOLUTION INTRAVENOUS at 11:33

## 2021-07-22 RX ADMIN — SODIUM CHLORIDE 250 ML: 9 INJECTION, SOLUTION INTRAVENOUS at 11:32

## 2021-07-22 ASSESSMENT — PAIN SCALES - GENERAL: PAINLEVEL: NO PAIN (0)

## 2021-07-22 NOTE — NURSING NOTE
Chief Complaint   Patient presents with     Blood Draw     Labs drawn via  by RN in lab. VS taken.      Labs drawn from PIV placed by RN. Line flushed with saline. Vitals taken. Pt checked in for appointment(s).    Mckayla Eagle RN

## 2021-07-22 NOTE — PROGRESS NOTES
Infusion Nursing Note:  Ahmet Coleman presents today for cycle 2 day 1 Nivolumab.    Patient seen by provider today: No   present during visit today: Not Applicable.    Note: Patient arrives feeling well, denies any new concerns/complaints.      Intravenous Access:  Peripheral IV placed.    Treatment Conditions:  Lab Results   Component Value Date    HGB 11.9 06/25/2021     Lab Results   Component Value Date    WBC 4.8 06/25/2021      Lab Results   Component Value Date    ANEU 2.6 06/25/2021     Lab Results   Component Value Date     06/25/2021      Lab Results   Component Value Date     07/22/2021     06/25/2021                   Lab Results   Component Value Date    POTASSIUM 3.7 07/22/2021    POTASSIUM 3.8 06/25/2021           Lab Results   Component Value Date    MAG 2.0 07/22/2021    MAG 2.1 06/11/2021            Lab Results   Component Value Date    CR 1.23 07/22/2021    CR 1.09 06/25/2021                   Lab Results   Component Value Date    STEPHEN 8.3 07/22/2021    STEPHEN 8.4 06/25/2021                Lab Results   Component Value Date    BILITOTAL 0.3 07/22/2021    BILITOTAL 0.4 06/25/2021           Lab Results   Component Value Date    ALBUMIN 3.5 07/22/2021    ALBUMIN 3.5 06/25/2021                    Lab Results   Component Value Date    ALT 17 07/22/2021    ALT 18 06/25/2021           Lab Results   Component Value Date    AST 16 07/22/2021    AST 14 06/25/2021       Results reviewed, labs MET treatment parameters, ok to proceed with treatment.      Post Infusion Assessment:  Patient tolerated infusion without incident.  Blood return noted pre and post infusion.  Site patent and intact, free from redness, edema or discomfort.  No evidence of extravasations.  Access discontinued per protocol.       Discharge Plan:   Patient declined prescription refills.  Discharge instructions reviewed with: Patient.  Patient and/or family verbalized understanding of discharge instructions  and all questions answered.  Copy of AVS reviewed with patient and/or family.  Patient will return 8/19/21 for next appointment.  Patient discharged in stable condition accompanied by: self.  Departure Mode: Ambulatory.      Kiesha Wong RN

## 2021-07-22 NOTE — PATIENT INSTRUCTIONS
Contact Numbers:   [facility]: [phone]    Call triage to speak with triage if you are experiencing chills and/or temperature greater than or equal to 100.5, uncontrolled nausea/vomiting, diarrhea, constipation, dizziness, shortness of breath, chest pain, bleeding, unexplained bruising, or any new/concerning symptoms, questions/concerns.     If you are having any concerning symptoms or wish to speak to a provider before your next infusion visit, please call your care coordinator or triage to notify them so we can adequately serve you.     If you need a refill on a medication or narcotic prescription, please call triage or your care coordinator before your infusion appointment.                 July 2021 Sunday Monday Tuesday Wednesday Thursday Friday Saturday                       1     2     3       4     5     6     7     8     9     10       11     12     13     14     15     16     17       18     19     20    VIDEO VISIT RETURN   1:00 PM   (45 min.)   Sherine Cardoza PA-C   [facility] 21     22    LAB PERIPHERAL  10:00 AM   (15 min.)    [facility] DRAW   [facility]    ONC INFUSION 1 HR (60 MIN)  10:30 AM   (60 min.)    ONC INFUSION NURSE   [facility] 23     24       25     26     27     28    [facility] NURSE VISIT  11:30 AM   (20 min.)   Nurse,  Ent   [facility] [place] 29 30 31 August 2021 Sunday Monday Tuesday Wednesday Thursday Friday Saturday   1     2     3     4     5     6     7       8     9     10     11     12     13    RETURN  12:45 PM   (30 min.)   Sidra Esteban MD   [facility] 14       15     16     17     18     19    VIDEO VISIT RETURN   9:30 AM   (30 min.)   Oksana Ashton MD   [facility]    ONC INFUSION 1 HR (60 MIN)   1:00 PM   (60 min.)    ONC INFUSION  [facility] 20     21       22     23     24     25     26     27     28       29     30     31                                         Lab Results:  Recent Results (from the past 12 hour(s))   Comprehensive metabolic panel    Collection Time: 07/22/21 10:17 AM   Result Value Ref Range    Sodium 140 133 - 144 mmol/L    Potassium 3.7 3.4 - 5.3 mmol/L    Chloride 105 94 - 109 mmol/L    Carbon Dioxide (CO2) 30 20 - 32 mmol/L    Anion Gap 5 3 - 14 mmol/L    Urea Nitrogen 17 7 - 30 mg/dL    Creatinine 1.23 0.66 - 1.25 mg/dL    Calcium 8.3 (L) 8.5 - 10.1 mg/dL    Glucose 104 (H) 70 - 99 mg/dL    Alkaline Phosphatase 50 40 - 150 U/L    AST 16 0 - 45 U/L    ALT 17 0 - 70 U/L    Protein Total 7.0 6.8 - 8.8 g/dL    Albumin 3.5 3.4 - 5.0 g/dL    Bilirubin Total 0.3 0.2 - 1.3 mg/dL    GFR Estimate 57 (L) >60 mL/min/1.73m2   TSH with free T4 reflex    Collection Time: 07/22/21 10:17 AM   Result Value Ref Range    TSH 1.50 0.40 - 4.00 mU/L   Magnesium    Collection Time: 07/22/21 10:17 AM   Result Value Ref Range    Magnesium 2.0 1.6 - 2.3 mg/dL   Phosphorus    Collection Time: 07/22/21 10:17 AM   Result Value Ref Range    Phosphorus 3.2 2.5 - 4.5 mg/dL

## 2021-08-04 ENCOUNTER — ALLIED HEALTH/NURSE VISIT (OUTPATIENT)
Dept: OTOLARYNGOLOGY | Facility: CLINIC | Age: 76
End: 2021-08-04
Payer: MEDICARE

## 2021-08-04 DIAGNOSIS — Z98.890 POSTOPERATIVE STATE: Primary | ICD-10-CM

## 2021-08-04 PROCEDURE — 99207 PR NO CHARGE NURSE ONLY: CPT

## 2021-08-09 NOTE — NURSING NOTE
Pt comes in for wound care follow up.    Lat flap continues to granulate and is warm, pink and soft. There were 3 small areas that still need to granulate further.    Silver nitrate applied to these 3 spots by Dr. Machado.    Pt given Aquaphor and Telfa for prn dressings at home.    Pt encouraged to keep Aquaphor on at all times and to keep dressing off when able.  Pt still needs to cover lat flap when wearing a hat.    Pt to f/u in one month.    Rozina Ascencio RN  8/9/2021 2:39 PM

## 2021-08-13 ENCOUNTER — OFFICE VISIT (OUTPATIENT)
Dept: SURGERY | Facility: CLINIC | Age: 76
End: 2021-08-13
Attending: DERMATOLOGY
Payer: MEDICARE

## 2021-08-13 VITALS
BODY MASS INDEX: 29.18 KG/M2 | WEIGHT: 191.9 LBS | DIASTOLIC BLOOD PRESSURE: 92 MMHG | OXYGEN SATURATION: 94 % | RESPIRATION RATE: 18 BRPM | TEMPERATURE: 98.4 F | HEART RATE: 66 BPM | SYSTOLIC BLOOD PRESSURE: 154 MMHG

## 2021-08-13 DIAGNOSIS — R23.8 VENOUS LAKE: ICD-10-CM

## 2021-08-13 DIAGNOSIS — L57.8 SUN-DAMAGED SKIN: ICD-10-CM

## 2021-08-13 DIAGNOSIS — L81.4 SOLAR LENTIGO: ICD-10-CM

## 2021-08-13 DIAGNOSIS — D22.9 MULTIPLE BENIGN NEVI: ICD-10-CM

## 2021-08-13 DIAGNOSIS — D18.01 CHERRY ANGIOMA: ICD-10-CM

## 2021-08-13 DIAGNOSIS — L82.1 SEBORRHEIC KERATOSIS: ICD-10-CM

## 2021-08-13 DIAGNOSIS — C43.9 METASTATIC MELANOMA (H): Primary | ICD-10-CM

## 2021-08-13 PROCEDURE — 99214 OFFICE O/P EST MOD 30 MIN: CPT | Performed by: DERMATOLOGY

## 2021-08-13 PROCEDURE — G0463 HOSPITAL OUTPT CLINIC VISIT: HCPCS

## 2021-08-13 RX ORDER — LIDOCAINE HYDROCHLORIDE AND EPINEPHRINE 10; 10 MG/ML; UG/ML
1.5 INJECTION, SOLUTION INFILTRATION; PERINEURAL ONCE
Status: DISCONTINUED | OUTPATIENT
Start: 2021-08-13 | End: 2021-08-13

## 2021-08-13 RX ORDER — DOCUSATE SODIUM 100 MG/1
CAPSULE, LIQUID FILLED ORAL
COMMUNITY
Start: 2021-07-07

## 2021-08-13 ASSESSMENT — PAIN SCALES - GENERAL: PAINLEVEL: NO PAIN (0)

## 2021-08-13 NOTE — NURSING NOTE
"Oncology Rooming Note    August 13, 2021 12:53 PM   Ahmet Coleman is a 76 year old male who presents for:    Chief Complaint   Patient presents with     Oncology Clinic Visit     Malignant melanoma      Initial Vitals: BP (!) 154/92   Pulse 66   Temp 98.4  F (36.9  C) (Oral)   Resp 18   Wt 87 kg (191 lb 14.4 oz)   SpO2 94%   BMI 29.18 kg/m   Estimated body mass index is 29.18 kg/m  as calculated from the following:    Height as of 6/9/21: 1.727 m (5' 8\").    Weight as of this encounter: 87 kg (191 lb 14.4 oz). Body surface area is 2.04 meters squared.  No Pain (0) Comment: Data Unavailable   No LMP for male patient.  Allergies reviewed: Yes  Medications reviewed: Yes    Medications: Medication refills not needed today.  Pharmacy name entered into [employer]:    [id number] - [place], [place] - [address] [facility]  [id number] - [place], [place] - [address] [facility] - [place], [place] - [address] [facility]  [facility] - [place], [place] - [address]    Clinical concerns: No new concerns       Fatoumata Crespo LPN              "

## 2021-08-13 NOTE — PROGRESS NOTES
[facility] Dermatology Note  Encounter Date: Aug 13, 2021  Office Visit     Dermatology Problem List:  1. Melanoma, left side of the scalp, stage IIIB  - Biopsies at [facility] on 10/26/20 of the A. left central parietal scalp, B. left central occipital scalp, C. Left posterior parietal scalp, and D. left superior occipital scalp which showed nodular and nevoid melanoma, no ulceration, Breslow 1.3mm (deep and lateral margins positive), 3 mitoses per mm2. PD-L1 TPS <1%. BRAF V600K mutation.  - Biopsies by Dr. Garcia 11/25/20 showed locoregional metastatic melanoma  - PET negative for distant disease 12/8/20  - Started neoadjuvant vemurafenib and cobimetinib 1/2021. Dose reduced due to diarrhea. Atezolizumab added on in February 2021.   - WLE excision with free flap 5/24/21. No clear melanoma seen but with tumoral melanosis to depth of 1.1mm.  - Adjuvant nivolumab started 6/25/21  2. 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 Pigment in flap related to recent silver nitrate treatment     Social history: [place] of [place] war.  Family history: Father had skin cancer, unsure of type.  ____________________________________________    Assessment & Plan:     # Melanoma, stage IIIB, left scalp. Currently on adjuvant treatment after neoadjuvant treatment then surgical removal. Reviewed role of dermatology in care - to help with cutaneous side effects of medications and screen for new primary melanomas. Explained routine schedule for follow up examinations in office and need for self skin checks monthly.   - ABCDs of melanoma were discussed and self skin checks were advised.   - Sun precaution was advised including the use of sunscreens of SPF 30 or higher, sun protective clothing, and avoidance of tanning beds.  - Recommended first degree relatives get yearly skin exams as well.  - *Reviewed last onc note from 7/20/21  - No skin findings concerning for  adverse reactions to current melanoma meds  - Next skin check in 3 months.     # Sun damaged skin with solar lentigines: Chronic, stable.  - Recommend sunscreens SPF #30 or greater, protective clothing and avoidance of tanning beds.     # Benign skin findings including: seborrheic keratoses, cherry angioma, venous lake - minor, self limited problem  - No further intervention required. Patient to report changes.   - Patient reassured of the benign nature of these lesions.     # Multiple clinically benign nevi: Chronic, stable  - No further intervention required. Patient to report changes.   - Patient reassured of the benign nature of these lesions.     # Pigment in flap from recent silver nitrate treatment of granulation tissue. Able to wipe away pigment on the left lateral scalp. Will monitor this to ensure resolves.    Procedures Performed:   None    Follow-up: 3 months for skin check, sooner for concerns.    Staff:     Sidra Esteban MD    Department of Dermatology  [facility]: Phone: [phone], Fax:[phone]  [facility]: Phone: [phone], Fax: [phone]    ____________________________________________    CC: Oncology Clinic Visit (Malignant melanoma )    HPI:  Mr. Ahmet Coleman is a(n) 76 year old male who presents today as a return patient for skin examination    Last seen 5/7/21 for skin check. Tumor melanosis vs metastatic disease was noted at the left earlobe.     Today, Ahmet notes no spots of concern. He has been healing well from surgery. His daughter does silver nitrate at openings on the scalp. Not having much pain. Has not noticed any pigment growing back..    Patient is otherwise feeling well, without additional skin concerns. Denies any fevers, chills, cough, shortness of breath, or weight loss.       Labs Reviewed:  N/A    Physical Exam:  Vitals: BP (!)  154/92   Pulse 66   Temp 98.4  F (36.9  C) (Oral)   Resp 18   Wt 87 kg (191 lb 14.4 oz)   SpO2 94%   BMI 29.18 kg/m    SKIN: Total skin excluding the undergarment areas was performed. The exam included the head/face, neck, both arms, chest, back, abdomen, both legs, digits and/or nails.   - Ortiz Type II  - Scattered brown macules on sun exposed areas.  - The left half of the scalp has been replaced with free flap. There are brown-black macules of pigment near at the right frontal scalp and left parietal scalp. Left parietal scalp easily wipes away with alcohol swabs.  - Only faint brown macule remaining at the left earlobe.  - Venous lake on left superior helix.  - There are dome shaped bright red papules on the trunk.   - Multiple regular brown pigmented macules and papules are identified on the body. About 40 nevi in all. None have significant atypia. There is a two component mole on the central lower back.  - There are waxy stuck on tan to brown papules on the trunk, extremities.  - No other lesions of concern on areas examined.   LYMPH NODES: No submandibular, cervical, supraclavicular, axillary, or inguinal lymphadenopathy palpable on examination.       Medications:  Current Outpatient Medications   Medication     acetaminophen (TYLENOL) 500 MG tablet     cholecalciferol (VITAMIN D3) 1000 UNIT tablet     docusate sodium (DSS) 100 MG capsule     fluvoxaMINE (LUVOX) 100 MG tablet     hydrochlorothiazide (HYDRODIURIL) 25 MG tablet     Hypromellose (ARTIFICIAL TEARS OP)     Lactobacillus-Inulin ([employer] DIGESTIVE HEALTH) CAPS     LORazepam (ATIVAN) 0.5 MG tablet     Magnesium Oxide 420 MG TABS     methocarbamol (ROBAXIN) 500 MG tablet     methocarbamol (ROBAXIN) 500 MG tablet     omeprazole (PRILOSEC) 20 MG capsule     polyethylene glycol (MIRALAX) 17 GM/Dose powder     potassium phosphate, monobasic, (K-PHOS) 500 MG tablet     pregabalin (LYRICA) 150 MG capsule     QUEtiapine (SEROQUEL) 300 MG  tablet     senna-docusate (SENOKOT-S/PERICOLACE) 8.6-50 MG tablet     simvastatin (ZOCOR) 80 MG tablet     traZODone (DESYREL) 100 MG tablet     vortioxetine (TRINTELLIX) 20 MG tablet     diphenoxylate-atropine (LOMOTIL) 2.5-0.025 MG tablet     prochlorperazine (COMPAZINE) 10 MG tablet     prochlorperazine (COMPAZINE) 10 MG tablet     Current Facility-Administered Medications   Medication     lidocaine 1% with EPINEPHrine 1:100,000 injection 1.5 mL     lidocaine 1% with EPINEPHrine 1:100,000 injection 1.5 mL     Facility-Administered Medications Ordered in Other Visits   Medication     sodium chloride (PF) 0.9% PF flush 10 mL      Past Medical History:   Patient Active Problem List   Diagnosis     Musculoskeletal disorder and symptoms referable to neck     Degeneration of intervertebral disc of cervical region     Displacement of cervical intervertebral disc without myelopathy     Traumatic shock (H)     Irritable bowel syndrome     Hiatal hernia     GERD (gastroesophageal reflux disease)     Personal history of tobacco use, presenting hazards to health     PTSD (post-traumatic stress disorder)     CARDIOVASCULAR SCREENING; LDL GOAL LESS THAN 130     Obesity     Advanced directives, counseling/discussion     Major depressive disorder, recurrent episode, moderate (H)     Anxiety     Amnesia     Impaired fasting glucose     History of colonic polyps     Hypertension     Hyperlipidemia     Diverticulosis of colon     Cataract     Cannabis abuse, episodic use     Benign neoplasm of colon     Arthropathy of hand     Arthralgia     Other ill-defined and unknown causes of morbidity and mortality     Subjective tinnitus     Spondylosis of cervical spine without myelopathy     Sensorineural hearing loss (SNHL) of both ears     Pure hypertriglyceridemia     Pure hypercholesterolemia     Psychophysiologic insomnia     Presbyopia     Personality disorder (H)     Other and unspecified noninfectious gastroenteritis and colitis      Impotence of organic origin     History of other injury     History of cervical fracture     Malignant melanoma of scalp (H)     Fibromyositis     Herpes zoster ophthalmicus of left eye     Family history of type 2 diabetes mellitus     Monoallelic mutation of BRAF gene     Melanoma of scalp (H)     COPD (chronic obstructive pulmonary disease) (H)     Past Medical History:   Diagnosis Date     Anxiety      Benign hypertension 12/11/2013     Cervicalgia      Chronic kidney disease      Depressive disorder, not elsewhere classified      Diverticulitis of colon 07/10/2013     Esophageal reflux      Irritable bowel syndrome 10/03/2003     Lumbago      Malignant melanoma of scalp (H) 12/29/2020     Migraine, unspecified, without mention of intractable migraine without mention of status migrainosus      Other kyphoscoliosis and scoliosis      Other specified gastritis without mention of hemorrhage      PTSD (post-traumatic stress disorder)      Tobacco abuse since 1965    on and off       CC Oksana Hernández MD  [address] [place] [address] on close of this encounter.

## 2021-08-13 NOTE — LETTER
8/13/2021         RE: Ahmet Coleman  [address]  [place] [address]        Dear Colleague,    Thank you for referring your patient, Ahmet Coleman, to the [facility]. Please see a copy of my visit note below.    [facility] Dermatology Note  Encounter Date: Aug 13, 2021  Office Visit     Dermatology Problem List:  1. Melanoma, left side of the scalp, stage IIIB  - Biopsies at [facility] on 10/26/20 of the A. left central parietal scalp, B. left central occipital scalp, C. Left posterior parietal scalp, and D. left superior occipital scalp which showed nodular and nevoid melanoma, no ulceration, Breslow 1.3mm (deep and lateral margins positive), 3 mitoses per mm2. PD-L1 TPS <1%. BRAF V600K mutation.  - Biopsies by Dr. Garcia 11/25/20 showed locoregional metastatic melanoma  - PET negative for distant disease 12/8/20  - Started neoadjuvant vemurafenib and cobimetinib 1/2021. Dose reduced due to diarrhea. Atezolizumab added on in February 2021.   - WLE excision with free flap 5/24/21. No clear melanoma seen but with tumoral melanosis to depth of 1.1mm.  - Adjuvant nivolumab started 6/25/21  2. 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 Pigment in flap related to recent silver nitrate treatment     Social history: [place] of [place] war.  Family history: Father had skin cancer, unsure of type.  ____________________________________________    Assessment & Plan:     # Melanoma, stage IIIB, left scalp. Currently on adjuvant treatment after neoadjuvant treatment then surgical removal. Reviewed role of dermatology in care - to help with cutaneous side effects of medications and screen for new primary melanomas. Explained routine schedule for follow up examinations in office and need for self skin checks monthly.   - ABCDs of melanoma were discussed and self skin checks were advised.   - Sun precaution was  advised including the use of sunscreens of SPF 30 or higher, sun protective clothing, and avoidance of tanning beds.  - Recommended first degree relatives get yearly skin exams as well.  - *Reviewed last onc note from 7/20/21  - No skin findings concerning for adverse reactions to current melanoma meds  - Next skin check in 3 months.     # Sun damaged skin with solar lentigines: Chronic, stable.  - Recommend sunscreens SPF #30 or greater, protective clothing and avoidance of tanning beds.     # Benign skin findings including: seborrheic keratoses, cherry angioma, venous lake - minor, self limited problem  - No further intervention required. Patient to report changes.   - Patient reassured of the benign nature of these lesions.     # Multiple clinically benign nevi: Chronic, stable  - No further intervention required. Patient to report changes.   - Patient reassured of the benign nature of these lesions.     # Pigment in flap from recent silver nitrate treatment of granulation tissue. Able to wipe away pigment on the left lateral scalp. Will monitor this to ensure resolves.    Procedures Performed:   None    Follow-up: 3 months for skin check, sooner for concerns.    Staff:     Sidra Esteban MD    Department of Dermatology  [facility]: Phone: [phone], Fax:[phone]  [facility]: Phone: [phone], Fax: [phone]    ____________________________________________    CC: Oncology Clinic Visit (Malignant melanoma )    HPI:  Mr. Ahmet Coleman is a(n) 76 year old male who presents today as a return patient for skin examination    Last seen 5/7/21 for skin check. Tumor melanosis vs metastatic disease was noted at the left earlobe.     Today, Ahmet notes no spots of concern. He has been healing well from surgery. His daughter does silver nitrate at openings on the scalp. Not having  much pain. Has not noticed any pigment growing back..    Patient is otherwise feeling well, without additional skin concerns. Denies any fevers, chills, cough, shortness of breath, or weight loss.       Labs Reviewed:  N/A    Physical Exam:  Vitals: BP (!) 154/92   Pulse 66   Temp 98.4  F (36.9  C) (Oral)   Resp 18   Wt 87 kg (191 lb 14.4 oz)   SpO2 94%   BMI 29.18 kg/m    SKIN: Total skin excluding the undergarment areas was performed. The exam included the head/face, neck, both arms, chest, back, abdomen, both legs, digits and/or nails.   - Ortiz Type II  - Scattered brown macules on sun exposed areas.  - The left half of the scalp has been replaced with free flap. There are brown-black macules of pigment near at the right frontal scalp and left parietal scalp. Left parietal scalp easily wipes away with alcohol swabs.  - Only faint brown macule remaining at the left earlobe.  - Venous lake on left superior helix.  - There are dome shaped bright red papules on the trunk.   - Multiple regular brown pigmented macules and papules are identified on the body. About 40 nevi in all. None have significant atypia. There is a two component mole on the central lower back.  - There are waxy stuck on tan to brown papules on the trunk, extremities.  - No other lesions of concern on areas examined.   LYMPH NODES: No submandibular, cervical, supraclavicular, axillary, or inguinal lymphadenopathy palpable on examination.       Medications:  Current Outpatient Medications   Medication     acetaminophen (TYLENOL) 500 MG tablet     cholecalciferol (VITAMIN D3) 1000 UNIT tablet     docusate sodium (DSS) 100 MG capsule     fluvoxaMINE (LUVOX) 100 MG tablet     hydrochlorothiazide (HYDRODIURIL) 25 MG tablet     Hypromellose (ARTIFICIAL TEARS OP)     Lactobacillus-Inulin ([facility] DIGESTIVE HEALTH) CAPS     LORazepam (ATIVAN) 0.5 MG tablet     Magnesium Oxide 420 MG TABS     methocarbamol (ROBAXIN) 500 MG tablet      methocarbamol (ROBAXIN) 500 MG tablet     omeprazole (PRILOSEC) 20 MG capsule     polyethylene glycol (MIRALAX) 17 GM/Dose powder     potassium phosphate, monobasic, (K-PHOS) 500 MG tablet     pregabalin (LYRICA) 150 MG capsule     QUEtiapine (SEROQUEL) 300 MG tablet     senna-docusate (SENOKOT-S/PERICOLACE) 8.6-50 MG tablet     simvastatin (ZOCOR) 80 MG tablet     traZODone (DESYREL) 100 MG tablet     vortioxetine (TRINTELLIX) 20 MG tablet     diphenoxylate-atropine (LOMOTIL) 2.5-0.025 MG tablet     prochlorperazine (COMPAZINE) 10 MG tablet     prochlorperazine (COMPAZINE) 10 MG tablet     Current Facility-Administered Medications   Medication     lidocaine 1% with EPINEPHrine 1:100,000 injection 1.5 mL     lidocaine 1% with EPINEPHrine 1:100,000 injection 1.5 mL     Facility-Administered Medications Ordered in Other Visits   Medication     sodium chloride (PF) 0.9% PF flush 10 mL      Past Medical History:   Patient Active Problem List   Diagnosis     Musculoskeletal disorder and symptoms referable to neck     Degeneration of intervertebral disc of cervical region     Displacement of cervical intervertebral disc without myelopathy     Traumatic shock (H)     Irritable bowel syndrome     Hiatal hernia     GERD (gastroesophageal reflux disease)     Personal history of tobacco use, presenting hazards to health     PTSD (post-traumatic stress disorder)     CARDIOVASCULAR SCREENING; LDL GOAL LESS THAN 130     Obesity     Advanced directives, counseling/discussion     Major depressive disorder, recurrent episode, moderate (H)     Anxiety     Amnesia     Impaired fasting glucose     History of colonic polyps     Hypertension     Hyperlipidemia     Diverticulosis of colon     Cataract     Cannabis abuse, episodic use     Benign neoplasm of colon     Arthropathy of hand     Arthralgia     Other ill-defined and unknown causes of morbidity and mortality     Subjective tinnitus     Spondylosis of cervical spine without  myelopathy     Sensorineural hearing loss (SNHL) of both ears     Pure hypertriglyceridemia     Pure hypercholesterolemia     Psychophysiologic insomnia     Presbyopia     Personality disorder (H)     Other and unspecified noninfectious gastroenteritis and colitis     Impotence of organic origin     History of other injury     History of cervical fracture     Malignant melanoma of scalp (H)     Fibromyositis     Herpes zoster ophthalmicus of left eye     Family history of type 2 diabetes mellitus     Monoallelic mutation of BRAF gene     Melanoma of scalp (H)     COPD (chronic obstructive pulmonary disease) (H)     Past Medical History:   Diagnosis Date     Anxiety      Benign hypertension 12/11/2013     Cervicalgia      Chronic kidney disease      Depressive disorder, not elsewhere classified      Diverticulitis of colon 07/10/2013     Esophageal reflux      Irritable bowel syndrome 10/03/2003     Lumbago      Malignant melanoma of scalp (H) 12/29/2020     Migraine, unspecified, without mention of intractable migraine without mention of status migrainosus      Other kyphoscoliosis and scoliosis      Other specified gastritis without mention of hemorrhage      PTSD (post-traumatic stress disorder)      Tobacco abuse since 1965    on and off       CC Oksana Hernández MD  [address] [place] [id number] on close of this encounter.                Again, thank you for allowing me to participate in the care of your patient.        Sincerely,        Sidra Esteban MD

## 2021-08-19 ENCOUNTER — INFUSION THERAPY VISIT (OUTPATIENT)
Dept: ONCOLOGY | Facility: CLINIC | Age: 76
End: 2021-08-19
Attending: INTERNAL MEDICINE
Payer: MEDICARE

## 2021-08-19 ENCOUNTER — APPOINTMENT (OUTPATIENT)
Dept: LAB | Facility: CLINIC | Age: 76
End: 2021-08-19
Attending: INTERNAL MEDICINE
Payer: MEDICARE

## 2021-08-19 VITALS
HEART RATE: 56 BPM | OXYGEN SATURATION: 96 % | SYSTOLIC BLOOD PRESSURE: 143 MMHG | DIASTOLIC BLOOD PRESSURE: 75 MMHG | TEMPERATURE: 98.3 F | WEIGHT: 192 LBS | RESPIRATION RATE: 16 BRPM | BODY MASS INDEX: 29.19 KG/M2

## 2021-08-19 DIAGNOSIS — Z15.09 MONOALLELIC MUTATION OF BRAF GENE: Primary | ICD-10-CM

## 2021-08-19 DIAGNOSIS — Z15.09 MONOALLELIC MUTATION OF BRAF GENE: ICD-10-CM

## 2021-08-19 DIAGNOSIS — C43.4 MALIGNANT MELANOMA OF SCALP (H): ICD-10-CM

## 2021-08-19 DIAGNOSIS — C43.4 MALIGNANT MELANOMA OF SCALP (H): Primary | ICD-10-CM

## 2021-08-19 DIAGNOSIS — Z15.89 MONOALLELIC MUTATION OF BRAF GENE: ICD-10-CM

## 2021-08-19 DIAGNOSIS — Z15.89 MONOALLELIC MUTATION OF BRAF GENE: Primary | ICD-10-CM

## 2021-08-19 LAB
ALBUMIN SERPL-MCNC: 3.6 G/DL (ref 3.4–5)
ALP SERPL-CCNC: 54 U/L (ref 40–150)
ALT SERPL W P-5'-P-CCNC: 19 U/L (ref 0–70)
ANION GAP SERPL CALCULATED.3IONS-SCNC: <1 MMOL/L (ref 3–14)
AST SERPL W P-5'-P-CCNC: 14 U/L (ref 0–45)
BASOPHILS # BLD AUTO: 0.1 10E3/UL (ref 0–0.2)
BASOPHILS NFR BLD AUTO: 1 %
BILIRUB SERPL-MCNC: 0.3 MG/DL (ref 0.2–1.3)
BUN SERPL-MCNC: 18 MG/DL (ref 7–30)
CALCIUM SERPL-MCNC: 8.9 MG/DL (ref 8.5–10.1)
CHLORIDE BLD-SCNC: 105 MMOL/L (ref 94–109)
CK SERPL-CCNC: 131 U/L (ref 30–300)
CO2 SERPL-SCNC: 30 MMOL/L (ref 20–32)
CREAT SERPL-MCNC: 1.27 MG/DL (ref 0.66–1.25)
EOSINOPHIL # BLD AUTO: 0.2 10E3/UL (ref 0–0.7)
EOSINOPHIL NFR BLD AUTO: 5 %
ERYTHROCYTE [DISTWIDTH] IN BLOOD BY AUTOMATED COUNT: 11.9 % (ref 10–15)
GFR SERPL CREATININE-BSD FRML MDRD: 55 ML/MIN/1.73M2
GLUCOSE BLD-MCNC: 100 MG/DL (ref 70–99)
HCT VFR BLD AUTO: 39 % (ref 40–53)
HGB BLD-MCNC: 13.2 G/DL (ref 13.3–17.7)
IMM GRANULOCYTES # BLD: 0 10E3/UL
IMM GRANULOCYTES NFR BLD: 0 %
LYMPHOCYTES # BLD AUTO: 1.5 10E3/UL (ref 0.8–5.3)
LYMPHOCYTES NFR BLD AUTO: 30 %
MAGNESIUM SERPL-MCNC: 2 MG/DL (ref 1.6–2.3)
MCH RBC QN AUTO: 27.6 PG (ref 26.5–33)
MCHC RBC AUTO-ENTMCNC: 33.8 G/DL (ref 31.5–36.5)
MCV RBC AUTO: 81 FL (ref 78–100)
MONOCYTES # BLD AUTO: 0.5 10E3/UL (ref 0–1.3)
MONOCYTES NFR BLD AUTO: 11 %
NEUTROPHILS # BLD AUTO: 2.6 10E3/UL (ref 1.6–8.3)
NEUTROPHILS NFR BLD AUTO: 53 %
NRBC # BLD AUTO: 0 10E3/UL
NRBC BLD AUTO-RTO: 0 /100
PHOSPHATE SERPL-MCNC: 2.6 MG/DL (ref 2.5–4.5)
PLATELET # BLD AUTO: 241 10E3/UL (ref 150–450)
POTASSIUM BLD-SCNC: 3.7 MMOL/L (ref 3.4–5.3)
PROT SERPL-MCNC: 7.3 G/DL (ref 6.8–8.8)
RBC # BLD AUTO: 4.79 10E6/UL (ref 4.4–5.9)
SODIUM SERPL-SCNC: 134 MMOL/L (ref 133–144)
TSH SERPL DL<=0.005 MIU/L-ACNC: 1.72 MU/L (ref 0.4–4)
WBC # BLD AUTO: 4.9 10E3/UL (ref 4–11)

## 2021-08-19 PROCEDURE — 258N000003 HC RX IP 258 OP 636: Performed by: INTERNAL MEDICINE

## 2021-08-19 PROCEDURE — 80053 COMPREHEN METABOLIC PANEL: CPT

## 2021-08-19 PROCEDURE — 83735 ASSAY OF MAGNESIUM: CPT

## 2021-08-19 PROCEDURE — 36415 COLL VENOUS BLD VENIPUNCTURE: CPT

## 2021-08-19 PROCEDURE — 96413 CHEMO IV INFUSION 1 HR: CPT

## 2021-08-19 PROCEDURE — 84100 ASSAY OF PHOSPHORUS: CPT

## 2021-08-19 PROCEDURE — 82550 ASSAY OF CK (CPK): CPT

## 2021-08-19 PROCEDURE — 85004 AUTOMATED DIFF WBC COUNT: CPT

## 2021-08-19 PROCEDURE — 84443 ASSAY THYROID STIM HORMONE: CPT | Performed by: INTERNAL MEDICINE

## 2021-08-19 PROCEDURE — 250N000011 HC RX IP 250 OP 636: Performed by: INTERNAL MEDICINE

## 2021-08-19 PROCEDURE — 99214 OFFICE O/P EST MOD 30 MIN: CPT | Mod: 95 | Performed by: INTERNAL MEDICINE

## 2021-08-19 RX ORDER — NALOXONE HYDROCHLORIDE 0.4 MG/ML
0.2 INJECTION, SOLUTION INTRAMUSCULAR; INTRAVENOUS; SUBCUTANEOUS
Status: CANCELLED | OUTPATIENT
Start: 2021-08-19

## 2021-08-19 RX ORDER — EPINEPHRINE 1 MG/ML
0.3 INJECTION, SOLUTION INTRAMUSCULAR; SUBCUTANEOUS EVERY 5 MIN PRN
Status: CANCELLED | OUTPATIENT
Start: 2021-08-19

## 2021-08-19 RX ORDER — DIPHENHYDRAMINE HYDROCHLORIDE 50 MG/ML
50 INJECTION INTRAMUSCULAR; INTRAVENOUS
Status: CANCELLED
Start: 2021-08-19

## 2021-08-19 RX ORDER — ALBUTEROL SULFATE 90 UG/1
1-2 AEROSOL, METERED RESPIRATORY (INHALATION)
Status: CANCELLED
Start: 2021-08-19

## 2021-08-19 RX ORDER — ALBUTEROL SULFATE 0.83 MG/ML
2.5 SOLUTION RESPIRATORY (INHALATION)
Status: CANCELLED | OUTPATIENT
Start: 2021-08-19

## 2021-08-19 RX ORDER — MEPERIDINE HYDROCHLORIDE 25 MG/ML
25 INJECTION INTRAMUSCULAR; INTRAVENOUS; SUBCUTANEOUS EVERY 30 MIN PRN
Status: CANCELLED | OUTPATIENT
Start: 2021-08-19

## 2021-08-19 RX ORDER — METHYLPREDNISOLONE SODIUM SUCCINATE 125 MG/2ML
125 INJECTION, POWDER, LYOPHILIZED, FOR SOLUTION INTRAMUSCULAR; INTRAVENOUS
Status: CANCELLED
Start: 2021-08-19

## 2021-08-19 RX ORDER — LORAZEPAM 2 MG/ML
0.5 INJECTION INTRAMUSCULAR EVERY 4 HOURS PRN
Status: CANCELLED
Start: 2021-08-19

## 2021-08-19 RX ORDER — HEPARIN SODIUM (PORCINE) LOCK FLUSH IV SOLN 100 UNIT/ML 100 UNIT/ML
5 SOLUTION INTRAVENOUS
Status: CANCELLED | OUTPATIENT
Start: 2021-08-19

## 2021-08-19 RX ORDER — HEPARIN SODIUM,PORCINE 10 UNIT/ML
5 VIAL (ML) INTRAVENOUS
Status: CANCELLED | OUTPATIENT
Start: 2021-08-19

## 2021-08-19 RX ADMIN — SODIUM CHLORIDE 250 ML: 9 INJECTION, SOLUTION INTRAVENOUS at 14:11

## 2021-08-19 RX ADMIN — SODIUM CHLORIDE 480 MG: 9 INJECTION, SOLUTION INTRAVENOUS at 14:12

## 2021-08-19 ASSESSMENT — PAIN SCALES - GENERAL: PAINLEVEL: NO PAIN (0)

## 2021-08-19 NOTE — LETTER
8/19/2021         RE: Ahmet Coleman  [address]  [place] [address]        Dear Colleague,    Thank you for referring your patient, Ahmet Coleman, to the [facility]. Please see a copy of my visit note below.    Lloyd is a 76 year old who is being evaluated via a billable video visit.      How would you like to obtain your AVS? MyChart  If the video visit is dropped, the invitation should be resent by: Text to cell phone: [phone]  Will anyone else be joining your video visit? No      Video-Visit Details  Type of service:  Video Visit  Video Start Time: 9:45 AM  Video End Time:10:15 AM  Originating Location (pt. Location): Home  Distant Location (provider location):  [facility]   Platform used for Video Visit: [facility]       MEDICAL ONCOLOGY CONSULT  Melanoma Clinic  Aug 19, 2021    CHIEF COMPLAINT: Scalp melanoma    Melanoma History:  1. He has a small pigmented scalp lesion present for over 30 years. The lesion was biopsied in 1992 and was benign.  2. October 2020, he presented to [facility] with 1 year of progressive enlargement of the lesion and new onset burning, itching, and stinging sensation in the affected scalp.  3. 10/26/20, He was seen at [facility] and he had shave biopsies of A. left central parietal scalp, B. left central occipital scalp, C. Left posterior parietal scalp, and D. punch biopsy of the left superior occipital scalp. Pathology (specimen: [id number]) showed a nodular and nevoid type melanoma, non-ulcerated, Breslow depth up to 1.3 mm, Saurabh's level IV, and up to 3 mitoses per mm2. All margins were involved. Ki-67 10-20%. Sox10 stain is positive and highlights an atypical compound melanocytic proliferation with significant overlying confluence and pagetosis of intraepidermal melanocytes. T-stage: at least pT2a. PD-L1 staining shows PD-L1 TPS <1%. 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  4. 11/25/20, he was seen by Dr. Garcia and he took three 3mm punch biopsies, which returned as dermal melanocytic proliferations with melanophages and fibrosis, consistent with locoregional metastatic melanoma.  5. 12/8/2020 he had PET-CT, which showed FDG avid irregular skin thickening overlying the left parietal region, with no FDG avid lymphadenopathy in the neck and no evidence of metastasis elsewhere in the body.  6. 1/22/2021 start vemurafenib and cobimetinib, ~2/12/21 held for diarrhea.    INTERVAL HISTORY  Patient reports that overall he is doing okay. On 5/24 he underwent wide local excision of the scalp melanoma. He then had left latissimus free flap for scalp reconstruction. Currently he is adjuvant nivolumab.    He notes some diarrhea, but usually 1 to 2  movements daily. He will take Lomotil as needed. He notes some a little bit of shortness of breath climbing stairs. He does also have some increase in nasal congestion. He notes he is sleeping quite a bit, about 10-12 hours a night. He attributes this to boredom. He will listen to the radio and maybe fall asleep. Gets out to make various appointments. Does not feel fatigue, per se.  He notes neck stiffness, but this is chronic and related to injuries from [place].     No fevers or chills. No infectious signs or symptoms.    Review of Systems:  12-point ROS negative except as in HPI    Current Outpatient Medications   Medication Sig Dispense Refill     acetaminophen (TYLENOL) 500 MG tablet Take 2 tablets (1,000 mg) by mouth every 8 hours as needed for mild pain 100 tablet 0     cholecalciferol (VITAMIN D3) 1000 UNIT tablet Take 1,000 Units by mouth daily        docusate sodium (DSS) 100 MG capsule TAKE ONE CAPSULE BY MOUTH EVERY DAY FOR STOOL SOFTENING       fluvoxaMINE (LUVOX) 100 MG tablet Take 150 mg at bedtime       hydrochlorothiazide (HYDRODIURIL) 25 MG tablet Take 25 mg by mouth daily.       Hypromellose (ARTIFICIAL TEARS OP) Apply   to eye. 2 drops in each eye twice a day as needed       Lactobacillus-Inulin (CULTURELLE DIGESTIVE HEALTH) CAPS 1 tab daily (Patient taking differently: Take 1 tablet by mouth daily ) 30 capsule 1     LORazepam (ATIVAN) 0.5 MG tablet Take 1 tablet (0.5 mg) by mouth every 4 hours as needed (Anxiety, Nausea/Vomiting or Sleep) 30 tablet 3     Magnesium Oxide 420 MG TABS Take 420 mg by mouth daily       methocarbamol (ROBAXIN) 500 MG tablet Take 1 tablet (500 mg) by mouth 3 times daily as needed for muscle spasms 15 tablet 0     methocarbamol (ROBAXIN) 500 MG tablet Take 1 tablet (500 mg) by mouth 3 times daily as needed for muscle spasms 10 tablet 0     omeprazole (PRILOSEC) 20 MG capsule Take 1 capsule by mouth 2 times daily. 60 capsule prn     polyethylene glycol (MIRALAX) 17 GM/Dose powder Take 17 g by mouth daily 510 g 0     potassium phosphate, monobasic, (K-PHOS) 500 MG tablet Take 1 tablet (500 mg) by mouth 2 times daily (Patient taking differently: Take 500 mg by mouth daily ) 60 tablet 3     pregabalin (LYRICA) 150 MG capsule Take 150 mg by mouth 2 times daily       QUEtiapine (SEROQUEL) 300 MG tablet Take 150 mg by mouth At Bedtime        senna-docusate (SENOKOT-S/PERICOLACE) 8.6-50 MG tablet Take 1 tablet by mouth 2 times daily 30 tablet 0     simvastatin (ZOCOR) 80 MG tablet Take 40 mg by mouth At Bedtime        traZODone (DESYREL) 100 MG tablet Take 100 mg by mouth At Bedtime        vortioxetine (TRINTELLIX) 20 MG tablet TAKE ONE TABLET BY MOUTH EVERY MORNING       diphenoxylate-atropine (LOMOTIL) 2.5-0.025 MG tablet Take 1-2 tablets by mouth 4 times daily as needed for diarrhea (Patient not taking: Reported on 6/25/2021) 120 tablet 5     prochlorperazine (COMPAZINE) 10 MG tablet Take 1 tablet (10 mg) by mouth every 6 hours as needed (Nausea/Vomiting) (Patient not taking: Reported on 6/25/2021) 30 tablet 3     prochlorperazine (COMPAZINE) 10 MG tablet Take 1 tablet (10 mg) by mouth every 6 hours as needed  (Nausea/Vomiting) (Patient not taking: Reported on 6/25/2021) 30 tablet 2     Video Physical Exam:  There were no vitals taken for this visit.  Wt Readings from Last 10 Encounters:   08/13/21 87 kg (191 lb 14.4 oz)   07/22/21 85.9 kg (189 lb 6.4 oz)   06/25/21 86 kg (189 lb 8 oz)   06/11/21 84.7 kg (186 lb 12.8 oz)   06/09/21 84.8 kg (187 lb)   06/02/21 85.1 kg (187 lb 9.8 oz)   05/26/21 88 kg (193 lb 14.4 oz)   05/12/21 85.2 kg (187 lb 12.8 oz)   05/07/21 84.9 kg (187 lb 3.2 oz)   05/05/21 84 kg (185 lb 3 oz)   GENERAL: Healthy, alert and no distress  EYES: Eyes grossly normal to inspection.  No discharge or erythema, or obvious scleral/conjunctival abnormalities.  HENT: Normal cephalic/atraumatic.  External ears, nose and mouth without ulcers or lesions.  No nasal drainage visible.  NECK: No asymmetry, visible masses or scars  RESP: No audible wheeze, cough, or visible cyanosis.  No visible retractions or increased work of breathing.    SKIN: Visible skin clear. No significant rash, abnormal pigmentation or lesions.  NEURO: Cranial nerves grossly intact.  Mentation and speech appropriate for age.  PSYCH: Mentation appears normal, affect normal/bright, judgement and insight intact, normal speech and appearance well-groomed.      LABS  No visits with results within 1 Week(s) from this visit.   Latest known visit with results is:   Infusion Therapy Visit on 07/22/2021   Component Date Value Ref Range Status     Sodium 07/22/2021 140  133 - 144 mmol/L Final     Potassium 07/22/2021 3.7  3.4 - 5.3 mmol/L Final     Chloride 07/22/2021 105  94 - 109 mmol/L Final     Carbon Dioxide (CO2) 07/22/2021 30  20 - 32 mmol/L Final     Anion Gap 07/22/2021 5  3 - 14 mmol/L Final     Urea Nitrogen 07/22/2021 17  7 - 30 mg/dL Final     Creatinine 07/22/2021 1.23  0.66 - 1.25 mg/dL Final     Calcium 07/22/2021 8.3* 8.5 - 10.1 mg/dL Final     Glucose 07/22/2021 104* 70 - 99 mg/dL Final     Alkaline Phosphatase 07/22/2021 50  40 - 150  U/L Final     AST 07/22/2021 16  0 - 45 U/L Final     ALT 07/22/2021 17  0 - 70 U/L Final     Protein Total 07/22/2021 7.0  6.8 - 8.8 g/dL Final     Albumin 07/22/2021 3.5  3.4 - 5.0 g/dL Final     Bilirubin Total 07/22/2021 0.3  0.2 - 1.3 mg/dL Final     GFR Estimate 07/22/2021 57* >60 mL/min/1.73m2 Final     TSH 07/22/2021 1.50  0.40 - 4.00 mU/L Final     Magnesium 07/22/2021 2.0  1.6 - 2.3 mg/dL Final     Phosphorus 07/22/2021 3.2  2.5 - 4.5 mg/dL Final           ASSESSMENT AND PLAN    #1 Cutaneous melanoma, scalp primary, pT2a cN1c, clinical Stage IIIB.   It was a pleasure to see Mr. Coleman today. He is a 76 year old  with agent orange cutaneous exposures in the [place] war and a diagnosis of malignant melanoma arising from a pre-existing pigmented lesion on the scalp. He had a decrease in thickening of the large scalp tumor and lightening of the periphery and in the middle part of the tumor then had surgery, which on pathology showed only tumoral melanosis.    He has received 2 cycles of adjuvant nivolumab and he is tolerating well with minimal side effects. He presents prior to cycle 3 of treatment. We will proceed with plan for a total of 9 cycles of adjuvant treatment to complete the balance of 1 year of treatment.    -continue nivolumab cycle 3.  -PET-CT scan rescheduled.    #2 Diarrhea, mild  Likely secondary to his treatment, complicated by possible IBS. He takes Lomotil as needed with good control.    #3 Dyspnea, mild  Likely contribution from nasal congestion, which is chronic lately.  I would recommend an antihistamine like Allegra 180 mg daily.There is some potential of pneumonitis and this was reviewed. We will have imaging when he has his PET-CT to guide any next steps. Patient feels his breathing is largely unchanged, though, so we will proceed with nivolumab today.    Oksana Peñaloza M.D.   of Medicine  Hematology, Oncology and Transplantation          Again,  thank you for allowing me to participate in the care of your patient.        Sincerely,        Oksana Hernández MD

## 2021-08-19 NOTE — NURSING NOTE
Chief Complaint   Patient presents with     Blood Draw     Labs drawn via PIV start by RN. VS taken.     Labs drawn with vpt by rn.  Pt tolerated well.  VS taken.  Pt checked in for next appt.    Marshal Adams RN

## 2021-08-19 NOTE — PROGRESS NOTES
Infusion Nursing Note:  Ahmet Fuentes presents today for Cycle 3 Day 1 Nivolumab.    Patient had a virtual visit with Dr Bowen Hernández prior to infusion    Intravenous Access:  Peripheral IV placed in lab      Treatment Conditions:  Results for MOSES FUENTES (MRN [id number]) as of 8/19/2021 15:39   Ref. Range 8/19/2021 12:36   Sodium Latest Ref Range: 133 - 144 mmol/L 134   Potassium Latest Ref Range: 3.4 - 5.3 mmol/L 3.7   Chloride Latest Ref Range: 94 - 109 mmol/L 105   Carbon Dioxide Latest Ref Range: 20 - 32 mmol/L 30   Urea Nitrogen Latest Ref Range: 7 - 30 mg/dL 18   Creatinine Latest Ref Range: 0.66 - 1.25 mg/dL 1.27 (H)   GFR Estimate Latest Ref Range: >60 mL/min/1.73m2 55 (L)   Calcium Latest Ref Range: 8.5 - 10.1 mg/dL 8.9   Anion Gap Latest Ref Range: 3 - 14 mmol/L <1 (L)   Magnesium Latest Ref Range: 1.6 - 2.3 mg/dL 2.0   Phosphorus Latest Ref Range: 2.5 - 4.5 mg/dL 2.6   Albumin Latest Ref Range: 3.4 - 5.0 g/dL 3.6   Protein Total Latest Ref Range: 6.8 - 8.8 g/dL 7.3   Bilirubin Total Latest Ref Range: 0.2 - 1.3 mg/dL 0.3   Alkaline Phosphatase Latest Ref Range: 40 - 150 U/L 54   ALT Latest Ref Range: 0 - 70 U/L 19   AST Latest Ref Range: 0 - 45 U/L 14   CK Total Latest Ref Range: 30 - 300 U/L 131   TSH Latest Ref Range: 0.40 - 4.00 mU/L 1.72   Glucose Latest Ref Range: 70 - 99 mg/dL 100 (H)   WBC Latest Ref Range: 4.0 - 11.0 10e3/uL 4.9   Hemoglobin Latest Ref Range: 13.3 - 17.7 g/dL 13.2 (L)   Hematocrit Latest Ref Range: 40.0 - 53.0 % 39.0 (L)   Platelet Count Latest Ref Range: 150 - 450 10e3/uL 241   RBC Count Latest Ref Range: 4.40 - 5.90 10e6/uL 4.79   MCV Latest Ref Range: 78 - 100 fL 81   MCH Latest Ref Range: 26.5 - 33.0 pg 27.6   MCHC Latest Ref Range: 31.5 - 36.5 g/dL 33.8   RDW Latest Ref Range: 10.0 - 15.0 % 11.9   % Neutrophils Latest Units: % 53   % Lymphocytes Latest Units: % 30   % Monocytes Latest Units: % 11   % Eosinophils Latest Units: % 5   Absolute Basophils Latest Ref  Range: 0.0 - 0.2 10e3/uL 0.1   % Basophils Latest Units: % 1   Absolute Eosinophils Latest Ref Range: 0.0 - 0.7 10e3/uL 0.2   Absolute Immature Granulocytes Latest Ref Range: <=0.0 10e3/uL 0.0   Absolute Lymphocytes Latest Ref Range: 0.8 - 5.3 10e3/uL 1.5   Absolute Monocytes Latest Ref Range: 0.0 - 1.3 10e3/uL 0.5   % Immature Granulocytes Latest Units: % 0   Absolute Neutrophils Latest Ref Range: 1.6 - 8.3 10e3/uL 2.6   Absolute NRBCs Latest Units: 10e3/uL 0.0   NRBCs per 100 WBC Latest Ref Range: <1 /100 0       Results reviewed, labs MET treatment parameters, ok to proceed with treatment.      Post Infusion Assessment:  Patient tolerated infusion without incident.       Discharge Plan:   Patient declined prescription refills.  AVS to patient via [email or web address].  Patient will return 9/16/21 for cycle 4      Anna Sylvester RN

## 2021-08-19 NOTE — PROGRESS NOTES
Lloyd is a 76 year old who is being evaluated via a billable video visit.      How would you like to obtain your AVS? mobile mumhart  If the video visit is dropped, the invitation should be resent by: Text to cell phone: [phone]  Will anyone else be joining your video visit? No      Video-Visit Details  Type of service:  Video Visit  Video Start Time: 9:45 AM  Video End Time:10:15 AM  Originating Location (pt. Location): Home  Distant Location (provider location):  [facility]   Platform used for Video Visit: [facility] ONCOLOGY CONSULT  Melanoma Clinic  Aug 19, 2021    CHIEF COMPLAINT: Scalp melanoma    Melanoma History:  1. He has a small pigmented scalp lesion present for over 30 years. The lesion was biopsied in 1992 and was benign.  2. October 2020, he presented to [facility] with 1 year of progressive enlargement of the lesion and new onset burning, itching, and stinging sensation in the affected scalp.  3. 10/26/20, He was seen at [facility] and he had shave biopsies of A. left central parietal scalp, B. left central occipital scalp, C. Left posterior parietal scalp, and D. punch biopsy of the left superior occipital scalp. Pathology (specimen: [id number]) showed a nodular and nevoid type melanoma, non-ulcerated, Breslow depth up to 1.3 mm, Saurabh's level IV, and up to 3 mitoses per mm2. All margins were involved. Ki-67 10-20%. Sox10 stain is positive and highlights an atypical compound melanocytic proliferation with significant overlying confluence and pagetosis of intraepidermal melanocytes. T-stage: at least pT2a. PD-L1 staining shows PD-L1 TPS <1%. 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  4. 11/25/20, he was seen by Dr. Garcia and he took three 3mm punch biopsies, which returned as dermal melanocytic proliferations with melanophages and fibrosis, consistent with locoregional metastatic melanoma.  5. 12/8/2020 he had PET-CT, which  showed FDG avid irregular skin thickening overlying the left parietal region, with no FDG avid lymphadenopathy in the neck and no evidence of metastasis elsewhere in the body.  6. 1/22/2021 start vemurafenib and cobimetinib, ~2/12/21 held for diarrhea.    INTERVAL HISTORY  Patient reports that overall he is doing okay. On 5/24 he underwent wide local excision of the scalp melanoma. He then had left latissimus free flap for scalp reconstruction. Currently he is adjuvant nivolumab.    He notes some diarrhea, but usually 1 to 2  movements daily. He will take Lomotil as needed. He notes some a little bit of shortness of breath climbing stairs. He does also have some increase in nasal congestion. He notes he is sleeping quite a bit, about 10-12 hours a night. He attributes this to boredom. He will listen to the radio and maybe fall asleep. Gets out to make various appointments. Does not feel fatigue, per se.  He notes neck stiffness, but this is chronic and related to injuries from [place].     No fevers or chills. No infectious signs or symptoms.    Review of Systems:  12-point ROS negative except as in HPI    Current Outpatient Medications   Medication Sig Dispense Refill     acetaminophen (TYLENOL) 500 MG tablet Take 2 tablets (1,000 mg) by mouth every 8 hours as needed for mild pain 100 tablet 0     cholecalciferol (VITAMIN D3) 1000 UNIT tablet Take 1,000 Units by mouth daily        docusate sodium (DSS) 100 MG capsule TAKE ONE CAPSULE BY MOUTH EVERY DAY FOR STOOL SOFTENING       fluvoxaMINE (LUVOX) 100 MG tablet Take 150 mg at bedtime       hydrochlorothiazide (HYDRODIURIL) 25 MG tablet Take 25 mg by mouth daily.       Hypromellose (ARTIFICIAL TEARS OP) Apply  to eye. 2 drops in each eye twice a day as needed       Lactobacillus-Inulin ([facility] DIGESTIVE [facility]) CAPS 1 tab daily (Patient taking differently: Take 1 tablet by mouth daily ) 30 capsule 1     LORazepam (ATIVAN) 0.5 MG tablet Take 1 tablet (0.5 mg) by  mouth every 4 hours as needed (Anxiety, Nausea/Vomiting or Sleep) 30 tablet 3     Magnesium Oxide 420 MG TABS Take 420 mg by mouth daily       methocarbamol (ROBAXIN) 500 MG tablet Take 1 tablet (500 mg) by mouth 3 times daily as needed for muscle spasms 15 tablet 0     methocarbamol (ROBAXIN) 500 MG tablet Take 1 tablet (500 mg) by mouth 3 times daily as needed for muscle spasms 10 tablet 0     omeprazole (PRILOSEC) 20 MG capsule Take 1 capsule by mouth 2 times daily. 60 capsule prn     polyethylene glycol (MIRALAX) 17 GM/Dose powder Take 17 g by mouth daily 510 g 0     potassium phosphate, monobasic, (K-PHOS) 500 MG tablet Take 1 tablet (500 mg) by mouth 2 times daily (Patient taking differently: Take 500 mg by mouth daily ) 60 tablet 3     pregabalin (LYRICA) 150 MG capsule Take 150 mg by mouth 2 times daily       QUEtiapine (SEROQUEL) 300 MG tablet Take 150 mg by mouth At Bedtime        senna-docusate (SENOKOT-S/PERICOLACE) 8.6-50 MG tablet Take 1 tablet by mouth 2 times daily 30 tablet 0     simvastatin (ZOCOR) 80 MG tablet Take 40 mg by mouth At Bedtime        traZODone (DESYREL) 100 MG tablet Take 100 mg by mouth At Bedtime        vortioxetine (TRINTELLIX) 20 MG tablet TAKE ONE TABLET BY MOUTH EVERY MORNING       diphenoxylate-atropine (LOMOTIL) 2.5-0.025 MG tablet Take 1-2 tablets by mouth 4 times daily as needed for diarrhea (Patient not taking: Reported on 6/25/2021) 120 tablet 5     prochlorperazine (COMPAZINE) 10 MG tablet Take 1 tablet (10 mg) by mouth every 6 hours as needed (Nausea/Vomiting) (Patient not taking: Reported on 6/25/2021) 30 tablet 3     prochlorperazine (COMPAZINE) 10 MG tablet Take 1 tablet (10 mg) by mouth every 6 hours as needed (Nausea/Vomiting) (Patient not taking: Reported on 6/25/2021) 30 tablet 2     Video Physical Exam:  There were no vitals taken for this visit.  Wt Readings from Last 10 Encounters:   08/13/21 87 kg (191 lb 14.4 oz)   07/22/21 85.9 kg (189 lb 6.4 oz)   06/25/21  86 kg (189 lb 8 oz)   06/11/21 84.7 kg (186 lb 12.8 oz)   06/09/21 84.8 kg (187 lb)   06/02/21 85.1 kg (187 lb 9.8 oz)   05/26/21 88 kg (193 lb 14.4 oz)   05/12/21 85.2 kg (187 lb 12.8 oz)   05/07/21 84.9 kg (187 lb 3.2 oz)   05/05/21 84 kg (185 lb 3 oz)   GENERAL: Healthy, alert and no distress  EYES: Eyes grossly normal to inspection.  No discharge or erythema, or obvious scleral/conjunctival abnormalities.  HENT: Normal cephalic/atraumatic.  External ears, nose and mouth without ulcers or lesions.  No nasal drainage visible.  NECK: No asymmetry, visible masses or scars  RESP: No audible wheeze, cough, or visible cyanosis.  No visible retractions or increased work of breathing.    SKIN: Visible skin clear. No significant rash, abnormal pigmentation or lesions.  NEURO: Cranial nerves grossly intact.  Mentation and speech appropriate for age.  PSYCH: Mentation appears normal, affect normal/bright, judgement and insight intact, normal speech and appearance well-groomed.      LABS  No visits with results within 1 Week(s) from this visit.   Latest known visit with results is:   Infusion Therapy Visit on 07/22/2021   Component Date Value Ref Range Status     Sodium 07/22/2021 140  133 - 144 mmol/L Final     Potassium 07/22/2021 3.7  3.4 - 5.3 mmol/L Final     Chloride 07/22/2021 105  94 - 109 mmol/L Final     Carbon Dioxide (CO2) 07/22/2021 30  20 - 32 mmol/L Final     Anion Gap 07/22/2021 5  3 - 14 mmol/L Final     Urea Nitrogen 07/22/2021 17  7 - 30 mg/dL Final     Creatinine 07/22/2021 1.23  0.66 - 1.25 mg/dL Final     Calcium 07/22/2021 8.3* 8.5 - 10.1 mg/dL Final     Glucose 07/22/2021 104* 70 - 99 mg/dL Final     Alkaline Phosphatase 07/22/2021 50  40 - 150 U/L Final     AST 07/22/2021 16  0 - 45 U/L Final     ALT 07/22/2021 17  0 - 70 U/L Final     Protein Total 07/22/2021 7.0  6.8 - 8.8 g/dL Final     Albumin 07/22/2021 3.5  3.4 - 5.0 g/dL Final     Bilirubin Total 07/22/2021 0.3  0.2 - 1.3 mg/dL Final     GFR  Estimate 07/22/2021 57* >60 mL/min/1.73m2 Final     TSH 07/22/2021 1.50  0.40 - 4.00 mU/L Final     Magnesium 07/22/2021 2.0  1.6 - 2.3 mg/dL Final     Phosphorus 07/22/2021 3.2  2.5 - 4.5 mg/dL Final           ASSESSMENT AND PLAN    #1 Cutaneous melanoma, scalp primary, pT2a cN1c, clinical Stage IIIB.   It was a pleasure to see Mr. Coleman today. He is a 76 year old  with agent orange cutaneous exposures in the [place] war and a diagnosis of malignant melanoma arising from a pre-existing pigmented lesion on the scalp. He had a decrease in thickening of the large scalp tumor and lightening of the periphery and in the middle part of the tumor then had surgery, which on pathology showed only tumoral melanosis.    He has received 2 cycles of adjuvant nivolumab and he is tolerating well with minimal side effects. He presents prior to cycle 3 of treatment. We will proceed with plan for a total of 9 cycles of adjuvant treatment to complete the balance of 1 year of treatment.    -continue nivolumab cycle 3.  -PET-CT scan rescheduled.    #2 Diarrhea, mild  Likely secondary to his treatment, complicated by possible IBS. He takes Lomotil as needed with good control.    #3 Dyspnea, mild  Likely contribution from nasal congestion, which is chronic lately.  I would recommend an antihistamine like Allegra 180 mg daily.There is some potential of pneumonitis and this was reviewed. We will have imaging when he has his PET-CT to guide any next steps. Patient feels his breathing is largely unchanged, though, so we will proceed with nivolumab today.    Oksana Peñaloza M.D.   of Medicine  Hematology, Oncology and Transplantation

## 2021-08-25 ENCOUNTER — HOSPITAL ENCOUNTER (OUTPATIENT)
Dept: PET IMAGING | Facility: CLINIC | Age: 76
Discharge: HOME | End: 2021-08-25
Attending: PHYSICIAN ASSISTANT | Admitting: PHYSICIAN ASSISTANT
Payer: MEDICARE

## 2021-08-25 ENCOUNTER — HOSPITAL ENCOUNTER (OUTPATIENT)
Dept: PET IMAGING | Facility: CLINIC | Age: 76
End: 2021-08-25
Attending: PHYSICIAN ASSISTANT
Payer: MEDICARE

## 2021-08-25 DIAGNOSIS — C43.4 MALIGNANT MELANOMA OF SCALP (H): ICD-10-CM

## 2021-08-25 PROCEDURE — 250N000011 HC RX IP 250 OP 636: Performed by: PHYSICIAN ASSISTANT

## 2021-08-25 PROCEDURE — 78816 PET IMAGE W/CT FULL BODY: CPT | Mod: PS,MG

## 2021-08-25 PROCEDURE — 74177 CT ABD & PELVIS W/CONTRAST: CPT | Mod: 26 | Performed by: RADIOLOGY

## 2021-08-25 PROCEDURE — 70491 CT SOFT TISSUE NECK W/DYE: CPT | Mod: 26 | Performed by: RADIOLOGY

## 2021-08-25 PROCEDURE — 343N000001 HC RX 343: Performed by: PHYSICIAN ASSISTANT

## 2021-08-25 PROCEDURE — 74177 CT ABD & PELVIS W/CONTRAST: CPT | Mod: PS

## 2021-08-25 PROCEDURE — 70491 CT SOFT TISSUE NECK W/DYE: CPT

## 2021-08-25 PROCEDURE — A9552 F18 FDG: HCPCS | Performed by: PHYSICIAN ASSISTANT

## 2021-08-25 PROCEDURE — 71260 CT THORAX DX C+: CPT | Mod: 26 | Performed by: RADIOLOGY

## 2021-08-25 PROCEDURE — G1004 CDSM NDSC: HCPCS | Mod: GC | Performed by: RADIOLOGY

## 2021-08-25 PROCEDURE — 78816 PET IMAGE W/CT FULL BODY: CPT | Mod: 26 | Performed by: RADIOLOGY

## 2021-08-25 RX ORDER — IOPAMIDOL 755 MG/ML
116 INJECTION, SOLUTION INTRAVASCULAR ONCE
Status: COMPLETED | OUTPATIENT
Start: 2021-08-25 | End: 2021-08-25

## 2021-08-25 RX ADMIN — FLUDEOXYGLUCOSE F-18 11.43 MCI.: 500 INJECTION, SOLUTION INTRAVENOUS at 13:53

## 2021-08-25 RX ADMIN — IOPAMIDOL 116 ML: 755 INJECTION, SOLUTION INTRAVENOUS at 14:48

## 2021-08-26 DIAGNOSIS — E83.39 HYPOPHOSPHATEMIA: ICD-10-CM

## 2021-08-26 NOTE — TELEPHONE ENCOUNTER
Last prescribing provider: Nani    Last clinic visit date: 8/19/21 Nani    Any missed appointments or no-shows since last clinic visit?: No    Recommendations for requested medication (if none, N/A): No    Next clinic visit date: 9/16/21    Any other pertinent information (if none, N/A):    Copied from 7/20/21 note from Nani  potassium phosphate, monobasic, (K-PHOS) 500 MG tablet Take 1 tablet (500 mg) by mouth 2 times daily (Patient taking differently: Take 500 mg by mouth daily ) 60 tablet 3.      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 Will start him on KPhos 500 mg bid and recheck again in 2 weeks.      Routing to Nani

## 2021-08-27 RX ORDER — POTASSIUM PHOSPHATE, MONOBASIC 500 MG/1
TABLET, SOLUBLE ORAL
Qty: 60 TABLET | Refills: 3 | OUTPATIENT
Start: 2021-08-27

## 2021-08-27 NOTE — TELEPHONE ENCOUNTER
Medication discontinued in  Meds and Orders 8/27/2021.      Routing to [facility]

## 2021-09-01 ENCOUNTER — OFFICE VISIT (OUTPATIENT)
Dept: OTOLARYNGOLOGY | Facility: CLINIC | Age: 76
End: 2021-09-01
Payer: MEDICARE

## 2021-09-01 VITALS
BODY MASS INDEX: 29.4 KG/M2 | WEIGHT: 194 LBS | TEMPERATURE: 99.1 F | HEART RATE: 61 BPM | HEIGHT: 68 IN | OXYGEN SATURATION: 95 %

## 2021-09-01 DIAGNOSIS — C43.4 MELANOMA OF SCALP (H): Primary | ICD-10-CM

## 2021-09-01 PROCEDURE — 99212 OFFICE O/P EST SF 10 MIN: CPT | Performed by: OTOLARYNGOLOGY

## 2021-09-01 ASSESSMENT — MIFFLIN-ST. JEOR: SCORE: 1594.48

## 2021-09-01 ASSESSMENT — PAIN SCALES - GENERAL: PAINLEVEL: NO PAIN (0)

## 2021-09-01 NOTE — PROGRESS NOTES
Facial Plastic and Reconstructive Surgery      Ahmet Coleman is doing well. He had a latissimus free flap for scalp reconstruction. He has done well and it is now fully epithelialized and healed. He feels comofortable with it but does state that it is numb.    On exam his scalp is well healed and he has great skin coverage. He has fullness of the flap at the inferior lateral border and this is from bulk of the muscle.     A/P:  Ahmet can follow up with me as needed in the future. He can shower and now function without any additional concern or care for the flap. If he would like to have it debulked in the future he will let me know and come in.

## 2021-09-01 NOTE — LETTER
9/1/2021       RE: Ahmet Coleman  [address]  [place] [address]     Dear Colleague,    Thank you for referring your patient, Ahmet Coleman, to the [facility] [place] [facility]. Please see a copy of my visit note below.    Facial Plastic and Reconstructive Surgery      Ahmet Coleman is doing well. He had a latissimus free flap for scalp reconstruction. He has done well and it is now fully epithelialized and healed. He feels comofortable with it but does state that it is numb.    On exam his scalp is well healed and he has great skin coverage. He has fullness of the flap at the inferior lateral border and this is from bulk of the muscle.     A/P:  Ahmet can follow up with me as needed in the future. He can shower and now function without any additional concern or care for the flap. If he would like to have it debulked in the future he will let me know and come in.      Again, thank you for allowing me to participate in the care of your patient.      Sincerely,    Kathryn Machado MD

## 2021-09-16 ENCOUNTER — APPOINTMENT (OUTPATIENT)
Dept: LAB | Facility: CLINIC | Age: 76
End: 2021-09-16
Attending: INTERNAL MEDICINE
Payer: MEDICARE

## 2021-09-16 ENCOUNTER — INFUSION THERAPY VISIT (OUTPATIENT)
Dept: ONCOLOGY | Facility: CLINIC | Age: 76
End: 2021-09-16
Attending: INTERNAL MEDICINE
Payer: MEDICARE

## 2021-09-16 VITALS
TEMPERATURE: 98.2 F | HEART RATE: 73 BPM | WEIGHT: 199 LBS | OXYGEN SATURATION: 98 % | BODY MASS INDEX: 30.26 KG/M2 | RESPIRATION RATE: 16 BRPM | DIASTOLIC BLOOD PRESSURE: 79 MMHG | SYSTOLIC BLOOD PRESSURE: 132 MMHG

## 2021-09-16 DIAGNOSIS — C43.4 MALIGNANT MELANOMA OF SCALP (H): Primary | ICD-10-CM

## 2021-09-16 DIAGNOSIS — C43.4 MALIGNANT MELANOMA OF SCALP (H): ICD-10-CM

## 2021-09-16 DIAGNOSIS — Z15.09 MONOALLELIC MUTATION OF BRAF GENE: Primary | ICD-10-CM

## 2021-09-16 DIAGNOSIS — Z15.89 MONOALLELIC MUTATION OF BRAF GENE: Primary | ICD-10-CM

## 2021-09-16 LAB
ALBUMIN SERPL-MCNC: 3.2 G/DL (ref 3.4–5)
ALP SERPL-CCNC: 58 U/L (ref 40–150)
ALT SERPL W P-5'-P-CCNC: 19 U/L (ref 0–70)
ANION GAP SERPL CALCULATED.3IONS-SCNC: 9 MMOL/L (ref 3–14)
AST SERPL W P-5'-P-CCNC: 15 U/L (ref 0–45)
BASOPHILS # BLD AUTO: 0.1 10E3/UL (ref 0–0.2)
BASOPHILS NFR BLD AUTO: 1 %
BILIRUB SERPL-MCNC: 0.2 MG/DL (ref 0.2–1.3)
BUN SERPL-MCNC: 19 MG/DL (ref 7–30)
CALCIUM SERPL-MCNC: 9.1 MG/DL (ref 8.5–10.1)
CHLORIDE BLD-SCNC: 103 MMOL/L (ref 94–109)
CO2 SERPL-SCNC: 28 MMOL/L (ref 20–32)
CREAT SERPL-MCNC: 1.23 MG/DL (ref 0.66–1.25)
EOSINOPHIL # BLD AUTO: 0.2 10E3/UL (ref 0–0.7)
EOSINOPHIL NFR BLD AUTO: 6 %
ERYTHROCYTE [DISTWIDTH] IN BLOOD BY AUTOMATED COUNT: 12.5 % (ref 10–15)
GFR SERPL CREATININE-BSD FRML MDRD: 57 ML/MIN/1.73M2
GLUCOSE BLD-MCNC: 120 MG/DL (ref 70–99)
HCT VFR BLD AUTO: 38.5 % (ref 40–53)
HGB BLD-MCNC: 13 G/DL (ref 13.3–17.7)
IMM GRANULOCYTES # BLD: 0 10E3/UL
IMM GRANULOCYTES NFR BLD: 0 %
LYMPHOCYTES # BLD AUTO: 1.4 10E3/UL (ref 0.8–5.3)
LYMPHOCYTES NFR BLD AUTO: 31 %
MAGNESIUM SERPL-MCNC: 2.1 MG/DL (ref 1.6–2.3)
MCH RBC QN AUTO: 27 PG (ref 26.5–33)
MCHC RBC AUTO-ENTMCNC: 33.8 G/DL (ref 31.5–36.5)
MCV RBC AUTO: 80 FL (ref 78–100)
MONOCYTES # BLD AUTO: 0.6 10E3/UL (ref 0–1.3)
MONOCYTES NFR BLD AUTO: 14 %
NEUTROPHILS # BLD AUTO: 2.1 10E3/UL (ref 1.6–8.3)
NEUTROPHILS NFR BLD AUTO: 48 %
NRBC # BLD AUTO: 0 10E3/UL
NRBC BLD AUTO-RTO: 0 /100
PHOSPHATE SERPL-MCNC: 3.8 MG/DL (ref 2.5–4.5)
PLATELET # BLD AUTO: 248 10E3/UL (ref 150–450)
POTASSIUM BLD-SCNC: 3.3 MMOL/L (ref 3.4–5.3)
PROT SERPL-MCNC: 7 G/DL (ref 6.8–8.8)
RBC # BLD AUTO: 4.81 10E6/UL (ref 4.4–5.9)
SODIUM SERPL-SCNC: 140 MMOL/L (ref 133–144)
TSH SERPL DL<=0.005 MIU/L-ACNC: 2.58 MU/L (ref 0.4–4)
WBC # BLD AUTO: 4.4 10E3/UL (ref 4–11)

## 2021-09-16 PROCEDURE — G0463 HOSPITAL OUTPT CLINIC VISIT: HCPCS

## 2021-09-16 PROCEDURE — 99215 OFFICE O/P EST HI 40 MIN: CPT | Performed by: PHYSICIAN ASSISTANT

## 2021-09-16 PROCEDURE — 84100 ASSAY OF PHOSPHORUS: CPT

## 2021-09-16 PROCEDURE — 96413 CHEMO IV INFUSION 1 HR: CPT

## 2021-09-16 PROCEDURE — 258N000003 HC RX IP 258 OP 636: Performed by: PHYSICIAN ASSISTANT

## 2021-09-16 PROCEDURE — 80053 COMPREHEN METABOLIC PANEL: CPT | Performed by: INTERNAL MEDICINE

## 2021-09-16 PROCEDURE — 83735 ASSAY OF MAGNESIUM: CPT

## 2021-09-16 PROCEDURE — 85025 COMPLETE CBC W/AUTO DIFF WBC: CPT

## 2021-09-16 PROCEDURE — 36415 COLL VENOUS BLD VENIPUNCTURE: CPT | Performed by: INTERNAL MEDICINE

## 2021-09-16 PROCEDURE — 250N000011 HC RX IP 250 OP 636: Performed by: PHYSICIAN ASSISTANT

## 2021-09-16 PROCEDURE — 250N000013 HC RX MED GY IP 250 OP 250 PS 637: Performed by: PHYSICIAN ASSISTANT

## 2021-09-16 PROCEDURE — 84443 ASSAY THYROID STIM HORMONE: CPT | Performed by: INTERNAL MEDICINE

## 2021-09-16 RX ORDER — HEPARIN SODIUM (PORCINE) LOCK FLUSH IV SOLN 100 UNIT/ML 100 UNIT/ML
5 SOLUTION INTRAVENOUS
Status: CANCELLED | OUTPATIENT
Start: 2021-09-16

## 2021-09-16 RX ORDER — DIPHENHYDRAMINE HYDROCHLORIDE 50 MG/ML
50 INJECTION INTRAMUSCULAR; INTRAVENOUS
Status: CANCELLED
Start: 2021-09-16

## 2021-09-16 RX ORDER — METHYLPREDNISOLONE SODIUM SUCCINATE 125 MG/2ML
125 INJECTION, POWDER, LYOPHILIZED, FOR SOLUTION INTRAMUSCULAR; INTRAVENOUS
Status: CANCELLED
Start: 2021-09-16

## 2021-09-16 RX ORDER — POTASSIUM CHLORIDE 1500 MG/1
40 TABLET, EXTENDED RELEASE ORAL ONCE
Status: COMPLETED | OUTPATIENT
Start: 2021-09-16 | End: 2021-09-16

## 2021-09-16 RX ORDER — HEPARIN SODIUM,PORCINE 10 UNIT/ML
5 VIAL (ML) INTRAVENOUS
Status: CANCELLED | OUTPATIENT
Start: 2021-09-16

## 2021-09-16 RX ORDER — LORAZEPAM 2 MG/ML
0.5 INJECTION INTRAMUSCULAR EVERY 4 HOURS PRN
Status: CANCELLED
Start: 2021-09-16

## 2021-09-16 RX ORDER — NALOXONE HYDROCHLORIDE 0.4 MG/ML
0.2 INJECTION, SOLUTION INTRAMUSCULAR; INTRAVENOUS; SUBCUTANEOUS
Status: CANCELLED | OUTPATIENT
Start: 2021-09-16

## 2021-09-16 RX ORDER — EPINEPHRINE 1 MG/ML
0.3 INJECTION, SOLUTION INTRAMUSCULAR; SUBCUTANEOUS EVERY 5 MIN PRN
Status: CANCELLED | OUTPATIENT
Start: 2021-09-16

## 2021-09-16 RX ORDER — ALBUTEROL SULFATE 90 UG/1
1-2 AEROSOL, METERED RESPIRATORY (INHALATION)
Status: CANCELLED
Start: 2021-09-16

## 2021-09-16 RX ORDER — MEPERIDINE HYDROCHLORIDE 25 MG/ML
25 INJECTION INTRAMUSCULAR; INTRAVENOUS; SUBCUTANEOUS EVERY 30 MIN PRN
Status: CANCELLED | OUTPATIENT
Start: 2021-09-16

## 2021-09-16 RX ORDER — ALBUTEROL SULFATE 0.83 MG/ML
2.5 SOLUTION RESPIRATORY (INHALATION)
Status: CANCELLED | OUTPATIENT
Start: 2021-09-16

## 2021-09-16 RX ADMIN — POTASSIUM CHLORIDE 40 MEQ: 1500 TABLET, EXTENDED RELEASE ORAL at 11:40

## 2021-09-16 RX ADMIN — SODIUM CHLORIDE 250 ML: 9 INJECTION, SOLUTION INTRAVENOUS at 11:35

## 2021-09-16 RX ADMIN — SODIUM CHLORIDE 480 MG: 9 INJECTION, SOLUTION INTRAVENOUS at 11:39

## 2021-09-16 ASSESSMENT — PAIN SCALES - GENERAL: PAINLEVEL: NO PAIN (0)

## 2021-09-16 NOTE — NURSING NOTE
"Oncology Rooming Note    September 16, 2021 10:06 AM   Ahmet Coleman is a 76 year old male who presents for:    Chief Complaint   Patient presents with     Blood Draw     Labs drawn via PIV placed by RN in lab. VS taken.      Oncology Clinic Visit     Patient with Monoallelic mutation of BRAF gene here for provider visit and lab review     Initial Vitals: /79   Pulse 73   Temp 98.2  F (36.8  C) (Oral)   Resp 16   Wt 90.3 kg (199 lb)   SpO2 98%   BMI 30.26 kg/m   Estimated body mass index is 30.26 kg/m  as calculated from the following:    Height as of 9/1/21: 1.727 m (5' 8\").    Weight as of this encounter: 90.3 kg (199 lb). Body surface area is 2.08 meters squared.  No Pain (0) Comment: Data Unavailable   No LMP for male patient.  Allergies reviewed: Yes  Medications reviewed: Yes    Medications: Medication refills not needed today.  Pharmacy name entered into [employer]:    [id number] - [place], [place] - [address] [id number] [place], [place] - [address] [facility] - [place], [place] - [address]  [facility] - [place], [place] - [address]    Clinical concerns:       Aretha Dailey CMA              "

## 2021-09-16 NOTE — LETTER
9/16/2021         RE: Ahmet Coleman  [address]  [place] [address]      MEDICAL ONCOLOGY CONSULT  Melanoma Clinic  Sep 16, 2021    CHIEF COMPLAINT: Scalp melanoma    Melanoma History:  1. He has a small pigmented scalp lesion present for over 30 years. The lesion was biopsied in 1992 and was benign.  2. October 2020, he presented to [facility] with 1 year of progressive enlargement of the lesion and new onset burning, itching, and stinging sensation in the affected scalp.  3. 10/26/20, He was seen at [facility] and he had shave biopsies of A. left central parietal scalp, B. left central occipital scalp, C. Left posterior parietal scalp, and D. punch biopsy of the left superior occipital scalp. Pathology (specimen: [id number]) showed a nodular and nevoid type melanoma, non-ulcerated, Breslow depth up to 1.3 mm, Saurabh's level IV, and up to 3 mitoses per mm2. All margins were involved. Ki-67 10-20%. Sox10 stain is positive and highlights an atypical compound melanocytic proliferation with significant overlying confluence and pagetosis of intraepidermal melanocytes. T-stage: at least pT2a. PD-L1 staining shows PD-L1 TPS <1%. 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  4. 11/25/20, he was seen by Dr. Garcia and he took three 3mm punch biopsies, which returned as dermal melanocytic proliferations with melanophages and fibrosis, consistent with locoregional metastatic melanoma.  5. 12/8/2020 he had PET-CT, which showed FDG avid irregular skin thickening overlying the left parietal region, with no FDG avid lymphadenopathy in the neck and no evidence of metastasis elsewhere in the body.  6. 1/22/2021 start vemurafenib and cobimetinib, ~2/12/21 held for diarrhea, then resumed.  7. 2/18/2021 Start atezolizumab, last on 4/29/21  8. 5/24/2021 Wide local excision of the scalp melanoma and left latissimus free flap for scalp reconstruction.    Surgical pathology showed features  consistent with scar and tumoral melanosis. No definite residual melanoma is seen. Tumoral melanosis (which extends to a depth of around 1.1 mm) is believed to be equivalent to a diagnosis of regressed melanoma.   9. 6/25/21 Start adjuvant Nivolumab     INTERVAL HISTORY  Lloyd is doing well physically. Not having as many issues with diarrhea. He uses lomotil only occasionally. No changes to vision but does have occasional dry eye. No skin concerns or rash. Denies shortness of breath or cough. He has gained some weight which he is disappointment in. Denies significant change in appetite. Not active on a daily basis which he attributes to his mood. Has chronic neck pain but no new joint or muscle swelling or pain.    He reports feeling depressed and anxious today. Did not sleep well last night which may be contributing. Usually takes seroquel and trazodone which are effective for sleep. He continues to use ativan occasionally for anxiety which is somewhat effective. He has seen a psychiatrist for many years. Has also tried talking to therapists many times, primarily related to his PTSD. He has a good support system at home although verbalized loneliness. He does not want to burden his family with his concerns. His father passed away December 2020 which has contributed to his depression. He denies suicidal or homicidal ideation.      Review of Systems:  General: No fevers, chills, night sweats  HEENT: No headaches, visual disturbances, hearing loss, tinnitus, mucositis, or dysphagia  Lymph: No palpable nodes  Pulmonary: No SOB, cough, hemoptysis  Cardiac: No chest pain, exertional dyspnea, peripheral edema  GI: No dyspepsia, nausea, vomiting, abdominal pain, diarrhea, constipation, melena, or hematochezia  : No dysuria or hematuria  MSK: No bone pain  Heme: No bruising or bleeding  Neuro: No paraesthesias  Skin: No rashes    Current Outpatient Medications   Medication Sig Dispense Refill     acetaminophen (TYLENOL)  500 MG tablet Take 2 tablets (1,000 mg) by mouth every 8 hours as needed for mild pain 100 tablet 0     cholecalciferol (VITAMIN D3) 1000 UNIT tablet Take 1,000 Units by mouth daily        diphenoxylate-atropine (LOMOTIL) 2.5-0.025 MG tablet Take 1-2 tablets by mouth 4 times daily as needed for diarrhea 120 tablet 5     docusate sodium (DSS) 100 MG capsule TAKE ONE CAPSULE BY MOUTH EVERY DAY FOR STOOL SOFTENING       fluvoxaMINE (LUVOX) 100 MG tablet Take 150 mg at bedtime       hydrochlorothiazide (HYDRODIURIL) 25 MG tablet Take 25 mg by mouth daily.       Hypromellose (ARTIFICIAL TEARS OP) Apply  to eye. 2 drops in each eye twice a day as needed       Lactobacillus-Inulin ([facility] DIGESTIVE [facility]) CAPS 1 tab daily 30 capsule 1     LORazepam (ATIVAN) 0.5 MG tablet Take 1 tablet (0.5 mg) by mouth every 4 hours as needed (Anxiety, Nausea/Vomiting or Sleep) 30 tablet 3     Magnesium Oxide 420 MG TABS Take 420 mg by mouth daily       methocarbamol (ROBAXIN) 500 MG tablet Take 1 tablet (500 mg) by mouth 3 times daily as needed for muscle spasms 15 tablet 0     methocarbamol (ROBAXIN) 500 MG tablet Take 1 tablet (500 mg) by mouth 3 times daily as needed for muscle spasms 10 tablet 0     omeprazole (PRILOSEC) 20 MG capsule Take 1 capsule by mouth 2 times daily. 60 capsule prn     polyethylene glycol (MIRALAX) 17 GM/Dose powder Take 17 g by mouth daily 510 g 0     pregabalin (LYRICA) 150 MG capsule Take 150 mg by mouth 2 times daily       prochlorperazine (COMPAZINE) 10 MG tablet Take 1 tablet (10 mg) by mouth every 6 hours as needed (Nausea/Vomiting) 30 tablet 3     prochlorperazine (COMPAZINE) 10 MG tablet Take 1 tablet (10 mg) by mouth every 6 hours as needed (Nausea/Vomiting) 30 tablet 2     QUEtiapine (SEROQUEL) 300 MG tablet Take 150 mg by mouth At Bedtime        senna-docusate (SENOKOT-S/PERICOLACE) 8.6-50 MG tablet Take 1 tablet by mouth 2 times daily 30 tablet 0     simvastatin (ZOCOR) 80 MG tablet Take 40 mg  by mouth At Bedtime        traZODone (DESYREL) 100 MG tablet Take 100 mg by mouth At Bedtime        vortioxetine (TRINTELLIX) 20 MG tablet TAKE ONE TABLET BY MOUTH EVERY MORNING       Objective:  HEENT: EOMI, PERRL. Sclerae are anicteric. Oral mucosa is pink and moist with no lesions or thrush.   Lymph: Neck is supple with no lymphadenopathy in the cervical or supraclavicular areas.   Heart: Regular rate and rhythm.   Lungs: Clear to auscultation bilaterally.   Abdomen: Bowel sounds present, soft, nontender with no palpable hepatosplenomegaly or masses.   Extremities: No lower extremity edema noted bilaterally.   Neuro: Cranial nerves II through XII are grossly intact.  Skin: Healing scalp reconstruction and skin grafting without drainage or erythema. Healed incision to left back from grafting site.   Psych: Depressed mood, appropriate affect.     LABS:   Most Recent 3 CBC's:  Recent Labs   Lab Test 09/16/21  0958 08/19/21  1236 06/25/21  1315   WBC 4.4 4.9 4.8   HGB 13.0* 13.2* 11.9*   MCV 80 81 88    241 236    Most Recent 3 BMP's:  Recent Labs   Lab Test 09/16/21  0957 08/19/21  1236 07/22/21  1017    134 140   POTASSIUM 3.3* 3.7 3.7   CHLORIDE 103 105 105   CO2 28 30 30   BUN 19 18 17   CR 1.23 1.27* 1.23   ANIONGAP 9 <1* 5   STEPHEN 9.1 8.9 8.3*   * 100* 104*    Most Recent 2 LFT's:  Recent Labs   Lab Test 09/16/21  0957 08/19/21  1236   AST 15 14   ALT 19 19   ALKPHOS 58 54   BILITOTAL 0.2 0.3    Most Recent TSH and T4:  Recent Labs   Lab Test 09/16/21  0957   TSH 2.58     I reviewed the above labs today.    ASSESSMENT AND PLAN    Cutaneous melanoma, scalp primary, pT2a cN1c, clinical Stage IIIB.  It was a pleasure to see Mr. Coleman today. He is a 76 year old  with agent orange cutaneous exposures in the [place] war and a diagnosis of malignant melanoma arising from a pre-existing pigmented lesion on the scalp. He had a partial response to neoadjuvant therapy with atezolizumab (3  cycles), vemurafenib, and cobimetinib (4 cycles) with decrease in thickening of the large scalp tumor and lightening of the periphery and in the middle part of the tumor then had surgery, which showed only tumoral melanosis. He started on adjuvant nivolumab on 6/25/21. Overall plan: 9 total cycles of nivolumab q 4 weeks (=12 cycles of immunotherapy total including previous 3 cycles of atezo). Next follow-up with dermatology is scheduled for December 2021.  -He has tolerated his first 3 cycles of adjuvant nivolumab well overall. Labs today reviewed. Will proceed with cycle 4 of nivolumab today.  -RTC in 4 weeks for follow-up with LEVY and cycle 5 nivolumab. Repeat PET scan in 3 months.    Irritated eyes, some blurry vision at night  Improved    Depressed mood, anxiety   Verbalizes fluctuating depression and anxiety, multifactorial. Recent surgery, Re Has a longstanding history of PTSD. Currently works with a psychiatrist out of [place] who he spoke with last week. States he was started on a new medication for PTSD but this was ineffective. Continues to use lorazepam occasionally. He denies SI or thoughts of harming others. Offered further counseling or therapy but he declined at this time. He voiced concern over his financial situation which is an additional stressor. Offered patient to speak with social work which he is agreeable to. [facility] notified. Advised that he follow-up with psychiatry regarding discontinuing his new medication due to ineffectiveness and to explore other options.    Diarrhea  Resolved, and likely secondary to his treatment, complicated by possible IBS. Uses Lomotil infrequently.    Hypokalemia  Mild, replace in infusion per protocol    COVID-19 Vaccine  Initial series completed. Advised patient to proceed with booster. He will get this today following his infusion appointment.    60 minutes spent on the date of the encounter doing chart review, review of test results,  interpretation of tests, patient visit and documentation     Patient seen by and plan of care discussed with Sherine Cardoza PA-C.   DIONICIO Hopkins PA-C  [facility] [address] [address] [phone]            Sherine Cardoza PA-C

## 2021-09-16 NOTE — PROGRESS NOTES
TORB: 9/16/21/Sherine Cardoza/Robyn Preston RN/ To proceed with treatment as planned. Potassium 3.3, to replace per protocol orally, PO Potassium Chloride 40 meq once. No need for home Potassium replacement.

## 2021-09-16 NOTE — PROGRESS NOTES
Infusion Nursing Note:  Ahmet Fuentes presents today for Cycle 4 Day 1 Nivolumab.    Patient spoke with provider today: Yes: Sherine FERNANDO    Treatment Conditions:    Results for MOSES FUENTES (MRN [id number]) as of 9/16/2021 10:57   Ref. Range 9/16/2021 09:58   WBC Latest Ref Range: 4.0 - 11.0 10e3/uL 4.4   Hemoglobin Latest Ref Range: 13.3 - 17.7 g/dL 13.0 (L)   Hematocrit Latest Ref Range: 40.0 - 53.0 % 38.5 (L)   Platelet Count Latest Ref Range: 150 - 450 10e3/uL 248   RBC Count Latest Ref Range: 4.40 - 5.90 10e6/uL 4.81   MCV Latest Ref Range: 78 - 100 fL 80   MCH Latest Ref Range: 26.5 - 33.0 pg 27.0   MCHC Latest Ref Range: 31.5 - 36.5 g/dL 33.8   RDW Latest Ref Range: 10.0 - 15.0 % 12.5   % Neutrophils Latest Units: % 48   % Lymphocytes Latest Units: % 31   % Monocytes Latest Units: % 14   % Eosinophils Latest Units: % 6   Absolute Basophils Latest Ref Range: 0.0 - 0.2 10e3/uL 0.1   % Basophils Latest Units: % 1   Absolute Eosinophils Latest Ref Range: 0.0 - 0.7 10e3/uL 0.2   Absolute Immature Granulocytes Latest Ref Range: <=0.0 10e3/uL 0.0   Absolute Lymphocytes Latest Ref Range: 0.8 - 5.3 10e3/uL 1.4   Absolute Monocytes Latest Ref Range: 0.0 - 1.3 10e3/uL 0.6   % Immature Granulocytes Latest Units: % 0   Absolute Neutrophils Latest Ref Range: 1.6 - 8.3 10e3/uL 2.1   Absolute NRBCs Latest Units: 10e3/uL 0.0   NRBCs per 100 WBC Latest Ref Range: <1 /100 0       Results for MOSES FUENTES (MRN [id number]) as of 9/16/2021 10:57   Ref. Range 9/16/2021 09:57   Sodium Latest Ref Range: 133 - 144 mmol/L 140   Potassium Latest Ref Range: 3.4 - 5.3 mmol/L 3.3 (L)   Chloride Latest Ref Range: 94 - 109 mmol/L 103   Carbon Dioxide Latest Ref Range: 20 - 32 mmol/L 28   Urea Nitrogen Latest Ref Range: 7 - 30 mg/dL 19   Creatinine Latest Ref Range: 0.66 - 1.25 mg/dL 1.23   GFR Estimate Latest Ref Range: >60 mL/min/1.73m2 57 (L)   Calcium Latest Ref Range: 8.5 - 10.1 mg/dL 9.1   Anion Gap Latest Ref Range: 3 - 14  mmol/L 9   Magnesium Latest Ref Range: 1.6 - 2.3 mg/dL 2.1   Phosphorus Latest Ref Range: 2.5 - 4.5 mg/dL 3.8   Albumin Latest Ref Range: 3.4 - 5.0 g/dL 3.2 (L)   Protein Total Latest Ref Range: 6.8 - 8.8 g/dL 7.0   Bilirubin Total Latest Ref Range: 0.2 - 1.3 mg/dL 0.2   Alkaline Phosphatase Latest Ref Range: 40 - 150 U/L 58   ALT Latest Ref Range: 0 - 70 U/L 19   AST Latest Ref Range: 0 - 45 U/L 15   TSH Latest Ref Range: 0.40 - 4.00 mU/L 2.58   Glucose Latest Ref Range: 70 - 99 mg/dL 120 (H)         Note:  No concerns during infusion visit.     9/16/21 at 1100 TORB Sherine FERNANDO / Sonal Ferarra RN:    Replace potassium of 3.3 per ambulatory electrolyte replacement protocol (pt will get 40 meq KCL PO today)    Intravenous Access:  Peripheral IV placed.    Post Infusion Assessment:  Patient tolerated infusion without incident.  Blood return noted pre and post infusion.  Access discontinued per protocol.    Discharge Plan:   Patient declined prescription refills.  Discharge instructions reviewed with: Patient.  Patient and/or family verbalized understanding of discharge instructions and all questions answered.  AVS to patient via [employer].  Patient will return 10/14/21 for next appointment.   Patient discharged in stable condition accompanied by: self.  Departure Mode: Ambulatory.    Sonal Ferrara RN

## 2021-09-16 NOTE — NURSING NOTE
Chief Complaint   Patient presents with     Blood Draw     Labs drawn via PIV placed by RN in lab. VS taken.      Aquiles Ansari RN

## 2021-09-16 NOTE — PROGRESS NOTES
REMINDER      3/31/2017       Jessica Burns  [address] [address] [place] [id number]          Dear Jessica Burns ,    This is a courtesy reminder from our office.  Your Pap smear is due 3 year . Our records indicate your last Pap smear was 03/13/2017 .  Please call our office at [phone] to schedule an appointment at your earliest convenience.  If you have any questions or concerns please feel free to contact me at any time.    Sincerely,        Richy Marroquin MD  [facility] OBSTETRICS AND GYNECOLOGY  [address] [address] [place] [id number] [phone]   MEDICAL ONCOLOGY CONSULT  Melanoma Clinic  Sep 16, 2021    CHIEF COMPLAINT: Scalp melanoma    Melanoma History:  1. He has a small pigmented scalp lesion present for over 30 years. The lesion was biopsied in 1992 and was benign.  2. October 2020, he presented to [facility] with 1 year of progressive enlargement of the lesion and new onset burning, itching, and stinging sensation in the affected scalp.  3. 10/26/20, He was seen at [facility] and he had shave biopsies of A. left central parietal scalp, B. left central occipital scalp, C. Left posterior parietal scalp, and D. punch biopsy of the left superior occipital scalp. Pathology (specimen: [id number]) showed a nodular and nevoid type melanoma, non-ulcerated, Breslow depth up to 1.3 mm, Saurabh's level IV, and up to 3 mitoses per mm2. All margins were involved. Ki-67 10-20%. Sox10 stain is positive and highlights an atypical compound melanocytic proliferation with significant overlying confluence and pagetosis of intraepidermal melanocytes. T-stage: at least pT2a. PD-L1 staining shows PD-L1 TPS <1%. 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  4. 11/25/20, he was seen by Dr. Garcia and he took three 3mm punch biopsies, which returned as dermal melanocytic proliferations with melanophages and fibrosis, consistent with locoregional metastatic melanoma.  5. 12/8/2020 he had PET-CT, which showed FDG avid irregular skin thickening overlying the left parietal region, with no FDG avid lymphadenopathy in the neck and no evidence of metastasis elsewhere in the body.  6. 1/22/2021 start vemurafenib and cobimetinib, ~2/12/21 held for diarrhea, then resumed.  7. 2/18/2021 Start atezolizumab, last on 4/29/21  8. 5/24/2021 Wide local excision of the scalp melanoma and left latissimus free flap for scalp reconstruction.    Surgical pathology showed features consistent with scar and tumoral melanosis. No definite residual melanoma is seen. Tumoral  melanosis (which extends to a depth of around 1.1 mm) is believed to be equivalent to a diagnosis of regressed melanoma.   9. 6/25/21 Start adjuvant Nivolumab     INTERVAL HISTORY  Lloyd is doing well physically. Not having as many issues with diarrhea. He uses lomotil only occasionally. No changes to vision but does have occasional dry eye. No skin concerns or rash. Denies shortness of breath or cough. He has gained some weight which he is disappointment in. Denies significant change in appetite. Not active on a daily basis which he attributes to his mood. Has chronic neck pain but no new joint or muscle swelling or pain.    He reports feeling depressed and anxious today. Did not sleep well last night which may be contributing. Usually takes seroquel and trazodone which are effective for sleep. He continues to use ativan occasionally for anxiety which is somewhat effective. He has seen a psychiatrist for many years. Has also tried talking to therapists many times, primarily related to his PTSD. He has a good support system at home although verbalized loneliness. He does not want to burden his family with his concerns. His father passed away December 2020 which has contributed to his depression. He denies suicidal or homicidal ideation.      Review of Systems:  General: No fevers, chills, night sweats  HEENT: No headaches, visual disturbances, hearing loss, tinnitus, mucositis, or dysphagia  Lymph: No palpable nodes  Pulmonary: No SOB, cough, hemoptysis  Cardiac: No chest pain, exertional dyspnea, peripheral edema  GI: No dyspepsia, nausea, vomiting, abdominal pain, diarrhea, constipation, melena, or hematochezia  : No dysuria or hematuria  MSK: No bone pain  Heme: No bruising or bleeding  Neuro: No paraesthesias  Skin: No rashes    Current Outpatient Medications   Medication Sig Dispense Refill     acetaminophen (TYLENOL) 500 MG tablet Take 2 tablets (1,000 mg) by mouth every 8 hours as needed for mild pain 100  tablet 0     cholecalciferol (VITAMIN D3) 1000 UNIT tablet Take 1,000 Units by mouth daily        diphenoxylate-atropine (LOMOTIL) 2.5-0.025 MG tablet Take 1-2 tablets by mouth 4 times daily as needed for diarrhea 120 tablet 5     docusate sodium (DSS) 100 MG capsule TAKE ONE CAPSULE BY MOUTH EVERY DAY FOR STOOL SOFTENING       fluvoxaMINE (LUVOX) 100 MG tablet Take 150 mg at bedtime       hydrochlorothiazide (HYDRODIURIL) 25 MG tablet Take 25 mg by mouth daily.       Hypromellose (ARTIFICIAL TEARS OP) Apply  to eye. 2 drops in each eye twice a day as needed       Lactobacillus-Inulin (CULTURELLE DIGESTIVE HEALTH) CAPS 1 tab daily 30 capsule 1     LORazepam (ATIVAN) 0.5 MG tablet Take 1 tablet (0.5 mg) by mouth every 4 hours as needed (Anxiety, Nausea/Vomiting or Sleep) 30 tablet 3     Magnesium Oxide 420 MG TABS Take 420 mg by mouth daily       methocarbamol (ROBAXIN) 500 MG tablet Take 1 tablet (500 mg) by mouth 3 times daily as needed for muscle spasms 15 tablet 0     methocarbamol (ROBAXIN) 500 MG tablet Take 1 tablet (500 mg) by mouth 3 times daily as needed for muscle spasms 10 tablet 0     omeprazole (PRILOSEC) 20 MG capsule Take 1 capsule by mouth 2 times daily. 60 capsule prn     polyethylene glycol (MIRALAX) 17 GM/Dose powder Take 17 g by mouth daily 510 g 0     pregabalin (LYRICA) 150 MG capsule Take 150 mg by mouth 2 times daily       prochlorperazine (COMPAZINE) 10 MG tablet Take 1 tablet (10 mg) by mouth every 6 hours as needed (Nausea/Vomiting) 30 tablet 3     prochlorperazine (COMPAZINE) 10 MG tablet Take 1 tablet (10 mg) by mouth every 6 hours as needed (Nausea/Vomiting) 30 tablet 2     QUEtiapine (SEROQUEL) 300 MG tablet Take 150 mg by mouth At Bedtime        senna-docusate (SENOKOT-S/PERICOLACE) 8.6-50 MG tablet Take 1 tablet by mouth 2 times daily 30 tablet 0     simvastatin (ZOCOR) 80 MG tablet Take 40 mg by mouth At Bedtime        traZODone (DESYREL) 100 MG tablet Take 100 mg by mouth At  Bedtime        vortioxetine (TRINTELLIX) 20 MG tablet TAKE ONE TABLET BY MOUTH EVERY MORNING       Objective:  HEENT: EOMI, PERRL. Sclerae are anicteric. Oral mucosa is pink and moist with no lesions or thrush.   Lymph: Neck is supple with no lymphadenopathy in the cervical or supraclavicular areas.   Heart: Regular rate and rhythm.   Lungs: Clear to auscultation bilaterally.   Abdomen: Bowel sounds present, soft, nontender with no palpable hepatosplenomegaly or masses.   Extremities: No lower extremity edema noted bilaterally.   Neuro: Cranial nerves II through XII are grossly intact.  Skin: Healing scalp reconstruction and skin grafting without drainage or erythema. Healed incision to left back from grafting site.   Psych: Depressed mood, appropriate affect.     LABS:   Most Recent 3 CBC's:  Recent Labs   Lab Test 09/16/21  0958 08/19/21  1236 06/25/21  1315   WBC 4.4 4.9 4.8   HGB 13.0* 13.2* 11.9*   MCV 80 81 88    241 236    Most Recent 3 BMP's:  Recent Labs   Lab Test 09/16/21  0957 08/19/21  1236 07/22/21  1017    134 140   POTASSIUM 3.3* 3.7 3.7   CHLORIDE 103 105 105   CO2 28 30 30   BUN 19 18 17   CR 1.23 1.27* 1.23   ANIONGAP 9 <1* 5   STEPHEN 9.1 8.9 8.3*   * 100* 104*    Most Recent 2 LFT's:  Recent Labs   Lab Test 09/16/21  0957 08/19/21  1236   AST 15 14   ALT 19 19   ALKPHOS 58 54   BILITOTAL 0.2 0.3    Most Recent TSH and T4:  Recent Labs   Lab Test 09/16/21  0957   TSH 2.58     I reviewed the above labs today.    ASSESSMENT AND PLAN    Cutaneous melanoma, scalp primary, pT2a cN1c, clinical Stage IIIB.  It was a pleasure to see Mr. Coleman today. He is a 76 year old  with agent orange cutaneous exposures in the [place] war and a diagnosis of malignant melanoma arising from a pre-existing pigmented lesion on the scalp. He had a partial response to neoadjuvant therapy with atezolizumab (3 cycles), vemurafenib, and cobimetinib (4 cycles) with decrease in thickening of the large  scalp tumor and lightening of the periphery and in the middle part of the tumor then had surgery, which showed only tumoral melanosis. He started on adjuvant nivolumab on 6/25/21. Overall plan: 9 total cycles of nivolumab q 4 weeks (=12 cycles of immunotherapy total including previous 3 cycles of atezo). Next follow-up with dermatology is scheduled for December 2021.  -He has tolerated his first 3 cycles of adjuvant nivolumab well overall. Labs today reviewed. Will proceed with cycle 4 of nivolumab today.  -RTC in 4 weeks for follow-up with [facility] and cycle 5 nivolumab. Repeat PET scan in 3 months.    Irritated eyes, some blurry vision at night  Improved    Depressed mood, anxiety   Verbalizes fluctuating depression and anxiety, multifactorial. Recent surgery, Re Has a longstanding history of PTSD. Currently works with a psychiatrist out of [employer] who he spoke with last week. States he was started on a new medication for PTSD but this was ineffective. Continues to use lorazepam occasionally. He denies SI or thoughts of harming others. Offered further counseling or therapy but he declined at this time. He voiced concern over his financial situation which is an additional stressor. Offered patient to speak with social work which he is agreeable to. [facility] notified. Advised that he follow-up with psychiatry regarding discontinuing his new medication due to ineffectiveness and to explore other options.    Diarrhea  Resolved, and likely secondary to his treatment, complicated by possible IBS. Uses Lomotil infrequently.    Hypokalemia  Mild, replace in infusion per protocol    COVID-19 Vaccine  Initial series completed. Advised patient to proceed with booster. He will get this today following his infusion appointment.    60 minutes spent on the date of the encounter doing chart review, review of test results, interpretation of tests, patient visit and documentation     Patient seen by and plan of care  discussed with Sherine Cardoza PA-C.   Sherine Meza, DIONICIO Cardoza PA-C  [facility] [id number] [place] [address] [phone]

## 2021-09-21 ENCOUNTER — PATIENT OUTREACH (OUTPATIENT)
Dept: CARE COORDINATION | Facility: CLINIC | Age: 76
End: 2021-09-21

## 2021-09-21 NOTE — PROGRESS NOTES
Social Work Intervention  [facility]    Data/Intervention:    Patient Name:  Ahmet Coleman  /Age:  1945 (76 year old)    Visit Type: telephone  Referral Source: [facility] Staff  Reason for Referral:  Financial Concerns    Collaborated With:    -Patient    Psychosocial Information/Concerns:  Lloyd is a 76 year old patient with a diagnosis of Scalp Melanoma. In a recent appointment Lloyd identified having financial stressors and was open to receiving a call from SW.    Intervention/Education/Resources Provided:  SW call patient, but unable to reach them. SW tried both home and mobile number on file. SW left a detailed message and provided contact information. SW will wait for patient to call back and address concerns at that time.    Assessment/Plan:  SW will wait for patient to call back and remain available as needed.    Provided patient/family with contact information and availability.    MITUL Harrison,LGSW  Hematology/Oncology Social Worker  Phone:[phone] Pager: [phone]

## 2021-10-14 ENCOUNTER — INFUSION THERAPY VISIT (OUTPATIENT)
Dept: ONCOLOGY | Facility: CLINIC | Age: 76
End: 2021-10-14
Attending: INTERNAL MEDICINE
Payer: MEDICARE

## 2021-10-14 ENCOUNTER — VIRTUAL VISIT (OUTPATIENT)
Dept: ONCOLOGY | Facility: CLINIC | Age: 76
End: 2021-10-14
Attending: REGISTERED NURSE
Payer: MEDICARE

## 2021-10-14 ENCOUNTER — APPOINTMENT (OUTPATIENT)
Dept: LAB | Facility: CLINIC | Age: 76
End: 2021-10-14
Attending: INTERNAL MEDICINE
Payer: MEDICARE

## 2021-10-14 VITALS
OXYGEN SATURATION: 95 % | WEIGHT: 202.1 LBS | DIASTOLIC BLOOD PRESSURE: 88 MMHG | TEMPERATURE: 97.6 F | SYSTOLIC BLOOD PRESSURE: 147 MMHG | BODY MASS INDEX: 30.73 KG/M2 | HEART RATE: 60 BPM | RESPIRATION RATE: 16 BRPM

## 2021-10-14 DIAGNOSIS — Z15.89 MONOALLELIC MUTATION OF BRAF GENE: Primary | ICD-10-CM

## 2021-10-14 DIAGNOSIS — C43.4 MALIGNANT MELANOMA OF SCALP (H): ICD-10-CM

## 2021-10-14 DIAGNOSIS — Z15.09 MONOALLELIC MUTATION OF BRAF GENE: Primary | ICD-10-CM

## 2021-10-14 LAB
ALBUMIN SERPL-MCNC: 3.5 G/DL (ref 3.4–5)
ALP SERPL-CCNC: 55 U/L (ref 40–150)
ALT SERPL W P-5'-P-CCNC: 19 U/L (ref 0–70)
ANION GAP SERPL CALCULATED.3IONS-SCNC: 5 MMOL/L (ref 3–14)
AST SERPL W P-5'-P-CCNC: 14 U/L (ref 0–45)
BASOPHILS # BLD AUTO: 0.1 10E3/UL (ref 0–0.2)
BASOPHILS NFR BLD AUTO: 1 %
BILIRUB SERPL-MCNC: 0.4 MG/DL (ref 0.2–1.3)
BUN SERPL-MCNC: 22 MG/DL (ref 7–30)
CALCIUM SERPL-MCNC: 8.8 MG/DL (ref 8.5–10.1)
CHLORIDE BLD-SCNC: 102 MMOL/L (ref 94–109)
CO2 SERPL-SCNC: 31 MMOL/L (ref 20–32)
CREAT SERPL-MCNC: 1.57 MG/DL (ref 0.66–1.25)
EOSINOPHIL # BLD AUTO: 0.2 10E3/UL (ref 0–0.7)
EOSINOPHIL NFR BLD AUTO: 4 %
ERYTHROCYTE [DISTWIDTH] IN BLOOD BY AUTOMATED COUNT: 13.7 % (ref 10–15)
GFR SERPL CREATININE-BSD FRML MDRD: 43 ML/MIN/1.73M2
GLUCOSE BLD-MCNC: 101 MG/DL (ref 70–99)
HCT VFR BLD AUTO: 39.2 % (ref 40–53)
HGB BLD-MCNC: 12.9 G/DL (ref 13.3–17.7)
IMM GRANULOCYTES # BLD: 0 10E3/UL
IMM GRANULOCYTES NFR BLD: 0 %
LYMPHOCYTES # BLD AUTO: 1.5 10E3/UL (ref 0.8–5.3)
LYMPHOCYTES NFR BLD AUTO: 31 %
MAGNESIUM SERPL-MCNC: 2 MG/DL (ref 1.6–2.3)
MCH RBC QN AUTO: 26.4 PG (ref 26.5–33)
MCHC RBC AUTO-ENTMCNC: 32.9 G/DL (ref 31.5–36.5)
MCV RBC AUTO: 80 FL (ref 78–100)
MONOCYTES # BLD AUTO: 0.5 10E3/UL (ref 0–1.3)
MONOCYTES NFR BLD AUTO: 10 %
NEUTROPHILS # BLD AUTO: 2.6 10E3/UL (ref 1.6–8.3)
NEUTROPHILS NFR BLD AUTO: 54 %
NRBC # BLD AUTO: 0 10E3/UL
NRBC BLD AUTO-RTO: 0 /100
PHOSPHATE SERPL-MCNC: 2.8 MG/DL (ref 2.5–4.5)
PLATELET # BLD AUTO: 241 10E3/UL (ref 150–450)
POTASSIUM BLD-SCNC: 3.6 MMOL/L (ref 3.4–5.3)
PROT SERPL-MCNC: 7.2 G/DL (ref 6.8–8.8)
RBC # BLD AUTO: 4.89 10E6/UL (ref 4.4–5.9)
SODIUM SERPL-SCNC: 138 MMOL/L (ref 133–144)
TSH SERPL DL<=0.005 MIU/L-ACNC: 2.22 MU/L (ref 0.4–4)
WBC # BLD AUTO: 4.9 10E3/UL (ref 4–11)

## 2021-10-14 PROCEDURE — 250N000011 HC RX IP 250 OP 636: Performed by: INTERNAL MEDICINE

## 2021-10-14 PROCEDURE — 85025 COMPLETE CBC W/AUTO DIFF WBC: CPT

## 2021-10-14 PROCEDURE — 36415 COLL VENOUS BLD VENIPUNCTURE: CPT | Performed by: INTERNAL MEDICINE

## 2021-10-14 PROCEDURE — 83735 ASSAY OF MAGNESIUM: CPT

## 2021-10-14 PROCEDURE — 258N000003 HC RX IP 258 OP 636: Performed by: INTERNAL MEDICINE

## 2021-10-14 PROCEDURE — 80053 COMPREHEN METABOLIC PANEL: CPT | Performed by: INTERNAL MEDICINE

## 2021-10-14 PROCEDURE — 96413 CHEMO IV INFUSION 1 HR: CPT

## 2021-10-14 PROCEDURE — 84100 ASSAY OF PHOSPHORUS: CPT

## 2021-10-14 PROCEDURE — 99214 OFFICE O/P EST MOD 30 MIN: CPT | Mod: 95 | Performed by: INTERNAL MEDICINE

## 2021-10-14 PROCEDURE — 84443 ASSAY THYROID STIM HORMONE: CPT | Performed by: INTERNAL MEDICINE

## 2021-10-14 RX ORDER — ALBUTEROL SULFATE 0.83 MG/ML
2.5 SOLUTION RESPIRATORY (INHALATION)
Status: CANCELLED | OUTPATIENT
Start: 2021-10-14

## 2021-10-14 RX ORDER — NALOXONE HYDROCHLORIDE 0.4 MG/ML
0.2 INJECTION, SOLUTION INTRAMUSCULAR; INTRAVENOUS; SUBCUTANEOUS
Status: CANCELLED | OUTPATIENT
Start: 2021-10-14

## 2021-10-14 RX ORDER — LORAZEPAM 2 MG/ML
0.5 INJECTION INTRAMUSCULAR EVERY 4 HOURS PRN
Status: CANCELLED
Start: 2021-10-14

## 2021-10-14 RX ORDER — HEPARIN SODIUM (PORCINE) LOCK FLUSH IV SOLN 100 UNIT/ML 100 UNIT/ML
5 SOLUTION INTRAVENOUS
Status: CANCELLED | OUTPATIENT
Start: 2021-10-14

## 2021-10-14 RX ORDER — METHYLPREDNISOLONE SODIUM SUCCINATE 125 MG/2ML
125 INJECTION, POWDER, LYOPHILIZED, FOR SOLUTION INTRAMUSCULAR; INTRAVENOUS
Status: CANCELLED
Start: 2021-10-14

## 2021-10-14 RX ORDER — ALBUTEROL SULFATE 90 UG/1
1-2 AEROSOL, METERED RESPIRATORY (INHALATION)
Status: CANCELLED
Start: 2021-10-14

## 2021-10-14 RX ORDER — HEPARIN SODIUM,PORCINE 10 UNIT/ML
5 VIAL (ML) INTRAVENOUS
Status: CANCELLED | OUTPATIENT
Start: 2021-10-14

## 2021-10-14 RX ORDER — DIPHENHYDRAMINE HYDROCHLORIDE 50 MG/ML
50 INJECTION INTRAMUSCULAR; INTRAVENOUS
Status: CANCELLED
Start: 2021-10-14

## 2021-10-14 RX ORDER — EPINEPHRINE 1 MG/ML
0.3 INJECTION, SOLUTION INTRAMUSCULAR; SUBCUTANEOUS EVERY 5 MIN PRN
Status: CANCELLED | OUTPATIENT
Start: 2021-10-14

## 2021-10-14 RX ORDER — MEPERIDINE HYDROCHLORIDE 25 MG/ML
25 INJECTION INTRAMUSCULAR; INTRAVENOUS; SUBCUTANEOUS EVERY 30 MIN PRN
Status: CANCELLED | OUTPATIENT
Start: 2021-10-14

## 2021-10-14 RX ADMIN — SODIUM CHLORIDE 480 MG: 9 INJECTION, SOLUTION INTRAVENOUS at 13:32

## 2021-10-14 RX ADMIN — SODIUM CHLORIDE 250 ML: 9 INJECTION, SOLUTION INTRAVENOUS at 13:32

## 2021-10-14 ASSESSMENT — PATIENT HEALTH QUESTIONNAIRE - PHQ9: SUM OF ALL RESPONSES TO PHQ QUESTIONS 1-9: 7

## 2021-10-14 ASSESSMENT — PAIN SCALES - GENERAL: PAINLEVEL: NO PAIN (0)

## 2021-10-14 NOTE — PROGRESS NOTES
Lloyd is a 76 year old who is being evaluated via a billable video visit.      How would you like to obtain your AVS? MyChart  If the video visit is dropped, the invitation should be resent by: Send to e-mail at: [email or web address]  Will anyone else be joining your video visit? Yes, his daughter.       Video-Visit Details  Type of service:  Video Visit  Video Start Time: 10:15 AM  Video End Time:10:45 AM  Originating Location (pt. Location): Home  Distant Location (provider location):  [facility]   Platform used for Video Visit: [facility] ONCOLOGY PROGRESS NOTE  Melanoma Clinic  Oct 14, 2021    CHIEF COMPLAINT: Scalp melanoma    Melanoma History:  1. He has a small pigmented scalp lesion present for over 30 years. The lesion was biopsied in 1992 and was benign.  2. October 2020, he presented to [facility] with 1 year of progressive enlargement of the lesion and new onset burning, itching, and stinging sensation in the affected scalp.  3. 10/26/20, He was seen at [facility] and he had shave biopsies of A. left central parietal scalp, B. left central occipital scalp, C. Left posterior parietal scalp, and D. punch biopsy of the left superior occipital scalp. Pathology (specimen: [id number]) showed a nodular and nevoid type melanoma, non-ulcerated, Breslow depth up to 1.3 mm, Saurabh's level IV, and up to 3 mitoses per mm2. All margins were involved. Ki-67 10-20%. Sox10 stain is positive and highlights an atypical compound melanocytic proliferation with significant overlying confluence and pagetosis of intraepidermal melanocytes. T-stage: at least pT2a. PD-L1 staining shows PD-L1 TPS <1%. 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  4. 11/25/20, he was seen by Dr. Garcia and he took three 3mm punch biopsies, which returned as dermal melanocytic proliferations with melanophages and fibrosis, consistent with locoregional metastatic melanoma.  5.  12/8/2020 he had PET-CT, which showed FDG avid irregular skin thickening overlying the left parietal region, with no FDG avid lymphadenopathy in the neck and no evidence of metastasis elsewhere in the body.  6. 1/22/2021 start vemurafenib and cobimetinib, ~2/12/21 held for diarrhea, then resumed.  7. 2/18/2021 Start atezolizumab, last on 4/29/21  8. 5/24/2021 Wide local excision of the scalp melanoma and left latissimus free flap for scalp reconstruction.    Surgical pathology showed features consistent with scar and tumoral melanosis. No definite residual melanoma is seen. Tumoral melanosis (which extends to a depth of around 1.1 mm) is believed to be equivalent to a diagnosis of regressed melanoma.   9. 6/25/21 Start adjuvant Nivolumab     INTERVAL HISTORY  Lloyd is doing well and tolerating his treatments. He continues to have occasional loose stools, but this is consistent with his pre-existing IBD. Notes loose stools worsen with certain foods like chocolates, greasy deep fried foods, so he he tries to avoid them. He uses lomotil only occasionally.    No changes to vision but does have occasional dry eyes, which he says also pre-exist immunotherapy.  No skin concerns or rashes. Denies shortness of breath or cough. Denies significant change in appetite, and has 1 large meal a day otherwise snacking throughout the day.    He reports his mood is stable. No thoughts of hurting himself or others. Continues to use ativan occasionally for anxiety which is somewhat effective. He takes it once or twice a week.He continues talking to a therapist virtually, but finds it less therapeutic than in person therapy which aimed primarily on PTSD. He has a good support system at home lives with daughter, grand daughter. He is lonely but Covid pandemic makes it hard to leave the house. His father passed away December 2020.    PHQ-9 (Patient Health Questionnaire-9) on 10/14/2021    RESULT SUMMARY:  7 points  Scores 5-9 suggest mild  depression which may require only watchful waiting and repeated PHQ-9 at followup.    Functionally, the patient does not report limitations due to their symptoms.    INPUTS:  Little interest or pleasure in doing things? --> 0 = Not at all  Feeling down, depressed, or hopeless? --> 0 = Not at all  Trouble falling or staying asleep, or sleeping too much? --> 1 = Several days  Feeling tired or having little energy? --> 3 = Nearly every day  Poor appetite or overeating? --> 0 = Not at all  Feeling bad about yourself -- or that you are a failure or have let yourself or your family down? --> 0 = Not at all  Trouble concentrating on things, such as reading the newspaper or watching television? --> 3 = Nearly every day  Moving or speaking so slowly that other people could have noticed? Or so fidgety or restless that you have been moving a lot more than usual? --> 0 = Not at all  Thoughts that you would be better off dead, or thoughts of hurting yourself in some way? --> 0 = Not at all        Review of Systems:    Current Outpatient Medications   Medication Sig Dispense Refill     acetaminophen (TYLENOL) 500 MG tablet Take 2 tablets (1,000 mg) by mouth every 8 hours as needed for mild pain 100 tablet 0     cholecalciferol (VITAMIN D3) 1000 UNIT tablet Take 1,000 Units by mouth daily        diphenoxylate-atropine (LOMOTIL) 2.5-0.025 MG tablet Take 1-2 tablets by mouth 4 times daily as needed for diarrhea 120 tablet 5     docusate sodium (DSS) 100 MG capsule TAKE ONE CAPSULE BY MOUTH EVERY DAY FOR STOOL SOFTENING       fluvoxaMINE (LUVOX) 100 MG tablet Take 150 mg at bedtime       hydrochlorothiazide (HYDRODIURIL) 25 MG tablet Take 25 mg by mouth daily.       Hypromellose (ARTIFICIAL TEARS OP) Apply  to eye. 2 drops in each eye twice a day as needed       LORazepam (ATIVAN) 0.5 MG tablet Take 1 tablet (0.5 mg) by mouth every 4 hours as needed (Anxiety, Nausea/Vomiting or Sleep) 30 tablet 3     Magnesium Oxide 420 MG TABS Take  420 mg by mouth daily       methocarbamol (ROBAXIN) 500 MG tablet Take 1 tablet (500 mg) by mouth 3 times daily as needed for muscle spasms 10 tablet 0     omeprazole (PRILOSEC) 20 MG capsule Take 1 capsule by mouth 2 times daily. 60 capsule prn     polyethylene glycol (MIRALAX) 17 GM/Dose powder Take 17 g by mouth daily 510 g 0     pregabalin (LYRICA) 150 MG capsule Take 150 mg by mouth 2 times daily       prochlorperazine (COMPAZINE) 10 MG tablet Take 1 tablet (10 mg) by mouth every 6 hours as needed (Nausea/Vomiting) 30 tablet 2     QUEtiapine (SEROQUEL) 300 MG tablet Take 150 mg by mouth At Bedtime        senna-docusate (SENOKOT-S/PERICOLACE) 8.6-50 MG tablet Take 1 tablet by mouth 2 times daily 30 tablet 0     simvastatin (ZOCOR) 80 MG tablet Take 40 mg by mouth At Bedtime        traZODone (DESYREL) 100 MG tablet Take 100 mg by mouth At Bedtime        vortioxetine (TRINTELLIX) 20 MG tablet TAKE ONE TABLET BY MOUTH EVERY MORNING       Lactobacillus-Inulin (CULTURELLE DIGESTIVE HEALTH) CAPS 1 tab daily (Patient not taking: Reported on 10/14/2021) 30 capsule 1     methocarbamol (ROBAXIN) 500 MG tablet Take 1 tablet (500 mg) by mouth 3 times daily as needed for muscle spasms (Patient not taking: Reported on 10/14/2021) 15 tablet 0     prochlorperazine (COMPAZINE) 10 MG tablet Take 1 tablet (10 mg) by mouth every 6 hours as needed (Nausea/Vomiting) (Patient not taking: Reported on 10/14/2021) 30 tablet 3     Objective:  HEENT: EOMI, PERRL. Sclerae are anicteric. Oral mucosa is pink and moist with no lesions or thrush.   Lymph: Neck is supple with no lymphadenopathy in the cervical or supraclavicular areas.   Heart: Regular rate and rhythm.   Lungs: Clear to auscultation bilaterally.   Abdomen: Bowel sounds present, soft, nontender with no palpable hepatosplenomegaly or masses.   Extremities: No lower extremity edema noted bilaterally.   Neuro: Cranial nerves II through XII are grossly intact.  Skin: Healing scalp  reconstruction and skin grafting without drainage or erythema. Healed incision to left back from grafting site.   Psych: Depressed mood, appropriate affect.     Labs:   Infusion Therapy Visit on 10/14/2021   Component Date Value Ref Range Status     Sodium 10/14/2021 138  133 - 144 mmol/L Final     Potassium 10/14/2021 3.6  3.4 - 5.3 mmol/L Final     Chloride 10/14/2021 102  94 - 109 mmol/L Final     Carbon Dioxide (CO2) 10/14/2021 31  20 - 32 mmol/L Final     Anion Gap 10/14/2021 5  3 - 14 mmol/L Final     Urea Nitrogen 10/14/2021 22  7 - 30 mg/dL Final     Creatinine 10/14/2021 1.57* 0.66 - 1.25 mg/dL Final     Calcium 10/14/2021 8.8  8.5 - 10.1 mg/dL Final     Glucose 10/14/2021 101* 70 - 99 mg/dL Final     Alkaline Phosphatase 10/14/2021 55  40 - 150 U/L Final     AST 10/14/2021 14  0 - 45 U/L Final     ALT 10/14/2021 19  0 - 70 U/L Final     Protein Total 10/14/2021 7.2  6.8 - 8.8 g/dL Final     Albumin 10/14/2021 3.5  3.4 - 5.0 g/dL Final     Bilirubin Total 10/14/2021 0.4  0.2 - 1.3 mg/dL Final     GFR Estimate 10/14/2021 43* >60 mL/min/1.73m2 Final     TSH 10/14/2021 2.22  0.40 - 4.00 mU/L Final     Magnesium 10/14/2021 2.0  1.6 - 2.3 mg/dL Final     Phosphorus 10/14/2021 2.8  2.5 - 4.5 mg/dL Final     WBC Count 10/14/2021 4.9  4.0 - 11.0 10e3/uL Final     RBC Count 10/14/2021 4.89  4.40 - 5.90 10e6/uL Final     Hemoglobin 10/14/2021 12.9* 13.3 - 17.7 g/dL Final     Hematocrit 10/14/2021 39.2* 40.0 - 53.0 % Final     MCV 10/14/2021 80  78 - 100 fL Final     MCH 10/14/2021 26.4* 26.5 - 33.0 pg Final     MCHC 10/14/2021 32.9  31.5 - 36.5 g/dL Final     RDW 10/14/2021 13.7  10.0 - 15.0 % Final     Platelet Count 10/14/2021 241  150 - 450 10e3/uL Final     % Neutrophils 10/14/2021 54  % Final     % Lymphocytes 10/14/2021 31  % Final     % Monocytes 10/14/2021 10  % Final     % Eosinophils 10/14/2021 4  % Final     % Basophils 10/14/2021 1  % Final     % Immature Granulocytes 10/14/2021 0  % Final     NRBCs per  100 WBC 10/14/2021 0  <1 /100 Final     Absolute Neutrophils 10/14/2021 2.6  1.6 - 8.3 10e3/uL Final     Absolute Lymphocytes 10/14/2021 1.5  0.8 - 5.3 10e3/uL Final     Absolute Monocytes 10/14/2021 0.5  0.0 - 1.3 10e3/uL Final     Absolute Eosinophils 10/14/2021 0.2  0.0 - 0.7 10e3/uL Final     Absolute Basophils 10/14/2021 0.1  0.0 - 0.2 10e3/uL Final     Absolute Immature Granulocytes 10/14/2021 0.0  <=0.0 10e3/uL Final     Absolute NRBCs 10/14/2021 0.0  10e3/uL Final     I reviewed the above labs today.      ASSESSMENT AND PLAN    #1 Cutaneous melanoma, scalp primary, pT2a cN1c, clinical Stage IIIB.  It was a pleasure to see Mr. Coleman today. He is a 76 year old  with agent orange cutaneous exposures in the [place] war and a diagnosis of malignant melanoma arising from a pre-existing pigmented lesion on the scalp. He had a partial response to neoadjuvant therapy with atezolizumab (3 cycles), vemurafenib, and cobimetinib (4 cycles) with decrease in thickening of the large scalp tumor and lightening of the periphery and in the middle part of the tumor then had surgery, which showed only tumoral melanosis. He started adjuvant nivolumab on 6/25/21. Overall plan: 9 total cycles of nivolumab q 4 weeks (=12 cycles of immunotherapy total including previous 3 cycles of atezo). Next follow-up with dermatology is scheduled for December 2021.  -He has tolerated adjuvant nivolumab well overall. Labs today reviewed and other than mild hypokalemia, which can be replaced in infusion, Ok to proceed with cycle 5 of nivolumab.  -RTC in 4 weeks to see me as scheduled    #2 Depressed mood, anxiety   Verbalizes fluctuating depression and anxiety, multifactorial. PHQ-9 is 7 today. He has a longstanding history of PTSD. Currently works with a psychiatrist out of [employer] who he spoke with last week. States he was started on a new medication for PTSD but this was ineffective. Continues to use lorazepam occasionally.    #3  Diarrhea  Resolved, and likely secondary to his treatment, complicated by possible IBS. Uses Lomotil infrequently.      Oksana Peñaloza M.D.   of Medicine  Hematology, Oncology and Transplantation

## 2021-10-14 NOTE — NURSING NOTE
Chief Complaint   Patient presents with     Blood Draw     Labs drawn via ultrasound PIV placed by RN in lab. VS taken.      Labs drawn from PIV placed by RN. Line flushed with saline. Vitals taken. Pt checked in for appointment(s).    Mckayla Eagle RN

## 2021-10-14 NOTE — PROGRESS NOTES
Infusion Nursing Note:  Ahmet Coleman presents today for Cycle 5, day 1 Nivolumab.    Patient seen by provider today: Yes: Dr. Bowen Hernández    Note: Patient presents to clinic today feeling well with no questions.  Pt did not request or require any intervention for pain today.  Pt declined to collect COVID test today/has completed vaccination.    Intravenous Access:  Peripheral IV placed.    Treatment Conditions:  Lab Results   Component Value Date    HGB 12.9 (L) 10/14/2021    WBC 4.9 10/14/2021    ANEU 2.6 06/25/2021    ANEUTAUTO 2.6 10/14/2021     10/14/2021      Lab Results   Component Value Date     10/14/2021    POTASSIUM 3.6 10/14/2021    MAG 2.0 10/14/2021    CR 1.57 (H) 10/14/2021    STEPHEN 8.8 10/14/2021    BILITOTAL 0.4 10/14/2021    ALBUMIN 3.5 10/14/2021    ALT 19 10/14/2021    AST 14 10/14/2021     Results reviewed, labs MET treatment parameters, ok to proceed with treatment.  Notified Dr. Hernández of elevated creatinine, though within infusion parameters.  Encouraged PO fluids, pt verbalized understanding.    Post Infusion Assessment:  Patient tolerated infusion without incident.  Blood return noted pre and post infusion.  Site patent and intact, free from redness, edema or discomfort.  No evidence of extravasations.  Access discontinued per protocol.    Discharge Plan:   Patient declined prescription refills.  Discharge instructions reviewed with: Patient.  Patient and/or family verbalized understanding of discharge instructions and all questions answered.  AVS to patient via [employer].  Patient will return 11/11/2021 for next appointment.   Patient discharged in stable condition accompanied by: self.  Departure Mode: Ambulatory.    Cammie Grier RN

## 2021-10-14 NOTE — LETTER
10/14/2021         RE: hAmet Coleman  [address]  [place] [address]        Dear Colleague,    Thank you for referring your patient, Ahmet Coleman, to the [facility]. Please see a copy of my visit note below.    Lloyd is a 76 year old who is being evaluated via a billable video visit.      How would you like to obtain your AVS? MyChart  If the video visit is dropped, the invitation should be resent by: Send to e-mail at: [email or web address]  Will anyone else be joining your video visit? Yes, his daughter.       Video-Visit Details  Type of service:  Video Visit  Video Start Time: 10:15 AM  Video End Time:10:45 AM  Originating Location (pt. Location): Home  Distant Location (provider location):  [facility]   Platform used for Video Visit: [facility] ONCOLOGY PROGRESS NOTE  Melanoma Clinic  Oct 14, 2021    CHIEF COMPLAINT: Scalp melanoma    Melanoma History:  1. He has a small pigmented scalp lesion present for over 30 years. The lesion was biopsied in 1992 and was benign.  2. October 2020, he presented to [facility] with 1 year of progressive enlargement of the lesion and new onset burning, itching, and stinging sensation in the affected scalp.  3. 10/26/20, He was seen at [facility] and he had shave biopsies of A. left central parietal scalp, B. left central occipital scalp, C. Left posterior parietal scalp, and D. punch biopsy of the left superior occipital scalp. Pathology (specimen: [id number]) showed a nodular and nevoid type melanoma, non-ulcerated, Breslow depth up to 1.3 mm, Saurabh's level IV, and up to 3 mitoses per mm2. All margins were involved. Ki-67 10-20%. Sox10 stain is positive and highlights an atypical compound melanocytic proliferation with significant overlying confluence and pagetosis of intraepidermal melanocytes. T-stage: at least pT2a. PD-L1 staining shows PD-L1 TPS <1%. 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  4. 11/25/20, he was seen by Dr. Garcia and he took three 3mm punch biopsies, which returned as dermal melanocytic proliferations with melanophages and fibrosis, consistent with locoregional metastatic melanoma.  5. 12/8/2020 he had PET-CT, which showed FDG avid irregular skin thickening overlying the left parietal region, with no FDG avid lymphadenopathy in the neck and no evidence of metastasis elsewhere in the body.  6. 1/22/2021 start vemurafenib and cobimetinib, ~2/12/21 held for diarrhea, then resumed.  7. 2/18/2021 Start atezolizumab, last on 4/29/21  8. 5/24/2021 Wide local excision of the scalp melanoma and left latissimus free flap for scalp reconstruction.    Surgical pathology showed features consistent with scar and tumoral melanosis. No definite residual melanoma is seen. Tumoral melanosis (which extends to a depth of around 1.1 mm) is believed to be equivalent to a diagnosis of regressed melanoma.   9. 6/25/21 Start adjuvant Nivolumab     INTERVAL HISTORY  Lloyd is doing well and tolerating his treatments. He continues to have occasional loose stools, but this is consistent with his pre-existing IBD. Notes loose stools worsen with certain foods like chocolates, greasy deep fried foods, so he he tries to avoid them. He uses lomotil only occasionally.    No changes to vision but does have occasional dry eyes, which he says also pre-exist immunotherapy.  No skin concerns or rashes. Denies shortness of breath or cough. Denies significant change in appetite, and has 1 large meal a day otherwise snacking throughout the day.    He reports his mood is stable. No thoughts of hurting himself or others. Continues to use ativan occasionally for anxiety which is somewhat effective. He takes it once or twice a week.He continues talking to a therapist virtually, but finds it less therapeutic than in person therapy which aimed primarily on PTSD. He has a good support system at  home lives with daughter, grand daughter. He is lonely but Covid pandemic makes it hard to leave the house. His father passed away December 2020.    PHQ-9 (Patient Health Questionnaire-9) on 10/14/2021    RESULT SUMMARY:  7 points  Scores 5-9 suggest mild depression which may require only watchful waiting and repeated PHQ-9 at followup.    Functionally, the patient does not report limitations due to their symptoms.    INPUTS:  Little interest or pleasure in doing things? --> 0 = Not at all  Feeling down, depressed, or hopeless? --> 0 = Not at all  Trouble falling or staying asleep, or sleeping too much? --> 1 = Several days  Feeling tired or having little energy? --> 3 = Nearly every day  Poor appetite or overeating? --> 0 = Not at all  Feeling bad about yourself -- or that you are a failure or have let yourself or your family down? --> 0 = Not at all  Trouble concentrating on things, such as reading the newspaper or watching television? --> 3 = Nearly every day  Moving or speaking so slowly that other people could have noticed? Or so fidgety or restless that you have been moving a lot more than usual? --> 0 = Not at all  Thoughts that you would be better off dead, or thoughts of hurting yourself in some way? --> 0 = Not at all        Review of Systems:    Current Outpatient Medications   Medication Sig Dispense Refill     acetaminophen (TYLENOL) 500 MG tablet Take 2 tablets (1,000 mg) by mouth every 8 hours as needed for mild pain 100 tablet 0     cholecalciferol (VITAMIN D3) 1000 UNIT tablet Take 1,000 Units by mouth daily        diphenoxylate-atropine (LOMOTIL) 2.5-0.025 MG tablet Take 1-2 tablets by mouth 4 times daily as needed for diarrhea 120 tablet 5     docusate sodium (DSS) 100 MG capsule TAKE ONE CAPSULE BY MOUTH EVERY DAY FOR STOOL SOFTENING       fluvoxaMINE (LUVOX) 100 MG tablet Take 150 mg at bedtime       hydrochlorothiazide (HYDRODIURIL) 25 MG tablet Take 25 mg by mouth daily.       Hypromellose  (ARTIFICIAL TEARS OP) Apply  to eye. 2 drops in each eye twice a day as needed       LORazepam (ATIVAN) 0.5 MG tablet Take 1 tablet (0.5 mg) by mouth every 4 hours as needed (Anxiety, Nausea/Vomiting or Sleep) 30 tablet 3     Magnesium Oxide 420 MG TABS Take 420 mg by mouth daily       methocarbamol (ROBAXIN) 500 MG tablet Take 1 tablet (500 mg) by mouth 3 times daily as needed for muscle spasms 10 tablet 0     omeprazole (PRILOSEC) 20 MG capsule Take 1 capsule by mouth 2 times daily. 60 capsule prn     polyethylene glycol (MIRALAX) 17 GM/Dose powder Take 17 g by mouth daily 510 g 0     pregabalin (LYRICA) 150 MG capsule Take 150 mg by mouth 2 times daily       prochlorperazine (COMPAZINE) 10 MG tablet Take 1 tablet (10 mg) by mouth every 6 hours as needed (Nausea/Vomiting) 30 tablet 2     QUEtiapine (SEROQUEL) 300 MG tablet Take 150 mg by mouth At Bedtime        senna-docusate (SENOKOT-S/PERICOLACE) 8.6-50 MG tablet Take 1 tablet by mouth 2 times daily 30 tablet 0     simvastatin (ZOCOR) 80 MG tablet Take 40 mg by mouth At Bedtime        traZODone (DESYREL) 100 MG tablet Take 100 mg by mouth At Bedtime        vortioxetine (TRINTELLIX) 20 MG tablet TAKE ONE TABLET BY MOUTH EVERY MORNING       Lactobacillus-Inulin (CULTURELLE DIGESTIVE HEALTH) CAPS 1 tab daily (Patient not taking: Reported on 10/14/2021) 30 capsule 1     methocarbamol (ROBAXIN) 500 MG tablet Take 1 tablet (500 mg) by mouth 3 times daily as needed for muscle spasms (Patient not taking: Reported on 10/14/2021) 15 tablet 0     prochlorperazine (COMPAZINE) 10 MG tablet Take 1 tablet (10 mg) by mouth every 6 hours as needed (Nausea/Vomiting) (Patient not taking: Reported on 10/14/2021) 30 tablet 3     Objective:  HEENT: EOMI, PERRL. Sclerae are anicteric. Oral mucosa is pink and moist with no lesions or thrush.   Lymph: Neck is supple with no lymphadenopathy in the cervical or supraclavicular areas.   Heart: Regular rate and rhythm.   Lungs: Clear to  auscultation bilaterally.   Abdomen: Bowel sounds present, soft, nontender with no palpable hepatosplenomegaly or masses.   Extremities: No lower extremity edema noted bilaterally.   Neuro: Cranial nerves II through XII are grossly intact.  Skin: Healing scalp reconstruction and skin grafting without drainage or erythema. Healed incision to left back from grafting site.   Psych: Depressed mood, appropriate affect.     Labs:   Infusion Therapy Visit on 10/14/2021   Component Date Value Ref Range Status     Sodium 10/14/2021 138  133 - 144 mmol/L Final     Potassium 10/14/2021 3.6  3.4 - 5.3 mmol/L Final     Chloride 10/14/2021 102  94 - 109 mmol/L Final     Carbon Dioxide (CO2) 10/14/2021 31  20 - 32 mmol/L Final     Anion Gap 10/14/2021 5  3 - 14 mmol/L Final     Urea Nitrogen 10/14/2021 22  7 - 30 mg/dL Final     Creatinine 10/14/2021 1.57* 0.66 - 1.25 mg/dL Final     Calcium 10/14/2021 8.8  8.5 - 10.1 mg/dL Final     Glucose 10/14/2021 101* 70 - 99 mg/dL Final     Alkaline Phosphatase 10/14/2021 55  40 - 150 U/L Final     AST 10/14/2021 14  0 - 45 U/L Final     ALT 10/14/2021 19  0 - 70 U/L Final     Protein Total 10/14/2021 7.2  6.8 - 8.8 g/dL Final     Albumin 10/14/2021 3.5  3.4 - 5.0 g/dL Final     Bilirubin Total 10/14/2021 0.4  0.2 - 1.3 mg/dL Final     GFR Estimate 10/14/2021 43* >60 mL/min/1.73m2 Final     TSH 10/14/2021 2.22  0.40 - 4.00 mU/L Final     Magnesium 10/14/2021 2.0  1.6 - 2.3 mg/dL Final     Phosphorus 10/14/2021 2.8  2.5 - 4.5 mg/dL Final     WBC Count 10/14/2021 4.9  4.0 - 11.0 10e3/uL Final     RBC Count 10/14/2021 4.89  4.40 - 5.90 10e6/uL Final     Hemoglobin 10/14/2021 12.9* 13.3 - 17.7 g/dL Final     Hematocrit 10/14/2021 39.2* 40.0 - 53.0 % Final     MCV 10/14/2021 80  78 - 100 fL Final     MCH 10/14/2021 26.4* 26.5 - 33.0 pg Final     MCHC 10/14/2021 32.9  31.5 - 36.5 g/dL Final     RDW 10/14/2021 13.7  10.0 - 15.0 % Final     Platelet Count 10/14/2021 241  150 - 450 10e3/uL Final      % Neutrophils 10/14/2021 54  % Final     % Lymphocytes 10/14/2021 31  % Final     % Monocytes 10/14/2021 10  % Final     % Eosinophils 10/14/2021 4  % Final     % Basophils 10/14/2021 1  % Final     % Immature Granulocytes 10/14/2021 0  % Final     NRBCs per 100 WBC 10/14/2021 0  <1 /100 Final     Absolute Neutrophils 10/14/2021 2.6  1.6 - 8.3 10e3/uL Final     Absolute Lymphocytes 10/14/2021 1.5  0.8 - 5.3 10e3/uL Final     Absolute Monocytes 10/14/2021 0.5  0.0 - 1.3 10e3/uL Final     Absolute Eosinophils 10/14/2021 0.2  0.0 - 0.7 10e3/uL Final     Absolute Basophils 10/14/2021 0.1  0.0 - 0.2 10e3/uL Final     Absolute Immature Granulocytes 10/14/2021 0.0  <=0.0 10e3/uL Final     Absolute NRBCs 10/14/2021 0.0  10e3/uL Final     I reviewed the above labs today.      ASSESSMENT AND PLAN    #1 Cutaneous melanoma, scalp primary, pT2a cN1c, clinical Stage IIIB.  It was a pleasure to see Mr. Coleman today. He is a 76 year old  with agent orange cutaneous exposures in the [place] war and a diagnosis of malignant melanoma arising from a pre-existing pigmented lesion on the scalp. He had a partial response to neoadjuvant therapy with atezolizumab (3 cycles), vemurafenib, and cobimetinib (4 cycles) with decrease in thickening of the large scalp tumor and lightening of the periphery and in the middle part of the tumor then had surgery, which showed only tumoral melanosis. He started adjuvant nivolumab on 6/25/21. Overall plan: 9 total cycles of nivolumab q 4 weeks (=12 cycles of immunotherapy total including previous 3 cycles of atezo). Next follow-up with dermatology is scheduled for December 2021.  -He has tolerated adjuvant nivolumab well overall. Labs today reviewed and other than mild hypokalemia, which can be replaced in infusion, Ok to proceed with cycle 5 of nivolumab.  -RTC in 4 weeks to see me as scheduled    #2 Depressed mood, anxiety   Verbalizes fluctuating depression and anxiety, multifactorial. PHQ-9 is  7 today. He has a longstanding history of PTSD. Currently works with a psychiatrist out of [employer] who he spoke with last week. States he was started on a new medication for PTSD but this was ineffective. Continues to use lorazepam occasionally.    #3 Diarrhea  Resolved, and likely secondary to his treatment, complicated by possible IBS. Uses Lomotil infrequently.          Again, thank you for allowing me to participate in the care of your patient.      Sincerely,    Oksana Hernández MD

## 2021-10-14 NOTE — PATIENT INSTRUCTIONS
Contact Numbers    [facility] Main Line/[facility]: [phone]    Call with chills and/or temperature greater than or equal to 100.5, uncontrolled nausea/vomiting, diarrhea, constipation, dizziness, shortness of breath, chest pain, bleeding, unexplained bruising, or any new/concerning symptoms, questions/concerns.     If you are having any concerning symptoms or wish to speak to a provider before your next infusion visit, please call your care coordinator or triage to notify them so we can adequately serve you.       After Hours: [phone]    If after hours, weekends, or holidays, call main hospital  and ask for Oncology doctor on call.       October 2021 Sunday Monday Tuesday Wednesday Thursday Friday Saturday                            1     2       3     4     5     6     7     8     9       10     11     12     13     14    VIDEO VISIT RETURN  10:00 AM   (30 min.)   Oksana Ashton MD   [facility]    LAB PERIPHERAL  11:30 AM   (15 min.)   [facility] DRAW   [facility]    ONC INFUSION 1 HR (60 MIN)  12:00 PM   (60 min.)    ONC INFUSION NURSE   [facility] 15     16       17     18     19     20     21     22     23       24     25     26     27     28     29     30       31                                               November 2021 Sunday Monday Tuesday Wednesday Thursday Friday Saturday        1     2     3     4     5    PET ONCOLOGY WHOLE BODY  11:00 AM   (45 min.)   [id number]   [facility] 6       7     8     9     10     11    LAB PERIPHERAL   9:30 AM   (15 min.)   [facility] DRAW   [facility]    VIDEO VISIT RETURN  10:00 AM   (30 min.)   Oksana Ashton MD   [facility]    ONC INFUSION 1 HR (60 MIN)  12:00 PM   (60 min.)   [facility] ONC INFUSION NURSE   [facility] 12     13        14     15     16     17     18     19     20       21     22     23     24     25     26     27       28     29     30                                         Lab Results:  Recent Results (from the past 12 hour(s))   CBC with platelets and differential    Collection Time: 10/14/21 12:02 PM   Result Value Ref Range    WBC Count 4.9 4.0 - 11.0 10e3/uL    RBC Count 4.89 4.40 - 5.90 10e6/uL    Hemoglobin 12.9 (L) 13.3 - 17.7 g/dL    Hematocrit 39.2 (L) 40.0 - 53.0 %    MCV 80 78 - 100 fL    MCH 26.4 (L) 26.5 - 33.0 pg    MCHC 32.9 31.5 - 36.5 g/dL    RDW 13.7 10.0 - 15.0 %    Platelet Count 241 150 - 450 10e3/uL    % Neutrophils 54 %    % Lymphocytes 31 %    % Monocytes 10 %    % Eosinophils 4 %    % Basophils 1 %    % Immature Granulocytes 0 %    NRBCs per 100 WBC 0 <1 /100    Absolute Neutrophils 2.6 1.6 - 8.3 10e3/uL    Absolute Lymphocytes 1.5 0.8 - 5.3 10e3/uL    Absolute Monocytes 0.5 0.0 - 1.3 10e3/uL    Absolute Eosinophils 0.2 0.0 - 0.7 10e3/uL    Absolute Basophils 0.1 0.0 - 0.2 10e3/uL    Absolute Immature Granulocytes 0.0 <=0.0 10e3/uL    Absolute NRBCs 0.0 10e3/uL

## 2021-10-23 ENCOUNTER — HEALTH MAINTENANCE LETTER (OUTPATIENT)
Age: 76
End: 2021-10-23

## 2021-11-11 ENCOUNTER — APPOINTMENT (OUTPATIENT)
Dept: LAB | Facility: CLINIC | Age: 76
End: 2021-11-11
Attending: INTERNAL MEDICINE
Payer: MEDICARE

## 2021-11-11 ENCOUNTER — VIRTUAL VISIT (OUTPATIENT)
Dept: ONCOLOGY | Facility: CLINIC | Age: 76
End: 2021-11-11
Attending: INTERNAL MEDICINE
Payer: MEDICARE

## 2021-11-11 ENCOUNTER — ALLIED HEALTH/NURSE VISIT (OUTPATIENT)
Dept: NURSING | Facility: CLINIC | Age: 76
End: 2021-11-11
Payer: MEDICARE

## 2021-11-11 VITALS
SYSTOLIC BLOOD PRESSURE: 151 MMHG | RESPIRATION RATE: 16 BRPM | OXYGEN SATURATION: 96 % | TEMPERATURE: 97.7 F | HEART RATE: 96 BPM | BODY MASS INDEX: 30.87 KG/M2 | WEIGHT: 203 LBS | DIASTOLIC BLOOD PRESSURE: 84 MMHG

## 2021-11-11 DIAGNOSIS — Z78.9 TRIAGE ASSESSMENT CLASS 2, URGENT: Primary | ICD-10-CM

## 2021-11-11 DIAGNOSIS — C43.4 MALIGNANT MELANOMA OF SCALP (H): ICD-10-CM

## 2021-11-11 LAB
BASOPHILS # BLD AUTO: 0.1 10E3/UL (ref 0–0.2)
BASOPHILS NFR BLD AUTO: 1 %
EOSINOPHIL # BLD AUTO: 0.2 10E3/UL (ref 0–0.7)
EOSINOPHIL NFR BLD AUTO: 3 %
ERYTHROCYTE [DISTWIDTH] IN BLOOD BY AUTOMATED COUNT: 14.4 % (ref 10–15)
HCT VFR BLD AUTO: 39.3 % (ref 40–53)
HGB BLD-MCNC: 13 G/DL (ref 13.3–17.7)
IMM GRANULOCYTES # BLD: 0 10E3/UL
IMM GRANULOCYTES NFR BLD: 0 %
LYMPHOCYTES # BLD AUTO: 1.5 10E3/UL (ref 0.8–5.3)
LYMPHOCYTES NFR BLD AUTO: 31 %
MAGNESIUM SERPL-MCNC: 2.2 MG/DL (ref 1.6–2.3)
MCH RBC QN AUTO: 27.5 PG (ref 26.5–33)
MCHC RBC AUTO-ENTMCNC: 33.1 G/DL (ref 31.5–36.5)
MCV RBC AUTO: 83 FL (ref 78–100)
MONOCYTES # BLD AUTO: 0.5 10E3/UL (ref 0–1.3)
MONOCYTES NFR BLD AUTO: 10 %
NEUTROPHILS # BLD AUTO: 2.7 10E3/UL (ref 1.6–8.3)
NEUTROPHILS NFR BLD AUTO: 55 %
NRBC # BLD AUTO: 0 10E3/UL
NRBC BLD AUTO-RTO: 0 /100
PHOSPHATE SERPL-MCNC: 2.9 MG/DL (ref 2.5–4.5)
PLATELET # BLD AUTO: 236 10E3/UL (ref 150–450)
RBC # BLD AUTO: 4.72 10E6/UL (ref 4.4–5.9)
WBC # BLD AUTO: 4.9 10E3/UL (ref 4–11)

## 2021-11-11 PROCEDURE — G0463 HOSPITAL OUTPT CLINIC VISIT: HCPCS | Mod: PN,RTG | Performed by: INTERNAL MEDICINE

## 2021-11-11 PROCEDURE — 999N001193 HC VIDEO/TELEPHONE VISIT; NO CHARGE

## 2021-11-11 PROCEDURE — 83735 ASSAY OF MAGNESIUM: CPT | Mod: GT | Performed by: INTERNAL MEDICINE

## 2021-11-11 PROCEDURE — 84100 ASSAY OF PHOSPHORUS: CPT | Mod: GT | Performed by: INTERNAL MEDICINE

## 2021-11-11 PROCEDURE — 99207 PR NO CHARGE NURSE ONLY: CPT

## 2021-11-11 PROCEDURE — 85025 COMPLETE CBC W/AUTO DIFF WBC: CPT | Mod: GT | Performed by: INTERNAL MEDICINE

## 2021-11-11 PROCEDURE — 99215 OFFICE O/P EST HI 40 MIN: CPT | Mod: 95 | Performed by: INTERNAL MEDICINE

## 2021-11-11 ASSESSMENT — PAIN SCALES - GENERAL: PAINLEVEL: NO PAIN (0)

## 2021-11-11 NOTE — NURSING NOTE
Chief Complaint   Patient presents with     Video Visit     Follow Up prior to Infusion     Blood Draw     Labs drawn via piv by RN in lab.  VS taken       Labs drawn from PIV placed by RN. Line flushed with saline. Vitals taken. Pt checked in for appointments.    Harriett Gee RN

## 2021-11-11 NOTE — NURSING NOTE
Patient arrived at [facility] requesting to have his IV removed.  Patient said that he had labs drawn from the IV site earlier and then he had a virtual visit with Dr. Wiseman and that his infusion appointment at 12:00PM was canceled.  He says he left for home without getting someone to remove the IV.  RN called Oncology Infusion center and received a verbal order to discontinue/remove peripheral IV for patient from Romi Zavaleta RN for Oksana House MD.  Peripheral IV removed from inner left forearm without difficulty.   Britt Stanley RN on 11/11/2021 at 12:56 PM

## 2021-11-11 NOTE — PROGRESS NOTES
Patient arrived at [facility] requesting to have his IV removed.  Patient said that he had labs drawn from the IV site earlier and then he had a virtual visit with Dr. Wiseman and that his infusion appointment at 12:00PM was canceled.  He says he left for home without getting someone to remove the IV.  RN called Oncology Infusion center and received a verbal order to discontinue/remove peripheral IV for patient from Romi Zavaleta RN for Oksana House MD.  Peripheral IV removed from inner left forearm without difficulty.   Britt Stanley RN on 11/11/2021 at 12:56 PM

## 2021-11-11 NOTE — LETTER
11/11/2021         RE: Ahmet Coleman  [address]  [place] [address]        Dear Colleague,    Thank you for referring your patient, Ahmet Coleman, to the [facility]. Please see a copy of my visit note below.    MEDICAL ONCOLOGY PROGRESS NOTE  Melanoma Clinic  Nov 11, 2021    CHIEF COMPLAINT: Scalp melanoma    Melanoma History:  1. He has a small pigmented scalp lesion present for over 30 years. The lesion was biopsied in 1992 and was benign.  2. October 2020, he presented to [facility] with 1 year of progressive enlargement of the lesion and new onset burning, itching, and stinging sensation in the affected scalp.  3. 10/26/20, He was seen at [facility] and he had shave biopsies of A. left central parietal scalp, B. left central occipital scalp, C. Left posterior parietal scalp, and D. punch biopsy of the left superior occipital scalp. Pathology (specimen: [id number]) showed a nodular and nevoid type melanoma, non-ulcerated, Breslow depth up to 1.3 mm, Saurabh's level IV, and up to 3 mitoses per mm2. All margins were involved. Ki-67 10-20%. Sox10 stain is positive and highlights an atypical compound melanocytic proliferation with significant overlying confluence and pagetosis of intraepidermal melanocytes. T-stage: at least pT2a. PD-L1 staining shows PD-L1 TPS <1%. 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  4. 11/25/20, he was seen by Dr. Garcia and he took three 3mm punch biopsies, which returned as dermal melanocytic proliferations with melanophages and fibrosis, consistent with locoregional metastatic melanoma.  5. 12/8/2020 he had PET-CT, which showed FDG avid irregular skin thickening overlying the left parietal region, with no FDG avid lymphadenopathy in the neck and no evidence of metastasis elsewhere in the body.  6. 1/22/2021 start vemurafenib and cobimetinib, ~2/12/21 held for diarrhea, then resumed.  7. 2/18/2021 Start  atezolizumab, last on 4/29/21  8. 5/24/2021 Wide local excision of the scalp melanoma and left latissimus free flap for scalp reconstruction.    Surgical pathology showed features consistent with scar and tumoral melanosis. No definite residual melanoma is seen. Tumoral melanosis (which extends to a depth of around 1.1 mm) is believed to be equivalent to a diagnosis of regressed melanoma.   9. 6/25/21 Start of adjuvant Nivolumab     INTERVAL HISTORY  Lloyd is doing well and tolerating his treatments. He continues to have occasional loose stools, but this is consistent with his pre-existing IBD. He notes loose stools worsen with certain foods like chocolates, greasy deep fried foods, so he he tries to avoid them. He uses lomotil only occasionally. Today, however, he notes some worsening of his shortness of breath. He notes this is progressive in nature over the last few months. However, he denies cough.    He reports his mood is stable. No thoughts of hurting himself or others. Continues to use ativan occasionally for anxiety which is somewhat effective. He takes it once or twice a week. He requests that I refill the Ativan.    Review of Systems:  A 12-point ROS negative except as in HPI    Current Outpatient Medications   Medication Sig Dispense Refill     acetaminophen (TYLENOL) 500 MG tablet Take 2 tablets (1,000 mg) by mouth every 8 hours as needed for mild pain 100 tablet 0     cholecalciferol (VITAMIN D3) 1000 UNIT tablet Take 1,000 Units by mouth daily        diphenoxylate-atropine (LOMOTIL) 2.5-0.025 MG tablet Take 1-2 tablets by mouth 4 times daily as needed for diarrhea 120 tablet 5     docusate sodium (DSS) 100 MG capsule TAKE ONE CAPSULE BY MOUTH EVERY DAY FOR STOOL SOFTENING       fluvoxaMINE (LUVOX) 100 MG tablet Take 150 mg at bedtime       hydrochlorothiazide (HYDRODIURIL) 25 MG tablet Take 25 mg by mouth daily.       Hypromellose (ARTIFICIAL TEARS OP) Apply  to eye. 2 drops in each eye twice a day as  needed       Lactobacillus-Inulin ([facility] DIGESTIVE [facility]) CAPS 1 tab daily (Patient not taking: Reported on 10/14/2021) 30 capsule 1     LORazepam (ATIVAN) 0.5 MG tablet Take 1 tablet (0.5 mg) by mouth every 4 hours as needed (Anxiety, Nausea/Vomiting or Sleep) 30 tablet 3     Magnesium Oxide 420 MG TABS Take 420 mg by mouth daily       methocarbamol (ROBAXIN) 500 MG tablet Take 1 tablet (500 mg) by mouth 3 times daily as needed for muscle spasms (Patient not taking: Reported on 10/14/2021) 15 tablet 0     methocarbamol (ROBAXIN) 500 MG tablet Take 1 tablet (500 mg) by mouth 3 times daily as needed for muscle spasms 10 tablet 0     omeprazole (PRILOSEC) 20 MG capsule Take 1 capsule by mouth 2 times daily. 60 capsule prn     polyethylene glycol (MIRALAX) 17 GM/Dose powder Take 17 g by mouth daily 510 g 0     pregabalin (LYRICA) 150 MG capsule Take 150 mg by mouth 2 times daily       prochlorperazine (COMPAZINE) 10 MG tablet Take 1 tablet (10 mg) by mouth every 6 hours as needed (Nausea/Vomiting) (Patient not taking: Reported on 10/14/2021) 30 tablet 3     prochlorperazine (COMPAZINE) 10 MG tablet Take 1 tablet (10 mg) by mouth every 6 hours as needed (Nausea/Vomiting) 30 tablet 2     QUEtiapine (SEROQUEL) 300 MG tablet Take 150 mg by mouth At Bedtime        senna-docusate (SENOKOT-S/PERICOLACE) 8.6-50 MG tablet Take 1 tablet by mouth 2 times daily 30 tablet 0     simvastatin (ZOCOR) 80 MG tablet Take 40 mg by mouth At Bedtime        traZODone (DESYREL) 100 MG tablet Take 100 mg by mouth At Bedtime        vortioxetine (TRINTELLIX) 20 MG tablet TAKE ONE TABLET BY MOUTH EVERY MORNING       Objective:  HEENT: EOMI, PERRL. Sclerae are anicteric. Oral mucosa is pink and moist with no lesions or thrush.   Lymph: Neck is supple with no lymphadenopathy in the cervical or supraclavicular areas.   Heart: Regular rate and rhythm.   Lungs: Clear to auscultation bilaterally.   Abdomen: Bowel sounds present, soft, nontender  with no palpable hepatosplenomegaly or masses.   Extremities: No lower extremity edema noted bilaterally.   Neuro: Cranial nerves II through XII are grossly intact.  Skin: Healing scalp reconstruction and skin grafting without drainage or erythema. Healed incision to left back from grafting site.   Psych: Depressed mood, appropriate affect.     Labs:   Virtual Visit on 11/11/2021   Component Date Value     WBC Count 11/11/2021 4.9      RBC Count 11/11/2021 4.72      Hemoglobin 11/11/2021 13.0*     Hematocrit 11/11/2021 39.3*     MCV 11/11/2021 83      MCH 11/11/2021 27.5      MCHC 11/11/2021 33.1      RDW 11/11/2021 14.4      Platelet Count 11/11/2021 236      % Neutrophils 11/11/2021 55      % Lymphocytes 11/11/2021 31      % Monocytes 11/11/2021 10      % Eosinophils 11/11/2021 3      % Basophils 11/11/2021 1      % Immature Granulocytes 11/11/2021 0      NRBCs per 100 WBC 11/11/2021 0      Absolute Neutrophils 11/11/2021 2.7      Absolute Lymphocytes 11/11/2021 1.5      Absolute Monocytes 11/11/2021 0.5      Absolute Eosinophils 11/11/2021 0.2      Absolute Basophils 11/11/2021 0.1      Absolute Immature Granul* 11/11/2021 0.0      Absolute NRBCs 11/11/2021 0.0      I reviewed the above labs today.      ASSESSMENT AND PLAN    #1 Cutaneous melanoma, scalp primary, pT2a cN1c, clinical Stage IIIB.  It was a pleasure to see Mr. Coleman today. He is a 76 year old  with agent orange cutaneous exposures in the [place] war and a diagnosis of malignant melanoma arising from a pre-existing pigmented lesion on the scalp. He had a partial response to neoadjuvant therapy with atezolizumab (3 cycles), vemurafenib, and cobimetinib (4 cycles) with decrease in thickening of the large scalp tumor and lightening of the periphery and in the middle part of the tumor then had surgery, which showed only tumoral melanosis. He started adjuvant nivolumab on 6/25/21. Overall plan: 9 total cycles of nivolumab q 4 weeks (=12 cycles of  immunotherapy total including previous 3 cycles of atezo). Next follow-up with dermatology is scheduled for December 2021.  -concern for possible pneumonitis, given worsening SOB, we will post-pone today's infusion while we work-up further.    #2 Depressed mood, anxiety   Verbalizes fluctuating depression and anxiety, multifactorial. PHQ-9 is 7 today. He has a longstanding history of PTSD. Currently works with a psychiatrist out of [facility]. Continues to use lorazepam occasionally.    #3 Diarrhea  Likely secondary to his treatment, complicated by possible IBS. Uses Lomotil infrequently.      Oksana Peñaloza M.D.   of Medicine  Hematology, Oncology and Transplantation

## 2021-11-11 NOTE — PROGRESS NOTES
Lloyd is a 76 year old who is being evaluated via a billable video visit.      How would you like to obtain your AVS? MyChart  If the video visit is dropped, the invitation should be resent by: Text to cell phone: [phone]   Will anyone else be joining your video visit? Yes: Huma - daughter. How would they like to receive their invitation? Text to cell phone: in person      Video-Visit Details  Type of service:  Video Visit  Video Start Time: 10:15 AM  Video End Time:10:45 AM  Originating Location (pt. Location): Home  Distant Location (provider location):  [facility]   Platform used for Video Visit: [facility] ONCOLOGY PROGRESS NOTE  Melanoma Clinic  Nov 11, 2021    CHIEF COMPLAINT: Scalp melanoma    Melanoma History:  1. He has a small pigmented scalp lesion present for over 30 years. The lesion was biopsied in 1992 and was benign.  2. October 2020, he presented to [facility] with 1 year of progressive enlargement of the lesion and new onset burning, itching, and stinging sensation in the affected scalp.  3. 10/26/20, He was seen at [facility] and he had shave biopsies of A. left central parietal scalp, B. left central occipital scalp, C. Left posterior parietal scalp, and D. punch biopsy of the left superior occipital scalp. Pathology (specimen: [id number]) showed a nodular and nevoid type melanoma, non-ulcerated, Breslow depth up to 1.3 mm, Saurabh's level IV, and up to 3 mitoses per mm2. All margins were involved. Ki-67 10-20%. Sox10 stain is positive and highlights an atypical compound melanocytic proliferation with significant overlying confluence and pagetosis of intraepidermal melanocytes. T-stage: at least pT2a. PD-L1 staining shows PD-L1 TPS <1%. 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  4. 11/25/20, he was seen by Dr. Garcia and he took three 3mm punch biopsies, which returned as dermal melanocytic proliferations with  melanophages and fibrosis, consistent with locoregional metastatic melanoma.  5. 12/8/2020 he had PET-CT, which showed FDG avid irregular skin thickening overlying the left parietal region, with no FDG avid lymphadenopathy in the neck and no evidence of metastasis elsewhere in the body.  6. 1/22/2021 start vemurafenib and cobimetinib, ~2/12/21 held for diarrhea, then resumed.  7. 2/18/2021 Start atezolizumab, last on 4/29/21  8. 5/24/2021 Wide local excision of the scalp melanoma and left latissimus free flap for scalp reconstruction.    Surgical pathology showed features consistent with scar and tumoral melanosis. No definite residual melanoma is seen. Tumoral melanosis (which extends to a depth of around 1.1 mm) is believed to be equivalent to a diagnosis of regressed melanoma.   9. 6/25/21 Start of adjuvant Nivolumab     INTERVAL HISTORY  Lloyd is doing well and tolerating his treatments. He continues to have occasional loose stools, but this is consistent with his pre-existing IBD. He notes loose stools worsen with certain foods like chocolates, greasy deep fried foods, so he he tries to avoid them. He uses lomotil only occasionally. Today, however, he notes some worsening of his shortness of breath. He notes this is progressive in nature over the last few months. However, he denies cough.    He reports his mood is stable. No thoughts of hurting himself or others. Continues to use ativan occasionally for anxiety which is somewhat effective. He takes it once or twice a week. He requests that I refill the Ativan.    Review of Systems:  A 12-point ROS negative except as in HPI    Current Outpatient Medications   Medication Sig Dispense Refill     acetaminophen (TYLENOL) 500 MG tablet Take 2 tablets (1,000 mg) by mouth every 8 hours as needed for mild pain 100 tablet 0     cholecalciferol (VITAMIN D3) 1000 UNIT tablet Take 1,000 Units by mouth daily        diphenoxylate-atropine (LOMOTIL) 2.5-0.025 MG tablet Take  1-2 tablets by mouth 4 times daily as needed for diarrhea 120 tablet 5     docusate sodium (DSS) 100 MG capsule TAKE ONE CAPSULE BY MOUTH EVERY DAY FOR STOOL SOFTENING       fluvoxaMINE (LUVOX) 100 MG tablet Take 150 mg at bedtime       hydrochlorothiazide (HYDRODIURIL) 25 MG tablet Take 25 mg by mouth daily.       Hypromellose (ARTIFICIAL TEARS OP) Apply  to eye. 2 drops in each eye twice a day as needed       Lactobacillus-Inulin ([facility] DIGEST[facility]) CAPS 1 tab daily (Patient not taking: Reported on 10/14/2021) 30 capsule 1     LORazepam (ATIVAN) 0.5 MG tablet Take 1 tablet (0.5 mg) by mouth every 4 hours as needed (Anxiety, Nausea/Vomiting or Sleep) 30 tablet 3     Magnesium Oxide 420 MG TABS Take 420 mg by mouth daily       methocarbamol (ROBAXIN) 500 MG tablet Take 1 tablet (500 mg) by mouth 3 times daily as needed for muscle spasms (Patient not taking: Reported on 10/14/2021) 15 tablet 0     methocarbamol (ROBAXIN) 500 MG tablet Take 1 tablet (500 mg) by mouth 3 times daily as needed for muscle spasms 10 tablet 0     omeprazole (PRILOSEC) 20 MG capsule Take 1 capsule by mouth 2 times daily. 60 capsule prn     polyethylene glycol (MIRALAX) 17 GM/Dose powder Take 17 g by mouth daily 510 g 0     pregabalin (LYRICA) 150 MG capsule Take 150 mg by mouth 2 times daily       prochlorperazine (COMPAZINE) 10 MG tablet Take 1 tablet (10 mg) by mouth every 6 hours as needed (Nausea/Vomiting) (Patient not taking: Reported on 10/14/2021) 30 tablet 3     prochlorperazine (COMPAZINE) 10 MG tablet Take 1 tablet (10 mg) by mouth every 6 hours as needed (Nausea/Vomiting) 30 tablet 2     QUEtiapine (SEROQUEL) 300 MG tablet Take 150 mg by mouth At Bedtime        senna-docusate (SENOKOT-S/PERICOLACE) 8.6-50 MG tablet Take 1 tablet by mouth 2 times daily 30 tablet 0     simvastatin (ZOCOR) 80 MG tablet Take 40 mg by mouth At Bedtime        traZODone (DESYREL) 100 MG tablet Take 100 mg by mouth At Bedtime        vortioxetine  (TRINTELLIX) 20 MG tablet TAKE ONE TABLET BY MOUTH EVERY MORNING       Objective:  HEENT: EOMI, PERRL. Sclerae are anicteric. Oral mucosa is pink and moist with no lesions or thrush.   Lymph: Neck is supple with no lymphadenopathy in the cervical or supraclavicular areas.   Heart: Regular rate and rhythm.   Lungs: Clear to auscultation bilaterally.   Abdomen: Bowel sounds present, soft, nontender with no palpable hepatosplenomegaly or masses.   Extremities: No lower extremity edema noted bilaterally.   Neuro: Cranial nerves II through XII are grossly intact.  Skin: Healing scalp reconstruction and skin grafting without drainage or erythema. Healed incision to left back from grafting site.   Psych: Depressed mood, appropriate affect.     Labs:   Virtual Visit on 11/11/2021   Component Date Value     WBC Count 11/11/2021 4.9      RBC Count 11/11/2021 4.72      Hemoglobin 11/11/2021 13.0*     Hematocrit 11/11/2021 39.3*     MCV 11/11/2021 83      MCH 11/11/2021 27.5      MCHC 11/11/2021 33.1      RDW 11/11/2021 14.4      Platelet Count 11/11/2021 236      % Neutrophils 11/11/2021 55      % Lymphocytes 11/11/2021 31      % Monocytes 11/11/2021 10      % Eosinophils 11/11/2021 3      % Basophils 11/11/2021 1      % Immature Granulocytes 11/11/2021 0      NRBCs per 100 WBC 11/11/2021 0      Absolute Neutrophils 11/11/2021 2.7      Absolute Lymphocytes 11/11/2021 1.5      Absolute Monocytes 11/11/2021 0.5      Absolute Eosinophils 11/11/2021 0.2      Absolute Basophils 11/11/2021 0.1      Absolute Immature Granul* 11/11/2021 0.0      Absolute NRBCs 11/11/2021 0.0      I reviewed the above labs today.      ASSESSMENT AND PLAN    #1 Cutaneous melanoma, scalp primary, pT2a cN1c, clinical Stage IIIB.  It was a pleasure to see Mr. Coleman today. He is a 76 year old  with agent orange cutaneous exposures in the [place] war and a diagnosis of malignant melanoma arising from a pre-existing pigmented lesion on the scalp. He  had a partial response to neoadjuvant therapy with atezolizumab (3 cycles), vemurafenib, and cobimetinib (4 cycles) with decrease in thickening of the large scalp tumor and lightening of the periphery and in the middle part of the tumor then had surgery, which showed only tumoral melanosis. He started adjuvant nivolumab on 6/25/21. Overall plan: 9 total cycles of nivolumab q 4 weeks (=12 cycles of immunotherapy total including previous 3 cycles of atezo). Next follow-up with dermatology is scheduled for December 2021.  -concern for possible pneumonitis, given worsening SOB, we will post-pone today's infusion while we work-up further.    #2 Depressed mood, anxiety   Verbalizes fluctuating depression and anxiety, multifactorial. PHQ-9 is 7 today. He has a longstanding history of PTSD. Currently works with a psychiatrist out of [place]. Continues to use lorazepam occasionally.    #3 Diarrhea  Likely secondary to his treatment, complicated by possible IBS. Uses Lomotil infrequently.      Oksana Peñaloza M.D.   of Medicine  Hematology, Oncology and Transplantation

## 2021-11-18 DIAGNOSIS — C43.4 MALIGNANT MELANOMA OF SCALP (H): Primary | ICD-10-CM

## 2021-11-19 ENCOUNTER — ANCILLARY PROCEDURE (OUTPATIENT)
Dept: CT IMAGING | Facility: CLINIC | Age: 76
End: 2021-11-19
Attending: PHYSICIAN ASSISTANT
Payer: MEDICARE

## 2021-11-19 DIAGNOSIS — C43.4 MALIGNANT MELANOMA OF SCALP (H): ICD-10-CM

## 2021-11-19 DIAGNOSIS — Z15.89 MONOALLELIC MUTATION OF BRAF GENE: Primary | ICD-10-CM

## 2021-11-19 DIAGNOSIS — Z15.09 MONOALLELIC MUTATION OF BRAF GENE: Primary | ICD-10-CM

## 2021-11-19 PROCEDURE — 71250 CT THORAX DX C-: CPT | Mod: GC | Performed by: RADIOLOGY

## 2021-11-19 RX ORDER — DIPHENHYDRAMINE HYDROCHLORIDE 50 MG/ML
50 INJECTION INTRAMUSCULAR; INTRAVENOUS
Status: CANCELLED
Start: 2021-11-19

## 2021-11-19 RX ORDER — LORAZEPAM 2 MG/ML
0.5 INJECTION INTRAMUSCULAR EVERY 4 HOURS PRN
Status: CANCELLED
Start: 2021-11-19

## 2021-11-19 RX ORDER — HEPARIN SODIUM (PORCINE) LOCK FLUSH IV SOLN 100 UNIT/ML 100 UNIT/ML
5 SOLUTION INTRAVENOUS
Status: CANCELLED | OUTPATIENT
Start: 2021-11-19

## 2021-11-19 RX ORDER — HEPARIN SODIUM,PORCINE 10 UNIT/ML
5 VIAL (ML) INTRAVENOUS
Status: CANCELLED | OUTPATIENT
Start: 2021-11-19

## 2021-11-19 RX ORDER — NALOXONE HYDROCHLORIDE 0.4 MG/ML
0.2 INJECTION, SOLUTION INTRAMUSCULAR; INTRAVENOUS; SUBCUTANEOUS
Status: CANCELLED | OUTPATIENT
Start: 2021-11-19

## 2021-11-19 RX ORDER — EPINEPHRINE 1 MG/ML
0.3 INJECTION, SOLUTION INTRAMUSCULAR; SUBCUTANEOUS EVERY 5 MIN PRN
Status: CANCELLED | OUTPATIENT
Start: 2021-11-19

## 2021-11-19 RX ORDER — ALBUTEROL SULFATE 0.83 MG/ML
2.5 SOLUTION RESPIRATORY (INHALATION)
Status: CANCELLED | OUTPATIENT
Start: 2021-11-19

## 2021-11-19 RX ORDER — ALBUTEROL SULFATE 90 UG/1
1-2 AEROSOL, METERED RESPIRATORY (INHALATION)
Status: CANCELLED
Start: 2021-11-19

## 2021-11-19 RX ORDER — MEPERIDINE HYDROCHLORIDE 25 MG/ML
25 INJECTION INTRAMUSCULAR; INTRAVENOUS; SUBCUTANEOUS EVERY 30 MIN PRN
Status: CANCELLED | OUTPATIENT
Start: 2021-11-19

## 2021-11-19 RX ORDER — METHYLPREDNISOLONE SODIUM SUCCINATE 125 MG/2ML
125 INJECTION, POWDER, LYOPHILIZED, FOR SOLUTION INTRAMUSCULAR; INTRAVENOUS
Status: CANCELLED
Start: 2021-11-19

## 2021-11-21 NOTE — PROGRESS NOTES
MEDICAL ONCOLOGY PROGRESS NOTE  Melanoma Clinic  Nov 22, 2021    CHIEF COMPLAINT: Scalp melanoma    Melanoma History:  1. He has a small pigmented scalp lesion present for over 30 years. The lesion was biopsied in 1992 and was benign.  2. October 2020, he presented to [facility] with 1 year of progressive enlargement of the lesion and new onset burning, itching, and stinging sensation in the affected scalp.  3. 10/26/20, He was seen at [facility] and he had shave biopsies of A. left central parietal scalp, B. left central occipital scalp, C. Left posterior parietal scalp, and D. punch biopsy of the left superior occipital scalp. Pathology (specimen: [id number]) showed a nodular and nevoid type melanoma, non-ulcerated, Breslow depth up to 1.3 mm, Saurabh's level IV, and up to 3 mitoses per mm2. All margins were involved. Ki-67 10-20%. Sox10 stain is positive and highlights an atypical compound melanocytic proliferation with significant overlying confluence and pagetosis of intraepidermal melanocytes. T-stage: at least pT2a. PD-L1 staining shows PD-L1 TPS <1%. 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  4. 11/25/20, he was seen by Dr. Garcia and he took three 3mm punch biopsies, which returned as dermal melanocytic proliferations with melanophages and fibrosis, consistent with locoregional metastatic melanoma.  5. 12/8/2020 he had PET-CT, which showed FDG avid irregular skin thickening overlying the left parietal region, with no FDG avid lymphadenopathy in the neck and no evidence of metastasis elsewhere in the body.  6. 1/22/2021 start vemurafenib and cobimetinib, ~2/12/21 held for diarrhea, then resumed.  7. 2/18/2021 Start atezolizumab, last on 4/29/21  8. 5/24/2021 Wide local excision of the scalp melanoma and left latissimus free flap for scalp reconstruction.    Surgical pathology showed features consistent with scar and tumoral melanosis. No definite residual melanoma is seen.  Tumoral melanosis (which extends to a depth of around 1.1 mm) is believed to be equivalent to a diagnosis of regressed melanoma.   9. 6/25/21 Start of adjuvant Nivolumab     INTERVAL HISTORY  Lloyd is doing well.  He denies cough or shortness of breath.  He is having ongoing nasal congestion which he states has been occurring for months.  Congestion improves with talking and when he removes his mask.  He is using saline nasal sprays as needed.  Denies headache or fever/chills.  He has occasional fluctuating diarrhea and constipation.  He has Lomotil available to use as needed.  He denies any new skin concerns or rash.  He has occasional itching to his graft site.    Review of Systems:  A 12-point ROS negative except as in HPI    Current Outpatient Medications   Medication Sig Dispense Refill     acetaminophen (TYLENOL) 500 MG tablet Take 2 tablets (1,000 mg) by mouth every 8 hours as needed for mild pain 100 tablet 0     cholecalciferol (VITAMIN D3) 1000 UNIT tablet Take 1,000 Units by mouth daily        diphenoxylate-atropine (LOMOTIL) 2.5-0.025 MG tablet Take 1-2 tablets by mouth 4 times daily as needed for diarrhea 120 tablet 5     docusate sodium (DSS) 100 MG capsule TAKE ONE CAPSULE BY MOUTH EVERY DAY FOR STOOL SOFTENING       fluvoxaMINE (LUVOX) 100 MG tablet Take 150 mg at bedtime       hydrochlorothiazide (HYDRODIURIL) 25 MG tablet Take 25 mg by mouth daily.       Hypromellose (ARTIFICIAL TEARS OP) Apply  to eye. 2 drops in each eye twice a day as needed       Lactobacillus-Inulin ([facility] DIGESTIVE [facility]) CAPS 1 tab daily (Patient not taking: Reported on 10/14/2021) 30 capsule 1     LORazepam (ATIVAN) 0.5 MG tablet Take 1 tablet (0.5 mg) by mouth every 4 hours as needed (Anxiety, Nausea/Vomiting or Sleep) 30 tablet 3     Magnesium Oxide 420 MG TABS Take 420 mg by mouth daily       methocarbamol (ROBAXIN) 500 MG tablet Take 1 tablet (500 mg) by mouth 3 times daily as needed for muscle spasms (Patient not  taking: Reported on 10/14/2021) 15 tablet 0     methocarbamol (ROBAXIN) 500 MG tablet Take 1 tablet (500 mg) by mouth 3 times daily as needed for muscle spasms 10 tablet 0     omeprazole (PRILOSEC) 20 MG capsule Take 1 capsule by mouth 2 times daily. 60 capsule prn     polyethylene glycol (MIRALAX) 17 GM/Dose powder Take 17 g by mouth daily 510 g 0     pregabalin (LYRICA) 150 MG capsule Take 150 mg by mouth 2 times daily       prochlorperazine (COMPAZINE) 10 MG tablet Take 1 tablet (10 mg) by mouth every 6 hours as needed (Nausea/Vomiting) (Patient not taking: Reported on 10/14/2021) 30 tablet 3     prochlorperazine (COMPAZINE) 10 MG tablet Take 1 tablet (10 mg) by mouth every 6 hours as needed (Nausea/Vomiting) 30 tablet 2     QUEtiapine (SEROQUEL) 300 MG tablet Take 150 mg by mouth At Bedtime        senna-docusate (SENOKOT-S/PERICOLACE) 8.6-50 MG tablet Take 1 tablet by mouth 2 times daily 30 tablet 0     simvastatin (ZOCOR) 80 MG tablet Take 40 mg by mouth At Bedtime        traZODone (DESYREL) 100 MG tablet Take 100 mg by mouth At Bedtime        vortioxetine (TRINTELLIX) 20 MG tablet TAKE ONE TABLET BY MOUTH EVERY MORNING       Objective:  BP (!) 163/91   Pulse 54   Temp 97.9  F (36.6  C) (Oral)   Resp 16   Wt 92.3 kg (203 lb 6.4 oz)   SpO2 95%   BMI 30.93 kg/m      General: Pleasant male, NAD, accompanied by granddaughter.  HEENT: EOMI, PERRL. Sclerae are anicteric. Oral exam deferred.  Lymph: Neck is supple with no lymphadenopathy in the cervical or supraclavicular areas.   Heart: Regular rate and rhythm.   Lungs: Clear to auscultation bilaterally.   Abdomen: Bowel sounds present, soft, nontender.  Extremities: No lower extremity edema noted bilaterally.   Neuro: Cranial nerves II through XII are grossly intact.  Skin: Healing scalp reconstruction and skin grafting without drainage or erythema.   Psych: Depressed mood, appropriate affect.     Labs:   Results for orders placed or performed in visit on  11/22/21   CBC with platelets differential     Status: Abnormal    Narrative    The following orders were created for panel order CBC with platelets differential.  Procedure                               Abnormality         Status                     ---------                               -----------         ------                     CBC with platelets and d...[[id number]]  Abnormal            Final result                 Please view results for these tests on the individual orders.   CBC with platelets and differential     Status: Abnormal   Result Value Ref Range    WBC Count 3.6 (L) 4.0 - 11.0 10e3/uL    RBC Count 4.94 4.40 - 5.90 10e6/uL    Hemoglobin 13.7 13.3 - 17.7 g/dL    Hematocrit 41.1 40.0 - 53.0 %    MCV 83 78 - 100 fL    MCH 27.7 26.5 - 33.0 pg    MCHC 33.3 31.5 - 36.5 g/dL    RDW 14.1 10.0 - 15.0 %    Platelet Count 204 150 - 450 10e3/uL    % Neutrophils 59 %    % Lymphocytes 29 %    % Monocytes 8 %    % Eosinophils 3 %    % Basophils 1 %    % Immature Granulocytes 0 %    NRBCs per 100 WBC 0 <1 /100    Absolute Neutrophils 2.1 1.6 - 8.3 10e3/uL    Absolute Lymphocytes 1.1 0.8 - 5.3 10e3/uL    Absolute Monocytes 0.3 0.0 - 1.3 10e3/uL    Absolute Eosinophils 0.1 0.0 - 0.7 10e3/uL    Absolute Basophils 0.1 0.0 - 0.2 10e3/uL    Absolute Immature Granulocytes 0.0 <=0.0 10e3/uL    Absolute NRBCs 0.0 10e3/uL       I reviewed the above labs today.    Imaging:  EXAMINATION: CT CHEST W/O CONTRAST, 11/19/2021 1:17 PM     CLINICAL HISTORY: Malignant melanoma of scalp (H)     COMPARISON: 8/25/2021 PET/CT.     TECHNIQUE: CT imaging obtained through the chest without contrast.  Coronal and axial MIP reformatted images obtained.      FINDINGS:     The central tracheobronchial tree is patent. Heart size is normal. No  pericardial effusion. Moderate coronary artery calcifications. Normal  three vessel arch anatomy. No axillary adenopathy. No mediastinal  adenopathy. Thyroid lobes are normal     No suspiciously  pulmonary nodules. No airspace consolidation. No  pleural effusion or pneumothorax.     Cyst in the anterior mid left kidney partially visualized. Visualized  portions of the upper abdomen are otherwise unremarkable on this  noncontrast study.      No suspicious osseus or soft tissue lesions.                                                                 IMPRESSION:      No acute cardiopulmonary findings. No evidence of pulmonary  metastases.     I have personally reviewed the examination and initial interpretation  and I agree with the findings.     SHANIQUA ALCANTARA MD     ASSESSMENT AND PLAN    #1 Cutaneous melanoma, scalp primary, pT2a cN1c, clinical Stage IIIB.  It was a pleasure to see Mr. Coleman today. He is a 76 year old  with agent orange cutaneous exposures in the [place] war and a diagnosis of malignant melanoma arising from a pre-existing pigmented lesion on the scalp. He had a partial response to neoadjuvant therapy with atezolizumab (3 cycles), vemurafenib, and cobimetinib (4 cycles) with decrease in thickening of the large scalp tumor and lightening of the periphery and in the middle part of the tumor then had surgery, which showed only tumoral melanosis. He started adjuvant nivolumab on 6/25/21. Overall plan: 9 total cycles of nivolumab q 4 weeks (=12 cycles of immunotherapy total including previous 3 cycles of atezo). Next follow-up with dermatology is scheduled for December 2021.  -Last cycle delayed to due increasing SOB, concern for pneumonitis. CT chest 11/19/21 negative for pneumonitis, cardiopulmonary findings or lung metatstases.  -Proceed with cycle 6 Nivolumab today  -Restaging PET scan scheduled 11/23-will see Sherine Cardoza next week to review results   -RTC in 4 weeks for labs, LEVY visit and nivolumab infusion    #2 Depressed mood, anxiety   Verbalizes fluctuating depression and anxiety, multifactorial. He has a longstanding history of PTSD. Currently works with a psychiatrist out  of [place]. Continues to use lorazepam occasionally, refilled today. Appropriate use/fills per  but would be better managed by psychiatry given other prescribed PTSD medications.    #3 Diarrhea  Likely secondary to his treatment, complicated by possible IBS. Uses Lomotil infrequently.    #4 CKD  Suspect secondary to hypertension. Creatinine meets parameters for immunotherapy. Encouraged pushing fluids.    # HTN  BP elevated today. He is on hydrochlorothiazide but reports no longer taking lisinopril. [facility] Recheck BP in infusion.    60 minutes spent on the date of the encounter doing chart review, review of test results, interpretation of tests, patient visit and documentation     Sherine Meza CNP  [facility]  [address] [place] [address]  [phone]    Addendum 11/22/21 3:45 pm:  Blood pressure improved to 135/76 when rechecked in infusion. Continue to monitor with follow-up visits and discuss follow-up with PCP to address htn/ckd.    Sherine Meza CNP

## 2021-11-22 ENCOUNTER — APPOINTMENT (OUTPATIENT)
Dept: LAB | Facility: CLINIC | Age: 76
End: 2021-11-22
Attending: INTERNAL MEDICINE
Payer: MEDICARE

## 2021-11-22 ENCOUNTER — ONCOLOGY VISIT (OUTPATIENT)
Dept: ONCOLOGY | Facility: CLINIC | Age: 76
End: 2021-11-22
Attending: INTERNAL MEDICINE
Payer: MEDICARE

## 2021-11-22 VITALS
WEIGHT: 203.4 LBS | RESPIRATION RATE: 16 BRPM | TEMPERATURE: 97.9 F | DIASTOLIC BLOOD PRESSURE: 91 MMHG | OXYGEN SATURATION: 95 % | HEART RATE: 54 BPM | BODY MASS INDEX: 30.93 KG/M2 | SYSTOLIC BLOOD PRESSURE: 163 MMHG

## 2021-11-22 VITALS — SYSTOLIC BLOOD PRESSURE: 135 MMHG | DIASTOLIC BLOOD PRESSURE: 76 MMHG

## 2021-11-22 DIAGNOSIS — Z15.09 MONOALLELIC MUTATION OF BRAF GENE: ICD-10-CM

## 2021-11-22 DIAGNOSIS — R45.89 DEPRESSED MOOD: ICD-10-CM

## 2021-11-22 DIAGNOSIS — C43.4 MALIGNANT MELANOMA OF SCALP (H): Primary | ICD-10-CM

## 2021-11-22 DIAGNOSIS — Z15.89 MONOALLELIC MUTATION OF BRAF GENE: ICD-10-CM

## 2021-11-22 DIAGNOSIS — I10 ESSENTIAL HYPERTENSION: ICD-10-CM

## 2021-11-22 LAB
ALBUMIN SERPL-MCNC: 3.6 G/DL (ref 3.4–5)
ALP SERPL-CCNC: 52 U/L (ref 40–150)
ALT SERPL W P-5'-P-CCNC: 22 U/L (ref 0–70)
ANION GAP SERPL CALCULATED.3IONS-SCNC: 8 MMOL/L (ref 3–14)
AST SERPL W P-5'-P-CCNC: 15 U/L (ref 0–45)
BASOPHILS # BLD AUTO: 0.1 10E3/UL (ref 0–0.2)
BASOPHILS NFR BLD AUTO: 1 %
BILIRUB SERPL-MCNC: 0.5 MG/DL (ref 0.2–1.3)
BUN SERPL-MCNC: 15 MG/DL (ref 7–30)
CALCIUM SERPL-MCNC: 8.9 MG/DL (ref 8.5–10.1)
CHLORIDE BLD-SCNC: 102 MMOL/L (ref 94–109)
CO2 SERPL-SCNC: 31 MMOL/L (ref 20–32)
CREAT SERPL-MCNC: 1.31 MG/DL (ref 0.66–1.25)
EOSINOPHIL # BLD AUTO: 0.1 10E3/UL (ref 0–0.7)
EOSINOPHIL NFR BLD AUTO: 3 %
ERYTHROCYTE [DISTWIDTH] IN BLOOD BY AUTOMATED COUNT: 14.1 % (ref 10–15)
GFR SERPL CREATININE-BSD FRML MDRD: 53 ML/MIN/1.73M2
GLUCOSE BLD-MCNC: 101 MG/DL (ref 70–99)
HCT VFR BLD AUTO: 41.1 % (ref 40–53)
HGB BLD-MCNC: 13.7 G/DL (ref 13.3–17.7)
IMM GRANULOCYTES # BLD: 0 10E3/UL
IMM GRANULOCYTES NFR BLD: 0 %
LYMPHOCYTES # BLD AUTO: 1.1 10E3/UL (ref 0.8–5.3)
LYMPHOCYTES NFR BLD AUTO: 29 %
MAGNESIUM SERPL-MCNC: 2.3 MG/DL (ref 1.6–2.3)
MCH RBC QN AUTO: 27.7 PG (ref 26.5–33)
MCHC RBC AUTO-ENTMCNC: 33.3 G/DL (ref 31.5–36.5)
MCV RBC AUTO: 83 FL (ref 78–100)
MONOCYTES # BLD AUTO: 0.3 10E3/UL (ref 0–1.3)
MONOCYTES NFR BLD AUTO: 8 %
NEUTROPHILS # BLD AUTO: 2.1 10E3/UL (ref 1.6–8.3)
NEUTROPHILS NFR BLD AUTO: 59 %
NRBC # BLD AUTO: 0 10E3/UL
NRBC BLD AUTO-RTO: 0 /100
PHOSPHATE SERPL-MCNC: 2.7 MG/DL (ref 2.5–4.5)
PLATELET # BLD AUTO: 204 10E3/UL (ref 150–450)
POTASSIUM BLD-SCNC: 3.6 MMOL/L (ref 3.4–5.3)
PROT SERPL-MCNC: 7.4 G/DL (ref 6.8–8.8)
RBC # BLD AUTO: 4.94 10E6/UL (ref 4.4–5.9)
SODIUM SERPL-SCNC: 141 MMOL/L (ref 133–144)
TSH SERPL DL<=0.005 MIU/L-ACNC: 1.39 MU/L (ref 0.4–4)
WBC # BLD AUTO: 3.6 10E3/UL (ref 4–11)

## 2021-11-22 PROCEDURE — 83735 ASSAY OF MAGNESIUM: CPT

## 2021-11-22 PROCEDURE — 84100 ASSAY OF PHOSPHORUS: CPT

## 2021-11-22 PROCEDURE — 84443 ASSAY THYROID STIM HORMONE: CPT | Performed by: INTERNAL MEDICINE

## 2021-11-22 PROCEDURE — 250N000011 HC RX IP 250 OP 636: Performed by: INTERNAL MEDICINE

## 2021-11-22 PROCEDURE — 99215 OFFICE O/P EST HI 40 MIN: CPT | Performed by: REGISTERED NURSE

## 2021-11-22 PROCEDURE — 85025 COMPLETE CBC W/AUTO DIFF WBC: CPT

## 2021-11-22 PROCEDURE — 258N000003 HC RX IP 258 OP 636: Performed by: INTERNAL MEDICINE

## 2021-11-22 PROCEDURE — 36415 COLL VENOUS BLD VENIPUNCTURE: CPT

## 2021-11-22 PROCEDURE — 96413 CHEMO IV INFUSION 1 HR: CPT

## 2021-11-22 PROCEDURE — G0463 HOSPITAL OUTPT CLINIC VISIT: HCPCS

## 2021-11-22 PROCEDURE — 82374 ASSAY BLOOD CARBON DIOXIDE: CPT

## 2021-11-22 RX ORDER — LORAZEPAM 0.5 MG/1
0.5 TABLET ORAL EVERY 4 HOURS PRN
Qty: 30 TABLET | Refills: 0 | Status: SHIPPED | OUTPATIENT
Start: 2021-11-22 | End: 2022-01-19

## 2021-11-22 RX ADMIN — SODIUM CHLORIDE 250 ML: 9 INJECTION, SOLUTION INTRAVENOUS at 15:17

## 2021-11-22 RX ADMIN — SODIUM CHLORIDE 480 MG: 9 INJECTION, SOLUTION INTRAVENOUS at 15:20

## 2021-11-22 ASSESSMENT — PAIN SCALES - GENERAL: PAINLEVEL: NO PAIN (0)

## 2021-11-22 NOTE — LETTER
11/22/2021         RE: Ahmet Coleman  [address]  [place] [id number]      MEDICAL ONCOLOGY PROGRESS NOTE  Melanoma Clinic  Nov 22, 2021    CHIEF COMPLAINT: Scalp melanoma    Melanoma History:  1. He has a small pigmented scalp lesion present for over 30 years. The lesion was biopsied in 1992 and was benign.  2. October 2020, he presented to [facility] with 1 year of progressive enlargement of the lesion and new onset burning, itching, and stinging sensation in the affected scalp.  3. 10/26/20, He was seen at [facility] and he had shave biopsies of A. left central parietal scalp, B. left central occipital scalp, C. Left posterior parietal scalp, and D. punch biopsy of the left superior occipital scalp. Pathology (specimen: [id number]) showed a nodular and nevoid type melanoma, non-ulcerated, Breslow depth up to 1.3 mm, Saurabh's level IV, and up to 3 mitoses per mm2. All margins were involved. Ki-67 10-20%. Sox10 stain is positive and highlights an atypical compound melanocytic proliferation with significant overlying confluence and pagetosis of intraepidermal melanocytes. T-stage: at least pT2a. PD-L1 staining shows PD-L1 TPS <1%. 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  4. 11/25/20, he was seen by Dr. Garcia and he took three 3mm punch biopsies, which returned as dermal melanocytic proliferations with melanophages and fibrosis, consistent with locoregional metastatic melanoma.  5. 12/8/2020 he had PET-CT, which showed FDG avid irregular skin thickening overlying the left parietal region, with no FDG avid lymphadenopathy in the neck and no evidence of metastasis elsewhere in the body.  6. 1/22/2021 start vemurafenib and cobimetinib, ~2/12/21 held for diarrhea, then resumed.  7. 2/18/2021 Start atezolizumab, last on 4/29/21  8. 5/24/2021 Wide local excision of the scalp melanoma and left latissimus free flap for scalp reconstruction.    Surgical pathology showed  features consistent with scar and tumoral melanosis. No definite residual melanoma is seen. Tumoral melanosis (which extends to a depth of around 1.1 mm) is believed to be equivalent to a diagnosis of regressed melanoma.   9. 6/25/21 Start of adjuvant Nivolumab     INTERVAL HISTORY  Lloyd is doing well.  He denies cough or shortness of breath.  He is having ongoing nasal congestion which he states has been occurring for months.  Congestion improves with talking and when he removes his mask.  He is using saline nasal sprays as needed.  Denies headache or fever/chills.  He has occasional fluctuating diarrhea and constipation.  He has Lomotil available to use as needed.  He denies any new skin concerns or rash.  He has occasional itching to his graft site.    Review of Systems:  A 12-point ROS negative except as in HPI    Current Outpatient Medications   Medication Sig Dispense Refill     acetaminophen (TYLENOL) 500 MG tablet Take 2 tablets (1,000 mg) by mouth every 8 hours as needed for mild pain 100 tablet 0     cholecalciferol (VITAMIN D3) 1000 UNIT tablet Take 1,000 Units by mouth daily        diphenoxylate-atropine (LOMOTIL) 2.5-0.025 MG tablet Take 1-2 tablets by mouth 4 times daily as needed for diarrhea 120 tablet 5     docusate sodium (DSS) 100 MG capsule TAKE ONE CAPSULE BY MOUTH EVERY DAY FOR STOOL SOFTENING       fluvoxaMINE (LUVOX) 100 MG tablet Take 150 mg at bedtime       hydrochlorothiazide (HYDRODIURIL) 25 MG tablet Take 25 mg by mouth daily.       Hypromellose (ARTIFICIAL TEARS OP) Apply  to eye. 2 drops in each eye twice a day as needed       Lactobacillus-Inulin ([facility] DIGESTIVE [facility]) CAPS 1 tab daily (Patient not taking: Reported on 10/14/2021) 30 capsule 1     LORazepam (ATIVAN) 0.5 MG tablet Take 1 tablet (0.5 mg) by mouth every 4 hours as needed (Anxiety, Nausea/Vomiting or Sleep) 30 tablet 3     Magnesium Oxide 420 MG TABS Take 420 mg by mouth daily       methocarbamol (ROBAXIN) 500 MG  tablet Take 1 tablet (500 mg) by mouth 3 times daily as needed for muscle spasms (Patient not taking: Reported on 10/14/2021) 15 tablet 0     methocarbamol (ROBAXIN) 500 MG tablet Take 1 tablet (500 mg) by mouth 3 times daily as needed for muscle spasms 10 tablet 0     omeprazole (PRILOSEC) 20 MG capsule Take 1 capsule by mouth 2 times daily. 60 capsule prn     polyethylene glycol (MIRALAX) 17 GM/Dose powder Take 17 g by mouth daily 510 g 0     pregabalin (LYRICA) 150 MG capsule Take 150 mg by mouth 2 times daily       prochlorperazine (COMPAZINE) 10 MG tablet Take 1 tablet (10 mg) by mouth every 6 hours as needed (Nausea/Vomiting) (Patient not taking: Reported on 10/14/2021) 30 tablet 3     prochlorperazine (COMPAZINE) 10 MG tablet Take 1 tablet (10 mg) by mouth every 6 hours as needed (Nausea/Vomiting) 30 tablet 2     QUEtiapine (SEROQUEL) 300 MG tablet Take 150 mg by mouth At Bedtime        senna-docusate (SENOKOT-S/PERICOLACE) 8.6-50 MG tablet Take 1 tablet by mouth 2 times daily 30 tablet 0     simvastatin (ZOCOR) 80 MG tablet Take 40 mg by mouth At Bedtime        traZODone (DESYREL) 100 MG tablet Take 100 mg by mouth At Bedtime        vortioxetine (TRINTELLIX) 20 MG tablet TAKE ONE TABLET BY MOUTH EVERY MORNING       Objective:  BP (!) 163/91   Pulse 54   Temp 97.9  F (36.6  C) (Oral)   Resp 16   Wt 92.3 kg (203 lb 6.4 oz)   SpO2 95%   BMI 30.93 kg/m      General: Pleasant male, NAD, accompanied by granddaughter.  HEENT: EOMI, PERRL. Sclerae are anicteric. Oral exam deferred.  Lymph: Neck is supple with no lymphadenopathy in the cervical or supraclavicular areas.   Heart: Regular rate and rhythm.   Lungs: Clear to auscultation bilaterally.   Abdomen: Bowel sounds present, soft, nontender.  Extremities: No lower extremity edema noted bilaterally.   Neuro: Cranial nerves II through XII are grossly intact.  Skin: Healing scalp reconstruction and skin grafting without drainage or erythema.   Psych: Depressed  mood, appropriate affect.     Labs:   Results for orders placed or performed in visit on 11/22/21   CBC with platelets differential     Status: Abnormal    Narrative    The following orders were created for panel order CBC with platelets differential.  Procedure                               Abnormality         Status                     ---------                               -----------         ------                     CBC with platelets and d...[[id number]]  Abnormal            Final result                 Please view results for these tests on the individual orders.   CBC with platelets and differential     Status: Abnormal   Result Value Ref Range    WBC Count 3.6 (L) 4.0 - 11.0 10e3/uL    RBC Count 4.94 4.40 - 5.90 10e6/uL    Hemoglobin 13.7 13.3 - 17.7 g/dL    Hematocrit 41.1 40.0 - 53.0 %    MCV 83 78 - 100 fL    MCH 27.7 26.5 - 33.0 pg    MCHC 33.3 31.5 - 36.5 g/dL    RDW 14.1 10.0 - 15.0 %    Platelet Count 204 150 - 450 10e3/uL    % Neutrophils 59 %    % Lymphocytes 29 %    % Monocytes 8 %    % Eosinophils 3 %    % Basophils 1 %    % Immature Granulocytes 0 %    NRBCs per 100 WBC 0 <1 /100    Absolute Neutrophils 2.1 1.6 - 8.3 10e3/uL    Absolute Lymphocytes 1.1 0.8 - 5.3 10e3/uL    Absolute Monocytes 0.3 0.0 - 1.3 10e3/uL    Absolute Eosinophils 0.1 0.0 - 0.7 10e3/uL    Absolute Basophils 0.1 0.0 - 0.2 10e3/uL    Absolute Immature Granulocytes 0.0 <=0.0 10e3/uL    Absolute NRBCs 0.0 10e3/uL       I reviewed the above labs today.    Imaging:  EXAMINATION: CT CHEST W/O CONTRAST, 11/19/2021 1:17 PM     CLINICAL HISTORY: Malignant melanoma of scalp (H)     COMPARISON: 8/25/2021 PET/CT.     TECHNIQUE: CT imaging obtained through the chest without contrast.  Coronal and axial MIP reformatted images obtained.      FINDINGS:     The central tracheobronchial tree is patent. Heart size is normal. No  pericardial effusion. Moderate coronary artery calcifications. Normal  three vessel arch anatomy. No axillary  adenopathy. No mediastinal  adenopathy. Thyroid lobes are normal     No suspiciously pulmonary nodules. No airspace consolidation. No  pleural effusion or pneumothorax.     Cyst in the anterior mid left kidney partially visualized. Visualized  portions of the upper abdomen are otherwise unremarkable on this  noncontrast study.      No suspicious osseus or soft tissue lesions.                                                                 IMPRESSION:      No acute cardiopulmonary findings. No evidence of pulmonary  metastases.     I have personally reviewed the examination and initial interpretation  and I agree with the findings.     SHANIQUA ALCANTARA MD     ASSESSMENT AND PLAN    #1 Cutaneous melanoma, scalp primary, pT2a cN1c, clinical Stage IIIB.  It was a pleasure to see Mr. Coleman today. He is a 76 year old  with agent orange cutaneous exposures in the [place] war and a diagnosis of malignant melanoma arising from a pre-existing pigmented lesion on the scalp. He had a partial response to neoadjuvant therapy with atezolizumab (3 cycles), vemurafenib, and cobimetinib (4 cycles) with decrease in thickening of the large scalp tumor and lightening of the periphery and in the middle part of the tumor then had surgery, which showed only tumoral melanosis. He started adjuvant nivolumab on 6/25/21. Overall plan: 9 total cycles of nivolumab q 4 weeks (=12 cycles of immunotherapy total including previous 3 cycles of atezo). Next follow-up with dermatology is scheduled for December 2021.  -Last cycle delayed to due increasing SOB, concern for pneumonitis. CT chest 11/19/21 negative for pneumonitis, cardiopulmonary findings or lung metatstases.  -Proceed with cycle 6 Nivolumab today  -Restaging PET scan scheduled 11/23-will see Sherine Cardoza next week to review results   -RTC in 4 weeks for labs, LEVY visit and nivolumab infusion    #2 Depressed mood, anxiety   Verbalizes fluctuating depression and anxiety,  multifactorial. He has a longstanding history of PTSD. Currently works with a psychiatrist out of [place]. Continues to use lorazepam occasionally, refilled today. Appropriate use/fills per  but would be better managed by psychiatry given other prescribed PTSD medications.    #3 Diarrhea  Likely secondary to his treatment, complicated by possible IBS. Uses Lomotil infrequently.    #4 CKD  Suspect secondary to hypertension. Creatinine meets parameters for immunotherapy. Encouraged pushing fluids.    # HTN  BP elevated today. He is on hydrochlorothiazide but reports no longer taking lisinopril. [facility]. Recheck BP in infusion.    60 minutes spent on the date of the encounter doing chart review, review of test results, interpretation of tests, patient visit and documentation     Sherine Meza CNP  [facility]  [address] [place] [address]  [phone]    Addendum 11/22/21 3:45 pm:  Blood pressure improved to 135/76 when rechecked in infusion. Continue to monitor with follow-up visits and discuss follow-up with PCP to address htn/ckd.

## 2021-11-22 NOTE — NURSING NOTE
"Oncology Rooming Note    November 22, 2021 1:34 PM   Ahmet Coleman is a 76 year old male who presents for:    Chief Complaint   Patient presents with     Blood Draw     Labs drawn via PIV by RN in lab. VS taken      Oncology Clinic Visit     malignant melanoma of scalp     Initial Vitals: BP (!) 163/91   Pulse 54   Temp 97.9  F (36.6  C) (Oral)   Resp 16   Wt 92.3 kg (203 lb 6.4 oz)   SpO2 95%   BMI 30.93 kg/m   Estimated body mass index is 30.93 kg/m  as calculated from the following:    Height as of 9/1/21: 1.727 m (5' 8\").    Weight as of this encounter: 92.3 kg (203 lb 6.4 oz). Body surface area is 2.1 meters squared.  No Pain (0) Comment: Data Unavailable   No LMP for male patient.  Allergies reviewed: Yes  Medications reviewed: Yes    Medications: Medication refills not needed today.  Pharmacy name entered into [employer]:    [id number] - [place], [place] - [address] [id number] [place] [place] - [address] [facility] - [place], [place] - [address]  [facility] - [place], [place] - [address]    Clinical concerns: none       Bianka Oliveira CMA              "

## 2021-11-22 NOTE — NURSING NOTE
Chief Complaint   Patient presents with     Blood Draw     Labs drawn via PIV by RN in lab. VS taken      Labs drawn from PIV placed by RN. Line flushed with saline. Vitals taken. Pt checked in for next appointment.      Rozina Meza RN

## 2021-11-22 NOTE — Clinical Note
11/22/2021         RE: Ahmet Coleman  [address]  [place] [address]        Dear Colleague,    Thank you for referring your patient, Ahmet Coleman, to the [facility]. Please see a copy of my visit note below.    MEDICAL ONCOLOGY PROGRESS NOTE  Melanoma Clinic  Nov 22, 2021    CHIEF COMPLAINT: Scalp melanoma    Melanoma History:  1. He has a small pigmented scalp lesion present for over 30 years. The lesion was biopsied in 1992 and was benign.  2. October 2020, he presented to [facility] with 1 year of progressive enlargement of the lesion and new onset burning, itching, and stinging sensation in the affected scalp.  3. 10/26/20, He was seen at [facility] and he had shave biopsies of A. left central parietal scalp, B. left central occipital scalp, C. Left posterior parietal scalp, and D. punch biopsy of the left superior occipital scalp. Pathology (specimen: [id number]) showed a nodular and nevoid type melanoma, non-ulcerated, Breslow depth up to 1.3 mm, Saurabh's level IV, and up to 3 mitoses per mm2. All margins were involved. Ki-67 10-20%. Sox10 stain is positive and highlights an atypical compound melanocytic proliferation with significant overlying confluence and pagetosis of intraepidermal melanocytes. T-stage: at least pT2a. PD-L1 staining shows PD-L1 TPS <1%. 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  4. 11/25/20, he was seen by Dr. Garcia and he took three 3mm punch biopsies, which returned as dermal melanocytic proliferations with melanophages and fibrosis, consistent with locoregional metastatic melanoma.  5. 12/8/2020 he had PET-CT, which showed FDG avid irregular skin thickening overlying the left parietal region, with no FDG avid lymphadenopathy in the neck and no evidence of metastasis elsewhere in the body.  6. 1/22/2021 start vemurafenib and cobimetinib, ~2/12/21 held for diarrhea, then resumed.  7. 2/18/2021 Start  atezolizumab, last on 4/29/21  8. 5/24/2021 Wide local excision of the scalp melanoma and left latissimus free flap for scalp reconstruction.    Surgical pathology showed features consistent with scar and tumoral melanosis. No definite residual melanoma is seen. Tumoral melanosis (which extends to a depth of around 1.1 mm) is believed to be equivalent to a diagnosis of regressed melanoma.   9. 6/25/21 Start of adjuvant Nivolumab     INTERVAL HISTORY  Lloyd is doing well.  He denies cough or shortness of breath.  He is having ongoing nasal congestion which he states has been occurring for months.  Congestion improves with talking and when he removes his mask.  He is using saline nasal sprays as needed.  Denies headache or fever/chills.  He has occasional fluctuating diarrhea and constipation.  He has Lomotil available to use as needed.  He denies any new skin concerns or rash.  He has occasional itching to his graft site.    Review of Systems:  A 12-point ROS negative except as in HPI    Current Outpatient Medications   Medication Sig Dispense Refill     acetaminophen (TYLENOL) 500 MG tablet Take 2 tablets (1,000 mg) by mouth every 8 hours as needed for mild pain 100 tablet 0     cholecalciferol (VITAMIN D3) 1000 UNIT tablet Take 1,000 Units by mouth daily        diphenoxylate-atropine (LOMOTIL) 2.5-0.025 MG tablet Take 1-2 tablets by mouth 4 times daily as needed for diarrhea 120 tablet 5     docusate sodium (DSS) 100 MG capsule TAKE ONE CAPSULE BY MOUTH EVERY DAY FOR STOOL SOFTENING       fluvoxaMINE (LUVOX) 100 MG tablet Take 150 mg at bedtime       hydrochlorothiazide (HYDRODIURIL) 25 MG tablet Take 25 mg by mouth daily.       Hypromellose (ARTIFICIAL TEARS OP) Apply  to eye. 2 drops in each eye twice a day as needed       Lactobacillus-Inulin ([facility] DIGESTIVE [facility]) CAPS 1 tab daily (Patient not taking: Reported on 10/14/2021) 30 capsule 1     LORazepam (ATIVAN) 0.5 MG tablet Take 1 tablet (0.5 mg) by  mouth every 4 hours as needed (Anxiety, Nausea/Vomiting or Sleep) 30 tablet 3     Magnesium Oxide 420 MG TABS Take 420 mg by mouth daily       methocarbamol (ROBAXIN) 500 MG tablet Take 1 tablet (500 mg) by mouth 3 times daily as needed for muscle spasms (Patient not taking: Reported on 10/14/2021) 15 tablet 0     methocarbamol (ROBAXIN) 500 MG tablet Take 1 tablet (500 mg) by mouth 3 times daily as needed for muscle spasms 10 tablet 0     omeprazole (PRILOSEC) 20 MG capsule Take 1 capsule by mouth 2 times daily. 60 capsule prn     polyethylene glycol (MIRALAX) 17 GM/Dose powder Take 17 g by mouth daily 510 g 0     pregabalin (LYRICA) 150 MG capsule Take 150 mg by mouth 2 times daily       prochlorperazine (COMPAZINE) 10 MG tablet Take 1 tablet (10 mg) by mouth every 6 hours as needed (Nausea/Vomiting) (Patient not taking: Reported on 10/14/2021) 30 tablet 3     prochlorperazine (COMPAZINE) 10 MG tablet Take 1 tablet (10 mg) by mouth every 6 hours as needed (Nausea/Vomiting) 30 tablet 2     QUEtiapine (SEROQUEL) 300 MG tablet Take 150 mg by mouth At Bedtime        senna-docusate (SENOKOT-S/PERICOLACE) 8.6-50 MG tablet Take 1 tablet by mouth 2 times daily 30 tablet 0     simvastatin (ZOCOR) 80 MG tablet Take 40 mg by mouth At Bedtime        traZODone (DESYREL) 100 MG tablet Take 100 mg by mouth At Bedtime        vortioxetine (TRINTELLIX) 20 MG tablet TAKE ONE TABLET BY MOUTH EVERY MORNING       Objective:  BP (!) 163/91   Pulse 54   Temp 97.9  F (36.6  C) (Oral)   Resp 16   Wt 92.3 kg (203 lb 6.4 oz)   SpO2 95%   BMI 30.93 kg/m      General: Pleasant male, NAD, accompanied by granddaughter.  HEENT: EOMI, PERRL. Sclerae are anicteric. Oral exam deferred.  Lymph: Neck is supple with no lymphadenopathy in the cervical or supraclavicular areas.   Heart: Regular rate and rhythm.   Lungs: Clear to auscultation bilaterally.   Abdomen: Bowel sounds present, soft, nontender.  Extremities: No lower extremity edema noted  bilaterally.   Neuro: Cranial nerves II through XII are grossly intact.  Skin: Healing scalp reconstruction and skin grafting without drainage or erythema.   Psych: Depressed mood, appropriate affect.     Labs:   Results for orders placed or performed in visit on 11/22/21   CBC with platelets differential     Status: Abnormal    Narrative    The following orders were created for panel order CBC with platelets differential.  Procedure                               Abnormality         Status                     ---------                               -----------         ------                     CBC with platelets and d...[[id number]]  Abnormal            Final result                 Please view results for these tests on the individual orders.   CBC with platelets and differential     Status: Abnormal   Result Value Ref Range    WBC Count 3.6 (L) 4.0 - 11.0 10e3/uL    RBC Count 4.94 4.40 - 5.90 10e6/uL    Hemoglobin 13.7 13.3 - 17.7 g/dL    Hematocrit 41.1 40.0 - 53.0 %    MCV 83 78 - 100 fL    MCH 27.7 26.5 - 33.0 pg    MCHC 33.3 31.5 - 36.5 g/dL    RDW 14.1 10.0 - 15.0 %    Platelet Count 204 150 - 450 10e3/uL    % Neutrophils 59 %    % Lymphocytes 29 %    % Monocytes 8 %    % Eosinophils 3 %    % Basophils 1 %    % Immature Granulocytes 0 %    NRBCs per 100 WBC 0 <1 /100    Absolute Neutrophils 2.1 1.6 - 8.3 10e3/uL    Absolute Lymphocytes 1.1 0.8 - 5.3 10e3/uL    Absolute Monocytes 0.3 0.0 - 1.3 10e3/uL    Absolute Eosinophils 0.1 0.0 - 0.7 10e3/uL    Absolute Basophils 0.1 0.0 - 0.2 10e3/uL    Absolute Immature Granulocytes 0.0 <=0.0 10e3/uL    Absolute NRBCs 0.0 10e3/uL       I reviewed the above labs today.    Imaging:  EXAMINATION: CT CHEST W/O CONTRAST, 11/19/2021 1:17 PM     CLINICAL HISTORY: Malignant melanoma of scalp (H)     COMPARISON: 8/25/2021 PET/CT.     TECHNIQUE: CT imaging obtained through the chest without contrast.  Coronal and axial MIP reformatted images obtained.      FINDINGS:     The  central tracheobronchial tree is patent. Heart size is normal. No  pericardial effusion. Moderate coronary artery calcifications. Normal  three vessel arch anatomy. No axillary adenopathy. No mediastinal  adenopathy. Thyroid lobes are normal     No suspiciously pulmonary nodules. No airspace consolidation. No  pleural effusion or pneumothorax.     Cyst in the anterior mid left kidney partially visualized. Visualized  portions of the upper abdomen are otherwise unremarkable on this  noncontrast study.      No suspicious osseus or soft tissue lesions.                                                                 IMPRESSION:      No acute cardiopulmonary findings. No evidence of pulmonary  metastases.     I have personally reviewed the examination and initial interpretation  and I agree with the findings.     SHANIQUA ALCANTARA MD     ASSESSMENT AND PLAN    #1 Cutaneous melanoma, scalp primary, pT2a cN1c, clinical Stage IIIB.  It was a pleasure to see Mr. Coleman today. He is a 76 year old  with agent orange cutaneous exposures in the [place] war and a diagnosis of malignant melanoma arising from a pre-existing pigmented lesion on the scalp. He had a partial response to neoadjuvant therapy with atezolizumab (3 cycles), vemurafenib, and cobimetinib (4 cycles) with decrease in thickening of the large scalp tumor and lightening of the periphery and in the middle part of the tumor then had surgery, which showed only tumoral melanosis. He started adjuvant nivolumab on 6/25/21. Overall plan: 9 total cycles of nivolumab q 4 weeks (=12 cycles of immunotherapy total including previous 3 cycles of atezo). Next follow-up with dermatology is scheduled for December 2021.  -Last cycle delayed to due increasing SOB, concern for pneumonitis. CT chest 11/19/21 negative for pneumonitis, cardiopulmonary findings or lung metatstases.  -Proceed with cycle 6 Nivolumab today  -Restaging PET scan scheduled 11/23-will see Sherine Cardoza  next week to review results   -RTC in 4 weeks for labs, LEVY visit and nivolumab infusion    #2 Depressed mood, anxiety   Verbalizes fluctuating depression and anxiety, multifactorial. He has a longstanding history of PTSD. 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 Continues to use lorazepam occasionally, refilled today. Appropriate use/fills per  but would be better managed by psychiatry given other prescribed PTSD medications.    #3 Diarrhea  Likely secondary to his treatment, complicated by possible IBS. Uses Lomotil infrequently.    #4 CKD  Suspect secondary to hypertension. Creatinine meets parameters for immunotherapy. Encouraged pushing fluids.    # HTN  BP elevated today. He is on hydrochlorothiazide but reports no longer taking lisinopril. [facility] Recheck BP in infusion.    60 minutes spent on the date of the encounter doing chart review, review of test results, interpretation of tests, patient visit and documentation     Sherine Meza CNP  [facility]  [address] [place] [id number]  [phone]            Again, thank you for allowing me to participate in the care of your patient.        Sincerely,        Sherine Meza CNP

## 2021-11-22 NOTE — PROGRESS NOTES
Infusion Nursing Note:  Ahmet Coleman presents today for cycle 6, day 1 nivolumab.    Patient seen by provider today: Yes: Sherine Meza   present during visit today: Not Applicable.    Note: Patient arrives feeling well today, no new concerns.  BP elevated in lab, recheck in infusion: 135/76 and provider notified.      Intravenous Access:  Peripheral IV placed.    Treatment Conditions:  Lab Results   Component Value Date    HGB 13.7 11/22/2021    WBC 3.6 (L) 11/22/2021    ANEU 2.6 06/25/2021    ANEUTAUTO 2.1 11/22/2021     11/22/2021      Lab Results   Component Value Date     11/22/2021    POTASSIUM 3.6 11/22/2021    MAG 2.3 11/22/2021    CR 1.31 (H) 11/22/2021    STEPEHN 8.9 11/22/2021    BILITOTAL 0.5 11/22/2021    ALBUMIN 3.6 11/22/2021    ALT 22 11/22/2021    AST 15 11/22/2021     Results reviewed, labs MET treatment parameters, ok to proceed with treatment.      Post Infusion Assessment:  Patient tolerated infusion without incident.  Blood return noted pre and post infusion.  Site patent and intact, free from redness, edema or discomfort.  No evidence of extravasations.  Access discontinued per protocol.       Discharge Plan:   Patient declined prescription refills.  Copy of AVS reviewed with patient and/or family.  Next infusion appointment not yet scheduled, has PET 11/23 to determine follow up plan.  Patient discharged in stable condition accompanied by: self.  Departure Mode: Ambulatory.      Keri Aguilar RN

## 2021-11-22 NOTE — PATIENT INSTRUCTIONS
Contact Numbers    [facility] Main Line: [phone]    Triage Nurse Line: [phone]      Please call the [facility] Nurse Triage line if you experience a temperature greater than or equal to 100.5, shaking chills, have uncontrolled nausea, vomiting and/or diarrhea, dizziness, shortness of breath, bleeding not relieved with pressure, or if you have any other questions or concerns.     If it is after hours, weekends, or holidays, please call the [phone] and a nurse will be able to triage your call.    If you are having any concerning symptoms or wish to speak to a provider before your next infusion visit, please call your care coordinator or triage them so we can adequately serve you.      If you need to refill your narcotic prescription or other medication, please call triage before your infusion appointment.

## 2021-11-23 ENCOUNTER — HOSPITAL ENCOUNTER (OUTPATIENT)
Dept: PET IMAGING | Facility: CLINIC | Age: 76
Setting detail: NUCLEAR MEDICINE
End: 2021-11-23
Attending: REGISTERED NURSE
Payer: MEDICARE

## 2021-11-23 DIAGNOSIS — C43.4 MALIGNANT MELANOMA OF SCALP (H): ICD-10-CM

## 2021-11-23 PROCEDURE — 70491 CT SOFT TISSUE NECK W/DYE: CPT

## 2021-11-23 PROCEDURE — 78816 PET IMAGE W/CT FULL BODY: CPT | Mod: PS

## 2021-11-23 PROCEDURE — 74177 CT ABD & PELVIS W/CONTRAST: CPT | Mod: 26 | Performed by: RADIOLOGY

## 2021-11-23 PROCEDURE — 78816 PET IMAGE W/CT FULL BODY: CPT | Mod: 26 | Performed by: RADIOLOGY

## 2021-11-23 PROCEDURE — 250N000011 HC RX IP 250 OP 636: Performed by: REGISTERED NURSE

## 2021-11-23 PROCEDURE — 71260 CT THORAX DX C+: CPT | Mod: 26 | Performed by: RADIOLOGY

## 2021-11-23 PROCEDURE — 343N000001 HC RX 343: Performed by: REGISTERED NURSE

## 2021-11-23 PROCEDURE — 70491 CT SOFT TISSUE NECK W/DYE: CPT | Mod: 26 | Performed by: RADIOLOGY

## 2021-11-23 PROCEDURE — A9552 F18 FDG: HCPCS | Performed by: REGISTERED NURSE

## 2021-11-23 RX ORDER — IOPAMIDOL 755 MG/ML
50-135 INJECTION, SOLUTION INTRAVASCULAR ONCE
Status: COMPLETED | OUTPATIENT
Start: 2021-11-23 | End: 2021-11-23

## 2021-11-23 RX ADMIN — FLUDEOXYGLUCOSE F-18 12.16 MCI.: 500 INJECTION, SOLUTION INTRAVENOUS at 13:39

## 2021-11-23 RX ADMIN — IOPAMIDOL 124 ML: 755 INJECTION, SOLUTION INTRAVENOUS at 14:54

## 2021-11-26 DIAGNOSIS — C43.4 MELANOMA OF SCALP (H): Primary | ICD-10-CM

## 2021-11-29 ENCOUNTER — VIRTUAL VISIT (OUTPATIENT)
Dept: ONCOLOGY | Facility: CLINIC | Age: 76
End: 2021-11-29
Attending: PHYSICIAN ASSISTANT
Payer: MEDICARE

## 2021-11-29 DIAGNOSIS — C43.4 MELANOMA OF SCALP (H): ICD-10-CM

## 2021-11-29 DIAGNOSIS — J18.9 PNEUMONIA DUE TO INFECTIOUS ORGANISM, UNSPECIFIED LATERALITY, UNSPECIFIED PART OF LUNG: ICD-10-CM

## 2021-11-29 DIAGNOSIS — R05.9 COUGH: Primary | ICD-10-CM

## 2021-11-29 PROCEDURE — 999N001193 HC VIDEO/TELEPHONE VISIT; NO CHARGE

## 2021-11-29 PROCEDURE — 99214 OFFICE O/P EST MOD 30 MIN: CPT | Mod: 95 | Performed by: PHYSICIAN ASSISTANT

## 2021-11-29 PROCEDURE — G0463 HOSPITAL OUTPT CLINIC VISIT: HCPCS | Mod: PN,RTG | Performed by: PHYSICIAN ASSISTANT

## 2021-11-29 RX ORDER — LEVOFLOXACIN 750 MG/1
750 TABLET, FILM COATED ORAL DAILY
Qty: 10 TABLET | Refills: 0 | Status: SHIPPED | OUTPATIENT
Start: 2021-11-29 | End: 2021-12-09

## 2021-11-29 NOTE — PROGRESS NOTES
Lloyd is a 76 year old who is being evaluated via a billable video visit.      How would you like to obtain your AVS? MyChart  If the video visit is dropped, the invitation should be resent by: Text to cell phone: [phone]  Will anyone else be joining your video visit? No    Video-Visit Details    Type of service:  Video Visit    Video Start Time: 2:18 PM    Video End Time:2:40 PM    Originating Location (pt. Location): Home    Distant Location (provider location):  [facility]     Platform used for Video Visit: scoo mobility     [facility] ONCOLOGY PROGRESS NOTE  Melanoma Clinic  Nov 29, 2021    CHIEF COMPLAINT: Scalp melanoma    Melanoma History:  1. He has a small pigmented scalp lesion present for over 30 years. The lesion was biopsied in 1992 and was benign.  2. October 2020, he presented to [facility] with 1 year of progressive enlargement of the lesion and new onset burning, itching, and stinging sensation in the affected scalp.  3. 10/26/20, He was seen at [facility] and he had shave biopsies of A. left central parietal scalp, B. left central occipital scalp, C. Left posterior parietal scalp, and D. punch biopsy of the left superior occipital scalp. Pathology (specimen: [id number]) showed a nodular and nevoid type melanoma, non-ulcerated, Breslow depth up to 1.3 mm, Saurabh's level IV, and up to 3 mitoses per mm2. All margins were involved. Ki-67 10-20%. Sox10 stain is positive and highlights an atypical compound melanocytic proliferation with significant overlying confluence and pagetosis of intraepidermal melanocytes. T-stage: at least pT2a. PD-L1 staining shows PD-L1 TPS <1%. 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  4. 11/25/20, he was seen by Dr. Garcia and he took three 3mm punch biopsies, which returned as dermal melanocytic proliferations with melanophages and fibrosis, consistent with locoregional metastatic melanoma.  5. 12/8/2020 he had  PET-CT, which showed FDG avid irregular skin thickening overlying the left parietal region, with no FDG avid lymphadenopathy in the neck and no evidence of metastasis elsewhere in the body.  6. 1/22/2021 start vemurafenib and cobimetinib, ~2/12/21 held for diarrhea, then resumed.  7. 2/18/2021 Start atezolizumab, last on 4/29/21  8. 5/24/2021 Wide local excision of the scalp melanoma and left latissimus free flap for scalp reconstruction.    Surgical pathology showed features consistent with scar and tumoral melanosis. No definite residual melanoma is seen. Tumoral melanosis (which extends to a depth of around 1.1 mm) is believed to be equivalent to a diagnosis of regressed melanoma.   9. 6/25/21 Start of adjuvant Nivolumab     INTERVAL HISTORY  -Home test is vague pink. One day later home test was negative.   -Was having night sweats, shivers, 102F fever, fatigue, body aches, and cough. Also, has sinus congestion.   -Symptoms first started on 11/25.   -Has been eating very little, just some chicken noodle soup. Taste is very salty.   -Last fever was 99F on Sunday, no fever today.  -Stools are slowing down, was worse last week, taking Lomotil.   -Has taken Mucinex for the cough.   -Feels crabby  -Developed green mucus with cough this morning.     Current Outpatient Medications   Medication Sig Dispense Refill     acetaminophen (TYLENOL) 500 MG tablet Take 2 tablets (1,000 mg) by mouth every 8 hours as needed for mild pain 100 tablet 0     cholecalciferol (VITAMIN D3) 1000 UNIT tablet Take 1,000 Units by mouth daily        diphenoxylate-atropine (LOMOTIL) 2.5-0.025 MG tablet Take 1-2 tablets by mouth 4 times daily as needed for diarrhea 120 tablet 5     docusate sodium (DSS) 100 MG capsule TAKE ONE CAPSULE BY MOUTH EVERY DAY FOR STOOL SOFTENING       fluvoxaMINE (LUVOX) 100 MG tablet Take 150 mg at bedtime       hydrochlorothiazide (HYDRODIURIL) 25 MG tablet Take 25 mg by mouth daily.       Hypromellose (ARTIFICIAL  TEARS OP) Apply  to eye. 2 drops in each eye twice a day as needed       Lactobacillus-Inulin ([facility] DIGESTIVE [facility]) CAPS 1 tab daily (Patient not taking: Reported on 10/14/2021) 30 capsule 1     LORazepam (ATIVAN) 0.5 MG tablet Take 1 tablet (0.5 mg) by mouth every 4 hours as needed (Anxiety, Nausea/Vomiting or Sleep) 30 tablet 0     Magnesium Oxide 420 MG TABS Take 420 mg by mouth daily       methocarbamol (ROBAXIN) 500 MG tablet Take 1 tablet (500 mg) by mouth 3 times daily as needed for muscle spasms (Patient not taking: Reported on 10/14/2021) 15 tablet 0     methocarbamol (ROBAXIN) 500 MG tablet Take 1 tablet (500 mg) by mouth 3 times daily as needed for muscle spasms 10 tablet 0     omeprazole (PRILOSEC) 20 MG capsule Take 1 capsule by mouth 2 times daily. 60 capsule prn     polyethylene glycol (MIRALAX) 17 GM/Dose powder Take 17 g by mouth daily 510 g 0     pregabalin (LYRICA) 150 MG capsule Take 150 mg by mouth 2 times daily       prochlorperazine (COMPAZINE) 10 MG tablet Take 1 tablet (10 mg) by mouth every 6 hours as needed (Nausea/Vomiting) (Patient not taking: Reported on 10/14/2021) 30 tablet 3     prochlorperazine (COMPAZINE) 10 MG tablet Take 1 tablet (10 mg) by mouth every 6 hours as needed (Nausea/Vomiting) 30 tablet 2     QUEtiapine (SEROQUEL) 300 MG tablet Take 150 mg by mouth At Bedtime        senna-docusate (SENOKOT-S/PERICOLACE) 8.6-50 MG tablet Take 1 tablet by mouth 2 times daily 30 tablet 0     simvastatin (ZOCOR) 80 MG tablet Take 40 mg by mouth At Bedtime        traZODone (DESYREL) 100 MG tablet Take 100 mg by mouth At Bedtime        vortioxetine (TRINTELLIX) 20 MG tablet TAKE ONE TABLET BY MOUTH EVERY MORNING       Objective:  General: patient appears well in no acute distress, alert and oriented, speech clear and fluid  Skin: no visualized rash or lesions on visualized skin  Resp: Appears to be breathing comfortably without accessory muscle usage, speaking in full sentences, no  audible wheezes or cough.  Psych: Coherent speech, normal rate and volume, able to articulate logical thoughts, able to abstract reason, no tangential thoughts, no hallucinations or delusions  Patient's affect is appropriate.    Labs:   Most Recent 3 CBC's:Recent Labs   Lab Test 11/22/21  1300 11/11/21  0956 10/14/21  1202   WBC 3.6* 4.9 4.9   HGB 13.7 13.0* 12.9*   MCV 83 83 80    236 241     Most Recent 3 BMP's:  Recent Labs   Lab Test 11/22/21  1300 11/11/21  0956 10/14/21  1202    139 138   POTASSIUM 3.6 3.4 3.6   CHLORIDE 102 103 102   CO2 31 27 31   BUN 15 16 22   CR 1.31* 1.29* 1.57*   ANIONGAP 8 9 5   STEPHEN 8.9 8.7 8.8   * 91 101*    Most Recent 2 LFT's:  Recent Labs   Lab Test 11/22/21  1300 11/11/21  0956   AST 15 18   ALT 22 19   ALKPHOS 52 52   BILITOTAL 0.5 0.4    Most Recent TSH and T4:  Recent Labs   Lab Test 11/22/21  1300   TSH 1.39   I reviewed the above labs today.    Imaging:  PET/CT on 11/29/21 shows the following:  IMPRESSION: In this patient with malignant melanoma of the scalp  status post-resection and chemotherapy:  1. No evidence of metastatic disease in the body.  2. See dedicated neuroradiology report for the results of the high  resolution PET CT of the neck.   3. Hypermetabolism in mildly thickened wall in the descending colon,   differential does include possible immunotherapy induced colitis.  4. No significant lung findings to suggest significant pneumonitis.  Minimal groundglass favor atelectatic in the right lower lobe is  Unchanged.    Neck CT on 11/29/21 shows the following:  Impression: In this patient with malignant melanoma of the scalp  status post-resection and chemotherapy:  1. No suspicious FDG uptake in the left scalp to represent locally  recurrent tumor.  2. On the fusion PET CT, there is no abnormal metabolic activity in  the mucosal space, soft tissues, or cervical nika chains.   3. No evidence of mucosal, nika, or soft tissue abnormality  on  contrast enhanced neck CT.Impression: In this patient with malignant melanoma of the scalp  status post-resection and chemotherapy:    ASSESSMENT AND PLAN  Cutaneous melanoma, scalp primary, pT2a cN1c, clinical Stage IIIB.  It was a pleasure to see Mr. Coleman today. He is a 76 year old  with agent orange cutaneous exposures in the [place] war and a diagnosis of malignant melanoma arising from a pre-existing pigmented lesion on the scalp. He had a partial response to neoadjuvant therapy with atezolizumab (3 cycles), vemurafenib, and cobimetinib (4 cycles) with decrease in thickening of the large scalp tumor and lightening of the periphery and in the middle part of the tumor then had surgery, which showed only tumoral melanosis. He started adjuvant nivolumab on 6/25/21. Overall plan: 9 total cycles of nivolumab q 4 weeks (=12 cycles of immunotherapy total including previous 3 cycles of atezo). Patient has been tolerating adjuvant nivolumab well. Imaging as above shows no evidence of recurrent disease. He will follow-up with me in 4 weeks prior to his next infusion.  Will repeat imaging every 3 months. Given likely COVID-19, will need to reschedule his dermatology appt.     Depressed mood, anxiety   Verbalizes fluctuating depression and anxiety, multifactorial. He has a longstanding history of PTSD. 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 Continues to use lorazepam occasionally.     Diarrhea  Likely secondary to his treatment, complicated by possible IBS. Uses Lomotil infrequently.    CKD  Suspect secondary to hypertension. Encouraged pushing fluids.    Likely COVID-19. Diagnosed on home test. Recommend continuing isolation until 10 days after symptoms first started, until 12/5. Recommend his close contacts quarantine for 14 days from last high risk contact and get tested. Discussed okay to get booster shot later in December once recovered from this illness. Given worsening cough and history  of developing bacterial pneumonia with viral infections, will empirically treat with Levaquin. He will call our clinic if his symptoms worsen.     Sherine Cardoza PA-C  [facility] [id number] [place] [address] [phone]

## 2021-11-29 NOTE — Clinical Note
11/29/2021         RE: Ahmet Coleman  [address]  [place] [address]        Dear Colleague,    Thank you for referring your patient, Ahmet Coleman, to the [facility]. Please see a copy of my visit note below.    Lloyd is a 76 year old who is being evaluated via a billable video visit.      How would you like to obtain your AVS? MyChart  If the video visit is dropped, the invitation should be resent by: Text to cell phone: [phone]  Will anyone else be joining your video visit? No    Video-Visit Details    Type of service:  Video Visit    Video Start Time: 2:18 PM    Video End Time:2:40 PM    Originating Location (pt. Location): Home    Distant Location (provider location):  [facility]     Platform used for Video Visit: Sh[employer]     [facility] ONCOLOGY PROGRESS NOTE  Melanoma Clinic  Nov 29, 2021    CHIEF COMPLAINT: Scalp melanoma    Melanoma History:  1. He has a small pigmented scalp lesion present for over 30 years. The lesion was biopsied in 1992 and was benign.  2. October 2020, he presented to [facility] with 1 year of progressive enlargement of the lesion and new onset burning, itching, and stinging sensation in the affected scalp.  3. 10/26/20, He was seen at [facility] and he had shave biopsies of A. left central parietal scalp, B. left central occipital scalp, C. Left posterior parietal scalp, and D. punch biopsy of the left superior occipital scalp. Pathology (specimen: [id number]) showed a nodular and nevoid type melanoma, non-ulcerated, Breslow depth up to 1.3 mm, Saurabh's level IV, and up to 3 mitoses per mm2. All margins were involved. Ki-67 10-20%. Sox10 stain is positive and highlights an atypical compound melanocytic proliferation with significant overlying confluence and pagetosis of intraepidermal melanocytes. T-stage: at least pT2a. PD-L1 staining shows PD-L1 TPS <1%. 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  4. 11/25/20, he was seen by Dr. Garcia and he took three 3mm punch biopsies, which returned as dermal melanocytic proliferations with melanophages and fibrosis, consistent with locoregional metastatic melanoma.  5. 12/8/2020 he had PET-CT, which showed FDG avid irregular skin thickening overlying the left parietal region, with no FDG avid lymphadenopathy in the neck and no evidence of metastasis elsewhere in the body.  6. 1/22/2021 start vemurafenib and cobimetinib, ~2/12/21 held for diarrhea, then resumed.  7. 2/18/2021 Start atezolizumab, last on 4/29/21  8. 5/24/2021 Wide local excision of the scalp melanoma and left latissimus free flap for scalp reconstruction.    Surgical pathology showed features consistent with scar and tumoral melanosis. No definite residual melanoma is seen. Tumoral melanosis (which extends to a depth of around 1.1 mm) is believed to be equivalent to a diagnosis of regressed melanoma.   9. 6/25/21 Start of adjuvant Nivolumab     INTERVAL HISTORY  -Home test is vague pink. One day later home test was negative.   -Was having night sweats, shivers, 102F fever, fatigue, body aches, and cough. Also, has sinus congestion.   -Symptoms first started on 11/25.   -Has been eating very little, just some chicken noodle soup. Taste is very salty.   -Last fever was 99F on Sunday, no fever today.  -Stools are slowing down, was worse last week, taking Lomotil.   -Has taken Mucinex for the cough.   -Feels crabby  -Developed green mucus with cough this morning.               Current Outpatient Medications   Medication Sig Dispense Refill     acetaminophen (TYLENOL) 500 MG tablet Take 2 tablets (1,000 mg) by mouth every 8 hours as needed for mild pain 100 tablet 0     cholecalciferol (VITAMIN D3) 1000 UNIT tablet Take 1,000 Units by mouth daily        diphenoxylate-atropine (LOMOTIL) 2.5-0.025 MG tablet Take 1-2 tablets by mouth 4 times daily as needed for diarrhea 120 tablet 5      docusate sodium (DSS) 100 MG capsule TAKE ONE CAPSULE BY MOUTH EVERY DAY FOR STOOL SOFTENING       fluvoxaMINE (LUVOX) 100 MG tablet Take 150 mg at bedtime       hydrochlorothiazide (HYDRODIURIL) 25 MG tablet Take 25 mg by mouth daily.       Hypromellose (ARTIFICIAL TEARS OP) Apply  to eye. 2 drops in each eye twice a day as needed       Lactobacillus-Inulin (C[employer]URELLE DIGESTIVE HEALTH) CAPS 1 tab daily (Patient not taking: Reported on 10/14/2021) 30 capsule 1     LORazepam (ATIVAN) 0.5 MG tablet Take 1 tablet (0.5 mg) by mouth every 4 hours as needed (Anxiety, Nausea/Vomiting or Sleep) 30 tablet 0     Magnesium Oxide 420 MG TABS Take 420 mg by mouth daily       methocarbamol (ROBAXIN) 500 MG tablet Take 1 tablet (500 mg) by mouth 3 times daily as needed for muscle spasms (Patient not taking: Reported on 10/14/2021) 15 tablet 0     methocarbamol (ROBAXIN) 500 MG tablet Take 1 tablet (500 mg) by mouth 3 times daily as needed for muscle spasms 10 tablet 0     omeprazole (PRILOSEC) 20 MG capsule Take 1 capsule by mouth 2 times daily. 60 capsule prn     polyethylene glycol (MIRALAX) 17 GM/Dose powder Take 17 g by mouth daily 510 g 0     pregabalin (LYRICA) 150 MG capsule Take 150 mg by mouth 2 times daily       prochlorperazine (COMPAZINE) 10 MG tablet Take 1 tablet (10 mg) by mouth every 6 hours as needed (Nausea/Vomiting) (Patient not taking: Reported on 10/14/2021) 30 tablet 3     prochlorperazine (COMPAZINE) 10 MG tablet Take 1 tablet (10 mg) by mouth every 6 hours as needed (Nausea/Vomiting) 30 tablet 2     QUEtiapine (SEROQUEL) 300 MG tablet Take 150 mg by mouth At Bedtime        senna-docusate (SENOKOT-S/PERICOLACE) 8.6-50 MG tablet Take 1 tablet by mouth 2 times daily 30 tablet 0     simvastatin (ZOCOR) 80 MG tablet Take 40 mg by mouth At Bedtime        traZODone (DESYREL) 100 MG tablet Take 100 mg by mouth At Bedtime        vortioxetine (TRINTELLIX) 20 MG tablet TAKE ONE TABLET BY MOUTH EVERY MORNING        Objective:  There were no vitals taken for this visit.    General: Pleasant male, NAD, accompanied by granddaughter.  HEENT: EOMI, PERRL. Sclerae are anicteric. Oral exam deferred.  Lymph: Neck is supple with no lymphadenopathy in the cervical or supraclavicular areas.   Heart: Regular rate and rhythm.   Lungs: Clear to auscultation bilaterally.   Abdomen: Bowel sounds present, soft, nontender.  Extremities: No lower extremity edema noted bilaterally.   Neuro: Cranial nerves II through XII are grossly intact.  Skin: Healing scalp reconstruction and skin grafting without drainage or erythema.   Psych: Depressed mood, appropriate affect.     Labs:   No results found for any visits on 11/29/21.    I reviewed the above labs today.    Imaging:  EXAMINATION: CT CHEST W/O CONTRAST, 11/19/2021 1:17 PM     CLINICAL HISTORY: Malignant melanoma of scalp (H)     COMPARISON: 8/25/2021 PET/CT.     TECHNIQUE: CT imaging obtained through the chest without contrast.  Coronal and axial MIP reformatted images obtained.      FINDINGS:     The central tracheobronchial tree is patent. Heart size is normal. No  pericardial effusion. Moderate coronary artery calcifications. Normal  three vessel arch anatomy. No axillary adenopathy. No mediastinal  adenopathy. Thyroid lobes are normal     No suspiciously pulmonary nodules. No airspace consolidation. No  pleural effusion or pneumothorax.     Cyst in the anterior mid left kidney partially visualized. Visualized  portions of the upper abdomen are otherwise unremarkable on this  noncontrast study.      No suspicious osseus or soft tissue lesions.                                                                 IMPRESSION:      No acute cardiopulmonary findings. No evidence of pulmonary  metastases.     I have personally reviewed the examination and initial interpretation  and I agree with the findings.     SHANIQUA ALCANTARA MD     ASSESSMENT AND PLAN    #1 Cutaneous melanoma, scalp primary,  pT2a cN1c, clinical Stage IIIB.  It was a pleasure to see Mr. Coleman today. He is a 76 year old  with agent orange cutaneous exposures in the [place] war and a diagnosis of malignant melanoma arising from a pre-existing pigmented lesion on the scalp. He had a partial response to neoadjuvant therapy with atezolizumab (3 cycles), vemurafenib, and cobimetinib (4 cycles) with decrease in thickening of the large scalp tumor and lightening of the periphery and in the middle part of the tumor then had surgery, which showed only tumoral melanosis. He started adjuvant nivolumab on 6/25/21. Overall plan: 9 total cycles of nivolumab q 4 weeks (=12 cycles of immunotherapy total including previous 3 cycles of atezo). Next follow-up with dermatology is scheduled for December 2021.  -Last cycle delayed to due increasing SOB, concern for pneumonitis. CT chest 11/19/21 negative for pneumonitis, cardiopulmonary findings or lung metatstases.  -Proceed with cycle 6 Nivolumab today  -Restaging PET scan scheduled 11/23-will see Sherine Cardoza next week to review results   -RTC in 4 weeks for labs, LEVY visit and nivolumab infusion    #2 Depressed mood, anxiety   Verbalizes fluctuating depression and anxiety, multifactorial. He has a longstanding history of PTSD. 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 Continues to use lorazepam occasionally, refilled today. Appropriate use/fills per  but would be better managed by psychiatry given other prescribed PTSD medications.    #3 Diarrhea  Likely secondary to his treatment, complicated by possible IBS. Uses Lomotil infrequently.    #4 CKD  Suspect secondary to hypertension. Creatinine meets parameters for immunotherapy. Encouraged pushing fluids.    # HTN  BP elevated today. He is on hydrochlorothiazide but reports no longer taking lisinopril. [facility] Recheck BP in infusion.    Addendum 11/22/21 3:45 pm:  Blood pressure improved to 135/76 when  rechecked in infusion. Continue to monitor with follow-up visits and discuss follow-up with PCP to address htn/ckd.      Booster shot later in December 12/5 out of isolation  Daughter vaccinated  Son in law and 14 year old granddaughter not vaccinated- last around them on 11/24, testing  Move out dermatology  Humberto Cardoza PA-C  [facility]  [address] [place] [address] [id number] [phone]              Again, thank you for allowing me to participate in the care of your patient.        Sincerely,        Sherine Cardoza PA-C

## 2021-11-29 NOTE — LETTER
11/29/2021      RE: Ahmet Coleman  [address]  [place] [address]       Lloyd is a 76 year old who is being evaluated via a billable video visit.      How would you like to obtain your AVS? MyChart  If the video visit is dropped, the invitation should be resent by: Text to cell phone: [phone]  Will anyone else be joining your video visit? No    Video-Visit Details    Type of service:  Video Visit    Video Start Time: 2:18 PM    Video End Time:2:40 PM    Originating Location (pt. Location): Home    Distant Location (provider location):  [facility]     Platform used for Video Visit: [facility] ONCOLOGY PROGRESS NOTE  Melanoma Clinic  Nov 29, 2021    CHIEF COMPLAINT: Scalp melanoma    Melanoma History:  1. He has a small pigmented scalp lesion present for over 30 years. The lesion was biopsied in 1992 and was benign.  2. October 2020, he presented to [facility] with 1 year of progressive enlargement of the lesion and new onset burning, itching, and stinging sensation in the affected scalp.  3. 10/26/20, He was seen at [facility] and he had shave biopsies of A. left central parietal scalp, B. left central occipital scalp, C. Left posterior parietal scalp, and D. punch biopsy of the left superior occipital scalp. Pathology (specimen: [id number]) showed a nodular and nevoid type melanoma, non-ulcerated, Breslow depth up to 1.3 mm, Saurabh's level IV, and up to 3 mitoses per mm2. All margins were involved. Ki-67 10-20%. Sox10 stain is positive and highlights an atypical compound melanocytic proliferation with significant overlying confluence and pagetosis of intraepidermal melanocytes. T-stage: at least pT2a. PD-L1 staining shows PD-L1 TPS <1%. 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  4. 11/25/20, he was seen by Dr. Garcia and he took three 3mm punch biopsies, which returned as dermal melanocytic proliferations with melanophages and  fibrosis, consistent with locoregional metastatic melanoma.  5. 12/8/2020 he had PET-CT, which showed FDG avid irregular skin thickening overlying the left parietal region, with no FDG avid lymphadenopathy in the neck and no evidence of metastasis elsewhere in the body.  6. 1/22/2021 start vemurafenib and cobimetinib, ~2/12/21 held for diarrhea, then resumed.  7. 2/18/2021 Start atezolizumab, last on 4/29/21  8. 5/24/2021 Wide local excision of the scalp melanoma and left latissimus free flap for scalp reconstruction.    Surgical pathology showed features consistent with scar and tumoral melanosis. No definite residual melanoma is seen. Tumoral melanosis (which extends to a depth of around 1.1 mm) is believed to be equivalent to a diagnosis of regressed melanoma.   9. 6/25/21 Start of adjuvant Nivolumab     INTERVAL HISTORY  -Home test is vague pink. One day later home test was negative.   -Was having night sweats, shivers, 102F fever, fatigue, body aches, and cough. Also, has sinus congestion.   -Symptoms first started on 11/25.   -Has been eating very little, just some chicken noodle soup. Taste is very salty.   -Last fever was 99F on Sunday, no fever today.  -Stools are slowing down, was worse last week, taking Lomotil.   -Has taken Mucinex for the cough.   -Feels crabby  -Developed green mucus with cough this morning.     Current Outpatient Medications   Medication Sig Dispense Refill     acetaminophen (TYLENOL) 500 MG tablet Take 2 tablets (1,000 mg) by mouth every 8 hours as needed for mild pain 100 tablet 0     cholecalciferol (VITAMIN D3) 1000 UNIT tablet Take 1,000 Units by mouth daily        diphenoxylate-atropine (LOMOTIL) 2.5-0.025 MG tablet Take 1-2 tablets by mouth 4 times daily as needed for diarrhea 120 tablet 5     docusate sodium (DSS) 100 MG capsule TAKE ONE CAPSULE BY MOUTH EVERY DAY FOR STOOL SOFTENING       fluvoxaMINE (LUVOX) 100 MG tablet Take 150 mg at bedtime       hydrochlorothiazide  (HYDRODIURIL) 25 MG tablet Take 25 mg by mouth daily.       Hypromellose (ARTIFICIAL TEARS OP) Apply  to eye. 2 drops in each eye twice a day as needed       Lactobacillus-Inulin ([facility] DIGESTIVE [facility]) CAPS 1 tab daily (Patient not taking: Reported on 10/14/2021) 30 capsule 1     LORazepam (ATIVAN) 0.5 MG tablet Take 1 tablet (0.5 mg) by mouth every 4 hours as needed (Anxiety, Nausea/Vomiting or Sleep) 30 tablet 0     Magnesium Oxide 420 MG TABS Take 420 mg by mouth daily       methocarbamol (ROBAXIN) 500 MG tablet Take 1 tablet (500 mg) by mouth 3 times daily as needed for muscle spasms (Patient not taking: Reported on 10/14/2021) 15 tablet 0     methocarbamol (ROBAXIN) 500 MG tablet Take 1 tablet (500 mg) by mouth 3 times daily as needed for muscle spasms 10 tablet 0     omeprazole (PRILOSEC) 20 MG capsule Take 1 capsule by mouth 2 times daily. 60 capsule prn     polyethylene glycol (MIRALAX) 17 GM/Dose powder Take 17 g by mouth daily 510 g 0     pregabalin (LYRICA) 150 MG capsule Take 150 mg by mouth 2 times daily       prochlorperazine (COMPAZINE) 10 MG tablet Take 1 tablet (10 mg) by mouth every 6 hours as needed (Nausea/Vomiting) (Patient not taking: Reported on 10/14/2021) 30 tablet 3     prochlorperazine (COMPAZINE) 10 MG tablet Take 1 tablet (10 mg) by mouth every 6 hours as needed (Nausea/Vomiting) 30 tablet 2     QUEtiapine (SEROQUEL) 300 MG tablet Take 150 mg by mouth At Bedtime        senna-docusate (SENOKOT-S/PERICOLACE) 8.6-50 MG tablet Take 1 tablet by mouth 2 times daily 30 tablet 0     simvastatin (ZOCOR) 80 MG tablet Take 40 mg by mouth At Bedtime        traZODone (DESYREL) 100 MG tablet Take 100 mg by mouth At Bedtime        vortioxetine (TRINTELLIX) 20 MG tablet TAKE ONE TABLET BY MOUTH EVERY MORNING       Objective:  General: patient appears well in no acute distress, alert and oriented, speech clear and fluid  Skin: no visualized rash or lesions on visualized skin  Resp: Appears to be  breathing comfortably without accessory muscle usage, speaking in full sentences, no audible wheezes or cough.  Psych: Coherent speech, normal rate and volume, able to articulate logical thoughts, able to abstract reason, no tangential thoughts, no hallucinations or delusions  Patient's affect is appropriate.    Labs:   Most Recent 3 CBC's:Recent Labs   Lab Test 11/22/21  1300 11/11/21  0956 10/14/21  1202   WBC 3.6* 4.9 4.9   HGB 13.7 13.0* 12.9*   MCV 83 83 80    236 241     Most Recent 3 BMP's:  Recent Labs   Lab Test 11/22/21  1300 11/11/21  0956 10/14/21  1202    139 138   POTASSIUM 3.6 3.4 3.6   CHLORIDE 102 103 102   CO2 31 27 31   BUN 15 16 22   CR 1.31* 1.29* 1.57*   ANIONGAP 8 9 5   STEPHEN 8.9 8.7 8.8   * 91 101*    Most Recent 2 LFT's:  Recent Labs   Lab Test 11/22/21  1300 11/11/21  0956   AST 15 18   ALT 22 19   ALKPHOS 52 52   BILITOTAL 0.5 0.4    Most Recent TSH and T4:  Recent Labs   Lab Test 11/22/21  1300   TSH 1.39   I reviewed the above labs today.    Imaging:  PET/CT on 11/29/21 shows the following:  IMPRESSION: In this patient with malignant melanoma of the scalp  status post-resection and chemotherapy:  1. No evidence of metastatic disease in the body.  2. See dedicated neuroradiology report for the results of the high  resolution PET CT of the neck.   3. Hypermetabolism in mildly thickened wall in the descending colon,   differential does include possible immunotherapy induced colitis.  4. No significant lung findings to suggest significant pneumonitis.  Minimal groundglass favor atelectatic in the right lower lobe is  Unchanged.    Neck CT on 11/29/21 shows the following:  Impression: In this patient with malignant melanoma of the scalp  status post-resection and chemotherapy:  1. No suspicious FDG uptake in the left scalp to represent locally  recurrent tumor.  2. On the fusion PET CT, there is no abnormal metabolic activity in  the mucosal space, soft tissues, or cervical  nika chains.   3. No evidence of mucosal, nika, or soft tissue abnormality on  contrast enhanced neck CT.Impression: In this patient with malignant melanoma of the scalp  status post-resection and chemotherapy:    ASSESSMENT AND PLAN  Cutaneous melanoma, scalp primary, pT2a cN1c, clinical Stage IIIB.  It was a pleasure to see Mr. Coleman today. He is a 76 year old  with agent orange cutaneous exposures in the [place] war and a diagnosis of malignant melanoma arising from a pre-existing pigmented lesion on the scalp. He had a partial response to neoadjuvant therapy with atezolizumab (3 cycles), vemurafenib, and cobimetinib (4 cycles) with decrease in thickening of the large scalp tumor and lightening of the periphery and in the middle part of the tumor then had surgery, which showed only tumoral melanosis. He started adjuvant nivolumab on 6/25/21. Overall plan: 9 total cycles of nivolumab q 4 weeks (=12 cycles of immunotherapy total including previous 3 cycles of atezo). Patient has been tolerating adjuvant nivolumab well. Imaging as above shows no evidence of recurrent disease. He will follow-up with me in 4 weeks prior to his next infusion.  Will repeat imaging every 3 months. Given likely COVID-19, will need to reschedule his dermatology appt.     Depressed mood, anxiety   Verbalizes fluctuating depression and anxiety, multifactorial. He has a longstanding history of PTSD. 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 Continues to use lorazepam occasionally.     Diarrhea  Likely secondary to his treatment, complicated by possible IBS. Uses Lomotil infrequently.    CKD  Suspect secondary to hypertension. Encouraged pushing fluids.    Likely COVID-19. Diagnosed on home test. Recommend continuing isolation until 10 days after symptoms first started, until 12/5. Recommend his close contacts quarantine for 14 days from last high risk contact and get tested. Discussed okay to get booster shot later in  December once recovered from this illness. Given worsening cough and history of developing bacterial pneumonia with viral infections, will empirically treat with Levaquin. He will call our clinic if his symptoms worsen.     Sherine Cardoza PA-C  [facility]  [address] [place] [id number] [phone]              Sherine Cardoza PA-C

## 2021-12-19 NOTE — PROGRESS NOTES
MEDICAL ONCOLOGY PROGRESS NOTE  Melanoma Clinic  Dec 20, 2021    CHIEF COMPLAINT: Scalp melanoma    Melanoma History:  1. He has a small pigmented scalp lesion present for over 30 years. The lesion was biopsied in 1992 and was benign.  2. October 2020, he presented to [facility] with 1 year of progressive enlargement of the lesion and new onset burning, itching, and stinging sensation in the affected scalp.  3. 10/26/20, He was seen at [facility] and he had shave biopsies of A. left central parietal scalp, B. left central occipital scalp, C. Left posterior parietal scalp, and D. punch biopsy of the left superior occipital scalp. Pathology (specimen: [id number]) showed a nodular and nevoid type melanoma, non-ulcerated, Breslow depth up to 1.3 mm, Saurabh's level IV, and up to 3 mitoses per mm2. All margins were involved. Ki-67 10-20%. Sox10 stain is positive and highlights an atypical compound melanocytic proliferation with significant overlying confluence and pagetosis of intraepidermal melanocytes. T-stage: at least pT2a. PD-L1 staining shows PD-L1 TPS <1%. 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  4. 11/25/20, he was seen by Dr. Garcia and he took three 3mm punch biopsies, which returned as dermal melanocytic proliferations with melanophages and fibrosis, consistent with locoregional metastatic melanoma.  5. 12/8/2020 he had PET-CT, which showed FDG avid irregular skin thickening overlying the left parietal region, with no FDG avid lymphadenopathy in the neck and no evidence of metastasis elsewhere in the body.  6. 1/22/2021 start vemurafenib and cobimetinib, ~2/12/21 held for diarrhea, then resumed.  7. 2/18/2021 Start atezolizumab, last on 4/29/21  8. 5/24/2021 Wide local excision of the scalp melanoma and left latissimus free flap for scalp reconstruction.    Surgical pathology showed features consistent with scar and tumoral melanosis. No definite residual melanoma is seen.  Tumoral melanosis (which extends to a depth of around 1.1 mm) is believed to be equivalent to a diagnosis of regressed melanoma.   9. 6/25/21 Start of adjuvant Nivolumab     INTERVAL HISTORY  Patient feels recovered from recent Covid diagnosis.  Denies further cough.  No fever/chills.  No significant fatigue.  Has occasional skin dryness and some areas of flaking skin but no rash.  Denies joint pain.  Stools continue to fluctuate but seem to be consistent with his norm.  He uses Lomotil as needed, titrating for loose stools.    Current Outpatient Medications   Medication Sig Dispense Refill     acetaminophen (TYLENOL) 500 MG tablet Take 2 tablets (1,000 mg) by mouth every 8 hours as needed for mild pain 100 tablet 0     cholecalciferol (VITAMIN D3) 1000 UNIT tablet Take 1,000 Units by mouth daily        diphenoxylate-atropine (LOMOTIL) 2.5-0.025 MG tablet Take 1-2 tablets by mouth 4 times daily as needed for diarrhea 120 tablet 5     docusate sodium (DSS) 100 MG capsule TAKE ONE CAPSULE BY MOUTH EVERY DAY FOR STOOL SOFTENING       fluvoxaMINE (LUVOX) 100 MG tablet Take 150 mg at bedtime       hydrochlorothiazide (HYDRODIURIL) 25 MG tablet Take 25 mg by mouth daily.       Hypromellose (ARTIFICIAL TEARS OP) Apply  to eye. 2 drops in each eye twice a day as needed       Lactobacillus-Inulin ([facility] DIGESTIVE [facility]) CAPS 1 tab daily (Patient not taking: Reported on 10/14/2021) 30 capsule 1     LORazepam (ATIVAN) 0.5 MG tablet Take 1 tablet (0.5 mg) by mouth every 4 hours as needed (Anxiety, Nausea/Vomiting or Sleep) 30 tablet 0     Magnesium Oxide 420 MG TABS Take 420 mg by mouth daily       methocarbamol (ROBAXIN) 500 MG tablet Take 1 tablet (500 mg) by mouth 3 times daily as needed for muscle spasms (Patient not taking: Reported on 10/14/2021) 15 tablet 0     methocarbamol (ROBAXIN) 500 MG tablet Take 1 tablet (500 mg) by mouth 3 times daily as needed for muscle spasms 10 tablet 0     omeprazole (PRILOSEC) 20 MG  capsule Take 1 capsule by mouth 2 times daily. 60 capsule prn     polyethylene glycol (MIRALAX) 17 GM/Dose powder Take 17 g by mouth daily 510 g 0     pregabalin (LYRICA) 150 MG capsule Take 150 mg by mouth 2 times daily       prochlorperazine (COMPAZINE) 10 MG tablet Take 1 tablet (10 mg) by mouth every 6 hours as needed (Nausea/Vomiting) (Patient not taking: Reported on 10/14/2021) 30 tablet 3     prochlorperazine (COMPAZINE) 10 MG tablet Take 1 tablet (10 mg) by mouth every 6 hours as needed (Nausea/Vomiting) 30 tablet 2     QUEtiapine (SEROQUEL) 300 MG tablet Take 150 mg by mouth At Bedtime        senna-docusate (SENOKOT-S/PERICOLACE) 8.6-50 MG tablet Take 1 tablet by mouth 2 times daily 30 tablet 0     simvastatin (ZOCOR) 80 MG tablet Take 40 mg by mouth At Bedtime        traZODone (DESYREL) 100 MG tablet Take 100 mg by mouth At Bedtime        vortioxetine (TRINTELLIX) 20 MG tablet TAKE ONE TABLET BY MOUTH EVERY MORNING       Objective:  BP (!) 149/84   Pulse 58   Temp 97.7  F (36.5  C) (Oral)   Resp 16   Wt 91.2 kg (201 lb 1.6 oz)   SpO2 95%   BMI 30.58 kg/m    Constitutional: Well-appearing male in no acute distress.  Eyes: EOMI, PERRL. No scleral icterus.  ENT: Oral mucosa is moist without lesions or thrush.   Cardiovascular: Regular rate and rhythm. No murmurs, gallops, or rubs. No peripheral edema.  Respiratory: Clear to auscultation bilaterally. No wheezes or crackles.  Gastrointestinal: Abdomen soft, non-tender.   Neurologic: Cranial nerves II through XII are grossly intact.  Skin: No rashes, petechiae, or bruising noted on exposed skin.    Labs:   Most Recent 3 CBC's:  Recent Labs   Lab Test 12/20/21  1128 11/22/21  1300 11/11/21  0956   WBC 3.8* 3.6* 4.9   HGB 12.8* 13.7 13.0*   MCV 83 83 83    204 236     Most Recent 3 BMP's:  Recent Labs   Lab Test 11/22/21  1300 11/11/21  0956 10/14/21  1202    139 138   POTASSIUM 3.6 3.4 3.6   CHLORIDE 102 103 102   CO2 31 27 31   BUN 15 16 22    CR 1.31* 1.29* 1.57*   ANIONGAP 8 9 5   STEPHEN 8.9 8.7 8.8   * 91 101*    Most Recent 2 LFT's:  Recent Labs   Lab Test 11/22/21  1300 11/11/21  0956   AST 15 18   ALT 22 19   ALKPHOS 52 52   BILITOTAL 0.5 0.4    Most Recent TSH and T4:  Recent Labs   Lab Test 11/22/21  1300   TSH 1.39   I reviewed the above labs today.    ASSESSMENT AND PLAN  Cutaneous melanoma, scalp primary, pT2a cN1c, clinical Stage IIIB.  It was a pleasure to see Mr. Coleman today. He is a 76 year old  with agent orange cutaneous exposures in the [place] war and a diagnosis of malignant melanoma arising from a pre-existing pigmented lesion on the scalp. He had a partial response to neoadjuvant therapy with atezolizumab (3 cycles), vemurafenib, and cobimetinib (4 cycles) with decrease in thickening of the large scalp tumor and lightening of the periphery and in the middle part of the tumor then had surgery, which showed only tumoral melanosis. He started adjuvant nivolumab on 6/25/21. Overall plan: 9 total cycles of nivolumab q 4 weeks (=12 cycles of immunotherapy total including previous 3 cycles of atezo). Patient has been tolerating adjuvant nivolumab well. Imaging 11/29/21 shows no evidence of recurrent disease. He will follow-up with Dr. Hernández in 4 weeks prior to his next infusion.  Will repeat imaging every 3 months.      Depressed mood, anxiety   Verbalizes fluctuating depression and anxiety, multifactorial. He has a longstanding history of PTSD. 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 Continues to use lorazepam occasionally.     Diarrhea  Likely secondary to his treatment, complicated by possible IBS. Uses Lomotil infrequently.    CKD  Suspect secondary to hypertension. Encouraged pushing fluids.    COVID-19 Recovered, was diagnosed on home test. Experienced fevers and respiratory symptoms. Was empirically treated with levaquin which he felt was helpful given his history of developing bacterial pneumonia.  Encouraged booster vaccine, could be given in infusion today. He will consider this.    40 minutes spent on the date of the encounter doing chart review, review of test results, interpretation of tests, patient visit, documentation and discussion with family     Sherine Meza CNP  [facility] [address] [address]  [phone]

## 2021-12-20 ENCOUNTER — APPOINTMENT (OUTPATIENT)
Dept: LAB | Facility: CLINIC | Age: 76
End: 2021-12-20
Attending: REGISTERED NURSE
Payer: MEDICARE

## 2021-12-20 ENCOUNTER — ONCOLOGY VISIT (OUTPATIENT)
Dept: ONCOLOGY | Facility: CLINIC | Age: 76
End: 2021-12-20
Attending: REGISTERED NURSE
Payer: MEDICARE

## 2021-12-20 VITALS
DIASTOLIC BLOOD PRESSURE: 84 MMHG | HEIGHT: 68 IN | SYSTOLIC BLOOD PRESSURE: 149 MMHG | RESPIRATION RATE: 16 BRPM | WEIGHT: 201.1 LBS | HEART RATE: 58 BPM | OXYGEN SATURATION: 95 % | BODY MASS INDEX: 30.48 KG/M2 | TEMPERATURE: 97.7 F

## 2021-12-20 DIAGNOSIS — Z15.09 MONOALLELIC MUTATION OF BRAF GENE: Primary | ICD-10-CM

## 2021-12-20 DIAGNOSIS — C43.4 MALIGNANT MELANOMA OF SCALP (H): Primary | ICD-10-CM

## 2021-12-20 DIAGNOSIS — Z15.09 MONOALLELIC MUTATION OF BRAF GENE: ICD-10-CM

## 2021-12-20 DIAGNOSIS — Z15.89 MONOALLELIC MUTATION OF BRAF GENE: Primary | ICD-10-CM

## 2021-12-20 DIAGNOSIS — R45.89 DEPRESSED MOOD: ICD-10-CM

## 2021-12-20 DIAGNOSIS — C43.4 MALIGNANT MELANOMA OF SCALP (H): ICD-10-CM

## 2021-12-20 DIAGNOSIS — N18.9 CHRONIC KIDNEY DISEASE, UNSPECIFIED CKD STAGE: ICD-10-CM

## 2021-12-20 DIAGNOSIS — Z15.89 MONOALLELIC MUTATION OF BRAF GENE: ICD-10-CM

## 2021-12-20 DIAGNOSIS — R19.7 DIARRHEA, UNSPECIFIED TYPE: ICD-10-CM

## 2021-12-20 LAB
ALBUMIN SERPL-MCNC: 3.5 G/DL (ref 3.4–5)
ALP SERPL-CCNC: 40 U/L (ref 40–150)
ALT SERPL W P-5'-P-CCNC: 22 U/L (ref 0–70)
ANION GAP SERPL CALCULATED.3IONS-SCNC: 7 MMOL/L (ref 3–14)
AST SERPL W P-5'-P-CCNC: 14 U/L (ref 0–45)
BASOPHILS # BLD AUTO: 0.1 10E3/UL (ref 0–0.2)
BASOPHILS NFR BLD AUTO: 1 %
BILIRUB SERPL-MCNC: 0.3 MG/DL (ref 0.2–1.3)
BUN SERPL-MCNC: 21 MG/DL (ref 7–30)
CALCIUM SERPL-MCNC: 8.5 MG/DL (ref 8.5–10.1)
CHLORIDE BLD-SCNC: 106 MMOL/L (ref 94–109)
CK SERPL-CCNC: 75 U/L (ref 30–300)
CO2 SERPL-SCNC: 29 MMOL/L (ref 20–32)
CREAT SERPL-MCNC: 1.28 MG/DL (ref 0.66–1.25)
EOSINOPHIL # BLD AUTO: 0.1 10E3/UL (ref 0–0.7)
EOSINOPHIL NFR BLD AUTO: 4 %
ERYTHROCYTE [DISTWIDTH] IN BLOOD BY AUTOMATED COUNT: 13.7 % (ref 10–15)
GFR SERPL CREATININE-BSD FRML MDRD: 55 ML/MIN/1.73M2
GLUCOSE BLD-MCNC: 106 MG/DL (ref 70–99)
HCT VFR BLD AUTO: 38.5 % (ref 40–53)
HGB BLD-MCNC: 12.8 G/DL (ref 13.3–17.7)
IMM GRANULOCYTES # BLD: 0 10E3/UL
IMM GRANULOCYTES NFR BLD: 0 %
LYMPHOCYTES # BLD AUTO: 1.5 10E3/UL (ref 0.8–5.3)
LYMPHOCYTES NFR BLD AUTO: 38 %
MAGNESIUM SERPL-MCNC: 2.4 MG/DL (ref 1.6–2.3)
MCH RBC QN AUTO: 27.6 PG (ref 26.5–33)
MCHC RBC AUTO-ENTMCNC: 33.2 G/DL (ref 31.5–36.5)
MCV RBC AUTO: 83 FL (ref 78–100)
MONOCYTES # BLD AUTO: 0.4 10E3/UL (ref 0–1.3)
MONOCYTES NFR BLD AUTO: 11 %
NEUTROPHILS # BLD AUTO: 1.7 10E3/UL (ref 1.6–8.3)
NEUTROPHILS NFR BLD AUTO: 46 %
NRBC # BLD AUTO: 0 10E3/UL
NRBC BLD AUTO-RTO: 0 /100
PHOSPHATE SERPL-MCNC: 3.2 MG/DL (ref 2.5–4.5)
PLATELET # BLD AUTO: 202 10E3/UL (ref 150–450)
POTASSIUM BLD-SCNC: 3.8 MMOL/L (ref 3.4–5.3)
PROT SERPL-MCNC: 7 G/DL (ref 6.8–8.8)
RBC # BLD AUTO: 4.63 10E6/UL (ref 4.4–5.9)
SODIUM SERPL-SCNC: 142 MMOL/L (ref 133–144)
TSH SERPL DL<=0.005 MIU/L-ACNC: 1.66 MU/L (ref 0.4–4)
WBC # BLD AUTO: 3.8 10E3/UL (ref 4–11)

## 2021-12-20 PROCEDURE — 83735 ASSAY OF MAGNESIUM: CPT | Performed by: REGISTERED NURSE

## 2021-12-20 PROCEDURE — 85025 COMPLETE CBC W/AUTO DIFF WBC: CPT | Performed by: REGISTERED NURSE

## 2021-12-20 PROCEDURE — 99215 OFFICE O/P EST HI 40 MIN: CPT | Performed by: REGISTERED NURSE

## 2021-12-20 PROCEDURE — 84443 ASSAY THYROID STIM HORMONE: CPT | Performed by: REGISTERED NURSE

## 2021-12-20 PROCEDURE — 82040 ASSAY OF SERUM ALBUMIN: CPT | Performed by: REGISTERED NURSE

## 2021-12-20 PROCEDURE — 96413 CHEMO IV INFUSION 1 HR: CPT

## 2021-12-20 PROCEDURE — 84100 ASSAY OF PHOSPHORUS: CPT | Performed by: REGISTERED NURSE

## 2021-12-20 PROCEDURE — 258N000003 HC RX IP 258 OP 636: Performed by: PHYSICIAN ASSISTANT

## 2021-12-20 PROCEDURE — 36415 COLL VENOUS BLD VENIPUNCTURE: CPT | Performed by: REGISTERED NURSE

## 2021-12-20 PROCEDURE — 82550 ASSAY OF CK (CPK): CPT | Performed by: REGISTERED NURSE

## 2021-12-20 PROCEDURE — G0463 HOSPITAL OUTPT CLINIC VISIT: HCPCS

## 2021-12-20 PROCEDURE — 250N000011 HC RX IP 250 OP 636: Performed by: PHYSICIAN ASSISTANT

## 2021-12-20 RX ORDER — ALBUTEROL SULFATE 90 UG/1
1-2 AEROSOL, METERED RESPIRATORY (INHALATION)
Status: CANCELLED
Start: 2021-12-20

## 2021-12-20 RX ORDER — METHYLPREDNISOLONE SODIUM SUCCINATE 125 MG/2ML
125 INJECTION, POWDER, LYOPHILIZED, FOR SOLUTION INTRAMUSCULAR; INTRAVENOUS
Status: CANCELLED
Start: 2021-12-20

## 2021-12-20 RX ORDER — MEPERIDINE HYDROCHLORIDE 25 MG/ML
25 INJECTION INTRAMUSCULAR; INTRAVENOUS; SUBCUTANEOUS EVERY 30 MIN PRN
Status: CANCELLED | OUTPATIENT
Start: 2021-12-20

## 2021-12-20 RX ORDER — HEPARIN SODIUM (PORCINE) LOCK FLUSH IV SOLN 100 UNIT/ML 100 UNIT/ML
5 SOLUTION INTRAVENOUS
Status: CANCELLED | OUTPATIENT
Start: 2021-12-20

## 2021-12-20 RX ORDER — EPINEPHRINE 1 MG/ML
0.3 INJECTION, SOLUTION INTRAMUSCULAR; SUBCUTANEOUS EVERY 5 MIN PRN
Status: CANCELLED | OUTPATIENT
Start: 2021-12-20

## 2021-12-20 RX ORDER — ALBUTEROL SULFATE 0.83 MG/ML
2.5 SOLUTION RESPIRATORY (INHALATION)
Status: CANCELLED | OUTPATIENT
Start: 2021-12-20

## 2021-12-20 RX ORDER — DIPHENHYDRAMINE HYDROCHLORIDE 50 MG/ML
50 INJECTION INTRAMUSCULAR; INTRAVENOUS
Status: CANCELLED
Start: 2021-12-20

## 2021-12-20 RX ORDER — LORAZEPAM 2 MG/ML
0.5 INJECTION INTRAMUSCULAR EVERY 4 HOURS PRN
Status: CANCELLED
Start: 2021-12-20

## 2021-12-20 RX ORDER — NALOXONE HYDROCHLORIDE 0.4 MG/ML
0.2 INJECTION, SOLUTION INTRAMUSCULAR; INTRAVENOUS; SUBCUTANEOUS
Status: CANCELLED | OUTPATIENT
Start: 2021-12-20

## 2021-12-20 RX ORDER — HEPARIN SODIUM,PORCINE 10 UNIT/ML
5 VIAL (ML) INTRAVENOUS
Status: CANCELLED | OUTPATIENT
Start: 2021-12-20

## 2021-12-20 RX ADMIN — SODIUM CHLORIDE 480 MG: 9 INJECTION, SOLUTION INTRAVENOUS at 12:59

## 2021-12-20 RX ADMIN — SODIUM CHLORIDE 250 ML: 9 INJECTION, SOLUTION INTRAVENOUS at 12:55

## 2021-12-20 ASSESSMENT — MIFFLIN-ST. JEOR: SCORE: 1626.55

## 2021-12-20 ASSESSMENT — PAIN SCALES - GENERAL: PAINLEVEL: NO PAIN (0)

## 2021-12-20 NOTE — PATIENT INSTRUCTIONS
[facility] Main Line: [phone]    Call triage nurse with chills and/or temperature greater than or equal to 100.4, uncontrolled nausea/vomiting, diarrhea, constipation, dizziness, shortness of breath, chest pain, bleeding, unexplained bruising, or any new/concerning symptoms, questions/concerns.   If you are having any concerning symptoms or wish to speak to a provider before your next infusion visit, please call your care coordinator or triage to notify them so we can adequately serve you.   Nurse Triage line:  [phone]    If after hours, weekends, or holidays, call main hospital  and ask for Oncology doctor on call @ [phone]      December 2021 Sunday Monday Tuesday Wednesday Thursday Friday Saturday                  1     2     3     4       5     6     7     8     9     10     11       12     13     14     15     16     17     18       19     20    LAB PERIPHERAL  11:00 AM   (15 min.)   [facility] DRAW   [facility]    RETURN  11:30 AM   (45 min.)   Sherine Meza CNP   [facility]    ONC INFUSION 2 HR (120 MIN)   1:00 PM   (120 min.)    ONC INFUSION NURSE   [facility] 21     22     23     24     25       26     27     28     29     30     31 January 2022 Sunday Monday Tuesday Wednesday Thursday Friday Saturday                                 1       2     3     4     5     6     7     8       9     10     11     12     13    VIDEO VISIT RETURN   2:30 PM   (30 min.)   Oksana Ashton MD   [facility] 14    RETURN   3:45 PM   (30 min.)   Sidra Esteban MD   [facility] 15       16     17    LAB PERIPHERAL   9:45 AM   (15 min.)   [facility]   [facility]    ONC INFUSION 2 HR (120 MIN)  10:30 AM   (120 min.)   [facility] INFUSION NURSE   [facility] 18     19     20     21     22       23     24     25     26     27     28     29       30     31                                              Lab Results:  Recent Results (from the past 12 hour(s))   TSH with free T4 reflex    Collection Time: 12/20/21 11:28 AM   Result Value Ref Range    TSH 1.66 0.40 - 4.00 mU/L   Magnesium    Collection Time: 12/20/21 11:28 AM   Result Value Ref Range    Magnesium 2.4 (H) 1.6 - 2.3 mg/dL   Phosphorus    Collection Time: 12/20/21 11:28 AM   Result Value Ref Range    Phosphorus 3.2 2.5 - 4.5 mg/dL   CK total    Collection Time: 12/20/21 11:28 AM   Result Value Ref Range    CK 75 30 - 300 U/L   Comprehensive metabolic panel    Collection Time: 12/20/21 11:28 AM   Result Value Ref Range    Sodium 142 133 - 144 mmol/L    Potassium 3.8 3.4 - 5.3 mmol/L    Chloride 106 94 - 109 mmol/L    Carbon Dioxide (CO2) 29 20 - 32 mmol/L    Anion Gap 7 3 - 14 mmol/L    Urea Nitrogen 21 7 - 30 mg/dL    Creatinine 1.28 (H) 0.66 - 1.25 mg/dL    Calcium 8.5 8.5 - 10.1 mg/dL    Glucose 106 (H) 70 - 99 mg/dL    Alkaline Phosphatase 40 40 - 150 U/L    AST 14 0 - 45 U/L    ALT 22 0 - 70 U/L    Protein Total 7.0 6.8 - 8.8 g/dL    Albumin 3.5 3.4 - 5.0 g/dL    Bilirubin Total 0.3 0.2 - 1.3 mg/dL    GFR Estimate 55 (L) >60 mL/min/1.73m2   CBC with platelets and differential    Collection Time: 12/20/21 11:28 AM   Result Value Ref Range    WBC Count 3.8 (L) 4.0 - 11.0 10e3/uL    RBC Count 4.63 4.40 - 5.90 10e6/uL    Hemoglobin 12.8 (L) 13.3 - 17.7 g/dL    Hematocrit 38.5 (L) 40.0 - 53.0 %    MCV 83 78 - 100 fL    MCH 27.6 26.5 - 33.0 pg    MCHC 33.2 31.5 - 36.5 g/dL    RDW 13.7 10.0 - 15.0 %    Platelet Count 202 150 - 450 10e3/uL    % Neutrophils 46 %    % Lymphocytes 38 %    % Monocytes 11 %    % Eosinophils 4 %    % Basophils 1 %    % Immature Granulocytes 0 %    NRBCs per 100 WBC 0 <1 /100    Absolute Neutrophils 1.7 1.6 - 8.3 10e3/uL    Absolute Lymphocytes 1.5 0.8 - 5.3 10e3/uL    Absolute  Monocytes 0.4 0.0 - 1.3 10e3/uL    Absolute Eosinophils 0.1 0.0 - 0.7 10e3/uL    Absolute Basophils 0.1 0.0 - 0.2 10e3/uL    Absolute Immature Granulocytes 0.0 <=0.4 10e3/uL    Absolute NRBCs 0.0 10e3/uL

## 2021-12-20 NOTE — PROGRESS NOTES
Infusion Nursing Note:  Ahmet Coleman presents today for C7D1 Nivolumab.    Patient seen by provider today: Yes: Sherine Meza NP   present during visit today: Not Applicable.    Note: Patient reported to clinic today after seeing provider with no new complaints or concerns.    Intravenous Access:  Labs drawn without difficulty.  Peripheral IV placed.  By lab staff.    Treatment Conditions:  Lab Results   Component Value Date    HGB 12.8 (L) 12/20/2021    WBC 3.8 (L) 12/20/2021    ANEU 2.6 06/25/2021    ANEUTAUTO 1.7 12/20/2021     12/20/2021      Lab Results   Component Value Date     12/20/2021    POTASSIUM 3.8 12/20/2021    MAG 2.4 (H) 12/20/2021    CR 1.28 (H) 12/20/2021    STEPHEN 8.5 12/20/2021    BILITOTAL 0.3 12/20/2021    ALBUMIN 3.5 12/20/2021    ALT 22 12/20/2021    AST 14 12/20/2021     Results reviewed, labs MET treatment parameters, ok to proceed with treatment.    Post Infusion Assessment:  Patient tolerated infusion without incident.  Blood return noted pre and post infusion.  Site patent and intact, free from redness, edema or discomfort.  No evidence of extravasations.  Access discontinued per protocol.     Discharge Plan:   Patient declined prescription refills.  Discharge instructions reviewed with: Patient.  Patient and/or family verbalized understanding of discharge instructions and all questions answered.  AVS to patient via [employer].  Patient will return 1/13/21 Dr Hernández, 1/17/21 infusion for next appointment.   Patient discharged in stable condition accompanied by: self.  Departure Mode: Ambulatory.  Face to Face time: 5 minutes.    Lit Maya RN

## 2021-12-20 NOTE — NURSING NOTE
Chief Complaint   Patient presents with     Blood Draw     Vitals, blood drawn and PIV placed by ZEINA Norwood RN. Pt checked into appt.      LAMAR Russlel LPN

## 2021-12-20 NOTE — NURSING NOTE
"Oncology Rooming Note    December 20, 2021 12:08 PM   Ahmet Coleman is a 76 year old male who presents for:    Chief Complaint   Patient presents with     Blood Draw     Vitals, blood drawn and PIV placed by ZEINA Norwood RN. Pt checked into appt.      Oncology Clinic Visit     [facility] RETURN - MALIGNANT MELANOMA OF SCALP     Initial Vitals: BP (!) 149/84   Pulse 58   Temp 97.7  F (36.5  C) (Oral)   Resp 16   Ht 1.727 m (5' 7.99\")   Wt 91.2 kg (201 lb 1.6 oz)   SpO2 95%   BMI 30.58 kg/m   Estimated body mass index is 30.58 kg/m  as calculated from the following:    Height as of this encounter: 1.727 m (5' 7.99\").    Weight as of this encounter: 91.2 kg (201 lb 1.6 oz). Body surface area is 2.09 meters squared.  No Pain (0) Comment: Data Unavailable   No LMP for male patient.  Allergies reviewed: Yes  Medications reviewed: Yes    Medications: Medication refills not needed today.  Pharmacy name entered into [employer]:    [id number] - [place], [place] - [address] [id number] [place] [place] [facility] [address] [facility] - [place], [place] - [address] [facility] [place] [facility] [address]    Clinical concerns: No new concerns. Susana was notified.      Edgard Lopez LPN            "

## 2022-01-08 ENCOUNTER — TELEPHONE (OUTPATIENT)
Dept: ONCOLOGY | Facility: CLINIC | Age: 77
End: 2022-01-08
Payer: MEDICARE

## 2022-01-08 NOTE — CONFIDENTIAL NOTE
FMLA for family member forms filled out and put in providers folder for review and signature.      Tiffanie Kunz CMA ([facility])

## 2022-01-08 NOTE — TELEPHONE ENCOUNTER
FMLA for family paperwork received via fax from [employer]. Will be placed in provider folder for signature upon completion.     Fax: [phone]      Candelaria Birch CMA

## 2022-01-13 ENCOUNTER — VIRTUAL VISIT (OUTPATIENT)
Dept: ONCOLOGY | Facility: CLINIC | Age: 77
End: 2022-01-13
Attending: INTERNAL MEDICINE
Payer: MEDICARE

## 2022-01-13 DIAGNOSIS — C43.4 MALIGNANT MELANOMA OF SCALP (H): Primary | ICD-10-CM

## 2022-01-13 DIAGNOSIS — Z15.09 MONOALLELIC MUTATION OF BRAF GENE: ICD-10-CM

## 2022-01-13 DIAGNOSIS — Z15.89 MONOALLELIC MUTATION OF BRAF GENE: ICD-10-CM

## 2022-01-13 PROCEDURE — G0463 HOSPITAL OUTPT CLINIC VISIT: HCPCS | Mod: PN,RTG | Performed by: INTERNAL MEDICINE

## 2022-01-13 PROCEDURE — 99214 OFFICE O/P EST MOD 30 MIN: CPT | Mod: 95 | Performed by: INTERNAL MEDICINE

## 2022-01-13 RX ORDER — NALOXONE HYDROCHLORIDE 0.4 MG/ML
0.2 INJECTION, SOLUTION INTRAMUSCULAR; INTRAVENOUS; SUBCUTANEOUS
Status: CANCELLED | OUTPATIENT
Start: 2022-01-17

## 2022-01-13 RX ORDER — LORAZEPAM 2 MG/ML
0.5 INJECTION INTRAMUSCULAR EVERY 4 HOURS PRN
Status: CANCELLED
Start: 2022-01-17

## 2022-01-13 RX ORDER — ALBUTEROL SULFATE 0.83 MG/ML
2.5 SOLUTION RESPIRATORY (INHALATION)
Status: CANCELLED | OUTPATIENT
Start: 2022-01-17

## 2022-01-13 RX ORDER — EPINEPHRINE 1 MG/ML
0.3 INJECTION, SOLUTION INTRAMUSCULAR; SUBCUTANEOUS EVERY 5 MIN PRN
Status: CANCELLED | OUTPATIENT
Start: 2022-01-17

## 2022-01-13 RX ORDER — HEPARIN SODIUM,PORCINE 10 UNIT/ML
5 VIAL (ML) INTRAVENOUS
Status: CANCELLED | OUTPATIENT
Start: 2022-01-17

## 2022-01-13 RX ORDER — ALBUTEROL SULFATE 90 UG/1
1-2 AEROSOL, METERED RESPIRATORY (INHALATION)
Status: CANCELLED
Start: 2022-01-17

## 2022-01-13 RX ORDER — MEPERIDINE HYDROCHLORIDE 25 MG/ML
25 INJECTION INTRAMUSCULAR; INTRAVENOUS; SUBCUTANEOUS EVERY 30 MIN PRN
Status: CANCELLED | OUTPATIENT
Start: 2022-01-17

## 2022-01-13 RX ORDER — DIPHENHYDRAMINE HYDROCHLORIDE 50 MG/ML
50 INJECTION INTRAMUSCULAR; INTRAVENOUS
Status: CANCELLED
Start: 2022-01-17

## 2022-01-13 RX ORDER — METHYLPREDNISOLONE SODIUM SUCCINATE 125 MG/2ML
125 INJECTION, POWDER, LYOPHILIZED, FOR SOLUTION INTRAMUSCULAR; INTRAVENOUS
Status: CANCELLED
Start: 2022-01-17

## 2022-01-13 RX ORDER — HEPARIN SODIUM (PORCINE) LOCK FLUSH IV SOLN 100 UNIT/ML 100 UNIT/ML
5 SOLUTION INTRAVENOUS
Status: CANCELLED | OUTPATIENT
Start: 2022-01-17

## 2022-01-13 NOTE — LETTER
1/13/2022         RE: Ahmet Coleman  [address]  [place] [address]        Dear Colleague,    Thank you for referring your patient, Ahmet Coleman, to the [facility]. Please see a copy of my visit note below.    Lloyd is a 76 year old who is being evaluated via a billable video visit.      Pt refused to answer distress screening.     How would you like to obtain your AVS? MyChart  If the video visit is dropped, the invitation should be resent by: Send to e-mail at: [email or web address]  Will anyone else be joining your video visit? No       Bianka Dougherty VF        Again, thank you for allowing me to participate in the care of your patient.      Sincerely,    Oksana Hernández MD

## 2022-01-13 NOTE — PROGRESS NOTES
Lloyd is a 76 year old who is being evaluated via a billable video visit.      Pt refused to answer distress screening.     How would you like to obtain your AVS? MyChart  If the video visit is dropped, the invitation should be resent by: Send to e-mail at: [email or web address]  Will anyone else be joining your video visit? No       Bianka DOWD    Video-Visit Details  Type of service:  Video Visit  Video Start Time: 2:45 PM  Video End Time: 3:15 PM  Originating Location (pt. Location): Home  Distant Location (provider location):  [facility]   Platform used for Video Visit: [employer]     [facility] ONCOLOGY PROGRESS NOTE  Melanoma Clinic  Jan 13, 2022    CHIEF COMPLAINT: Scalp melanoma    Melanoma History:  1. He has a small pigmented scalp lesion present for over 30 years. The lesion was biopsied in 1992 and was benign.  2. October 2020, he presented to [facility] with 1 year of progressive enlargement of the lesion and new onset burning, itching, and stinging sensation in the affected scalp.  3. 10/26/20, He was seen at [facility] and he had shave biopsies of A. left central parietal scalp, B. left central occipital scalp, C. Left posterior parietal scalp, and D. punch biopsy of the left superior occipital scalp. Pathology (specimen: [id number]) showed a nodular and nevoid type melanoma, non-ulcerated, Breslow depth up to 1.3 mm, Saurabh's level IV, and up to 3 mitoses per mm2. All margins were involved. Ki-67 10-20%. Sox10 stain is positive and highlights an atypical compound melanocytic proliferation with significant overlying confluence and pagetosis of intraepidermal melanocytes. T-stage: at least pT2a. PD-L1 staining shows PD-L1 TPS <1%. 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  4. 11/25/20, he was seen by Dr. Garcia and he took three 3mm punch biopsies, which returned as dermal melanocytic proliferations with melanophages and fibrosis, consistent  with locoregional metastatic melanoma.  5. 12/8/2020 he had PET-CT, which showed FDG avid irregular skin thickening overlying the left parietal region, with no FDG avid lymphadenopathy in the neck and no evidence of metastasis elsewhere in the body.  6. 1/22/2021 start vemurafenib and cobimetinib, ~2/12/21 held for diarrhea, then resumed.  7. 2/18/2021 Start atezolizumab, last on 4/29/21  8. 5/24/2021 Wide local excision of the scalp melanoma and left latissimus free flap for scalp reconstruction.    Surgical pathology showed features consistent with scar and tumoral melanosis. No definite residual melanoma is seen. Tumoral melanosis (which extends to a depth of around 1.1 mm) is believed to be equivalent to a diagnosis of regressed melanoma.   9. 6/25/21 Start of adjuvant Nivolumab     INTERVAL HISTORY  Mr. Coleman is a 76 year old man with history of stage IIIB melanoma of the scalp. He presents via video visit.    He has recently recovered from covid. He was diagnosed on a home test in late November. He had fevers to 102 and respiratory symptoms. He was empirically treated with levaquin which he felt was helpful given his history of developing bacterial pneumonia. He continues to use Lomotil for IBD with no changes on immunotherapy. Denies any new or worsening fevers, chills, nausea, vomiting or abdominal pain. No new or worsening shortness of breath or cough.    Current Outpatient Medications   Medication Sig Dispense Refill     acetaminophen (TYLENOL) 500 MG tablet Take 2 tablets (1,000 mg) by mouth every 8 hours as needed for mild pain 100 tablet 0     cholecalciferol (VITAMIN D3) 1000 UNIT tablet Take 1,000 Units by mouth daily        diphenoxylate-atropine (LOMOTIL) 2.5-0.025 MG tablet Take 1-2 tablets by mouth 4 times daily as needed for diarrhea 120 tablet 5     docusate sodium (DSS) 100 MG capsule TAKE ONE CAPSULE BY MOUTH EVERY DAY FOR STOOL SOFTENING       fluvoxaMINE (LUVOX) 100 MG tablet Take 150 mg at  bedtime       hydrochlorothiazide (HYDRODIURIL) 25 MG tablet Take 25 mg by mouth daily.       Hypromellose (ARTIFICIAL TEARS OP) Apply  to eye. 2 drops in each eye twice a day as needed       LORazepam (ATIVAN) 0.5 MG tablet Take 1 tablet (0.5 mg) by mouth every 4 hours as needed (Anxiety, Nausea/Vomiting or Sleep) 30 tablet 0     Magnesium Oxide 420 MG TABS Take 420 mg by mouth daily       methocarbamol (ROBAXIN) 500 MG tablet Take 1 tablet (500 mg) by mouth 3 times daily as needed for muscle spasms 10 tablet 0     omeprazole (PRILOSEC) 20 MG capsule Take 1 capsule by mouth 2 times daily. 60 capsule prn     polyethylene glycol (MIRALAX) 17 GM/Dose powder Take 17 g by mouth daily 510 g 0     pregabalin (LYRICA) 150 MG capsule Take 150 mg by mouth 2 times daily       prochlorperazine (COMPAZINE) 10 MG tablet Take 1 tablet (10 mg) by mouth every 6 hours as needed (Nausea/Vomiting) 30 tablet 2     QUEtiapine (SEROQUEL) 300 MG tablet Take 150 mg by mouth At Bedtime        senna-docusate (SENOKOT-S/PERICOLACE) 8.6-50 MG tablet Take 1 tablet by mouth 2 times daily 30 tablet 0     simvastatin (ZOCOR) 80 MG tablet Take 40 mg by mouth At Bedtime        traZODone (DESYREL) 100 MG tablet Take 100 mg by mouth At Bedtime        vortioxetine (TRINTELLIX) 20 MG tablet TAKE ONE TABLET BY MOUTH EVERY MORNING       Lactobacillus-Inulin ([employer] DIGESTIVE HEALTH) CAPS 1 tab daily (Patient not taking: Reported on 10/14/2021) 30 capsule 1     methocarbamol (ROBAXIN) 500 MG tablet Take 1 tablet (500 mg) by mouth 3 times daily as needed for muscle spasms (Patient not taking: Reported on 10/14/2021) 15 tablet 0     prochlorperazine (COMPAZINE) 10 MG tablet Take 1 tablet (10 mg) by mouth every 6 hours as needed (Nausea/Vomiting) (Patient not taking: Reported on 10/14/2021) 30 tablet 3     Objective:  There were no vitals taken for this visit.  GENERAL: Healthy, alert and no distress  EYES: Eyes grossly normal to inspection.  No discharge  or erythema, or obvious scleral/conjunctival abnormalities.  HENT: Left scalp is post surgical with no new pigmented lesions seen. External ears, nose and mouth without ulcers or lesions.  No nasal drainage visible.  NECK: No asymmetry, visible masses or scars  RESP: No audible wheeze, cough, or visible cyanosis.  No visible retractions or increased work of breathing.    SKIN: Visible skin clear. No significant rash, abnormal pigmentation or lesions.  NEURO: Cranial nerves grossly intact.  Mentation and speech appropriate for age.  PSYCH: Mentation appears normal, affect normal/bright, judgement and insight intact, normal speech and appearance well-groomed.      ASSESSMENT AND PLAN  Cutaneous melanoma, scalp primary, pT2a cN1c, clinical Stage IIIB.  It was a pleasure to see Mr. Coleman today. He is a 76 year old  with agent orange cutaneous exposures in the [place] war and a diagnosis of malignant melanoma arising from a pre-existing pigmented lesion on the scalp. He had a partial response to neoadjuvant therapy with atezolizumab (3 cycles), vemurafenib, and cobimetinib (4 cycles) with decrease in thickening of the large scalp tumor and lightening of the periphery and in the middle part of the tumor then had surgery, which showed only tumoral melanosis. He started adjuvant nivolumab on 6/25/21, with overall plan for 9 total cycles of nivolumab q 4 weeks (=12 cycles of immunotherapy total including previous 3 cycles of atezo). Patient has been tolerating adjuvant nivolumab well. Imaging 11/23/21 shows no evidence of recurrent disease.  -repeat PET-CT in about 2 months, in March   -prefers infusions and scans at [place] if possible    Depressed mood, anxiety   Verbalizes fluctuating depression and anxiety, multifactorial. He has a longstanding history of PTSD. Currently works with a psychiatrist out of [employer]. Continues to use lorazepam occasionally.     Diarrhea  History of IBS  Uses Lomotil  infrequently. Possible complicated by treatment. Continue to observe.    CKD, stage 3A  Creatinine 1.28 in December, eGFR 55. Probably secondary to hypertension.    Oksana Peñaloza M.D.   of Medicine  Hematology, Oncology and Transplantation  Pager: [phone]

## 2022-01-17 ENCOUNTER — APPOINTMENT (OUTPATIENT)
Dept: LAB | Facility: CLINIC | Age: 77
End: 2022-01-17
Attending: INTERNAL MEDICINE
Payer: MEDICARE

## 2022-01-17 ENCOUNTER — INFUSION THERAPY VISIT (OUTPATIENT)
Dept: ONCOLOGY | Facility: CLINIC | Age: 77
End: 2022-01-17
Attending: INTERNAL MEDICINE
Payer: MEDICARE

## 2022-01-17 VITALS
OXYGEN SATURATION: 94 % | RESPIRATION RATE: 16 BRPM | SYSTOLIC BLOOD PRESSURE: 134 MMHG | WEIGHT: 202 LBS | BODY MASS INDEX: 30.72 KG/M2 | TEMPERATURE: 97.6 F | HEART RATE: 65 BPM | DIASTOLIC BLOOD PRESSURE: 74 MMHG

## 2022-01-17 DIAGNOSIS — Z15.89 MONOALLELIC MUTATION OF BRAF GENE: Primary | ICD-10-CM

## 2022-01-17 DIAGNOSIS — Z15.09 MONOALLELIC MUTATION OF BRAF GENE: Primary | ICD-10-CM

## 2022-01-17 DIAGNOSIS — C43.4 MALIGNANT MELANOMA OF SCALP (H): ICD-10-CM

## 2022-01-17 LAB
ALBUMIN SERPL-MCNC: 3.6 G/DL (ref 3.4–5)
ALP SERPL-CCNC: 46 U/L (ref 40–150)
ALT SERPL W P-5'-P-CCNC: 19 U/L (ref 0–70)
ANION GAP SERPL CALCULATED.3IONS-SCNC: 12 MMOL/L (ref 3–14)
AST SERPL W P-5'-P-CCNC: 13 U/L (ref 0–45)
BASOPHILS # BLD AUTO: 0.1 10E3/UL (ref 0–0.2)
BASOPHILS NFR BLD AUTO: 1 %
BILIRUB SERPL-MCNC: 0.4 MG/DL (ref 0.2–1.3)
BUN SERPL-MCNC: 18 MG/DL (ref 7–30)
CALCIUM SERPL-MCNC: 8.6 MG/DL (ref 8.5–10.1)
CHLORIDE BLD-SCNC: 102 MMOL/L (ref 94–109)
CO2 SERPL-SCNC: 28 MMOL/L (ref 20–32)
CREAT SERPL-MCNC: 1.21 MG/DL (ref 0.66–1.25)
EOSINOPHIL # BLD AUTO: 0.2 10E3/UL (ref 0–0.7)
EOSINOPHIL NFR BLD AUTO: 3 %
ERYTHROCYTE [DISTWIDTH] IN BLOOD BY AUTOMATED COUNT: 13.1 % (ref 10–15)
GFR SERPL CREATININE-BSD FRML MDRD: 63 ML/MIN/1.73M2
GLUCOSE BLD-MCNC: 104 MG/DL (ref 70–99)
HCT VFR BLD AUTO: 39.1 % (ref 40–53)
HGB BLD-MCNC: 13.3 G/DL (ref 13.3–17.7)
IMM GRANULOCYTES # BLD: 0 10E3/UL
IMM GRANULOCYTES NFR BLD: 0 %
LYMPHOCYTES # BLD AUTO: 1.5 10E3/UL (ref 0.8–5.3)
LYMPHOCYTES NFR BLD AUTO: 35 %
MCH RBC QN AUTO: 27.4 PG (ref 26.5–33)
MCHC RBC AUTO-ENTMCNC: 34 G/DL (ref 31.5–36.5)
MCV RBC AUTO: 81 FL (ref 78–100)
MONOCYTES # BLD AUTO: 0.5 10E3/UL (ref 0–1.3)
MONOCYTES NFR BLD AUTO: 11 %
NEUTROPHILS # BLD AUTO: 2.2 10E3/UL (ref 1.6–8.3)
NEUTROPHILS NFR BLD AUTO: 50 %
NRBC # BLD AUTO: 0 10E3/UL
NRBC BLD AUTO-RTO: 0 /100
PLATELET # BLD AUTO: 208 10E3/UL (ref 150–450)
POTASSIUM BLD-SCNC: 3.2 MMOL/L (ref 3.4–5.3)
PROT SERPL-MCNC: 7 G/DL (ref 6.8–8.8)
RBC # BLD AUTO: 4.85 10E6/UL (ref 4.4–5.9)
SODIUM SERPL-SCNC: 142 MMOL/L (ref 133–144)
TSH SERPL DL<=0.005 MIU/L-ACNC: 2.13 MU/L (ref 0.4–4)
WBC # BLD AUTO: 4.4 10E3/UL (ref 4–11)

## 2022-01-17 PROCEDURE — 85025 COMPLETE CBC W/AUTO DIFF WBC: CPT

## 2022-01-17 PROCEDURE — 258N000003 HC RX IP 258 OP 636: Performed by: INTERNAL MEDICINE

## 2022-01-17 PROCEDURE — 84443 ASSAY THYROID STIM HORMONE: CPT | Performed by: INTERNAL MEDICINE

## 2022-01-17 PROCEDURE — 80053 COMPREHEN METABOLIC PANEL: CPT | Performed by: INTERNAL MEDICINE

## 2022-01-17 PROCEDURE — 250N000013 HC RX MED GY IP 250 OP 250 PS 637: Performed by: INTERNAL MEDICINE

## 2022-01-17 PROCEDURE — 36415 COLL VENOUS BLD VENIPUNCTURE: CPT

## 2022-01-17 PROCEDURE — 250N000011 HC RX IP 250 OP 636: Performed by: INTERNAL MEDICINE

## 2022-01-17 PROCEDURE — 96413 CHEMO IV INFUSION 1 HR: CPT

## 2022-01-17 RX ORDER — POTASSIUM CHLORIDE 1500 MG/1
40 TABLET, EXTENDED RELEASE ORAL ONCE
Status: COMPLETED | OUTPATIENT
Start: 2022-01-17 | End: 2022-01-17

## 2022-01-17 RX ADMIN — SODIUM CHLORIDE 250 ML: 9 INJECTION, SOLUTION INTRAVENOUS at 11:49

## 2022-01-17 RX ADMIN — SODIUM CHLORIDE 480 MG: 9 INJECTION, SOLUTION INTRAVENOUS at 11:50

## 2022-01-17 RX ADMIN — POTASSIUM CHLORIDE 40 MEQ: 1500 TABLET, EXTENDED RELEASE ORAL at 12:32

## 2022-01-17 ASSESSMENT — PAIN SCALES - GENERAL: PAINLEVEL: MILD PAIN (3)

## 2022-01-17 NOTE — PROGRESS NOTES
Infusion Nursing Note:  Ahmet Coleman presents today for Cycle 8 Day 1 Nivolumab   Patient seen by provider today: No   present during visit today: Not Applicable.    Note: pt had provider visit with Dr. Hernández on 1/14; pt states that he has no changes or concerns since his visit.  Pt denies fevers, chill, SOB.  Pt expressed some abdominal discomfort today but he feels it is related to something he ate last night.      Reviewed today's labs and plan of care with patient.    TORB: Dr. Hernández/Stacey Snlel RN @ 1206  -Ok to replace K 3.2 per protocol    Intravenous Access:  Peripheral IV placed.    Treatment Conditions:     Ref. Range 1/17/2022 10:28   Sodium Latest Ref Range: 133 - 144 mmol/L 142   Potassium Latest Ref Range: 3.4 - 5.3 mmol/L 3.2 (L)   Chloride Latest Ref Range: 94 - 109 mmol/L 102   Carbon Dioxide Latest Ref Range: 20 - 32 mmol/L 28   Urea Nitrogen Latest Ref Range: 7 - 30 mg/dL 18   Creatinine Latest Ref Range: 0.66 - 1.25 mg/dL 1.21   GFR Estimate Latest Ref Range: >60 mL/min/1.73m2 63   Calcium Latest Ref Range: 8.5 - 10.1 mg/dL 8.6   Anion Gap Latest Ref Range: 3 - 14 mmol/L 12   Albumin Latest Ref Range: 3.4 - 5.0 g/dL 3.6   Protein Total Latest Ref Range: 6.8 - 8.8 g/dL 7.0   Bilirubin Total Latest Ref Range: 0.2 - 1.3 mg/dL 0.4   Alkaline Phosphatase Latest Ref Range: 40 - 150 U/L 46   ALT Latest Ref Range: 0 - 70 U/L 19   AST Latest Ref Range: 0 - 45 U/L 13   TSH Latest Ref Range: 0.40 - 4.00 mU/L 2.13   Glucose Latest Ref Range: 70 - 99 mg/dL 104 (H)   WBC Latest Ref Range: 4.0 - 11.0 10e3/uL 4.4   Hemoglobin Latest Ref Range: 13.3 - 17.7 g/dL 13.3   Hematocrit Latest Ref Range: 40.0 - 53.0 % 39.1 (L)   Platelet Count Latest Ref Range: 150 - 450 10e3/uL 208   RBC Count Latest Ref Range: 4.40 - 5.90 10e6/uL 4.85   MCV Latest Ref Range: 78 - 100 fL 81   MCH Latest Ref Range: 26.5 - 33.0 pg 27.4   MCHC Latest Ref Range: 31.5 - 36.5 g/dL 34.0   RDW Latest Ref Range: 10.0 - 15.0 %  13.1   % Neutrophils Latest Units: % 50   % Lymphocytes Latest Units: % 35   % Monocytes Latest Units: % 11   % Eosinophils Latest Units: % 3   % Basophils Latest Units: % 1   Absolute Basophils Latest Ref Range: 0.0 - 0.2 10e3/uL 0.1   Absolute Eosinophils Latest Ref Range: 0.0 - 0.7 10e3/uL 0.2   Absolute Immature Granulocytes Latest Ref Range: <=0.4 10e3/uL 0.0   Absolute Lymphocytes Latest Ref Range: 0.8 - 5.3 10e3/uL 1.5   Absolute Monocytes Latest Ref Range: 0.0 - 1.3 10e3/uL 0.5   % Immature Granulocytes Latest Units: % 0   Absolute Neutrophils Latest Ref Range: 1.6 - 8.3 10e3/uL 2.2       Results reviewed, labs MET treatment parameters, ok to proceed with treatment.      Post Infusion Assessment:  Patient tolerated infusion without incident.  Blood return noted pre and post infusion.  Site patent and intact, free from redness, edema or discomfort.  No evidence of extravasations.  Access discontinued per protocol.       Discharge Plan:   Prescription refills given for ativan - IB message sent to care team for ativan refills at his local [facility]'s pharmacy.  Discharge instructions reviewed with: Patient.  Patient and/or family verbalized understanding of discharge instructions and all questions answered.  AVS to patient via doo.  Patient will return 2/14 for next appointment.   Patient discharged in stable condition accompanied by: self.  Departure Mode: Ambulatory.    Stacey Snell RN

## 2022-01-17 NOTE — NURSING NOTE
Chief Complaint   Patient presents with     Blood Draw     Labs drawn with piv start by RN. Vitals taken.     Labs drawn from PIV placed by RN. Line flushed with saline. Vitals taken. Pt checked in for appointment(s).      Yumiko Jeter RN

## 2022-01-17 NOTE — PATIENT INSTRUCTIONS
[facility] Triage and after hours / weekends / holidays:  [phone]    Please call the triage or after hours line if you experience a temperature greater than or equal to 100.5, shaking chills, have uncontrolled nausea, vomiting and/or diarrhea, dizziness, shortness of breath, chest pain, bleeding, unexplained bruising, or if you have any other new/concerning symptoms, questions or concerns.      If you are having any concerning symptoms or wish to speak to a provider before your next infusion visit, please call your care coordinator or triage to notify them so we can adequately serve you.     If you need a refill on a narcotic prescription or other medication, please call before your infusion appointment.                 January 2022 Sunday Monday Tuesday Wednesday Thursday Friday Saturday                                 1       2     3     4     5     6     7     8       9     10     11     12     13    VIDEO VISIT RETURN   2:30 PM   (30 min.)   Oksana Ashton MD   [facility] 14     15       16     17    LAB PERIPHERAL   9:45 AM   (15 min.)   [facility]   [facility]    ONC INFUSION 2 HR (120 MIN)  10:30 AM   (120 min.)    ONC INFUSION NURSE   [facility] 18     19     20     21     22       23     24     25     26     27     28     29       30     31                                          February 2022 Sunday Monday Tuesday Wednesday Thursday Friday Saturday             1     2     3     4     5       6     7     8     9     10     11     12       13     14    LAB PERIPHERAL   8:30 AM   (15 min.)   [facility]   [facility]    RETURN   9:00 AM   (45 min.)   Sherine Meza CNP   [facility]    ONC INFUSION 2 HR (120 MIN)  11:00 AM   (120 min.)    ONC INFUSION NURSE   [facility] 15     16     17      18     19       20     21     22     23     24     25     26       27     28                                             Recent Results (from the past 24 hour(s))   Comprehensive metabolic panel    Collection Time: 01/17/22 10:28 AM   Result Value Ref Range    Sodium 142 133 - 144 mmol/L    Potassium 3.2 (L) 3.4 - 5.3 mmol/L    Chloride 102 94 - 109 mmol/L    Carbon Dioxide (CO2) 28 20 - 32 mmol/L    Anion Gap 12 3 - 14 mmol/L    Urea Nitrogen 18 7 - 30 mg/dL    Creatinine 1.21 0.66 - 1.25 mg/dL    Calcium 8.6 8.5 - 10.1 mg/dL    Glucose 104 (H) 70 - 99 mg/dL    Alkaline Phosphatase 46 40 - 150 U/L    AST 13 0 - 45 U/L    ALT 19 0 - 70 U/L    Protein Total 7.0 6.8 - 8.8 g/dL    Albumin 3.6 3.4 - 5.0 g/dL    Bilirubin Total 0.4 0.2 - 1.3 mg/dL    GFR Estimate 63 >60 mL/min/1.73m2   TSH with free T4 reflex    Collection Time: 01/17/22 10:28 AM   Result Value Ref Range    TSH 2.13 0.40 - 4.00 mU/L   CBC with platelets and differential    Collection Time: 01/17/22 10:28 AM   Result Value Ref Range    WBC Count 4.4 4.0 - 11.0 10e3/uL    RBC Count 4.85 4.40 - 5.90 10e6/uL    Hemoglobin 13.3 13.3 - 17.7 g/dL    Hematocrit 39.1 (L) 40.0 - 53.0 %    MCV 81 78 - 100 fL    MCH 27.4 26.5 - 33.0 pg    MCHC 34.0 31.5 - 36.5 g/dL    RDW 13.1 10.0 - 15.0 %    Platelet Count 208 150 - 450 10e3/uL    % Neutrophils 50 %    % Lymphocytes 35 %    % Monocytes 11 %    % Eosinophils 3 %    % Basophils 1 %    % Immature Granulocytes 0 %    NRBCs per 100 WBC 0 <1 /100    Absolute Neutrophils 2.2 1.6 - 8.3 10e3/uL    Absolute Lymphocytes 1.5 0.8 - 5.3 10e3/uL    Absolute Monocytes 0.5 0.0 - 1.3 10e3/uL    Absolute Eosinophils 0.2 0.0 - 0.7 10e3/uL    Absolute Basophils 0.1 0.0 - 0.2 10e3/uL    Absolute Immature Granulocytes 0.0 <=0.4 10e3/uL    Absolute NRBCs 0.0 10e3/uL

## 2022-01-18 NOTE — CONFIDENTIAL NOTE
FMLA for family member paperwork completed, checked for accuracy, signed and faxed to [facility] @ [phone]. A copy was made, put in file and original mailed to patient at home address.    Successful transmission verified in Right Fax.    Tiffanie Kunz CMA

## 2022-01-19 DIAGNOSIS — C43.4 MALIGNANT MELANOMA OF SCALP (H): ICD-10-CM

## 2022-01-19 DIAGNOSIS — Z15.89 MONOALLELIC MUTATION OF BRAF GENE: ICD-10-CM

## 2022-01-19 DIAGNOSIS — Z15.09 MONOALLELIC MUTATION OF BRAF GENE: ICD-10-CM

## 2022-01-19 RX ORDER — LORAZEPAM 0.5 MG/1
0.5 TABLET ORAL EVERY 4 HOURS PRN
Qty: 30 TABLET | Refills: 0 | Status: SHIPPED | OUTPATIENT
Start: 2022-01-19 | End: 2023-07-24

## 2022-02-13 NOTE — PROGRESS NOTES
MEDICAL ONCOLOGY PROGRESS NOTE  Melanoma Clinic  Feb 14, 2022    CHIEF COMPLAINT: Scalp melanoma    Melanoma History:  1. He has a small pigmented scalp lesion present for over 30 years. The lesion was biopsied in 1992 and was benign.  2. October 2020, he presented to [facility] with 1 year of progressive enlargement of the lesion and new onset burning, itching, and stinging sensation in the affected scalp.  3. 10/26/20, He was seen at [facility] and he had shave biopsies of A. left central parietal scalp, B. left central occipital scalp, C. Left posterior parietal scalp, and D. punch biopsy of the left superior occipital scalp. Pathology (specimen: [id number]) showed a nodular and nevoid type melanoma, non-ulcerated, Breslow depth up to 1.3 mm, Saurabh's level IV, and up to 3 mitoses per mm2. All margins were involved. Ki-67 10-20%. Sox10 stain is positive and highlights an atypical compound melanocytic proliferation with significant overlying confluence and pagetosis of intraepidermal melanocytes. T-stage: at least pT2a. PD-L1 staining shows PD-L1 TPS <1%. 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  4. 11/25/20, he was seen by Dr. Garcia and he took three 3mm punch biopsies, which returned as dermal melanocytic proliferations with melanophages and fibrosis, consistent with locoregional metastatic melanoma.  5. 12/8/2020 he had PET-CT, which showed FDG avid irregular skin thickening overlying the left parietal region, with no FDG avid lymphadenopathy in the neck and no evidence of metastasis elsewhere in the body.  6. 1/22/2021 start vemurafenib and cobimetinib, ~2/12/21 held for diarrhea, then resumed.  7. 2/18/2021 Start atezolizumab, last on 4/29/21  8. 5/24/2021 Wide local excision of the scalp melanoma and left latissimus free flap for scalp reconstruction.    Surgical pathology showed features consistent with scar and tumoral melanosis. No definite residual melanoma is seen.  "Tumoral melanosis (which extends to a depth of around 1.1 mm) is believed to be equivalent to a diagnosis of regressed melanoma.   9. 6/25/21 Start of adjuvant Nivolumab     INTERVAL HISTORY  Mr. Coleman is a 76 year old man with history of stage IIIB melanoma of the scalp.     Has had no issues with nivolumab infusions.   Depression has been worse lately, attributes this to feeling isolated and the winter.  Verbalizes anxiety over finishing nivolumab infusions moving forward. Feels that the infusions have created a \"security blanket\" for him and now worries about recurrence.  Intermittent episodes of diarrhea are unchanged.   Doesn't routinely check BP at home.  Na cough, skin changes or joint pain.       Current Outpatient Medications   Medication Sig Dispense Refill     acetaminophen (TYLENOL) 500 MG tablet Take 2 tablets (1,000 mg) by mouth every 8 hours as needed for mild pain 100 tablet 0     cholecalciferol (VITAMIN D3) 1000 UNIT tablet Take 1,000 Units by mouth daily        diphenoxylate-atropine (LOMOTIL) 2.5-0.025 MG tablet Take 1-2 tablets by mouth 4 times daily as needed for diarrhea 120 tablet 5     docusate sodium (DSS) 100 MG capsule TAKE ONE CAPSULE BY MOUTH EVERY DAY FOR STOOL SOFTENING       fluvoxaMINE (LUVOX) 100 MG tablet Take 150 mg at bedtime       hydrochlorothiazide (HYDRODIURIL) 25 MG tablet Take 25 mg by mouth daily.       Hypromellose (ARTIFICIAL TEARS OP) Apply  to eye. 2 drops in each eye twice a day as needed       Lactobacillus-Inulin ([facility] DIGESTIVE [facility]) CAPS 1 tab daily (Patient not taking: Reported on 10/14/2021) 30 capsule 1     LORazepam (ATIVAN) 0.5 MG tablet Take 1 tablet (0.5 mg) by mouth every 4 hours as needed (Anxiety, Nausea/Vomiting or Sleep) 30 tablet 0     Magnesium Oxide 420 MG TABS Take 420 mg by mouth daily       methocarbamol (ROBAXIN) 500 MG tablet Take 1 tablet (500 mg) by mouth 3 times daily as needed for muscle spasms (Patient not taking: Reported on " 10/14/2021) 15 tablet 0     methocarbamol (ROBAXIN) 500 MG tablet Take 1 tablet (500 mg) by mouth 3 times daily as needed for muscle spasms 10 tablet 0     omeprazole (PRILOSEC) 20 MG capsule Take 1 capsule by mouth 2 times daily. 60 capsule prn     polyethylene glycol (MIRALAX) 17 GM/Dose powder Take 17 g by mouth daily 510 g 0     pregabalin (LYRICA) 150 MG capsule Take 150 mg by mouth 2 times daily       prochlorperazine (COMPAZINE) 10 MG tablet Take 1 tablet (10 mg) by mouth every 6 hours as needed (Nausea/Vomiting) (Patient not taking: Reported on 10/14/2021) 30 tablet 3     prochlorperazine (COMPAZINE) 10 MG tablet Take 1 tablet (10 mg) by mouth every 6 hours as needed (Nausea/Vomiting) 30 tablet 2     QUEtiapine (SEROQUEL) 300 MG tablet Take 150 mg by mouth At Bedtime        senna-docusate (SENOKOT-S/PERICOLACE) 8.6-50 MG tablet Take 1 tablet by mouth 2 times daily 30 tablet 0     simvastatin (ZOCOR) 80 MG tablet Take 40 mg by mouth At Bedtime        traZODone (DESYREL) 100 MG tablet Take 100 mg by mouth At Bedtime        vortioxetine (TRINTELLIX) 20 MG tablet TAKE ONE TABLET BY MOUTH EVERY MORNING       Objective:  BP (!) 141/83   Pulse 68   Temp 98.1  F (36.7  C) (Oral)   Resp 16   Wt 92.1 kg (203 lb)   SpO2 95%   BMI 30.87 kg/m    GENERAL: Healthy, alert and no distress  EYES: Eyes grossly normal to inspection.  No scleral icterus  HENT: Left scalp is post surgical with no new pigmented lesions seen. External ears, nose and mouth without ulcers or lesions.  No nasal drainage visible.  NECK: No asymmetry, visible masses or scars  RESP: Clear to auscultation bilaterally.  SKIN: Visible skin clear. No significant rash, abnormal pigmentation or lesions.  NEURO: Cranial nerves grossly intact.      ASSESSMENT AND PLAN  Cutaneous melanoma, scalp primary, pT2a cN1c, clinical Stage IIIB.  It was a pleasure to see Mr. Coleman today. He is a 76 year old  with agent orange cutaneous exposures in the  [place] war and a diagnosis of malignant melanoma arising from a pre-existing pigmented lesion on the scalp. He had a partial response to neoadjuvant therapy with atezolizumab (3 cycles), vemurafenib, and cobimetinib (4 cycles) with decrease in thickening of the large scalp tumor and lightening of the periphery and in the middle part of the tumor then had surgery, which showed only tumoral melanosis. He started adjuvant nivolumab on 6/25/21, with overall plan for 9 total cycles of nivolumab q 4 weeks (=12 cycles of immunotherapy total including previous 3 cycles of atezo). Patient has been tolerating adjuvant nivolumab well. Imaging 11/23/21 shows no evidence of recurrent disease.  -Proceed with final nivolumab infusion today  -Repeat PET-CT in 1 month    Depressed mood, anxiety   Verbalizes fluctuating depression and anxiety, multifactorial. He has a longstanding history of PTSD. Uses occasional lorazepam. Will continue to work with a psychiatrist out of [place].     Diarrhea  History of IBS  Stable, uses Lomotil infrequently.    CKD, stage 3A  Creatinine 1.28 in December, eGFR 55. Probably secondary to hypertension. [facility]    Hypokalemia  Replace per protocol in infusion, prescription sent for 1 week supplement at home.    40 minutes spent on the date of the encounter doing chart review, review of test results, interpretation of tests, patient visit and documentation     Sherine Meza CNP  [facility]  [address] [place] [id number]  [phone]

## 2022-02-14 ENCOUNTER — INFUSION THERAPY VISIT (OUTPATIENT)
Dept: ONCOLOGY | Facility: CLINIC | Age: 77
End: 2022-02-14
Attending: REGISTERED NURSE
Payer: MEDICARE

## 2022-02-14 ENCOUNTER — LAB (OUTPATIENT)
Dept: LAB | Facility: CLINIC | Age: 77
End: 2022-02-14
Attending: REGISTERED NURSE
Payer: MEDICARE

## 2022-02-14 VITALS
OXYGEN SATURATION: 95 % | DIASTOLIC BLOOD PRESSURE: 83 MMHG | HEART RATE: 68 BPM | BODY MASS INDEX: 30.87 KG/M2 | WEIGHT: 203 LBS | SYSTOLIC BLOOD PRESSURE: 141 MMHG | RESPIRATION RATE: 16 BRPM | TEMPERATURE: 98.1 F

## 2022-02-14 DIAGNOSIS — C43.4 MALIGNANT MELANOMA OF SCALP (H): Primary | ICD-10-CM

## 2022-02-14 DIAGNOSIS — Z15.09 MONOALLELIC MUTATION OF BRAF GENE: Primary | ICD-10-CM

## 2022-02-14 DIAGNOSIS — Z15.89 MONOALLELIC MUTATION OF BRAF GENE: Primary | ICD-10-CM

## 2022-02-14 DIAGNOSIS — R45.89 DEPRESSED MOOD: ICD-10-CM

## 2022-02-14 DIAGNOSIS — C43.4 MALIGNANT MELANOMA OF SCALP (H): ICD-10-CM

## 2022-02-14 DIAGNOSIS — Z15.09 MONOALLELIC MUTATION OF BRAF GENE: ICD-10-CM

## 2022-02-14 DIAGNOSIS — Z15.89 MONOALLELIC MUTATION OF BRAF GENE: ICD-10-CM

## 2022-02-14 DIAGNOSIS — R19.7 DIARRHEA, UNSPECIFIED TYPE: ICD-10-CM

## 2022-02-14 DIAGNOSIS — E87.6 HYPOKALEMIA: ICD-10-CM

## 2022-02-14 LAB
ALBUMIN SERPL-MCNC: 3.7 G/DL (ref 3.4–5)
ALP SERPL-CCNC: 50 U/L (ref 40–150)
ALT SERPL W P-5'-P-CCNC: 25 U/L (ref 0–70)
ANION GAP SERPL CALCULATED.3IONS-SCNC: 7 MMOL/L (ref 3–14)
AST SERPL W P-5'-P-CCNC: 20 U/L (ref 0–45)
BASOPHILS # BLD AUTO: 0.1 10E3/UL (ref 0–0.2)
BASOPHILS NFR BLD AUTO: 1 %
BILIRUB SERPL-MCNC: 0.4 MG/DL (ref 0.2–1.3)
BUN SERPL-MCNC: 16 MG/DL (ref 7–30)
CALCIUM SERPL-MCNC: 8.8 MG/DL (ref 8.5–10.1)
CHLORIDE BLD-SCNC: 102 MMOL/L (ref 94–109)
CO2 SERPL-SCNC: 29 MMOL/L (ref 20–32)
CREAT SERPL-MCNC: 1.22 MG/DL (ref 0.66–1.25)
EOSINOPHIL # BLD AUTO: 0.2 10E3/UL (ref 0–0.7)
EOSINOPHIL NFR BLD AUTO: 2 %
ERYTHROCYTE [DISTWIDTH] IN BLOOD BY AUTOMATED COUNT: 13.2 % (ref 10–15)
GFR SERPL CREATININE-BSD FRML MDRD: 62 ML/MIN/1.73M2
GLUCOSE BLD-MCNC: 121 MG/DL (ref 70–99)
HCT VFR BLD AUTO: 41.9 % (ref 40–53)
HGB BLD-MCNC: 14.1 G/DL (ref 13.3–17.7)
IMM GRANULOCYTES # BLD: 0 10E3/UL
IMM GRANULOCYTES NFR BLD: 0 %
LYMPHOCYTES # BLD AUTO: 1.5 10E3/UL (ref 0.8–5.3)
LYMPHOCYTES NFR BLD AUTO: 17 %
MCH RBC QN AUTO: 27.6 PG (ref 26.5–33)
MCHC RBC AUTO-ENTMCNC: 33.7 G/DL (ref 31.5–36.5)
MCV RBC AUTO: 82 FL (ref 78–100)
MONOCYTES # BLD AUTO: 0.6 10E3/UL (ref 0–1.3)
MONOCYTES NFR BLD AUTO: 7 %
NEUTROPHILS # BLD AUTO: 6.6 10E3/UL (ref 1.6–8.3)
NEUTROPHILS NFR BLD AUTO: 73 %
NRBC # BLD AUTO: 0 10E3/UL
NRBC BLD AUTO-RTO: 0 /100
PLATELET # BLD AUTO: 256 10E3/UL (ref 150–450)
POTASSIUM BLD-SCNC: 3 MMOL/L (ref 3.4–5.3)
PROT SERPL-MCNC: 7.4 G/DL (ref 6.8–8.8)
RBC # BLD AUTO: 5.1 10E6/UL (ref 4.4–5.9)
SODIUM SERPL-SCNC: 138 MMOL/L (ref 133–144)
TSH SERPL DL<=0.005 MIU/L-ACNC: 2.48 MU/L (ref 0.4–4)
WBC # BLD AUTO: 9 10E3/UL (ref 4–11)

## 2022-02-14 PROCEDURE — 250N000013 HC RX MED GY IP 250 OP 250 PS 637: Performed by: REGISTERED NURSE

## 2022-02-14 PROCEDURE — 999N000127 HC STATISTIC PERIPHERAL IV START W US GUIDANCE

## 2022-02-14 PROCEDURE — 85025 COMPLETE CBC W/AUTO DIFF WBC: CPT | Performed by: REGISTERED NURSE

## 2022-02-14 PROCEDURE — 36415 COLL VENOUS BLD VENIPUNCTURE: CPT | Performed by: REGISTERED NURSE

## 2022-02-14 PROCEDURE — 96413 CHEMO IV INFUSION 1 HR: CPT

## 2022-02-14 PROCEDURE — 250N000011 HC RX IP 250 OP 636: Performed by: PHYSICIAN ASSISTANT

## 2022-02-14 PROCEDURE — 99215 OFFICE O/P EST HI 40 MIN: CPT | Performed by: REGISTERED NURSE

## 2022-02-14 PROCEDURE — 258N000003 HC RX IP 258 OP 636: Performed by: PHYSICIAN ASSISTANT

## 2022-02-14 PROCEDURE — 80053 COMPREHEN METABOLIC PANEL: CPT | Performed by: REGISTERED NURSE

## 2022-02-14 PROCEDURE — 84443 ASSAY THYROID STIM HORMONE: CPT | Performed by: REGISTERED NURSE

## 2022-02-14 PROCEDURE — G0463 HOSPITAL OUTPT CLINIC VISIT: HCPCS

## 2022-02-14 RX ORDER — LORAZEPAM 2 MG/ML
0.5 INJECTION INTRAMUSCULAR EVERY 4 HOURS PRN
Status: CANCELLED
Start: 2022-02-14

## 2022-02-14 RX ORDER — NALOXONE HYDROCHLORIDE 0.4 MG/ML
0.2 INJECTION, SOLUTION INTRAMUSCULAR; INTRAVENOUS; SUBCUTANEOUS
Status: CANCELLED | OUTPATIENT
Start: 2022-02-14

## 2022-02-14 RX ORDER — METHYLPREDNISOLONE SODIUM SUCCINATE 125 MG/2ML
125 INJECTION, POWDER, LYOPHILIZED, FOR SOLUTION INTRAMUSCULAR; INTRAVENOUS
Status: CANCELLED
Start: 2022-02-14

## 2022-02-14 RX ORDER — POTASSIUM CHLORIDE 1500 MG/1
40 TABLET, EXTENDED RELEASE ORAL ONCE
Status: COMPLETED | OUTPATIENT
Start: 2022-02-14 | End: 2022-02-14

## 2022-02-14 RX ORDER — EPINEPHRINE 1 MG/ML
0.3 INJECTION, SOLUTION INTRAMUSCULAR; SUBCUTANEOUS EVERY 5 MIN PRN
Status: CANCELLED | OUTPATIENT
Start: 2022-02-14

## 2022-02-14 RX ORDER — ALBUTEROL SULFATE 0.83 MG/ML
2.5 SOLUTION RESPIRATORY (INHALATION)
Status: CANCELLED | OUTPATIENT
Start: 2022-02-14

## 2022-02-14 RX ORDER — HEPARIN SODIUM (PORCINE) LOCK FLUSH IV SOLN 100 UNIT/ML 100 UNIT/ML
5 SOLUTION INTRAVENOUS
Status: CANCELLED | OUTPATIENT
Start: 2022-02-14

## 2022-02-14 RX ORDER — MEPERIDINE HYDROCHLORIDE 25 MG/ML
25 INJECTION INTRAMUSCULAR; INTRAVENOUS; SUBCUTANEOUS EVERY 30 MIN PRN
Status: CANCELLED | OUTPATIENT
Start: 2022-02-14

## 2022-02-14 RX ORDER — HEPARIN SODIUM,PORCINE 10 UNIT/ML
5 VIAL (ML) INTRAVENOUS
Status: CANCELLED | OUTPATIENT
Start: 2022-02-14

## 2022-02-14 RX ORDER — ALBUTEROL SULFATE 90 UG/1
1-2 AEROSOL, METERED RESPIRATORY (INHALATION)
Status: CANCELLED
Start: 2022-02-14

## 2022-02-14 RX ORDER — POTASSIUM CHLORIDE 1500 MG/1
20 TABLET, EXTENDED RELEASE ORAL DAILY
Qty: 7 TABLET | Refills: 0 | Status: SHIPPED | OUTPATIENT
Start: 2022-02-14

## 2022-02-14 RX ORDER — DIPHENHYDRAMINE HYDROCHLORIDE 50 MG/ML
50 INJECTION INTRAMUSCULAR; INTRAVENOUS
Status: CANCELLED
Start: 2022-02-14

## 2022-02-14 RX ADMIN — SODIUM CHLORIDE 480 MG: 9 INJECTION, SOLUTION INTRAVENOUS at 11:27

## 2022-02-14 RX ADMIN — SODIUM CHLORIDE 250 ML: 9 INJECTION, SOLUTION INTRAVENOUS at 11:27

## 2022-02-14 RX ADMIN — POTASSIUM CHLORIDE 40 MEQ: 1500 TABLET, EXTENDED RELEASE ORAL at 11:42

## 2022-02-14 ASSESSMENT — PAIN SCALES - GENERAL: PAINLEVEL: NO PAIN (0)

## 2022-02-14 NOTE — PATIENT INSTRUCTIONS
Contact Numbers    [facility] [place] [facility]: [phone]    Call with chills and/or temperature greater than or equal to 100.5, uncontrolled nausea/vomiting, diarrhea, constipation, dizziness, shortness of breath, chest pain, bleeding, unexplained bruising, or any new/concerning symptoms, questions/concerns.     If you are having any concerning symptoms or wish to speak to a provider before your next infusion visit, please call your care coordinator or triage to notify them so we can adequately serve you.       After Hours: [phone]    If after hours, weekends, or holidays, call main hospital  and ask for Oncology doctor on call.       February 2022 Sunday Monday Tuesday Wednesday Thursday Friday Saturday             1     2     3     4     5       6     7     8     9     10     11     12       13     14    LAB PERIPHERAL   8:30 AM   (15 min.)   [facility] LAB DRAW   [facility]    RETURN   9:00 AM   (45 min.)   Sherine Meza CNP   [facility]    ONC INFUSION 2 HR (120 MIN)  11:00 AM   (120 min.)    ONC INFUSION NURSE   [facility] 15     16     17     18     19       20     21     22     23     24     25     26       27     28                                          March 2022 Sunday Monday Tuesday Wednesday Thursday Friday Saturday             1     2     3     4    PE [facility]/ ONCOLOGY   8:45 AM   (45 min.)   [email or web address]   [facility] 5       6     7     8     9     10    VIDEO VISIT RETURN   2:00 PM   (30 min.)   Oksana Ashton MD   [facility] 11     12       13     14     15     16     17     18     19       20     21     22     23     24  Happy Birthday!     25     26       27     28     29     30     31                               Lab Results:  Recent Results (from the past 12 hour(s))   Comprehensive metabolic panel    Collection Time: 02/14/22   8:53 AM   Result Value Ref Range    Sodium 138 133 - 144 mmol/L    Potassium 3.0 (L) 3.4 - 5.3 mmol/L    Chloride 102 94 - 109 mmol/L    Carbon Dioxide (CO2) 29 20 - 32 mmol/L    Anion Gap 7 3 - 14 mmol/L    Urea Nitrogen 16 7 - 30 mg/dL    Creatinine 1.22 0.66 - 1.25 mg/dL    Calcium 8.8 8.5 - 10.1 mg/dL    Glucose 121 (H) 70 - 99 mg/dL    Alkaline Phosphatase 50 40 - 150 U/L    AST 20 0 - 45 U/L    ALT 25 0 - 70 U/L    Protein Total 7.4 6.8 - 8.8 g/dL    Albumin 3.7 3.4 - 5.0 g/dL    Bilirubin Total 0.4 0.2 - 1.3 mg/dL    GFR Estimate 62 >60 mL/min/1.73m2   TSH with free T4 reflex    Collection Time: 02/14/22  8:53 AM   Result Value Ref Range    TSH 2.48 0.40 - 4.00 mU/L   CBC with platelets and differential    Collection Time: 02/14/22  8:53 AM   Result Value Ref Range    WBC Count 9.0 4.0 - 11.0 10e3/uL    RBC Count 5.10 4.40 - 5.90 10e6/uL    Hemoglobin 14.1 13.3 - 17.7 g/dL    Hematocrit 41.9 40.0 - 53.0 %    MCV 82 78 - 100 fL    MCH 27.6 26.5 - 33.0 pg    MCHC 33.7 31.5 - 36.5 g/dL    RDW 13.2 10.0 - 15.0 %    Platelet Count 256 150 - 450 10e3/uL    % Neutrophils 73 %    % Lymphocytes 17 %    % Monocytes 7 %    % Eosinophils 2 %    % Basophils 1 %    % Immature Granulocytes 0 %    NRBCs per 100 WBC 0 <1 /100    Absolute Neutrophils 6.6 1.6 - 8.3 10e3/uL    Absolute Lymphocytes 1.5 0.8 - 5.3 10e3/uL    Absolute Monocytes 0.6 0.0 - 1.3 10e3/uL    Absolute Eosinophils 0.2 0.0 - 0.7 10e3/uL    Absolute Basophils 0.1 0.0 - 0.2 10e3/uL    Absolute Immature Granulocytes 0.0 <=0.4 10e3/uL    Absolute NRBCs 0.0 10e3/uL

## 2022-02-14 NOTE — NURSING NOTE
Chief Complaint   Patient presents with     Blood Draw     Vitals, blood drawn and PIV placed by KG, VAT. Pt checked into appt.      LAMAR Russell LPN

## 2022-02-14 NOTE — LETTER
2/14/2022         RE: Ahmet Coleman  [address]  [place] [address]        Dear Colleague,    Thank you for referring your patient, Ahmet Coleman, to the [facility]. Please see a copy of my visit note below.    MEDICAL ONCOLOGY PROGRESS NOTE  Melanoma Clinic  Feb 14, 2022    CHIEF COMPLAINT: Scalp melanoma    Melanoma History:  1. He has a small pigmented scalp lesion present for over 30 years. The lesion was biopsied in 1992 and was benign.  2. October 2020, he presented to [facility] with 1 year of progressive enlargement of the lesion and new onset burning, itching, and stinging sensation in the affected scalp.  3. 10/26/20, He was seen at [facility] and he had shave biopsies of A. left central parietal scalp, B. left central occipital scalp, C. Left posterior parietal scalp, and D. punch biopsy of the left superior occipital scalp. Pathology (specimen: [id number]) showed a nodular and nevoid type melanoma, non-ulcerated, Breslow depth up to 1.3 mm, Saurabh's level IV, and up to 3 mitoses per mm2. All margins were involved. Ki-67 10-20%. Sox10 stain is positive and highlights an atypical compound melanocytic proliferation with significant overlying confluence and pagetosis of intraepidermal melanocytes. T-stage: at least pT2a. PD-L1 staining shows PD-L1 TPS <1%. 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  4. 11/25/20, he was seen by Dr. Garcia and he took three 3mm punch biopsies, which returned as dermal melanocytic proliferations with melanophages and fibrosis, consistent with locoregional metastatic melanoma.  5. 12/8/2020 he had PET-CT, which showed FDG avid irregular skin thickening overlying the left parietal region, with no FDG avid lymphadenopathy in the neck and no evidence of metastasis elsewhere in the body.  6. 1/22/2021 start vemurafenib and cobimetinib, ~2/12/21 held for diarrhea, then resumed.  7. 2/18/2021 Start  "atezolizumab, last on 4/29/21  8. 5/24/2021 Wide local excision of the scalp melanoma and left latissimus free flap for scalp reconstruction.    Surgical pathology showed features consistent with scar and tumoral melanosis. No definite residual melanoma is seen. Tumoral melanosis (which extends to a depth of around 1.1 mm) is believed to be equivalent to a diagnosis of regressed melanoma.   9. 6/25/21 Start of adjuvant Nivolumab     INTERVAL HISTORY  Mr. Coleman is a 76 year old man with history of stage IIIB melanoma of the scalp.     Has had no issues with nivolumab infusions.   Depression has been worse lately, attributes this to feeling isolated and the winter.  Verbalizes anxiety over finishing nivolumab infusions moving forward. Feels that the infusions have created a \"security blanket\" for him and now worries about recurrence.  Intermittent episodes of diarrhea are unchanged.   Doesn't routinely check BP at home.  Na cough, skin changes or joint pain.       Current Outpatient Medications   Medication Sig Dispense Refill     acetaminophen (TYLENOL) 500 MG tablet Take 2 tablets (1,000 mg) by mouth every 8 hours as needed for mild pain 100 tablet 0     cholecalciferol (VITAMIN D3) 1000 UNIT tablet Take 1,000 Units by mouth daily        diphenoxylate-atropine (LOMOTIL) 2.5-0.025 MG tablet Take 1-2 tablets by mouth 4 times daily as needed for diarrhea 120 tablet 5     docusate sodium (DSS) 100 MG capsule TAKE ONE CAPSULE BY MOUTH EVERY DAY FOR STOOL SOFTENING       fluvoxaMINE (LUVOX) 100 MG tablet Take 150 mg at bedtime       hydrochlorothiazide (HYDRODIURIL) 25 MG tablet Take 25 mg by mouth daily.       Hypromellose (ARTIFICIAL TEARS OP) Apply  to eye. 2 drops in each eye twice a day as needed       Lactobacillus-Inulin ([facility] DIGESTIVE [facility]) CAPS 1 tab daily (Patient not taking: Reported on 10/14/2021) 30 capsule 1     LORazepam (ATIVAN) 0.5 MG tablet Take 1 tablet (0.5 mg) by mouth every 4 hours as " needed (Anxiety, Nausea/Vomiting or Sleep) 30 tablet 0     Magnesium Oxide 420 MG TABS Take 420 mg by mouth daily       methocarbamol (ROBAXIN) 500 MG tablet Take 1 tablet (500 mg) by mouth 3 times daily as needed for muscle spasms (Patient not taking: Reported on 10/14/2021) 15 tablet 0     methocarbamol (ROBAXIN) 500 MG tablet Take 1 tablet (500 mg) by mouth 3 times daily as needed for muscle spasms 10 tablet 0     omeprazole (PRILOSEC) 20 MG capsule Take 1 capsule by mouth 2 times daily. 60 capsule prn     polyethylene glycol (MIRALAX) 17 GM/Dose powder Take 17 g by mouth daily 510 g 0     pregabalin (LYRICA) 150 MG capsule Take 150 mg by mouth 2 times daily       prochlorperazine (COMPAZINE) 10 MG tablet Take 1 tablet (10 mg) by mouth every 6 hours as needed (Nausea/Vomiting) (Patient not taking: Reported on 10/14/2021) 30 tablet 3     prochlorperazine (COMPAZINE) 10 MG tablet Take 1 tablet (10 mg) by mouth every 6 hours as needed (Nausea/Vomiting) 30 tablet 2     QUEtiapine (SEROQUEL) 300 MG tablet Take 150 mg by mouth At Bedtime        senna-docusate (SENOKOT-S/PERICOLACE) 8.6-50 MG tablet Take 1 tablet by mouth 2 times daily 30 tablet 0     simvastatin (ZOCOR) 80 MG tablet Take 40 mg by mouth At Bedtime        traZODone (DESYREL) 100 MG tablet Take 100 mg by mouth At Bedtime        vortioxetine (TRINTELLIX) 20 MG tablet TAKE ONE TABLET BY MOUTH EVERY MORNING       Objective:  BP (!) 141/83   Pulse 68   Temp 98.1  F (36.7  C) (Oral)   Resp 16   Wt 92.1 kg (203 lb)   SpO2 95%   BMI 30.87 kg/m    GENERAL: Healthy, alert and no distress  EYES: Eyes grossly normal to inspection.  No scleral icterus  HENT: Left scalp is post surgical with no new pigmented lesions seen. External ears, nose and mouth without ulcers or lesions.  No nasal drainage visible.  NECK: No asymmetry, visible masses or scars  RESP: Clear to auscultation bilaterally.  SKIN: Visible skin clear. No significant rash, abnormal pigmentation or  lesions.  NEURO: Cranial nerves grossly intact.      ASSESSMENT AND PLAN  Cutaneous melanoma, scalp primary, pT2a cN1c, clinical Stage IIIB.  It was a pleasure to see Mr. Coleman today. He is a 76 year old  with agent orange cutaneous exposures in the [place] war and a diagnosis of malignant melanoma arising from a pre-existing pigmented lesion on the scalp. He had a partial response to neoadjuvant therapy with atezolizumab (3 cycles), vemurafenib, and cobimetinib (4 cycles) with decrease in thickening of the large scalp tumor and lightening of the periphery and in the middle part of the tumor then had surgery, which showed only tumoral melanosis. He started adjuvant nivolumab on 6/25/21, with overall plan for 9 total cycles of nivolumab q 4 weeks (=12 cycles of immunotherapy total including previous 3 cycles of atezo). Patient has been tolerating adjuvant nivolumab well. Imaging 11/23/21 shows no evidence of recurrent disease.  -Proceed with final nivolumab infusion today  -Repeat PET-CT in 1 month    Depressed mood, anxiety   Verbalizes fluctuating depression and anxiety, multifactorial. He has a longstanding history of PTSD. Uses occasional lorazepam. Will continue to work with a psychiatrist out of [email or web address].     Diarrhea  History of IBS  Stable, uses Lomotil infrequently.    CKD, stage 3A  Creatinine 1.28 in December, eGFR 55. Probably secondary to hypertension. [facility]    Hypokalemia  Replace per protocol in infusion, prescription sent for 1 week supplement at home.    40 minutes spent on the date of the encounter doing chart review, review of test results, interpretation of tests, patient visit and documentation     Sherine Meza CNP  [facility] [address] [address]  [phone]          Again, thank you for allowing me to participate in the care of your patient.        Sincerely,        Sherine Meza CNP

## 2022-02-14 NOTE — PROGRESS NOTES
Infusion Nursing Note:  Ahmet Coleman presents today for Cycle 9, day 1 Nivolumab    Patient seen by provider today: Yes: Sherine Meza NP    Note: Patient presents to clinic today feeling well with no questions.  Pt did not request or require any intervention for pain today.    TORB 2/14/2022 1125 TRINA Meza NP/CLAUDINE Grier, RN via Kym Meza RN: Replace potassium per protocol in addition to home script for daily supplement    Intravenous Access:  Peripheral IV placed.    Treatment Conditions:  Lab Results   Component Value Date    HGB 14.1 02/14/2022    WBC 9.0 02/14/2022    ANEU 2.6 06/25/2021    ANEUTAUTO 6.6 02/14/2022     02/14/2022      Lab Results   Component Value Date     02/14/2022    POTASSIUM 3.0 (L) 02/14/2022    MAG 2.4 (H) 12/20/2021    CR 1.22 02/14/2022    STEPHEN 8.8 02/14/2022    BILITOTAL 0.4 02/14/2022    ALBUMIN 3.7 02/14/2022    ALT 25 02/14/2022    AST 20 02/14/2022     Results reviewed, labs MET treatment parameters, ok to proceed with treatment.    Post Infusion Assessment:  Patient tolerated infusion without incident.  Blood return noted pre and post infusion.  Site patent and intact, free from redness, edema or discomfort.  No evidence of extravasations.  Access discontinued per protocol.    Discharge Plan:   Patient declined prescription refills. (Potassium at local pharmacy)  Discharge instructions reviewed with: Patient.  Patient and/or family verbalized understanding of discharge instructions and all questions answered.  AVS to patient via [employer].  Patient will return 3/10/2022 for next appointment with MD.   Patient discharged in stable condition accompanied by: self.  Departure Mode: Ambulatory.    Cmamie Grier RN

## 2022-02-14 NOTE — NURSING NOTE
"Oncology Rooming Note    February 14, 2022 9:14 AM   Ahmet Coleman is a 76 year old male who presents for:    Chief Complaint   Patient presents with     Blood Draw     Vitals, blood drawn and PIV placed by MESERET CARRASCO. Pt checked into appt.      Oncology Clinic Visit     Malignant melanoma of scalp      Initial Vitals: BP (!) 141/83   Pulse 68   Temp 98.1  F (36.7  C) (Oral)   Resp 16   Wt 92.1 kg (203 lb)   SpO2 95%   BMI 30.87 kg/m   Estimated body mass index is 30.87 kg/m  as calculated from the following:    Height as of 12/20/21: 1.727 m (5' 7.99\").    Weight as of this encounter: 92.1 kg (203 lb). Body surface area is 2.1 meters squared.  No Pain (0) Comment: Data Unavailable   No LMP for male patient.  Allergies reviewed: Yes  Medications reviewed: Yes    Medications: MEDICATION REFILLS NEEDED TODAY. Provider was notified.  Pharmacy name entered into [employer]:    [id number] - [place], [place] - [address] [id number] - [place], [place] - [address] [facility] - [place], [place] - [address]  [facility] - [place], [place] - [address]    Clinical concerns: Medication refill: René Crespo LPN            "

## 2022-02-17 PROBLEM — F32.9 MAJOR DEPRESSIVE DISORDER WITH SINGLE EPISODE: Status: RESOLVED | Noted: 2020-12-29 | Resolved: 2020-12-29

## 2022-02-17 PROBLEM — R10.9 ABDOMINAL DISCOMFORT: Status: RESOLVED | Noted: 2020-12-29 | Resolved: 2020-12-29

## 2022-03-07 ENCOUNTER — VIRTUAL VISIT (OUTPATIENT)
Dept: ONCOLOGY | Facility: CLINIC | Age: 77
End: 2022-03-07
Attending: INTERNAL MEDICINE
Payer: MEDICARE

## 2022-03-07 DIAGNOSIS — C43.4 MALIGNANT MELANOMA OF SCALP (H): Primary | ICD-10-CM

## 2022-03-07 PROCEDURE — 99214 OFFICE O/P EST MOD 30 MIN: CPT | Mod: 95 | Performed by: PHYSICIAN ASSISTANT

## 2022-03-07 NOTE — PROGRESS NOTES
Lloyd is a 76 year old who is being evaluated via a billable video visit.      How would you like to obtain your AVS? [email or web address]  If the video visit is dropped, the invitation should be resent by: Text to cell phone: [phone]  Will anyone else be joining your video visit? No  Gretchen Ryder VF    Video-Visit Details    Type of service:  Video Visit    Video Start Time: 1:24 PM    Video End Time:1:41 PM    Originating Location (pt. Location): Home    Distant Location (provider location):  provider's home    Platform used for Video Visit: [facility] ONCOLOGY PROGRESS NOTE  Melanoma Clinic  Mar 7, 2022    CHIEF COMPLAINT: Scalp melanoma    Melanoma History:  1. He has a small pigmented scalp lesion present for over 30 years. The lesion was biopsied in 1992 and was benign.  2. October 2020, he presented to [facility] with 1 year of progressive enlargement of the lesion and new onset burning, itching, and stinging sensation in the affected scalp.  3. 10/26/20, He was seen at [facility] and he had shave biopsies of A. left central parietal scalp, B. left central occipital scalp, C. Left posterior parietal scalp, and D. punch biopsy of the left superior occipital scalp. Pathology (specimen: [id number]) showed a nodular and nevoid type melanoma, non-ulcerated, Breslow depth up to 1.3 mm, Saurabh's level IV, and up to 3 mitoses per mm2. All margins were involved. Ki-67 10-20%. Sox10 stain is positive and highlights an atypical compound melanocytic proliferation with significant overlying confluence and pagetosis of intraepidermal melanocytes. T-stage: at least pT2a. PD-L1 staining shows PD-L1 TPS <1%. 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  4. 11/25/20, he was seen by Dr. Garcia and he took three 3mm punch biopsies, which returned as dermal melanocytic proliferations with melanophages and fibrosis, consistent with locoregional metastatic melanoma.  5. 12/8/2020 he had PET-CT,  which showed FDG avid irregular skin thickening overlying the left parietal region, with no FDG avid lymphadenopathy in the neck and no evidence of metastasis elsewhere in the body.  6. 1/22/2021 start vemurafenib and cobimetinib, ~2/12/21 held for diarrhea, then resumed.  7. 2/18/2021 Start atezolizumab, last on 4/29/21  8. 5/24/2021 Wide local excision of the scalp melanoma and left latissimus free flap for scalp reconstruction.    Surgical pathology showed features consistent with scar and tumoral melanosis. No definite residual melanoma is seen. Tumoral melanosis (which extends to a depth of around 1.1 mm) is believed to be equivalent to a diagnosis of regressed melanoma.   9. 6/25/21 Start of adjuvant Nivolumab, last dose 2/14/22    INTERVAL HISTORY  Mr. Coleman is a 76 year old man with history of stage IIIB melanoma of the scalp.     -Has been bothered by invasion with [place].  -Feels more tense with this, bringing back old memories, and PTSD symptoms.  -Last met with psychiatrist over phone about a month ago.   -Recently increased quetiapine dose without much improvement.   -PCP is retiring in mid-April.   Has not yet gotten a new one.  -Stools start out constipated, then change to water. Takes Lomotil intermittently with relief.   -Eating and drinking okay.      Current Outpatient Medications   Medication Sig Dispense Refill     acetaminophen (TYLENOL) 500 MG tablet Take 2 tablets (1,000 mg) by mouth every 8 hours as needed for mild pain 100 tablet 0     cholecalciferol (VITAMIN D3) 1000 UNIT tablet Take 1,000 Units by mouth daily        diphenoxylate-atropine (LOMOTIL) 2.5-0.025 MG tablet Take 1-2 tablets by mouth 4 times daily as needed for diarrhea 120 tablet 5     docusate sodium (DSS) 100 MG capsule TAKE ONE CAPSULE BY MOUTH EVERY DAY FOR STOOL SOFTENING       fluvoxaMINE (LUVOX) 100 MG tablet Take 150 mg at bedtime       hydrochlorothiazide (HYDRODIURIL) 25 MG tablet Take 25 mg by mouth daily.        Hypromellose (ARTIFICIAL TEARS OP) Apply  to eye. 2 drops in each eye twice a day as needed       Lactobacillus-Inulin ([facility] DIG[facility]) CAPS 1 tab daily (Patient not taking: Reported on 10/14/2021) 30 capsule 1     LORazepam (ATIVAN) 0.5 MG tablet Take 1 tablet (0.5 mg) by mouth every 4 hours as needed (Anxiety, Nausea/Vomiting or Sleep) 30 tablet 0     Magnesium Oxide 420 MG TABS Take 420 mg by mouth daily       methocarbamol (ROBAXIN) 500 MG tablet Take 1 tablet (500 mg) by mouth 3 times daily as needed for muscle spasms (Patient not taking: Reported on 10/14/2021) 15 tablet 0     methocarbamol (ROBAXIN) 500 MG tablet Take 1 tablet (500 mg) by mouth 3 times daily as needed for muscle spasms 10 tablet 0     omeprazole (PRILOSEC) 20 MG capsule Take 1 capsule by mouth 2 times daily. 60 capsule prn     polyethylene glycol (MIRALAX) 17 GM/Dose powder Take 17 g by mouth daily 510 g 0     potassium chloride ER (KLOR-CON M) 20 MEQ CR tablet Take 1 tablet (20 mEq) by mouth daily 7 tablet 0     pregabalin (LYRICA) 150 MG capsule Take 150 mg by mouth 2 times daily       prochlorperazine (COMPAZINE) 10 MG tablet Take 1 tablet (10 mg) by mouth every 6 hours as needed (Nausea/Vomiting) (Patient not taking: Reported on 10/14/2021) 30 tablet 3     prochlorperazine (COMPAZINE) 10 MG tablet Take 1 tablet (10 mg) by mouth every 6 hours as needed (Nausea/Vomiting) 30 tablet 2     QUEtiapine (SEROQUEL) 300 MG tablet Take 150 mg by mouth At Bedtime        senna-docusate (SENOKOT-S/PERICOLACE) 8.6-50 MG tablet Take 1 tablet by mouth 2 times daily 30 tablet 0     simvastatin (ZOCOR) 80 MG tablet Take 40 mg by mouth At Bedtime        traZODone (DESYREL) 100 MG tablet Take 100 mg by mouth At Bedtime        vortioxetine (TRINTELLIX) 20 MG tablet TAKE ONE TABLET BY MOUTH EVERY MORNING       Objective:  General: patient appears well in no acute distress, alert and oriented, speech clear and fluid  Skin: no visualized rash or  lesions on visualized skin  Resp: Appears to be breathing comfortably without accessory muscle usage, speaking in full sentences, no audible wheezes or cough.  Psych: Coherent speech, normal rate and volume, able to articulate logical thoughts, able to abstract reason, no tangential thoughts, no hallucinations or delusions  Patient's affect is appropriate.    ASSESSMENT AND PLAN  Cutaneous melanoma, scalp primary, pT2a cN1c, clinical Stage IIIB  It was a pleasure to see Mr. Coleman today. He is a 76 year old  with agent orange cutaneous exposures in the [place] war and a diagnosis of malignant melanoma arising from a pre-existing pigmented lesion on the scalp. He had a partial response to neoadjuvant therapy with atezolizumab (3 cycles), vemurafenib, and cobimetinib (4 cycles) with decrease in thickening of the large scalp tumor and lightening of the periphery and in the middle part of the tumor then had surgery, which showed only tumoral melanosis. He started adjuvant nivolumab on 6/25/21, with overall plan for 9 total cycles of nivolumab q 4 weeks (=12 cycles of immunotherapy total including previous 3 cycles of atezo). His last dose of nivolumab was 2/14/22. He is doing well today with no concern for recurrence. Due to not fasting, his PET/CT had to be rescheduled to 3/18. Assuming no concerns on this imaging, will plan for follow-up with medical oncology in 3 months. Will get his dermatology appt rescheduled.     Depressed mood, anxiety, PTSD  Verbalizes fluctuating depression and anxiety, multifactorial. He has a longstanding history of PTSD. Uses occasional lorazepam. Will continue to work with a psychiatrist out of [place]. Recommend he call his psychiatrist about his worsening symptoms and also call to get in with a new PCP.     Diarrhea  History of IBS  Stable, uses Lomotil prn.    Health care maintenance  Eye exams: Recommend exams at least every 2 years. Will schedule  Dental exams: Recommend  exams and cleanings every 6 months.  Will schedule  Primary care: Recommend follow up at least annually. Will schedule  Skin care: Recommend the use of sunscreen with SPF of at least 30, reapplying every 2 hours with prolonged sun exposure. Up to date with derm visits   Tobacco use: Recommend abstaining. Denies use.  Alcohol use: Recommend no more than 2/day for men. Denies use.  Physical activity: Recommend regular activity, ideally 150 minutes/week of moderate intensity activity. Not doing much activity lately. Recommend daily walks or consider going up and down stairs.    Sherine Cardoza PA-C  [facility]  [address], [place] [address] [phone]    30 minutes spent on the date of the encounter doing chart review, review of test results, interpretation of tests, patient visit and documentation

## 2022-03-07 NOTE — ANESTHESIA PREPROCEDURE EVALUATION
Anesthesia Pre-Procedure Evaluation    Patient: Ahmet Coleman   MRN: [id number] : 1945        Preoperative Diagnosis: Melanoma of scalp (H) [C43.4]   Procedure : Procedure(s):  wide local excision of scalp melanoma  left latissimus free flap     Past Medical History:   Diagnosis Date     Anxiety      Benign hypertension 2013     Cervicalgia      Depressive disorder, not elsewhere classified      Diverticulitis of colon 7/10/2013     Esophageal reflux      Irritable bowel syndrome 10/3/2003     Lumbago      Malignant melanoma of scalp (H) 2020     Migraine, unspecified, without mention of intractable migraine without mention of status migrainosus      Other kyphoscoliosis and scoliosis      Other specified gastritis without mention of hemorrhage      PTSD (post-traumatic stress disorder)      Tobacco abuse since     on and off      Past Surgical History:   Procedure Laterality Date     C APPENDECTOMY       COLONOSCOPY N/A 2016    Procedure: COMBINED COLONOSCOPY, SINGLE OR MULTIPLE BIOPSY/POLYPECTOMY BY BIOPSY;  Surgeon: Mahesh Amanda MD;  Location: [facility]     ESOPHAGOSCOPY, GASTROSCOPY, DUODENOSCOPY (EGD), COMBINED  2011    Procedure:COMBINED ESOPHAGOSCOPY, GASTROSCOPY, DUODENOSCOPY (EGD), BIOPSY SINGLE OR MULTIPLE; Esophagogastroduodenoscopy with multiple Biopsy's; Surgeon:MARINA DYE; Location:[facility]     ZZC NONSPECIFIC PROCEDURE      nasal septal fx and a devitation needing plastic surgery      Allergies   Allergen Reactions     Aspirin      Motrin [Ibuprofen Micronized]      And ASA  Cramps  diarrhea      Social History     Tobacco Use     Smoking status: Former Smoker     Packs/day: 0.50     Years: 45.00     Pack years: 22.50     Types: Cigarettes     Quit date: 2013     Years since quittin.8     Smokeless tobacco: Never Used     Tobacco comment: since , on and off in there 1/2 pack per day   Substance Use Topics     Alcohol use: No      Wt Readings from Last  1 Encounters:   05/12/21 85.2 kg (187 lb 12.8 oz)        Anesthesia Evaluation   Pt has had prior anesthetic. Type: General and MAC.    No history of anesthetic complications       ROS/MED HX  ENT/Pulmonary: Comment: Dry mouth    (+) tobacco use, Past use,     Neurologic: Comment: Cervicalgia  Unsteady gait, neuropathy    (+) peripheral neuropathy, migraines,     Cardiovascular:     (+) Dyslipidemia hypertension-----Previous cardiac testing   Echo: Date: 2019 Results:    Stress Test: Date: 2014 Results:    ECG Reviewed: Date: 4/29/21 Results:  SB, nonspecific anterior T wave abnormality, no changes from previous   Cath: Date: Results:   (-) taking anticoagulants/antiplatelets   METS/Exercise Tolerance: 3 - Able to walk 1-2 blocks without stopping    Hematologic:  - neg hematologic  ROS     Musculoskeletal:  - neg musculoskeletal ROS     GI/Hepatic:     (+) GERD, Asymptomatic on medication, hiatal hernia,     Renal/Genitourinary:     (+) renal disease, type: CRI, Pt does not require dialysis,     Endo:  - neg endo ROS     Psychiatric/Substance Use:     (+) psychiatric history anxiety, depression and other (comment) (PTSD)     Infectious Disease:  - neg infectious disease ROS     Malignancy:   (+) Malignancy, History of Skin.Skin CA Active status post Surgery.        Other:  - neg other ROS          Physical Exam    Airway   unable to assess   Comment: Long thomas    Mallampati: II   TM distance: > 3 FB   Neck ROM: full   Mouth opening: > 3 cm    Respiratory Devices and Support         Dental  no notable dental history         Cardiovascular    unable to assess      Rhythm and rate: regular and normal     Pulmonary   pulmonary exam normal            Other findings: Virtual visit    OUTSIDE LABS:  CBC:   Lab Results   Component Value Date    WBC 3.1 (L) 04/29/2021    WBC 3.4 (L) 04/15/2021    HGB 13.1 (L) 04/29/2021    HGB 13.1 (L) 04/15/2021    HCT 38.5 (L) 04/29/2021    HCT 37.6 (L) 04/15/2021     04/29/2021      04/15/2021     BMP:   Lab Results   Component Value Date     04/29/2021     04/15/2021    POTASSIUM 3.9 04/29/2021    POTASSIUM 4.1 04/15/2021    CHLORIDE 102 04/29/2021    CHLORIDE 102 04/15/2021    CO2 32 04/29/2021    CO2 33 (H) 04/15/2021    BUN 20 04/29/2021    BUN 20 04/15/2021    CR 1.62 (H) 04/29/2021    CR 1.63 (H) 04/15/2021     (H) 04/29/2021     (H) 04/15/2021     COAGS: No results found for: PTT, INR, FIBR  POC: No results found for: BGM, HCG, HCGS  HEPATIC:   Lab Results   Component Value Date    ALBUMIN 3.7 04/29/2021    PROTTOTAL 7.4 04/29/2021    ALT 30 04/29/2021    AST 16 04/29/2021    GGT 46 04/15/2021    ALKPHOS 110 04/29/2021    BILITOTAL 0.7 04/29/2021     OTHER:   Lab Results   Component Value Date    LACT 0.8 08/11/2011    STEPHEN 8.8 04/29/2021    PHOS 2.5 04/29/2021    MAG 2.3 04/15/2021    LIPASE 138 04/13/2016    TSH 2.43 04/29/2021    CRP <2.9 04/13/2016    SED 9 04/13/2016       Anesthesia Plan    ASA Status:  3      Anesthesia Type: General.     - Airway: ETT   Induction: Intravenous.   Maintenance: Balanced.   Techniques and Equipment:     - Lines/Monitors: 2nd IV, Arterial Line     Consents    Anesthesia Plan(s) and associated risks, benefits, and realistic alternatives discussed. Questions answered and patient/representative(s) expressed understanding.     - Discussed with:  Patient      - Extended Intubation/Ventilatory Support Discussed: Yes.      - Patient is DNR/DNI Status: No    Use of blood products discussed: No .     Postoperative Care    Pain management: IV analgesics.   PONV prophylaxis: Ondansetron (or other 5HT-3), Dexamethasone or Solumedrol     Comments:              PAC Discussion and Assessment    ASA Classification: 3  Case is suitable for: [place]  Anesthetic techniques and relevant risks discussed: GA  Invasive monitoring and risk discussed: No                PAC Resident/NP Anesthesia Assessment: Ahmet Coleman is a 76 year old  male scheduled to undergo wide local excision of scalp melanoma, left latissimus free flap with Maggy Garcia and Carter on 5/24/21. He has the following specific operative considerations:   - RCRI : No serious cardiac risks.    - VTE risk: 3%  - NEELIMA # of risks 4/8 = Intermediate risk  - Risk of PONV score = 2.  If > 2, anti-emetic intervention recommended.    --Melanoma of scalp, s/p trial with vemurfenib and cobimetinib. Now preparing for above surgeries.   --No history of problems with anesthesia.  --HLD simvastatin at HS. HTN. Will hold HCTZ and lisinopril on DOS. No other cardiac history, symptoms or meds. EKG SB, Last echo EF 60-65%, limited exam due to poor acoustic windows, last stress test normal 2014. Able to walk some distance.   --Former smoker Quit in 2013. Denies pulmonary symptoms.   --GERD Will take omeprazole on DOS.   --History of migraines. No recent issues. History of unsteady gait, neuropathy. Will take Lyrica on DOS.  --CRI Cr 1.62.   --Anxiety/depression/PTSD. [place]. Will take Trintellix on DOS. Seroquel and Luvox at HS.   --Denies history of blood transfusion. Type and screen on DOS.    Discussed with Dr. Teague. Will proceed to surgery.            Reviewed and Signed by PAC Mid-Level Provider/Resident  Mid-Level Provider/Resident: RAE Holcomb, CNS  Date: 5/13/21  Time: 8:53am                               RAE Ross CNS   07-Mar-2022 14:10

## 2022-03-10 ENCOUNTER — DOCUMENTATION ONLY (OUTPATIENT)
Dept: ONCOLOGY | Facility: CLINIC | Age: 77
End: 2022-03-10
Payer: MEDICARE

## 2022-03-10 NOTE — PROGRESS NOTES
CLINICAL NUTRITION SERVICES     Reason for Contact: This patient has scored >= 6 on Nutrition question 1 and/or 2 on the Oncology Distress Screening questionaire. Nutritionist Referral recommended. See Onc Distress Screening Flowsheet    Action: RD called patient indicating reason for phone call. Left a VM with a return call back number.     Follow up: Wait for a return phone call.    Melissa Shanks RD, LD  [phone]

## 2022-03-18 ENCOUNTER — ANCILLARY PROCEDURE (OUTPATIENT)
Dept: PET IMAGING | Facility: CLINIC | Age: 77
End: 2022-03-18
Attending: INTERNAL MEDICINE
Payer: MEDICARE

## 2022-03-18 DIAGNOSIS — C43.4 MALIGNANT MELANOMA OF SCALP (H): ICD-10-CM

## 2022-03-18 PROCEDURE — 74177 CT ABD & PELVIS W/CONTRAST: CPT | Mod: MG | Performed by: RADIOLOGY

## 2022-03-18 PROCEDURE — G1004 CDSM NDSC: HCPCS | Mod: PS | Performed by: RADIOLOGY

## 2022-03-18 PROCEDURE — 71260 CT THORAX DX C+: CPT | Mod: MG | Performed by: RADIOLOGY

## 2022-03-18 PROCEDURE — 78816 PET IMAGE W/CT FULL BODY: CPT | Mod: MG | Performed by: RADIOLOGY

## 2022-03-18 PROCEDURE — A9552 F18 FDG: HCPCS | Performed by: RADIOLOGY

## 2022-03-18 PROCEDURE — 70491 CT SOFT TISSUE NECK W/DYE: CPT | Mod: 59 | Performed by: RADIOLOGY

## 2022-03-18 RX ORDER — IOPAMIDOL 755 MG/ML
10-135 INJECTION, SOLUTION INTRAVASCULAR ONCE
Status: COMPLETED | OUTPATIENT
Start: 2022-03-18 | End: 2022-03-18

## 2022-03-18 RX ADMIN — IOPAMIDOL 124 ML: 755 INJECTION, SOLUTION INTRAVASCULAR at 10:09

## 2022-03-21 ENCOUNTER — VIRTUAL VISIT (OUTPATIENT)
Dept: ONCOLOGY | Facility: CLINIC | Age: 77
End: 2022-03-21
Attending: INTERNAL MEDICINE
Payer: MEDICARE

## 2022-03-21 DIAGNOSIS — C43.4 MALIGNANT MELANOMA OF SCALP (H): Primary | ICD-10-CM

## 2022-03-21 PROCEDURE — G0463 HOSPITAL OUTPT CLINIC VISIT: HCPCS | Mod: PN,RTG | Performed by: PHYSICIAN ASSISTANT

## 2022-03-21 PROCEDURE — 99213 OFFICE O/P EST LOW 20 MIN: CPT | Mod: 95 | Performed by: PHYSICIAN ASSISTANT

## 2022-03-21 NOTE — LETTER
3/21/2022         RE: Ahmet Coleman  [address]  [place] [address]        Dear Colleague,    Thank you for referring your patient, Ahmet Coleman, to the [facility]. Please see a copy of my visit note below.    Lloyd is a 76 year old who is being evaluated via a billable video visit.      How would you like to obtain your AVS? MyChart  If the video visit is dropped, the invitation should be resent by: Text to cell phone: [phone]  Will anyone else be joining your video visit? Hellen Loredothia Juliane    Video-Visit Details    Type of service:  Video Visit    Video Start Time: 8:03 AM    Video End Time:8:11 AM    Originating Location (pt. Location): Home    Distant Location (provider location):  [facility]     Platform used for Video Visit: [facility] ONCOLOGY PROGRESS NOTE  Melanoma Clinic  Mar 21, 2022    CHIEF COMPLAINT: Scalp melanoma    Melanoma History:  1. He has a small pigmented scalp lesion present for over 30 years. The lesion was biopsied in 1992 and was benign.  2. October 2020, he presented to [facility] with 1 year of progressive enlargement of the lesion and new onset burning, itching, and stinging sensation in the affected scalp.  3. 10/26/20, He was seen at [facility] and he had shave biopsies of A. left central parietal scalp, B. left central occipital scalp, C. Left posterior parietal scalp, and D. punch biopsy of the left superior occipital scalp. Pathology (specimen: [id number]) showed a nodular and nevoid type melanoma, non-ulcerated, Breslow depth up to 1.3 mm, Saurabh's level IV, and up to 3 mitoses per mm2. All margins were involved. Ki-67 10-20%. Sox10 stain is positive and highlights an atypical compound melanocytic proliferation with significant overlying confluence and pagetosis of intraepidermal melanocytes. T-stage: at least pT2a. PD-L1 staining shows PD-L1 TPS <1%. 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  4. 11/25/20, he was seen by Dr. Garcia and he took three 3mm punch biopsies, which returned as dermal melanocytic proliferations with melanophages and fibrosis, consistent with locoregional metastatic melanoma.  5. 12/8/2020 he had PET-CT, which showed FDG avid irregular skin thickening overlying the left parietal region, with no FDG avid lymphadenopathy in the neck and no evidence of metastasis elsewhere in the body.  6. 1/22/2021 start vemurafenib and cobimetinib, ~2/12/21 held for diarrhea, then resumed.  7. 2/18/2021 Start atezolizumab, last on 4/29/21  8. 5/24/2021 Wide local excision of the scalp melanoma and left latissimus free flap for scalp reconstruction.    Surgical pathology showed features consistent with scar and tumoral melanosis. No definite residual melanoma is seen. Tumoral melanosis (which extends to a depth of around 1.1 mm) is believed to be equivalent to a diagnosis of regressed melanoma.   9. 6/25/21 Start of adjuvant Nivolumab, last dose 2/14/22    INTERVAL HISTORY  Mr. Coleman is a 76 year old man with history of stage IIIB melanoma of the scalp.     -Continues to feel concerned about war in [place].   -Next psychiatrist visit later this month.   -Scalp is doing well. Area remains numb.   -Denies other health concerns.    Current Outpatient Medications   Medication Sig Dispense Refill     acetaminophen (TYLENOL) 500 MG tablet Take 2 tablets (1,000 mg) by mouth every 8 hours as needed for mild pain 100 tablet 0     cholecalciferol (VITAMIN D3) 1000 UNIT tablet Take 1,000 Units by mouth daily        diphenoxylate-atropine (LOMOTIL) 2.5-0.025 MG tablet Take 1-2 tablets by mouth 4 times daily as needed for diarrhea 120 tablet 5     docusate sodium (DSS) 100 MG capsule TAKE ONE CAPSULE BY MOUTH EVERY DAY FOR STOOL SOFTENING       fluvoxaMINE (LUVOX) 100 MG tablet Take 150 mg at bedtime       hydrochlorothiazide (HYDRODIURIL) 25 MG tablet Take 25 mg by  mouth daily.       Hypromellose (ARTIFICIAL TEARS OP) Apply  to eye. 2 drops in each eye twice a day as needed       Lactobacillus-Inulin (CULTURELLE DIGESTIVE HEALTH) CAPS 1 tab daily (Patient not taking: Reported on 10/14/2021) 30 capsule 1     LORazepam (ATIVAN) 0.5 MG tablet Take 1 tablet (0.5 mg) by mouth every 4 hours as needed (Anxiety, Nausea/Vomiting or Sleep) 30 tablet 0     Magnesium Oxide 420 MG TABS Take 420 mg by mouth daily       methocarbamol (ROBAXIN) 500 MG tablet Take 1 tablet (500 mg) by mouth 3 times daily as needed for muscle spasms (Patient not taking: Reported on 10/14/2021) 15 tablet 0     methocarbamol (ROBAXIN) 500 MG tablet Take 1 tablet (500 mg) by mouth 3 times daily as needed for muscle spasms 10 tablet 0     omeprazole (PRILOSEC) 20 MG capsule Take 1 capsule by mouth 2 times daily. 60 capsule prn     polyethylene glycol (MIRALAX) 17 GM/Dose powder Take 17 g by mouth daily 510 g 0     potassium chloride ER (KLOR-CON M) 20 MEQ CR tablet Take 1 tablet (20 mEq) by mouth daily 7 tablet 0     pregabalin (LYRICA) 150 MG capsule Take 150 mg by mouth 2 times daily       prochlorperazine (COMPAZINE) 10 MG tablet Take 1 tablet (10 mg) by mouth every 6 hours as needed (Nausea/Vomiting) (Patient not taking: Reported on 10/14/2021) 30 tablet 3     prochlorperazine (COMPAZINE) 10 MG tablet Take 1 tablet (10 mg) by mouth every 6 hours as needed (Nausea/Vomiting) 30 tablet 2     QUEtiapine (SEROQUEL) 300 MG tablet Take 150 mg by mouth At Bedtime        senna-docusate (SENOKOT-S/PERICOLACE) 8.6-50 MG tablet Take 1 tablet by mouth 2 times daily 30 tablet 0     simvastatin (ZOCOR) 80 MG tablet Take 40 mg by mouth At Bedtime        traZODone (DESYREL) 100 MG tablet Take 100 mg by mouth At Bedtime        vortioxetine (TRINTELLIX) 20 MG tablet TAKE ONE TABLET BY MOUTH EVERY MORNING       Objective:  General: patient appears well in no acute distress, alert and oriented, speech clear and fluid  Skin: no  visualized rash or lesions on visualized skin  Resp: Appears to be breathing comfortably without accessory muscle usage, speaking in full sentences, no audible wheezes or cough.  Psych: Coherent speech, normal rate and volume, able to articulate logical thoughts, able to abstract reason, no tangential thoughts, no hallucinations or delusions  Patient's affect is appropriate.    Imaging:  PET/CT on 3/18/22 shows the following:  FINDINGS:   .  HEAD/NECK:  See dedicated neuroradiology report for the results of the high  resolution PET CT of the neck.         CHEST:  There is no suspicious FDG uptake in the chest.      Heart size is normal. No pericardial effusion. Mild coronary artery  calcifications. No hypermetabolic or enlarged thoracic  lymphadenopathy.     Central tracheobronchial tree is patent. No pneumothorax or pleural  effusion. Mild right basilar atelectasis. No suspicious mass or  pulmonary nodule.     ABDOMEN AND PELVIS:  There is no suspicious FDG uptake in the abdomen or pelvis.      No suspicious hepatic lesion. Focal fatty deposition near the  falciform ligament. Unchanged non-FDG avid 1.4 cm hypodense lesion at  the hepatic dome, likely a benign cyst or hemangioma. The spleen,  adrenal glands, pancreas are unremarkable. Left renal cyst. Symmetric  renal enhancement. No suspicious renal mass. No hydronephrosis.  Urinary bladder is decompressed. Small and large bowel is normal  caliber without evidence of a bowel obstruction. Moderate colonic  stool burden. Colonic diverticulosis without adjacent inflammatory  change. No intra-abdominal free air or free fluid. The major  intra-abdominal vasculature is patent and normal in caliber.  Aortoiliac calcific atherosclerosis.      LOWER EXTREMITIES:   No abnormal masses or hypermetabolic lesions.     BONES:   There are no suspicious lytic or blastic osseous lesions.  There is no  abnormal FDG uptake in the skeleton. Multilevel degenerative changes  of the  spine.                                                               IMPRESSION: In this patient with malignant melanoma of the scalp  status post resection and chemotherapy:     1. No evidence of metastatic disease within the chest, abdomen or  Pelvis.    Neck CT on 3/18/22 shows the following:  Surgical changes of resection of left scalp melanoma with free flap  reconstruction. No suspicious nodular FDG uptake at the surgical site  to suggest locally recurrent disease. There are bilateral chronic  subdural hygromas  larger on the left. There are unchanged since 2007.     Evaluation of the mucosal space demonstrates no abnormality or  abnormal metabolic uptake on PET CT in the nasopharynx, oropharynx,  hypopharynx or the glottis. The tongue base appears normal. The major  salivary glands and thyroid gland appear normal.     There is no evident cervical lymphadenopathy.     Limited evaluation of the cervical vertebral column demonstrates  multilevel degenerative changes without overt spinal canal stenosis.  The visualized paranasal sinuses and mastoid air cells are clear. The  major vascular structures in the neck are patent.     Dependent atelectasis at the lung apices.                                                                      Impression: In this patient with history of malignant melanoma of the  scalp status post resection therapy:  1. Surgical changes in the left scalp without suspicious FDG uptake to  represent locally recurrent tumor.  2. No evidence of mucosal, nika, or soft tissue abnormality on  contrast enhanced neck CT.    ASSESSMENT AND PLAN  Cutaneous melanoma, scalp primary, pT2a cN1c, clinical Stage IIIB  It was a pleasure to see Mr. Coleman today. He is a 76 year old  with agent orange cutaneous exposures in the [place] war and a diagnosis of malignant melanoma arising from a pre-existing pigmented lesion on the scalp. He had a partial response to neoadjuvant therapy with  atezolizumab (3 cycles), vemurafenib, and cobimetinib (4 cycles) with decrease in thickening of the large scalp tumor and lightening of the periphery and in the middle part of the tumor then had surgery, which showed only tumoral melanosis. He started adjuvant nivolumab on 6/25/21, with overall plan for 9 total cycles of nivolumab q 4 weeks (=12 cycles of immunotherapy total including previous 3 cycles of atezo). His last dose of nivolumab was 2/14/22. He is doing well today with no concern for recurrence. The above imaging was reviewed. Will plan for follow-up with medical oncology in 3 months. Will get his dermatology appt rescheduled.     Depressed mood, anxiety, PTSD  Verbalizes fluctuating depression and anxiety, multifactorial. He has a longstanding history of PTSD. Uses occasional lorazepam. Will continue to work with a psychiatrist out of [place]. Recommend he call his psychiatrist about his worsening symptoms and also call to get in with a new PCP.     Diarrhea  History of IBS  Stable, uses Lomotil prn.    Health care maintenance  Eye exams: Recommend exams at least every 2 years. Will schedule  Dental exams: Recommend exams and cleanings every 6 months.  Will schedule  Primary care: Recommend follow up at least annually. Will schedule  Skin care: Recommend the use of sunscreen with SPF of at least 30, reapplying every 2 hours with prolonged sun exposure. Needs to schedule dermatology.  Tobacco use: Recommend abstaining. Denies use.  Alcohol use: Recommend no more than 2/day for men. Denies use.  Physical activity: Recommend regular activity, ideally 150 minutes/week of moderate intensity activity. Has been doing some steps. Recommend outdoor walks.     [facility]. Will plan to renew [facility] paperwork for his daughter to bring Lloyd to appts.     20 minutes spent on the date of the encounter doing chart review, review of test results, interpretation of tests, patient visit and documentation         Again, thank  you for allowing me to participate in the care of your patient.      Sincerely,    Sherine Cardoza PA-C

## 2022-03-21 NOTE — PROGRESS NOTES
Lloyd is a 76 year old who is being evaluated via a billable video visit.      How would you like to obtain your AVS? [employer]  If the video visit is dropped, the invitation should be resent by: Text to cell phone: [phone]  Will anyone else be joining your video visit? Hellen Cardozo    Video-Visit Details    Type of service:  Video Visit    Video Start Time: 8:03 AM    Video End Time:8:11 AM    Originating Location (pt. Location): Home    Distant Location (provider location):  [facility] CANCER [facility]     Platform used for Video Visit: [facility] ONCOLOGY PROGRESS NOTE  Melanoma Clinic  Mar 21, 2022    CHIEF COMPLAINT: Scalp melanoma    Melanoma History:  1. He has a small pigmented scalp lesion present for over 30 years. The lesion was biopsied in 1992 and was benign.  2. October 2020, he presented to [facility] with 1 year of progressive enlargement of the lesion and new onset burning, itching, and stinging sensation in the affected scalp.  3. 10/26/20, He was seen at [facility] and he had shave biopsies of A. left central parietal scalp, B. left central occipital scalp, C. Left posterior parietal scalp, and D. punch biopsy of the left superior occipital scalp. Pathology (specimen: [id number]) showed a nodular and nevoid type melanoma, non-ulcerated, Breslow depth up to 1.3 mm, Saurabh's level IV, and up to 3 mitoses per mm2. All margins were involved. Ki-67 10-20%. Sox10 stain is positive and highlights an atypical compound melanocytic proliferation with significant overlying confluence and pagetosis of intraepidermal melanocytes. T-stage: at least pT2a. PD-L1 staining shows PD-L1 TPS <1%. 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  4. 11/25/20, he was seen by Dr. Garcia and he took three 3mm punch biopsies, which returned as dermal melanocytic proliferations with melanophages and fibrosis, consistent with locoregional metastatic melanoma.  5.  12/8/2020 he had PET-CT, which showed FDG avid irregular skin thickening overlying the left parietal region, with no FDG avid lymphadenopathy in the neck and no evidence of metastasis elsewhere in the body.  6. 1/22/2021 start vemurafenib and cobimetinib, ~2/12/21 held for diarrhea, then resumed.  7. 2/18/2021 Start atezolizumab, last on 4/29/21  8. 5/24/2021 Wide local excision of the scalp melanoma and left latissimus free flap for scalp reconstruction.    Surgical pathology showed features consistent with scar and tumoral melanosis. No definite residual melanoma is seen. Tumoral melanosis (which extends to a depth of around 1.1 mm) is believed to be equivalent to a diagnosis of regressed melanoma.   9. 6/25/21 Start of adjuvant Nivolumab, last dose 2/14/22    INTERVAL HISTORY  Mr. Coleman is a 76 year old man with history of stage IIIB melanoma of the scalp.     -Continues to feel concerned about war in [facility].   -Next psychiatrist visit later this month.   -Scalp is doing well. Area remains numb.   -Denies other health concerns.    Current Outpatient Medications   Medication Sig Dispense Refill     acetaminophen (TYLENOL) 500 MG tablet Take 2 tablets (1,000 mg) by mouth every 8 hours as needed for mild pain 100 tablet 0     cholecalciferol (VITAMIN D3) 1000 UNIT tablet Take 1,000 Units by mouth daily        diphenoxylate-atropine (LOMOTIL) 2.5-0.025 MG tablet Take 1-2 tablets by mouth 4 times daily as needed for diarrhea 120 tablet 5     docusate sodium (DSS) 100 MG capsule TAKE ONE CAPSULE BY MOUTH EVERY DAY FOR STOOL SOFTENING       fluvoxaMINE (LUVOX) 100 MG tablet Take 150 mg at bedtime       hydrochlorothiazide (HYDRODIURIL) 25 MG tablet Take 25 mg by mouth daily.       Hypromellose (ARTIFICIAL TEARS OP) Apply  to eye. 2 drops in each eye twice a day as needed       Lactobacillus-Inulin ([facility] DIGESTIVE [facility]) CAPS 1 tab daily (Patient not taking: Reported on 10/14/2021) 30 capsule 1     LORazepam  (ATIVAN) 0.5 MG tablet Take 1 tablet (0.5 mg) by mouth every 4 hours as needed (Anxiety, Nausea/Vomiting or Sleep) 30 tablet 0     Magnesium Oxide 420 MG TABS Take 420 mg by mouth daily       methocarbamol (ROBAXIN) 500 MG tablet Take 1 tablet (500 mg) by mouth 3 times daily as needed for muscle spasms (Patient not taking: Reported on 10/14/2021) 15 tablet 0     methocarbamol (ROBAXIN) 500 MG tablet Take 1 tablet (500 mg) by mouth 3 times daily as needed for muscle spasms 10 tablet 0     omeprazole (PRILOSEC) 20 MG capsule Take 1 capsule by mouth 2 times daily. 60 capsule prn     polyethylene glycol (MIRALAX) 17 GM/Dose powder Take 17 g by mouth daily 510 g 0     potassium chloride ER (KLOR-CON M) 20 MEQ CR tablet Take 1 tablet (20 mEq) by mouth daily 7 tablet 0     pregabalin (LYRICA) 150 MG capsule Take 150 mg by mouth 2 times daily       prochlorperazine (COMPAZINE) 10 MG tablet Take 1 tablet (10 mg) by mouth every 6 hours as needed (Nausea/Vomiting) (Patient not taking: Reported on 10/14/2021) 30 tablet 3     prochlorperazine (COMPAZINE) 10 MG tablet Take 1 tablet (10 mg) by mouth every 6 hours as needed (Nausea/Vomiting) 30 tablet 2     QUEtiapine (SEROQUEL) 300 MG tablet Take 150 mg by mouth At Bedtime        senna-docusate (SENOKOT-S/PERICOLACE) 8.6-50 MG tablet Take 1 tablet by mouth 2 times daily 30 tablet 0     simvastatin (ZOCOR) 80 MG tablet Take 40 mg by mouth At Bedtime        traZODone (DESYREL) 100 MG tablet Take 100 mg by mouth At Bedtime        vortioxetine (TRINTELLIX) 20 MG tablet TAKE ONE TABLET BY MOUTH EVERY MORNING       Objective:  General: patient appears well in no acute distress, alert and oriented, speech clear and fluid  Skin: no visualized rash or lesions on visualized skin  Resp: Appears to be breathing comfortably without accessory muscle usage, speaking in full sentences, no audible wheezes or cough.  Psych: Coherent speech, normal rate and volume, able to articulate logical  thoughts, able to abstract reason, no tangential thoughts, no hallucinations or delusions  Patient's affect is appropriate.    Imaging:  PET/CT on 3/18/22 shows the following:  FINDINGS:   .  HEAD/NECK:  See dedicated neuroradiology report for the results of the high  resolution PET CT of the neck.         CHEST:  There is no suspicious FDG uptake in the chest.      Heart size is normal. No pericardial effusion. Mild coronary artery  calcifications. No hypermetabolic or enlarged thoracic  lymphadenopathy.     Central tracheobronchial tree is patent. No pneumothorax or pleural  effusion. Mild right basilar atelectasis. No suspicious mass or  pulmonary nodule.     ABDOMEN AND PELVIS:  There is no suspicious FDG uptake in the abdomen or pelvis.      No suspicious hepatic lesion. Focal fatty deposition near the  falciform ligament. Unchanged non-FDG avid 1.4 cm hypodense lesion at  the hepatic dome, likely a benign cyst or hemangioma. The spleen,  adrenal glands, pancreas are unremarkable. Left renal cyst. Symmetric  renal enhancement. No suspicious renal mass. No hydronephrosis.  Urinary bladder is decompressed. Small and large bowel is normal  caliber without evidence of a bowel obstruction. Moderate colonic  stool burden. Colonic diverticulosis without adjacent inflammatory  change. No intra-abdominal free air or free fluid. The major  intra-abdominal vasculature is patent and normal in caliber.  Aortoiliac calcific atherosclerosis.      LOWER EXTREMITIES:   No abnormal masses or hypermetabolic lesions.     BONES:   There are no suspicious lytic or blastic osseous lesions.  There is no  abnormal FDG uptake in the skeleton. Multilevel degenerative changes  of the spine.                                                               IMPRESSION: In this patient with malignant melanoma of the scalp  status post resection and chemotherapy:     1. No evidence of metastatic disease within the chest, abdomen  or  Pelvis.    Neck CT on 3/18/22 shows the following:  Surgical changes of resection of left scalp melanoma with free flap  reconstruction. No suspicious nodular FDG uptake at the surgical site  to suggest locally recurrent disease. There are bilateral chronic  subdural hygromas  larger on the left. There are unchanged since 2007.     Evaluation of the mucosal space demonstrates no abnormality or  abnormal metabolic uptake on PET CT in the nasopharynx, oropharynx,  hypopharynx or the glottis. The tongue base appears normal. The major  salivary glands and thyroid gland appear normal.     There is no evident cervical lymphadenopathy.     Limited evaluation of the cervical vertebral column demonstrates  multilevel degenerative changes without overt spinal canal stenosis.  The visualized paranasal sinuses and mastoid air cells are clear. The  major vascular structures in the neck are patent.     Dependent atelectasis at the lung apices.                                                                      Impression: In this patient with history of malignant melanoma of the  scalp status post resection therapy:  1. Surgical changes in the left scalp without suspicious FDG uptake to  represent locally recurrent tumor.  2. No evidence of mucosal, nika, or soft tissue abnormality on  contrast enhanced neck CT.    ASSESSMENT AND PLAN  Cutaneous melanoma, scalp primary, pT2a cN1c, clinical Stage IIIB  It was a pleasure to see Mr. Coleman today. He is a 76 year old  with agent orange cutaneous exposures in the [place] war and a diagnosis of malignant melanoma arising from a pre-existing pigmented lesion on the scalp. He had a partial response to neoadjuvant therapy with atezolizumab (3 cycles), vemurafenib, and cobimetinib (4 cycles) with decrease in thickening of the large scalp tumor and lightening of the periphery and in the middle part of the tumor then had surgery, which showed only tumoral melanosis. He started  adjuvant nivolumab on 6/25/21, with overall plan for 9 total cycles of nivolumab q 4 weeks (=12 cycles of immunotherapy total including previous 3 cycles of atezo). His last dose of nivolumab was 2/14/22. He is doing well today with no concern for recurrence. The above imaging was reviewed. Will plan for follow-up with medical oncology in 3 months. Will get his dermatology appt rescheduled.     Depressed mood, anxiety, PTSD  Verbalizes fluctuating depression and anxiety, multifactorial. He has a longstanding history of PTSD. Uses occasional lorazepam. Will continue to work with a psychiatrist out of [place]. Recommend he call his psychiatrist about his worsening symptoms and also call to get in with a new PCP.     Diarrhea  History of IBS  Stable, uses Lomotil prn.    Health care maintenance  Eye exams: Recommend exams at least every 2 years. Will schedule  Dental exams: Recommend exams and cleanings every 6 months.  Will schedule  Primary care: Recommend follow up at least annually. Will schedule  Skin care: Recommend the use of sunscreen with SPF of at least 30, reapplying every 2 hours with prolonged sun exposure. Needs to schedule dermatology.  Tobacco use: Recommend abstaining. Denies use.  Alcohol use: Recommend no more than 2/day for men. Denies use.  Physical activity: Recommend regular activity, ideally 150 minutes/week of moderate intensity activity. Has been doing some steps. Recommend outdoor walks.     [facility]. Will plan to renew [place] paperwork for his daughter to bring Lloyd to appts.     Sherine Cardoza PA-C  [facility]  [address], [place] [id number] [phone]    20 minutes spent on the date of the encounter doing chart review, review of test results, interpretation of tests, patient visit and documentation

## 2022-04-09 ENCOUNTER — HEALTH MAINTENANCE LETTER (OUTPATIENT)
Age: 77
End: 2022-04-09

## 2022-05-09 PROBLEM — C43.4 MALIGNANT MELANOMA OF SCALP (H): Status: ACTIVE | Noted: 2020-12-29

## 2022-06-10 ENCOUNTER — ANCILLARY PROCEDURE (OUTPATIENT)
Dept: PET IMAGING | Facility: CLINIC | Age: 77
End: 2022-06-10
Attending: PHYSICIAN ASSISTANT
Payer: MEDICARE

## 2022-06-10 ENCOUNTER — LAB (OUTPATIENT)
Dept: LAB | Facility: CLINIC | Age: 77
End: 2022-06-10
Payer: MEDICARE

## 2022-06-10 DIAGNOSIS — C43.4 MALIGNANT MELANOMA OF SCALP (H): ICD-10-CM

## 2022-06-10 LAB
ALBUMIN SERPL-MCNC: 3.7 G/DL (ref 3.4–5)
ALP SERPL-CCNC: 52 U/L (ref 40–150)
ALT SERPL W P-5'-P-CCNC: 25 U/L (ref 0–70)
ANION GAP SERPL CALCULATED.3IONS-SCNC: 4 MMOL/L (ref 3–14)
AST SERPL W P-5'-P-CCNC: 12 U/L (ref 0–45)
BASOPHILS # BLD AUTO: 0 10E3/UL (ref 0–0.2)
BASOPHILS NFR BLD AUTO: 1 %
BILIRUB SERPL-MCNC: 0.4 MG/DL (ref 0.2–1.3)
BUN SERPL-MCNC: 17 MG/DL (ref 7–30)
CALCIUM SERPL-MCNC: 9.1 MG/DL (ref 8.5–10.1)
CHLORIDE BLD-SCNC: 103 MMOL/L (ref 94–109)
CO2 SERPL-SCNC: 33 MMOL/L (ref 20–32)
CREAT BLD-MCNC: 1.4 MG/DL (ref 0.7–1.3)
CREAT SERPL-MCNC: 1.46 MG/DL (ref 0.66–1.25)
EOSINOPHIL # BLD AUTO: 0.2 10E3/UL (ref 0–0.7)
EOSINOPHIL NFR BLD AUTO: 3 %
ERYTHROCYTE [DISTWIDTH] IN BLOOD BY AUTOMATED COUNT: 13 % (ref 10–15)
GFR SERPL CREATININE-BSD FRML MDRD: 50 ML/MIN/1.73M2
GFR SERPL CREATININE-BSD FRML MDRD: 52 ML/MIN/1.73M2
GLUCOSE BLD-MCNC: 117 MG/DL (ref 70–99)
HCT VFR BLD AUTO: 39.4 % (ref 40–53)
HGB BLD-MCNC: 13.8 G/DL (ref 13.3–17.7)
IMM GRANULOCYTES # BLD: 0 10E3/UL
IMM GRANULOCYTES NFR BLD: 0 %
LYMPHOCYTES # BLD AUTO: 1.4 10E3/UL (ref 0.8–5.3)
LYMPHOCYTES NFR BLD AUTO: 31 %
MCH RBC QN AUTO: 28.9 PG (ref 26.5–33)
MCHC RBC AUTO-ENTMCNC: 35 G/DL (ref 31.5–36.5)
MCV RBC AUTO: 83 FL (ref 78–100)
MONOCYTES # BLD AUTO: 0.4 10E3/UL (ref 0–1.3)
MONOCYTES NFR BLD AUTO: 9 %
NEUTROPHILS # BLD AUTO: 2.5 10E3/UL (ref 1.6–8.3)
NEUTROPHILS NFR BLD AUTO: 56 %
NRBC # BLD AUTO: 0 10E3/UL
NRBC BLD AUTO-RTO: 0 /100
PLATELET # BLD AUTO: 233 10E3/UL (ref 150–450)
POTASSIUM BLD-SCNC: 3.5 MMOL/L (ref 3.4–5.3)
PROT SERPL-MCNC: 7.3 G/DL (ref 6.8–8.8)
RBC # BLD AUTO: 4.77 10E6/UL (ref 4.4–5.9)
SODIUM SERPL-SCNC: 140 MMOL/L (ref 133–144)
WBC # BLD AUTO: 4.5 10E3/UL (ref 4–11)

## 2022-06-10 PROCEDURE — 74177 CT ABD & PELVIS W/CONTRAST: CPT | Mod: 59

## 2022-06-10 PROCEDURE — 71260 CT THORAX DX C+: CPT | Mod: 59

## 2022-06-10 PROCEDURE — 85025 COMPLETE CBC W/AUTO DIFF WBC: CPT

## 2022-06-10 PROCEDURE — 80053 COMPREHEN METABOLIC PANEL: CPT

## 2022-06-10 PROCEDURE — A9552 F18 FDG: HCPCS

## 2022-06-10 PROCEDURE — 82565 ASSAY OF CREATININE: CPT

## 2022-06-10 PROCEDURE — 36415 COLL VENOUS BLD VENIPUNCTURE: CPT

## 2022-06-10 PROCEDURE — 78816 PET IMAGE W/CT FULL BODY: CPT | Mod: GC

## 2022-06-10 PROCEDURE — 70491 CT SOFT TISSUE NECK W/DYE: CPT | Mod: 59 | Performed by: RADIOLOGY

## 2022-06-10 RX ORDER — IOPAMIDOL 755 MG/ML
10-135 INJECTION, SOLUTION INTRAVASCULAR ONCE
Status: COMPLETED | OUTPATIENT
Start: 2022-06-10 | End: 2022-06-10

## 2022-06-10 RX ADMIN — IOPAMIDOL 124 ML: 755 INJECTION, SOLUTION INTRAVASCULAR at 10:21

## 2022-06-23 ENCOUNTER — VIRTUAL VISIT (OUTPATIENT)
Dept: ONCOLOGY | Facility: CLINIC | Age: 77
End: 2022-06-23
Attending: INTERNAL MEDICINE
Payer: MEDICARE

## 2022-06-23 DIAGNOSIS — C43.4 MALIGNANT MELANOMA OF SCALP (H): Primary | ICD-10-CM

## 2022-06-23 DIAGNOSIS — R19.7 DIARRHEA, UNSPECIFIED TYPE: ICD-10-CM

## 2022-06-23 PROCEDURE — 99214 OFFICE O/P EST MOD 30 MIN: CPT | Mod: 95 | Performed by: INTERNAL MEDICINE

## 2022-06-23 PROCEDURE — G0463 HOSPITAL OUTPT CLINIC VISIT: HCPCS | Mod: PN,RTG | Performed by: INTERNAL MEDICINE

## 2022-06-23 RX ORDER — DIPHENOXYLATE HCL/ATROPINE 2.5-.025MG
1-2 TABLET ORAL 4 TIMES DAILY PRN
Qty: 120 TABLET | Refills: 5 | Status: SHIPPED | OUTPATIENT
Start: 2022-06-23 | End: 2022-09-30

## 2022-06-23 NOTE — LETTER
6/23/2022         RE: Ahmet Coleman  [address]  [place] [address]        Dear Colleague,    Thank you for referring your patient, Ahmet Coleman, to the [facility]. Please see a copy of my visit note below.    MEDICAL ONCOLOGY PROGRESS NOTE  Melanoma Clinic  Jun 23, 2022    CHIEF COMPLAINT: Scalp melanoma    Melanoma History:  1. He has a small pigmented scalp lesion present for over 30 years. The lesion was biopsied in 1992 and was benign.  2. October 2020, he presented to [facility] with 1 year of progressive enlargement of the lesion and new onset burning, itching, and stinging sensation in the affected scalp.  3. 10/26/20, He was seen at [facility] and he had shave biopsies of A. left central parietal scalp, B. left central occipital scalp, C. Left posterior parietal scalp, and D. punch biopsy of the left superior occipital scalp. Pathology (specimen: [id number]) showed a nodular and nevoid type melanoma, non-ulcerated, Breslow depth up to 1.3 mm, Saurabh's level IV, and up to 3 mitoses per mm2. All margins were involved. Ki-67 10-20%. Sox10 stain is positive and highlights an atypical compound melanocytic proliferation with significant overlying confluence and pagetosis of intraepidermal melanocytes. T-stage: at least pT2a. PD-L1 staining shows PD-L1 TPS <1%. 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  4. 11/25/20, he was seen by Dr. Garcia and he took three 3mm punch biopsies, which returned as dermal melanocytic proliferations with melanophages and fibrosis, consistent with locoregional metastatic melanoma.  5. 12/8/2020 he had PET-CT, which showed FDG avid irregular skin thickening overlying the left parietal region, with no FDG avid lymphadenopathy in the neck and no evidence of metastasis elsewhere in the body.  6. 1/22/2021 start vemurafenib and cobimetinib, ~2/12/21 held for diarrhea, then resumed.  7. 2/18/2021 Start  atezolizumab, last on 4/29/21  8. 5/24/2021 Wide local excision of the scalp melanoma and left latissimus free flap for scalp reconstruction.    Surgical pathology showed features consistent with scar and tumoral melanosis. No definite residual melanoma is seen. Tumoral melanosis (which extends to a depth of around 1.1 mm) is believed to be equivalent to a diagnosis of regressed melanoma.   9. 6/25/21, he starts adjuvant Nivolumab, last dose 2/14/22    INTERVAL HISTORY  Mr. 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     No new lumps or bumps in the scalp. The scalp is nice and smooth. He notes no new rashes. He does have some longstanding trouble breathing through his nose. Otherwise, no nausea, vomiting. He gets diarrhea about once or twice a week, for which he takes Lomotil. He is a coffee drinker, which doesn't help the diarrhea, but he otherwise tries to work on his diet to help manage diarrhea symptoms..    He has yet to make an appointment with dermatology.     Current Outpatient Medications   Medication Sig Dispense Refill     acetaminophen (TYLENOL) 500 MG tablet Take 2 tablets (1,000 mg) by mouth every 8 hours as needed for mild pain 100 tablet 0     cholecalciferol (VITAMIN D3) 1000 UNIT tablet Take 1,000 Units by mouth daily        diphenoxylate-atropine (LOMOTIL) 2.5-0.025 MG tablet Take 1-2 tablets by mouth 4 times daily as needed for diarrhea 120 tablet 5     docusate sodium (DSS) 100 MG capsule TAKE ONE CAPSULE BY MOUTH EVERY DAY FOR STOOL SOFTENING       fluvoxaMINE (LUVOX) 100 MG tablet Take 150 mg at bedtime       hydrochlorothiazide (HYDRODIURIL) 25 MG tablet Take 25 mg by mouth daily.       Hypromellose (ARTIFICIAL TEARS OP) Apply  to eye. 2 drops in each eye twice a day as needed       Lactobacillus-Inulin ([facility] DIGESTIVE [facility]) CAPS 1 tab daily (Patient not taking: Reported on 10/14/2021) 30 capsule 1     LORazepam (ATIVAN) 0.5 MG tablet Take 1  tablet (0.5 mg) by mouth every 4 hours as needed (Anxiety, Nausea/Vomiting or Sleep) 30 tablet 0     Magnesium Oxide 420 MG TABS Take 420 mg by mouth daily       methocarbamol (ROBAXIN) 500 MG tablet Take 1 tablet (500 mg) by mouth 3 times daily as needed for muscle spasms (Patient not taking: Reported on 10/14/2021) 15 tablet 0     methocarbamol (ROBAXIN) 500 MG tablet Take 1 tablet (500 mg) by mouth 3 times daily as needed for muscle spasms 10 tablet 0     omeprazole (PRILOSEC) 20 MG capsule Take 1 capsule by mouth 2 times daily. 60 capsule prn     polyethylene glycol (MIRALAX) 17 GM/Dose powder Take 17 g by mouth daily 510 g 0     potassium chloride ER (KLOR-CON M) 20 MEQ CR tablet Take 1 tablet (20 mEq) by mouth daily 7 tablet 0     pregabalin (LYRICA) 150 MG capsule Take 150 mg by mouth 2 times daily       prochlorperazine (COMPAZINE) 10 MG tablet Take 1 tablet (10 mg) by mouth every 6 hours as needed (Nausea/Vomiting) (Patient not taking: Reported on 10/14/2021) 30 tablet 3     prochlorperazine (COMPAZINE) 10 MG tablet Take 1 tablet (10 mg) by mouth every 6 hours as needed (Nausea/Vomiting) 30 tablet 2     QUEtiapine (SEROQUEL) 300 MG tablet Take 150 mg by mouth At Bedtime        senna-docusate (SENOKOT-S/PERICOLACE) 8.6-50 MG tablet Take 1 tablet by mouth 2 times daily 30 tablet 0     simvastatin (ZOCOR) 80 MG tablet Take 40 mg by mouth At Bedtime        traZODone (DESYREL) 100 MG tablet Take 100 mg by mouth At Bedtime        vortioxetine (TRINTELLIX) 20 MG tablet TAKE ONE TABLET BY MOUTH EVERY MORNING       Objective:  General: patient appears well in no acute distress, alert and oriented, speech clear and fluid  Skin: no visualized rash or lesions on visualized skin  Resp: Appears to be breathing comfortably without accessory muscle usage, speaking in full sentences, no audible wheezes or cough.  Psych: Coherent speech, normal rate and volume, able to articulate logical thoughts, able to abstract reason, no  tangential thoughts, no hallucinations or delusions  Patient's affect is appropriate.    Labs:  No visits with results within 1 Week(s) from this visit.   Latest known visit with results is:   Ancillary Procedure on 06/10/2022   Component Date Value Ref Range Status     Creatinine POCT 06/10/2022 1.4 (A) 0.7 - 1.3 mg/dL Final     GFR, ESTIMATED POCT 06/10/2022 52 (A) >60 mL/min/1.73m2 Final       Imaging:  CT Chest/Abdomen/Pelvis w Contrast  Narrative: Combined Report of:    PET and CT on  6/10/2022:    1. PET of the neck, chest, abdomen, and pelvis.  2. PET CT Fusion for Attenuation Correction and Anatomical  Localization:    3. Diagnostic CT scan of the chest, abdomen, and pelvis with  intravenous contrast for interpretation.  4. 3D MIP and PET-CT fused images were processed on an independent  workstation and archived to PACS and reviewed by a radiologist.    TECHNIQUE:    1. PET: The patient received 13.24 mCi of F-18-FDG; the serum glucose  was 117 mg/dL prior to administration, body weight was 92.1 kg. Images  were evaluated in the axial, sagittal, and coronal planes as well as  the rotational whole body MIP. Images were acquired from the Vertex to  the Feet.    UPTAKE WAS MEASURED AT 60 MINUTES.    BACKGROUND:  Liver SUV max= 5.5,   Aorta Blood SUV Max: 4.2.     2. CT: Volumetric acquisition for clinical interpretation of the  chest, abdomen, and pelvis acquired at 3 mm sections  after the  uneventful administration of intravenous contrast. The chest, abdomen,  and pelvis were evaluated at 5 mm sections in bone, soft tissue, and  lung windows.      The patient received 124 cc of Isovue 370 intravenously and 500 ml  Breeza orally for the examination.    High resolution images of the neck were obtained with multiple oblique  projection reformats.    3. 3D MIP and PET-CT fused images were processed on an independent  workstation and archived to PACS and reviewed by a radiologist.    INDICATION: Malignant melanoma  of scalp (H)    ADDITIONAL INFORMATION OBTAINED FROM EMR: Stage IIIB melanoma of the  scalp diagnosed in late 2020 post wide local excision and  chemotherapy.    COMPARISON: PET/CT 8/25/2021 and 12/8/2020    FINDINGS:     Head, neck:  Please see dedicated neuroradiology report for findings of the  high-resolution PET/CT the neck.    Chest:  No suspicious FDG uptake in the chest.    No acute airspace disease, pleural effusion, or pneumothorax. Heart is  normal in size without pericardial effusion. Nonaneurysmal thoracic  aorta. No central pulmonary embolism. No abnormally enlarged thoracic  lymph nodes.    Abdomen, pelvis:  No suspicious FDG uptake in the abdomen or pelvis. Multiple segments  of mild hypermetabolism in the colon (max SUV 8.4) most pronounced in  the transverse and sigmoid portions with associated thickening and  fatty replacement of the bowel wall.    No suspicious abnormality of the liver, gallbladder, pancreas, spleen,  adrenal glands, or kidneys with simple cysts measuring up to 2.4 cm in  the left kidney. Urinary bladder is unremarkable. No abnormally  dilated loops of bowel, free intra-abdominal air or fluid. Colonic  diverticulosis without evidence of acute diverticulitis. Nonaneurysmal  abdominal aorta and major branches. No abnormally enlarged lymph nodes  in the abdomen/pelvis.     Extremities:  No abnormal masses or hypermetabolic lesions. Focal FDG uptake along  the superficial mid left forearm is likely residual radiotracer at  site of injection.    Bones:   No suspicious FDG uptake in the skeleton.     No suspicious lytic or blastic osseous lesions.    Impression: IMPRESSION: In this patient with history of treated scalp melanoma:    1. No evidence of recurrent disease in the chest, abdomen, or pelvis.     2. Mild bowel wall thickening and hypermetabolism within portions of  the colon, nonspecific though may be seen with colitis. Diverticulosis  is present in the distal colon without  evidence of acute  diverticulitis.    3. Please see dedicated neuroradiology report for findings of the  high-resolution PET/CT the neck.    I have personally reviewed the examination and initial interpretation  and I agree with the findings.    MARISSA VINCENT MD         SYSTEM ID:  [id number]  PET Oncology Whole Body  Narrative: Combined Report of:    PET and CT on  6/10/2022:    1. PET of the neck, chest, abdomen, and pelvis.  2. PET CT Fusion for Attenuation Correction and Anatomical  Localization:    3. Diagnostic CT scan of the chest, abdomen, and pelvis with  intravenous contrast for interpretation.  4. 3D MIP and PET-CT fused images were processed on an independent  workstation and archived to PACS and reviewed by a radiologist.    TECHNIQUE:    1. PET: The patient received 13.24 mCi of F-18-FDG; the serum glucose  was 117 mg/dL prior to administration, body weight was 92.1 kg. Images  were evaluated in the axial, sagittal, and coronal planes as well as  the rotational whole body MIP. Images were acquired from the Vertex to  the Feet.    UPTAKE WAS MEASURED AT 60 MINUTES.    BACKGROUND:  Liver SUV max= 5.5,   Aorta Blood SUV Max: 4.2.     2. CT: Volumetric acquisition for clinical interpretation of the  chest, abdomen, and pelvis acquired at 3 mm sections  after the  uneventful administration of intravenous contrast. The chest, abdomen,  and pelvis were evaluated at 5 mm sections in bone, soft tissue, and  lung windows.      The patient received 124 cc of Isovue 370 intravenously and 500 ml  Breeza orally for the examination.    High resolution images of the neck were obtained with multiple oblique  projection reformats.    3. 3D MIP and PET-CT fused images were processed on an independent  workstation and archived to PACS and reviewed by a radiologist.    INDICATION: Malignant melanoma of scalp (H)    ADDITIONAL INFORMATION OBTAINED FROM EMR: Stage IIIB melanoma of the  scalp diagnosed in late 2020 post wide  local excision and  chemotherapy.    COMPARISON: PET/CT 8/25/2021 and 12/8/2020    FINDINGS:     Head, neck:  Please see dedicated neuroradiology report for findings of the  high-resolution PET/CT the neck.    Chest:  No suspicious FDG uptake in the chest.    No acute airspace disease, pleural effusion, or pneumothorax. Heart is  normal in size without pericardial effusion. Nonaneurysmal thoracic  aorta. No central pulmonary embolism. No abnormally enlarged thoracic  lymph nodes.    Abdomen, pelvis:  No suspicious FDG uptake in the abdomen or pelvis. Multiple segments  of mild hypermetabolism in the colon (max SUV 8.4) most pronounced in  the transverse and sigmoid portions with associated thickening and  fatty replacement of the bowel wall.    No suspicious abnormality of the liver, gallbladder, pancreas, spleen,  adrenal glands, or kidneys with simple cysts measuring up to 2.4 cm in  the left kidney. Urinary bladder is unremarkable. No abnormally  dilated loops of bowel, free intra-abdominal air or fluid. Colonic  diverticulosis without evidence of acute diverticulitis. Nonaneurysmal  abdominal aorta and major branches. No abnormally enlarged lymph nodes  in the abdomen/pelvis.     Extremities:  No abnormal masses or hypermetabolic lesions. Focal FDG uptake along  the superficial mid left forearm is likely residual radiotracer at  site of injection.    Bones:   No suspicious FDG uptake in the skeleton.     No suspicious lytic or blastic osseous lesions.    Impression: IMPRESSION: In this patient with history of treated scalp melanoma:    1. No evidence of recurrent disease in the chest, abdomen, or pelvis.     2. Mild bowel wall thickening and hypermetabolism within portions of  the colon, nonspecific though may be seen with colitis. Diverticulosis  is present in the distal colon without evidence of acute  diverticulitis.    3. Please see dedicated neuroradiology report for findings of the  high-resolution PET/CT  the neck.    I have personally reviewed the examination and initial interpretation  and I agree with the findings.    MARISSA VINCENT MD         SYSTEM ID:  [id number]  CT Soft Tissue Neck w Contrast  Narrative: PET CT FUSION EXAMINATION 6/10/2022  1. Neck CT with contrast  2. PET study of the neck  3. PET CT fusion study of the neck    HISTORY: Malignant melanoma of scalp (H).    COMPARISON: CT of the body from same day. PET/CT 1120 3/20/2011 the  infiltrates 20    TECHNIQUE: Please refer to the accompanying whole body PET-CT for  report of the dose and whole body PET-CT findings.  Regarding the neck, axial images were obtained after nonionic  intravenous contrast administration, with sagittal and coronal  reconstructions performed. Neck CT images were reviewed in bone, soft  tissue, and lung windows, with review of the fused PET-CT images as  well in multiple planes.    FINDINGS:  Stable appearance of left scalp resection site with mild  linear/striated subcutaneous attenuation in the resection bed.     No mass lesion or abnormal FDG uptake in the neck or head. No cervical  lymphadenopathy.    The mucosal spaces the nasopharynx, oropharynx, hypopharynx, and  glottis are within normal limits. The tongue base and major salivary  glands are within normal pins. No suspicious thyroid lesion. Major  cervical vasculature is patent with mild/moderate atherosclerosis at  the carotid bifurcations.    Limited evaluation of the cervical vertebral column demonstrates no  evident high-grade spinal canal stenosis. Right maxillary sinus mucus  retention cyst. Mastoid air cells are clear.    Regarding the visualized brain, there is unchanged hypoattenuating 10  mm extra-axial collection over the left frontal lobe, possibly a  subdural hygroma.    Please see separately dictated whole body PET/CT report for evaluation  of the thorax.  Impression: IMPRESSION:   1. Stable postsurgical changes in the left scalp. No evidence of  local  recurrence or metastatic disease.  2. No cervical adenopathy.  3. Please refer to the whole body PET CT performed as a separate  report, for the findings of the remainder of the body.     I have personally reviewed the examination and initial interpretation  and I agree with the findings.    ANTHONY HUNTER MD         SYSTEM ID:  [id number]        ASSESSMENT AND PLAN  #1 Cutaneous melanoma, scalp primary, pT2a cN1c, clinical Stage IIIB  It was a pleasure to see Mr. Coleman today. He is a 77 year old  with agent orange cutaneous exposures in the [place] war and a diagnosis of malignant melanoma arising from a pre-existing pigmented lesion on the scalp. He had a partial response to neoadjuvant therapy with atezolizumab (3 cycles), vemurafenib, and cobimetinib (4 cycles) with decrease in thickening of the large scalp tumor and lightening of the periphery and in the middle part of the tumor then had surgery, which showed only tumoral melanosis. He started adjuvant nivolumab on 6/25/21, with overall plan for 9 total cycles of nivolumab q 4 weeks (=12 cycles of immunotherapy total including previous 3 cycles of atezo). His last dose of nivolumab was 2/14/22.    He is doing well today with no concern for recurrence on PET-CT obtained 6/10/2022.  -Follow-up in 3 months with PET-CT and labs  -continue dermatology follow-up     #2 Depressed mood, anxiety, PTSD  Verbalizes fluctuating depression and anxiety, multifactorial. He has a longstanding history of PTSD. Uses occasional lorazepam. Will continue to work with a psychiatrist out of [place]. Recommend he call his psychiatrist about his worsening symptoms and also call to get in with a new PCP.     #3 Diarrhea  #4 History of IBS  Stable, uses Lomotil prn.    Oksana Peñaloza M.D.   of Medicine  Hematology, Oncology and Transplantation  Pager: [phone]

## 2022-06-23 NOTE — PROGRESS NOTES
Lloyd is a 77 year old who is being evaluated via a billable video visit.      If the video visit is dropped, the invitation should be resent by: Send to e-mail at: [email or web address]  Will anyone else be joining your video visit? No        Video-Visit Details    Video Start Time: 1:55 PM    Type of service:  Video Visit    Video End Time: 2:23 PM    Originating Location (pt. Location): Home    Distant Location (provider location):  [facility] CANC[facility]     Platform used for Video Visit: [facility] ONCOLOGY PROGRESS NOTE  Melanoma Clinic  Jun 23, 2022    CHIEF COMPLAINT: Scalp melanoma    Melanoma History:  1. He has a small pigmented scalp lesion present for over 30 years. The lesion was biopsied in 1992 and was benign.  2. October 2020, he presented to [facility] with 1 year of progressive enlargement of the lesion and new onset burning, itching, and stinging sensation in the affected scalp.  3. 10/26/20, He was seen at [facility] and he had shave biopsies of A. left central parietal scalp, B. left central occipital scalp, C. Left posterior parietal scalp, and D. punch biopsy of the left superior occipital scalp. Pathology (specimen: [id number]) showed a nodular and nevoid type melanoma, non-ulcerated, Breslow depth up to 1.3 mm, Saurabh's level IV, and up to 3 mitoses per mm2. All margins were involved. Ki-67 10-20%. Sox10 stain is positive and highlights an atypical compound melanocytic proliferation with significant overlying confluence and pagetosis of intraepidermal melanocytes. T-stage: at least pT2a. PD-L1 staining shows PD-L1 TPS <1%. 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  4. 11/25/20, he was seen by Dr. Garcia and he took three 3mm punch biopsies, which returned as dermal melanocytic proliferations with melanophages and fibrosis, consistent with locoregional metastatic melanoma.  5. 12/8/2020 he had PET-CT, which showed FDG avid irregular skin  thickening overlying the left parietal region, with no FDG avid lymphadenopathy in the neck and no evidence of metastasis elsewhere in the body.  6. 1/22/2021 start vemurafenib and cobimetinib, ~2/12/21 held for diarrhea, then resumed.  7. 2/18/2021 Start atezolizumab, last on 4/29/21  8. 5/24/2021 Wide local excision of the scalp melanoma and left latissimus free flap for scalp reconstruction.    Surgical pathology showed features consistent with scar and tumoral melanosis. No definite residual melanoma is seen. Tumoral melanosis (which extends to a depth of around 1.1 mm) is believed to be equivalent to a diagnosis of regressed melanoma.   9. 6/25/21, he starts adjuvant Nivolumab, last dose 2/14/22    INTERVAL HISTORY  Mr. 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     No new lumps or bumps in the scalp. The scalp is nice and smooth. He notes no new rashes. He does have some longstanding trouble breathing through his nose. Otherwise, no nausea, vomiting. He gets diarrhea about once or twice a week, for which he takes Lomotil. He is a coffee drinker, which doesn't help the diarrhea, but he otherwise tries to work on his diet to help manage diarrhea symptoms..    He has yet to make an appointment with dermatology.     Current Outpatient Medications   Medication Sig Dispense Refill     acetaminophen (TYLENOL) 500 MG tablet Take 2 tablets (1,000 mg) by mouth every 8 hours as needed for mild pain 100 tablet 0     cholecalciferol (VITAMIN D3) 1000 UNIT tablet Take 1,000 Units by mouth daily        diphenoxylate-atropine (LOMOTIL) 2.5-0.025 MG tablet Take 1-2 tablets by mouth 4 times daily as needed for diarrhea 120 tablet 5     docusate sodium (DSS) 100 MG capsule TAKE ONE CAPSULE BY MOUTH EVERY DAY FOR STOOL SOFTENING       fluvoxaMINE (LUVOX) 100 MG tablet Take 150 mg at bedtime       hydrochlorothiazide (HYDRODIURIL) 25 MG tablet Take 25 mg by mouth daily.        Hypromellose (ARTIFICIAL TEARS OP) Apply  to eye. 2 drops in each eye twice a day as needed       Lactobacillus-Inulin (CULTURELLE DIGESTIVE HEALTH) CAPS 1 tab daily (Patient not taking: Reported on 10/14/2021) 30 capsule 1     LORazepam (ATIVAN) 0.5 MG tablet Take 1 tablet (0.5 mg) by mouth every 4 hours as needed (Anxiety, Nausea/Vomiting or Sleep) 30 tablet 0     Magnesium Oxide 420 MG TABS Take 420 mg by mouth daily       methocarbamol (ROBAXIN) 500 MG tablet Take 1 tablet (500 mg) by mouth 3 times daily as needed for muscle spasms (Patient not taking: Reported on 10/14/2021) 15 tablet 0     methocarbamol (ROBAXIN) 500 MG tablet Take 1 tablet (500 mg) by mouth 3 times daily as needed for muscle spasms 10 tablet 0     omeprazole (PRILOSEC) 20 MG capsule Take 1 capsule by mouth 2 times daily. 60 capsule prn     polyethylene glycol (MIRALAX) 17 GM/Dose powder Take 17 g by mouth daily 510 g 0     potassium chloride ER (KLOR-CON M) 20 MEQ CR tablet Take 1 tablet (20 mEq) by mouth daily 7 tablet 0     pregabalin (LYRICA) 150 MG capsule Take 150 mg by mouth 2 times daily       prochlorperazine (COMPAZINE) 10 MG tablet Take 1 tablet (10 mg) by mouth every 6 hours as needed (Nausea/Vomiting) (Patient not taking: Reported on 10/14/2021) 30 tablet 3     prochlorperazine (COMPAZINE) 10 MG tablet Take 1 tablet (10 mg) by mouth every 6 hours as needed (Nausea/Vomiting) 30 tablet 2     QUEtiapine (SEROQUEL) 300 MG tablet Take 150 mg by mouth At Bedtime        senna-docusate (SENOKOT-S/PERICOLACE) 8.6-50 MG tablet Take 1 tablet by mouth 2 times daily 30 tablet 0     simvastatin (ZOCOR) 80 MG tablet Take 40 mg by mouth At Bedtime        traZODone (DESYREL) 100 MG tablet Take 100 mg by mouth At Bedtime        vortioxetine (TRINTELLIX) 20 MG tablet TAKE ONE TABLET BY MOUTH EVERY MORNING       Objective:  General: patient appears well in no acute distress, alert and oriented, speech clear and fluid  Skin: no visualized rash or  lesions on visualized skin  Resp: Appears to be breathing comfortably without accessory muscle usage, speaking in full sentences, no audible wheezes or cough.  Psych: Coherent speech, normal rate and volume, able to articulate logical thoughts, able to abstract reason, no tangential thoughts, no hallucinations or delusions  Patient's affect is appropriate.    Labs:  No visits with results within 1 Week(s) from this visit.   Latest known visit with results is:   Ancillary Procedure on 06/10/2022   Component Date Value Ref Range Status     Creatinine POCT 06/10/2022 1.4 (A) 0.7 - 1.3 mg/dL Final     GFR, ESTIMATED POCT 06/10/2022 52 (A) >60 mL/min/1.73m2 Final       Imaging:  CT Chest/Abdomen/Pelvis w Contrast  Narrative: Combined Report of:    PET and CT on  6/10/2022:    1. PET of the neck, chest, abdomen, and pelvis.  2. PET CT Fusion for Attenuation Correction and Anatomical  Localization:    3. Diagnostic CT scan of the chest, abdomen, and pelvis with  intravenous contrast for interpretation.  4. 3D MIP and PET-CT fused images were processed on an independent  workstation and archived to PACS and reviewed by a radiologist.    TECHNIQUE:    1. PET: The patient received 13.24 mCi of F-18-FDG; the serum glucose  was 117 mg/dL prior to administration, body weight was 92.1 kg. Images  were evaluated in the axial, sagittal, and coronal planes as well as  the rotational whole body MIP. Images were acquired from the Vertex to  the Feet.    UPTAKE WAS MEASURED AT 60 MINUTES.    BACKGROUND:  Liver SUV max= 5.5,   Aorta Blood SUV Max: 4.2.     2. CT: Volumetric acquisition for clinical interpretation of the  chest, abdomen, and pelvis acquired at 3 mm sections  after the  uneventful administration of intravenous contrast. The chest, abdomen,  and pelvis were evaluated at 5 mm sections in bone, soft tissue, and  lung windows.      The patient received 124 cc of Isovue 370 intravenously and 500 ml  Breeza orally for the  examination.    High resolution images of the neck were obtained with multiple oblique  projection reformats.    3. 3D MIP and PET-CT fused images were processed on an independent  workstation and archived to PACS and reviewed by a radiologist.    INDICATION: Malignant melanoma of scalp (H)    ADDITIONAL INFORMATION OBTAINED FROM EMR: Stage IIIB melanoma of the  scalp diagnosed in late 2020 post wide local excision and  chemotherapy.    COMPARISON: PET/CT 8/25/2021 and 12/8/2020    FINDINGS:     Head, neck:  Please see dedicated neuroradiology report for findings of the  high-resolution PET/CT the neck.    Chest:  No suspicious FDG uptake in the chest.    No acute airspace disease, pleural effusion, or pneumothorax. Heart is  normal in size without pericardial effusion. Nonaneurysmal thoracic  aorta. No central pulmonary embolism. No abnormally enlarged thoracic  lymph nodes.    Abdomen, pelvis:  No suspicious FDG uptake in the abdomen or pelvis. Multiple segments  of mild hypermetabolism in the colon (max SUV 8.4) most pronounced in  the transverse and sigmoid portions with associated thickening and  fatty replacement of the bowel wall.    No suspicious abnormality of the liver, gallbladder, pancreas, spleen,  adrenal glands, or kidneys with simple cysts measuring up to 2.4 cm in  the left kidney. Urinary bladder is unremarkable. No abnormally  dilated loops of bowel, free intra-abdominal air or fluid. Colonic  diverticulosis without evidence of acute diverticulitis. Nonaneurysmal  abdominal aorta and major branches. No abnormally enlarged lymph nodes  in the abdomen/pelvis.     Extremities:  No abnormal masses or hypermetabolic lesions. Focal FDG uptake along  the superficial mid left forearm is likely residual radiotracer at  site of injection.    Bones:   No suspicious FDG uptake in the skeleton.     No suspicious lytic or blastic osseous lesions.    Impression: IMPRESSION: In this patient with history of  treated scalp melanoma:    1. No evidence of recurrent disease in the chest, abdomen, or pelvis.     2. Mild bowel wall thickening and hypermetabolism within portions of  the colon, nonspecific though may be seen with colitis. Diverticulosis  is present in the distal colon without evidence of acute  diverticulitis.    3. Please see dedicated neuroradiology report for findings of the  high-resolution PET/CT the neck.    I have personally reviewed the examination and initial interpretation  and I agree with the findings.    MARISSA VINCENT MD         SYSTEM ID:  [id number]  PET Oncology Whole Body  Narrative: Combined Report of:    PET and CT on  6/10/2022:    1. PET of the neck, chest, abdomen, and pelvis.  2. PET CT Fusion for Attenuation Correction and Anatomical  Localization:    3. Diagnostic CT scan of the chest, abdomen, and pelvis with  intravenous contrast for interpretation.  4. 3D MIP and PET-CT fused images were processed on an independent  workstation and archived to PACS and reviewed by a radiologist.    TECHNIQUE:    1. PET: The patient received 13.24 mCi of F-18-FDG; the serum glucose  was 117 mg/dL prior to administration, body weight was 92.1 kg. Images  were evaluated in the axial, sagittal, and coronal planes as well as  the rotational whole body MIP. Images were acquired from the Vertex to  the Feet.    UPTAKE WAS MEASURED AT 60 MINUTES.    BACKGROUND:  Liver SUV max= 5.5,   Aorta Blood SUV Max: 4.2.     2. CT: Volumetric acquisition for clinical interpretation of the  chest, abdomen, and pelvis acquired at 3 mm sections  after the  uneventful administration of intravenous contrast. The chest, abdomen,  and pelvis were evaluated at 5 mm sections in bone, soft tissue, and  lung windows.      The patient received 124 cc of Isovue 370 intravenously and 500 ml  Breeza orally for the examination.    High resolution images of the neck were obtained with multiple oblique  projection reformats.    3. 3D MIP  and PET-CT fused images were processed on an independent  workstation and archived to PACS and reviewed by a radiologist.    INDICATION: Malignant melanoma of scalp (H)    ADDITIONAL INFORMATION OBTAINED FROM EMR: Stage IIIB melanoma of the  scalp diagnosed in late 2020 post wide local excision and  chemotherapy.    COMPARISON: PET/CT 8/25/2021 and 12/8/2020    FINDINGS:     Head, neck:  Please see dedicated neuroradiology report for findings of the  high-resolution PET/CT the neck.    Chest:  No suspicious FDG uptake in the chest.    No acute airspace disease, pleural effusion, or pneumothorax. Heart is  normal in size without pericardial effusion. Nonaneurysmal thoracic  aorta. No central pulmonary embolism. No abnormally enlarged thoracic  lymph nodes.    Abdomen, pelvis:  No suspicious FDG uptake in the abdomen or pelvis. Multiple segments  of mild hypermetabolism in the colon (max SUV 8.4) most pronounced in  the transverse and sigmoid portions with associated thickening and  fatty replacement of the bowel wall.    No suspicious abnormality of the liver, gallbladder, pancreas, spleen,  adrenal glands, or kidneys with simple cysts measuring up to 2.4 cm in  the left kidney. Urinary bladder is unremarkable. No abnormally  dilated loops of bowel, free intra-abdominal air or fluid. Colonic  diverticulosis without evidence of acute diverticulitis. Nonaneurysmal  abdominal aorta and major branches. No abnormally enlarged lymph nodes  in the abdomen/pelvis.     Extremities:  No abnormal masses or hypermetabolic lesions. Focal FDG uptake along  the superficial mid left forearm is likely residual radiotracer at  site of injection.    Bones:   No suspicious FDG uptake in the skeleton.     No suspicious lytic or blastic osseous lesions.    Impression: IMPRESSION: In this patient with history of treated scalp melanoma:    1. No evidence of recurrent disease in the chest, abdomen, or pelvis.     2. Mild bowel wall  thickening and hypermetabolism within portions of  the colon, nonspecific though may be seen with colitis. Diverticulosis  is present in the distal colon without evidence of acute  diverticulitis.    3. Please see dedicated neuroradiology report for findings of the  high-resolution PET/CT the neck.    I have personally reviewed the examination and initial interpretation  and I agree with the findings.    MARISSA VINCENT MD         SYSTEM ID:  [id number]  CT Soft Tissue Neck w Contrast  Narrative: PET CT FUSION EXAMINATION 6/10/2022  1. Neck CT with contrast  2. PET study of the neck  3. PET CT fusion study of the neck    HISTORY: Malignant melanoma of scalp (H).    COMPARISON: CT of the body from same day. PET/CT 1120 3/20/2011 the  infiltrates 20    TECHNIQUE: Please refer to the accompanying whole body PET-CT for  report of the dose and whole body PET-CT findings.  Regarding the neck, axial images were obtained after nonionic  intravenous contrast administration, with sagittal and coronal  reconstructions performed. Neck CT images were reviewed in bone, soft  tissue, and lung windows, with review of the fused PET-CT images as  well in multiple planes.    FINDINGS:  Stable appearance of left scalp resection site with mild  linear/striated subcutaneous attenuation in the resection bed.     No mass lesion or abnormal FDG uptake in the neck or head. No cervical  lymphadenopathy.    The mucosal spaces the nasopharynx, oropharynx, hypopharynx, and  glottis are within normal limits. The tongue base and major salivary  glands are within normal pins. No suspicious thyroid lesion. Major  cervical vasculature is patent with mild/moderate atherosclerosis at  the carotid bifurcations.    Limited evaluation of the cervical vertebral column demonstrates no  evident high-grade spinal canal stenosis. Right maxillary sinus mucus  retention cyst. Mastoid air cells are clear.    Regarding the visualized brain, there is unchanged  hypoattenuating 10  mm extra-axial collection over the left frontal lobe, possibly a  subdural hygroma.    Please see separately dictated whole body PET/CT report for evaluation  of the thorax.  Impression: IMPRESSION:   1. Stable postsurgical changes in the left scalp. No evidence of local  recurrence or metastatic disease.  2. No cervical adenopathy.  3. Please refer to the whole body PET CT performed as a separate  report, for the findings of the remainder of the body.     I have personally reviewed the examination and initial interpretation  and I agree with the findings.    ANTHONY HUNTER MD         SYSTEM ID:  [id number]        ASSESSMENT AND PLAN  #1 Cutaneous melanoma, scalp primary, pT2a cN1c, clinical Stage IIIB  It was a pleasure to see Mr. Coleman today. He is a 77 year old  with agent orange cutaneous exposures in the [place] war and a diagnosis of malignant melanoma arising from a pre-existing pigmented lesion on the scalp. He had a partial response to neoadjuvant therapy with atezolizumab (3 cycles), vemurafenib, and cobimetinib (4 cycles) with decrease in thickening of the large scalp tumor and lightening of the periphery and in the middle part of the tumor then had surgery, which showed only tumoral melanosis. He started adjuvant nivolumab on 6/25/21, with overall plan for 9 total cycles of nivolumab q 4 weeks (=12 cycles of immunotherapy total including previous 3 cycles of atezo). His last dose of nivolumab was 2/14/22.    He is doing well today with no concern for recurrence on PET-CT obtained 6/10/2022.  -Follow-up in 3 months with PET-CT and labs  -continue dermatology follow-up     #2 Depressed mood, anxiety, PTSD  Verbalizes fluctuating depression and anxiety, multifactorial. He has a longstanding history of PTSD. Uses occasional lorazepam. Will continue to work with a psychiatrist out of [place]. Recommend he call his psychiatrist about his worsening symptoms and also call to get  in with a new PCP.     #3 Diarrhea  #4 History of IBS  Stable, uses Lomotil prn.    Oksana Peñaloza M.D.   of Medicine  Hematology, Oncology and Transplantation  Pager: [phone]

## 2022-08-26 ENCOUNTER — LAB (OUTPATIENT)
Dept: LAB | Facility: CLINIC | Age: 77
End: 2022-08-26
Payer: MEDICARE

## 2022-08-26 DIAGNOSIS — C43.4 MALIGNANT MELANOMA OF SCALP (H): ICD-10-CM

## 2022-08-26 LAB
ALBUMIN SERPL-MCNC: 3.9 G/DL (ref 3.4–5)
ALP SERPL-CCNC: 54 U/L (ref 40–150)
ALT SERPL W P-5'-P-CCNC: 27 U/L (ref 0–70)
ANION GAP SERPL CALCULATED.3IONS-SCNC: 2 MMOL/L (ref 3–14)
AST SERPL W P-5'-P-CCNC: 22 U/L (ref 0–45)
BASOPHILS # BLD AUTO: 0 10E3/UL (ref 0–0.2)
BASOPHILS NFR BLD AUTO: 1 %
BILIRUB SERPL-MCNC: 0.6 MG/DL (ref 0.2–1.3)
BUN SERPL-MCNC: 19 MG/DL (ref 7–30)
CALCIUM SERPL-MCNC: 9 MG/DL (ref 8.5–10.1)
CHLORIDE BLD-SCNC: 107 MMOL/L (ref 94–109)
CO2 SERPL-SCNC: 32 MMOL/L (ref 20–32)
CREAT SERPL-MCNC: 1.11 MG/DL (ref 0.66–1.25)
EOSINOPHIL # BLD AUTO: 0.1 10E3/UL (ref 0–0.7)
EOSINOPHIL NFR BLD AUTO: 3 %
ERYTHROCYTE [DISTWIDTH] IN BLOOD BY AUTOMATED COUNT: 13.3 % (ref 10–15)
GFR SERPL CREATININE-BSD FRML MDRD: 69 ML/MIN/1.73M2
GLUCOSE BLD-MCNC: 101 MG/DL (ref 70–99)
HCT VFR BLD AUTO: 43.2 % (ref 40–53)
HGB BLD-MCNC: 14.3 G/DL (ref 13.3–17.7)
IMM GRANULOCYTES # BLD: 0 10E3/UL
IMM GRANULOCYTES NFR BLD: 0 %
LYMPHOCYTES # BLD AUTO: 1.5 10E3/UL (ref 0.8–5.3)
LYMPHOCYTES NFR BLD AUTO: 34 %
MCH RBC QN AUTO: 28.4 PG (ref 26.5–33)
MCHC RBC AUTO-ENTMCNC: 33.1 G/DL (ref 31.5–36.5)
MCV RBC AUTO: 86 FL (ref 78–100)
MONOCYTES # BLD AUTO: 0.3 10E3/UL (ref 0–1.3)
MONOCYTES NFR BLD AUTO: 7 %
NEUTROPHILS # BLD AUTO: 2.4 10E3/UL (ref 1.6–8.3)
NEUTROPHILS NFR BLD AUTO: 55 %
NRBC # BLD AUTO: 0 10E3/UL
NRBC BLD AUTO-RTO: 0 /100
PLATELET # BLD AUTO: 235 10E3/UL (ref 150–450)
POTASSIUM BLD-SCNC: 4 MMOL/L (ref 3.4–5.3)
PROT SERPL-MCNC: 7.6 G/DL (ref 6.8–8.8)
RBC # BLD AUTO: 5.03 10E6/UL (ref 4.4–5.9)
SODIUM SERPL-SCNC: 141 MMOL/L (ref 133–144)
WBC # BLD AUTO: 4.5 10E3/UL (ref 4–11)

## 2022-08-26 PROCEDURE — 36415 COLL VENOUS BLD VENIPUNCTURE: CPT

## 2022-08-26 PROCEDURE — 85025 COMPLETE CBC W/AUTO DIFF WBC: CPT

## 2022-08-26 PROCEDURE — 80053 COMPREHEN METABOLIC PANEL: CPT

## 2022-09-30 ENCOUNTER — VIRTUAL VISIT (OUTPATIENT)
Dept: ONCOLOGY | Facility: CLINIC | Age: 77
End: 2022-09-30
Attending: INTERNAL MEDICINE
Payer: MEDICARE

## 2022-09-30 DIAGNOSIS — R19.7 DIARRHEA, UNSPECIFIED TYPE: ICD-10-CM

## 2022-09-30 DIAGNOSIS — C43.4 MALIGNANT MELANOMA OF SCALP (H): Primary | ICD-10-CM

## 2022-09-30 PROCEDURE — 99443 PR PHYSICIAN TELEPHONE EVALUATION 21-30 MIN: CPT | Mod: 95 | Performed by: INTERNAL MEDICINE

## 2022-09-30 RX ORDER — DIPHENOXYLATE HCL/ATROPINE 2.5-.025MG
1-2 TABLET ORAL 4 TIMES DAILY PRN
Qty: 120 TABLET | Refills: 5 | Status: SHIPPED | OUTPATIENT
Start: 2022-09-30

## 2022-09-30 NOTE — LETTER
9/30/2022         RE: Ahmet Coleman  [address]  [place] [address]        Dear Colleague,    Thank you for referring your patient, Ahmet Coleman, to the [facility]. Please see a copy of my visit note below.    Lloyd is a 77 year old who is being evaluated via a billable telephone visit.    Pt has pain in right shoulder and neck.      What phone number would you like to be contacted at? [phone]  How would you like to obtain your AVS? [facility] [employer]  Phone call duration: 21 minutes    Bianka Dougherty         MEDICAL ONCOLOGY PROGRESS NOTE  Melanoma Clinic  Sep 30, 2022    CHIEF COMPLAINT: Scalp melanoma    Melanoma History:  1. He has a small pigmented scalp lesion present for over 30 years. The lesion was biopsied in 1992 and was benign.  2. October 2020, he presented to [facility] with 1 year of progressive enlargement of the lesion and new onset burning, itching, and stinging sensation in the affected scalp.  3. 10/26/20, He was seen at [facility] and he had shave biopsies of A. left central parietal scalp, B. left central occipital scalp, C. Left posterior parietal scalp, and D. punch biopsy of the left superior occipital scalp. Pathology (specimen: [id number]) showed a nodular and nevoid type melanoma, non-ulcerated, Breslow depth up to 1.3 mm, Saurabh's level IV, and up to 3 mitoses per mm2. All margins were involved. Ki-67 10-20%. Sox10 stain is positive and highlights an atypical compound melanocytic proliferation with significant overlying confluence and pagetosis of intraepidermal melanocytes. T-stage: at least pT2a. PD-L1 staining shows PD-L1 TPS <1%. 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  4. 11/25/20, he was seen by Dr. Garcia and he took three 3mm punch biopsies, which returned as dermal melanocytic proliferations with melanophages and fibrosis, consistent with locoregional metastatic melanoma.  5. 12/8/2020 he had  PET-CT, which showed FDG avid irregular skin thickening overlying the left parietal region, with no FDG avid lymphadenopathy in the neck and no evidence of metastasis elsewhere in the body.  6. 1/22/2021 start vemurafenib and cobimetinib, ~2/12/21 held for diarrhea, then resumed.  7. 2/18/2021 Start atezolizumab, last on 4/29/21  8. 5/24/2021 Wide local excision of the scalp melanoma and left latissimus free flap for scalp reconstruction.    Surgical pathology showed features consistent with scar and tumoral melanosis. No definite residual melanoma is seen. Tumoral melanosis (which extends to a depth of around 1.1 mm) is believed to be equivalent to a diagnosis of regressed melanoma.   9. 6/25/21, he starts adjuvant Nivolumab, last dose 2/14/22    INTERVAL HISTORY  Mr. 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     He has been reading a lot. He went back on blood pressure medicine and BPs are running 130/70s.     No new lumps or bumps in the scalp. The scalp is nice and smooth. He notes no new rashes. No shortness of breath or coughing. No nausea, vomiting. He gets diarrhea about once or twice a week, for which he takes Lomotil. He is a coffee drinker, which doesn't feel helps the diarrhea, but he otherwise tries to work on his diet to help manage diarrhea symptoms..    He has yet to make an appointment with dermatology.     Current Outpatient Medications   Medication Sig Dispense Refill     acetaminophen (TYLENOL) 500 MG tablet Take 2 tablets (1,000 mg) by mouth every 8 hours as needed for mild pain 100 tablet 0     diphenoxylate-atropine (LOMOTIL) 2.5-0.025 MG tablet Take 1-2 tablets by mouth 4 times daily as needed for diarrhea 120 tablet 5     docusate sodium (COLACE) 100 MG capsule TAKE ONE CAPSULE BY MOUTH EVERY DAY FOR STOOL SOFTENING       fluvoxaMINE (LUVOX) 100 MG tablet Take 150 mg at bedtime       hydrochlorothiazide (HYDRODIURIL) 25 MG tablet Take 25 mg by  mouth daily.       Hypromellose (ARTIFICIAL TEARS OP) Apply  to eye. 2 drops in each eye twice a day as needed       LORazepam (ATIVAN) 0.5 MG tablet Take 1 tablet (0.5 mg) by mouth every 4 hours as needed (Anxiety, Nausea/Vomiting or Sleep) 30 tablet 0     Magnesium Oxide 420 MG TABS Take 420 mg by mouth daily       methocarbamol (ROBAXIN) 500 MG tablet Take 1 tablet (500 mg) by mouth 3 times daily as needed for muscle spasms 10 tablet 0     omeprazole (PRILOSEC) 20 MG capsule Take 1 capsule by mouth 2 times daily. 60 capsule prn     polyethylene glycol (MIRALAX) 17 GM/Dose powder Take 17 g by mouth daily 510 g 0     potassium chloride ER (KLOR-CON M) 20 MEQ CR tablet Take 1 tablet (20 mEq) by mouth daily 7 tablet 0     pregabalin (LYRICA) 150 MG capsule Take 150 mg by mouth 2 times daily       prochlorperazine (COMPAZINE) 10 MG tablet Take 1 tablet (10 mg) by mouth every 6 hours as needed (Nausea/Vomiting) 30 tablet 2     QUEtiapine (SEROQUEL) 300 MG tablet Take 150 mg by mouth At Bedtime        simvastatin (ZOCOR) 80 MG tablet Take 40 mg by mouth At Bedtime        traZODone (DESYREL) 100 MG tablet Take 100 mg by mouth At Bedtime        vitamin D3 (CHOLECALCIFEROL) 1000 units (25 mcg) tablet Take 1,000 Units by mouth daily        vortioxetine (TRINTELLIX) 20 MG tablet TAKE ONE TABLET BY MOUTH EVERY MORNING       Lactobacillus-Inulin ([facility] DIGESTIVE [facility]) CAPS 1 tab daily (Patient not taking: No sig reported) 30 capsule 1     methocarbamol (ROBAXIN) 500 MG tablet Take 1 tablet (500 mg) by mouth 3 times daily as needed for muscle spasms (Patient not taking: No sig reported) 15 tablet 0     prochlorperazine (COMPAZINE) 10 MG tablet Take 1 tablet (10 mg) by mouth every 6 hours as needed (Nausea/Vomiting) (Patient not taking: No sig reported) 30 tablet 3     senna-docusate (SENOKOT-S/PERICOLACE) 8.6-50 MG tablet Take 1 tablet by mouth 2 times daily (Patient not taking: Reported on 9/30/2022) 30 tablet 0      Objective:  General: patient appears well in no acute distress, alert and oriented, speech clear and fluid  Skin: no visualized rash or lesions on visualized skin  Resp: Appears to be breathing comfortably without accessory muscle usage, speaking in full sentences, no audible wheezes or cough.  Psych: Coherent speech, normal rate and volume, able to articulate logical thoughts, able to abstract reason, no tangential thoughts, no hallucinations or delusions  Patient's affect is appropriate.    Labs:  No visits with results within 1 Week(s) from this visit.   Latest known visit with results is:   Ancillary Procedure on 08/26/2022   Component Date Value Ref Range Status     Creatinine POCT 08/26/2022 1.3  0.7 - 1.3 mg/dL Final     GFR, ESTIMATED POCT 08/26/2022 57 (A) >60 mL/min/1.73m2 Final       Imaging:  CT Soft Tissue Neck w Contrast  Narrative: PET CT fusion examination  1. Neck CT with contrast  2. PET study of the neck  3. PET CT fusion study of the neck    History:  Malignant melanoma of scalp (H)   Comparison: PET/CT 6/10/2022 and 3/18/2022    Technique: Please refer to the accompanying whole body PET-CT for  report of the dose and whole body PET-CT findings.  Regarding the neck, axial images were obtained after nonionic  intravenous contrast administration, with sagittal and coronal  reconstructions performed. Neck CT images were reviewed in bone, soft  tissue, and lung windows, with review of the fused PET-CT images as  well in multiple planes.    Findings: Patient head motion during PET acquisition which makes  interpretation slightly suboptimal.  Stable postsurgical changes of left scalp resection with mild  linear/striated subcutaneous attenuation in the resection bed. No new  FDG avid lesions. Presumably postsurgical changes in the left  preauricular region.  Regarding evaluation of the mucosal space, there is no definite  abnormality or abnormal metabolic uptake on PET CT in the  nasopharynx,  oropharynx, hypopharynx, or of the glottis. Regarding the tongue base,  no abnormality is identified. Regarding the major salivary glands, no  abnormality is identified. Regarding the thyroid gland, no abnormality  is identified.     Regarding the cervical lymph nodes, there is no definite  lymphadenopathy.     Limited evaluation of the cervical vertebra demonstrates that there is  no overt spinal canal stenosis. Right maxillary sinus mucous retention  cyst. Visualized paranasal sinuses are otherwise clear. Regarding the  mastoid air cells, there is no evidence of significant debris or  fluid. Major cervical vasculature is patent with mild-to-moderate  atherosclerosis of the carotid bifurcations.    Regarding the visualized lung apices, there is no definite  abnormality, and the lung apices appear relatively clear.  Impression: Impression:   1. Stable postsurgical changes of left scalp without evidence of local  recurrence or metastatic disease.   2. No cervical lymphadenopathy.  3. Please refer to the whole body PET CT performed as a separate  report, for the findings of the remainder of the body.     I have personally reviewed the examination and initial interpretation  and I agree with the findings.    CATHI RODRIGUEZ MD         SYSTEM ID:  [id number]  PET Oncology Whole Body  Narrative: Combined Report of: PET and CT on 8/26/2022 4:18 PM:    1. PET of the neck, chest, abdomen, and pelvis.  2. PET CT Fusion for Attenuation Correction and Anatomical  Localization.  3. Diagnostic CT of the chest, abdomen and pelvis with intravenous  contrast obtained for diagnostic interpretation.  4. 3D MIP and PET-CT fused images were processed on an independent  workstation and archived to PACS and reviewed by a radiologist.    Technique:    1. PET: The patient received 13.37 mCi of F-18-FDG. The serum glucose  was 105 prior to administration. Body weight was 92.1 kg. Images were  evaluated in the axial, sagittal, and  coronal planes as well as the  rotational whole body MIP. Images were acquired from the cranial  vertex to the feet.    UPTAKE WAS MEASURED AT 60 MINUTES.     BACKGROUND: Liver SUV max = 4.56, Aorta Blood SUV Max = 3.63.     2. CT: Volumetric acquisition for clinical interpretation of the  chest, abdomen, and pelvis acquired at 3 mm sections. The chest,  abdomen, and pelvis were evaluated at 5 mm sections in bone, soft  tissue, and lung windows.    Contrast and Medications:  IV contrast: 124 mL of Isovue 370 intravenously.  PO contrast: 500 mL oral Breeza contrast.  Additional Medications: None.    3. 3D MIP and PET-CT fused images were processed on an independent  workstation and archived to PACS and reviewed by a radiologist.    INDICATION: melanoma; Malignant melanoma of scalp (H).    ADDITIONAL INFORMATION OBTAINED FROM EMR: Stage IIIB melanoma of the  scalp diagnosed in late 2020 post wide local excision and  chemotherapy.    COMPARISON: PET/CT 6/10/2022 and 3/18/2022.    FINDINGS:     HEAD/NECK:  Please see dedicated neuroradiology report for findings of the  high-resolution PET/CT the neck.    CHEST:  There is no suspicious FDG uptake in the chest.    The central tracheobronchial tree is clear. No pleural effusion or  pneumothorax. No acute consolidation.     No hypermetabolic lymph nodes are demonstrated in the chest.    Heart size is within normal limits. No pericardial effusion. The  thoracic aorta and main pulmonary artery are within normal limits for  diameter. The esophagus is unremarkable.     ABDOMEN AND PELVIS:  There is no suspicious FDG uptake in the abdomen or pelvis.    The liver is unremarkable. The gallbladder is unremarkable. No  intrahepatic or extrahepatic biliary ductal dilatation. The pancreas  is unremarkable. No pancreatic ductal dilatation. The spleen is  unremarkable. The adrenal glands are unremarkable.    Symmetric enhancement of the kidneys. No hydronephrosis. Stable left  renal  cyst measuring up to 2.6 cm. The urinary bladder is  unremarkable. The reproductive organs are unremarkable.    No hypermetabolic lymph nodes are demonstrated in the abdomen or  pelvis.    Normal caliber of the small and large bowel. Colonic diverticulosis  without evidence of acute diverticulitis. No free air, free fluid, or  fluid collection. Normal caliber of the abdominal aorta.    LOWER EXTREMITIES:   There is no suspicious FDG uptake in the visualized lower extremities.    BONES:   There is no suspicious FDG uptake in the skeleton. There are no  suspicious lytic or blastic osseous lesions. Multilevel degenerative  changes of the visualized spine. Partially visualized hardware in the  left third metacarpal joint.  Impression: IMPRESSION: In this patient with history of treated scalp melanoma:  1. No evidence of recurrent disease in the chest, abdomen or pelvis.  2. Please see dedicated report for findings of the high resolution  PET/CT of the neck.    I have personally reviewed the examination and initial interpretation  and I agree with the findings.    CATHI RODRIGUEZ MD         SYSTEM ID:  [id number]  CT Chest/Abdomen/Pelvis w Contrast  Narrative: Combined Report of: PET and CT on 8/26/2022 4:18 PM:    1. PET of the neck, chest, abdomen, and pelvis.  2. PET CT Fusion for Attenuation Correction and Anatomical  Localization.  3. Diagnostic CT of the chest, abdomen and pelvis with intravenous  contrast obtained for diagnostic interpretation.  4. 3D MIP and PET-CT fused images were processed on an independent  workstation and archived to PACS and reviewed by a radiologist.    Technique:    1. PET: The patient received 13.37 mCi of F-18-FDG. The serum glucose  was 105 prior to administration. Body weight was 92.1 kg. Images were  evaluated in the axial, sagittal, and coronal planes as well as the  rotational whole body MIP. Images were acquired from the cranial  vertex to the feet.    UPTAKE WAS MEASURED AT 60  MINUTES.     BACKGROUND: Liver SUV max = 4.56, Aorta Blood SUV Max = 3.63.     2. CT: Volumetric acquisition for clinical interpretation of the  chest, abdomen, and pelvis acquired at 3 mm sections. The chest,  abdomen, and pelvis were evaluated at 5 mm sections in bone, soft  tissue, and lung windows.    Contrast and Medications:  IV contrast: 124 mL of Isovue 370 intravenously.  PO contrast: 500 mL oral Breeza contrast.  Additional Medications: None.    3. 3D MIP and PET-CT fused images were processed on an independent  workstation and archived to PACS and reviewed by a radiologist.    INDICATION: melanoma; Malignant melanoma of scalp (H).    ADDITIONAL INFORMATION OBTAINED FROM EMR: Stage IIIB melanoma of the  scalp diagnosed in late 2020 post wide local excision and  chemotherapy.    COMPARISON: PET/CT 6/10/2022 and 3/18/2022.    FINDINGS:     HEAD/NECK:  Please see dedicated neuroradiology report for findings of the  high-resolution PET/CT the neck.    CHEST:  There is no suspicious FDG uptake in the chest.    The central tracheobronchial tree is clear. No pleural effusion or  pneumothorax. No acute consolidation.     No hypermetabolic lymph nodes are demonstrated in the chest.    Heart size is within normal limits. No pericardial effusion. The  thoracic aorta and main pulmonary artery are within normal limits for  diameter. The esophagus is unremarkable.     ABDOMEN AND PELVIS:  There is no suspicious FDG uptake in the abdomen or pelvis.    The liver is unremarkable. The gallbladder is unremarkable. No  intrahepatic or extrahepatic biliary ductal dilatation. The pancreas  is unremarkable. No pancreatic ductal dilatation. The spleen is  unremarkable. The adrenal glands are unremarkable.    Symmetric enhancement of the kidneys. No hydronephrosis. Stable left  renal cyst measuring up to 2.6 cm. The urinary bladder is  unremarkable. The reproductive organs are unremarkable.    No hypermetabolic lymph nodes are  demonstrated in the abdomen or  pelvis.    Normal caliber of the small and large bowel. Colonic diverticulosis  without evidence of acute diverticulitis. No free air, free fluid, or  fluid collection. Normal caliber of the abdominal aorta.    LOWER EXTREMITIES:   There is no suspicious FDG uptake in the visualized lower extremities.    BONES:   There is no suspicious FDG uptake in the skeleton. There are no  suspicious lytic or blastic osseous lesions. Multilevel degenerative  changes of the visualized spine. Partially visualized hardware in the  left third metacarpal joint.  Impression: IMPRESSION: In this patient with history of treated scalp melanoma:  1. No evidence of recurrent disease in the chest, abdomen or pelvis.  2. Please see dedicated report for findings of the high resolution  PET/CT of the neck.    I have personally reviewed the examination and initial interpretation  and I agree with the findings.    CATHI RODRIGUEZ MD         SYSTEM ID:  [id number]        ASSESSMENT AND PLAN  #1 Cutaneous melanoma, scalp primary, pT2a cN1c, clinical Stage IIIB  It was a pleasure to see Mr. Coleman today. He is a 77 year old  with agent orange cutaneous exposures in the [place] war and a diagnosis of malignant melanoma arising from a pre-existing pigmented lesion on the scalp. He had a partial response to neoadjuvant therapy with atezolizumab (3 cycles), vemurafenib, and cobimetinib (4 cycles). He started adjuvant nivolumab through 2/14/2022. He is doing well today with no concern for recurrence on PET-CT obtained 8/26/22.  -Follow-up in 3 months with PET-CT and labs  -continue dermatology follow-up     #2 Depressed mood, anxiety, PTSD  Verbalizes fluctuating depression and anxiety, multifactorial. He has a longstanding history of PTSD. Uses occasional lorazepam. Will continue to work with a psychiatrist out of [place].     #3 Diarrhea  #4 History of IBS  Stable, uses Lomotil prn.        Again, thank you for  allowing me to participate in the care of your patient.      Sincerely,    Oksana Hernández MD

## 2022-09-30 NOTE — PROGRESS NOTES
Lloyd is a 77 year old who is being evaluated via a billable telephone visit.    Pt has pain in right shoulder and neck.      What phone number would you like to be contacted at? [phone]  How would you like to obtain your AVS? [employer]  Phone call duration: 21 minutes    Bianka Farhat         MEDICAL ONCOLOGY PROGRESS NOTE  Melanoma Clinic  Sep 30, 2022    CHIEF COMPLAINT: Scalp melanoma    Melanoma History:  1. He has a small pigmented scalp lesion present for over 30 years. The lesion was biopsied in 1992 and was benign.  2. October 2020, he presented to [facility] with 1 year of progressive enlargement of the lesion and new onset burning, itching, and stinging sensation in the affected scalp.  3. 10/26/20, He was seen at [facility] and he had shave biopsies of A. left central parietal scalp, B. left central occipital scalp, C. Left posterior parietal scalp, and D. punch biopsy of the left superior occipital scalp. Pathology (specimen: [id number]) showed a nodular and nevoid type melanoma, non-ulcerated, Breslow depth up to 1.3 mm, Saurabh's level IV, and up to 3 mitoses per mm2. All margins were involved. Ki-67 10-20%. Sox10 stain is positive and highlights an atypical compound melanocytic proliferation with significant overlying confluence and pagetosis of intraepidermal melanocytes. T-stage: at least pT2a. PD-L1 staining shows PD-L1 TPS <1%. 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  4. 11/25/20, he was seen by Dr. Garcia and he took three 3mm punch biopsies, which returned as dermal melanocytic proliferations with melanophages and fibrosis, consistent with locoregional metastatic melanoma.  5. 12/8/2020 he had PET-CT, which showed FDG avid irregular skin thickening overlying the left parietal region, with no FDG avid lymphadenopathy in the neck and no evidence of metastasis elsewhere in the body.  6. 1/22/2021 start vemurafenib and cobimetinib, ~2/12/21 held for  diarrhea, then resumed.  7. 2/18/2021 Start atezolizumab, last on 4/29/21  8. 5/24/2021 Wide local excision of the scalp melanoma and left latissimus free flap for scalp reconstruction.    Surgical pathology showed features consistent with scar and tumoral melanosis. No definite residual melanoma is seen. Tumoral melanosis (which extends to a depth of around 1.1 mm) is believed to be equivalent to a diagnosis of regressed melanoma.   9. 6/25/21, he starts adjuvant Nivolumab, last dose 2/14/22    INTERVAL HISTORY  Mr. 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     He has been reading a lot. He went back on blood pressure medicine and BPs are running 130/70s.     No new lumps or bumps in the scalp. The scalp is nice and smooth. He notes no new rashes. No shortness of breath or coughing. No nausea, vomiting. He gets diarrhea about once or twice a week, for which he takes Lomotil. He is a coffee drinker, which doesn't feel helps the diarrhea, but he otherwise tries to work on his diet to help manage diarrhea symptoms..    He has yet to make an appointment with dermatology.     Current Outpatient Medications   Medication Sig Dispense Refill     acetaminophen (TYLENOL) 500 MG tablet Take 2 tablets (1,000 mg) by mouth every 8 hours as needed for mild pain 100 tablet 0     diphenoxylate-atropine (LOMOTIL) 2.5-0.025 MG tablet Take 1-2 tablets by mouth 4 times daily as needed for diarrhea 120 tablet 5     docusate sodium (COLACE) 100 MG capsule TAKE ONE CAPSULE BY MOUTH EVERY DAY FOR STOOL SOFTENING       fluvoxaMINE (LUVOX) 100 MG tablet Take 150 mg at bedtime       hydrochlorothiazide (HYDRODIURIL) 25 MG tablet Take 25 mg by mouth daily.       Hypromellose (ARTIFICIAL TEARS OP) Apply  to eye. 2 drops in each eye twice a day as needed       LORazepam (ATIVAN) 0.5 MG tablet Take 1 tablet (0.5 mg) by mouth every 4 hours as needed (Anxiety, Nausea/Vomiting or Sleep) 30 tablet 0      Magnesium Oxide 420 MG TABS Take 420 mg by mouth daily       methocarbamol (ROBAXIN) 500 MG tablet Take 1 tablet (500 mg) by mouth 3 times daily as needed for muscle spasms 10 tablet 0     omeprazole (PRILOSEC) 20 MG capsule Take 1 capsule by mouth 2 times daily. 60 capsule prn     polyethylene glycol (MIRALAX) 17 GM/Dose powder Take 17 g by mouth daily 510 g 0     potassium chloride ER (KLOR-CON M) 20 MEQ CR tablet Take 1 tablet (20 mEq) by mouth daily 7 tablet 0     pregabalin (LYRICA) 150 MG capsule Take 150 mg by mouth 2 times daily       prochlorperazine (COMPAZINE) 10 MG tablet Take 1 tablet (10 mg) by mouth every 6 hours as needed (Nausea/Vomiting) 30 tablet 2     QUEtiapine (SEROQUEL) 300 MG tablet Take 150 mg by mouth At Bedtime        simvastatin (ZOCOR) 80 MG tablet Take 40 mg by mouth At Bedtime        traZODone (DESYREL) 100 MG tablet Take 100 mg by mouth At Bedtime        vitamin D3 (CHOLECALCIFEROL) 1000 units (25 mcg) tablet Take 1,000 Units by mouth daily        vortioxetine (TRINTELLIX) 20 MG tablet TAKE ONE TABLET BY MOUTH EVERY MORNING       Lactobacillus-Inulin (CULTURELLE DIGESTIVE HEALTH) CAPS 1 tab daily (Patient not taking: No sig reported) 30 capsule 1     methocarbamol (ROBAXIN) 500 MG tablet Take 1 tablet (500 mg) by mouth 3 times daily as needed for muscle spasms (Patient not taking: No sig reported) 15 tablet 0     prochlorperazine (COMPAZINE) 10 MG tablet Take 1 tablet (10 mg) by mouth every 6 hours as needed (Nausea/Vomiting) (Patient not taking: No sig reported) 30 tablet 3     senna-docusate (SENOKOT-S/PERICOLACE) 8.6-50 MG tablet Take 1 tablet by mouth 2 times daily (Patient not taking: Reported on 9/30/2022) 30 tablet 0     Objective:  General: patient appears well in no acute distress, alert and oriented, speech clear and fluid  Skin: no visualized rash or lesions on visualized skin  Resp: Appears to be breathing comfortably without accessory muscle usage, speaking in full  sentences, no audible wheezes or cough.  Psych: Coherent speech, normal rate and volume, able to articulate logical thoughts, able to abstract reason, no tangential thoughts, no hallucinations or delusions  Patient's affect is appropriate.    Labs:  No visits with results within 1 Week(s) from this visit.   Latest known visit with results is:   Ancillary Procedure on 08/26/2022   Component Date Value Ref Range Status     Creatinine POCT 08/26/2022 1.3  0.7 - 1.3 mg/dL Final     GFR, ESTIMATED POCT 08/26/2022 57 (A) >60 mL/min/1.73m2 Final       Imaging:  CT Soft Tissue Neck w Contrast  Narrative: PET CT fusion examination  1. Neck CT with contrast  2. PET study of the neck  3. PET CT fusion study of the neck    History:  Malignant melanoma of scalp (H)   Comparison: PET/CT 6/10/2022 and 3/18/2022    Technique: Please refer to the accompanying whole body PET-CT for  report of the dose and whole body PET-CT findings.  Regarding the neck, axial images were obtained after nonionic  intravenous contrast administration, with sagittal and coronal  reconstructions performed. Neck CT images were reviewed in bone, soft  tissue, and lung windows, with review of the fused PET-CT images as  well in multiple planes.    Findings: Patient head motion during PET acquisition which makes  interpretation slightly suboptimal.  Stable postsurgical changes of left scalp resection with mild  linear/striated subcutaneous attenuation in the resection bed. No new  FDG avid lesions. Presumably postsurgical changes in the left  preauricular region.  Regarding evaluation of the mucosal space, there is no definite  abnormality or abnormal metabolic uptake on PET CT in the nasopharynx,  oropharynx, hypopharynx, or of the glottis. Regarding the tongue base,  no abnormality is identified. Regarding the major salivary glands, no  abnormality is identified. Regarding the thyroid gland, no abnormality  is identified.     Regarding the cervical lymph  nodes, there is no definite  lymphadenopathy.     Limited evaluation of the cervical vertebra demonstrates that there is  no overt spinal canal stenosis. Right maxillary sinus mucous retention  cyst. Visualized paranasal sinuses are otherwise clear. Regarding the  mastoid air cells, there is no evidence of significant debris or  fluid. Major cervical vasculature is patent with mild-to-moderate  atherosclerosis of the carotid bifurcations.    Regarding the visualized lung apices, there is no definite  abnormality, and the lung apices appear relatively clear.  Impression: Impression:   1. Stable postsurgical changes of left scalp without evidence of local  recurrence or metastatic disease.   2. No cervical lymphadenopathy.  3. Please refer to the whole body PET CT performed as a separate  report, for the findings of the remainder of the body.     I have personally reviewed the examination and initial interpretation  and I agree with the findings.    CATHI RODRIGUEZ MD         SYSTEM ID:  [id number]  PET Oncology Whole Body  Narrative: Combined Report of: PET and CT on 8/26/2022 4:18 PM:    1. PET of the neck, chest, abdomen, and pelvis.  2. PET CT Fusion for Attenuation Correction and Anatomical  Localization.  3. Diagnostic CT of the chest, abdomen and pelvis with intravenous  contrast obtained for diagnostic interpretation.  4. 3D MIP and PET-CT fused images were processed on an independent  workstation and archived to PACS and reviewed by a radiologist.    Technique:    1. PET: The patient received 13.37 mCi of F-18-FDG. The serum glucose  was 105 prior to administration. Body weight was 92.1 kg. Images were  evaluated in the axial, sagittal, and coronal planes as well as the  rotational whole body MIP. Images were acquired from the cranial  vertex to the feet.    UPTAKE WAS MEASURED AT 60 MINUTES.     BACKGROUND: Liver SUV max = 4.56, Aorta Blood SUV Max = 3.63.     2. CT: Volumetric acquisition for clinical  interpretation of the  chest, abdomen, and pelvis acquired at 3 mm sections. The chest,  abdomen, and pelvis were evaluated at 5 mm sections in bone, soft  tissue, and lung windows.    Contrast and Medications:  IV contrast: 124 mL of Isovue 370 intravenously.  PO contrast: 500 mL oral Breeza contrast.  Additional Medications: None.    3. 3D MIP and PET-CT fused images were processed on an independent  workstation and archived to PACS and reviewed by a radiologist.    INDICATION: melanoma; Malignant melanoma of scalp (H).    ADDITIONAL INFORMATION OBTAINED FROM EMR: Stage IIIB melanoma of the  scalp diagnosed in late 2020 post wide local excision and  chemotherapy.    COMPARISON: PET/CT 6/10/2022 and 3/18/2022.    FINDINGS:     HEAD/NECK:  Please see dedicated neuroradiology report for findings of the  high-resolution PET/CT the neck.    CHEST:  There is no suspicious FDG uptake in the chest.    The central tracheobronchial tree is clear. No pleural effusion or  pneumothorax. No acute consolidation.     No hypermetabolic lymph nodes are demonstrated in the chest.    Heart size is within normal limits. No pericardial effusion. The  thoracic aorta and main pulmonary artery are within normal limits for  diameter. The esophagus is unremarkable.     ABDOMEN AND PELVIS:  There is no suspicious FDG uptake in the abdomen or pelvis.    The liver is unremarkable. The gallbladder is unremarkable. No  intrahepatic or extrahepatic biliary ductal dilatation. The pancreas  is unremarkable. No pancreatic ductal dilatation. The spleen is  unremarkable. The adrenal glands are unremarkable.    Symmetric enhancement of the kidneys. No hydronephrosis. Stable left  renal cyst measuring up to 2.6 cm. The urinary bladder is  unremarkable. The reproductive organs are unremarkable.    No hypermetabolic lymph nodes are demonstrated in the abdomen or  pelvis.    Normal caliber of the small and large bowel. Colonic diverticulosis  without  evidence of acute diverticulitis. No free air, free fluid, or  fluid collection. Normal caliber of the abdominal aorta.    LOWER EXTREMITIES:   There is no suspicious FDG uptake in the visualized lower extremities.    BONES:   There is no suspicious FDG uptake in the skeleton. There are no  suspicious lytic or blastic osseous lesions. Multilevel degenerative  changes of the visualized spine. Partially visualized hardware in the  left third metacarpal joint.  Impression: IMPRESSION: In this patient with history of treated scalp melanoma:  1. No evidence of recurrent disease in the chest, abdomen or pelvis.  2. Please see dedicated report for findings of the high resolution  PET/CT of the neck.    I have personally reviewed the examination and initial interpretation  and I agree with the findings.    CATHI RODRIGUEZ MD         SYSTEM ID:  [id number]  CT Chest/Abdomen/Pelvis w Contrast  Narrative: Combined Report of: PET and CT on 8/26/2022 4:18 PM:    1. PET of the neck, chest, abdomen, and pelvis.  2. PET CT Fusion for Attenuation Correction and Anatomical  Localization.  3. Diagnostic CT of the chest, abdomen and pelvis with intravenous  contrast obtained for diagnostic interpretation.  4. 3D MIP and PET-CT fused images were processed on an independent  workstation and archived to PACS and reviewed by a radiologist.    Technique:    1. PET: The patient received 13.37 mCi of F-18-FDG. The serum glucose  was 105 prior to administration. Body weight was 92.1 kg. Images were  evaluated in the axial, sagittal, and coronal planes as well as the  rotational whole body MIP. Images were acquired from the cranial  vertex to the feet.    UPTAKE WAS MEASURED AT 60 MINUTES.     BACKGROUND: Liver SUV max = 4.56, Aorta Blood SUV Max = 3.63.     2. CT: Volumetric acquisition for clinical interpretation of the  chest, abdomen, and pelvis acquired at 3 mm sections. The chest,  abdomen, and pelvis were evaluated at 5 mm sections in bone,  soft  tissue, and lung windows.    Contrast and Medications:  IV contrast: 124 mL of Isovue 370 intravenously.  PO contrast: 500 mL oral Breeza contrast.  Additional Medications: None.    3. 3D MIP and PET-CT fused images were processed on an independent  workstation and archived to PACS and reviewed by a radiologist.    INDICATION: melanoma; Malignant melanoma of scalp (H).    ADDITIONAL INFORMATION OBTAINED FROM EMR: Stage IIIB melanoma of the  scalp diagnosed in late 2020 post wide local excision and  chemotherapy.    COMPARISON: PET/CT 6/10/2022 and 3/18/2022.    FINDINGS:     HEAD/NECK:  Please see dedicated neuroradiology report for findings of the  high-resolution PET/CT the neck.    CHEST:  There is no suspicious FDG uptake in the chest.    The central tracheobronchial tree is clear. No pleural effusion or  pneumothorax. No acute consolidation.     No hypermetabolic lymph nodes are demonstrated in the chest.    Heart size is within normal limits. No pericardial effusion. The  thoracic aorta and main pulmonary artery are within normal limits for  diameter. The esophagus is unremarkable.     ABDOMEN AND PELVIS:  There is no suspicious FDG uptake in the abdomen or pelvis.    The liver is unremarkable. The gallbladder is unremarkable. No  intrahepatic or extrahepatic biliary ductal dilatation. The pancreas  is unremarkable. No pancreatic ductal dilatation. The spleen is  unremarkable. The adrenal glands are unremarkable.    Symmetric enhancement of the kidneys. No hydronephrosis. Stable left  renal cyst measuring up to 2.6 cm. The urinary bladder is  unremarkable. The reproductive organs are unremarkable.    No hypermetabolic lymph nodes are demonstrated in the abdomen or  pelvis.    Normal caliber of the small and large bowel. Colonic diverticulosis  without evidence of acute diverticulitis. No free air, free fluid, or  fluid collection. Normal caliber of the abdominal aorta.    LOWER EXTREMITIES:   There is no  suspicious FDG uptake in the visualized lower extremities.    BONES:   There is no suspicious FDG uptake in the skeleton. There are no  suspicious lytic or blastic osseous lesions. Multilevel degenerative  changes of the visualized spine. Partially visualized hardware in the  left third metacarpal joint.  Impression: IMPRESSION: In this patient with history of treated scalp melanoma:  1. No evidence of recurrent disease in the chest, abdomen or pelvis.  2. Please see dedicated report for findings of the high resolution  PET/CT of the neck.    I have personally reviewed the examination and initial interpretation  and I agree with the findings.    CATHI RODRIGUEZ MD         SYSTEM ID:  [id number]        ASSESSMENT AND PLAN  #1 Cutaneous melanoma, scalp primary, pT2a cN1c, clinical Stage IIIB  It was a pleasure to see Mr. Coleman today. He is a 77 year old  with agent orange cutaneous exposures in the [place] war and a diagnosis of malignant melanoma arising from a pre-existing pigmented lesion on the scalp. He had a partial response to neoadjuvant therapy with atezolizumab (3 cycles), vemurafenib, and cobimetinib (4 cycles). He started adjuvant nivolumab through 2/14/2022. He is doing well today with no concern for recurrence on PET-CT obtained 8/26/22.  -Follow-up in 3 months with PET-CT and labs  -continue dermatology follow-up     #2 Depressed mood, anxiety, PTSD  Verbalizes fluctuating depression and anxiety, multifactorial. He has a longstanding history of PTSD. Uses occasional lorazepam. Will continue to work with a psychiatrist out of [place].     #3 Diarrhea  #4 History of IBS  Stable, uses Lomotil prn.    Oksana Peñaloza M.D.   of Medicine  Hematology, Oncology and Transplantation  Pager: [phone]

## 2022-10-03 ENCOUNTER — TELEPHONE (OUTPATIENT)
Dept: ONCOLOGY | Facility: CLINIC | Age: 77
End: 2022-10-03

## 2022-10-03 NOTE — PROGRESS NOTES
"CLINICAL NUTRITION SERVICES- ONCOLOGY DISTRESS SCREENING     Identified Concern and Score From Distress Screening: This patient has scored >= 6 on Nutrition question 1 and/or 2 on the Oncology Distress Screening questionaire. Nutritionist Referral recommended. See Onc Distress Screening Flowsheet    Date of Distress Screenin/30     Findings: Pt reports that he has a good appetite and know what foods \"he should be eating.\" Pt declined nutrition visit at this time.      Follow-up Required: MARIALUISA Shanks RD, LD  [phone]      "

## 2022-10-06 ENCOUNTER — OFFICE VISIT (OUTPATIENT)
Dept: DERMATOLOGY | Facility: CLINIC | Age: 77
End: 2022-10-06
Attending: INTERNAL MEDICINE
Payer: MEDICARE

## 2022-10-06 DIAGNOSIS — C43.4 MALIGNANT MELANOMA OF SCALP (H): ICD-10-CM

## 2022-10-06 DIAGNOSIS — L82.1 SEBORRHEIC KERATOSIS: Primary | ICD-10-CM

## 2022-10-06 DIAGNOSIS — D22.9 MULTIPLE MELANOCYTIC NEVI: ICD-10-CM

## 2022-10-06 PROCEDURE — 99213 OFFICE O/P EST LOW 20 MIN: CPT | Performed by: DERMATOLOGY

## 2022-10-06 ASSESSMENT — PAIN SCALES - GENERAL: PAINLEVEL: MODERATE PAIN (5)

## 2022-10-06 NOTE — LETTER
10/6/2022       RE: Ahmet Coleman  [address]  [place] [address]     Dear Colleague,    Thank you for referring your patient, Ahmet Coleman, to the [facility] [place] [facility]. Please see a copy of my visit note below.    CHIEF COMPLAINT:  Full body skin check in a patient with history of metastatic melanoma.    HISTORY OF PRESENT ILLNESS:  Lloyd is a very pleasant 77-year-old male who is a new patient to our clinic today, presenting for skin check at the request Dr. Oksana Hernández in Oncology.  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  Please refer to his Oncology note for full details regarding this, but in brief, he reports a longstanding pigmented lesion on the scalp, which was biopsied in the 1990s and interpreted as benign.  In 2020, he had a biopsy which demonstrated features consistent with melanoma.  He was subsequently referred to our academic center at which time scouting biopsies and wide local excision were performed for locoregional metastases of melanoma.  Since mid 2021 he has been maintained on a combination of immunotherapies, most recently nivolumab.  He last saw Dr. Hernández in late September and they reviewed his most recent PET scan, which was negative for evidence of visceral disease.  Today Lloyd reports that he does not believe he has any new or changing pigmented lesions.  He denies any significant problems at his extensive surgical scar site on the scalp.  He does not have any other nonhealing sores or subcutaneous masses.    REVIEW OF SYSTEMS:  No recent fevers.  No significant weight loss.    PHYSICAL EXAMINATION:    GENERAL:  This is a well-appearing, well-nourished male with a normal mood and affect, who is oriented x3.  SKIN:  Complete cutaneous exam including the head, neck, chest, abdomen, back, bilateral upper and lower  extremities and buttocks was performed.  On the left superior scalp, there is an extensive, well-healed skin graft without atypical nodularity or pigmentation.  On the left mid-back, there is a linear, well-healed scar from skin and subcutaneous tissue graft donor site.  On the shoulders, there are scattered 2-5 mm brown, flat-topped papules and macules consistent with benign nevi.  On the posterior legs and back, there are many waxy brown to tan papules consistent with seborrheic keratoses.  Scattered bright red, 2-3 mm papules consistent with cherry angiomas are also present.  No concerning atypical pigmented lesions were identified on today's exam.    ASSESSMENT AND PLAN:       1. History of scalp melanoma with local regional metastases.  By our pathology report description, this appears to be a blue nevus-like melanoma, or possibly a melanoma of unspecified subtype with blue nevus-like locoregional metastases.  Clinically, he is free of disease on skin exam today.  He was reassured in this regard.  We discussed the signs and symptoms of melanoma, melanoma recurrence and nonmelanoma skin cancers.  We also encouraged him to continue observing careful sun protection.     2. Benign findings including seborrheic keratoses and cherry angiomas.  He was reassured as to the benign appearance of these lesions.     3. Multiple benign appearing melanocytic nevi of the trunk.  He was similarly reassured.     4. We would like him to follow up for a full body skin exam in 4 months' time.      Keith Alonzo MD  Dermatology Attending

## 2022-10-06 NOTE — PROGRESS NOTES
CHIEF COMPLAINT:  Full body skin check in a patient with history of metastatic melanoma.    HISTORY OF PRESENT ILLNESS:  Lloyd is a very pleasant 77-year-old male who is a new patient to our clinic today, presenting for skin check at the request Dr. Oksana Hernández in Oncology.  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  Please refer to his Oncology note for full details regarding this, but in brief, he reports a longstanding pigmented lesion on the scalp, which was biopsied in the 1990s and interpreted as benign.  In 2020, he had a biopsy which demonstrated features consistent with melanoma.  He was subsequently referred to our academic center at which time scouting biopsies and wide local excision were performed for locoregional metastases of melanoma.  Since mid 2021 he has been maintained on a combination of immunotherapies, most recently nivolumab.  He last saw Dr. Hernández in late September and they reviewed his most recent PET scan, which was negative for evidence of visceral disease.  Today Lloyd reports that he does not believe he has any new or changing pigmented lesions.  He denies any significant problems at his extensive surgical scar site on the scalp.  He does not have any other nonhealing sores or subcutaneous masses.    REVIEW OF SYSTEMS:  No recent fevers.  No significant weight loss.    PHYSICAL EXAMINATION:    GENERAL:  This is a well-appearing, well-nourished male with a normal mood and affect, who is oriented x3.  SKIN:  Complete cutaneous exam including the head, neck, chest, abdomen, back, bilateral upper and lower extremities and buttocks was performed.  On the left superior scalp, there is an extensive, well-healed skin graft without atypical nodularity or pigmentation.  On the left mid-back, there is a linear, well-healed scar from skin and subcutaneous tissue graft donor site.  On the shoulders, there are scattered 2-5 mm  brown, flat-topped papules and macules consistent with benign nevi.  On the posterior legs and back, there are many waxy brown to tan papules consistent with seborrheic keratoses.  Scattered bright red, 2-3 mm papules consistent with cherry angiomas are also present.  No concerning atypical pigmented lesions were identified on today's exam.    ASSESSMENT AND PLAN:       1. History of scalp melanoma with local regional metastases.  By our pathology report description, this appears to be a blue nevus-like melanoma, or possibly a melanoma of unspecified subtype with blue nevus-like locoregional metastases.  Clinically, he is free of disease on skin exam today.  He was reassured in this regard.  We discussed the signs and symptoms of melanoma, melanoma recurrence and nonmelanoma skin cancers.  We also encouraged him to continue observing careful sun protection.     2. Benign findings including seborrheic keratoses and cherry angiomas.  He was reassured as to the benign appearance of these lesions.     3. Multiple benign appearing melanocytic nevi of the trunk.  He was similarly reassured.     4. We would like him to follow up for a full body skin exam in 4 months' time.      Keith Alonzo MD  Dermatology Attending

## 2022-10-06 NOTE — NURSING NOTE
Dermatology Rooming Note    Ahmet Coleman's goals for this visit include:   Chief Complaint   Patient presents with     Skin Check     Lloyd is here for a skin check and has no spots of concern.      Av Bai, EMT

## 2022-10-07 ENCOUNTER — PATIENT OUTREACH (OUTPATIENT)
Dept: CARE COORDINATION | Facility: CLINIC | Age: 77
End: 2022-10-07

## 2022-10-07 NOTE — PROGRESS NOTES
Oncology Distress Screening Follow-up  Clinical Social Work  [facility]    Identified Concern and Score From Distress Screening:   3. How concerned are you about feeling depressed or very sad?  8 Abnormal        4. How concerned are you about feeling anxious or very scared?  10 Abnormal          Date of Distress Screenin2022      Data: Patient is a 77 year old male that is being followed at this clinic due to a diagnosis of malignant melanoma of scalp. At time of last visit, Patient scored positive on distress screen.  called Patient today with intention of introducing them to psychosocial services and support, and following up on elevated distress.        Intervention/Education Provided:  placed call to patient to follow up regarding elevated distress screening questions. Patient reports that [facility] as he is a [place] . Patient reports that he is currently taking medications and has attended support groups in the past.  Patient states he had one really good support group previously but has not been able to find one that was as good and does not enjoy attending the groups anymore.  Patient denies needing any resources for support groups, mentorship programs or therapy services but states he is aware of how to reach out to the [facility] or the clinic should he have needs in the future. Patient denies having any financial concerns at this time.   discussed role and how we are able to support him should he have any needs.  Patient appreciative of the call.     Follow-up Required:  will remain available as needed.    MITUL Jasso, [facility] (Covering for [facility])  , Outpatient Specialty Clinics  [phone]

## 2022-10-09 ENCOUNTER — HEALTH MAINTENANCE LETTER (OUTPATIENT)
Age: 77
End: 2022-10-09

## 2022-10-19 ENCOUNTER — TELEPHONE (OUTPATIENT)
Dept: ONCOLOGY | Facility: CLINIC | Age: 77
End: 2022-10-19

## 2022-10-29 PROBLEM — L82.1 SEBORRHEIC KERATOSIS: Status: ACTIVE | Noted: 2022-10-29

## 2022-10-29 PROBLEM — D22.9 MULTIPLE MELANOCYTIC NEVI: Status: ACTIVE | Noted: 2022-10-29

## 2022-12-08 ENCOUNTER — VIRTUAL VISIT (OUTPATIENT)
Dept: ONCOLOGY | Facility: CLINIC | Age: 77
End: 2022-12-08
Attending: INTERNAL MEDICINE
Payer: MEDICARE

## 2022-12-08 DIAGNOSIS — N18.1 CHRONIC KIDNEY DISEASE, STAGE 1: ICD-10-CM

## 2022-12-08 DIAGNOSIS — C43.4 MALIGNANT MELANOMA OF SCALP (H): Primary | ICD-10-CM

## 2022-12-08 PROCEDURE — 99442 PR PHYSICIAN TELEPHONE EVALUATION 11-20 MIN: CPT | Mod: 95 | Performed by: INTERNAL MEDICINE

## 2022-12-08 NOTE — NURSING NOTE
Ahmet Coleman 77 year old male presents today to discuss intermittent sore throat for about 2 weeks now. Denies any other cold sx at this time.  Melva Damon, Virtual Facilitator

## 2022-12-08 NOTE — LETTER
12/8/2022         RE: Ahmet Coleman  [address]  [place] [facility] [place] [facility] [address]        Dear Colleague,    Thank you for referring your patient, Ahmet Coleman, to the [facility]. Please see a copy of my visit note below.    Lloyd is a 77 year old who is being evaluated via a billable telephone visit.      Patient stated he is in the state of [place] for the visit today.    What phone number would you like to be contacted at? [phone]  How would you like to obtain your AVS? Sara Damon, Virtual Visit Facilitator    Phone call duration: 15 minutes    MEDICAL ONCOLOGY PROGRESS NOTE  Melanoma Clinic  Dec 8, 2022    CHIEF COMPLAINT: Scalp melanoma    Melanoma History:  1. He has a small pigmented scalp lesion present for over 30 years. The lesion was biopsied in 1992 and was benign.  2. October 2020, he presented to [facility] with 1 year of progressive enlargement of the lesion and new onset burning, itching, and stinging sensation in the affected scalp.  3. 10/26/20, He was seen at [facility] and he had shave biopsies of A. left central parietal scalp, B. left central occipital scalp, C. Left posterior parietal scalp, and D. punch biopsy of the left superior occipital scalp. Pathology (specimen: [id number]) showed a nodular and nevoid type melanoma, non-ulcerated, Breslow depth up to 1.3 mm, Saurabh's level IV, and up to 3 mitoses per mm2. All margins were involved. Ki-67 10-20%. Sox10 stain is positive and highlights an atypical compound melanocytic proliferation with significant overlying confluence and pagetosis of intraepidermal melanocytes. T-stage: at least pT2a. PD-L1 staining shows PD-L1 TPS <1%. 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  4. 11/25/20, he was seen by Dr. Garcia and he took three 3mm punch biopsies, which returned as dermal melanocytic proliferations with melanophages and fibrosis, consistent with locoregional  metastatic melanoma.  5. 12/8/2020 he had PET-CT, which showed FDG avid irregular skin thickening overlying the left parietal region, with no FDG avid lymphadenopathy in the neck and no evidence of metastasis elsewhere in the body.  6. 1/22/2021 start vemurafenib and cobimetinib, ~2/12/21 held for diarrhea, then resumed.  7. 2/18/2021 Start atezolizumab, last on 4/29/21  8. 5/24/2021 Wide local excision of the scalp melanoma and left latissimus free flap for scalp reconstruction.    Surgical pathology showed features consistent with scar and tumoral melanosis. No definite residual melanoma is seen. Tumoral melanosis (which extends to a depth of around 1.1 mm) is believed to be equivalent to a diagnosis of regressed melanoma.   9. 6/25/21, he starts adjuvant Nivolumab, last dose 2/14/22    INTERVAL HISTORY  Mr. 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     No new lumps or bumps that he's noticed. Maybe some weight gain. He was scheduled for PET-CT prior to our visit, but he was sick with a upper respiratory infection and had to reschedule the exam for 12/16.     Current Outpatient Medications   Medication Sig Dispense Refill     acetaminophen (TYLENOL) 500 MG tablet Take 2 tablets (1,000 mg) by mouth every 8 hours as needed for mild pain 100 tablet 0     diphenoxylate-atropine (LOMOTIL) 2.5-0.025 MG tablet Take 1-2 tablets by mouth 4 times daily as needed for diarrhea 120 tablet 5     docusate sodium (COLACE) 100 MG capsule TAKE ONE CAPSULE BY MOUTH EVERY DAY FOR STOOL SOFTENING       fluvoxaMINE (LUVOX) 100 MG tablet Take 150 mg at bedtime       hydrochlorothiazide (HYDRODIURIL) 25 MG tablet Take 25 mg by mouth daily.       Hypromellose (ARTIFICIAL TEARS OP) Apply  to eye. 2 drops in each eye twice a day as needed       Lactobacillus-Inulin ([facility] DIGESTIVE [facility]) CAPS 1 tab daily (Patient not taking: No sig reported) 30 capsule 1     LORazepam (ATIVAN) 0.5 MG  tablet Take 1 tablet (0.5 mg) by mouth every 4 hours as needed (Anxiety, Nausea/Vomiting or Sleep) 30 tablet 0     Magnesium Oxide 420 MG TABS Take 420 mg by mouth daily       methocarbamol (ROBAXIN) 500 MG tablet Take 1 tablet (500 mg) by mouth 3 times daily as needed for muscle spasms (Patient not taking: No sig reported) 15 tablet 0     methocarbamol (ROBAXIN) 500 MG tablet Take 1 tablet (500 mg) by mouth 3 times daily as needed for muscle spasms 10 tablet 0     omeprazole (PRILOSEC) 20 MG capsule Take 1 capsule by mouth 2 times daily. 60 capsule prn     polyethylene glycol (MIRALAX) 17 GM/Dose powder Take 17 g by mouth daily 510 g 0     potassium chloride ER (KLOR-CON M) 20 MEQ CR tablet Take 1 tablet (20 mEq) by mouth daily 7 tablet 0     pregabalin (LYRICA) 150 MG capsule Take 150 mg by mouth 2 times daily       prochlorperazine (COMPAZINE) 10 MG tablet Take 1 tablet (10 mg) by mouth every 6 hours as needed (Nausea/Vomiting) (Patient not taking: No sig reported) 30 tablet 3     prochlorperazine (COMPAZINE) 10 MG tablet Take 1 tablet (10 mg) by mouth every 6 hours as needed (Nausea/Vomiting) 30 tablet 2     QUEtiapine (SEROQUEL) 300 MG tablet Take 150 mg by mouth At Bedtime        senna-docusate (SENOKOT-S/PERICOLACE) 8.6-50 MG tablet Take 1 tablet by mouth 2 times daily (Patient not taking: No sig reported) 30 tablet 0     simvastatin (ZOCOR) 80 MG tablet Take 40 mg by mouth At Bedtime        traZODone (DESYREL) 100 MG tablet Take 100 mg by mouth At Bedtime        vitamin D3 (CHOLECALCIFEROL) 1000 units (25 mcg) tablet Take 1,000 Units by mouth daily        vortioxetine (TRINTELLIX) 20 MG tablet TAKE ONE TABLET BY MOUTH EVERY MORNING       Objective:  Telephone visit.    Labs:  N/A    Imaging:  N/A    ASSESSMENT AND PLAN  #1 Cutaneous melanoma, scalp primary, pT2a cN1c, clinical Stage IIIB  It was a pleasure to talk with Mr. Coleman today. He is a 77 year old  with agent orange cutaneous exposures in the  [place] war and a diagnosis of malignant melanoma arising from a pre-existing pigmented lesion on the scalp. He had a partial response to neoadjuvant therapy with atezolizumab (3 cycles), vemurafenib, and cobimetinib (4 cycles). He received adjuvant nivolumab through 2/14/2022. He is doing well today aside from getting over a cold. I will have him return to review PET-CT results after 12/16.  -Thereafter, follow-up in 3 months with PET-CT and labs  -continue dermatology follow-up     #2 Depressed mood, anxiety, PTSD  He has a longstanding history of PTSD. Uses occasional lorazepam. Continues to work with psychiatrist out of [employer]     #3 Diarrhea  #4 History of IBS  Stable, uses Lomotil prn.    Oksana Peñaloza M.D.   of Medicine  Hematology, Oncology and Transplantation  Pager: [phone]

## 2022-12-08 NOTE — PROGRESS NOTES
Lloyd is a 77 year old who is being evaluated via a billable telephone visit.      Patient stated he is in the state of [place] for the visit today.    What phone number would you like to be contacted at? [phone]  How would you like to obtain your AVS? Sara Damon, Virtual Visit Facilitator    Phone call duration: 15 minutes    MEDICAL ONCOLOGY PROGRESS NOTE  Melanoma Clinic  Dec 8, 2022    CHIEF COMPLAINT: Scalp melanoma    Melanoma History:  1. He has a small pigmented scalp lesion present for over 30 years. The lesion was biopsied in 1992 and was benign.  2. October 2020, he presented to [facility] with 1 year of progressive enlargement of the lesion and new onset burning, itching, and stinging sensation in the affected scalp.  3. 10/26/20, He was seen at [facility] dermatology and he had shave biopsies of A. left central parietal scalp, B. left central occipital scalp, C. Left posterior parietal scalp, and D. punch biopsy of the left superior occipital scalp. Pathology (specimen: [id number]) showed a nodular and nevoid type melanoma, non-ulcerated, Breslow depth up to 1.3 mm, Saurabh's level IV, and up to 3 mitoses per mm2. All margins were involved. Ki-67 10-20%. Sox10 stain is positive and highlights an atypical compound melanocytic proliferation with significant overlying confluence and pagetosis of intraepidermal melanocytes. T-stage: at least pT2a. PD-L1 staining shows PD-L1 TPS <1%. 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  4. 11/25/20, he was seen by Dr. Garcia and he took three 3mm punch biopsies, which returned as dermal melanocytic proliferations with melanophages and fibrosis, consistent with locoregional metastatic melanoma.  5. 12/8/2020 he had PET-CT, which showed FDG avid irregular skin thickening overlying the left parietal region, with no FDG avid lymphadenopathy in the neck and no evidence of metastasis elsewhere in the body.  6. 1/22/2021 start vemurafenib and  cobimetinib, ~2/12/21 held for diarrhea, then resumed.  7. 2/18/2021 Start atezolizumab, last on 4/29/21  8. 5/24/2021 Wide local excision of the scalp melanoma and left latissimus free flap for scalp reconstruction.    Surgical pathology showed features consistent with scar and tumoral melanosis. No definite residual melanoma is seen. Tumoral melanosis (which extends to a depth of around 1.1 mm) is believed to be equivalent to a diagnosis of regressed melanoma.   9. 6/25/21, he starts adjuvant Nivolumab, last dose 2/14/22    INTERVAL HISTORY  Mr. 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     No new lumps or bumps that he's noticed. Maybe some weight gain. He was scheduled for PET-CT prior to our visit, but he was sick with a upper respiratory infection and had to reschedule the exam for 12/16.     Current Outpatient Medications   Medication Sig Dispense Refill     acetaminophen (TYLENOL) 500 MG tablet Take 2 tablets (1,000 mg) by mouth every 8 hours as needed for mild pain 100 tablet 0     diphenoxylate-atropine (LOMOTIL) 2.5-0.025 MG tablet Take 1-2 tablets by mouth 4 times daily as needed for diarrhea 120 tablet 5     docusate sodium (COLACE) 100 MG capsule TAKE ONE CAPSULE BY MOUTH EVERY DAY FOR STOOL SOFTENING       fluvoxaMINE (LUVOX) 100 MG tablet Take 150 mg at bedtime       hydrochlorothiazide (HYDRODIURIL) 25 MG tablet Take 25 mg by mouth daily.       Hypromellose (ARTIFICIAL TEARS OP) Apply  to eye. 2 drops in each eye twice a day as needed       Lactobacillus-Inulin ([facility] DIGESTIVE [facility]) CAPS 1 tab daily (Patient not taking: No sig reported) 30 capsule 1     LORazepam (ATIVAN) 0.5 MG tablet Take 1 tablet (0.5 mg) by mouth every 4 hours as needed (Anxiety, Nausea/Vomiting or Sleep) 30 tablet 0     Magnesium Oxide 420 MG TABS Take 420 mg by mouth daily       methocarbamol (ROBAXIN) 500 MG tablet Take 1 tablet (500 mg) by mouth 3 times daily as needed for  muscle spasms (Patient not taking: No sig reported) 15 tablet 0     methocarbamol (ROBAXIN) 500 MG tablet Take 1 tablet (500 mg) by mouth 3 times daily as needed for muscle spasms 10 tablet 0     omeprazole (PRILOSEC) 20 MG capsule Take 1 capsule by mouth 2 times daily. 60 capsule prn     polyethylene glycol (MIRALAX) 17 GM/Dose powder Take 17 g by mouth daily 510 g 0     potassium chloride ER (KLOR-CON M) 20 MEQ CR tablet Take 1 tablet (20 mEq) by mouth daily 7 tablet 0     pregabalin (LYRICA) 150 MG capsule Take 150 mg by mouth 2 times daily       prochlorperazine (COMPAZINE) 10 MG tablet Take 1 tablet (10 mg) by mouth every 6 hours as needed (Nausea/Vomiting) (Patient not taking: No sig reported) 30 tablet 3     prochlorperazine (COMPAZINE) 10 MG tablet Take 1 tablet (10 mg) by mouth every 6 hours as needed (Nausea/Vomiting) 30 tablet 2     QUEtiapine (SEROQUEL) 300 MG tablet Take 150 mg by mouth At Bedtime        senna-docusate (SENOKOT-S/PERICOLACE) 8.6-50 MG tablet Take 1 tablet by mouth 2 times daily (Patient not taking: No sig reported) 30 tablet 0     simvastatin (ZOCOR) 80 MG tablet Take 40 mg by mouth At Bedtime        traZODone (DESYREL) 100 MG tablet Take 100 mg by mouth At Bedtime        vitamin D3 (CHOLECALCIFEROL) 1000 units (25 mcg) tablet Take 1,000 Units by mouth daily        vortioxetine (TRINTELLIX) 20 MG tablet TAKE ONE TABLET BY MOUTH EVERY MORNING       Objective:  Telephone visit.    Labs:  N/A    Imaging:  N/A    ASSESSMENT AND PLAN  #1 Cutaneous melanoma, scalp primary, pT2a cN1c, clinical Stage IIIB  It was a pleasure to talk with Mr. Coleman today. He is a 77 year old  with agent orange cutaneous exposures in the [place] war and a diagnosis of malignant melanoma arising from a pre-existing pigmented lesion on the scalp. He had a partial response to neoadjuvant therapy with atezolizumab (3 cycles), vemurafenib, and cobimetinib (4 cycles). He received adjuvant nivolumab through  2/14/2022. He is doing well today aside from getting over a cold. I will have him return to review PET-CT results after 12/16.  -Thereafter, follow-up in 3 months with PET-CT and labs  -continue dermatology follow-up     #2 Depressed mood, anxiety, PTSD  He has a longstanding history of PTSD. Uses occasional lorazepam. Continues to work with psychiatrist out of [place].     #3 Diarrhea  #4 History of IBS  Stable, uses Lomotil prn.    Oksana Peñaloza M.D.   of Medicine  Hematology, Oncology and Transplantation  Pager: [phone]

## 2022-12-12 ENCOUNTER — PATIENT OUTREACH (OUTPATIENT)
Dept: ONCOLOGY | Facility: CLINIC | Age: 77
End: 2022-12-12

## 2022-12-12 NOTE — PROGRESS NOTES
[facility]: Cancer Care                                                                                          Placed a copy of patient's cancer treatment summary in the mail today.     Lorene Zavala RN BSN  Cancer Survivorship Coordinator

## 2023-01-13 ENCOUNTER — ANCILLARY PROCEDURE (OUTPATIENT)
Dept: PET IMAGING | Facility: CLINIC | Age: 78
End: 2023-01-13
Attending: INTERNAL MEDICINE
Payer: MEDICARE

## 2023-01-13 DIAGNOSIS — C43.4 MALIGNANT MELANOMA OF SCALP (H): ICD-10-CM

## 2023-01-13 LAB
CREAT BLD-MCNC: 1.5 MG/DL (ref 0.7–1.3)
GFR SERPL CREATININE-BSD FRML MDRD: 48 ML/MIN/1.73M2

## 2023-01-13 PROCEDURE — 78816 PET IMAGE W/CT FULL BODY: CPT | Mod: MG | Performed by: RADIOLOGY

## 2023-01-13 PROCEDURE — 71260 CT THORAX DX C+: CPT | Performed by: RADIOLOGY

## 2023-01-13 PROCEDURE — A9552 F18 FDG: HCPCS | Performed by: RADIOLOGY

## 2023-01-13 PROCEDURE — 70491 CT SOFT TISSUE NECK W/DYE: CPT | Performed by: RADIOLOGY

## 2023-01-13 PROCEDURE — 74177 CT ABD & PELVIS W/CONTRAST: CPT | Performed by: RADIOLOGY

## 2023-01-13 PROCEDURE — G1010 CDSM STANSON: HCPCS | Performed by: RADIOLOGY

## 2023-01-13 PROCEDURE — 82565 ASSAY OF CREATININE: CPT

## 2023-01-13 RX ORDER — IOPAMIDOL 755 MG/ML
10-135 INJECTION, SOLUTION INTRAVASCULAR ONCE
Status: COMPLETED | OUTPATIENT
Start: 2023-01-13 | End: 2023-01-13

## 2023-01-13 RX ADMIN — IOPAMIDOL 112 ML: 755 INJECTION, SOLUTION INTRAVASCULAR at 10:15

## 2023-01-13 NOTE — PROGRESS NOTES
Lloyd is a 77 year old who is being evaluated via a billable video visit.      How would you like to obtain your AVS? [facility]  If the video visit is dropped, the invitation should be resent by: Send to e-mail at: [email or web address]  Will anyone else be joining your video visit? No    Video-Visit Details    Type of service:  Video Visit   Video Start Time: 10:03 AM  Video End Time:10:10 AM    Originating Location (pt. Location): Home  Distant Location (provider location):  Off-site  Platform used for Video Visit: [place] ONCOLOGY PROGRESS NOTE  Melanoma Clinic  Jan 16, 2023    CHIEF COMPLAINT: Scalp melanoma    Melanoma History:  1. He has a small pigmented scalp lesion present for over 30 years. The lesion was biopsied in 1992 and was benign.  2. October 2020, he presented to [facility] with 1 year of progressive enlargement of the lesion and new onset burning, itching, and stinging sensation in the affected scalp.  3. 10/26/20, He was seen at [facility] and he had shave biopsies of A. left central parietal scalp, B. left central occipital scalp, C. Left posterior parietal scalp, and D. punch biopsy of the left superior occipital scalp. Pathology (specimen: [id number]) showed a nodular and nevoid type melanoma, non-ulcerated, Breslow depth up to 1.3 mm, Saurabh's level IV, and up to 3 mitoses per mm2. All margins were involved. Ki-67 10-20%. Sox10 stain is positive and highlights an atypical compound melanocytic proliferation with significant overlying confluence and pagetosis of intraepidermal melanocytes. T-stage: at least pT2a. PD-L1 staining shows PD-L1 TPS <1%. 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  4. 11/25/20, he was seen by Dr. Garcia and he took three 3mm punch biopsies, which returned as dermal melanocytic proliferations with melanophages and fibrosis, consistent with locoregional metastatic melanoma.  5. 12/8/2020 he had PET-CT, which  showed FDG avid irregular skin thickening overlying the left parietal region, with no FDG avid lymphadenopathy in the neck and no evidence of metastasis elsewhere in the body.  6. 1/22/2021 start vemurafenib and cobimetinib, ~2/12/21 held for diarrhea, then resumed.  7. 2/18/2021 Start atezolizumab, last on 4/29/21  8. 5/24/2021 Wide local excision of the scalp melanoma and left latissimus free flap for scalp reconstruction.    Surgical pathology showed features consistent with scar and tumoral melanosis. No definite residual melanoma is seen. Tumoral melanosis (which extends to a depth of around 1.1 mm) is believed to be equivalent to a diagnosis of regressed melanoma.   9. 6/25/21, he starts adjuvant Nivolumab, last dose 2/14/22    INTERVAL HISTORY  Mr. 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     -Had a good holiday season.  -Denies any new lumps or bumps.   -Denies any falls.  -Has been having some left low achy back pain for the past few weeks. Pain is constant with movement. Does not take anything for anything. Not using ice or heat. Area is not tender.   -Pain goes up the side of the back. Does not travel into the buttock or leg.   -Eating and drinking fairly well.   -Mood is doing fair. His mom was recently put into a nursing home.     Current Outpatient Medications   Medication Sig Dispense Refill     acetaminophen (TYLENOL) 500 MG tablet Take 2 tablets (1,000 mg) by mouth every 8 hours as needed for mild pain 100 tablet 0     diphenoxylate-atropine (LOMOTIL) 2.5-0.025 MG tablet Take 1-2 tablets by mouth 4 times daily as needed for diarrhea 120 tablet 5     docusate sodium (COLACE) 100 MG capsule TAKE ONE CAPSULE BY MOUTH EVERY DAY FOR STOOL SOFTENING       fluvoxaMINE (LUVOX) 100 MG tablet Take 150 mg at bedtime       hydrochlorothiazide (HYDRODIURIL) 25 MG tablet Take 25 mg by mouth daily.       Hypromellose (ARTIFICIAL TEARS OP) Apply  to eye. 2 drops in each  eye twice a day as needed       Lactobacillus-Inulin ([facility] DIGESTIVE [facility]) CAPS 1 tab daily (Patient not taking: No sig reported) 30 capsule 1     LORazepam (ATIVAN) 0.5 MG tablet Take 1 tablet (0.5 mg) by mouth every 4 hours as needed (Anxiety, Nausea/Vomiting or Sleep) 30 tablet 0     Magnesium Oxide 420 MG TABS Take 420 mg by mouth daily       methocarbamol (ROBAXIN) 500 MG tablet Take 1 tablet (500 mg) by mouth 3 times daily as needed for muscle spasms (Patient not taking: No sig reported) 15 tablet 0     methocarbamol (ROBAXIN) 500 MG tablet Take 1 tablet (500 mg) by mouth 3 times daily as needed for muscle spasms 10 tablet 0     omeprazole (PRILOSEC) 20 MG capsule Take 1 capsule by mouth 2 times daily. 60 capsule prn     polyethylene glycol (MIRALAX) 17 GM/Dose powder Take 17 g by mouth daily 510 g 0     potassium chloride ER (KLOR-CON M) 20 MEQ CR tablet Take 1 tablet (20 mEq) by mouth daily 7 tablet 0     pregabalin (LYRICA) 150 MG capsule Take 150 mg by mouth 2 times daily       prochlorperazine (COMPAZINE) 10 MG tablet Take 1 tablet (10 mg) by mouth every 6 hours as needed (Nausea/Vomiting) (Patient not taking: No sig reported) 30 tablet 3     prochlorperazine (COMPAZINE) 10 MG tablet Take 1 tablet (10 mg) by mouth every 6 hours as needed (Nausea/Vomiting) 30 tablet 2     QUEtiapine (SEROQUEL) 300 MG tablet Take 150 mg by mouth At Bedtime        senna-docusate (SENOKOT-S/PERICOLACE) 8.6-50 MG tablet Take 1 tablet by mouth 2 times daily (Patient not taking: No sig reported) 30 tablet 0     simvastatin (ZOCOR) 80 MG tablet Take 40 mg by mouth At Bedtime        traZODone (DESYREL) 100 MG tablet Take 100 mg by mouth At Bedtime        vitamin D3 (CHOLECALCIFEROL) 1000 units (25 mcg) tablet Take 1,000 Units by mouth daily        vortioxetine (TRINTELLIX) 20 MG tablet TAKE ONE TABLET BY MOUTH EVERY MORNING       Objective:  General: patient appears well in no acute distress, alert and oriented, speech  clear and fluid  Skin: no visualized rash or lesions on visualized skin  Resp: Appears to be breathing comfortably without accessory muscle usage, speaking in full sentences, no audible wheezes or cough.  Psych: Coherent speech, normal rate and volume, able to articulate logical thoughts, able to abstract reason, no tangential thoughts, no hallucinations or delusions  Patient's affect is appropriate.    Labs:  Most Recent 3 CBC's:Recent Labs   Lab Test 08/26/22  1338 06/10/22  0953 02/14/22  0853   WBC 4.5 4.5 9.0   HGB 14.3 13.8 14.1   MCV 86 83 82    233 256   ANEUTAUTO 2.4 2.5 6.6     Most Recent 3 BMP's:  Recent Labs   Lab Test 01/13/23  0915 08/26/22  1404 08/26/22 1338 06/10/22  1019 06/10/22  0953 02/14/22  0853   NA  --   --  141  --  140 138   POTASSIUM  --   --  4.0  --  3.5 3.0*   CHLORIDE  --   --  107  --  103 102   CO2  --   --  32  --  33* 29   BUN  --   --  19  --  17 16   CR 1.5* 1.3 1.11   < > 1.46* 1.22   ANIONGAP  --   --  2*  --  4 7   STEPHEN  --   --  9.0  --  9.1 8.8   GLC  --   --  101*  --  117* 121*   PROTTOTAL  --   --  7.6  --  7.3 7.4   ALBUMIN  --   --  3.9  --  3.7 3.7    < > = values in this interval not displayed.    Most Recent 3 LFT's:  Recent Labs   Lab Test 08/26/22 1338 06/10/22  0953 02/14/22  0853   AST 22 12 20   ALT 27 25 25   ALKPHOS 54 52 50   BILITOTAL 0.6 0.4 0.4   I reviewed the above labs today.    IMAGING  Imaging:  CT Chest/Abdomen/Pelvis w Contrast  Narrative: Combined Report of: PET and CT on 1/13/2023 11:06 AM:    1. PET of the neck, chest, abdomen, and pelvis.  2. PET CT Fusion for Attenuation Correction and Anatomical  Localization.  3. Diagnostic CT of the chest, abdomen and pelvis with intravenous  contrast obtained for diagnostic interpretation.  4. 3D MIP and PET-CT fused images were processed on an independent  workstation and archived to PACS and reviewed by a radiologist.    Technique:    1. PET: The patient received 12.38 mCi of F-18-FDG. The serum  glucose  was 109 prior to administration. Body weight was 92.1 kg. Images were  evaluated in the axial, sagittal, and coronal planes as well as the  rotational whole body MIP. Images were acquired from the cranial  vertex to the feet.    UPTAKE WAS MEASURED AT 60 MINUTES.     BACKGROUND: Liver SUV max = 5.3, Aorta Blood SUV Max = 3.1.     2. CT: Volumetric acquisition for clinical interpretation of the  chest, abdomen, and pelvis acquired at 3 mm sections. The chest,  abdomen, and pelvis were evaluated at 5 mm sections in bone, soft  tissue, and lung windows.    Contrast and Medications:  IV contrast: 112 mL of Isovue 370 intravenously.  PO contrast: none.  Additional Medications: None.    3. 3D MIP and PET-CT fused images were processed on an independent  workstation and archived to PACS and reviewed by a radiologist.    INDICATION: Malignant melanoma of scalp (H).    ADDITIONAL INFORMATION OBTAINED FROM EMR: History of resected stage  IIIB melanoma of the scalp. He?had a partial response to neoadjuvant  therapy with atezolizumab (3 cycles), vemurafenib, and cobimetinib?(4  cycles).?He started adjuvant nivolumab through 2/14/2022.?He is doing  well today with no concern for recurrence on PET-CT obtained?8/26/22.    COMPARISON: PET/CT from 8/26/2022 and 8/25/2021.    FINDINGS:     HEAD/NECK:  See dedicated report for the results of the high resolution PET CT of  the neck.     CHEST:  There is no suspicious FDG uptake in the chest.    The central tracheobronchial tree is clear. No pleural effusion or  pneumothorax. No acute consolidation.     No hypermetabolic lymph nodes are demonstrated in the chest.    Heart size is within normal limits. No pericardial effusion. Coronary  artery calcifications. The thoracic aorta and main pulmonary artery  are within normal limits for diameter. The esophagus is unremarkable.     Bilateral rotator cuff muscle uptake.    ABDOMEN AND PELVIS:  There is no suspicious FDG uptake in the  abdomen or pelvis.    Mild diffuse hepatic steatosis. Focal hypodensity along the falciform  ligament likely represents focal fatty infiltration. The gallbladder  is unremarkable. No intrahepatic or extrahepatic biliary ductal  dilatation. The pancreas is unremarkable. No pancreatic ductal  dilatation. The spleen is unremarkable. Subcentimeter focal nodular  thickening of the left adrenal gland, stable dating back to 8/25/2021.    Symmetric enhancement of the kidneys. No hydronephrosis. Left renal  cyst. The urinary bladder is unremarkable. The reproductive organs are  unremarkable.    No hypermetabolic lymph nodes are demonstrated in the abdomen or  pelvis.    Normal caliber of the small and large bowel. Colonic diverticulosis  without diverticulitis. No free air, free fluid, or fluid collection.  Normal caliber of the abdominal aorta. Fat-containing bilateral  inguinal hernias.    LOWER EXTREMITIES:   There is no suspicious FDG uptake in the visualized lower extremities.    Left distal fibular deformity without associated uptake. Partially  visualized hardware in the left third metacarpal joint.    BONES:   Foci of hypermetabolism in left side of L2 max SUV 6.2 without  definite CT abnormality series 4 image 209).    Impression: IMPRESSION: In this patient with resected stage IIIB melanoma of the  scalp, had adjuvant Nivolumab,?last dose 2/14/22.    1. Foci of hypermetabolism in L2. Indeterminant differential  physiologic uptake versus metastatic disease. If clinically indicated,  consider MRI for further evaluation.   2. See dedicated report for the results of the high resolution PET CT  of the neck.     I have personally reviewed the examination and initial interpretation  and I agree with the findings.    GONZALEZ LÓPEZ MD         SYSTEM ID:  [id number]  PET Oncology Whole Body  Narrative: Combined Report of: PET and CT on 1/13/2023 11:06 AM:    1. PET of the neck, chest, abdomen, and pelvis.  2. PET CT  Fusion for Attenuation Correction and Anatomical  Localization.  3. Diagnostic CT of the chest, abdomen and pelvis with intravenous  contrast obtained for diagnostic interpretation.  4. 3D MIP and PET-CT fused images were processed on an independent  workstation and archived to PACS and reviewed by a radiologist.    Technique:    1. PET: The patient received 12.38 mCi of F-18-FDG. The serum glucose  was 109 prior to administration. Body weight was 92.1 kg. Images were  evaluated in the axial, sagittal, and coronal planes as well as the  rotational whole body MIP. Images were acquired from the cranial  vertex to the feet.    UPTAKE WAS MEASURED AT 60 MINUTES.     BACKGROUND: Liver SUV max = 5.3, Aorta Blood SUV Max = 3.1.     2. CT: Volumetric acquisition for clinical interpretation of the  chest, abdomen, and pelvis acquired at 3 mm sections. The chest,  abdomen, and pelvis were evaluated at 5 mm sections in bone, soft  tissue, and lung windows.    Contrast and Medications:  IV contrast: 112 mL of Isovue 370 intravenously.  PO contrast: none.  Additional Medications: None.    3. 3D MIP and PET-CT fused images were processed on an independent  workstation and archived to PACS and reviewed by a radiologist.    INDICATION: Malignant melanoma of scalp (H).    ADDITIONAL INFORMATION OBTAINED FROM EMR: History of resected stage  IIIB melanoma of the scalp. He?had a partial response to neoadjuvant  therapy with atezolizumab (3 cycles), vemurafenib, and cobimetinib?(4  cycles).?He started adjuvant nivolumab through 2/14/2022.?He is doing  well today with no concern for recurrence on PET-CT obtained?8/26/22.    COMPARISON: PET/CT from 8/26/2022 and 8/25/2021.    FINDINGS:     HEAD/NECK:  See dedicated report for the results of the high resolution PET CT of  the neck.     CHEST:  There is no suspicious FDG uptake in the chest.    The central tracheobronchial tree is clear. No pleural effusion or  pneumothorax. No acute  consolidation.     No hypermetabolic lymph nodes are demonstrated in the chest.    Heart size is within normal limits. No pericardial effusion. Coronary  artery calcifications. The thoracic aorta and main pulmonary artery  are within normal limits for diameter. The esophagus is unremarkable.     Bilateral rotator cuff muscle uptake.    ABDOMEN AND PELVIS:  There is no suspicious FDG uptake in the abdomen or pelvis.    Mild diffuse hepatic steatosis. Focal hypodensity along the falciform  ligament likely represents focal fatty infiltration. The gallbladder  is unremarkable. No intrahepatic or extrahepatic biliary ductal  dilatation. The pancreas is unremarkable. No pancreatic ductal  dilatation. The spleen is unremarkable. Subcentimeter focal nodular  thickening of the left adrenal gland, stable dating back to 8/25/2021.    Symmetric enhancement of the kidneys. No hydronephrosis. Left renal  cyst. The urinary bladder is unremarkable. The reproductive organs are  unremarkable.    No hypermetabolic lymph nodes are demonstrated in the abdomen or  pelvis.    Normal caliber of the small and large bowel. Colonic diverticulosis  without diverticulitis. No free air, free fluid, or fluid collection.  Normal caliber of the abdominal aorta. Fat-containing bilateral  inguinal hernias.    LOWER EXTREMITIES:   There is no suspicious FDG uptake in the visualized lower extremities.    Left distal fibular deformity without associated uptake. Partially  visualized hardware in the left third metacarpal joint.    BONES:   Foci of hypermetabolism in left side of L2 max SUV 6.2 without  definite CT abnormality series 4 image 209).    Impression: IMPRESSION: In this patient with resected stage IIIB melanoma of the  scalp, had adjuvant Nivolumab,?last dose 2/14/22.    1. Foci of hypermetabolism in L2. Indeterminant differential  physiologic uptake versus metastatic disease. If clinically indicated,  consider MRI for further evaluation.   2.  See dedicated report for the results of the high resolution PET CT  of the neck.     I have personally reviewed the examination and initial interpretation  and I agree with the findings.    GONZALEZ LÓPEZ MD         SYSTEM ID:  [id number]    I reviewed the above imaging today.    ASSESSMENT AND PLAN  Cutaneous melanoma, scalp primary, pT2a cN1c, clinical Stage IIIB  L2 hypermetabolism  It was a pleasure to talk with Mr. Coleman today. He is a 77 year old  with agent orange cutaneous exposures in the [place] war and a diagnosis of malignant melanoma arising from a pre-existing pigmented lesion on the scalp. He had a partial response to neoadjuvant therapy with atezolizumab (3 cycles), vemurafenib, and cobimetinib (4 cycles). He received adjuvant nivolumab through 2/14/2022. He is doing well today, but has developed some left back pain that could potentially correspond to the questionable area noted at L2 on his PET/CT, as above. Will obtain an L-spine MRI to further evaluate. The remainder of his imaging does not show clear evidence of recurrent or metastatic disease. His neck CT radiology read is pending, but upon my personal review of the images, I do not see clear evidence of recurrence. Next follow-up with dermatology is scheduled for March. Will determine our follow-up plan based on the L-spine MRI results. If not concerning, next follow-up with labs and PET/CT will be in 3 months.     Depressed mood, anxiety, PTSD  He has a longstanding history of PTSD. Uses occasional lorazepam. Continues to work with psychiatrist about every 2 months out of [place].     Sherine Cardoza PA-C  [facility]  [address] [place] [id number] [phone]    25 minutes spent on the date of the encounter doing chart review, review of test results, interpretation of tests, patient visit and documentation

## 2023-01-16 ENCOUNTER — VIRTUAL VISIT (OUTPATIENT)
Dept: ONCOLOGY | Facility: CLINIC | Age: 78
End: 2023-01-16
Attending: PHYSICIAN ASSISTANT
Payer: MEDICARE

## 2023-01-16 DIAGNOSIS — C43.4 MALIGNANT MELANOMA OF SCALP (H): Primary | ICD-10-CM

## 2023-01-16 PROCEDURE — 99213 OFFICE O/P EST LOW 20 MIN: CPT | Mod: 95 | Performed by: PHYSICIAN ASSISTANT

## 2023-01-16 NOTE — LETTER
1/16/2023         RE: Ahmet Coleman  [address]  [place] [address]        Dear Colleague,    Thank you for referring your patient, Ahmet Coleman, to the [facility]. Please see a copy of my visit note below.    Lloyd is a 77 year old who is being evaluated via a billable video visit.      How would you like to obtain your AVS? MyChart  If the video visit is dropped, the invitation should be resent by: Send to e-mail at: [email or web address]  Will anyone else be joining your video visit? No    Video-Visit Details    Type of service:  Video Visit   Video Start Time: 10:03 AM  Video End Time:10:10 AM    Originating Location (pt. Location): Home  Distant Location (provider location):  Off-site  Platform used for Video Visit: [facility] ONCOLOGY PROGRESS NOTE  Melanoma Clinic  Jan 16, 2023    CHIEF COMPLAINT: Scalp melanoma    Melanoma History:  1. He has a small pigmented scalp lesion present for over 30 years. The lesion was biopsied in 1992 and was benign.  2. October 2020, he presented to [facility] with 1 year of progressive enlargement of the lesion and new onset burning, itching, and stinging sensation in the affected scalp.  3. 10/26/20, He was seen at [facility] and he had shave biopsies of A. left central parietal scalp, B. left central occipital scalp, C. Left posterior parietal scalp, and D. punch biopsy of the left superior occipital scalp. Pathology (specimen: [id number]) showed a nodular and nevoid type melanoma, non-ulcerated, Breslow depth up to 1.3 mm, Saurabh's level IV, and up to 3 mitoses per mm2. All margins were involved. Ki-67 10-20%. Sox10 stain is positive and highlights an atypical compound melanocytic proliferation with significant overlying confluence and pagetosis of intraepidermal melanocytes. T-stage: at least pT2a. PD-L1 staining shows PD-L1 TPS <1%. 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  4. 11/25/20, he was seen by Dr. Garcia and he took three 3mm punch biopsies, which returned as dermal melanocytic proliferations with melanophages and fibrosis, consistent with locoregional metastatic melanoma.  5. 12/8/2020 he had PET-CT, which showed FDG avid irregular skin thickening overlying the left parietal region, with no FDG avid lymphadenopathy in the neck and no evidence of metastasis elsewhere in the body.  6. 1/22/2021 start vemurafenib and cobimetinib, ~2/12/21 held for diarrhea, then resumed.  7. 2/18/2021 Start atezolizumab, last on 4/29/21  8. 5/24/2021 Wide local excision of the scalp melanoma and left latissimus free flap for scalp reconstruction.    Surgical pathology showed features consistent with scar and tumoral melanosis. No definite residual melanoma is seen. Tumoral melanosis (which extends to a depth of around 1.1 mm) is believed to be equivalent to a diagnosis of regressed melanoma.   9. 6/25/21, he starts adjuvant Nivolumab, last dose 2/14/22    INTERVAL HISTORY  Mr. 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     -Had a good holiday season.  -Denies any new lumps or bumps.   -Denies any falls.  -Has been having some left low achy back pain for the past few weeks. Pain is constant with movement. Does not take anything for anything. Not using ice or heat. Area is not tender.   -Pain goes up the side of the back. Does not travel into the buttock or leg.   -Eating and drinking fairly well.   -Mood is doing fair. His mom was recently put into a nursing home.     Current Outpatient Medications   Medication Sig Dispense Refill     acetaminophen (TYLENOL) 500 MG tablet Take 2 tablets (1,000 mg) by mouth every 8 hours as needed for mild pain 100 tablet 0     diphenoxylate-atropine (LOMOTIL) 2.5-0.025 MG tablet Take 1-2 tablets by mouth 4 times daily as needed for diarrhea 120 tablet 5     docusate sodium (COLACE) 100 MG capsule TAKE ONE  CAPSULE BY MOUTH EVERY DAY FOR STOOL SOFTENING       fluvoxaMINE (LUVOX) 100 MG tablet Take 150 mg at bedtime       hydrochlorothiazide (HYDRODIURIL) 25 MG tablet Take 25 mg by mouth daily.       Hypromellose (ARTIFICIAL TEARS OP) Apply  to eye. 2 drops in each eye twice a day as needed       Lactobacillus-Inulin (CULTURELLE DIGESTIVE HEALTH) CAPS 1 tab daily (Patient not taking: No sig reported) 30 capsule 1     LORazepam (ATIVAN) 0.5 MG tablet Take 1 tablet (0.5 mg) by mouth every 4 hours as needed (Anxiety, Nausea/Vomiting or Sleep) 30 tablet 0     Magnesium Oxide 420 MG TABS Take 420 mg by mouth daily       methocarbamol (ROBAXIN) 500 MG tablet Take 1 tablet (500 mg) by mouth 3 times daily as needed for muscle spasms (Patient not taking: No sig reported) 15 tablet 0     methocarbamol (ROBAXIN) 500 MG tablet Take 1 tablet (500 mg) by mouth 3 times daily as needed for muscle spasms 10 tablet 0     omeprazole (PRILOSEC) 20 MG capsule Take 1 capsule by mouth 2 times daily. 60 capsule prn     polyethylene glycol (MIRALAX) 17 GM/Dose powder Take 17 g by mouth daily 510 g 0     potassium chloride ER (KLOR-CON M) 20 MEQ CR tablet Take 1 tablet (20 mEq) by mouth daily 7 tablet 0     pregabalin (LYRICA) 150 MG capsule Take 150 mg by mouth 2 times daily       prochlorperazine (COMPAZINE) 10 MG tablet Take 1 tablet (10 mg) by mouth every 6 hours as needed (Nausea/Vomiting) (Patient not taking: No sig reported) 30 tablet 3     prochlorperazine (COMPAZINE) 10 MG tablet Take 1 tablet (10 mg) by mouth every 6 hours as needed (Nausea/Vomiting) 30 tablet 2     QUEtiapine (SEROQUEL) 300 MG tablet Take 150 mg by mouth At Bedtime        senna-docusate (SENOKOT-S/PERICOLACE) 8.6-50 MG tablet Take 1 tablet by mouth 2 times daily (Patient not taking: No sig reported) 30 tablet 0     simvastatin (ZOCOR) 80 MG tablet Take 40 mg by mouth At Bedtime        traZODone (DESYREL) 100 MG tablet Take 100 mg by mouth At Bedtime        vitamin D3  (CHOLECALCIFEROL) 1000 units (25 mcg) tablet Take 1,000 Units by mouth daily        vortioxetine (TRINTELLIX) 20 MG tablet TAKE ONE TABLET BY MOUTH EVERY MORNING       Objective:  General: patient appears well in no acute distress, alert and oriented, speech clear and fluid  Skin: no visualized rash or lesions on visualized skin  Resp: Appears to be breathing comfortably without accessory muscle usage, speaking in full sentences, no audible wheezes or cough.  Psych: Coherent speech, normal rate and volume, able to articulate logical thoughts, able to abstract reason, no tangential thoughts, no hallucinations or delusions  Patient's affect is appropriate.    Labs:  Most Recent 3 CBC's:Recent Labs   Lab Test 08/26/22 1338 06/10/22  0953 02/14/22  0853   WBC 4.5 4.5 9.0   HGB 14.3 13.8 14.1   MCV 86 83 82    233 256   ANEUTAUTO 2.4 2.5 6.6     Most Recent 3 BMP's:  Recent Labs   Lab Test 01/13/23  0915 08/26/22  1404 08/26/22 1338 06/10/22  1019 06/10/22  0953 02/14/22  0853   NA  --   --  141  --  140 138   POTASSIUM  --   --  4.0  --  3.5 3.0*   CHLORIDE  --   --  107  --  103 102   CO2  --   --  32  --  33* 29   BUN  --   --  19  --  17 16   CR 1.5* 1.3 1.11   < > 1.46* 1.22   ANIONGAP  --   --  2*  --  4 7   STEPHEN  --   --  9.0  --  9.1 8.8   GLC  --   --  101*  --  117* 121*   PROTTOTAL  --   --  7.6  --  7.3 7.4   ALBUMIN  --   --  3.9  --  3.7 3.7    < > = values in this interval not displayed.    Most Recent 3 LFT's:  Recent Labs   Lab Test 08/26/22 1338 06/10/22  0953 02/14/22  0853   AST 22 12 20   ALT 27 25 25   ALKPHOS 54 52 50   BILITOTAL 0.6 0.4 0.4   I reviewed the above labs today.    IMAGING  Imaging:  CT Chest/Abdomen/Pelvis w Contrast  Narrative: Combined Report of: PET and CT on 1/13/2023 11:06 AM:    1. PET of the neck, chest, abdomen, and pelvis.  2. PET CT Fusion for Attenuation Correction and Anatomical  Localization.  3. Diagnostic CT of the chest, abdomen and pelvis with  intravenous  contrast obtained for diagnostic interpretation.  4. 3D MIP and PET-CT fused images were processed on an independent  workstation and archived to PACS and reviewed by a radiologist.    Technique:    1. PET: The patient received 12.38 mCi of F-18-FDG. The serum glucose  was 109 prior to administration. Body weight was 92.1 kg. Images were  evaluated in the axial, sagittal, and coronal planes as well as the  rotational whole body MIP. Images were acquired from the cranial  vertex to the feet.    UPTAKE WAS MEASURED AT 60 MINUTES.     BACKGROUND: Liver SUV max = 5.3, Aorta Blood SUV Max = 3.1.     2. CT: Volumetric acquisition for clinical interpretation of the  chest, abdomen, and pelvis acquired at 3 mm sections. The chest,  abdomen, and pelvis were evaluated at 5 mm sections in bone, soft  tissue, and lung windows.    Contrast and Medications:  IV contrast: 112 mL of Isovue 370 intravenously.  PO contrast: none.  Additional Medications: None.    3. 3D MIP and PET-CT fused images were processed on an independent  workstation and archived to PACS and reviewed by a radiologist.    INDICATION: Malignant melanoma of scalp (H).    ADDITIONAL INFORMATION OBTAINED FROM EMR: History of resected stage  IIIB melanoma of the scalp. He?had a partial response to neoadjuvant  therapy with atezolizumab (3 cycles), vemurafenib, and cobimetinib?(4  cycles).?He started adjuvant nivolumab through 2/14/2022.?He is doing  well today with no concern for recurrence on PET-CT obtained?8/26/22.    COMPARISON: PET/CT from 8/26/2022 and 8/25/2021.    FINDINGS:     HEAD/NECK:  See dedicated report for the results of the high resolution PET CT of  the neck.     CHEST:  There is no suspicious FDG uptake in the chest.    The central tracheobronchial tree is clear. No pleural effusion or  pneumothorax. No acute consolidation.     No hypermetabolic lymph nodes are demonstrated in the chest.    Heart size is within normal limits. No  pericardial effusion. Coronary  artery calcifications. The thoracic aorta and main pulmonary artery  are within normal limits for diameter. The esophagus is unremarkable.     Bilateral rotator cuff muscle uptake.    ABDOMEN AND PELVIS:  There is no suspicious FDG uptake in the abdomen or pelvis.    Mild diffuse hepatic steatosis. Focal hypodensity along the falciform  ligament likely represents focal fatty infiltration. The gallbladder  is unremarkable. No intrahepatic or extrahepatic biliary ductal  dilatation. The pancreas is unremarkable. No pancreatic ductal  dilatation. The spleen is unremarkable. Subcentimeter focal nodular  thickening of the left adrenal gland, stable dating back to 8/25/2021.    Symmetric enhancement of the kidneys. No hydronephrosis. Left renal  cyst. The urinary bladder is unremarkable. The reproductive organs are  unremarkable.    No hypermetabolic lymph nodes are demonstrated in the abdomen or  pelvis.    Normal caliber of the small and large bowel. Colonic diverticulosis  without diverticulitis. No free air, free fluid, or fluid collection.  Normal caliber of the abdominal aorta. Fat-containing bilateral  inguinal hernias.    LOWER EXTREMITIES:   There is no suspicious FDG uptake in the visualized lower extremities.    Left distal fibular deformity without associated uptake. Partially  visualized hardware in the left third metacarpal joint.    BONES:   Foci of hypermetabolism in left side of L2 max SUV 6.2 without  definite CT abnormality series 4 image 209).    Impression: IMPRESSION: In this patient with resected stage IIIB melanoma of the  scalp, had adjuvant Nivolumab,?last dose 2/14/22.    1. Foci of hypermetabolism in L2. Indeterminant differential  physiologic uptake versus metastatic disease. If clinically indicated,  consider MRI for further evaluation.   2. See dedicated report for the results of the high resolution PET CT  of the neck.     I have personally reviewed the  examination and initial interpretation  and I agree with the findings.    GONZALEZ LÓPEZ MD         SYSTEM ID:  [id number]  PET Oncology Whole Body  Narrative: Combined Report of: PET and CT on 1/13/2023 11:06 AM:    1. PET of the neck, chest, abdomen, and pelvis.  2. PET CT Fusion for Attenuation Correction and Anatomical  Localization.  3. Diagnostic CT of the chest, abdomen and pelvis with intravenous  contrast obtained for diagnostic interpretation.  4. 3D MIP and PET-CT fused images were processed on an independent  workstation and archived to PACS and reviewed by a radiologist.    Technique:    1. PET: The patient received 12.38 mCi of F-18-FDG. The serum glucose  was 109 prior to administration. Body weight was 92.1 kg. Images were  evaluated in the axial, sagittal, and coronal planes as well as the  rotational whole body MIP. Images were acquired from the cranial  vertex to the feet.    UPTAKE WAS MEASURED AT 60 MINUTES.     BACKGROUND: Liver SUV max = 5.3, Aorta Blood SUV Max = 3.1.     2. CT: Volumetric acquisition for clinical interpretation of the  chest, abdomen, and pelvis acquired at 3 mm sections. The chest,  abdomen, and pelvis were evaluated at 5 mm sections in bone, soft  tissue, and lung windows.    Contrast and Medications:  IV contrast: 112 mL of Isovue 370 intravenously.  PO contrast: none.  Additional Medications: None.    3. 3D MIP and PET-CT fused images were processed on an independent  workstation and archived to PACS and reviewed by a radiologist.    INDICATION: Malignant melanoma of scalp (H).    ADDITIONAL INFORMATION OBTAINED FROM EMR: History of resected stage  IIIB melanoma of the scalp. He?had a partial response to neoadjuvant  therapy with atezolizumab (3 cycles), vemurafenib, and cobimetinib?(4  cycles).?He started adjuvant nivolumab through 2/14/2022.?He is doing  well today with no concern for recurrence on PET-CT obtained?8/26/22.    COMPARISON: PET/CT from 8/26/2022 and  8/25/2021.    FINDINGS:     HEAD/NECK:  See dedicated report for the results of the high resolution PET CT of  the neck.     CHEST:  There is no suspicious FDG uptake in the chest.    The central tracheobronchial tree is clear. No pleural effusion or  pneumothorax. No acute consolidation.     No hypermetabolic lymph nodes are demonstrated in the chest.    Heart size is within normal limits. No pericardial effusion. Coronary  artery calcifications. The thoracic aorta and main pulmonary artery  are within normal limits for diameter. The esophagus is unremarkable.     Bilateral rotator cuff muscle uptake.    ABDOMEN AND PELVIS:  There is no suspicious FDG uptake in the abdomen or pelvis.    Mild diffuse hepatic steatosis. Focal hypodensity along the falciform  ligament likely represents focal fatty infiltration. The gallbladder  is unremarkable. No intrahepatic or extrahepatic biliary ductal  dilatation. The pancreas is unremarkable. No pancreatic ductal  dilatation. The spleen is unremarkable. Subcentimeter focal nodular  thickening of the left adrenal gland, stable dating back to 8/25/2021.    Symmetric enhancement of the kidneys. No hydronephrosis. Left renal  cyst. The urinary bladder is unremarkable. The reproductive organs are  unremarkable.    No hypermetabolic lymph nodes are demonstrated in the abdomen or  pelvis.    Normal caliber of the small and large bowel. Colonic diverticulosis  without diverticulitis. No free air, free fluid, or fluid collection.  Normal caliber of the abdominal aorta. Fat-containing bilateral  inguinal hernias.    LOWER EXTREMITIES:   There is no suspicious FDG uptake in the visualized lower extremities.    Left distal fibular deformity without associated uptake. Partially  visualized hardware in the left third metacarpal joint.    BONES:   Foci of hypermetabolism in left side of L2 max SUV 6.2 without  definite CT abnormality series 4 image 209).    Impression: IMPRESSION: In this  patient with resected stage IIIB melanoma of the  scalp, had adjuvant Nivolumab,?last dose 2/14/22.    1. Foci of hypermetabolism in L2. Indeterminant differential  physiologic uptake versus metastatic disease. If clinically indicated,  consider MRI for further evaluation.   2. See dedicated report for the results of the high resolution PET CT  of the neck.     I have personally reviewed the examination and initial interpretation  and I agree with the findings.    GONZALEZ LÓPEZ MD         SYSTEM ID:  [id number]    I reviewed the above imaging today.    ASSESSMENT AND PLAN  Cutaneous melanoma, scalp primary, pT2a cN1c, clinical Stage IIIB  L2 hypermetabolism  It was a pleasure to talk with Mr. Coleman today. He is a 77 year old  with agent orange cutaneous exposures in the [place] war and a diagnosis of malignant melanoma arising from a pre-existing pigmented lesion on the scalp. He had a partial response to neoadjuvant therapy with atezolizumab (3 cycles), vemurafenib, and cobimetinib (4 cycles). He received adjuvant nivolumab through 2/14/2022. He is doing well today, but has developed some left back pain that could potentially correspond to the questionable area noted at L2 on his PET/CT, as above. Will obtain an L-spine MRI to further evaluate. The remainder of his imaging does not show clear evidence of recurrent or metastatic disease. His neck CT radiology read is pending, but upon my personal review of the images, I do not see clear evidence of recurrence. Next follow-up with dermatology is scheduled for March. Will determine our follow-up plan based on the L-spine MRI results. If not concerning, next follow-up with labs and PET/CT will be in 3 months.     Depressed mood, anxiety, PTSD  He has a longstanding history of PTSD. Uses occasional lorazepam. Continues to work with psychiatrist about every 2 months out of [place].     Sherine Cardoza PA-C  [facility] [place] [id number] [facility] [place] [address]  [phone]    25 minutes spent on the date of the encounter doing chart review, review of test results, interpretation of tests, patient visit and documentation

## 2023-01-16 NOTE — NURSING NOTE
Quetiapine 300 mg at bedtime starting about a week ago.   Patient declined individual allergy and medication review by support staff because nurse reviews medications at home with patient.     Bianka Daniel

## 2023-01-20 ENCOUNTER — LAB (OUTPATIENT)
Dept: LAB | Facility: CLINIC | Age: 78
End: 2023-01-20
Payer: MEDICARE

## 2023-01-20 ENCOUNTER — HOSPITAL ENCOUNTER (OUTPATIENT)
Dept: MRI IMAGING | Facility: CLINIC | Age: 78
Discharge: HOME | End: 2023-01-20
Attending: PHYSICIAN ASSISTANT | Admitting: PHYSICIAN ASSISTANT
Payer: MEDICARE

## 2023-01-20 DIAGNOSIS — C43.4 MALIGNANT MELANOMA OF SCALP (H): ICD-10-CM

## 2023-01-20 LAB
ALBUMIN SERPL-MCNC: 3.7 G/DL (ref 3.4–5)
ALP SERPL-CCNC: 53 U/L (ref 40–150)
ALT SERPL W P-5'-P-CCNC: 25 U/L (ref 0–70)
ANION GAP SERPL CALCULATED.3IONS-SCNC: 5 MMOL/L (ref 3–14)
AST SERPL W P-5'-P-CCNC: 14 U/L (ref 0–45)
BASOPHILS # BLD AUTO: 0 10E3/UL (ref 0–0.2)
BASOPHILS NFR BLD AUTO: 1 %
BILIRUB SERPL-MCNC: 0.4 MG/DL (ref 0.2–1.3)
BUN SERPL-MCNC: 21 MG/DL (ref 7–30)
CALCIUM SERPL-MCNC: 9 MG/DL (ref 8.5–10.1)
CHLORIDE BLD-SCNC: 103 MMOL/L (ref 94–109)
CO2 SERPL-SCNC: 31 MMOL/L (ref 20–32)
CREAT SERPL-MCNC: 1.39 MG/DL (ref 0.66–1.25)
EOSINOPHIL # BLD AUTO: 0.1 10E3/UL (ref 0–0.7)
EOSINOPHIL NFR BLD AUTO: 1 %
ERYTHROCYTE [DISTWIDTH] IN BLOOD BY AUTOMATED COUNT: 13 % (ref 10–15)
GFR SERPL CREATININE-BSD FRML MDRD: 53 ML/MIN/1.73M2
GLUCOSE BLD-MCNC: 115 MG/DL (ref 70–99)
HCT VFR BLD AUTO: 41.4 % (ref 40–53)
HGB BLD-MCNC: 14.1 G/DL (ref 13.3–17.7)
IMM GRANULOCYTES # BLD: 0 10E3/UL
IMM GRANULOCYTES NFR BLD: 0 %
LDH SERPL L TO P-CCNC: 204 U/L (ref 85–227)
LYMPHOCYTES # BLD AUTO: 1.6 10E3/UL (ref 0.8–5.3)
LYMPHOCYTES NFR BLD AUTO: 24 %
MCH RBC QN AUTO: 28.8 PG (ref 26.5–33)
MCHC RBC AUTO-ENTMCNC: 34.1 G/DL (ref 31.5–36.5)
MCV RBC AUTO: 85 FL (ref 78–100)
MONOCYTES # BLD AUTO: 0.5 10E3/UL (ref 0–1.3)
MONOCYTES NFR BLD AUTO: 8 %
NEUTROPHILS # BLD AUTO: 4.2 10E3/UL (ref 1.6–8.3)
NEUTROPHILS NFR BLD AUTO: 66 %
NRBC # BLD AUTO: 0 10E3/UL
NRBC BLD AUTO-RTO: 0 /100
PLATELET # BLD AUTO: 230 10E3/UL (ref 150–450)
POTASSIUM BLD-SCNC: 3.9 MMOL/L (ref 3.4–5.3)
PROT SERPL-MCNC: 7.5 G/DL (ref 6.8–8.8)
RBC # BLD AUTO: 4.89 10E6/UL (ref 4.4–5.9)
SODIUM SERPL-SCNC: 139 MMOL/L (ref 133–144)
WBC # BLD AUTO: 6.4 10E3/UL (ref 4–11)

## 2023-01-20 PROCEDURE — 255N000002 HC RX 255 OP 636: Performed by: PHYSICIAN ASSISTANT

## 2023-01-20 PROCEDURE — 72158 MRI LUMBAR SPINE W/O & W/DYE: CPT | Mod: ME

## 2023-01-20 PROCEDURE — 85025 COMPLETE CBC W/AUTO DIFF WBC: CPT

## 2023-01-20 PROCEDURE — A9585 GADOBUTROL INJECTION: HCPCS | Performed by: PHYSICIAN ASSISTANT

## 2023-01-20 PROCEDURE — G1010 CDSM STANSON: HCPCS

## 2023-01-20 PROCEDURE — 36415 COLL VENOUS BLD VENIPUNCTURE: CPT

## 2023-01-20 PROCEDURE — 80053 COMPREHEN METABOLIC PANEL: CPT

## 2023-01-20 PROCEDURE — 83615 LACTATE (LD) (LDH) ENZYME: CPT

## 2023-01-20 RX ORDER — GADOBUTROL 604.72 MG/ML
10 INJECTION INTRAVENOUS ONCE
Status: COMPLETED | OUTPATIENT
Start: 2023-01-20 | End: 2023-01-20

## 2023-01-20 RX ADMIN — GADOBUTROL 9 ML: 604.72 INJECTION INTRAVENOUS at 18:57

## 2023-04-21 ENCOUNTER — LAB (OUTPATIENT)
Dept: LAB | Facility: CLINIC | Age: 78
End: 2023-04-21
Payer: MEDICARE

## 2023-04-21 ENCOUNTER — ANCILLARY PROCEDURE (OUTPATIENT)
Dept: PET IMAGING | Facility: CLINIC | Age: 78
End: 2023-04-21
Attending: INTERNAL MEDICINE
Payer: MEDICARE

## 2023-04-21 DIAGNOSIS — C43.4 MALIGNANT MELANOMA OF SCALP (H): ICD-10-CM

## 2023-04-21 LAB
ALBUMIN SERPL-MCNC: 4 G/DL (ref 3.4–5)
ALP SERPL-CCNC: 55 U/L (ref 40–150)
ALT SERPL W P-5'-P-CCNC: 19 U/L (ref 0–70)
ANION GAP SERPL CALCULATED.3IONS-SCNC: 3 MMOL/L (ref 3–14)
AST SERPL W P-5'-P-CCNC: 14 U/L (ref 0–45)
BASOPHILS # BLD AUTO: 0 10E3/UL (ref 0–0.2)
BASOPHILS NFR BLD AUTO: 1 %
BILIRUB SERPL-MCNC: 0.6 MG/DL (ref 0.2–1.3)
BUN SERPL-MCNC: 23 MG/DL (ref 7–30)
CALCIUM SERPL-MCNC: 9.1 MG/DL (ref 8.5–10.1)
CHLORIDE BLD-SCNC: 103 MMOL/L (ref 94–109)
CO2 SERPL-SCNC: 34 MMOL/L (ref 20–32)
CREAT SERPL-MCNC: 1.57 MG/DL (ref 0.66–1.25)
EOSINOPHIL # BLD AUTO: 0.1 10E3/UL (ref 0–0.7)
EOSINOPHIL NFR BLD AUTO: 3 %
ERYTHROCYTE [DISTWIDTH] IN BLOOD BY AUTOMATED COUNT: 13 % (ref 10–15)
GFR SERPL CREATININE-BSD FRML MDRD: 45 ML/MIN/1.73M2
GLUCOSE BLD-MCNC: 110 MG/DL (ref 70–99)
HCT VFR BLD AUTO: 42.4 % (ref 40–53)
HGB BLD-MCNC: 14.5 G/DL (ref 13.3–17.7)
IMM GRANULOCYTES # BLD: 0 10E3/UL
IMM GRANULOCYTES NFR BLD: 0 %
LYMPHOCYTES # BLD AUTO: 1.5 10E3/UL (ref 0.8–5.3)
LYMPHOCYTES NFR BLD AUTO: 39 %
MCH RBC QN AUTO: 29.3 PG (ref 26.5–33)
MCHC RBC AUTO-ENTMCNC: 34.2 G/DL (ref 31.5–36.5)
MCV RBC AUTO: 86 FL (ref 78–100)
MONOCYTES # BLD AUTO: 0.4 10E3/UL (ref 0–1.3)
MONOCYTES NFR BLD AUTO: 10 %
NEUTROPHILS # BLD AUTO: 1.8 10E3/UL (ref 1.6–8.3)
NEUTROPHILS NFR BLD AUTO: 47 %
NRBC # BLD AUTO: 0 10E3/UL
NRBC BLD AUTO-RTO: 0 /100
PLATELET # BLD AUTO: 224 10E3/UL (ref 150–450)
POTASSIUM BLD-SCNC: 3.9 MMOL/L (ref 3.4–5.3)
PROT SERPL-MCNC: 7.5 G/DL (ref 6.8–8.8)
RBC # BLD AUTO: 4.95 10E6/UL (ref 4.4–5.9)
SODIUM SERPL-SCNC: 140 MMOL/L (ref 133–144)
WBC # BLD AUTO: 3.8 10E3/UL (ref 4–11)

## 2023-04-21 PROCEDURE — 36415 COLL VENOUS BLD VENIPUNCTURE: CPT

## 2023-04-21 PROCEDURE — 85025 COMPLETE CBC W/AUTO DIFF WBC: CPT

## 2023-04-21 PROCEDURE — A9552 F18 FDG: HCPCS

## 2023-04-21 PROCEDURE — 70491 CT SOFT TISSUE NECK W/DYE: CPT | Mod: XU

## 2023-04-21 PROCEDURE — 78816 PET IMAGE W/CT FULL BODY: CPT | Mod: MG

## 2023-04-21 PROCEDURE — 71260 CT THORAX DX C+: CPT | Mod: XU

## 2023-04-21 PROCEDURE — 80053 COMPREHEN METABOLIC PANEL: CPT

## 2023-04-21 PROCEDURE — G1010 CDSM STANSON: HCPCS

## 2023-04-21 PROCEDURE — 74177 CT ABD & PELVIS W/CONTRAST: CPT | Mod: XU

## 2023-04-21 RX ORDER — IOPAMIDOL 755 MG/ML
0-135 INJECTION, SOLUTION INTRAVASCULAR ONCE
Status: COMPLETED | OUTPATIENT
Start: 2023-04-21 | End: 2023-04-21

## 2023-04-21 RX ADMIN — IOPAMIDOL 111 ML: 755 INJECTION, SOLUTION INTRAVASCULAR at 12:21

## 2023-04-27 ENCOUNTER — VIRTUAL VISIT (OUTPATIENT)
Dept: ONCOLOGY | Facility: CLINIC | Age: 78
End: 2023-04-27
Attending: INTERNAL MEDICINE
Payer: MEDICARE

## 2023-04-27 DIAGNOSIS — C43.4 MALIGNANT MELANOMA OF SCALP (H): ICD-10-CM

## 2023-04-27 DIAGNOSIS — N18.31 STAGE 3A CHRONIC KIDNEY DISEASE (H): Primary | ICD-10-CM

## 2023-04-27 PROCEDURE — 99215 OFFICE O/P EST HI 40 MIN: CPT | Mod: VID | Performed by: INTERNAL MEDICINE

## 2023-04-27 NOTE — PROGRESS NOTES
Virtual Visit Details    Type of service:  Video Visit   Video Start Time: 9:45 AM  Video End Time:10:10 AM    Originating Location (pt. Location): Home    Distant Location (provider location):  Off-site  Platform used for Video Visit: [facility] ONCOLOGY PROGRESS NOTE  Melanoma Clinic  Apr 27, 2023    CHIEF COMPLAINT: Scalp melanoma    Melanoma History:  1. He has a small pigmented scalp lesion present for over 30 years. The lesion was biopsied in 1992 and was benign.  2. October 2020, he presented to [facility] with 1 year of progressive enlargement of the lesion and new onset burning, itching, and stinging sensation in the affected scalp.  3. 10/26/20, He was seen at [facility] and he had shave biopsies of A. left central parietal scalp, B. left central occipital scalp, C. Left posterior parietal scalp, and D. punch biopsy of the left superior occipital scalp. Pathology (specimen: [id number]) showed a nodular and nevoid type melanoma, non-ulcerated, Breslow depth up to 1.3 mm, Saurabh's level IV, and up to 3 mitoses per mm2. All margins were involved. Ki-67 10-20%. Sox10 stain is positive and highlights an atypical compound melanocytic proliferation with significant overlying confluence and pagetosis of intraepidermal melanocytes. T-stage: at least pT2a. PD-L1 staining shows PD-L1 TPS <1%. 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  4. 11/25/20, he was seen by Dr. Garcia and he took three 3mm punch biopsies, which returned as dermal melanocytic proliferations with melanophages and fibrosis, consistent with locoregional metastatic melanoma.  5. 12/8/2020 he had PET-CT, which showed FDG avid irregular skin thickening overlying the left parietal region, with no FDG avid lymphadenopathy in the neck and no evidence of metastasis elsewhere in the body.  6. 1/22/2021 start vemurafenib and cobimetinib, ~2/12/21 held for diarrhea, then resumed.  7. 2/18/2021 Start atezolizumab, last  on 4/29/21  8. 5/24/2021 Wide local excision of the scalp melanoma and left latissimus free flap for scalp reconstruction.    Surgical pathology showed features consistent with scar and tumoral melanosis. No definite residual melanoma is seen. Tumoral melanosis (which extends to a depth of around 1.1 mm) is believed to be equivalent to a diagnosis of regressed melanoma.   9. 6/25/21, he starts adjuvant Nivolumab, last dose 2/14/22    INTERVAL HISTORY  Mr. 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     He is doing well and denies any major complaints. No new skin lesions, or lumps/bumps or swollen lymph nodes nodes. He denies any urinary changes. No hematuria, dysuria, flank pain. No changes in urinary flow or stream. No fevers or chills.    PET-CT shows no obvious evidence of recurrence or metastasis. There is a small area of FDG uptake anterior to the left ear. Patient denies any skin changes in the area. Otherwise, labs show an elevated creatinine to 1.57. This has been notably elevated since January.      Current Outpatient Medications   Medication Sig Dispense Refill     acetaminophen (TYLENOL) 500 MG tablet Take 2 tablets (1,000 mg) by mouth every 8 hours as needed for mild pain 100 tablet 0     diphenoxylate-atropine (LOMOTIL) 2.5-0.025 MG tablet Take 1-2 tablets by mouth 4 times daily as needed for diarrhea 120 tablet 5     docusate sodium (COLACE) 100 MG capsule TAKE ONE CAPSULE BY MOUTH EVERY DAY FOR STOOL SOFTENING       fluvoxaMINE (LUVOX) 100 MG tablet Take 150 mg at bedtime       hydrochlorothiazide (HYDRODIURIL) 25 MG tablet Take 25 mg by mouth daily.       Hypromellose (ARTIFICIAL TEARS OP) Apply  to eye. 2 drops in each eye twice a day as needed       Lactobacillus-Inulin ([facility] DIGESTIVE [facility]) CAPS 1 tab daily (Patient not taking: No sig reported) 30 capsule 1     LORazepam (ATIVAN) 0.5 MG tablet Take 1 tablet (0.5 mg) by mouth every 4 hours as  needed (Anxiety, Nausea/Vomiting or Sleep) 30 tablet 0     Magnesium Oxide 420 MG TABS Take 420 mg by mouth daily       methocarbamol (ROBAXIN) 500 MG tablet Take 1 tablet (500 mg) by mouth 3 times daily as needed for muscle spasms (Patient not taking: No sig reported) 15 tablet 0     methocarbamol (ROBAXIN) 500 MG tablet Take 1 tablet (500 mg) by mouth 3 times daily as needed for muscle spasms 10 tablet 0     omeprazole (PRILOSEC) 20 MG capsule Take 1 capsule by mouth 2 times daily. 60 capsule prn     polyethylene glycol (MIRALAX) 17 GM/Dose powder Take 17 g by mouth daily 510 g 0     potassium chloride ER (KLOR-CON M) 20 MEQ CR tablet Take 1 tablet (20 mEq) by mouth daily 7 tablet 0     pregabalin (LYRICA) 150 MG capsule Take 150 mg by mouth 2 times daily       prochlorperazine (COMPAZINE) 10 MG tablet Take 1 tablet (10 mg) by mouth every 6 hours as needed (Nausea/Vomiting) (Patient not taking: No sig reported) 30 tablet 3     prochlorperazine (COMPAZINE) 10 MG tablet Take 1 tablet (10 mg) by mouth every 6 hours as needed (Nausea/Vomiting) 30 tablet 2     QUEtiapine (SEROQUEL) 300 MG tablet Take 150 mg by mouth At Bedtime        senna-docusate (SENOKOT-S/PERICOLACE) 8.6-50 MG tablet Take 1 tablet by mouth 2 times daily (Patient not taking: No sig reported) 30 tablet 0     simvastatin (ZOCOR) 80 MG tablet Take 40 mg by mouth At Bedtime        traZODone (DESYREL) 100 MG tablet Take 100 mg by mouth At Bedtime        vitamin D3 (CHOLECALCIFEROL) 1000 units (25 mcg) tablet Take 1,000 Units by mouth daily        vortioxetine (TRINTELLIX) 20 MG tablet TAKE ONE TABLET BY MOUTH EVERY MORNING       Objective:  General: patient appears well in no acute distress, alert and oriented, speech clear and fluid  Skin: no visualized rash or lesions on visualized skin  Resp: Appears to be breathing comfortably without accessory muscle usage, speaking in full sentences, no audible wheezes or cough.  Psych: Coherent speech, normal rate  and volume, able to articulate logical thoughts, able to abstract reason, no tangential thoughts, no hallucinations or delusions  Patient's affect is appropriate.    Labs:  Most Recent 3 CBC's:  Recent Labs   Lab Test 04/21/23  1108 01/20/23  1303 08/26/22  1338   WBC 3.8* 6.4 4.5   HGB 14.5 14.1 14.3   MCV 86 85 86    230 235   ANEUTAUTO 1.8 4.2 2.4     Most Recent 3 BMP's:  Recent Labs   Lab Test 04/21/23  1108 01/20/23  1303 01/13/23  0915 08/26/22  1404 08/26/22  1338    139  --   --  141   POTASSIUM 3.9 3.9  --   --  4.0   CHLORIDE 103 103  --   --  107   CO2 34* 31  --   --  32   BUN 23 21  --   --  19   CR 1.57* 1.39* 1.5*   < > 1.11   ANIONGAP 3 5  --   --  2*   STEPHEN 9.1 9.0  --   --  9.0   * 115*  --   --  101*   PROTTOTAL 7.5 7.5  --   --  7.6   ALBUMIN 4.0 3.7  --   --  3.9    < > = values in this interval not displayed.    Most Recent 3 LFT's:  Recent Labs   Lab Test 04/21/23  1108 01/20/23  1303 08/26/22  1338   AST 14 14 22   ALT 19 25 27   ALKPHOS 55 53 54   BILITOTAL 0.6 0.4 0.6   I reviewed the above labs today.    IMAGING  Imaging:  CT Chest/Abdomen/Pelvis w Contrast  Narrative: Combined Report of: PET and CT on 4/21/2023 1:50 PM:    1. PET of the neck, chest, abdomen, and pelvis.  2. PET CT Fusion for Attenuation Correction and Anatomical  Localization.  3. Diagnostic CT of the chest, abdomen and pelvis with intravenous  contrast obtained for diagnostic interpretation.  4. 3D MIP and PET-CT fused images were processed on an independent  workstation and archived to PACS and reviewed by a radiologist.    Technique:    1. PET: The patient received 14.2 mCi of F-18-FDG. The serum glucose  was 106 prior to administration. Body weight was 91 kg. Images were  evaluated in the axial, sagittal, and coronal planes as well as the  rotational whole body MIP. Images were acquired from the cranial  vertex to the feet.    UPTAKE WAS MEASURED AT 60 MINUTES.     BACKGROUND: Liver SUV max = 4.5,  Aorta Blood SUV Max = 2.9.     2. CT: Volumetric acquisition for clinical interpretation of the  chest, abdomen, and pelvis acquired at 3 mm sections. The chest,  abdomen, and pelvis were evaluated at 5 mm sections in bone, soft  tissue, and lung windows. High resolution images of the neck were  obtained with multiple oblique projection reformats.    Contrast and Medications:  IV contrast: 111 mL of Isovue 370 intravenously.  PO contrast: 500 mL oral Breeza contrast.  Additional Medications: None.    3. 3D MIP and PET-CT fused images were processed on an independent  workstation and archived to PACS and reviewed by a radiologist.    INDICATION: Malignant melanoma of scalp (H).    ADDITIONAL INFORMATION OBTAINED FROM EMR: 78-year-old male with scalp  melanoma, status post wide local excision and left latissimus free  flap reconstruction on 5/24/2021. Most recent treatment with adjuvant  Nivolumab, completed 2/14/2022.    COMPARISON: PET/CT 1/13/2023, lumbar spine MRI 1/20/2023, chest CT  11/19/2021, lower extremity radiograph 6/21/2007    FINDINGS:     HEAD/NECK:    Please see separate dictation for the high resolution PET-CT of the  head and neck performed at the same time for discussion of findings.    CHEST:  There is no suspicious FDG uptake in the chest.    The central tracheobronchial tree is patent. No pleural effusion,  pneumothorax, or focal consolidation. 4 mm solid pulmonary nodule in  the medial right lower lobe (series 8, image 61) is stable. No new or  enlarging pulmonary nodules.    Atherosclerotic calcification of the aorta. The heart is not enlarged.  No significant pericardial effusion. Coronary artery calcification.  The ascending aorta and main pulmonary artery are normal in caliber.  No mediastinal or axillary lymphadenopathy. Surgical clips of the left  chest wall.    ABDOMEN AND PELVIS:  There is no suspicious FDG uptake in the abdomen or pelvis.    Scattered foci of FDG uptake throughout the  liver without definite  correlate on CT, likely due to heterogeneous liver.    Focal FDG uptake in the sigmoid colon with SUV max 9.3 (series 3,  image 270) without definite correlate on CT.    Hypodensities adjacent to the falciform ligament and gallbladder fossa  likely represent focal fatty infiltration. The gallbladder and spleen  are unremarkable. Small soft tissue nodule superior to the spleen  likely represents accessory splenule. Partial fatty atrophy of the  pancreas. Stable 8 mm nodule in the left adrenal gland that  demonstrated fat density on prior noncontrast CT, likely benign  adrenal adenoma. The right adrenal gland is unremarkable. 2.5 cm left  renal cyst. No hydronephrosis or suspicious renal mass. Mild bilateral  nonspecific perinephric fat stranding.    The urinary bladder is partially decompressed and appears  unremarkable. No pelvic mass. Colonic diverticulosis. No bowel wall  thickening or evidence for obstruction. Mild FDG uptake of the anus is  likely inflammatory, including hemorrhoids. Atherosclerotic  calcification of the abdominal aorta and iliac arteries. No abdominal  aortic aneurysm. No free fluid or pneumoperitoneum. No suspicious  lymphadenopathy in the abdomen/pelvis.    LOWER EXTREMITIES:   There is no suspicious FDG uptake in the visualized lower extremities.  Chronic appearing fracture deformities of the distal left tibia and  fibula.     BONES/SOFT TISSUES:  There is no suspicious FDG uptake in the skeleton. There are no  suspicious lytic or blastic osseous lesions. Degenerative changes of  the spine.    Small fat-containing periumbilical hernia.  Impression: IMPRESSION: In this patient with a history of melanoma of the scalp:    1. No evidence for metastatic disease in the chest, abdomen, or  pelvis.  2. Focus of FDG uptake in the sigmoid colon. Consider further  evaluation with colonoscopy (per chart last colonoscopy was in 2017).    I have personally reviewed the examination  and initial interpretation  and I agree with the findings.    MARISSA VINCENT MD         SYSTEM ID:  [id number]  PET Oncology Whole Body  Narrative: Combined Report of: PET and CT on 4/21/2023 1:50 PM:    1. PET of the neck, chest, abdomen, and pelvis.  2. PET CT Fusion for Attenuation Correction and Anatomical  Localization.  3. Diagnostic CT of the chest, abdomen and pelvis with intravenous  contrast obtained for diagnostic interpretation.  4. 3D MIP and PET-CT fused images were processed on an independent  workstation and archived to PACS and reviewed by a radiologist.    Technique:    1. PET: The patient received 14.2 mCi of F-18-FDG. The serum glucose  was 106 prior to administration. Body weight was 91 kg. Images were  evaluated in the axial, sagittal, and coronal planes as well as the  rotational whole body MIP. Images were acquired from the cranial  vertex to the feet.    UPTAKE WAS MEASURED AT 60 MINUTES.     BACKGROUND: Liver SUV max = 4.5, Aorta Blood SUV Max = 2.9.     2. CT: Volumetric acquisition for clinical interpretation of the  chest, abdomen, and pelvis acquired at 3 mm sections. The chest,  abdomen, and pelvis were evaluated at 5 mm sections in bone, soft  tissue, and lung windows. High resolution images of the neck were  obtained with multiple oblique projection reformats.    Contrast and Medications:  IV contrast: 111 mL of Isovue 370 intravenously.  PO contrast: 500 mL oral Breeza contrast.  Additional Medications: None.    3. 3D MIP and PET-CT fused images were processed on an independent  workstation and archived to PACS and reviewed by a radiologist.    INDICATION: Malignant melanoma of scalp (H).    ADDITIONAL INFORMATION OBTAINED FROM EMR: 78-year-old male with scalp  melanoma, status post wide local excision and left latissimus free  flap reconstruction on 5/24/2021. Most recent treatment with adjuvant  Nivolumab, completed 2/14/2022.    COMPARISON: PET/CT 1/13/2023, lumbar spine MRI  1/20/2023, chest CT  11/19/2021, lower extremity radiograph 6/21/2007    FINDINGS:     HEAD/NECK:    Please see separate dictation for the high resolution PET-CT of the  head and neck performed at the same time for discussion of findings.    CHEST:  There is no suspicious FDG uptake in the chest.    The central tracheobronchial tree is patent. No pleural effusion,  pneumothorax, or focal consolidation. 4 mm solid pulmonary nodule in  the medial right lower lobe (series 8, image 61) is stable. No new or  enlarging pulmonary nodules.    Atherosclerotic calcification of the aorta. The heart is not enlarged.  No significant pericardial effusion. Coronary artery calcification.  The ascending aorta and main pulmonary artery are normal in caliber.  No mediastinal or axillary lymphadenopathy. Surgical clips of the left  chest wall.    ABDOMEN AND PELVIS:  There is no suspicious FDG uptake in the abdomen or pelvis.    Scattered foci of FDG uptake throughout the liver without definite  correlate on CT, likely due to heterogeneous liver.    Focal FDG uptake in the sigmoid colon with SUV max 9.3 (series 3,  image 270) without definite correlate on CT.    Hypodensities adjacent to the falciform ligament and gallbladder fossa  likely represent focal fatty infiltration. The gallbladder and spleen  are unremarkable. Small soft tissue nodule superior to the spleen  likely represents accessory splenule. Partial fatty atrophy of the  pancreas. Stable 8 mm nodule in the left adrenal gland that  demonstrated fat density on prior noncontrast CT, likely benign  adrenal adenoma. The right adrenal gland is unremarkable. 2.5 cm left  renal cyst. No hydronephrosis or suspicious renal mass. Mild bilateral  nonspecific perinephric fat stranding.    The urinary bladder is partially decompressed and appears  unremarkable. No pelvic mass. Colonic diverticulosis. No bowel wall  thickening or evidence for obstruction. Mild FDG uptake of the anus  is  likely inflammatory, including hemorrhoids. Atherosclerotic  calcification of the abdominal aorta and iliac arteries. No abdominal  aortic aneurysm. No free fluid or pneumoperitoneum. No suspicious  lymphadenopathy in the abdomen/pelvis.    LOWER EXTREMITIES:   There is no suspicious FDG uptake in the visualized lower extremities.  Chronic appearing fracture deformities of the distal left tibia and  fibula.     BONES/SOFT TISSUES:  There is no suspicious FDG uptake in the skeleton. There are no  suspicious lytic or blastic osseous lesions. Degenerative changes of  the spine.    Small fat-containing periumbilical hernia.  Impression: IMPRESSION: In this patient with a history of melanoma of the scalp:    1. No evidence for metastatic disease in the chest, abdomen, or  pelvis.  2. Focus of FDG uptake in the sigmoid colon. Consider further  evaluation with colonoscopy (per chart last colonoscopy was in 2017).    I have personally reviewed the examination and initial interpretation  and I agree with the findings.    MARISSA VINCENT MD         SYSTEM ID:  [id number]  CT Soft Tissue Neck w Contrast  Narrative: PET CT fusion examination 4/21/2023 1:58 PM  1. Neck CT with contrast  2. PET study of the neck  3. PET CT fusion study of the neck    INDICATION: Malignant melanoma of scalp (H).    ADDITIONAL INFORMATION OBTAINED FROM EMR: 78-year-old male with scalp  melanoma, status post wide local excision and left latissimus free  flap reconstruction on 5/24/2021. Most recent treatment with adjuvant  Nivolumab, completed 2/14/2022.    COMPARISON: PET/CT 1/13/2023, 12/8/2020    Technique: Please refer to the accompanying whole body PET-CT for  report of the dose and whole body PET-CT findings.  Regarding the neck, axial images were obtained after nonionic  intravenous contrast administration, with sagittal and coronal  reconstructions performed. Neck CT images were reviewed in bone, soft  tissue, and lung windows, with review  of the fused PET-CT images as  well in multiple planes.    Findings:    Postsurgical changes of the left temporoparietal and preauricular  scalp with free flap reconstruction. Mild focal uptake in the left  preauricular skin with SUV max 3.2 and mild soft tissue thickening  (series 3, image 66).    Stable hypodensity along the left cerebral convexity measuring 1.1 cm  in greatest depth, likely chronic hygroma.    Evaluation of the mucosal space demonstrates no abnormality or  abnormal metabolic uptake on PET CT in the nasopharynx, oropharynx,  hypopharynx or the glottis. The tongue base appears normal. The major  salivary glands and thyroid gland appear normal.    There is no evident cervical lymphadenopathy, and the cervical lymph  nodes are within normal limits by size criteria. No abnormal metabolic  uptake on PET CT.     Limited evaluation of the cervical vertebral column demonstrates no  evident spinal canal stenosis. Mucous retention cyst in the right  maxillary sinus. The mastoid air cells are clear. The major vascular  structures in the neck are patent.    The visualized lung apices appear clear.  Impression: Impression: In this patient with a history of melanoma of the scalp,  status post excision and reconstruction:    1. Mild soft tissue thickening and FDG uptake of the left preauricular  skin, which may represent disease recurrence. Recommend direct  visualization.  2. No cervical lymphadenopathy.  3. Please refer to the whole body PET CT performed as a separate  report, for the findings of the remainder of the body.    I have personally reviewed the examination and initial interpretation  and I agree with the findings.    MARISSA VINCENT MD         SYSTEM ID:  [id number]    I reviewed the above imaging today.    ASSESSMENT AND PLAN  Cutaneous melanoma, scalp primary, pT2a cN1c, clinical Stage IIIB  L2 hypermetabolism  It was a pleasure to talk with Mr. Coleman today. 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 He had a partial response to neoadjuvant therapy with atezolizumab (3 cycles), vemurafenib, and cobimetinib (4 cycles). He received adjuvant nivolumab through 2/14/2022.    PET-CT shows no obvious evidence for recurrence or metastasis. I think the small left ear FDG avid focus is probably not recurrence, as patient notes no skin changes in the area and I am not able to appreciate anything on the video. I recommended he be seen by dermatology for a thorough face to face evaluation. He should be seen every 3-6 months by dermatology. In the meantime, we will continue systemic surveillance. It has been almost 2 years since the WLE surgery, so would continue with imaging every 3 months at this point. Plan to transition to 4 months after that.    -RTC in 3 months with PET-CT.     Depressed mood, anxiety, PTSD  He has a longstanding history of PTSD. Uses occasional lorazepam. Continues to work with psychiatrist about every 2 months out of [place].     CKD, moderate, stage III  Creatinine is elevated to 1.57. eGFR 45, or stage IIIa kidney disease. Will refer to nephrology given the changes. He is on a diuretic and takes a few other medications, some of which may be impact the renal function.    -nephrology referral    Questions answered.    Oksana Peñaloza M.D.   of Medicine  Hematology, Oncology and Transplantation  Pager: [phone]

## 2023-04-27 NOTE — NURSING NOTE
Is the patient currently in the state of [place]? YES    Visit mode:VIDEO    If the visit is dropped, the patient can be reconnected by: VIDEO VISIT: Text to cell phone: [phone]    Will anyone else be joining the visit? NO      How would you like to obtain your AVS? MyChart    Are changes needed to the allergy or medication list? NO    Reason for visit: Video Visit (Return [facility])  Pt has mild pain in head and eyes.    Bianka Dougherty VF

## 2023-04-27 NOTE — LETTER
4/27/2023         RE: Ahmet Coleman  [address]  [place] [address]        Dear Colleague,    Thank you for referring your patient, Ahmet Coleman, to the [facility]. Please see a copy of my visit note below.    Virtual Visit Details    Type of service:  Video Visit   Video Start Time: 9:45 AM  Video End Time:10:10 AM    Originating Location (pt. Location): Home    Distant Location (provider location):  Off-site  Platform used for Video Visit: [facility] ONCOLOGY PROGRESS NOTE  Melanoma Clinic  Apr 27, 2023    CHIEF COMPLAINT: Scalp melanoma    Melanoma History:  1. He has a small pigmented scalp lesion present for over 30 years. The lesion was biopsied in 1992 and was benign.  2. October 2020, he presented to [facility] with 1 year of progressive enlargement of the lesion and new onset burning, itching, and stinging sensation in the affected scalp.  3. 10/26/20, He was seen at [facility] and he had shave biopsies of A. left central parietal scalp, B. left central occipital scalp, C. Left posterior parietal scalp, and D. punch biopsy of the left superior occipital scalp. Pathology (specimen: [id number]) showed a nodular and nevoid type melanoma, non-ulcerated, Breslow depth up to 1.3 mm, Saurabh's level IV, and up to 3 mitoses per mm2. All margins were involved. Ki-67 10-20%. Sox10 stain is positive and highlights an atypical compound melanocytic proliferation with significant overlying confluence and pagetosis of intraepidermal melanocytes. T-stage: at least pT2a. PD-L1 staining shows PD-L1 TPS <1%. 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  4. 11/25/20, he was seen by Dr. Garcia and he took three 3mm punch biopsies, which returned as dermal melanocytic proliferations with melanophages and fibrosis, consistent with locoregional metastatic melanoma.  5. 12/8/2020 he had PET-CT, which showed FDG avid irregular skin thickening  overlying the left parietal region, with no FDG avid lymphadenopathy in the neck and no evidence of metastasis elsewhere in the body.  6. 1/22/2021 start vemurafenib and cobimetinib, ~2/12/21 held for diarrhea, then resumed.  7. 2/18/2021 Start atezolizumab, last on 4/29/21  8. 5/24/2021 Wide local excision of the scalp melanoma and left latissimus free flap for scalp reconstruction.    Surgical pathology showed features consistent with scar and tumoral melanosis. No definite residual melanoma is seen. Tumoral melanosis (which extends to a depth of around 1.1 mm) is believed to be equivalent to a diagnosis of regressed melanoma.   9. 6/25/21, he starts adjuvant Nivolumab, last dose 2/14/22    INTERVAL HISTORY  Mr. 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     He is doing well and denies any major complaints. No new skin lesions, or lumps/bumps or swollen lymph nodes nodes. He denies any urinary changes. No hematuria, dysuria, flank pain. No changes in urinary flow or stream. No fevers or chills.    PET-CT shows no obvious evidence of recurrence or metastasis. There is a small area of FDG uptake anterior to the left ear. Patient denies any skin changes in the area. Otherwise, labs show an elevated creatinine to 1.57. This has been notably elevated since January.      Current Outpatient Medications   Medication Sig Dispense Refill    acetaminophen (TYLENOL) 500 MG tablet Take 2 tablets (1,000 mg) by mouth every 8 hours as needed for mild pain 100 tablet 0    diphenoxylate-atropine (LOMOTIL) 2.5-0.025 MG tablet Take 1-2 tablets by mouth 4 times daily as needed for diarrhea 120 tablet 5    docusate sodium (COLACE) 100 MG capsule TAKE ONE CAPSULE BY MOUTH EVERY DAY FOR STOOL SOFTENING      fluvoxaMINE (LUVOX) 100 MG tablet Take 150 mg at bedtime      hydrochlorothiazide (HYDRODIURIL) 25 MG tablet Take 25 mg by mouth daily.      Hypromellose (ARTIFICIAL TEARS OP) Apply  to eye.  2 drops in each eye twice a day as needed      Lactobacillus-Inulin ([facility] DIGESTIVE [facility]) CAPS 1 tab daily (Patient not taking: No sig reported) 30 capsule 1    LORazepam (ATIVAN) 0.5 MG tablet Take 1 tablet (0.5 mg) by mouth every 4 hours as needed (Anxiety, Nausea/Vomiting or Sleep) 30 tablet 0    Magnesium Oxide 420 MG TABS Take 420 mg by mouth daily      methocarbamol (ROBAXIN) 500 MG tablet Take 1 tablet (500 mg) by mouth 3 times daily as needed for muscle spasms (Patient not taking: No sig reported) 15 tablet 0    methocarbamol (ROBAXIN) 500 MG tablet Take 1 tablet (500 mg) by mouth 3 times daily as needed for muscle spasms 10 tablet 0    omeprazole (PRILOSEC) 20 MG capsule Take 1 capsule by mouth 2 times daily. 60 capsule prn    polyethylene glycol (MIRALAX) 17 GM/Dose powder Take 17 g by mouth daily 510 g 0    potassium chloride ER (KLOR-CON M) 20 MEQ CR tablet Take 1 tablet (20 mEq) by mouth daily 7 tablet 0    pregabalin (LYRICA) 150 MG capsule Take 150 mg by mouth 2 times daily      prochlorperazine (COMPAZINE) 10 MG tablet Take 1 tablet (10 mg) by mouth every 6 hours as needed (Nausea/Vomiting) (Patient not taking: No sig reported) 30 tablet 3    prochlorperazine (COMPAZINE) 10 MG tablet Take 1 tablet (10 mg) by mouth every 6 hours as needed (Nausea/Vomiting) 30 tablet 2    QUEtiapine (SEROQUEL) 300 MG tablet Take 150 mg by mouth At Bedtime       senna-docusate (SENOKOT-S/PERICOLACE) 8.6-50 MG tablet Take 1 tablet by mouth 2 times daily (Patient not taking: No sig reported) 30 tablet 0    simvastatin (ZOCOR) 80 MG tablet Take 40 mg by mouth At Bedtime       traZODone (DESYREL) 100 MG tablet Take 100 mg by mouth At Bedtime       vitamin D3 (CHOLECALCIFEROL) 1000 units (25 mcg) tablet Take 1,000 Units by mouth daily       vortioxetine (TRINTELLIX) 20 MG tablet TAKE ONE TABLET BY MOUTH EVERY MORNING       Objective:  General: patient appears well in no acute distress, alert and oriented, speech  clear and fluid  Skin: no visualized rash or lesions on visualized skin  Resp: Appears to be breathing comfortably without accessory muscle usage, speaking in full sentences, no audible wheezes or cough.  Psych: Coherent speech, normal rate and volume, able to articulate logical thoughts, able to abstract reason, no tangential thoughts, no hallucinations or delusions  Patient's affect is appropriate.    Labs:  Most Recent 3 CBC's:  Recent Labs   Lab Test 04/21/23  1108 01/20/23  1303 08/26/22  1338   WBC 3.8* 6.4 4.5   HGB 14.5 14.1 14.3   MCV 86 85 86    230 235   ANEUTAUTO 1.8 4.2 2.4     Most Recent 3 BMP's:  Recent Labs   Lab Test 04/21/23  1108 01/20/23  1303 01/13/23  0915 08/26/22  1404 08/26/22  1338    139  --   --  141   POTASSIUM 3.9 3.9  --   --  4.0   CHLORIDE 103 103  --   --  107   CO2 34* 31  --   --  32   BUN 23 21  --   --  19   CR 1.57* 1.39* 1.5*   < > 1.11   ANIONGAP 3 5  --   --  2*   STEPHEN 9.1 9.0  --   --  9.0   * 115*  --   --  101*   PROTTOTAL 7.5 7.5  --   --  7.6   ALBUMIN 4.0 3.7  --   --  3.9    < > = values in this interval not displayed.    Most Recent 3 LFT's:  Recent Labs   Lab Test 04/21/23  1108 01/20/23  1303 08/26/22  1338   AST 14 14 22   ALT 19 25 27   ALKPHOS 55 53 54   BILITOTAL 0.6 0.4 0.6   I reviewed the above labs today.    IMAGING  Imaging:  CT Chest/Abdomen/Pelvis w Contrast  Narrative: Combined Report of: PET and CT on 4/21/2023 1:50 PM:    1. PET of the neck, chest, abdomen, and pelvis.  2. PET CT Fusion for Attenuation Correction and Anatomical  Localization.  3. Diagnostic CT of the chest, abdomen and pelvis with intravenous  contrast obtained for diagnostic interpretation.  4. 3D MIP and PET-CT fused images were processed on an independent  workstation and archived to PACS and reviewed by a radiologist.    Technique:    1. PET: The patient received 14.2 mCi of F-18-FDG. The serum glucose  was 106 prior to administration. Body weight was 91 kg.  Images were  evaluated in the axial, sagittal, and coronal planes as well as the  rotational whole body MIP. Images were acquired from the cranial  vertex to the feet.    UPTAKE WAS MEASURED AT 60 MINUTES.     BACKGROUND: Liver SUV max = 4.5, Aorta Blood SUV Max = 2.9.     2. CT: Volumetric acquisition for clinical interpretation of the  chest, abdomen, and pelvis acquired at 3 mm sections. The chest,  abdomen, and pelvis were evaluated at 5 mm sections in bone, soft  tissue, and lung windows. High resolution images of the neck were  obtained with multiple oblique projection reformats.    Contrast and Medications:  IV contrast: 111 mL of Isovue 370 intravenously.  PO contrast: 500 mL oral Breeza contrast.  Additional Medications: None.    3. 3D MIP and PET-CT fused images were processed on an independent  workstation and archived to PACS and reviewed by a radiologist.    INDICATION: Malignant melanoma of scalp (H).    ADDITIONAL INFORMATION OBTAINED FROM EMR: 78-year-old male with scalp  melanoma, status post wide local excision and left latissimus free  flap reconstruction on 5/24/2021. Most recent treatment with adjuvant  Nivolumab, completed 2/14/2022.    COMPARISON: PET/CT 1/13/2023, lumbar spine MRI 1/20/2023, chest CT  11/19/2021, lower extremity radiograph 6/21/2007    FINDINGS:     HEAD/NECK:    Please see separate dictation for the high resolution PET-CT of the  head and neck performed at the same time for discussion of findings.    CHEST:  There is no suspicious FDG uptake in the chest.    The central tracheobronchial tree is patent. No pleural effusion,  pneumothorax, or focal consolidation. 4 mm solid pulmonary nodule in  the medial right lower lobe (series 8, image 61) is stable. No new or  enlarging pulmonary nodules.    Atherosclerotic calcification of the aorta. The heart is not enlarged.  No significant pericardial effusion. Coronary artery calcification.  The ascending aorta and main pulmonary  artery are normal in caliber.  No mediastinal or axillary lymphadenopathy. Surgical clips of the left  chest wall.    ABDOMEN AND PELVIS:  There is no suspicious FDG uptake in the abdomen or pelvis.    Scattered foci of FDG uptake throughout the liver without definite  correlate on CT, likely due to heterogeneous liver.    Focal FDG uptake in the sigmoid colon with SUV max 9.3 (series 3,  image 270) without definite correlate on CT.    Hypodensities adjacent to the falciform ligament and gallbladder fossa  likely represent focal fatty infiltration. The gallbladder and spleen  are unremarkable. Small soft tissue nodule superior to the spleen  likely represents accessory splenule. Partial fatty atrophy of the  pancreas. Stable 8 mm nodule in the left adrenal gland that  demonstrated fat density on prior noncontrast CT, likely benign  adrenal adenoma. The right adrenal gland is unremarkable. 2.5 cm left  renal cyst. No hydronephrosis or suspicious renal mass. Mild bilateral  nonspecific perinephric fat stranding.    The urinary bladder is partially decompressed and appears  unremarkable. No pelvic mass. Colonic diverticulosis. No bowel wall  thickening or evidence for obstruction. Mild FDG uptake of the anus is  likely inflammatory, including hemorrhoids. Atherosclerotic  calcification of the abdominal aorta and iliac arteries. No abdominal  aortic aneurysm. No free fluid or pneumoperitoneum. No suspicious  lymphadenopathy in the abdomen/pelvis.    LOWER EXTREMITIES:   There is no suspicious FDG uptake in the visualized lower extremities.  Chronic appearing fracture deformities of the distal left tibia and  fibula.     BONES/SOFT TISSUES:  There is no suspicious FDG uptake in the skeleton. There are no  suspicious lytic or blastic osseous lesions. Degenerative changes of  the spine.    Small fat-containing periumbilical hernia.  Impression: IMPRESSION: In this patient with a history of melanoma of the scalp:    1. No  evidence for metastatic disease in the chest, abdomen, or  pelvis.  2. Focus of FDG uptake in the sigmoid colon. Consider further  evaluation with colonoscopy (per chart last colonoscopy was in 2017).    I have personally reviewed the examination and initial interpretation  and I agree with the findings.    MARISSA VINCENT MD         SYSTEM ID:  [id number]  PET Oncology Whole Body  Narrative: Combined Report of: PET and CT on 4/21/2023 1:50 PM:    1. PET of the neck, chest, abdomen, and pelvis.  2. PET CT Fusion for Attenuation Correction and Anatomical  Localization.  3. Diagnostic CT of the chest, abdomen and pelvis with intravenous  contrast obtained for diagnostic interpretation.  4. 3D MIP and PET-CT fused images were processed on an independent  workstation and archived to PACS and reviewed by a radiologist.    Technique:    1. PET: The patient received 14.2 mCi of F-18-FDG. The serum glucose  was 106 prior to administration. Body weight was 91 kg. Images were  evaluated in the axial, sagittal, and coronal planes as well as the  rotational whole body MIP. Images were acquired from the cranial  vertex to the feet.    UPTAKE WAS MEASURED AT 60 MINUTES.     BACKGROUND: Liver SUV max = 4.5, Aorta Blood SUV Max = 2.9.     2. CT: Volumetric acquisition for clinical interpretation of the  chest, abdomen, and pelvis acquired at 3 mm sections. The chest,  abdomen, and pelvis were evaluated at 5 mm sections in bone, soft  tissue, and lung windows. High resolution images of the neck were  obtained with multiple oblique projection reformats.    Contrast and Medications:  IV contrast: 111 mL of Isovue 370 intravenously.  PO contrast: 500 mL oral Breeza contrast.  Additional Medications: None.    3. 3D MIP and PET-CT fused images were processed on an independent  workstation and archived to PACS and reviewed by a radiologist.    INDICATION: Malignant melanoma of scalp (H).    ADDITIONAL INFORMATION OBTAINED FROM EMR:  78-year-old male with scalp  melanoma, status post wide local excision and left latissimus free  flap reconstruction on 5/24/2021. Most recent treatment with adjuvant  Nivolumab, completed 2/14/2022.    COMPARISON: PET/CT 1/13/2023, lumbar spine MRI 1/20/2023, chest CT  11/19/2021, lower extremity radiograph 6/21/2007    FINDINGS:     HEAD/NECK:    Please see separate dictation for the high resolution PET-CT of the  head and neck performed at the same time for discussion of findings.    CHEST:  There is no suspicious FDG uptake in the chest.    The central tracheobronchial tree is patent. No pleural effusion,  pneumothorax, or focal consolidation. 4 mm solid pulmonary nodule in  the medial right lower lobe (series 8, image 61) is stable. No new or  enlarging pulmonary nodules.    Atherosclerotic calcification of the aorta. The heart is not enlarged.  No significant pericardial effusion. Coronary artery calcification.  The ascending aorta and main pulmonary artery are normal in caliber.  No mediastinal or axillary lymphadenopathy. Surgical clips of the left  chest wall.    ABDOMEN AND PELVIS:  There is no suspicious FDG uptake in the abdomen or pelvis.    Scattered foci of FDG uptake throughout the liver without definite  correlate on CT, likely due to heterogeneous liver.    Focal FDG uptake in the sigmoid colon with SUV max 9.3 (series 3,  image 270) without definite correlate on CT.    Hypodensities adjacent to the falciform ligament and gallbladder fossa  likely represent focal fatty infiltration. The gallbladder and spleen  are unremarkable. Small soft tissue nodule superior to the spleen  likely represents accessory splenule. Partial fatty atrophy of the  pancreas. Stable 8 mm nodule in the left adrenal gland that  demonstrated fat density on prior noncontrast CT, likely benign  adrenal adenoma. The right adrenal gland is unremarkable. 2.5 cm left  renal cyst. No hydronephrosis or suspicious renal mass. Mild  bilateral  nonspecific perinephric fat stranding.    The urinary bladder is partially decompressed and appears  unremarkable. No pelvic mass. Colonic diverticulosis. No bowel wall  thickening or evidence for obstruction. Mild FDG uptake of the anus is  likely inflammatory, including hemorrhoids. Atherosclerotic  calcification of the abdominal aorta and iliac arteries. No abdominal  aortic aneurysm. No free fluid or pneumoperitoneum. No suspicious  lymphadenopathy in the abdomen/pelvis.    LOWER EXTREMITIES:   There is no suspicious FDG uptake in the visualized lower extremities.  Chronic appearing fracture deformities of the distal left tibia and  fibula.     BONES/SOFT TISSUES:  There is no suspicious FDG uptake in the skeleton. There are no  suspicious lytic or blastic osseous lesions. Degenerative changes of  the spine.    Small fat-containing periumbilical hernia.  Impression: IMPRESSION: In this patient with a history of melanoma of the scalp:    1. No evidence for metastatic disease in the chest, abdomen, or  pelvis.  2. Focus of FDG uptake in the sigmoid colon. Consider further  evaluation with colonoscopy (per chart last colonoscopy was in 2017).    I have personally reviewed the examination and initial interpretation  and I agree with the findings.    MARISSA VINCENT MD         SYSTEM ID:  [id number]  CT Soft Tissue Neck w Contrast  Narrative: PET CT fusion examination 4/21/2023 1:58 PM  1. Neck CT with contrast  2. PET study of the neck  3. PET CT fusion study of the neck    INDICATION: Malignant melanoma of scalp (H).    ADDITIONAL INFORMATION OBTAINED FROM EMR: 78-year-old male with scalp  melanoma, status post wide local excision and left latissimus free  flap reconstruction on 5/24/2021. Most recent treatment with adjuvant  Nivolumab, completed 2/14/2022.    COMPARISON: PET/CT 1/13/2023, 12/8/2020    Technique: Please refer to the accompanying whole body PET-CT for  report of the dose and whole body  PET-CT findings.  Regarding the neck, axial images were obtained after nonionic  intravenous contrast administration, with sagittal and coronal  reconstructions performed. Neck CT images were reviewed in bone, soft  tissue, and lung windows, with review of the fused PET-CT images as  well in multiple planes.    Findings:    Postsurgical changes of the left temporoparietal and preauricular  scalp with free flap reconstruction. Mild focal uptake in the left  preauricular skin with SUV max 3.2 and mild soft tissue thickening  (series 3, image 66).    Stable hypodensity along the left cerebral convexity measuring 1.1 cm  in greatest depth, likely chronic hygroma.    Evaluation of the mucosal space demonstrates no abnormality or  abnormal metabolic uptake on PET CT in the nasopharynx, oropharynx,  hypopharynx or the glottis. The tongue base appears normal. The major  salivary glands and thyroid gland appear normal.    There is no evident cervical lymphadenopathy, and the cervical lymph  nodes are within normal limits by size criteria. No abnormal metabolic  uptake on PET CT.     Limited evaluation of the cervical vertebral column demonstrates no  evident spinal canal stenosis. Mucous retention cyst in the right  maxillary sinus. The mastoid air cells are clear. The major vascular  structures in the neck are patent.    The visualized lung apices appear clear.  Impression: Impression: In this patient with a history of melanoma of the scalp,  status post excision and reconstruction:    1. Mild soft tissue thickening and FDG uptake of the left preauricular  skin, which may represent disease recurrence. Recommend direct  visualization.  2. No cervical lymphadenopathy.  3. Please refer to the whole body PET CT performed as a separate  report, for the findings of the remainder of the body.    I have personally reviewed the examination and initial interpretation  and I agree with the findings.    MARISSA VINCENT MD          SYSTEM ID:  [id number]    I reviewed the above imaging today.    ASSESSMENT AND PLAN  Cutaneous melanoma, scalp primary, pT2a cN1c, clinical Stage IIIB  L2 hypermetabolism  It was a pleasure to talk with Mr. Coleman today. He is a 78 year old  with agent orange cutaneous exposures in the [place] war and a diagnosis of malignant melanoma arising from a pre-existing pigmented lesion on the scalp. He had a partial response to neoadjuvant therapy with atezolizumab (3 cycles), vemurafenib, and cobimetinib (4 cycles). He received adjuvant nivolumab through 2/14/2022.    PET-CT shows no obvious evidence for recurrence or metastasis. I think the small left ear FDG avid focus is probably not recurrence, as patient notes no skin changes in the area and I am not able to appreciate anything on the video. I recommended he be seen by dermatology for a thorough face to face evaluation. He should be seen every 3-6 months by dermatology. In the meantime, we will continue systemic surveillance. It has been almost 2 years since the WLE surgery, so would continue with imaging every 3 months at this point. Plan to transition to 4 months after that.    -RTC in 3 months with PET-CT.     Depressed mood, anxiety, PTSD  He has a longstanding history of PTSD. Uses occasional lorazepam. Continues to work with psychiatrist about every 2 months out of [facility].     CKD, moderate, stage III  Creatinine is elevated to 1.57. eGFR 45, or stage IIIa kidney disease. Will refer to nephrology given the changes. He is on a diuretic and takes a few other medications, some of which may be impact the renal function.    -nephrology referral    Questions answered.    Oksana Peñaloza M.D.   of Medicine  Hematology, Oncology and Transplantation  Pager: [phone]

## 2023-04-28 ENCOUNTER — TELEPHONE (OUTPATIENT)
Dept: NEPHROLOGY | Facility: CLINIC | Age: 78
End: 2023-04-28
Payer: MEDICARE

## 2023-04-28 DIAGNOSIS — N18.30 STAGE 3 CHRONIC KIDNEY DISEASE, UNSPECIFIED WHETHER STAGE 3A OR 3B CKD (H): Primary | ICD-10-CM

## 2023-04-28 NOTE — TELEPHONE ENCOUNTER
[facility] Call Center    Phone Message    May a detailed message be left on voicemail: yes     Reason for Call: Order(s): Other:   Reason for requested: Order for labs. Labs scheduled at [facility] on 7/21/23  Date needed: Before 7/1/23  Provider name: Dr. Alarcon      Action Taken: Message routed to:  [facility] ([facility] Nephrology    Travel Screening: Not Applicable

## 2023-05-17 NOTE — CONFIDENTIAL NOTE
DIAGNOSIS: Stage 3a chronic kidney disease (H)   DATE RECEIVED: 07.24.2023   NOTES STATUS DETAILS   OFFICE NOTE from referring provider Internal 04.27.2023 Oksana Ashton MD   OFFICE NOTE from other specialist      *Only VASCULITIS or LUPUS gather office notes for the following     *PULMONARY       *ENT     *DERMATOLOGY     *RHEUMATOLOGY     DISCHARGE SUMMARY from hospital     DISCHARGE REPORT from the ER     MEDICATION LIST Internal    IMAGING  (NEED IMAGES AND REPORTS)     KIDNEY CT SCAN     KIDNEY ULTRASOUND     MR ABDOMEN     NUCLEAR MEDICINE RENAL     LABS     CBC Internal 04.21.2023   CMP Internal 04.21.2023   BMP Internal 05.27.2021   UA     URINE PROTEIN     RENAL PANEL     BIOPSY     KIDNEY BIOPSY

## 2023-05-21 ENCOUNTER — HEALTH MAINTENANCE LETTER (OUTPATIENT)
Age: 78
End: 2023-05-21

## 2023-06-13 ENCOUNTER — OFFICE VISIT (OUTPATIENT)
Dept: DERMATOLOGY | Facility: CLINIC | Age: 78
End: 2023-06-13
Payer: MEDICARE

## 2023-06-13 DIAGNOSIS — D22.9 MULTIPLE NEVI: ICD-10-CM

## 2023-06-13 DIAGNOSIS — Z85.820 HISTORY OF MELANOMA: ICD-10-CM

## 2023-06-13 DIAGNOSIS — Z12.83 SKIN EXAM FOR MALIGNANT NEOPLASM: Primary | ICD-10-CM

## 2023-06-13 DIAGNOSIS — L81.4 LENTIGO: ICD-10-CM

## 2023-06-13 DIAGNOSIS — D18.01 CHERRY ANGIOMA: ICD-10-CM

## 2023-06-13 DIAGNOSIS — L82.1 SEBORRHEIC KERATOSES: ICD-10-CM

## 2023-06-13 PROCEDURE — 99213 OFFICE O/P EST LOW 20 MIN: CPT | Mod: GC | Performed by: DERMATOLOGY

## 2023-06-13 ASSESSMENT — PAIN SCALES - GENERAL: PAINLEVEL: NO PAIN (0)

## 2023-06-13 NOTE — LETTER
6/13/2023       RE: Ahmet Coleman  [address]  [place] [address]     Dear Colleague,    Thank you for referring your patient, Ahmet Coleman, to the [facility] [place] at [facility]. Please see a copy of my visit note below.    [facility] Dermatology Note   Encounter Date: Jun 13, 2023  Office visit    Dermatology Problem List:    Last FBSE: 6/13/23    # H/o melanoma  - Blue nevus-like, BD at least 1.3mm, L central parietal scalp, s/p chemo and surgery  # Other biopsies  - tumoral melanosis, L superior shoulder, s/p shave bx 2/5/21  - tumoral melanosis, central inferior abdomen, s/p shave bx 2/5/21    ___________________________________________    Assessment & Plan:    # Multiple clinically banal appearing nevi  # Lentigo  Discussed the possible etiologies, clinical course, and management options of this benign condition with the patient.  - Reviewed the ABCDEs of melanoma  - Recommend daily sunscreen use with SPF at least 30    # Seborrheic keratoses  # Cherry angiomata  Discussed the possible etiologies, clinical course, and management options of this benign condition with the patient.  - Reassurance provided    # H/o melanoma  No evidence of recurrence of disease  - We will continue to monitor     Procedures Performed:  None    Follow-up: 6m    Staff Involved:  Patient was seen and staffed with attending physician Dr. Clinton Watkins MD  Med/Derm Resident PGY-5  P:[phone]    I have seen and examined this patient and agree with the assessment and plan as documented in the resident's note.    Keith Alonzo MD  Dermatology Attending  ___________________________________________      CC: Skin Check (FBSC - spot of concern on back of the head and ears. )      HPI:  Mr. Ahmet Coleman is a(n) 78 year old male who presents to clinic today for FBSE. Reports:  - no major concerns  - has a  couple spots he would like checked (posterior scalp, back, and lower legs)  - denies any painful or bleeding, but notes some intermittent pruritus  - otherwise feeling well in usual state of health    Physical exam:  General: in no acute distress, well-developed, well-nourished  Skin:  - skin type: fair  - multiple light brown to tan papules with reassuring pigment network on dermoscopy on back, face, and extremities  - light brown macules on sun exposed skin  - tan to light brown waxy stuck on papules and plaques on trunk and extremities  - red or purple papules that simba with diascopy on ears, scalp, trunk  - previous melanoma graft scar site on the L scalp without nodularity or pigment changes  - No other lesions of concern on areas examined.     Medications:  Current Outpatient Medications   Medication    acetaminophen (TYLENOL) 500 MG tablet    diphenoxylate-atropine (LOMOTIL) 2.5-0.025 MG tablet    docusate sodium (COLACE) 100 MG capsule    fluvoxaMINE (LUVOX) 100 MG tablet    hydrochlorothiazide (HYDRODIURIL) 25 MG tablet    Hypromellose (ARTIFICIAL TEARS OP)    Lactobacillus-Inulin (CULTURELLE DIGESTIVE HEALTH) CAPS    Magnesium Oxide 420 MG TABS    methocarbamol (ROBAXIN) 500 MG tablet    omeprazole (PRILOSEC) 20 MG capsule    polyethylene glycol (MIRALAX) 17 GM/Dose powder    potassium chloride ER (KLOR-CON M) 20 MEQ CR tablet    pregabalin (LYRICA) 150 MG capsule    QUEtiapine (SEROQUEL) 300 MG tablet    senna-docusate (SENOKOT-S/PERICOLACE) 8.6-50 MG tablet    simvastatin (ZOCOR) 80 MG tablet    traZODone (DESYREL) 100 MG tablet    vitamin D3 (CHOLECALCIFEROL) 1000 units (25 mcg) tablet    vortioxetine (TRINTELLIX) 20 MG tablet    LORazepam (ATIVAN) 0.5 MG tablet    methocarbamol (ROBAXIN) 500 MG tablet    prochlorperazine (COMPAZINE) 10 MG tablet    prochlorperazine (COMPAZINE) 10 MG tablet     Current Facility-Administered Medications   Medication    lidocaine 1% with EPINEPHrine 1:100,000 injection  1.5 mL    lidocaine 1% with EPINEPHrine 1:100,000 injection 1.5 mL     Facility-Administered Medications Ordered in Other Visits   Medication    fluorodeoxyglucose F-18 (FDG) radioisotope injection 10-18 mCi    iopamidol (ISOVUE-370) solution  mL    sodium chloride (PF) 0.9% PF flush 10 mL    sodium chloride (PF) 0.9% PF flush 10-60 mL    sodium chloride (PF) 0.9% PF flush 60 mL      Past Medical History:   Patient Active Problem List   Diagnosis    Musculoskeletal disorder and symptoms referable to neck    Degeneration of intervertebral disc of cervical region    Displacement of cervical intervertebral disc without myelopathy    Traumatic shock (H)    Irritable bowel syndrome    Hiatal hernia    GERD (gastroesophageal reflux disease)    Personal history of tobacco use, presenting hazards to health    PTSD (post-traumatic stress disorder)    CARDIOVASCULAR SCREENING; LDL GOAL LESS THAN 130    Obesity    Advanced directives, counseling/discussion    Major depressive disorder, recurrent episode, moderate (H)    Anxiety    Amnesia    Impaired fasting glucose    History of colonic polyps    Hypertension    Hyperlipidemia    Diverticulosis of colon    Cataract    Cannabis abuse, episodic use    Benign neoplasm of colon    Arthropathy of hand    Arthralgia    Other ill-defined and unknown causes of morbidity and mortality    Subjective tinnitus    Spondylosis of cervical spine without myelopathy    Sensorineural hearing loss (SNHL) of both ears    Pure hypertriglyceridemia    Pure hypercholesterolemia    Psychophysiologic insomnia    Presbyopia    Personality disorder (H)    Other and unspecified noninfectious gastroenteritis and colitis    Impotence of organic origin    History of other injury    History of cervical fracture    Malignant melanoma of scalp (H)    Fibromyositis    Herpes zoster ophthalmicus of left eye    Family history of type 2 diabetes mellitus    Monoallelic mutation of BRAF gene    Melanoma of  scalp (H)    COPD (chronic obstructive pulmonary disease) (H)    Seborrheic keratosis    Multiple melanocytic nevi    Chronic kidney disease, stage 1     Past Medical History:   Diagnosis Date    Anxiety     Benign hypertension 12/11/2013    Cervicalgia     Chronic kidney disease     Depressive disorder, not elsewhere classified     Diverticulitis of colon 07/10/2013    Esophageal reflux     Irritable bowel syndrome 10/03/2003    Lumbago     Malignant melanoma of scalp (H) 12/29/2020    Migraine, unspecified, without mention of intractable migraine without mention of status migrainosus     Other kyphoscoliosis and scoliosis     Other specified gastritis without mention of hemorrhage     PTSD (post-traumatic stress disorder)     Tobacco abuse since 1965    on and off       CC No referring provider defined for this encounter. on close of this encounter.

## 2023-06-13 NOTE — PROGRESS NOTES
[facility] Dermatology Note   Encounter Date: Jun 13, 2023  Office visit    Dermatology Problem List:    Last FBSE: 6/13/23    # H/o melanoma  - Blue nevus-like, BD at least 1.3mm, L central parietal scalp, s/p chemo and surgery  # Other biopsies  - tumoral melanosis, L superior shoulder, s/p shave bx 2/5/21  - tumoral melanosis, central inferior abdomen, s/p shave bx 2/5/21    ___________________________________________    Assessment & Plan:    # Multiple clinically banal appearing nevi  # Lentigo  Discussed the possible etiologies, clinical course, and management options of this benign condition with the patient.  - Reviewed the ABCDEs of melanoma  - Recommend daily sunscreen use with SPF at least 30    # Seborrheic keratoses  # Cherry angiomata  Discussed the possible etiologies, clinical course, and management options of this benign condition with the patient.  - Reassurance provided    # H/o melanoma  No evidence of recurrence of disease  - We will continue to monitor     Procedures Performed:  None    Follow-up: 6m    Staff Involved:  Patient was seen and staffed with attending physician Dr. Clinton Watkins MD  Med/Derm Resident PGY-5  P:[phone]    I have seen and examined this patient and agree with the assessment and plan as documented in the resident's note.    Keith Alonzo MD  Dermatology Attending  ___________________________________________      CC: Skin Check (FBSC - spot of concern on back of the head and ears. )      HPI:  Mr. Ahmet Coleman is a(n) 78 year old male who presents to clinic today for FBSE. Reports:  - no major concerns  - has a couple spots he would like checked (posterior scalp, back, and lower legs)  - denies any painful or bleeding, but notes some intermittent pruritus  - otherwise feeling well in usual state of health    Physical exam:  General: in no acute distress, well-developed, well-nourished  Skin:  - skin type: fair  - multiple light  brown to tan papules with reassuring pigment network on dermoscopy on back, face, and extremities  - light brown macules on sun exposed skin  - tan to light brown waxy stuck on papules and plaques on trunk and extremities  - red or purple papules that simba with diascopy on ears, scalp, trunk  - previous melanoma graft scar site on the L scalp without nodularity or pigment changes  - No other lesions of concern on areas examined.     Medications:  Current Outpatient Medications   Medication     acetaminophen (TYLENOL) 500 MG tablet     diphenoxylate-atropine (LOMOTIL) 2.5-0.025 MG tablet     docusate sodium (COLACE) 100 MG capsule     fluvoxaMINE (LUVOX) 100 MG tablet     hydrochlorothiazide (HYDRODIURIL) 25 MG tablet     Hypromellose (ARTIFICIAL TEARS OP)     Lactobacillus-Inulin (CULTURELLE DIGESTIVE HEALTH) CAPS     Magnesium Oxide 420 MG TABS     methocarbamol (ROBAXIN) 500 MG tablet     omeprazole (PRILOSEC) 20 MG capsule     polyethylene glycol (MIRALAX) 17 GM/Dose powder     potassium chloride ER (KLOR-CON M) 20 MEQ CR tablet     pregabalin (LYRICA) 150 MG capsule     QUEtiapine (SEROQUEL) 300 MG tablet     senna-docusate (SENOKOT-S/PERICOLACE) 8.6-50 MG tablet     simvastatin (ZOCOR) 80 MG tablet     traZODone (DESYREL) 100 MG tablet     vitamin D3 (CHOLECALCIFEROL) 1000 units (25 mcg) tablet     vortioxetine (TRINTELLIX) 20 MG tablet     LORazepam (ATIVAN) 0.5 MG tablet     methocarbamol (ROBAXIN) 500 MG tablet     prochlorperazine (COMPAZINE) 10 MG tablet     prochlorperazine (COMPAZINE) 10 MG tablet     Current Facility-Administered Medications   Medication     lidocaine 1% with EPINEPHrine 1:100,000 injection 1.5 mL     lidocaine 1% with EPINEPHrine 1:100,000 injection 1.5 mL     Facility-Administered Medications Ordered in Other Visits   Medication     fluorodeoxyglucose F-18 (FDG) radioisotope injection 10-18 mCi     iopamidol (ISOVUE-370) solution  mL     sodium chloride (PF) 0.9% PF flush 10  mL     sodium chloride (PF) 0.9% PF flush 10-60 mL     sodium chloride (PF) 0.9% PF flush 60 mL      Past Medical History:   Patient Active Problem List   Diagnosis     Musculoskeletal disorder and symptoms referable to neck     Degeneration of intervertebral disc of cervical region     Displacement of cervical intervertebral disc without myelopathy     Traumatic shock (H)     Irritable bowel syndrome     Hiatal hernia     GERD (gastroesophageal reflux disease)     Personal history of tobacco use, presenting hazards to health     PTSD (post-traumatic stress disorder)     CARDIOVASCULAR SCREENING; LDL GOAL LESS THAN 130     Obesity     Advanced directives, counseling/discussion     Major depressive disorder, recurrent episode, moderate (H)     Anxiety     Amnesia     Impaired fasting glucose     History of colonic polyps     Hypertension     Hyperlipidemia     Diverticulosis of colon     Cataract     Cannabis abuse, episodic use     Benign neoplasm of colon     Arthropathy of hand     Arthralgia     Other ill-defined and unknown causes of morbidity and mortality     Subjective tinnitus     Spondylosis of cervical spine without myelopathy     Sensorineural hearing loss (SNHL) of both ears     Pure hypertriglyceridemia     Pure hypercholesterolemia     Psychophysiologic insomnia     Presbyopia     Personality disorder (H)     Other and unspecified noninfectious gastroenteritis and colitis     Impotence of organic origin     History of other injury     History of cervical fracture     Malignant melanoma of scalp (H)     Fibromyositis     Herpes zoster ophthalmicus of left eye     Family history of type 2 diabetes mellitus     Monoallelic mutation of BRAF gene     Melanoma of scalp (H)     COPD (chronic obstructive pulmonary disease) (H)     Seborrheic keratosis     Multiple melanocytic nevi     Chronic kidney disease, stage 1     Past Medical History:   Diagnosis Date     Anxiety      Benign hypertension 12/11/2013      Cervicalgia      Chronic kidney disease      Depressive disorder, not elsewhere classified      Diverticulitis of colon 07/10/2013     Esophageal reflux      Irritable bowel syndrome 10/03/2003     Lumbago      Malignant melanoma of scalp (H) 12/29/2020     Migraine, unspecified, without mention of intractable migraine without mention of status migrainosus      Other kyphoscoliosis and scoliosis      Other specified gastritis without mention of hemorrhage      PTSD (post-traumatic stress disorder)      Tobacco abuse since 1965    on and off       CC No referring provider defined for this encounter. on close of this encounter.

## 2023-06-13 NOTE — NURSING NOTE
Dermatology Rooming Note    Ahmet Coleman's goals for this visit include:   Chief Complaint   Patient presents with     Skin Check     FBSC - spot of concern on back of the head and ears.        Graciela Vasquez

## 2023-07-13 PROBLEM — D22.9 MULTIPLE NEVI: Status: ACTIVE | Noted: 2023-07-13

## 2023-07-13 PROBLEM — Z85.820 HISTORY OF MELANOMA: Status: ACTIVE | Noted: 2023-07-13

## 2023-07-18 NOTE — PROGRESS NOTES
Nephrology Initial Consult  July 18, 2023      Ahmet Coleman MRN:[id number] YOB: 1945  Primary care provider: Adama Pacheco  Requesting physician: Oksaan Ashton MD    REASON FOR CONSULT: Stage 3A CKD    HISTORY OF PRESENT ILLNESS:  Ahmet Coleman is a 78 year old with HX of hypertension, GERD, IBS, hypertension, HLD,  scalp melanoma who is here for CKD consult.    Oncologic history, the patient was diagnosed with scalp melanoma after presetned with lesional enlargement and abnormal sensation.  Bx showed nodular and nevoid type melanoma, non-ulcerated, Breslow depth up to 1.3 mm, Saurabh's level IV, and up to 3 mitoses per mm2. 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 He was started vemurafenib and cobimetinib, ~2/12/21 held for diarrhea, then resumed.  In 2/18/2021, he was started atezolizumab, last on 4/29/21. In 5/24/2021, underwent wide local excision of the scalp melanoma and left latissimus free flap for scalp reconstruction.  Surgical pathology showed features consistent with scar and tumoral melanosis. No definite residual melanoma is seen. Tumoral melanosis (which extends to a depth of around 1.1 mm) is believed to be equivalent to a diagnosis of regressed melanoma. In 6/25/21, he starts adjuvant Nivolumab, last dose 2/14/22    Renal history, he has cr of 1 up until 2016. Since then Cr has fluctuated between 1.2-1.5. Last UA in 2016 showed bland UA. His PET CT scan on 4/23 showed left kidney cyst, 2.5 cm.      Today, he is doing well. He was recently told that they are keeping an eyes on his kidneys since Jan 2023.  He reports having kidney infection when he was very young (high school) but he could not remember any detail. Presently, he has no problem with urination. He has no leg swelling. He has been diagnosed with high blood pressure for about 30 years. He has been told by home care nurse that his blood pressure good. He does not feel lightheadedness. He takes  lisinopril 10 mg  1.5 tab (15 mg) per day, hydrochlorothiazide 25 mg once a day (dose was raise and prazosin HCL 1 mg take 2 capsules in the morning and 1 mg bed time. He has reflux and is taking omeprazole 20 mg twice a day for a long time. He has no prostate problem. He does not take NSAIDs currently. He does not smoke or drink. His dad has kidney problem, he has when he was 94.He lives in [place] near [place].  He takes simvastatin  (80 mg per tab) 40 mg per day for HLD. [facility]     /74. He does not smoke now or drink.  He used smoke intermittently in the past. He is a [employer] vet.   Labs on 7/21/23 showed Cr 1.55, GFR 46, BUN 15.6, K 3.6, CO2 329, Albumin 4.6. WBC 3.3, Hb 14.1. Last UA in 2016.     PAST MEDICAL HISTORY:  Reviewed with patient on 07/18/2023     Past Medical History:   Diagnosis Date     Anxiety      Benign hypertension 12/11/2013     Cervicalgia      Chronic kidney disease      Depressive disorder, not elsewhere classified      Diverticulitis of colon 07/10/2013     Esophageal reflux      Irritable bowel syndrome 10/03/2003     Lumbago      Malignant melanoma of scalp (H) 12/29/2020     Migraine, unspecified, without mention of intractable migraine without mention of status migrainosus      Other kyphoscoliosis and scoliosis      Other specified gastritis without mention of hemorrhage      PTSD (post-traumatic stress disorder)      Tobacco abuse since 1965    on and off       Past Surgical History:   Procedure Laterality Date     COLONOSCOPY N/A 6/8/2016    Procedure: COMBINED COLONOSCOPY, SINGLE OR MULTIPLE BIOPSY/POLYPECTOMY BY BIOPSY;  Surgeon: Mahesh Amanda MD;  Location:  GI     ESOPHAGOSCOPY, GASTROSCOPY, DUODENOSCOPY (EGD), COMBINED  9/6/2011    Procedure:COMBINED ESOPHAGOSCOPY, GASTROSCOPY, DUODENOSCOPY (EGD), BIOPSY SINGLE OR MULTIPLE; Esophagogastroduodenoscopy with multiple Biopsy's; Surgeon:MARINA DYE; Location: GI     EXCISE MASS HEAD N/A  5/24/2021    Procedure: wide local excision of scalp melanoma;  Surgeon: Paulo Garcia MD;  Location: [facility] OR     GRAFT FREE VASCULARIZED LATISSIMUS DORSI Left 5/24/2021    Procedure: left latissimus free flap for scalp reconstruction\. skin graft from left back to scalp;  Surgeon: Kathryn Machado MD;  Location: [facility] OR     ZZC APPENDECTOMY       ZZC NONSPECIFIC PROCEDURE      nasal septal fx and a devitation needing plastic surgery      MEDICATIONS:  PTA Meds  Current Outpatient Medications   Medication     acetaminophen (TYLENOL) 500 MG tablet     diphenoxylate-atropine (LOMOTIL) 2.5-0.025 MG tablet     docusate sodium (COLACE) 100 MG capsule     fluvoxaMINE (LUVOX) 100 MG tablet     hydrochlorothiazide (HYDRODIURIL) 25 MG tablet     Hypromellose (ARTIFICIAL TEARS OP)     Lactobacillus-Inulin (CULTURELLE DIGESTIVE HEALTH) CAPS     LORazepam (ATIVAN) 0.5 MG tablet     Magnesium Oxide 420 MG TABS     methocarbamol (ROBAXIN) 500 MG tablet     methocarbamol (ROBAXIN) 500 MG tablet     omeprazole (PRILOSEC) 20 MG capsule     polyethylene glycol (MIRALAX) 17 GM/Dose powder     potassium chloride ER (KLOR-CON M) 20 MEQ CR tablet     pregabalin (LYRICA) 150 MG capsule     prochlorperazine (COMPAZINE) 10 MG tablet     prochlorperazine (COMPAZINE) 10 MG tablet     QUEtiapine (SEROQUEL) 300 MG tablet     senna-docusate (SENOKOT-S/PERICOLACE) 8.6-50 MG tablet     simvastatin (ZOCOR) 80 MG tablet     traZODone (DESYREL) 100 MG tablet     vitamin D3 (CHOLECALCIFEROL) 1000 units (25 mcg) tablet     vortioxetine (TRINTELLIX) 20 MG tablet     Current Facility-Administered Medications   Medication     lidocaine 1% with EPINEPHrine 1:100,000 injection 1.5 mL     lidocaine 1% with EPINEPHrine 1:100,000 injection 1.5 mL     Facility-Administered Medications Ordered in Other Visits   Medication     fluorodeoxyglucose F-18 (FDG) radioisotope injection 10-18 mCi     iopamidol (ISOVUE-370) solution  mL     sodium chloride (PF)  0.9% PF flush 10 mL     sodium chloride (PF) 0.9% PF flush 10-60 mL     sodium chloride (PF) 0.9% PF flush 60 mL     ALLERGIES:    Allergies   Allergen Reactions     Aspirin      Motrin [Ibuprofen Micronized]      And ASA  Cramps  diarrhea     REVIEW OF SYSTEMS:  A comprehensive of systems was negative except as noted above.    SOCIAL HISTORY:   Social History     Socioeconomic History     Marital status:      Spouse name: Not on file     Number of children: Not on file     Years of education: Not on file     Highest education level: Not on file   Occupational History     Not on file   Tobacco Use     Smoking status: Former     Packs/day: 0.50     Years: 45.00     Pack years: 22.50     Types: Cigarettes     Quit date: 2013     Years since quittin.9     Smokeless tobacco: Never     Tobacco comments:     since , on and off in there 1/2 pack per day   Substance and Sexual Activity     Alcohol use: No     Drug use: Not Currently     Types: Marijuana     Sexual activity: Never     Partners: Female     Comment: not currently active   Other Topics Concern     Parent/sibling w/ CABG, MI or angioplasty before 65F 55M? No   Social History Narrative     Not on file     Social Determinants of Health     Financial Resource Strain: Not on file   Food Insecurity: Not on file   Transportation Needs: Not on file   Physical Activity: Not on file   Stress: Not on file   Social Connections: Not on file   Intimate Partner Violence: Not on file   Housing Stability: Not on file     Reviewed with patient.    FAMILY MEDICAL HISTORY:   Family History   Problem Relation Age of Onset     Diabetes Father      Heart Disease Brother      Skin Cancer No family hx of      Melanoma No family hx of      Reviewed with patient.    PHYSICAL EXAM:   Home /74 mmHg during this video visit.    No lower extremities edema. He is pleasant and not in acute distress.     LABS:   Last Renal Panel:  Sodium   Date Value Ref Range Status    04/21/2023 140 133 - 144 mmol/L Final   06/25/2021 139 133 - 144 mmol/L Final     Potassium   Date Value Ref Range Status   04/21/2023 3.9 3.4 - 5.3 mmol/L Final   06/25/2021 3.8 3.4 - 5.3 mmol/L Final     Chloride   Date Value Ref Range Status   04/21/2023 103 94 - 109 mmol/L Final   06/25/2021 106 94 - 109 mmol/L Final     Carbon Dioxide   Date Value Ref Range Status   06/25/2021 26 20 - 32 mmol/L Final     Carbon Dioxide (CO2)   Date Value Ref Range Status   04/21/2023 34 (H) 20 - 32 mmol/L Final     Anion Gap   Date Value Ref Range Status   04/21/2023 3 3 - 14 mmol/L Final   06/25/2021 6 3 - 14 mmol/L Final     Glucose   Date Value Ref Range Status   04/21/2023 110 (H) 70 - 99 mg/dL Final   06/25/2021 102 (H) 70 - 99 mg/dL Final     Urea Nitrogen   Date Value Ref Range Status   04/21/2023 23 7 - 30 mg/dL Final   06/25/2021 16 7 - 30 mg/dL Final     Creatinine   Date Value Ref Range Status   04/21/2023 1.57 (H) 0.66 - 1.25 mg/dL Final   06/25/2021 1.09 0.66 - 1.25 mg/dL Final     GFR Estimate   Date Value Ref Range Status   04/21/2023 45 (L) >60 mL/min/1.73m2 Final     Comment:     eGFR calculated using 2021 CKD-EPI equation.   06/25/2021 66 >60 mL/min/[1.73_m2] Final     Comment:     Non  GFR Calc  Starting 12/18/2018, serum creatinine based estimated GFR (eGFR) will be   calculated using the Chronic Kidney Disease Epidemiology Collaboration   (CKD-EPI) equation.       GFR, ESTIMATED POCT   Date Value Ref Range Status   01/13/2023 48 (L) >60 mL/min/1.73m2 Final     Calcium   Date Value Ref Range Status   04/21/2023 9.1 8.5 - 10.1 mg/dL Final   06/25/2021 8.4 (L) 8.5 - 10.1 mg/dL Final     Phosphorus   Date Value Ref Range Status   12/20/2021 3.2 2.5 - 4.5 mg/dL Final   06/11/2021 2.8 2.5 - 4.5 mg/dL Final     Albumin   Date Value Ref Range Status   04/21/2023 4.0 3.4 - 5.0 g/dL Final   06/25/2021 3.5 3.4 - 5.0 g/dL Final       URINE STUDIES  Recent Labs   Lab Test 04/13/16  1100   COLOR Yellow    APPEARANCE Clear   URINEGLC Negative   URINEBILI Negative   URINEKETONE Negative   SG 1.015   UBLD Negative   URINEPH 7.0   PROTEIN Negative   UROBILINOGEN 0.2   NITRITE Negative   LEUKEST Trace*   RBCU O - 2   WBCU O - 2     No lab results found.  PTH  No lab results found.  IRON STUDIES  No lab results found.    IMAGING:  I review the PET CT on 7/21/23 which showed  Symmetric enhancement of the kidneys. Simple renal cortical cysts. No  hydronephrosis. The urinary bladder is decompressed. The reproductive  organs are unremarkable.    ASSESSMENT AND RECOMMENDATIONS:   # CKD stage 3 likely secondary to chronic hypertension; Cr fluctuated likely in the setting of diuretics use  The patient has been noted to have creatinine elevation in the range of 1.2-1.6 since 2016.  He received nivolumab between 6/22-2/22. Cr while taking Nivolumab was normal. Cr 1011 in 8/26/22 but increased to 1.39 in 1/23 and now 1.50s with eGFR in the mid 40s. I do not have any recent UA to help determine the cause of chronic kidney disease but in 2016 the UA was unremarkable.  I suspect that that his chronic kidney disease is likely from chronic hypertension for which she has been suffering for 30 years.  We need UA and urine protein to help determine the cause of his chronic kidney disease.  Recent PET CT scan showed normal kidneys without any stone or mass or hydronephrosis.  The fact that his creatinine has been fluctuating between 1.1-1.5 could be due to being on diuretics.  The patient reports that sometime he felt lightheadedness but not currently.  I asked patient to measure blood pressure and let me know was in 2 weeks to make sure that he has no episode of hypotension.  The patient is also on omeprazole 20 mg twice daily for GERD. Depending on UA findings, we may switch to Famotidine.   - Obtain UA and Urine protein at [facility]  - Ask that he drinks a lot of fluid and hold lisinopril omn the day of receiving contrast media (if  necessary)  - Stay well hydrated  - Next work up depending on UA and urine protein  # Scalp melanoma followed by Dr. Hernández  The patient was diagnosed with scalp melanoma after presetned with lesional enlargement and abnormal sensation.  Bx showed nodular and nevoid type melanoma, non-ulcerated, Breslow depth up to 1.3 mm, Saurabh's level IV, and up to 3 mitoses per mm2. 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 He was started vemurafenib and cobimetinib, ~2/12/21 held for diarrhea, then resumed. In 2/18/2021, he was started atezolizumab, last on 4/29/21. In 5/24/2021, underwent wide local excision of the scalp melanoma and left latissimus free flap for scalp reconstruction.  Surgical pathology showed features consistent with scar and tumoral melanosis. No definite residual melanoma is seen. Tumoral melanosis (which extends to a depth of around 1.1 mm) is believed to be equivalent to a diagnosis of regressed melanoma. In 6/25/21, he starts adjuvant Nivolumab, last dose 2/14/22. CT headneck from 7/22/23 showed possibly hypermetabolic lesion at scalp-> he will have repeat imaging in 3 mmonths.  # BMD  Phos and Calcium are normal. Will check PTH and Vit D.   # Hypertension; well conreolled  On lisinopril 10 mg  1.5 tab (15 mg) per day, hydrochlorothiazide 25 mg once a day (dose was raise and prazosin HCL 1 mg take 2 capsules in the morning and 1 mg bed time.  - BP today is 130/74  - Ask to measure bP in 2 weeks and let me know; worried about hypotension  # Leukopenia  WBC in the 3.0s recently. No infection. Has not received chemo recently. Defer to oncology to address.   # Anemia surveillance  Hb 14.1; no issue    Follow-up in 6 months with labs; will send [facility] Msg with UA/Urien protein, PTH and Vit D results to [facility]    I spent 60 minutes on the date of the encounter doing chart review, history and exam, documentation and further activities as noted above. 31 (1.30-2.01) minutes of this visit is dedicated to  direct patient interaction via video.    Malgorzata Alarcon MD on 07/24/2023

## 2023-07-21 ENCOUNTER — LAB (OUTPATIENT)
Dept: LAB | Facility: CLINIC | Age: 78
End: 2023-07-21
Payer: MEDICARE

## 2023-07-21 ENCOUNTER — ANCILLARY PROCEDURE (OUTPATIENT)
Dept: PET IMAGING | Facility: CLINIC | Age: 78
End: 2023-07-21
Attending: INTERNAL MEDICINE
Payer: MEDICARE

## 2023-07-21 DIAGNOSIS — C43.4 MALIGNANT MELANOMA OF SCALP (H): ICD-10-CM

## 2023-07-21 LAB
ALBUMIN SERPL BCG-MCNC: 4.6 G/DL (ref 3.5–5.2)
ALP SERPL-CCNC: 56 U/L (ref 40–129)
ALT SERPL W P-5'-P-CCNC: 8 U/L (ref 0–70)
ANION GAP SERPL CALCULATED.3IONS-SCNC: 11 MMOL/L (ref 7–15)
AST SERPL W P-5'-P-CCNC: 19 U/L (ref 0–45)
BASOPHILS # BLD AUTO: 0 10E3/UL (ref 0–0.2)
BASOPHILS NFR BLD AUTO: 1 %
BILIRUB SERPL-MCNC: 0.5 MG/DL
BUN SERPL-MCNC: 15.6 MG/DL (ref 8–23)
CALCIUM SERPL-MCNC: 9.3 MG/DL (ref 8.8–10.2)
CHLORIDE SERPL-SCNC: 99 MMOL/L (ref 98–107)
CREAT BLD-MCNC: 1.4 MG/DL (ref 0.7–1.3)
CREAT SERPL-MCNC: 1.55 MG/DL (ref 0.67–1.17)
DEPRECATED HCO3 PLAS-SCNC: 29 MMOL/L (ref 22–29)
EOSINOPHIL # BLD AUTO: 0.1 10E3/UL (ref 0–0.7)
EOSINOPHIL NFR BLD AUTO: 3 %
ERYTHROCYTE [DISTWIDTH] IN BLOOD BY AUTOMATED COUNT: 12.4 % (ref 10–15)
GFR SERPL CREATININE-BSD FRML MDRD: 46 ML/MIN/1.73M2
GFR SERPL CREATININE-BSD FRML MDRD: 52 ML/MIN/1.73M2
GLUCOSE SERPL-MCNC: 110 MG/DL (ref 70–99)
HCT VFR BLD AUTO: 40.5 % (ref 40–53)
HGB BLD-MCNC: 14.1 G/DL (ref 13.3–17.7)
HOLD SPECIMEN: NORMAL
IMM GRANULOCYTES # BLD: 0 10E3/UL
IMM GRANULOCYTES NFR BLD: 0 %
LYMPHOCYTES # BLD AUTO: 1.7 10E3/UL (ref 0.8–5.3)
LYMPHOCYTES NFR BLD AUTO: 52 %
MCH RBC QN AUTO: 29.4 PG (ref 26.5–33)
MCHC RBC AUTO-ENTMCNC: 34.8 G/DL (ref 31.5–36.5)
MCV RBC AUTO: 85 FL (ref 78–100)
MONOCYTES # BLD AUTO: 0.4 10E3/UL (ref 0–1.3)
MONOCYTES NFR BLD AUTO: 12 %
NEUTROPHILS # BLD AUTO: 1.1 10E3/UL (ref 1.6–8.3)
NEUTROPHILS NFR BLD AUTO: 32 %
NRBC # BLD AUTO: 0 10E3/UL
NRBC BLD AUTO-RTO: 0 /100
PLATELET # BLD AUTO: 203 10E3/UL (ref 150–450)
POTASSIUM SERPL-SCNC: 3.6 MMOL/L (ref 3.4–5.3)
PROT SERPL-MCNC: 7.3 G/DL (ref 6.4–8.3)
RBC # BLD AUTO: 4.79 10E6/UL (ref 4.4–5.9)
SODIUM SERPL-SCNC: 139 MMOL/L (ref 136–145)
WBC # BLD AUTO: 3.3 10E3/UL (ref 4–11)

## 2023-07-21 PROCEDURE — A9552 F18 FDG: HCPCS | Performed by: RADIOLOGY

## 2023-07-21 PROCEDURE — 85025 COMPLETE CBC W/AUTO DIFF WBC: CPT

## 2023-07-21 PROCEDURE — 36415 COLL VENOUS BLD VENIPUNCTURE: CPT

## 2023-07-21 PROCEDURE — G1010 CDSM STANSON: HCPCS | Performed by: RADIOLOGY

## 2023-07-21 PROCEDURE — 74177 CT ABD & PELVIS W/CONTRAST: CPT | Mod: GC | Performed by: RADIOLOGY

## 2023-07-21 PROCEDURE — 70491 CT SOFT TISSUE NECK W/DYE: CPT | Mod: GC | Performed by: RADIOLOGY

## 2023-07-21 PROCEDURE — 80053 COMPREHEN METABOLIC PANEL: CPT

## 2023-07-21 PROCEDURE — 78816 PET IMAGE W/CT FULL BODY: CPT | Mod: MG | Performed by: RADIOLOGY

## 2023-07-21 PROCEDURE — 82565 ASSAY OF CREATININE: CPT | Mod: 91

## 2023-07-21 PROCEDURE — 71260 CT THORAX DX C+: CPT | Mod: GC | Performed by: RADIOLOGY

## 2023-07-21 RX ORDER — IOPAMIDOL 755 MG/ML
10-135 INJECTION, SOLUTION INTRAVASCULAR ONCE
Status: COMPLETED | OUTPATIENT
Start: 2023-07-21 | End: 2023-07-21

## 2023-07-21 RX ADMIN — IOPAMIDOL 112 ML: 755 INJECTION, SOLUTION INTRAVASCULAR at 14:05

## 2023-07-24 ENCOUNTER — PRE VISIT (OUTPATIENT)
Dept: NEPHROLOGY | Facility: CLINIC | Age: 78
End: 2023-07-24
Payer: MEDICARE

## 2023-07-24 ENCOUNTER — VIRTUAL VISIT (OUTPATIENT)
Dept: NEPHROLOGY | Facility: CLINIC | Age: 78
End: 2023-07-24
Attending: INTERNAL MEDICINE
Payer: MEDICARE

## 2023-07-24 VITALS — WEIGHT: 190 LBS | BODY MASS INDEX: 28.9 KG/M2

## 2023-07-24 DIAGNOSIS — I10 HYPERTENSION, ESSENTIAL: ICD-10-CM

## 2023-07-24 DIAGNOSIS — N18.31 STAGE 3A CHRONIC KIDNEY DISEASE (H): Primary | ICD-10-CM

## 2023-07-24 DIAGNOSIS — D70.8 OTHER NEUTROPENIA (H): ICD-10-CM

## 2023-07-24 DIAGNOSIS — C43.4 MALIGNANT MELANOMA OF SCALP (H): ICD-10-CM

## 2023-07-24 PROCEDURE — 99215 OFFICE O/P EST HI 40 MIN: CPT | Mod: VID | Performed by: INTERNAL MEDICINE

## 2023-07-24 ASSESSMENT — PAIN SCALES - GENERAL: PAINLEVEL: NO PAIN (0)

## 2023-07-24 NOTE — LETTER
7/24/2023       RE: Ahmet Coleman  [address]  [place] [address]     Dear Colleague,    Thank you for referring your patient, Ahmet Coleman, to the [facility] [place] at [facility]. Please see a copy of my visit note below.      Nephrology Initial Consult  July 18, 2023      Ahmet Coleman MRN:[id number] YOB: 1945  Primary care provider: Adama Pacheco  Requesting physician: Oksana Ashton MD    REASON FOR CONSULT: Stage 3A CKD    HISTORY OF PRESENT ILLNESS:  Ahmet Coleman is a 78 year old with HX of hypertension, GERD, IBS, hypertension, HLD,  scalp melanoma who is here for CKD consult.    Oncologic history, the patient was diagnosed with scalp melanoma after presetned with lesional enlargement and abnormal sensation.  Bx showed nodular and nevoid type melanoma, non-ulcerated, Breslow depth up to 1.3 mm, Saurabh's level IV, and up to 3 mitoses per mm2. 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 He was started vemurafenib and cobimetinib, ~2/12/21 held for diarrhea, then resumed.  In 2/18/2021, he was started atezolizumab, last on 4/29/21. In 5/24/2021, underwent wide local excision of the scalp melanoma and left latissimus free flap for scalp reconstruction.  Surgical pathology showed features consistent with scar and tumoral melanosis. No definite residual melanoma is seen. Tumoral melanosis (which extends to a depth of around 1.1 mm) is believed to be equivalent to a diagnosis of regressed melanoma. In 6/25/21, he starts adjuvant Nivolumab, last dose 2/14/22    Renal history, he has cr of 1 up until 2016. Since then Cr has fluctuated between 1.2-1.5. Last UA in 2016 showed bland UA. His PET CT scan on 4/23 showed left kidney cyst, 2.5 cm.      Today, he is doing well. He was recently told that they are keeping an eyes on his kidneys since Jan 2023.  He reports having kidney infection when he  was very young (high school) but he could not remember any detail. Presently, he has no problem with urination. He has no leg swelling. He has been diagnosed with high blood pressure for about 30 years. He has been told by home care nurse that his blood pressure good. He does not feel lightheadedness. He takes lisinopril 10 mg  1.5 tab (15 mg) per day, hydrochlorothiazide 25 mg once a day (dose was raise and prazosin HCL 1 mg take 2 capsules in the morning and 1 mg bed time. He has reflux and is taking omeprazole 20 mg twice a day for a long time. He has no prostate problem. He does not take NSAIDs currently. He does not smoke or drink. His dad has kidney problem, he has when he was 94.[place]  He takes simvastatin  (80 mg per tab) 40 mg per day for HLD. [facility]     /74. He does not smoke now or drink.  He used smoke intermittently in the past. He is a [employer] vet.   Labs on 7/21/23 showed Cr 1.55, GFR 46, BUN 15.6, K 3.6, CO2 329, Albumin 4.6. WBC 3.3, Hb 14.1. Last UA in 2016.     PAST MEDICAL HISTORY:  Reviewed with patient on 07/18/2023     Past Medical History:   Diagnosis Date    Anxiety     Benign hypertension 12/11/2013    Cervicalgia     Chronic kidney disease     Depressive disorder, not elsewhere classified     Diverticulitis of colon 07/10/2013    Esophageal reflux     Irritable bowel syndrome 10/03/2003    Lumbago     Malignant melanoma of scalp (H) 12/29/2020    Migraine, unspecified, without mention of intractable migraine without mention of status migrainosus     Other kyphoscoliosis and scoliosis     Other specified gastritis without mention of hemorrhage     PTSD (post-traumatic stress disorder)     Tobacco abuse since 1965    on and off       Past Surgical History:   Procedure Laterality Date    COLONOSCOPY N/A 6/8/2016    Procedure: COMBINED COLONOSCOPY, SINGLE OR MULTIPLE BIOPSY/POLYPECTOMY BY BIOPSY;  Surgeon: Mahesh Amanda MD;  Location:   GI    ESOPHAGOSCOPY, GASTROSCOPY, DUODENOSCOPY (EGD), COMBINED  9/6/2011    Procedure:COMBINED ESOPHAGOSCOPY, GASTROSCOPY, DUODENOSCOPY (EGD), BIOPSY SINGLE OR MULTIPLE; Esophagogastroduodenoscopy with multiple Biopsy's; Surgeon:MARINA DYE; Location:[facility]    EXCISE MASS HEAD N/A 5/24/2021    Procedure: wide local excision of scalp melanoma;  Surgeon: Paulo Garcia MD;  Location: [facility] OR    GRAFT FREE VASCULARIZED LATISSIMUS DORSI Left 5/24/2021    Procedure: left latissimus free flap for scalp reconstruction\. skin graft from left back to scalp;  Surgeon: Kathryn Machado MD;  Location: [facility] OR    ZZC APPENDECTOMY      ZZC NONSPECIFIC PROCEDURE      nasal septal fx and a devitation needing plastic surgery      MEDICATIONS:  PTA Meds  Current Outpatient Medications   Medication    acetaminophen (TYLENOL) 500 MG tablet    diphenoxylate-atropine (LOMOTIL) 2.5-0.025 MG tablet    docusate sodium (COLACE) 100 MG capsule    fluvoxaMINE (LUVOX) 100 MG tablet    hydrochlorothiazide (HYDRODIURIL) 25 MG tablet    Hypromellose (ARTIFICIAL TEARS OP)    Lactobacillus-Inulin (CULTURELLE DIGESTIVE HEALTH) CAPS    LORazepam (ATIVAN) 0.5 MG tablet    Magnesium Oxide 420 MG TABS    methocarbamol (ROBAXIN) 500 MG tablet    methocarbamol (ROBAXIN) 500 MG tablet    omeprazole (PRILOSEC) 20 MG capsule    polyethylene glycol (MIRALAX) 17 GM/Dose powder    potassium chloride ER (KLOR-CON M) 20 MEQ CR tablet    pregabalin (LYRICA) 150 MG capsule    prochlorperazine (COMPAZINE) 10 MG tablet    prochlorperazine (COMPAZINE) 10 MG tablet    QUEtiapine (SEROQUEL) 300 MG tablet    senna-docusate (SENOKOT-S/PERICOLACE) 8.6-50 MG tablet    simvastatin (ZOCOR) 80 MG tablet    traZODone (DESYREL) 100 MG tablet    vitamin D3 (CHOLECALCIFEROL) 1000 units (25 mcg) tablet    vortioxetine (TRINTELLIX) 20 MG tablet     Current Facility-Administered Medications   Medication    lidocaine 1% with EPINEPHrine 1:100,000 injection 1.5 mL    lidocaine  1% with EPINEPHrine 1:100,000 injection 1.5 mL     Facility-Administered Medications Ordered in Other Visits   Medication    fluorodeoxyglucose F-18 (FDG) radioisotope injection 10-18 mCi    iopamidol (ISOVUE-370) solution  mL    sodium chloride (PF) 0.9% PF flush 10 mL    sodium chloride (PF) 0.9% PF flush 10-60 mL    sodium chloride (PF) 0.9% PF flush 60 mL     ALLERGIES:    Allergies   Allergen Reactions    Aspirin     Motrin [Ibuprofen Micronized]      And ASA  Cramps  diarrhea     REVIEW OF SYSTEMS:  A comprehensive of systems was negative except as noted above.    SOCIAL HISTORY:   Social History     Socioeconomic History    Marital status:      Spouse name: Not on file    Number of children: Not on file    Years of education: Not on file    Highest education level: Not on file   Occupational History    Not on file   Tobacco Use    Smoking status: Former     Packs/day: 0.50     Years: 45.00     Pack years: 22.50     Types: Cigarettes     Quit date: 2013     Years since quittin.9    Smokeless tobacco: Never    Tobacco comments:     since , on and off in there 1/2 pack per day   Substance and Sexual Activity    Alcohol use: No    Drug use: Not Currently     Types: Marijuana    Sexual activity: Never     Partners: Female     Comment: not currently active   Other Topics Concern    Parent/sibling w/ CABG, MI or angioplasty before 65F 55M? No   Social History Narrative    Not on file     Social Determinants of Health     Financial Resource Strain: Not on file   Food Insecurity: Not on file   Transportation Needs: Not on file   Physical Activity: Not on file   Stress: Not on file   Social Connections: Not on file   Intimate Partner Violence: Not on file   Housing Stability: Not on file     Reviewed with patient.    FAMILY MEDICAL HISTORY:   Family History   Problem Relation Age of Onset    Diabetes Father     Heart Disease Brother     Skin Cancer No family hx of     Melanoma No family hx of       Reviewed with patient.    PHYSICAL EXAM:   Home /74 mmHg during this video visit.    No lower extremities edema. He is pleasant and not in acute distress.     LABS:   Last Renal Panel:  Sodium   Date Value Ref Range Status   04/21/2023 140 133 - 144 mmol/L Final   06/25/2021 139 133 - 144 mmol/L Final     Potassium   Date Value Ref Range Status   04/21/2023 3.9 3.4 - 5.3 mmol/L Final   06/25/2021 3.8 3.4 - 5.3 mmol/L Final     Chloride   Date Value Ref Range Status   04/21/2023 103 94 - 109 mmol/L Final   06/25/2021 106 94 - 109 mmol/L Final     Carbon Dioxide   Date Value Ref Range Status   06/25/2021 26 20 - 32 mmol/L Final     Carbon Dioxide (CO2)   Date Value Ref Range Status   04/21/2023 34 (H) 20 - 32 mmol/L Final     Anion Gap   Date Value Ref Range Status   04/21/2023 3 3 - 14 mmol/L Final   06/25/2021 6 3 - 14 mmol/L Final     Glucose   Date Value Ref Range Status   04/21/2023 110 (H) 70 - 99 mg/dL Final   06/25/2021 102 (H) 70 - 99 mg/dL Final     Urea Nitrogen   Date Value Ref Range Status   04/21/2023 23 7 - 30 mg/dL Final   06/25/2021 16 7 - 30 mg/dL Final     Creatinine   Date Value Ref Range Status   04/21/2023 1.57 (H) 0.66 - 1.25 mg/dL Final   06/25/2021 1.09 0.66 - 1.25 mg/dL Final     GFR Estimate   Date Value Ref Range Status   04/21/2023 45 (L) >60 mL/min/1.73m2 Final     Comment:     eGFR calculated using 2021 CKD-EPI equation.   06/25/2021 66 >60 mL/min/[1.73_m2] Final     Comment:     Non  GFR Calc  Starting 12/18/2018, serum creatinine based estimated GFR (eGFR) will be   calculated using the Chronic Kidney Disease Epidemiology Collaboration   (CKD-EPI) equation.       GFR, ESTIMATED POCT   Date Value Ref Range Status   01/13/2023 48 (L) >60 mL/min/1.73m2 Final     Calcium   Date Value Ref Range Status   04/21/2023 9.1 8.5 - 10.1 mg/dL Final   06/25/2021 8.4 (L) 8.5 - 10.1 mg/dL Final     Phosphorus   Date Value Ref Range Status   12/20/2021 3.2 2.5 - 4.5 mg/dL  Final   06/11/2021 2.8 2.5 - 4.5 mg/dL Final     Albumin   Date Value Ref Range Status   04/21/2023 4.0 3.4 - 5.0 g/dL Final   06/25/2021 3.5 3.4 - 5.0 g/dL Final       URINE STUDIES  Recent Labs   Lab Test 04/13/16  1100   COLOR Yellow   APPEARANCE Clear   URINEGLC Negative   URINEBILI Negative   URINEKETONE Negative   SG 1.015   UBLD Negative   URINEPH 7.0   PROTEIN Negative   UROBILINOGEN 0.2   NITRITE Negative   LEUKEST Trace*   RBCU O - 2   WBCU O - 2     No lab results found.  PTH  No lab results found.  IRON STUDIES  No lab results found.    IMAGING:  I review the PET CT on 7/21/23 which showed  Symmetric enhancement of the kidneys. Simple renal cortical cysts. No  hydronephrosis. The urinary bladder is decompressed. The reproductive  organs are unremarkable.    ASSESSMENT AND RECOMMENDATIONS:   # CKD stage 3 likely secondary to chronic hypertension; Cr fluctuated likely in the setting of diuretics use  The patient has been noted to have creatinine elevation in the range of 1.2-1.6 since 2016.  He received nivolumab between 6/22-2/22. Cr while taking Nivolumab was normal. Cr 1011 in 8/26/22 but increased to 1.39 in 1/23 and now 1.50s with eGFR in the mid 40s. I do not have any recent UA to help determine the cause of chronic kidney disease but in 2016 the UA was unremarkable.  I suspect that that his chronic kidney disease is likely from chronic hypertension for which she has been suffering for 30 years.  We need UA and urine protein to help determine the cause of his chronic kidney disease.  Recent PET CT scan showed normal kidneys without any stone or mass or hydronephrosis.  The fact that his creatinine has been fluctuating between 1.1-1.5 could be due to being on diuretics.  The patient reports that sometime he felt lightheadedness but not currently.  I asked patient to measure blood pressure and let me know was in 2 weeks to make sure that he has no episode of hypotension.  The patient is also on  omeprazole 20 mg twice daily for GERD. Depending on UA findings, we may switch to Famotidine.   - Obtain UA and Urine protein at [place]  - Ask that he drinks a lot of fluid and hold lisinopril omn the day of receiving contrast media (if necessary)  - Stay well hydrated  - Next work up depending on UA and urine protein  # Scalp melanoma followed by Dr. Hernández  The patient was diagnosed with scalp melanoma after presetned with lesional enlargement and abnormal sensation.  Bx showed nodular and nevoid type melanoma, non-ulcerated, Breslow depth up to 1.3 mm, Saurabh's level IV, and up to 3 mitoses per mm2. 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 He was started vemurafenib and cobimetinib, ~2/12/21 held for diarrhea, then resumed. In 2/18/2021, he was started atezolizumab, last on 4/29/21. In 5/24/2021, underwent wide local excision of the scalp melanoma and left latissimus free flap for scalp reconstruction.  Surgical pathology showed features consistent with scar and tumoral melanosis. No definite residual melanoma is seen. Tumoral melanosis (which extends to a depth of around 1.1 mm) is believed to be equivalent to a diagnosis of regressed melanoma. In 6/25/21, he starts adjuvant Nivolumab, last dose 2/14/22. CT headneck from 7/22/23 showed possibly hypermetabolic lesion at scalp-> he will have repeat imaging in 3 mmonths.  # BMD  Phos and Calcium are normal. Will check PTH and Vit D.   # Hypertension; well conreolled  On lisinopril 10 mg  1.5 tab (15 mg) per day, hydrochlorothiazide 25 mg once a day (dose was raise and prazosin HCL 1 mg take 2 capsules in the morning and 1 mg bed time.  - BP today is 130/74  - Ask to measure bP in 2 weeks and let me know; worried about hypotension  # Leukopenia  WBC in the 3.0s recently. No infection. Has not received chemo recently. Defer to oncology to address.   # Anemia surveillance  Hb 14.1; no issue    Follow-up in 6 months with labs; will send MyChart Msg with  UA/Urien protein, PTH and Vit D results to Sara    I spent 60 minutes on the date of the encounter doing chart review, history and exam, documentation and further activities as noted above. 31 (1.30-2.01) minutes of this visit is dedicated to direct patient interaction via video.    Malgorzata Alarcon MD on 07/24/2023              Virtual Visit Details    Type of service:  Video Visit   Video Start Time: 1.30  Video End Time:2.01    Originating Location (pt. Location): Home    Distant Location (provider location):  On-site  Platform used for Video Visit: Ranjith        Again, thank you for allowing me to participate in the care of your patient.      Sincerely,    Malgorzata Alarcon MD

## 2023-07-24 NOTE — NURSING NOTE
Is the patient currently in the state of [place]? YES    Visit mode:VIDEO    If the visit is dropped, the patient can be reconnected by: VIDEO VISIT: Text to cell phone: [phone]    Will anyone else be joining the visit? NO      How would you like to obtain your AVS? MyChart    Are changes needed to the allergy or medication list? NO    Reason for visit: Video Visit (Consult)

## 2023-07-24 NOTE — PROGRESS NOTES
Virtual Visit Details    Type of service:  Video Visit   Video Start Time: 1.30  Video End Time:2.01    Originating Location (pt. Location): Home    Distant Location (provider location):  On-site  Platform used for Video Visit: Ranjith

## 2023-07-24 NOTE — PATIENT INSTRUCTIONS
Measure blood pressure and let me know in 2 weeks  Please gets urine test, vitamin D and parathyroid hormone soon  Please stay well hydrated and avoid NSAIDs  We may have to switch omeprazole to other agents; pending urine result  Will see you in 6 months with labs

## 2023-07-27 ENCOUNTER — VIRTUAL VISIT (OUTPATIENT)
Dept: ONCOLOGY | Facility: CLINIC | Age: 78
End: 2023-07-27
Attending: INTERNAL MEDICINE
Payer: MEDICARE

## 2023-07-27 VITALS
DIASTOLIC BLOOD PRESSURE: 74 MMHG | SYSTOLIC BLOOD PRESSURE: 130 MMHG | WEIGHT: 198 LBS | HEIGHT: 69 IN | BODY MASS INDEX: 29.33 KG/M2

## 2023-07-27 DIAGNOSIS — Z08 FOLLOW-UP SURVEILLANCE OF MELANOMA, ENCOUNTER FOR: ICD-10-CM

## 2023-07-27 DIAGNOSIS — D70.9 NEUTROPENIA, UNSPECIFIED TYPE (H): ICD-10-CM

## 2023-07-27 DIAGNOSIS — Z85.820 FOLLOW-UP SURVEILLANCE OF MELANOMA, ENCOUNTER FOR: ICD-10-CM

## 2023-07-27 DIAGNOSIS — C43.4 MELANOMA OF SCALP (H): Primary | ICD-10-CM

## 2023-07-27 PROCEDURE — 99215 OFFICE O/P EST HI 40 MIN: CPT | Mod: VID | Performed by: INTERNAL MEDICINE

## 2023-07-27 ASSESSMENT — PAIN SCALES - GENERAL: PAINLEVEL: NO PAIN (0)

## 2023-07-27 NOTE — PROGRESS NOTES
Virtual Visit Details    Type of service:  Video Visit   Video Start Time:  1:45 PM  Video End Time: 2:15 PM    Originating Location (pt. Location): Home    Distant Location (provider location):  On-site  Platform used for Video Visit: [facility] ONCOLOGY PROGRESS NOTE  Melanoma Clinic  Jul 27, 2023    CHIEF COMPLAINT: Scalp melanoma    Melanoma History:  1. He has a small pigmented scalp lesion present for over 30 years. The lesion was biopsied in 1992 and was benign.  2. October 2020, he presented to [facility] with 1 year of progressive enlargement of the lesion and new onset burning, itching, and stinging sensation in the affected scalp.  3. 10/26/20, He was seen at [facility] and he had shave biopsies of A. left central parietal scalp, B. left central occipital scalp, C. Left posterior parietal scalp, and D. punch biopsy of the left superior occipital scalp. Pathology (specimen: [id number]) showed a nodular and nevoid type melanoma, non-ulcerated, Breslow depth up to 1.3 mm, Saurabh's level IV, and up to 3 mitoses per mm2. All margins were involved. Ki-67 10-20%. Sox10 stain is positive and highlights an atypical compound melanocytic proliferation with significant overlying confluence and pagetosis of intraepidermal melanocytes. T-stage: at least pT2a. PD-L1 staining shows PD-L1 TPS <1%. 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  4. 11/25/20, he was seen by Dr. Garcia and he took three 3mm punch biopsies, which returned as dermal melanocytic proliferations with melanophages and fibrosis, consistent with locoregional metastatic melanoma.  5. 12/8/2020 he had PET-CT, which showed FDG avid irregular skin thickening overlying the left parietal region, with no FDG avid lymphadenopathy in the neck and no evidence of metastasis elsewhere in the body.  6. 1/22/2021 start vemurafenib and cobimetinib, ~2/12/21 held for diarrhea, then resumed.  7. 2/18/2021 Start atezolizumab, last  on 4/29/21  8. 5/24/2021 Wide local excision of the scalp melanoma and left latissimus free flap for scalp reconstruction.    Surgical pathology showed features consistent with scar and tumoral melanosis. No definite residual melanoma is seen. Tumoral melanosis (which extends to a depth of around 1.1 mm) is believed to be equivalent to a diagnosis of regressed melanoma.   9. 6/25/21, he starts adjuvant Nivolumab, last dose 2/14/22    INTERVAL HISTORY  Mr. 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     He is doing well and denies any major complaints. No new skin lesions. No ear lesions. Wakes up with moist ears. He had a few teeth pulled last year, but nothing else for association with     No fevers or chills. Feels cold a lot of the time. Denies any recent infections.    Low blood pressures at home and working with nephrology.    PET-CT shows no obvious evidence of recurrence or metastasis. There is a small area of FDG uptake and thickening inferior to the right ear. Patient denies any skin changes in the area. Otherwise, labs show an elevated creatinine to 1.55.      Current Outpatient Medications   Medication Sig Dispense Refill    acetaminophen (TYLENOL) 500 MG tablet Take 2 tablets (1,000 mg) by mouth every 8 hours as needed for mild pain 100 tablet 0    diphenoxylate-atropine (LOMOTIL) 2.5-0.025 MG tablet Take 1-2 tablets by mouth 4 times daily as needed for diarrhea 120 tablet 5    docusate sodium (COLACE) 100 MG capsule TAKE ONE CAPSULE BY MOUTH EVERY DAY FOR STOOL SOFTENING      fluvoxaMINE (LUVOX) 100 MG tablet Take 150 mg at bedtime      hydrochlorothiazide (HYDRODIURIL) 25 MG tablet Take 25 mg by mouth daily.      Hypromellose (ARTIFICIAL TEARS OP) Apply  to eye. 2 drops in each eye twice a day as needed      Lactobacillus-Inulin ([facility] DIGESTIVE [facility]) CAPS 1 tab daily 30 capsule 1    Magnesium Oxide 420 MG TABS Take 420 mg by mouth daily       methocarbamol (ROBAXIN) 500 MG tablet Take 1 tablet (500 mg) by mouth 3 times daily as needed for muscle spasms 10 tablet 0    omeprazole (PRILOSEC) 20 MG capsule Take 1 capsule by mouth 2 times daily. 60 capsule prn    polyethylene glycol (MIRALAX) 17 GM/Dose powder Take 17 g by mouth daily 510 g 0    potassium chloride ER (KLOR-CON M) 20 MEQ CR tablet Take 1 tablet (20 mEq) by mouth daily 7 tablet 0    pregabalin (LYRICA) 150 MG capsule Take 150 mg by mouth 2 times daily      QUEtiapine (SEROQUEL) 300 MG tablet Take 150 mg by mouth At Bedtime       senna-docusate (SENOKOT-S/PERICOLACE) 8.6-50 MG tablet Take 1 tablet by mouth 2 times daily 30 tablet 0    simvastatin (ZOCOR) 80 MG tablet Take 40 mg by mouth At Bedtime       traZODone (DESYREL) 100 MG tablet Take 100 mg by mouth At Bedtime       vitamin D3 (CHOLECALCIFEROL) 1000 units (25 mcg) tablet Take 1,000 Units by mouth daily       vortioxetine (TRINTELLIX) 20 MG tablet TAKE ONE TABLET BY MOUTH EVERY MORNING       Objective:  General: patient appears well in no acute distress, alert and oriented, speech clear and fluid  Skin: no visualized rash or lesions on visualized skin  Resp: Appears to be breathing comfortably without accessory muscle usage, speaking in full sentences, no audible wheezes or cough.  Psych: Coherent speech, normal rate and volume, able to articulate logical thoughts, able to abstract reason, no tangential thoughts, no hallucinations or delusions  Patient's affect is appropriate.    Labs:  Most Recent 3 CBC's:  Recent Labs   Lab Test 07/21/23  1251 04/21/23  1108 01/20/23  1303   WBC 3.3* 3.8* 6.4   HGB 14.1 14.5 14.1   MCV 85 86 85    224 230   ANEUTAUTO 1.1* 1.8 4.2     Most Recent 3 BMP's:  Recent Labs   Lab Test 07/21/23  1347 07/21/23  1251 04/21/23  1108 01/20/23  1303   NA  --  139 140 139   POTASSIUM  --  3.6 3.9 3.9   CHLORIDE  --  99 103 103   CO2  --  29 34* 31   BUN  --  15.6 23 21   CR 1.4* 1.55* 1.57* 1.39*   ANIONGAP   --  11 3 5   STEPHEN  --  9.3 9.1 9.0   GLC  --  110* 110* 115*   PROTTOTAL  --  7.3 7.5 7.5   ALBUMIN  --  4.6 4.0 3.7    Most Recent 3 LFT's:  Recent Labs   Lab Test 07/21/23  1251 04/21/23  1108 01/20/23  1303   AST 19 14 14   ALT 8 19 25   ALKPHOS 56 55 53   BILITOTAL 0.5 0.6 0.4   I reviewed the above labs today.    IMAGING  Imaging:  PET Oncology Whole Body  Narrative: Combined Report of: PET and CT on 7/21/2023 2:51 PM:    1. PET of the neck, chest, abdomen, and pelvis.  2. PET CT Fusion for Attenuation Correction and Anatomical  Localization.  3. Diagnostic CT of the chest, abdomen and pelvis with intravenous  contrast obtained for diagnostic interpretation.  4. 3D MIP and PET-CT fused images were processed on an independent  workstation and archived to PACS and reviewed by a radiologist.    Technique:    1. PET: The patient received 11.01 mCi of F-18-FDG. The serum glucose  was 116 mg/dL prior to administration. Body weight was 92.1 kg. Images  were evaluated in the axial, sagittal, and coronal planes as well as  the rotational whole body MIP. Images were acquired from the cranial  vertex to the feet.    UPTAKE WAS MEASURED AT 16 MINUTES.     BACKGROUND: Liver SUV max = 4.89, Aorta Blood SUV Max = 4.12.     2. CT: Volumetric acquisition for clinical interpretation of the  chest, abdomen, and pelvis acquired at 3 mm sections. The chest,  abdomen, and pelvis were evaluated at 5 mm sections in bone, soft  tissue, and lung windows.    Contrast and Medications:  IV contrast: 112 mL of Isovue 370 intravenously.  PO contrast: none.  Additional Medications: None.    3. 3D MIP and PET-CT fused images were processed on an independent  workstation and archived to PACS and reviewed by a radiologist.    INDICATION: Malignant melanoma of scalp (H).    ADDITIONAL INFORMATION OBTAINED FROM EMR: 78-year-old man with scalp  melanoma, status post wide local excision and left latissimus free  flap reconstruction on 5/24/2021. Most  recent treatment with adjuvant  Nivolumab, completed 2/14/2022. Restaging exam.     COMPARISON: PET-CT dated 4/21/2023.     FINDINGS:     HEAD/NECK:  Please see separately dedicated neuroradiology PET/CT for head and  neck findings.    CHEST:  There is no suspicious FDG uptake in the chest.    The central tracheobronchial tree is clear. No pleural effusion or  pneumothorax. No acute consolidation. Scattered calcified granulomas.  No new or suspicious pulmonary nodules.     No hypermetabolic lymph nodes are demonstrated in the chest.    Heart size is within normal limits. No pericardial effusion. The  thoracic aorta and main pulmonary artery are within normal limits for  diameter. Mild atherosclerotic calcifications of the aortic arch.  Scattered coronary calcifications. The esophagus is unremarkable.  Surgical clips in the left chest wall/axilla.     ABDOMEN AND PELVIS:  There is no suspicious FDG uptake in the abdomen or pelvis.    Hypodensities adjacent to the falciform ligament and gallbladder  fossa, likely represent focal fatty infiltration. Unchanged  subcentimeter cyst/cavernous hemangioma in segment 5. The gallbladder  is unremarkable. No intrahepatic or extrahepatic biliary ductal  dilatation. Fat atrophy of the pancreas. No pancreatic ductal  dilatation. The spleen is unremarkable, adjacent splenule is present.  Stable 8 mm nodule in the left adrenal gland demonstrates fat density  on prior noncontrast CT, likely an adenoma.     Symmetric enhancement of the kidneys. Simple renal cortical cysts. No  hydronephrosis. The urinary bladder is decompressed. The reproductive  organs are unremarkable.    No hypermetabolic lymph nodes are demonstrated in the abdomen or  pelvis.    Normal caliber of the small and large bowel. Extensive colonic  diverticulosis. Non-specific diffuse submucosal fatty infiltration in  the right colon and distal ileum. No free air, free fluid, or fluid  collection. Normal caliber of the  abdominal aorta. Atherosclerotic  calcifications of the abdominal aorta and branching vessels. Calcified  injection granulomas in both subcutaneous regions. Tiny fat-containing  umbilical hernia.    EXTREMITIES:   There is no suspicious FDG uptake in the visualized lower extremities.  Mild focal subcutaneous uptake in the right antecubital region is  likely related to injection.     BONES:   There is no suspicious FDG uptake in the skeleton. There are no  suspicious lytic or blastic osseous lesions. Degenerative changes of  the spine and hips.   Impression: IMPRESSION:     1. No evidence of metastatic disease in the chest, abdomen, or pelvis.    2. Ancillary/incidental findings as noted in the body of the report.    3. Please see separately dedicated neuroradiology PET/CT for head and  neck findings.    I have personally reviewed the examination and initial interpretation  and I agree with the findings.    ROCIO MCINTOSH MD         SYSTEM ID:  [id number]  CT Chest/Abdomen/Pelvis w Contrast  Narrative: Combined Report of: PET and CT on 7/21/2023 2:51 PM:    1. PET of the neck, chest, abdomen, and pelvis.  2. PET CT Fusion for Attenuation Correction and Anatomical  Localization.  3. Diagnostic CT of the chest, abdomen and pelvis with intravenous  contrast obtained for diagnostic interpretation.  4. 3D MIP and PET-CT fused images were processed on an independent  workstation and archived to PACS and reviewed by a radiologist.    Technique:    1. PET: The patient received 11.01 mCi of F-18-FDG. The serum glucose  was 116 mg/dL prior to administration. Body weight was 92.1 kg. Images  were evaluated in the axial, sagittal, and coronal planes as well as  the rotational whole body MIP. Images were acquired from the cranial  vertex to the feet.    UPTAKE WAS MEASURED AT 16 MINUTES.     BACKGROUND: Liver SUV max = 4.89, Aorta Blood SUV Max = 4.12.     2. CT: Volumetric acquisition for clinical interpretation of the  chest,  abdomen, and pelvis acquired at 3 mm sections. The chest,  abdomen, and pelvis were evaluated at 5 mm sections in bone, soft  tissue, and lung windows.    Contrast and Medications:  IV contrast: 112 mL of Isovue 370 intravenously.  PO contrast: none.  Additional Medications: None.    3. 3D MIP and PET-CT fused images were processed on an independent  workstation and archived to PACS and reviewed by a radiologist.    INDICATION: Malignant melanoma of scalp (H).    ADDITIONAL INFORMATION OBTAINED FROM EMR: 78-year-old man with scalp  melanoma, status post wide local excision and left latissimus free  flap reconstruction on 5/24/2021. Most recent treatment with adjuvant  Nivolumab, completed 2/14/2022. Restaging exam.     COMPARISON: PET-CT dated 4/21/2023.     FINDINGS:     HEAD/NECK:  Please see separately dedicated neuroradiology PET/CT for head and  neck findings.    CHEST:  There is no suspicious FDG uptake in the chest.    The central tracheobronchial tree is clear. No pleural effusion or  pneumothorax. No acute consolidation. Scattered calcified granulomas.  No new or suspicious pulmonary nodules.     No hypermetabolic lymph nodes are demonstrated in the chest.    Heart size is within normal limits. No pericardial effusion. The  thoracic aorta and main pulmonary artery are within normal limits for  diameter. Mild atherosclerotic calcifications of the aortic arch.  Scattered coronary calcifications. The esophagus is unremarkable.  Surgical clips in the left chest wall/axilla.     ABDOMEN AND PELVIS:  There is no suspicious FDG uptake in the abdomen or pelvis.    Hypodensities adjacent to the falciform ligament and gallbladder  fossa, likely represent focal fatty infiltration. Unchanged  subcentimeter cyst/cavernous hemangioma in segment 5. The gallbladder  is unremarkable. No intrahepatic or extrahepatic biliary ductal  dilatation. Fat atrophy of the pancreas. No pancreatic ductal  dilatation. The spleen is  unremarkable, adjacent splenule is present.  Stable 8 mm nodule in the left adrenal gland demonstrates fat density  on prior noncontrast CT, likely an adenoma.     Symmetric enhancement of the kidneys. Simple renal cortical cysts. No  hydronephrosis. The urinary bladder is decompressed. The reproductive  organs are unremarkable.    No hypermetabolic lymph nodes are demonstrated in the abdomen or  pelvis.    Normal caliber of the small and large bowel. Extensive colonic  diverticulosis. Non-specific diffuse submucosal fatty infiltration in  the right colon and distal ileum. No free air, free fluid, or fluid  collection. Normal caliber of the abdominal aorta. Atherosclerotic  calcifications of the abdominal aorta and branching vessels. Calcified  injection granulomas in both subcutaneous regions. Tiny fat-containing  umbilical hernia.    EXTREMITIES:   There is no suspicious FDG uptake in the visualized lower extremities.  Mild focal subcutaneous uptake in the right antecubital region is  likely related to injection.     BONES:   There is no suspicious FDG uptake in the skeleton. There are no  suspicious lytic or blastic osseous lesions. Degenerative changes of  the spine and hips.   Impression: IMPRESSION:     1. No evidence of metastatic disease in the chest, abdomen, or pelvis.    2. Ancillary/incidental findings as noted in the body of the report.    3. Please see separately dedicated neuroradiology PET/CT for head and  neck findings.    I have personally reviewed the examination and initial interpretation  and I agree with the findings.    ROCIO MCINTOSH MD         SYSTEM ID:  [id number]  CT Soft Tissue Neck w Contrast  Narrative: PET CT fusion examination 7/21/2023 2:54 PM  1. Neck CT with contrast  2. PET study of the neck  3. PET CT fusion study of the neck    ADDITIONAL INFORMATION OBTAINED FROM EMR: 78-year-old man with scalp  melanoma, status post wide local excision and left latissimus free  flap  reconstruction on 5/24/2021. Most recent treatment with adjuvant  Nivolumab, completed 2/14/2022. Restaging exam.     COMPARISON: PET-CT dated 4/21/2023.    Technique: Please refer to the accompanying whole body PET-CT for  report of the dose and whole body PET-CT findings.  Regarding the neck, axial images were obtained after nonionic  intravenous contrast administration, with sagittal and coronal  reconstructions performed. Neck CT images were reviewed in bone, soft  tissue, and lung windows, with review of the fused PET-CT images as  well in multiple planes.    Findings:  Postsurgical changes of the left temporoparietal and preauricular  scalp with free flap reconstruction. No abnormal uptake to indicate  local recurrence. Resolution of previous left preauricular focus.     Interval accentuated mild focal cutaneous thickening and associated  uptake in the right infraauricular region (1/64) and right parietal  scalp (1/20).    Stable hypodensity along the left cerebral convexity measuring 1.1 cm  in greatest thickness, likely chronic hygroma.    Evaluation of the mucosal space demonstrates no abnormality or  abnormal metabolic uptake on PET CT in the nasopharynx, oropharynx,  hypopharynx or the glottis. The tongue base appears normal. The major  salivary glands and thyroid gland appear normal.    There is no evident cervical lymphadenopathy, and the cervical lymph  nodes are within normal limits by size criteria. No abnormal metabolic  uptake on PET CT. Mildly avid, subcentimeter right cervical level V  node is likely reactive.      Limited evaluation of the cervical vertebral column demonstrates no  evident spinal canal stenosis. Small mucous retention cyst in right  maxillary sinus; otherwise, the visualized paranasal sinuses and  mastoid air cells are clear. The major vascular structures in the neck  are patent.  Impression: Impression:     1. Postsurgical changes in the left temporoparietal scalp  and  preauricular region without evidence of local recurrence.     2. Interval accentuated mild focal cutaneous thickening and associated  FDG uptake in the right infraauricular region and right parietal scalp  is indeterminate, probably inflammatory; correlate with direct  inspection.     3. Please refer to the whole body PET CT performed as a separate  report, for the findings of the remainder of the body    I have personally reviewed the examination and initial interpretation  and I agree with the findings.    ROCIO MCINTOSH MD         SYSTEM ID:  [id number]    I reviewed the above imaging today.    ASSESSMENT AND PLAN  Cutaneous melanoma, scalp primary, pT2a cN1c, clinical Stage IIIB  L2 hypermetabolism  It was a pleasure to talk with Mr. Coleman today. He is a 78 year old  with agent orange cutaneous exposures in the [place] war and a diagnosis of malignant melanoma arising from a pre-existing pigmented lesion on the scalp. He had a partial response to neoadjuvant therapy with atezolizumab (3 cycles), vemurafenib, and cobimetinib (4 cycles). He received adjuvant nivolumab through 2/14/2022.    PET-CT shows no obvious evidence for recurrence or metastasis. I think the small right ear FDG avid focus is probably not recurrence, as patient notes no skin changes in the area and I am not able to appreciate anything on the video. I recommended he be seen by dermatology for a thorough skin evaluation. He should be seen every 3-6 months by dermatology. In the meantime, we will continue systemic surveillance. It has been almost 2 years since the WLE surgery, so would continue with imaging every 3 months at this point. Plan to transition to 4 months after that.    -RTC in 3 months with PET-CT.     Isolated Neutropenia, mild  He has low white count of 3.3 and ANC 1.1. This is down from ANC 1.8 in April. He has had low neutrophil counts before in 2021 etc but not this low. Unclear cause. Unlikely nutritional as normal  hemoglobin and MCV (I.e. Folate/B12). Could consider Inflammatory causes (AIDEN, RF), drugs (nothing new on med list), endocrine (TSH), alcohol, or infections (EBV).  We will obtain CBC with differential in about 2 weeks to monitor. If worsening will work up further.    Depressed mood, anxiety, PTSD  He has a longstanding history of PTSD. Takes occasional lorazepam. Continues to work with psychiatrist about every 2 months out of [place].     CKD, moderate, stage III  Creatinine is elevated to 1.55. He has seen nephrology, Dr. Alarcon, and felt likely fluctuating creatinine due to diuretic use.     Questions answered.    Oksana Peñaloza M.D.   of Medicine  Hematology, Oncology and Transplantation  Pager: [phone]

## 2023-07-27 NOTE — LETTER
7/27/2023         RE: Ahmet Coleman  [address]  [place] [address]        Dear Colleague,    Thank you for referring your patient, Ahmet Coleman, to the [facility]. Please see a copy of my visit note below.    Virtual Visit Details    Type of service:  Video Visit   Video Start Time:  1:45 PM  Video End Time: 2:15 PM    Originating Location (pt. Location): Home    Distant Location (provider location):  On-site  Platform used for Video Visit: [facility] ONCOLOGY PROGRESS NOTE  Melanoma Clinic  Jul 27, 2023    CHIEF COMPLAINT: Scalp melanoma    Melanoma History:  1. He has a small pigmented scalp lesion present for over 30 years. The lesion was biopsied in 1992 and was benign.  2. October 2020, he presented to [facility] with 1 year of progressive enlargement of the lesion and new onset burning, itching, and stinging sensation in the affected scalp.  3. 10/26/20, He was seen at [facility] and he had shave biopsies of A. left central parietal scalp, B. left central occipital scalp, C. Left posterior parietal scalp, and D. punch biopsy of the left superior occipital scalp. Pathology (specimen: [id number]) showed a nodular and nevoid type melanoma, non-ulcerated, Breslow depth up to 1.3 mm, Saurabh's level IV, and up to 3 mitoses per mm2. All margins were involved. Ki-67 10-20%. Sox10 stain is positive and highlights an atypical compound melanocytic proliferation with significant overlying confluence and pagetosis of intraepidermal melanocytes. T-stage: at least pT2a. PD-L1 staining shows PD-L1 TPS <1%. 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  4. 11/25/20, he was seen by Dr. Garcia and he took three 3mm punch biopsies, which returned as dermal melanocytic proliferations with melanophages and fibrosis, consistent with locoregional metastatic melanoma.  5. 12/8/2020 he had PET-CT, which showed FDG avid irregular skin thickening  overlying the left parietal region, with no FDG avid lymphadenopathy in the neck and no evidence of metastasis elsewhere in the body.  6. 1/22/2021 start vemurafenib and cobimetinib, ~2/12/21 held for diarrhea, then resumed.  7. 2/18/2021 Start atezolizumab, last on 4/29/21  8. 5/24/2021 Wide local excision of the scalp melanoma and left latissimus free flap for scalp reconstruction.    Surgical pathology showed features consistent with scar and tumoral melanosis. No definite residual melanoma is seen. Tumoral melanosis (which extends to a depth of around 1.1 mm) is believed to be equivalent to a diagnosis of regressed melanoma.   9. 6/25/21, he starts adjuvant Nivolumab, last dose 2/14/22    INTERVAL HISTORY  Mr. 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     He is doing well and denies any major complaints. No new skin lesions. No ear lesions. Wakes up with moist ears. He had a few teeth pulled last year, but nothing else for association with     No fevers or chills. Feels cold a lot of the time. Denies any recent infections.    Low blood pressures at home and working with nephrology.    PET-CT shows no obvious evidence of recurrence or metastasis. There is a small area of FDG uptake and thickening inferior to the right ear. Patient denies any skin changes in the area. Otherwise, labs show an elevated creatinine to 1.55.      Current Outpatient Medications   Medication Sig Dispense Refill    acetaminophen (TYLENOL) 500 MG tablet Take 2 tablets (1,000 mg) by mouth every 8 hours as needed for mild pain 100 tablet 0    diphenoxylate-atropine (LOMOTIL) 2.5-0.025 MG tablet Take 1-2 tablets by mouth 4 times daily as needed for diarrhea 120 tablet 5    docusate sodium (COLACE) 100 MG capsule TAKE ONE CAPSULE BY MOUTH EVERY DAY FOR STOOL SOFTENING      fluvoxaMINE (LUVOX) 100 MG tablet Take 150 mg at bedtime      hydrochlorothiazide (HYDRODIURIL) 25 MG tablet Take 25 mg by mouth  daily.      Hypromellose (ARTIFICIAL TEARS OP) Apply  to eye. 2 drops in each eye twice a day as needed      Lactobacillus-Inulin ([facility] DIGESTIVE [facility]) CAPS 1 tab daily 30 capsule 1    Magnesium Oxide 420 MG TABS Take 420 mg by mouth daily      methocarbamol (ROBAXIN) 500 MG tablet Take 1 tablet (500 mg) by mouth 3 times daily as needed for muscle spasms 10 tablet 0    omeprazole (PRILOSEC) 20 MG capsule Take 1 capsule by mouth 2 times daily. 60 capsule prn    polyethylene glycol (MIRALAX) 17 GM/Dose powder Take 17 g by mouth daily 510 g 0    potassium chloride ER (KLOR-CON M) 20 MEQ CR tablet Take 1 tablet (20 mEq) by mouth daily 7 tablet 0    pregabalin (LYRICA) 150 MG capsule Take 150 mg by mouth 2 times daily      QUEtiapine (SEROQUEL) 300 MG tablet Take 150 mg by mouth At Bedtime       senna-docusate (SENOKOT-S/PERICOLACE) 8.6-50 MG tablet Take 1 tablet by mouth 2 times daily 30 tablet 0    simvastatin (ZOCOR) 80 MG tablet Take 40 mg by mouth At Bedtime       traZODone (DESYREL) 100 MG tablet Take 100 mg by mouth At Bedtime       vitamin D3 (CHOLECALCIFEROL) 1000 units (25 mcg) tablet Take 1,000 Units by mouth daily       vortioxetine (TRINTELLIX) 20 MG tablet TAKE ONE TABLET BY MOUTH EVERY MORNING       Objective:  General: patient appears well in no acute distress, alert and oriented, speech clear and fluid  Skin: no visualized rash or lesions on visualized skin  Resp: Appears to be breathing comfortably without accessory muscle usage, speaking in full sentences, no audible wheezes or cough.  Psych: Coherent speech, normal rate and volume, able to articulate logical thoughts, able to abstract reason, no tangential thoughts, no hallucinations or delusions  Patient's affect is appropriate.    Labs:  Most Recent 3 CBC's:  Recent Labs   Lab Test 07/21/23  1251 04/21/23  1108 01/20/23  1303   WBC 3.3* 3.8* 6.4   HGB 14.1 14.5 14.1   MCV 85 86 85    224 230   ANEUTAUTO 1.1* 1.8 4.2     Most Recent 3  BMP's:  Recent Labs   Lab Test 07/21/23  1347 07/21/23  1251 04/21/23  1108 01/20/23  1303   NA  --  139 140 139   POTASSIUM  --  3.6 3.9 3.9   CHLORIDE  --  99 103 103   CO2  --  29 34* 31   BUN  --  15.6 23 21   CR 1.4* 1.55* 1.57* 1.39*   ANIONGAP  --  11 3 5   STEPHEN  --  9.3 9.1 9.0   GLC  --  110* 110* 115*   PROTTOTAL  --  7.3 7.5 7.5   ALBUMIN  --  4.6 4.0 3.7    Most Recent 3 LFT's:  Recent Labs   Lab Test 07/21/23  1251 04/21/23  1108 01/20/23  1303   AST 19 14 14   ALT 8 19 25   ALKPHOS 56 55 53   BILITOTAL 0.5 0.6 0.4   I reviewed the above labs today.    IMAGING  Imaging:  PET Oncology Whole Body  Narrative: Combined Report of: PET and CT on 7/21/2023 2:51 PM:    1. PET of the neck, chest, abdomen, and pelvis.  2. PET CT Fusion for Attenuation Correction and Anatomical  Localization.  3. Diagnostic CT of the chest, abdomen and pelvis with intravenous  contrast obtained for diagnostic interpretation.  4. 3D MIP and PET-CT fused images were processed on an independent  workstation and archived to PACS and reviewed by a radiologist.    Technique:    1. PET: The patient received 11.01 mCi of F-18-FDG. The serum glucose  was 116 mg/dL prior to administration. Body weight was 92.1 kg. Images  were evaluated in the axial, sagittal, and coronal planes as well as  the rotational whole body MIP. Images were acquired from the cranial  vertex to the feet.    UPTAKE WAS MEASURED AT 16 MINUTES.     BACKGROUND: Liver SUV max = 4.89, Aorta Blood SUV Max = 4.12.     2. CT: Volumetric acquisition for clinical interpretation of the  chest, abdomen, and pelvis acquired at 3 mm sections. The chest,  abdomen, and pelvis were evaluated at 5 mm sections in bone, soft  tissue, and lung windows.    Contrast and Medications:  IV contrast: 112 mL of Isovue 370 intravenously.  PO contrast: none.  Additional Medications: None.    3. 3D MIP and PET-CT fused images were processed on an independent  workstation and archived to PACS and  reviewed by a radiologist.    INDICATION: Malignant melanoma of scalp (H).    ADDITIONAL INFORMATION OBTAINED FROM EMR: 78-year-old man with scalp  melanoma, status post wide local excision and left latissimus free  flap reconstruction on 5/24/2021. Most recent treatment with adjuvant  Nivolumab, completed 2/14/2022. Restaging exam.     COMPARISON: PET-CT dated 4/21/2023.     FINDINGS:     HEAD/NECK:  Please see separately dedicated neuroradiology PET/CT for head and  neck findings.    CHEST:  There is no suspicious FDG uptake in the chest.    The central tracheobronchial tree is clear. No pleural effusion or  pneumothorax. No acute consolidation. Scattered calcified granulomas.  No new or suspicious pulmonary nodules.     No hypermetabolic lymph nodes are demonstrated in the chest.    Heart size is within normal limits. No pericardial effusion. The  thoracic aorta and main pulmonary artery are within normal limits for  diameter. Mild atherosclerotic calcifications of the aortic arch.  Scattered coronary calcifications. The esophagus is unremarkable.  Surgical clips in the left chest wall/axilla.     ABDOMEN AND PELVIS:  There is no suspicious FDG uptake in the abdomen or pelvis.    Hypodensities adjacent to the falciform ligament and gallbladder  fossa, likely represent focal fatty infiltration. Unchanged  subcentimeter cyst/cavernous hemangioma in segment 5. The gallbladder  is unremarkable. No intrahepatic or extrahepatic biliary ductal  dilatation. Fat atrophy of the pancreas. No pancreatic ductal  dilatation. The spleen is unremarkable, adjacent splenule is present.  Stable 8 mm nodule in the left adrenal gland demonstrates fat density  on prior noncontrast CT, likely an adenoma.     Symmetric enhancement of the kidneys. Simple renal cortical cysts. No  hydronephrosis. The urinary bladder is decompressed. The reproductive  organs are unremarkable.    No hypermetabolic lymph nodes are demonstrated in the abdomen  or  pelvis.    Normal caliber of the small and large bowel. Extensive colonic  diverticulosis. Non-specific diffuse submucosal fatty infiltration in  the right colon and distal ileum. No free air, free fluid, or fluid  collection. Normal caliber of the abdominal aorta. Atherosclerotic  calcifications of the abdominal aorta and branching vessels. Calcified  injection granulomas in both subcutaneous regions. Tiny fat-containing  umbilical hernia.    EXTREMITIES:   There is no suspicious FDG uptake in the visualized lower extremities.  Mild focal subcutaneous uptake in the right antecubital region is  likely related to injection.     BONES:   There is no suspicious FDG uptake in the skeleton. There are no  suspicious lytic or blastic osseous lesions. Degenerative changes of  the spine and hips.   Impression: IMPRESSION:     1. No evidence of metastatic disease in the chest, abdomen, or pelvis.    2. Ancillary/incidental findings as noted in the body of the report.    3. Please see separately dedicated neuroradiology PET/CT for head and  neck findings.    I have personally reviewed the examination and initial interpretation  and I agree with the findings.    ROCIO MCINTOSH MD         SYSTEM ID:  [id number]  CT Chest/Abdomen/Pelvis w Contrast  Narrative: Combined Report of: PET and CT on 7/21/2023 2:51 PM:    1. PET of the neck, chest, abdomen, and pelvis.  2. PET CT Fusion for Attenuation Correction and Anatomical  Localization.  3. Diagnostic CT of the chest, abdomen and pelvis with intravenous  contrast obtained for diagnostic interpretation.  4. 3D MIP and PET-CT fused images were processed on an independent  workstation and archived to PACS and reviewed by a radiologist.    Technique:    1. PET: The patient received 11.01 mCi of F-18-FDG. The serum glucose  was 116 mg/dL prior to administration. Body weight was 92.1 kg. Images  were evaluated in the axial, sagittal, and coronal planes as well as  the rotational whole  body MIP. Images were acquired from the cranial  vertex to the feet.    UPTAKE WAS MEASURED AT 16 MINUTES.     BACKGROUND: Liver SUV max = 4.89, Aorta Blood SUV Max = 4.12.     2. CT: Volumetric acquisition for clinical interpretation of the  chest, abdomen, and pelvis acquired at 3 mm sections. The chest,  abdomen, and pelvis were evaluated at 5 mm sections in bone, soft  tissue, and lung windows.    Contrast and Medications:  IV contrast: 112 mL of Isovue 370 intravenously.  PO contrast: none.  Additional Medications: None.    3. 3D MIP and PET-CT fused images were processed on an independent  workstation and archived to PACS and reviewed by a radiologist.    INDICATION: Malignant melanoma of scalp (H).    ADDITIONAL INFORMATION OBTAINED FROM EMR: 78-year-old man with scalp  melanoma, status post wide local excision and left latissimus free  flap reconstruction on 5/24/2021. Most recent treatment with adjuvant  Nivolumab, completed 2/14/2022. Restaging exam.     COMPARISON: PET-CT dated 4/21/2023.     FINDINGS:     HEAD/NECK:  Please see separately dedicated neuroradiology PET/CT for head and  neck findings.    CHEST:  There is no suspicious FDG uptake in the chest.    The central tracheobronchial tree is clear. No pleural effusion or  pneumothorax. No acute consolidation. Scattered calcified granulomas.  No new or suspicious pulmonary nodules.     No hypermetabolic lymph nodes are demonstrated in the chest.    Heart size is within normal limits. No pericardial effusion. The  thoracic aorta and main pulmonary artery are within normal limits for  diameter. Mild atherosclerotic calcifications of the aortic arch.  Scattered coronary calcifications. The esophagus is unremarkable.  Surgical clips in the left chest wall/axilla.     ABDOMEN AND PELVIS:  There is no suspicious FDG uptake in the abdomen or pelvis.    Hypodensities adjacent to the falciform ligament and gallbladder  fossa, likely represent focal fatty  infiltration. Unchanged  subcentimeter cyst/cavernous hemangioma in segment 5. The gallbladder  is unremarkable. No intrahepatic or extrahepatic biliary ductal  dilatation. Fat atrophy of the pancreas. No pancreatic ductal  dilatation. The spleen is unremarkable, adjacent splenule is present.  Stable 8 mm nodule in the left adrenal gland demonstrates fat density  on prior noncontrast CT, likely an adenoma.     Symmetric enhancement of the kidneys. Simple renal cortical cysts. No  hydronephrosis. The urinary bladder is decompressed. The reproductive  organs are unremarkable.    No hypermetabolic lymph nodes are demonstrated in the abdomen or  pelvis.    Normal caliber of the small and large bowel. Extensive colonic  diverticulosis. Non-specific diffuse submucosal fatty infiltration in  the right colon and distal ileum. No free air, free fluid, or fluid  collection. Normal caliber of the abdominal aorta. Atherosclerotic  calcifications of the abdominal aorta and branching vessels. Calcified  injection granulomas in both subcutaneous regions. Tiny fat-containing  umbilical hernia.    EXTREMITIES:   There is no suspicious FDG uptake in the visualized lower extremities.  Mild focal subcutaneous uptake in the right antecubital region is  likely related to injection.     BONES:   There is no suspicious FDG uptake in the skeleton. There are no  suspicious lytic or blastic osseous lesions. Degenerative changes of  the spine and hips.   Impression: IMPRESSION:     1. No evidence of metastatic disease in the chest, abdomen, or pelvis.    2. Ancillary/incidental findings as noted in the body of the report.    3. Please see separately dedicated neuroradiology PET/CT for head and  neck findings.    I have personally reviewed the examination and initial interpretation  and I agree with the findings.    ROCIO MCINTOSH MD         SYSTEM ID:  [id number]  CT Soft Tissue Neck w Contrast  Narrative: PET CT fusion examination 7/21/2023  2:54 PM  1. Neck CT with contrast  2. PET study of the neck  3. PET CT fusion study of the neck    ADDITIONAL INFORMATION OBTAINED FROM EMR: 78-year-old man with scalp  melanoma, status post wide local excision and left latissimus free  flap reconstruction on 5/24/2021. Most recent treatment with adjuvant  Nivolumab, completed 2/14/2022. Restaging exam.     COMPARISON: PET-CT dated 4/21/2023.    Technique: Please refer to the accompanying whole body PET-CT for  report of the dose and whole body PET-CT findings.  Regarding the neck, axial images were obtained after nonionic  intravenous contrast administration, with sagittal and coronal  reconstructions performed. Neck CT images were reviewed in bone, soft  tissue, and lung windows, with review of the fused PET-CT images as  well in multiple planes.    Findings:  Postsurgical changes of the left temporoparietal and preauricular  scalp with free flap reconstruction. No abnormal uptake to indicate  local recurrence. Resolution of previous left preauricular focus.     Interval accentuated mild focal cutaneous thickening and associated  uptake in the right infraauricular region (1/64) and right parietal  scalp (1/20).    Stable hypodensity along the left cerebral convexity measuring 1.1 cm  in greatest thickness, likely chronic hygroma.    Evaluation of the mucosal space demonstrates no abnormality or  abnormal metabolic uptake on PET CT in the nasopharynx, oropharynx,  hypopharynx or the glottis. The tongue base appears normal. The major  salivary glands and thyroid gland appear normal.    There is no evident cervical lymphadenopathy, and the cervical lymph  nodes are within normal limits by size criteria. No abnormal metabolic  uptake on PET CT. Mildly avid, subcentimeter right cervical level V  node is likely reactive.      Limited evaluation of the cervical vertebral column demonstrates no  evident spinal canal stenosis. Small mucous retention cyst in  right  maxillary sinus; otherwise, the visualized paranasal sinuses and  mastoid air cells are clear. The major vascular structures in the neck  are patent.  Impression: Impression:     1. Postsurgical changes in the left temporoparietal scalp and  preauricular region without evidence of local recurrence.     2. Interval accentuated mild focal cutaneous thickening and associated  FDG uptake in the right infraauricular region and right parietal scalp  is indeterminate, probably inflammatory; correlate with direct  inspection.     3. Please refer to the whole body PET CT performed as a separate  report, for the findings of the remainder of the body    I have personally reviewed the examination and initial interpretation  and I agree with the findings.    ROCIO MCINTOSH MD         SYSTEM ID:  [id number]    I reviewed the above imaging today.    ASSESSMENT AND PLAN  Cutaneous melanoma, scalp primary, pT2a cN1c, clinical Stage IIIB  L2 hypermetabolism  It was a pleasure to talk with Mr. Coleman today. He is a 78 year old  with agent orange cutaneous exposures in the [place] war and a diagnosis of malignant melanoma arising from a pre-existing pigmented lesion on the scalp. He had a partial response to neoadjuvant therapy with atezolizumab (3 cycles), vemurafenib, and cobimetinib (4 cycles). He received adjuvant nivolumab through 2/14/2022.    PET-CT shows no obvious evidence for recurrence or metastasis. I think the small right ear FDG avid focus is probably not recurrence, as patient notes no skin changes in the area and I am not able to appreciate anything on the video. I recommended he be seen by dermatology for a thorough skin evaluation. He should be seen every 3-6 months by dermatology. In the meantime, we will continue systemic surveillance. It has been almost 2 years since the WLE surgery, so would continue with imaging every 3 months at this point. Plan to transition to 4 months after that.    -RTC in 3  months with PET-CT.     Isolated Neutropenia, mild  He has low white count of 3.3 and ANC 1.1. This is down from ANC 1.8 in April. He has had low neutrophil counts before in 2021 etc but not this low. Unclear cause. Unlikely nutritional as normal hemoglobin and MCV (I.e. Folate/B12). Could consider Inflammatory causes (AIDEN, RF), drugs (nothing new on med list), endocrine (TSH), alcohol, or infections (EBV).  We will obtain CBC with differential in about 2 weeks to monitor. If worsening will work up further.    Depressed mood, anxiety, PTSD  He has a longstanding history of PTSD. Takes occasional lorazepam. Continues to work with psychiatrist about every 2 months out of [place].     CKD, moderate, stage III  Creatinine is elevated to 1.55. He has seen nephrology, Dr. Alarcon, and felt likely fluctuating creatinine due to diuretic use.     Questions answered.    Oksana Peñaloza M.D.   of Medicine  Hematology, Oncology and Transplantation  Pager: [phone]

## 2023-07-27 NOTE — NURSING NOTE
Is the patient currently in the state of [place]? YES    Visit mode:VIDEO    If the visit is dropped, the patient can be reconnected by: VIDEO VISIT: Text to cell phone: [phone]    Will anyone else be joining the visit? Yes, Pt's daughter (Huma)  is currently with the pt and will be joining the video visit per pt      How would you like to obtain your AVS? MyChart    Are changes needed to the allergy or medication list? Patient declined individual allergy and medication review by support staff because patient denies any changes since echeck-in completion and states all information entered during echeck-in remains accurate.     Reason for visit: RECHECK     No other pt vitals to report per pt    Kiesha Chaney

## 2023-09-14 ENCOUNTER — TELEPHONE (OUTPATIENT)
Dept: NEPHROLOGY | Facility: CLINIC | Age: 78
End: 2023-09-14
Payer: MEDICARE

## 2023-09-14 NOTE — TELEPHONE ENCOUNTER
unable to LVM & sent mychart // pt needs to schedule Return Neph with Dr. Alarcon around 1.24.24 with labs prior // first attempt, AN 9.14.23

## 2023-09-21 ENCOUNTER — TELEPHONE (OUTPATIENT)
Dept: NEPHROLOGY | Facility: CLINIC | Age: 78
End: 2023-09-21
Payer: MEDICARE

## 2023-09-21 NOTE — TELEPHONE ENCOUNTER
unable to LVM & sent mychart // pt needs to schedule Return Neph with Dr. Alarcon around 1.24.24 with labs prior // second attempt, AN 9.21.23

## 2023-10-27 ENCOUNTER — LAB (OUTPATIENT)
Dept: LAB | Facility: CLINIC | Age: 78
End: 2023-10-27
Payer: MEDICARE

## 2023-10-27 ENCOUNTER — ANCILLARY PROCEDURE (OUTPATIENT)
Dept: PET IMAGING | Facility: CLINIC | Age: 78
End: 2023-10-27
Attending: INTERNAL MEDICINE
Payer: MEDICARE

## 2023-10-27 DIAGNOSIS — D70.9 NEUTROPENIA, UNSPECIFIED TYPE (H): ICD-10-CM

## 2023-10-27 DIAGNOSIS — Z85.820 FOLLOW-UP SURVEILLANCE OF MELANOMA, ENCOUNTER FOR: ICD-10-CM

## 2023-10-27 DIAGNOSIS — N18.30 STAGE 3 CHRONIC KIDNEY DISEASE, UNSPECIFIED WHETHER STAGE 3A OR 3B CKD (H): ICD-10-CM

## 2023-10-27 DIAGNOSIS — N18.31 STAGE 3A CHRONIC KIDNEY DISEASE (H): ICD-10-CM

## 2023-10-27 DIAGNOSIS — C43.4 MELANOMA OF SCALP (H): ICD-10-CM

## 2023-10-27 DIAGNOSIS — Z08 FOLLOW-UP SURVEILLANCE OF MELANOMA, ENCOUNTER FOR: ICD-10-CM

## 2023-10-27 LAB
ALBUMIN MFR UR ELPH: 22.5 MG/DL
ALBUMIN SERPL BCG-MCNC: 4.5 G/DL (ref 3.5–5.2)
ALBUMIN UR-MCNC: 30 MG/DL
ANION GAP SERPL CALCULATED.3IONS-SCNC: 14 MMOL/L (ref 7–15)
APPEARANCE UR: CLEAR
BACTERIA #/AREA URNS HPF: ABNORMAL /HPF
BASOPHILS # BLD AUTO: 0 10E3/UL (ref 0–0.2)
BASOPHILS NFR BLD AUTO: 1 %
BILIRUB UR QL STRIP: NEGATIVE
BUN SERPL-MCNC: 18.3 MG/DL (ref 8–23)
CALCIUM SERPL-MCNC: 9.6 MG/DL (ref 8.8–10.2)
CHLORIDE SERPL-SCNC: 98 MMOL/L (ref 98–107)
COLOR UR AUTO: YELLOW
CREAT BLD-MCNC: 2 MG/DL (ref 0.7–1.3)
CREAT SERPL-MCNC: 1.84 MG/DL (ref 0.67–1.17)
CREAT UR-MCNC: 319 MG/DL
CREAT UR-MCNC: 327 MG/DL
DEPRECATED HCO3 PLAS-SCNC: 27 MMOL/L (ref 22–29)
EGFRCR SERPLBLD CKD-EPI 2021: 34 ML/MIN/1.73M2
EGFRCR SERPLBLD CKD-EPI 2021: 38 ML/MIN/1.73M2
EOSINOPHIL # BLD AUTO: 0.1 10E3/UL (ref 0–0.7)
EOSINOPHIL NFR BLD AUTO: 3 %
ERYTHROCYTE [DISTWIDTH] IN BLOOD BY AUTOMATED COUNT: 12.1 % (ref 10–15)
GLUCOSE SERPL-MCNC: 109 MG/DL (ref 70–99)
GLUCOSE UR STRIP-MCNC: NEGATIVE MG/DL
HCT VFR BLD AUTO: 40.9 % (ref 40–53)
HGB BLD-MCNC: 13.8 G/DL (ref 13.3–17.7)
HGB UR QL STRIP: NEGATIVE
HYALINE CASTS #/AREA URNS LPF: ABNORMAL /LPF
IMM GRANULOCYTES # BLD: 0 10E3/UL
IMM GRANULOCYTES NFR BLD: 0 %
KETONES UR STRIP-MCNC: NEGATIVE MG/DL
LEUKOCYTE ESTERASE UR QL STRIP: NEGATIVE
LYMPHOCYTES # BLD AUTO: 1.3 10E3/UL (ref 0.8–5.3)
LYMPHOCYTES NFR BLD AUTO: 39 %
MCH RBC QN AUTO: 29 PG (ref 26.5–33)
MCHC RBC AUTO-ENTMCNC: 33.7 G/DL (ref 31.5–36.5)
MCV RBC AUTO: 86 FL (ref 78–100)
MICROALBUMIN UR-MCNC: 17.6 MG/L
MICROALBUMIN/CREAT UR: 5.52 MG/G CR (ref 0–17)
MONOCYTES # BLD AUTO: 0.4 10E3/UL (ref 0–1.3)
MONOCYTES NFR BLD AUTO: 11 %
MUCOUS THREADS #/AREA URNS LPF: PRESENT /LPF
NEUTROPHILS # BLD AUTO: 1.6 10E3/UL (ref 1.6–8.3)
NEUTROPHILS NFR BLD AUTO: 46 %
NITRATE UR QL: NEGATIVE
NRBC # BLD AUTO: 0 10E3/UL
NRBC BLD AUTO-RTO: 0 /100
PH UR STRIP: 5.5 [PH] (ref 5–7)
PHOSPHATE SERPL-MCNC: 4.5 MG/DL (ref 2.5–4.5)
PLATELET # BLD AUTO: 214 10E3/UL (ref 150–450)
POTASSIUM SERPL-SCNC: 3.5 MMOL/L (ref 3.4–5.3)
PROT/CREAT 24H UR: 0.07 MG/MG CR (ref 0–0.2)
PTH-INTACT SERPL-MCNC: 28 PG/ML (ref 15–65)
RBC # BLD AUTO: 4.76 10E6/UL (ref 4.4–5.9)
RBC #/AREA URNS AUTO: ABNORMAL /HPF
SKIP: ABNORMAL
SODIUM SERPL-SCNC: 139 MMOL/L (ref 135–145)
SP GR UR STRIP: 1.02 (ref 1–1.03)
TSH SERPL DL<=0.005 MIU/L-ACNC: 1.06 UIU/ML (ref 0.3–4.2)
UROBILINOGEN UR STRIP-MCNC: NORMAL MG/DL
VIT D+METAB SERPL-MCNC: 12 NG/ML (ref 20–50)
WBC # BLD AUTO: 3.4 10E3/UL (ref 4–11)
WBC #/AREA URNS AUTO: ABNORMAL /HPF

## 2023-10-27 PROCEDURE — A9552 F18 FDG: HCPCS | Performed by: RADIOLOGY

## 2023-10-27 PROCEDURE — 82306 VITAMIN D 25 HYDROXY: CPT

## 2023-10-27 PROCEDURE — 82043 UR ALBUMIN QUANTITATIVE: CPT

## 2023-10-27 PROCEDURE — 81001 URINALYSIS AUTO W/SCOPE: CPT

## 2023-10-27 PROCEDURE — 82570 ASSAY OF URINE CREATININE: CPT

## 2023-10-27 PROCEDURE — 80069 RENAL FUNCTION PANEL: CPT

## 2023-10-27 PROCEDURE — 74177 CT ABD & PELVIS W/CONTRAST: CPT | Mod: GC | Performed by: RADIOLOGY

## 2023-10-27 PROCEDURE — G1010 CDSM STANSON: HCPCS | Performed by: RADIOLOGY

## 2023-10-27 PROCEDURE — 85025 COMPLETE CBC W/AUTO DIFF WBC: CPT

## 2023-10-27 PROCEDURE — 78816 PET IMAGE W/CT FULL BODY: CPT | Mod: MG | Performed by: RADIOLOGY

## 2023-10-27 PROCEDURE — 71260 CT THORAX DX C+: CPT | Mod: GC | Performed by: RADIOLOGY

## 2023-10-27 PROCEDURE — 84156 ASSAY OF PROTEIN URINE: CPT

## 2023-10-27 PROCEDURE — 82565 ASSAY OF CREATININE: CPT | Mod: 59

## 2023-10-27 PROCEDURE — 36415 COLL VENOUS BLD VENIPUNCTURE: CPT

## 2023-10-27 PROCEDURE — 83970 ASSAY OF PARATHORMONE: CPT

## 2023-10-27 PROCEDURE — 84443 ASSAY THYROID STIM HORMONE: CPT

## 2023-10-27 RX ORDER — IOPAMIDOL 755 MG/ML
10-135 INJECTION, SOLUTION INTRAVASCULAR ONCE
Status: COMPLETED | OUTPATIENT
Start: 2023-10-27 | End: 2023-10-27

## 2023-10-27 RX ADMIN — IOPAMIDOL 100 ML: 755 INJECTION, SOLUTION INTRAVASCULAR at 12:07

## 2023-11-02 NOTE — PROGRESS NOTES
Virtual Visit Details    Type of service:  Video Visit   Video Start Time: 2:06 PM  Video End Time:2:18 PM    Originating Location (pt. Location): Home    Distant Location (provider location):  On-site  Platform used for Video Visit: [facility] ONCOLOGY PROGRESS NOTE  Melanoma Clinic  Nov 3, 2023    CHIEF COMPLAINT: Scalp melanoma    Melanoma History:  1. He has a small pigmented scalp lesion present for over 30 years. The lesion was biopsied in 1992 and was benign.  2. October 2020, he presented to [facility] with 1 year of progressive enlargement of the lesion and new onset burning, itching, and stinging sensation in the affected scalp.  3. 10/26/20, He was seen at [facility] and he had shave biopsies of A. left central parietal scalp, B. left central occipital scalp, C. Left posterior parietal scalp, and D. punch biopsy of the left superior occipital scalp. Pathology (specimen: [id number]) showed a nodular and nevoid type melanoma, non-ulcerated, Breslow depth up to 1.3 mm, Saurabh's level IV, and up to 3 mitoses per mm2. All margins were involved. Ki-67 10-20%. Sox10 stain is positive and highlights an atypical compound melanocytic proliferation with significant overlying confluence and pagetosis of intraepidermal melanocytes. T-stage: at least pT2a. PD-L1 staining shows PD-L1 TPS <1%. 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  4. 11/25/20, he was seen by Dr. Garcia and he took three 3mm punch biopsies, which returned as dermal melanocytic proliferations with melanophages and fibrosis, consistent with locoregional metastatic melanoma.  5. 12/8/2020 he had PET-CT, which showed FDG avid irregular skin thickening overlying the left parietal region, with no FDG avid lymphadenopathy in the neck and no evidence of metastasis elsewhere in the body.  6. 1/22/2021 start vemurafenib and cobimetinib, ~2/12/21 held for diarrhea, then resumed.  7. 2/18/2021 Start atezolizumab, last on  4/29/21  8. 5/24/2021 Wide local excision of the scalp melanoma and left latissimus free flap for scalp reconstruction.    Surgical pathology showed features consistent with scar and tumoral melanosis. No definite residual melanoma is seen. Tumoral melanosis (which extends to a depth of around 1.1 mm) is believed to be equivalent to a diagnosis of regressed melanoma.   9. 6/25/21, he starts adjuvant Nivolumab, last dose 2/14/22    INTERVAL HISTORY  Mr. 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   -Notes chronic fatigue. Exacerbated by the change in season, upcoming time change  -Shoulders and low back chronically arthritic  -Following with eye doctor soon for routine follow-up  -Has chronically experienced diarrhea for years. No changes lately. Often has formed stools in the morning which can turn to liquid later in the day. Last colonoscopy in 2016, was due for follow-up in 2021  -Appetite is stable, only eats a few small meals or one large meal a day.  -BP stable based on recent home nursing visit      Current Outpatient Medications   Medication Sig Dispense Refill    acetaminophen (TYLENOL) 500 MG tablet Take 2 tablets (1,000 mg) by mouth every 8 hours as needed for mild pain 100 tablet 0    diphenoxylate-atropine (LOMOTIL) 2.5-0.025 MG tablet Take 1-2 tablets by mouth 4 times daily as needed for diarrhea 120 tablet 5    fluvoxaMINE (LUVOX) 100 MG tablet Take 150 mg at bedtime      hydrochlorothiazide (HYDRODIURIL) 25 MG tablet Take 25 mg by mouth daily.      Hypromellose (ARTIFICIAL TEARS OP) Apply  to eye. 2 drops in each eye twice a day as needed      Lactobacillus-Inulin ([facility] DIGESTIVE HEALTH) CAPS 1 tab daily 30 capsule 1    Magnesium Oxide 420 MG TABS Take 420 mg by mouth daily      methocarbamol (ROBAXIN) 500 MG tablet Take 1 tablet (500 mg) by mouth 3 times daily as needed for muscle spasms 10 tablet 0    omeprazole (PRILOSEC) 20 MG capsule Take 1 capsule  by mouth 2 times daily. 60 capsule prn    polyethylene glycol (MIRALAX) 17 GM/Dose powder Take 17 g by mouth daily 510 g 0    potassium chloride ER (KLOR-CON M) 20 MEQ CR tablet Take 1 tablet (20 mEq) by mouth daily 7 tablet 0    pregabalin (LYRICA) 150 MG capsule Take 150 mg by mouth 2 times daily      QUEtiapine (SEROQUEL) 300 MG tablet Take 150 mg by mouth At Bedtime       simvastatin (ZOCOR) 80 MG tablet Take 40 mg by mouth At Bedtime       traZODone (DESYREL) 100 MG tablet Take 100 mg by mouth At Bedtime       vitamin D3 (CHOLECALCIFEROL) 1000 units (25 mcg) tablet Take 1,000 Units by mouth daily       vortioxetine (TRINTELLIX) 20 MG tablet TAKE ONE TABLET BY MOUTH EVERY MORNING      docusate sodium (COLACE) 100 MG capsule TAKE ONE CAPSULE BY MOUTH EVERY DAY FOR STOOL SOFTENING (Patient not taking: Reported on 11/3/2023)      senna-docusate (SENOKOT-S/PERICOLACE) 8.6-50 MG tablet Take 1 tablet by mouth 2 times daily (Patient not taking: Reported on 11/3/2023) 30 tablet 0     Objective:  General: patient appears well in no acute distress, alert and oriented, speech clear and fluid  Skin: no visualized rash or lesions on visualized skin  Resp: Appears to be breathing comfortably without accessory muscle usage, speaking in full sentences, no audible wheezes or cough.  Psych: Coherent speech, normal rate and volume, able to articulate logical thoughts, able to abstract reason, no tangential thoughts, no hallucinations or delusions  Patient's affect is appropriate.    Labs:  Most Recent 3 CBC's:  Recent Labs   Lab Test 10/27/23  1135 07/21/23  1251 04/21/23  1108   WBC 3.4* 3.3* 3.8*   HGB 13.8 14.1 14.5   MCV 86 85 86    203 224   ANEUTAUTO 1.6 1.1* 1.8     Most Recent 3 BMP's:  Recent Labs   Lab Test 10/27/23  1158 10/27/23  1135 07/21/23  1347 07/21/23  1251 04/21/23  1108 01/20/23  1303   NA  --  139  --  139 140 139   POTASSIUM  --  3.5  --  3.6 3.9 3.9   CHLORIDE  --  98  --  99 103 103   CO2  --  27   --  29 34* 31   BUN  --  18.3  --  15.6 23 21   CR 2.0* 1.84* 1.4* 1.55* 1.57* 1.39*   ANIONGAP  --  14  --  11 3 5   STEPHEN  --  9.6  --  9.3 9.1 9.0   GLC  --  109*  --  110* 110* 115*   PROTTOTAL  --   --   --  7.3 7.5 7.5   ALBUMIN  --  4.5  --  4.6 4.0 3.7    Most Recent 3 LFT's:  Recent Labs   Lab Test 07/21/23  1251 04/21/23  1108 01/20/23  1303   AST 19 14 14   ALT 8 19 25   ALKPHOS 56 55 53   BILITOTAL 0.5 0.6 0.4   I reviewed the above labs today.    IMAGING  Imaging:  CT Chest/Abdomen/Pelvis w Contrast  Narrative: Combined Report of: PET and CT on 10/27/2023 1:19 PM:    1. PET of the neck, chest, abdomen, and pelvis.  2. PET CT Fusion for Attenuation Correction and Anatomical  Localization.  3. Diagnostic CT of the chest, abdomen and pelvis with intravenous  contrast obtained for diagnostic interpretation.  4. 3D MIP and PET-CT fused images were processed on an independent  workstation and archived to PACS and reviewed by a radiologist.    Technique:    1. PET: The patient received 12.43 mCi of F-18-FDG. The serum glucose  was 122 md/dL prior to administration. Body weight was 89.8 kg. Images  were evaluated in the axial, sagittal, and coronal planes as well as  the rotational whole body MIP. Images were acquired from the cranial  vertex to the feet.    UPTAKE WAS MEASURED AT 60 MINUTES.     BACKGROUND: Liver SUV max = 5.2, Aorta Blood SUV Max = 3.9.     2. CT: Volumetric acquisition for clinical interpretation of the  chest, abdomen, and pelvis acquired at 3 mm sections. The chest,  abdomen, and pelvis were evaluated at 5 mm sections in bone, soft  tissue, and lung windows.    Contrast and Medications:  IV contrast: 60 mL of Isovue 370 intravenously.  PO contrast: 500 mL oral Breeza contrast.  Additional Medications: None.    3. 3D MIP and PET-CT fused images were processed on an independent  workstation and archived to PACS and reviewed by a radiologist.    INDICATION: Melanoma of scalp (H).    ADDITIONAL  INFORMATION OBTAINED FROM EMR: 78 year old? with  agent orange cutaneous exposures in the [place] war and a diagnosis of  malignant melanoma arising from a pre-existing pigmented lesion on the  scalp. status post wide local excision and left latissimus free flap  reconstruction on 5/24/2021 He received adjuvant nivolumab through  2/14/2022.    COMPARISON: PET 7/21/2023, 3/18/2022    FINDINGS:     HEAD/NECK:  There is no suspicious FDG uptake in the neck.    Postsurgical changes of the left temporoparietal and preauricular  scalp with free flap reconstruction. No abnormal uptake to indicate  local recurrence. Mild superficial cutaneous uptake about the right  chest (series 4 image 86).    The paranasal sinuses are clear. The mastoid air cells are clear.     No hypermetabolic lymph nodes are demonstrated in the neck.     The mucosal and deep spaces of the neck are unremarkable. The major  salivary glands are unremarkable. The thyroid is unremarkable. The  major vasculature of the neck are patent.    CHEST:  There is no suspicious FDG uptake in the chest.    The central tracheobronchial tree is clear. No pleural effusion or  pneumothorax. No acute consolidation. Minimal chronic appearing  reticular opacities in the subpleural right lower lobe not  significantly changed compared to previous.    No hypermetabolic lymph nodes are demonstrated in the chest.    Heart size is within normal limits.    Vascular calcification coronary arteries.. No pericardial effusion.  The thoracic aorta and main pulmonary artery are within normal limits  for diameter. The esophagus is unremarkable.     ABDOMEN AND PELVIS:  There is no suspicious FDG uptake in the abdomen or pelvis.    Stable hypodensities along the falciform ligament and gallbladder  fossa, likely benign fatty infiltration. The gallbladder is  unremarkable. No intrahepatic or extrahepatic biliary ductal  dilatation. Failure. The pancreas. No pancreatic ductal  dilatation.  The spleen is unremarkable. Small splenule noted. The adrenal glands  are unremarkable.    Symmetric enhancement of the kidneys. No hydronephrosis. The urinary  bladder is incompletely distended which limits evaluation. The  reproductive organs are unremarkable.    No hypermetabolic lymph nodes are demonstrated in the abdomen or  pelvis.    Normal caliber of the small and large bowel. Colonic diverticulosis  without evidence of diverticulitis. No free air, free fluid, or fluid  collection. Normal caliber of the abdominal aorta.    LOWER EXTREMITIES:   There is no suspicious FDG uptake in the visualized lower extremities.    BONES:   There is no suspicious FDG uptake in the skeleton. There are no  suspicious lytic or blastic osseous lesions.   Impression: IMPRESSION: In this patient with history of melanoma    No evidence for metastatic disease on today's exam.    I have personally reviewed the examination and initial interpretation  and I agree with the findings.    SADIA DENSON MD         SYSTEM ID:  [id number]  PET Oncology Whole Body  Narrative: Combined Report of: PET and CT on 10/27/2023 1:19 PM:    1. PET of the neck, chest, abdomen, and pelvis.  2. PET CT Fusion for Attenuation Correction and Anatomical  Localization.  3. Diagnostic CT of the chest, abdomen and pelvis with intravenous  contrast obtained for diagnostic interpretation.  4. 3D MIP and PET-CT fused images were processed on an independent  workstation and archived to PACS and reviewed by a radiologist.    Technique:    1. PET: The patient received 12.43 mCi of F-18-FDG. The serum glucose  was 122 md/dL prior to administration. Body weight was 89.8 kg. Images  were evaluated in the axial, sagittal, and coronal planes as well as  the rotational whole body MIP. Images were acquired from the cranial  vertex to the feet.    UPTAKE WAS MEASURED AT 60 MINUTES.     BACKGROUND: Liver SUV max = 5.2, Aorta Blood SUV Max = 3.9.     2. CT:  Volumetric acquisition for clinical interpretation of the  chest, abdomen, and pelvis acquired at 3 mm sections. The chest,  abdomen, and pelvis were evaluated at 5 mm sections in bone, soft  tissue, and lung windows.    Contrast and Medications:  IV contrast: 60 mL of Isovue 370 intravenously.  PO contrast: 500 mL oral Breeza contrast.  Additional Medications: None.    3. 3D MIP and PET-CT fused images were processed on an independent  workstation and archived to PACS and reviewed by a radiologist.    INDICATION: Melanoma of scalp (H).    ADDITIONAL INFORMATION OBTAINED FROM EMR: 78 year old? with  agent orange cutaneous exposures in the [place] war and a diagnosis of  malignant melanoma arising from a pre-existing pigmented lesion on the  scalp. status post wide local excision and left latissimus free flap  reconstruction on 5/24/2021 He received adjuvant nivolumab through  2/14/2022.    COMPARISON: PET 7/21/2023, 3/18/2022    FINDINGS:     HEAD/NECK:  There is no suspicious FDG uptake in the neck.    Postsurgical changes of the left temporoparietal and preauricular  scalp with free flap reconstruction. No abnormal uptake to indicate  local recurrence. Mild superficial cutaneous uptake about the right  chest (series 4 image 86).    The paranasal sinuses are clear. The mastoid air cells are clear.     No hypermetabolic lymph nodes are demonstrated in the neck.     The mucosal and deep spaces of the neck are unremarkable. The major  salivary glands are unremarkable. The thyroid is unremarkable. The  major vasculature of the neck are patent.    CHEST:  There is no suspicious FDG uptake in the chest.    The central tracheobronchial tree is clear. No pleural effusion or  pneumothorax. No acute consolidation. Minimal chronic appearing  reticular opacities in the subpleural right lower lobe not  significantly changed compared to previous.    No hypermetabolic lymph nodes are demonstrated in the chest.    Heart  size is within normal limits.    Vascular calcification coronary arteries.. No pericardial effusion.  The thoracic aorta and main pulmonary artery are within normal limits  for diameter. The esophagus is unremarkable.     ABDOMEN AND PELVIS:  There is no suspicious FDG uptake in the abdomen or pelvis.    Stable hypodensities along the falciform ligament and gallbladder  fossa, likely benign fatty infiltration. The gallbladder is  unremarkable. No intrahepatic or extrahepatic biliary ductal  dilatation. Failure. The pancreas. No pancreatic ductal dilatation.  The spleen is unremarkable. Small splenule noted. The adrenal glands  are unremarkable.    Symmetric enhancement of the kidneys. No hydronephrosis. The urinary  bladder is incompletely distended which limits evaluation. The  reproductive organs are unremarkable.    No hypermetabolic lymph nodes are demonstrated in the abdomen or  pelvis.    Normal caliber of the small and large bowel. Colonic diverticulosis  without evidence of diverticulitis. No free air, free fluid, or fluid  collection. Normal caliber of the abdominal aorta.    LOWER EXTREMITIES:   There is no suspicious FDG uptake in the visualized lower extremities.    BONES:   There is no suspicious FDG uptake in the skeleton. There are no  suspicious lytic or blastic osseous lesions.   Impression: IMPRESSION: In this patient with history of melanoma    No evidence for metastatic disease on today's exam.    I have personally reviewed the examination and initial interpretation  and I agree with the findings.    SADIA DENSON MD         SYSTEM ID:  [id number]    I reviewed the above images today.    ASSESSMENT AND PLAN  Cutaneous melanoma, scalp primary, pT2a cN1c, clinical Stage IIIB  L2 hypermetabolism  It was a pleasure to talk with Mr. Coleman today. He is a 78 year old  with agent orange cutaneous exposures in the [place] war and a diagnosis of malignant melanoma arising from a pre-existing  pigmented lesion on the scalp. He had a partial response to neoadjuvant therapy with atezolizumab (3 cycles), vemurafenib, and cobimetinib (4 cycles). He received adjuvant nivolumab through 2/14/2022.    PET-CT shows no obvious evidence for recurrence or metastasis.He should continue routine dermatology follow-up every 3-6 months, next scheduled in December. We can transition to every 4 month surveillance with PET-CT      Isolated Neutropenia, mild  Improved. White count 3.4 and ANC now improved to 1.6. Neutrophil count was low before in 2021. No new medications. Will recheck labs with repeat PET/CT in 4 months.    Depressed mood, anxiety, PTSD  He has a longstanding history of PTSD. Takes occasional lorazepam. Continues to work with psychiatrist about every 2 months out of [place].     CKD, moderate, stage III  Creatinine further elevated to 1.84. He has seen nephrology, Dr. Alarcon, and felt likely fluctuating creatinine due to diuretic use. Nephrology labs drawn today. Due for follow-up in the next 1-2 months. Provided clinic number for patient to schedule as they have been unable to reach patient to schedule x 2.    Health maintenance  Due for colonoscopy for screening. Last in 2016. Prefers to have this done in the [facility] system. Order placed.     34 minutes spent on the date of the encounter doing chart review, review of test results, interpretation of tests, patient visit, and documentation     Sherine Meza, CNP

## 2023-11-03 ENCOUNTER — VIRTUAL VISIT (OUTPATIENT)
Dept: ONCOLOGY | Facility: CLINIC | Age: 78
End: 2023-11-03
Attending: REGISTERED NURSE
Payer: MEDICARE

## 2023-11-03 VITALS — WEIGHT: 185 LBS | HEIGHT: 69 IN | BODY MASS INDEX: 27.4 KG/M2

## 2023-11-03 DIAGNOSIS — D70.9 NEUTROPENIA, UNSPECIFIED TYPE (H): ICD-10-CM

## 2023-11-03 DIAGNOSIS — Z12.11 COLON CANCER SCREENING: ICD-10-CM

## 2023-11-03 DIAGNOSIS — N18.9 CHRONIC KIDNEY DISEASE, UNSPECIFIED CKD STAGE: ICD-10-CM

## 2023-11-03 DIAGNOSIS — C43.4 MELANOMA OF SCALP (H): Primary | ICD-10-CM

## 2023-11-03 PROCEDURE — 99214 OFFICE O/P EST MOD 30 MIN: CPT | Mod: 95 | Performed by: REGISTERED NURSE

## 2023-11-03 ASSESSMENT — PAIN SCALES - GENERAL: PAINLEVEL: NO PAIN (0)

## 2023-11-03 NOTE — NURSING NOTE
Is the patient currently in the state of [place]? YES    Visit mode:VIDEO    If the visit is dropped, the patient can be reconnected by: VIDEO VISIT: Send to e-mail at: [email or web address]    Will anyone else be joining the visit? NO  (If patient encounters technical issues they should call [phone])    How would you like to obtain your AVS? MyChart    Are changes needed to the allergy or medication list? No    Reason for visit: JAYME MONSALVE

## 2023-11-03 NOTE — LETTER
"    11/3/2023         RE: Ahmet Coleman  [address]   [place] [address]        Dear Colleague,    Thank you for referring your patient, Ahmet Coleman, to the [facility]. Please see a copy of my visit note below.    Virtual Visit Details    Type of service:  Video Visit   Video Start Time: 2:06 PM  Video End Time:2:18 PM    Originating Location (pt. Location): {video visit patient location:182598::\"Home\"}  {PROVIDER LOCATION On-site should be selected for visits conducted from your clinic location or adjoining [facility] hospital, academic office, or other nearby [facility] building. Off-site should be selected for all other provider locations, including home:331140}  Distant Location (provider location):  {virtual location provider:610193}  Platform used for Video Visit: {Virtual Visit Platforms:235649::\"Billogram\"}      MEDICAL ONCOLOGY PROGRESS NOTE  Melanoma Clinic  Nov 3, 2023    CHIEF COMPLAINT: Scalp melanoma    Melanoma History:  1. He has a small pigmented scalp lesion present for over 30 years. The lesion was biopsied in 1992 and was benign.  2. October 2020, he presented to [facility] with 1 year of progressive enlargement of the lesion and new onset burning, itching, and stinging sensation in the affected scalp.  3. 10/26/20, He was seen at [facility] and he had shave biopsies of A. left central parietal scalp, B. left central occipital scalp, C. Left posterior parietal scalp, and D. punch biopsy of the left superior occipital scalp. Pathology (specimen: [id number]) showed a nodular and nevoid type melanoma, non-ulcerated, Breslow depth up to 1.3 mm, Saurabh's level IV, and up to 3 mitoses per mm2. All margins were involved. Ki-67 10-20%. Sox10 stain is positive and highlights an atypical compound melanocytic proliferation with significant overlying confluence and pagetosis of intraepidermal melanocytes. T-stage: at least pT2a. PD-L1 staining shows PD-L1 " TPS <1%. 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  4. 11/25/20, he was seen by Dr. Garcia and he took three 3mm punch biopsies, which returned as dermal melanocytic proliferations with melanophages and fibrosis, consistent with locoregional metastatic melanoma.  5. 12/8/2020 he had PET-CT, which showed FDG avid irregular skin thickening overlying the left parietal region, with no FDG avid lymphadenopathy in the neck and no evidence of metastasis elsewhere in the body.  6. 1/22/2021 start vemurafenib and cobimetinib, ~2/12/21 held for diarrhea, then resumed.  7. 2/18/2021 Start atezolizumab, last on 4/29/21  8. 5/24/2021 Wide local excision of the scalp melanoma and left latissimus free flap for scalp reconstruction.    Surgical pathology showed features consistent with scar and tumoral melanosis. No definite residual melanoma is seen. Tumoral melanosis (which extends to a depth of around 1.1 mm) is believed to be equivalent to a diagnosis of regressed melanoma.   9. 6/25/21, he starts adjuvant Nivolumab, last dose 2/14/22    INTERVAL HISTORY  Mr. 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     Arthritis in shoulder and low back  Seeing eye doctor soon  Not much of appetite  No new medications   Diarrhea  Blood pressure stable recently 116/80      Current Outpatient Medications   Medication Sig Dispense Refill     acetaminophen (TYLENOL) 500 MG tablet Take 2 tablets (1,000 mg) by mouth every 8 hours as needed for mild pain 100 tablet 0     diphenoxylate-atropine (LOMOTIL) 2.5-0.025 MG tablet Take 1-2 tablets by mouth 4 times daily as needed for diarrhea 120 tablet 5     docusate sodium (COLACE) 100 MG capsule TAKE ONE CAPSULE BY MOUTH EVERY DAY FOR STOOL SOFTENING       fluvoxaMINE (LUVOX) 100 MG tablet Take 150 mg at bedtime       hydrochlorothiazide (HYDRODIURIL) 25 MG tablet Take 25 mg by mouth daily.       Hypromellose (ARTIFICIAL TEARS OP) Apply  to eye. 2  drops in each eye twice a day as needed       Lactobacillus-Inulin ([facility] [employer]) CAPS 1 tab daily 30 capsule 1     Magnesium Oxide 420 MG TABS Take 420 mg by mouth daily       methocarbamol (ROBAXIN) 500 MG tablet Take 1 tablet (500 mg) by mouth 3 times daily as needed for muscle spasms 10 tablet 0     omeprazole (PRILOSEC) 20 MG capsule Take 1 capsule by mouth 2 times daily. 60 capsule prn     polyethylene glycol (MIRALAX) 17 GM/Dose powder Take 17 g by mouth daily 510 g 0     potassium chloride ER (KLOR-CON M) 20 MEQ CR tablet Take 1 tablet (20 mEq) by mouth daily 7 tablet 0     pregabalin (LYRICA) 150 MG capsule Take 150 mg by mouth 2 times daily       QUEtiapine (SEROQUEL) 300 MG tablet Take 150 mg by mouth At Bedtime        senna-docusate (SENOKOT-S/PERICOLACE) 8.6-50 MG tablet Take 1 tablet by mouth 2 times daily 30 tablet 0     simvastatin (ZOCOR) 80 MG tablet Take 40 mg by mouth At Bedtime        traZODone (DESYREL) 100 MG tablet Take 100 mg by mouth At Bedtime        vitamin D3 (CHOLECALCIFEROL) 1000 units (25 mcg) tablet Take 1,000 Units by mouth daily        vortioxetine (TRINTELLIX) 20 MG tablet TAKE ONE TABLET BY MOUTH EVERY MORNING       Objective:  General: patient appears well in no acute distress, alert and oriented, speech clear and fluid  Skin: no visualized rash or lesions on visualized skin  Resp: Appears to be breathing comfortably without accessory muscle usage, speaking in full sentences, no audible wheezes or cough.  Psych: Coherent speech, normal rate and volume, able to articulate logical thoughts, able to abstract reason, no tangential thoughts, no hallucinations or delusions  Patient's affect is appropriate.    Labs:  Most Recent 3 CBC's:  Recent Labs   Lab Test 10/27/23  1135 07/21/23  1251 04/21/23  1108   WBC 3.4* 3.3* 3.8*   HGB 13.8 14.1 14.5   MCV 86 85 86    203 224   ANEUTAUTO 1.6 1.1* 1.8     Most Recent 3 BMP's:  Recent Labs   Lab Test 10/27/23  1158  10/27/23  1135 07/21/23  1347 07/21/23  1251 04/21/23  1108 01/20/23  1303   NA  --  139  --  139 140 139   POTASSIUM  --  3.5  --  3.6 3.9 3.9   CHLORIDE  --  98  --  99 103 103   CO2  --  27  --  29 34* 31   BUN  --  18.3  --  15.6 23 21   CR 2.0* 1.84* 1.4* 1.55* 1.57* 1.39*   ANIONGAP  --  14  --  11 3 5   STEPHEN  --  9.6  --  9.3 9.1 9.0   GLC  --  109*  --  110* 110* 115*   PROTTOTAL  --   --   --  7.3 7.5 7.5   ALBUMIN  --  4.5  --  4.6 4.0 3.7    Most Recent 3 LFT's:  Recent Labs   Lab Test 07/21/23  1251 04/21/23  1108 01/20/23  1303   AST 19 14 14   ALT 8 19 25   ALKPHOS 56 55 53   BILITOTAL 0.5 0.6 0.4   I reviewed the above labs today.    IMAGING  Imaging:  CT Chest/Abdomen/Pelvis w Contrast  Narrative: Combined Report of: PET and CT on 10/27/2023 1:19 PM:    1. PET of the neck, chest, abdomen, and pelvis.  2. PET CT Fusion for Attenuation Correction and Anatomical  Localization.  3. Diagnostic CT of the chest, abdomen and pelvis with intravenous  contrast obtained for diagnostic interpretation.  4. 3D MIP and PET-CT fused images were processed on an independent  workstation and archived to PACS and reviewed by a radiologist.    Technique:    1. PET: The patient received 12.43 mCi of F-18-FDG. The serum glucose  was 122 md/dL prior to administration. Body weight was 89.8 kg. Images  were evaluated in the axial, sagittal, and coronal planes as well as  the rotational whole body MIP. Images were acquired from the cranial  vertex to the feet.    UPTAKE WAS MEASURED AT 60 MINUTES.     BACKGROUND: Liver SUV max = 5.2, Aorta Blood SUV Max = 3.9.     2. CT: Volumetric acquisition for clinical interpretation of the  chest, abdomen, and pelvis acquired at 3 mm sections. The chest,  abdomen, and pelvis were evaluated at 5 mm sections in bone, soft  tissue, and lung windows.    Contrast and Medications:  IV contrast: 60 mL of Isovue 370 intravenously.  PO contrast: 500 mL oral Breeza contrast.  Additional Medications:  None.    3. 3D MIP and PET-CT fused images were processed on an independent  workstation and archived to PACS and reviewed by a radiologist.    INDICATION: Melanoma of scalp (H).    ADDITIONAL INFORMATION OBTAINED FROM EMR: 78 year old? with  agent orange cutaneous exposures in the [place] war and a diagnosis of  malignant melanoma arising from a pre-existing pigmented lesion on the  scalp. status post wide local excision and left latissimus free flap  reconstruction on 5/24/2021 He received adjuvant nivolumab through  2/14/2022.    COMPARISON: PET 7/21/2023, 3/18/2022    FINDINGS:     HEAD/NECK:  There is no suspicious FDG uptake in the neck.    Postsurgical changes of the left temporoparietal and preauricular  scalp with free flap reconstruction. No abnormal uptake to indicate  local recurrence. Mild superficial cutaneous uptake about the right  chest (series 4 image 86).    The paranasal sinuses are clear. The mastoid air cells are clear.     No hypermetabolic lymph nodes are demonstrated in the neck.     The mucosal and deep spaces of the neck are unremarkable. The major  salivary glands are unremarkable. The thyroid is unremarkable. The  major vasculature of the neck are patent.    CHEST:  There is no suspicious FDG uptake in the chest.    The central tracheobronchial tree is clear. No pleural effusion or  pneumothorax. No acute consolidation. Minimal chronic appearing  reticular opacities in the subpleural right lower lobe not  significantly changed compared to previous.    No hypermetabolic lymph nodes are demonstrated in the chest.    Heart size is within normal limits.    Vascular calcification coronary arteries.. No pericardial effusion.  The thoracic aorta and main pulmonary artery are within normal limits  for diameter. The esophagus is unremarkable.     ABDOMEN AND PELVIS:  There is no suspicious FDG uptake in the abdomen or pelvis.    Stable hypodensities along the falciform ligament and  gallbladder  fossa, likely benign fatty infiltration. The gallbladder is  unremarkable. No intrahepatic or extrahepatic biliary ductal  dilatation. Failure. The pancreas. No pancreatic ductal dilatation.  The spleen is unremarkable. Small splenule noted. The adrenal glands  are unremarkable.    Symmetric enhancement of the kidneys. No hydronephrosis. The urinary  bladder is incompletely distended which limits evaluation. The  reproductive organs are unremarkable.    No hypermetabolic lymph nodes are demonstrated in the abdomen or  pelvis.    Normal caliber of the small and large bowel. Colonic diverticulosis  without evidence of diverticulitis. No free air, free fluid, or fluid  collection. Normal caliber of the abdominal aorta.    LOWER EXTREMITIES:   There is no suspicious FDG uptake in the visualized lower extremities.    BONES:   There is no suspicious FDG uptake in the skeleton. There are no  suspicious lytic or blastic osseous lesions.   Impression: IMPRESSION: In this patient with history of melanoma    No evidence for metastatic disease on today's exam.    I have personally reviewed the examination and initial interpretation  and I agree with the findings.    SADIA DENSON MD         SYSTEM ID:  [id number]  PET Oncology Whole Body  Narrative: Combined Report of: PET and CT on 10/27/2023 1:19 PM:    1. PET of the neck, chest, abdomen, and pelvis.  2. PET CT Fusion for Attenuation Correction and Anatomical  Localization.  3. Diagnostic CT of the chest, abdomen and pelvis with intravenous  contrast obtained for diagnostic interpretation.  4. 3D MIP and PET-CT fused images were processed on an independent  workstation and archived to PACS and reviewed by a radiologist.    Technique:    1. PET: The patient received 12.43 mCi of F-18-FDG. The serum glucose  was 122 md/dL prior to administration. Body weight was 89.8 kg. Images  were evaluated in the axial, sagittal, and coronal planes as well as  the rotational  whole body MIP. Images were acquired from the cranial  vertex to the feet.    UPTAKE WAS MEASURED AT 60 MINUTES.     BACKGROUND: Liver SUV max = 5.2, Aorta Blood SUV Max = 3.9.     2. CT: Volumetric acquisition for clinical interpretation of the  chest, abdomen, and pelvis acquired at 3 mm sections. The chest,  abdomen, and pelvis were evaluated at 5 mm sections in bone, soft  tissue, and lung windows.    Contrast and Medications:  IV contrast: 60 mL of Isovue 370 intravenously.  PO contrast: 500 mL oral Breeza contrast.  Additional Medications: None.    3. 3D MIP and PET-CT fused images were processed on an independent  workstation and archived to PACS and reviewed by a radiologist.    INDICATION: Melanoma of scalp (H).    ADDITIONAL INFORMATION OBTAINED FROM EMR: 78 year old? with  agent orange cutaneous exposures in the [place] war and a diagnosis of  malignant melanoma arising from a pre-existing pigmented lesion on the  scalp. status post wide local excision and left latissimus free flap  reconstruction on 5/24/2021 He received adjuvant nivolumab through  2/14/2022.    COMPARISON: PET 7/21/2023, 3/18/2022    FINDINGS:     HEAD/NECK:  There is no suspicious FDG uptake in the neck.    Postsurgical changes of the left temporoparietal and preauricular  scalp with free flap reconstruction. No abnormal uptake to indicate  local recurrence. Mild superficial cutaneous uptake about the right  chest (series 4 image 86).    The paranasal sinuses are clear. The mastoid air cells are clear.     No hypermetabolic lymph nodes are demonstrated in the neck.     The mucosal and deep spaces of the neck are unremarkable. The major  salivary glands are unremarkable. The thyroid is unremarkable. The  major vasculature of the neck are patent.    CHEST:  There is no suspicious FDG uptake in the chest.    The central tracheobronchial tree is clear. No pleural effusion or  pneumothorax. No acute consolidation. Minimal chronic  appearing  reticular opacities in the subpleural right lower lobe not  significantly changed compared to previous.    No hypermetabolic lymph nodes are demonstrated in the chest.    Heart size is within normal limits.    Vascular calcification coronary arteries.. No pericardial effusion.  The thoracic aorta and main pulmonary artery are within normal limits  for diameter. The esophagus is unremarkable.     ABDOMEN AND PELVIS:  There is no suspicious FDG uptake in the abdomen or pelvis.    Stable hypodensities along the falciform ligament and gallbladder  fossa, likely benign fatty infiltration. The gallbladder is  unremarkable. No intrahepatic or extrahepatic biliary ductal  dilatation. Failure. The pancreas. No pancreatic ductal dilatation.  The spleen is unremarkable. Small splenule noted. The adrenal glands  are unremarkable.    Symmetric enhancement of the kidneys. No hydronephrosis. The urinary  bladder is incompletely distended which limits evaluation. The  reproductive organs are unremarkable.    No hypermetabolic lymph nodes are demonstrated in the abdomen or  pelvis.    Normal caliber of the small and large bowel. Colonic diverticulosis  without evidence of diverticulitis. No free air, free fluid, or fluid  collection. Normal caliber of the abdominal aorta.    LOWER EXTREMITIES:   There is no suspicious FDG uptake in the visualized lower extremities.    BONES:   There is no suspicious FDG uptake in the skeleton. There are no  suspicious lytic or blastic osseous lesions.   Impression: IMPRESSION: In this patient with history of melanoma    No evidence for metastatic disease on today's exam.    I have personally reviewed the examination and initial interpretation  and I agree with the findings.    SADIA DENSON MD         SYSTEM ID:  [id number]    I reviewed the above images today.    ASSESSMENT AND PLAN  Cutaneous melanoma, scalp primary, pT2a cN1c, clinical Stage IIIB  L2 hypermetabolism  It was a pleasure  to talk with Mr. Coleman today. He is a 78 year old  with agent orange cutaneous exposures in the [place] war and a diagnosis of malignant melanoma arising from a pre-existing pigmented lesion on the scalp. He had a partial response to neoadjuvant therapy with atezolizumab (3 cycles), vemurafenib, and cobimetinib (4 cycles). He received adjuvant nivolumab through 2/14/2022.    PET-CT shows no obvious evidence for recurrence or metastasis. I think the small right ear FDG avid focus is probably not recurrence, as patient notes no skin changes in the area and I am not able to appreciate anything on the video. I recommended he be seen by dermatology for a thorough skin evaluation. He should be seen every 3-6 months by dermatology. In the meantime, we will continue systemic surveillance. It has been almost 2 years since the WLE surgery, so would continue with imaging every 3 months at this point. Plan to transition to 4 months after that.    -RTC in 3 months with PET-CT.     Isolated Neutropenia, mild  He has low white count of 3.3 and ANC 1.1. This is down from ANC 1.8 in April. He has had low neutrophil counts before in 2021 etc but not this low. Unclear cause. Unlikely nutritional as normal hemoglobin and MCV (I.e. Folate/B12). Could consider Inflammatory causes (AIDEN, RF), drugs (nothing new on med list), endocrine (TSH), alcohol, or infections (EBV).  We will obtain CBC with differential in about 2 weeks to monitor. If worsening will work up further.    Depressed mood, anxiety, PTSD  He has a longstanding history of PTSD. Takes occasional lorazepam. Continues to work with psychiatrist about every 2 months out of [place].     CKD, moderate, stage III  Creatinine is elevated to 1.55. He has seen nephrology, Dr. Alarcon, and felt likely fluctuating creatinine due to diuretic use.     *** minutes spent on the date of the encounter doing {2021 E&M time in:406862}     Sherine Meza CNP        Again, thank you  for allowing me to participate in the care of your patient.        Sincerely,        Sherine Meza, CNP

## 2023-11-15 ENCOUNTER — TELEPHONE (OUTPATIENT)
Dept: NEPHROLOGY | Facility: CLINIC | Age: 78
End: 2023-11-15
Payer: MEDICARE

## 2023-11-15 NOTE — TELEPHONE ENCOUNTER
ALAYNA and sent My Chart message to reschedule return neph appointment with Dr. Alarcon in April 2024 to November 22, 2023 at 10am. Ask where they want labs as visit will be via video.// 11.15.2023 MCE

## 2023-11-16 ENCOUNTER — TELEPHONE (OUTPATIENT)
Dept: NEPHROLOGY | Facility: CLINIC | Age: 78
End: 2023-11-16
Payer: MEDICARE

## 2023-11-16 NOTE — TELEPHONE ENCOUNTER
ALAYNA and have had consitant communication to schedule patient with Griffin Ruiz for video visit. Huma will make sure labs are taken care of prior to appointment // 11.16.2023 MCE

## 2023-11-20 ENCOUNTER — TELEPHONE (OUTPATIENT)
Dept: NEPHROLOGY | Facility: CLINIC | Age: 78
End: 2023-11-20
Payer: MEDICARE

## 2023-11-20 NOTE — TELEPHONE ENCOUNTER
Call to patient, rescheduled him with Dr Alarcon in December as he opened clinics.  Patient aware labs will need to be drawn before as well.  Kerry Mendez LPN  Nephrology  [phone]

## 2023-12-07 ENCOUNTER — OFFICE VISIT (OUTPATIENT)
Dept: DERMATOLOGY | Facility: CLINIC | Age: 78
End: 2023-12-07
Attending: REGISTERED NURSE
Payer: MEDICARE

## 2023-12-07 ENCOUNTER — LAB (OUTPATIENT)
Dept: LAB | Facility: CLINIC | Age: 78
End: 2023-12-07
Payer: MEDICARE

## 2023-12-07 DIAGNOSIS — N18.31 STAGE 3A CHRONIC KIDNEY DISEASE (H): ICD-10-CM

## 2023-12-07 DIAGNOSIS — Z85.820 HISTORY OF MELANOMA: Primary | ICD-10-CM

## 2023-12-07 DIAGNOSIS — D49.2 NEOPLASM OF UNSPECIFIED BEHAVIOR OF BONE, SOFT TISSUE, AND SKIN: ICD-10-CM

## 2023-12-07 LAB
ALBUMIN MFR UR ELPH: 11 MG/DL
ALBUMIN SERPL BCG-MCNC: 4.6 G/DL (ref 3.5–5.2)
ALBUMIN UR-MCNC: NEGATIVE MG/DL
ANION GAP SERPL CALCULATED.3IONS-SCNC: 12 MMOL/L (ref 7–15)
APPEARANCE UR: CLEAR
BASOPHILS # BLD AUTO: 0 10E3/UL (ref 0–0.2)
BASOPHILS NFR BLD AUTO: 1 %
BILIRUB UR QL STRIP: NEGATIVE
BUN SERPL-MCNC: 14.9 MG/DL (ref 8–23)
CALCIUM SERPL-MCNC: 9.3 MG/DL (ref 8.8–10.2)
CHLORIDE SERPL-SCNC: 99 MMOL/L (ref 98–107)
COLOR UR AUTO: YELLOW
CREAT SERPL-MCNC: 1.37 MG/DL (ref 0.67–1.17)
CREAT UR-MCNC: 168 MG/DL
DEPRECATED HCO3 PLAS-SCNC: 26 MMOL/L (ref 22–29)
EGFRCR SERPLBLD CKD-EPI 2021: 53 ML/MIN/1.73M2
EOSINOPHIL # BLD AUTO: 0.1 10E3/UL (ref 0–0.7)
EOSINOPHIL NFR BLD AUTO: 2 %
ERYTHROCYTE [DISTWIDTH] IN BLOOD BY AUTOMATED COUNT: 12.4 % (ref 10–15)
GLUCOSE SERPL-MCNC: 111 MG/DL (ref 70–99)
GLUCOSE UR STRIP-MCNC: NEGATIVE MG/DL
HCT VFR BLD AUTO: 41.9 % (ref 40–53)
HGB BLD-MCNC: 14.9 G/DL (ref 13.3–17.7)
HGB UR QL STRIP: NEGATIVE
HYALINE CASTS: 5 /LPF
IMM GRANULOCYTES # BLD: 0 10E3/UL
IMM GRANULOCYTES NFR BLD: 0 %
KETONES UR STRIP-MCNC: NEGATIVE MG/DL
LEUKOCYTE ESTERASE UR QL STRIP: NEGATIVE
LYMPHOCYTES # BLD AUTO: 1.6 10E3/UL (ref 0.8–5.3)
LYMPHOCYTES NFR BLD AUTO: 38 %
MCH RBC QN AUTO: 29.5 PG (ref 26.5–33)
MCHC RBC AUTO-ENTMCNC: 35.6 G/DL (ref 31.5–36.5)
MCV RBC AUTO: 83 FL (ref 78–100)
MONOCYTES # BLD AUTO: 0.4 10E3/UL (ref 0–1.3)
MONOCYTES NFR BLD AUTO: 9 %
NEUTROPHILS # BLD AUTO: 2.1 10E3/UL (ref 1.6–8.3)
NEUTROPHILS NFR BLD AUTO: 50 %
NITRATE UR QL: NEGATIVE
NRBC # BLD AUTO: 0 10E3/UL
NRBC BLD AUTO-RTO: 0 /100
PH UR STRIP: 6 [PH] (ref 5–7)
PHOSPHATE SERPL-MCNC: 2.3 MG/DL (ref 2.5–4.5)
PLATELET # BLD AUTO: 203 10E3/UL (ref 150–450)
POTASSIUM SERPL-SCNC: 3.6 MMOL/L (ref 3.4–5.3)
PROT/CREAT 24H UR: 0.07 MG/MG CR (ref 0–0.2)
PTH-INTACT SERPL-MCNC: 81 PG/ML (ref 15–65)
RBC # BLD AUTO: 5.05 10E6/UL (ref 4.4–5.9)
RBC URINE: 1 /HPF
SODIUM SERPL-SCNC: 137 MMOL/L (ref 135–145)
SP GR UR STRIP: 1.02 (ref 1–1.03)
UROBILINOGEN UR STRIP-MCNC: NORMAL MG/DL
VIT D+METAB SERPL-MCNC: 13 NG/ML (ref 20–50)
WBC # BLD AUTO: 4.2 10E3/UL (ref 4–11)
WBC URINE: 1 /HPF

## 2023-12-07 PROCEDURE — 84156 ASSAY OF PROTEIN URINE: CPT | Performed by: PATHOLOGY

## 2023-12-07 PROCEDURE — 80069 RENAL FUNCTION PANEL: CPT | Performed by: PATHOLOGY

## 2023-12-07 PROCEDURE — 85025 COMPLETE CBC W/AUTO DIFF WBC: CPT | Performed by: PATHOLOGY

## 2023-12-07 PROCEDURE — 36415 COLL VENOUS BLD VENIPUNCTURE: CPT | Performed by: PATHOLOGY

## 2023-12-07 PROCEDURE — 83970 ASSAY OF PARATHORMONE: CPT | Performed by: PATHOLOGY

## 2023-12-07 PROCEDURE — 99213 OFFICE O/P EST LOW 20 MIN: CPT | Mod: 25 | Performed by: DERMATOLOGY

## 2023-12-07 PROCEDURE — 82306 VITAMIN D 25 HYDROXY: CPT | Performed by: INTERNAL MEDICINE

## 2023-12-07 PROCEDURE — 99000 SPECIMEN HANDLING OFFICE-LAB: CPT | Performed by: PATHOLOGY

## 2023-12-07 PROCEDURE — 88305 TISSUE EXAM BY PATHOLOGIST: CPT | Mod: TC | Performed by: DERMATOLOGY

## 2023-12-07 PROCEDURE — 11104 PUNCH BX SKIN SINGLE LESION: CPT | Performed by: DERMATOLOGY

## 2023-12-07 PROCEDURE — 81001 URINALYSIS AUTO W/SCOPE: CPT | Performed by: PATHOLOGY

## 2023-12-07 ASSESSMENT — PAIN SCALES - GENERAL: PAINLEVEL: NO PAIN (0)

## 2023-12-07 NOTE — NURSING NOTE
Dermatology Rooming Note    Ahmet Coleman's goals for this visit include:   Chief Complaint   Patient presents with    Derm Problem     FBSE- no spots of concern     [place], EMT

## 2023-12-07 NOTE — PROGRESS NOTES
[facility] Dermatology Note  Encounter Date: Dec 7, 2023  Office Visit     Dermatology Problem List:  1. H/o melanoma 2020  - Stage 3B, L central scalp s/p immunotherapy and WLE 5/2021 with L latissimus graft  2. Other biopsies  - tumoral melanosis, L superior shoulder, s/p shave bx 2/5/21  - tumoral melanosis, central inferior abdomen, s/p shave bx 2/5/21  3.NUB s/p punch bx 12/7/23  ____________________________________________    Assessment & Plan:  #Hx melanoma; clinically no evidence of recurrence.  - Reassurance as to benign findings    #FBSE  Distal R upper arm lesion most likely benign angioma, but atypical shape. Given hx of stage 3B melanoma, will perform bx out of abundance of caution. Patient agrees with plan.   - Area of melanoma excision has no evidence of recurrence. PET 10/27/2023 negative for metastatic disease. FBSE also with benign nevi, cherry angiomas, sebK. Discussed benign nature of these lesions. Recommended continuing to monitor for changes. RTC 6 months.   -s/p punch biopsy (pt will remove his own suture)    Procedures Performed:   - Punch biopsy procedure note, location(s): R upper arm. After discussion of benefits and risks including but not limited to bleeding, infection, scar, incomplete removal, recurrence, and non-diagnostic biopsy, verbal consent and photographs were obtained. The area was cleaned with isopropyl alcohol. 0.5mL of 1% lidocaine with epinephrine was injected to obtain adequate anesthesia and a 4 mm punch biopsy was performed at site(s). 4-0 Prolene sutures were utilized to approximate the epidermal edges. White petrolatum ointment and a bandage was applied to the wound. Explicit verbal wound care instructions were provided. The patient left the dermatology clinic in good condition.   - Procedure(s) performed by faculty with medical student participation.     Follow-up: 6 month(s) in-person, or earlier for new or changing lesions    Staff and Medical  Student:     Seen and staffed with Dr. Clinton Dolan, MS3    I was present with the medical student who participated in the service and in the documentation.  I have verified the history and personally performed the physical exam and medical decision making.  I agree with the assessment and plan of care as documented in the note.  I personally performed all procedures.    Keith Alonzo MD  Dermatology Attending    ____________________________________________    CC: Derm Problem (FBSE- no spots of concern)    HPI:  Mr. Ahmet Coleman is a(n) 78 year old male who presents today as a return patient for FBSE. He currently has no lesions of concern (no itching/pain/bleeding) and no changing lesions. He has some fatigue, but is otherwise feeling well, without additional skin concerns.     Labs:  None    Imaging:  -PET 10/27/2023 without evidence of metastatic disease    Physical Exam:  Vitals: There were no vitals taken for this visit.  SKIN: Full skin, which includes the head/face, both arms, chest, back, abdomen,both legs,  buttocks, digits and/or nails, was examined. Genitalia was not examined.  -Scar around skin graft on scalp without evidence of pigmentation or firm nodularity  -Skin graft on scalp has no concerning pigmented lesions  -L ear helix with blue macule, dilated vessel under dermoscopy  -Multiple bright red or purple macules and papules scattered on face, scalp, extremities, and trunk  -Multiple light brown macules and papules on extremities, trunk, and head with regular pigment network  -Light brown/tan, waxy, stuck-on papules on trunk and extremities  -Tattoos on arms, L chest, and lower extremity  - No other lesions of concern on areas examined.     Medications:  Current Outpatient Medications   Medication     acetaminophen (TYLENOL) 500 MG tablet     diphenoxylate-atropine (LOMOTIL) 2.5-0.025 MG tablet     fluvoxaMINE (LUVOX) 100 MG tablet     hydrochlorothiazide (HYDRODIURIL) 25 MG tablet      Hypromellose (ARTIFICIAL TEARS OP)     Lactobacillus-Inulin ([facility] DIGESTIVE [facility]) CAPS     Magnesium Oxide 420 MG TABS     methocarbamol (ROBAXIN) 500 MG tablet     omeprazole (PRILOSEC) 20 MG capsule     polyethylene glycol (MIRALAX) 17 GM/Dose powder     potassium chloride ER (KLOR-CON M) 20 MEQ CR tablet     pregabalin (LYRICA) 150 MG capsule     QUEtiapine (SEROQUEL) 300 MG tablet     simvastatin (ZOCOR) 80 MG tablet     traZODone (DESYREL) 100 MG tablet     vitamin D3 (CHOLECALCIFEROL) 1000 units (25 mcg) tablet     vortioxetine (TRINTELLIX) 20 MG tablet     docusate sodium (COLACE) 100 MG capsule     senna-docusate (SENOKOT-S/PERICOLACE) 8.6-50 MG tablet     Current Facility-Administered Medications   Medication     lidocaine 1% with EPINEPHrine 1:100,000 injection 1.5 mL     lidocaine 1% with EPINEPHrine 1:100,000 injection 1.5 mL     Facility-Administered Medications Ordered in Other Visits   Medication     fluorodeoxyglucose F-18 (FDG) radioisotope injection 10-18 mCi     iopamidol (ISOVUE-370) solution  mL     sodium chloride (PF) 0.9% PF flush 10 mL     sodium chloride (PF) 0.9% PF flush 10-60 mL     sodium chloride (PF) 0.9% PF flush 60 mL      Past Medical History:   Patient Active Problem List   Diagnosis     Musculoskeletal disorder and symptoms referable to neck     Degeneration of intervertebral disc of cervical region     Displacement of cervical intervertebral disc without myelopathy     Traumatic shock (H)     Irritable bowel syndrome     Hiatal hernia     GERD (gastroesophageal reflux disease)     Personal history of tobacco use, presenting hazards to health     PTSD (post-traumatic stress disorder)     CARDIOVASCULAR SCREENING; LDL GOAL LESS THAN 130     Obesity     Advanced directives, counseling/discussion     Major depressive disorder, recurrent episode, moderate (H)     Anxiety     Amnesia     Impaired fasting glucose     History of colonic polyps     Hypertension      Hyperlipidemia     Diverticulosis of colon     Cataract     Cannabis abuse, episodic use     Benign neoplasm of colon     Arthropathy of hand     Arthralgia     Other ill-defined and unknown causes of morbidity and mortality     Subjective tinnitus     Spondylosis of cervical spine without myelopathy     Sensorineural hearing loss (SNHL) of both ears     Pure hypertriglyceridemia     Pure hypercholesterolemia     Psychophysiologic insomnia     Presbyopia     Personality disorder (H)     Other and unspecified noninfectious gastroenteritis and colitis     Impotence of organic origin     History of other injury     History of cervical fracture     Malignant melanoma of scalp (H)     Fibromyositis     Herpes zoster ophthalmicus of left eye     Family history of type 2 diabetes mellitus     Monoallelic mutation of BRAF gene     Melanoma of scalp (H)     COPD (chronic obstructive pulmonary disease) (H)     Seborrheic keratosis     Multiple melanocytic nevi     Chronic kidney disease, stage 1     History of melanoma     Multiple nevi     Hypertension, essential     Stage 3a chronic kidney disease (H)     Other neutropenia (H24)     Past Medical History:   Diagnosis Date     Anxiety      Benign hypertension 12/11/2013     Cervicalgia      Chronic kidney disease      Depressive disorder, not elsewhere classified      Diverticulitis of colon 07/10/2013     Esophageal reflux      Irritable bowel syndrome 10/03/2003     Lumbago      Malignant melanoma of scalp (H) 12/29/2020     Migraine, unspecified, without mention of intractable migraine without mention of status migrainosus      Other kyphoscoliosis and scoliosis      Other specified gastritis without mention of hemorrhage      PTSD (post-traumatic stress disorder)      Tobacco abuse since 1965    on and off        CC Sherine Meza, CNP  [address] [place] [address] on close of this encounter.

## 2023-12-07 NOTE — LETTER
12/7/2023       RE: Ahmet Coleman  [address]  [place] [address]     Dear Colleague,    Thank you for referring your patient, Ahmet Coleman, to the [facility] [place] at [facility]. Please see a copy of my visit note below.    [facility] Dermatology Note  Encounter Date: Dec 7, 2023  Office Visit     Dermatology Problem List:  1. H/o melanoma 2020  - Stage 3B, L central scalp s/p immunotherapy and WLE 5/2021 with L latissimus graft  2. Other biopsies  - tumoral melanosis, L superior shoulder, s/p shave bx 2/5/21  - tumoral melanosis, central inferior abdomen, s/p shave bx 2/5/21  3.NUB s/p punch bx 12/7/23  ____________________________________________    Assessment & Plan:  #Hx melanoma; clinically no evidence of recurrence.  - Reassurance as to benign findings    #FBSE  Distal R upper arm lesion most likely benign angioma, but atypical shape. Given hx of stage 3B melanoma, will perform bx out of abundance of caution. Patient agrees with plan.   - Area of melanoma excision has no evidence of recurrence. PET 10/27/2023 negative for metastatic disease. FBSE also with benign nevi, cherry angiomas, sebK. Discussed benign nature of these lesions. Recommended continuing to monitor for changes. RTC 6 months.   -s/p punch biopsy (pt will remove his own suture)    Procedures Performed:   - Punch biopsy procedure note, location(s): R upper arm. After discussion of benefits and risks including but not limited to bleeding, infection, scar, incomplete removal, recurrence, and non-diagnostic biopsy, verbal consent and photographs were obtained. The area was cleaned with isopropyl alcohol. 0.5mL of 1% lidocaine with epinephrine was injected to obtain adequate anesthesia and a 4 mm punch biopsy was performed at site(s). 4-0 Prolene sutures were utilized to approximate the epidermal edges. White petrolatum ointment and a  bandage was applied to the wound. Explicit verbal wound care instructions were provided. The patient left the dermatology clinic in good condition.   - Procedure(s) performed by faculty with medical student participation.     Follow-up: 6 month(s) in-person, or earlier for new or changing lesions    Staff and Medical Student:     Seen and staffed with Dr. Clinton Dolan, MS3    I was present with the medical student who participated in the service and in the documentation.  I have verified the history and personally performed the physical exam and medical decision making.  I agree with the assessment and plan of care as documented in the note.  I personally performed all procedures.    Keith Alonzo MD  Dermatology Attending    ____________________________________________    CC: Derm Problem (FBSE- no spots of concern)    HPI:  Mr. Ahmte Coleman is a(n) 78 year old male who presents today as a return patient for FBSE. He currently has no lesions of concern (no itching/pain/bleeding) and no changing lesions. He has some fatigue, but is otherwise feeling well, without additional skin concerns.     Labs:  None    Imaging:  -PET 10/27/2023 without evidence of metastatic disease    Physical Exam:  Vitals: There were no vitals taken for this visit.  SKIN: Full skin, which includes the head/face, both arms, chest, back, abdomen,both legs,  buttocks, digits and/or nails, was examined. Genitalia was not examined.  -Scar around skin graft on scalp without evidence of pigmentation or firm nodularity  -Skin graft on scalp has no concerning pigmented lesions  -L ear helix with blue macule, dilated vessel under dermoscopy  -Multiple bright red or purple macules and papules scattered on face, scalp, extremities, and trunk  -Multiple light brown macules and papules on extremities, trunk, and head with regular pigment network  -Light brown/tan, waxy, stuck-on papules on trunk and extremities  -Tattoos on arms, L chest,  and lower extremity  - No other lesions of concern on areas examined.     Medications:  Current Outpatient Medications   Medication    acetaminophen (TYLENOL) 500 MG tablet    diphenoxylate-atropine (LOMOTIL) 2.5-0.025 MG tablet    fluvoxaMINE (LUVOX) 100 MG tablet    hydrochlorothiazide (HYDRODIURIL) 25 MG tablet    Hypromellose (ARTIFICIAL TEARS OP)    Lactobacillus-Inulin ([facility]) CAPS    Magnesium Oxide 420 MG TABS    methocarbamol (ROBAXIN) 500 MG tablet    omeprazole (PRILOSEC) 20 MG capsule    polyethylene glycol (MIRALAX) 17 GM/Dose powder    potassium chloride ER (KLOR-CON M) 20 MEQ CR tablet    pregabalin (LYRICA) 150 MG capsule    QUEtiapine (SEROQUEL) 300 MG tablet    simvastatin (ZOCOR) 80 MG tablet    traZODone (DESYREL) 100 MG tablet    vitamin D3 (CHOLECALCIFEROL) 1000 units (25 mcg) tablet    vortioxetine (TRINTELLIX) 20 MG tablet    docusate sodium (COLACE) 100 MG capsule    senna-docusate (SENOKOT-S/PERICOLACE) 8.6-50 MG tablet     Current Facility-Administered Medications   Medication    lidocaine 1% with EPINEPHrine 1:100,000 injection 1.5 mL    lidocaine 1% with EPINEPHrine 1:100,000 injection 1.5 mL     Facility-Administered Medications Ordered in Other Visits   Medication    fluorodeoxyglucose F-18 (FDG) radioisotope injection 10-18 mCi    iopamidol (ISOVUE-370) solution  mL    sodium chloride (PF) 0.9% PF flush 10 mL    sodium chloride (PF) 0.9% PF flush 10-60 mL    sodium chloride (PF) 0.9% PF flush 60 mL      Past Medical History:   Patient Active Problem List   Diagnosis    Musculoskeletal disorder and symptoms referable to neck    Degeneration of intervertebral disc of cervical region    Displacement of cervical intervertebral disc without myelopathy    Traumatic shock (H)    Irritable bowel syndrome    Hiatal hernia    GERD (gastroesophageal reflux disease)    Personal history of tobacco use, presenting hazards to health    PTSD (post-traumatic stress  disorder)    CARDIOVASCULAR SCREENING; LDL GOAL LESS THAN 130    Obesity    Advanced directives, counseling/discussion    Major depressive disorder, recurrent episode, moderate (H)    Anxiety    Amnesia    Impaired fasting glucose    History of colonic polyps    Hypertension    Hyperlipidemia    Diverticulosis of colon    Cataract    Cannabis abuse, episodic use    Benign neoplasm of colon    Arthropathy of hand    Arthralgia    Other ill-defined and unknown causes of morbidity and mortality    Subjective tinnitus    Spondylosis of cervical spine without myelopathy    Sensorineural hearing loss (SNHL) of both ears    Pure hypertriglyceridemia    Pure hypercholesterolemia    Psychophysiologic insomnia    Presbyopia    Personality disorder (H)    Other and unspecified noninfectious gastroenteritis and colitis    Impotence of organic origin    History of other injury    History of cervical fracture    Malignant melanoma of scalp (H)    Fibromyositis    Herpes zoster ophthalmicus of left eye    Family history of type 2 diabetes mellitus    Monoallelic mutation of BRAF gene    Melanoma of scalp (H)    COPD (chronic obstructive pulmonary disease) (H)    Seborrheic keratosis    Multiple melanocytic nevi    Chronic kidney disease, stage 1    History of melanoma    Multiple nevi    Hypertension, essential    Stage 3a chronic kidney disease (H)    Other neutropenia (H24)     Past Medical History:   Diagnosis Date    Anxiety     Benign hypertension 12/11/2013    Cervicalgia     Chronic kidney disease     Depressive disorder, not elsewhere classified     Diverticulitis of colon 07/10/2013    Esophageal reflux     Irritable bowel syndrome 10/03/2003    Lumbago     Malignant melanoma of scalp (H) 12/29/2020    Migraine, unspecified, without mention of intractable migraine without mention of status migrainosus     Other kyphoscoliosis and scoliosis     Other specified gastritis without mention of hemorrhage     PTSD  (post-traumatic stress disorder)     Tobacco abuse since 1965    on and off        CC Sherine Meza, CNP  [address]  [facility] [address]  [place] [address] on close of this encounter.      Lidocaine-epinephrine 1-1:195702 % injection   1mL once for one use, starting 12/7/2023 ending 12/7/2023,  2mL disp, R-0, injection  Injected by MODE Caputo

## 2023-12-07 NOTE — PROGRESS NOTES
Lidocaine-epinephrine 1-1:907237 % injection   1mL once for one use, starting 12/7/2023 ending 12/7/2023,  2mL disp, R-0, injection  Injected by MODE Caputo

## 2023-12-11 ENCOUNTER — VIRTUAL VISIT (OUTPATIENT)
Dept: NEPHROLOGY | Facility: CLINIC | Age: 78
End: 2023-12-11
Attending: INTERNAL MEDICINE
Payer: MEDICARE

## 2023-12-11 VITALS — BODY MASS INDEX: 27.4 KG/M2 | WEIGHT: 185 LBS | HEIGHT: 69 IN

## 2023-12-11 DIAGNOSIS — I10 HYPERTENSION, ESSENTIAL: ICD-10-CM

## 2023-12-11 DIAGNOSIS — N18.31 STAGE 3A CHRONIC KIDNEY DISEASE (H): Primary | ICD-10-CM

## 2023-12-11 DIAGNOSIS — C43.4 MALIGNANT MELANOMA OF SCALP (H): ICD-10-CM

## 2023-12-11 DIAGNOSIS — N25.81 SECONDARY HYPERPARATHYROIDISM (H): ICD-10-CM

## 2023-12-11 DIAGNOSIS — E55.9 VITAMIN D DEFICIENCY: ICD-10-CM

## 2023-12-11 LAB
PATH REPORT.COMMENTS IMP SPEC: NORMAL
PATH REPORT.COMMENTS IMP SPEC: NORMAL
PATH REPORT.FINAL DX SPEC: NORMAL
PATH REPORT.GROSS SPEC: NORMAL
PATH REPORT.MICROSCOPIC SPEC OTHER STN: NORMAL
PATH REPORT.RELEVANT HX SPEC: NORMAL

## 2023-12-11 PROCEDURE — 99215 OFFICE O/P EST HI 40 MIN: CPT | Mod: VID | Performed by: INTERNAL MEDICINE

## 2023-12-11 RX ORDER — LISINOPRIL 10 MG/1
15 TABLET ORAL DAILY
COMMUNITY

## 2023-12-11 RX ORDER — PRAZOSIN HYDROCHLORIDE 1 MG/1
1 CAPSULE ORAL 2 TIMES DAILY
COMMUNITY

## 2023-12-11 ASSESSMENT — PAIN SCALES - GENERAL: PAINLEVEL: SEVERE PAIN (7)

## 2023-12-11 NOTE — PROGRESS NOTES
Virtual Visit Details    Type of service:  Video Visit   Video Start Time:  1.33  Video End Time: 1.47    Originating Location (pt. Location): Home    Distant Location (provider location):  On-site  Platform used for Video Visit: Ranjith

## 2023-12-11 NOTE — PROGRESS NOTES
Nephrology Progress Note  12/11/2023   Chief complaint:  History of Present Illness:    Ahmet Coleman is a 78 year old M with HX of hypertension, GERD, IBS, hypertension, HLD,  scalp melanoma who is here for CKD follow-up.     Oncologic history, the patient was diagnosed with scalp melanoma after presetned with lesional enlargement and abnormal sensation.  Bx showed nodular and nevoid type melanoma, non-ulcerated, Breslow depth up to 1.3 mm, Saurabh's level IV, and up to 3 mitoses per mm2. 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 He was started vemurafenib and cobimetinib, ~2/12/21 held for diarrhea, then resumed.  In 2/18/2021, he was started atezolizumab, last on 4/29/21. In 5/24/2021, underwent wide local excision of the scalp melanoma and left latissimus free flap for scalp reconstruction.  Surgical pathology showed features consistent with scar and tumoral melanosis. No definite residual melanoma is seen. Tumoral melanosis (which extends to a depth of around 1.1 mm) is believed to be equivalent to a diagnosis of regressed melanoma. In 6/25/21, he starts adjuvant Nivolumab, last dose 2/14/22     Renal history, he has cr of 1 up until 2016. Since then Cr has fluctuated between 1.2-1.5. Last UA in 2016 showed bland UA. His PET CT scan on 4/23 showed left kidney cyst, 2.5 cm.       7/24/23: Seen for consultation. He is doing well. He was recently told that they are keeping an eyes on his kidneys since Jan 2023.  He reports having kidney infection when he was very young (high school) but he could not remember any detail. Presently, he has no problem with urination. He has no leg swelling. He has been diagnosed with high blood pressure for about 30 years. He has been told by home care nurse that his blood pressure good. He does not feel lightheadedness. He takes lisinopril 10 mg  1.5 tab (15 mg) per day, hydrochlorothiazide 25 mg once a day (dose was raise and prazosin HCL 1 mg take 2 capsules in the morning  and 1 mg bed time. He has reflux and is taking omeprazole 20 mg twice a day for a long time. He has no prostate problem. He does not take NSAIDs currently. He does not smoke or drink. His dad has kidney problem, he has when he was 94.He lives in [place] near [place].  He takes simvastatin  (80 mg per tab) 40 mg per day for HLD. [facility]      /74. He does not smoke now or drink.  He used smoke intermittently in the past. He is a [employer] vet. Labs on 7/21/23 showed Cr 1.55, GFR 46, BUN 15.6, K 3.6, CO2 329, Albumin 4.6. WBC 3.3, Hb 14.1. Last UA in 2016.     12/11/23:  In the interim, he has been doing well. PET on 10/27/23 showed metastatic disease. Has not been on Nivolumab since 2/14/22. He just got a cold and last night he got sick. His BP is 112/80 mmHg today. He has a home health nurse coming in every 2 weeks. His PCP just changed to a different doctor, not Adama Pacheco. He has no fall or leg swelling.   Hydrochlorothiazide 25 mg once a day, lisinopril 15 mg daily and Prazosin 2 mg in the morning and 1 tab.  Labs on 12/7/23 showed creatinine 1.37, GFR 53, BUN 14.9, potassium 3.6,, dioxide 26, calcium 9.3, phosphorus 2.3, albumin 4.6.  Vitamin D 13, PTH 81. Hemoglobin 14.9, white cell 4.2 and platelet 203. UA is bland without blood or protein. UPCR 0.07 g/g.     Past medical history  Past Medical History:   Diagnosis Date    Anxiety     Benign hypertension 12/11/2013    Cervicalgia     Chronic kidney disease     Depressive disorder, not elsewhere classified     Diverticulitis of colon 07/10/2013    Esophageal reflux     Irritable bowel syndrome 10/03/2003    Lumbago     Malignant melanoma of scalp (H) 12/29/2020    Migraine, unspecified, without mention of intractable migraine without mention of status migrainosus     Other kyphoscoliosis and scoliosis     Other specified gastritis without mention of hemorrhage     PTSD (post-traumatic stress disorder)     Tobacco abuse since 1965    on  and off     Past surgical history  Past Surgical History:   Procedure Laterality Date    COLONOSCOPY N/A 6/8/2016    Procedure: COMBINED COLONOSCOPY, SINGLE OR MULTIPLE BIOPSY/POLYPECTOMY BY BIOPSY;  Surgeon: Mahesh Amanda MD;  Location: [facility]    ESOPHAGOSCOPY, GASTROSCOPY, DUODENOSCOPY (EGD), COMBINED  9/6/2011    Procedure:COMBINED ESOPHAGOSCOPY, GASTROSCOPY, DUODENOSCOPY (EGD), BIOPSY SINGLE OR MULTIPLE; Esophagogastroduodenoscopy with multiple Biopsy's; Surgeon:MARINA DYE; Location:[facility]    EXCISE MASS HEAD N/A 5/24/2021    Procedure: wide local excision of scalp melanoma;  Surgeon: Paulo Garcia MD;  Location: [facility]    GRAFT FREE VASCULARIZED LATISSIMUS DORSI Left 5/24/2021    Procedure: left latissimus free flap for scalp reconstruction\. skin graft from left back to scalp;  Surgeon: Kathryn Machado MD;  Location: [facility]    ZZC APPENDECTOMY      ZZC NONSPECIFIC PROCEDURE      nasal septal fx and a devitation needing plastic surgery         Review of Systems:   14 systems were reviewed and all negative except as mentioned above.   Current Medications:  Current Outpatient Medications   Medication    acetaminophen (TYLENOL) 500 MG tablet    diphenoxylate-atropine (LOMOTIL) 2.5-0.025 MG tablet    docusate sodium (COLACE) 100 MG capsule    fluvoxaMINE (LUVOX) 100 MG tablet    hydrochlorothiazide (HYDRODIURIL) 25 MG tablet    Hypromellose (ARTIFICIAL TEARS OP)    Lactobacillus-Inulin (CULTURELLE DIGESTIVE HEALTH) CAPS    Magnesium Oxide 420 MG TABS    methocarbamol (ROBAXIN) 500 MG tablet    omeprazole (PRILOSEC) 20 MG capsule    polyethylene glycol (MIRALAX) 17 GM/Dose powder    potassium chloride ER (KLOR-CON M) 20 MEQ CR tablet    pregabalin (LYRICA) 150 MG capsule    QUEtiapine (SEROQUEL) 300 MG tablet    senna-docusate (SENOKOT-S/PERICOLACE) 8.6-50 MG tablet    simvastatin (ZOCOR) 80 MG tablet    traZODone (DESYREL) 100 MG tablet    vitamin D3 (CHOLECALCIFEROL) 1000 units (25 mcg) tablet     "vortioxetine (TRINTELLIX) 20 MG tablet     Current Facility-Administered Medications   Medication    lidocaine 1% with EPINEPHrine 1:100,000 injection 1.5 mL    lidocaine 1% with EPINEPHrine 1:100,000 injection 1.5 mL     Facility-Administered Medications Ordered in Other Visits   Medication    fluorodeoxyglucose F-18 (FDG) radioisotope injection 10-18 mCi    iopamidol (ISOVUE-370) solution  mL    sodium chloride (PF) 0.9% PF flush 10 mL    sodium chloride (PF) 0.9% PF flush 10-60 mL    sodium chloride (PF) 0.9% PF flush 60 mL       Physical Exam:   Ht 1.74 m (5' 8.5\")   Wt 83.9 kg (185 lb)   BMI 27.72 kg/m     Body mass index is 27.72 kg/m .    On video, he is alert and not in acute distress.  He has no lower extremity edema. He has some mild cough from cold.     Labs:   All labs reviewed by me  Last Renal Panel:  Sodium   Date Value Ref Range Status   12/07/2023 137 135 - 145 mmol/L Final     Comment:     Reference intervals for this test were updated on 09/26/2023 to more accurately reflect our healthy population. There may be differences in the flagging of prior results with similar values performed with this method. Interpretation of those prior results can be made in the context of the updated reference intervals.    06/25/2021 139 133 - 144 mmol/L Final     Potassium   Date Value Ref Range Status   12/07/2023 3.6 3.4 - 5.3 mmol/L Final   04/21/2023 3.9 3.4 - 5.3 mmol/L Final   06/25/2021 3.8 3.4 - 5.3 mmol/L Final     Chloride   Date Value Ref Range Status   12/07/2023 99 98 - 107 mmol/L Final   04/21/2023 103 94 - 109 mmol/L Final   06/25/2021 106 94 - 109 mmol/L Final     Carbon Dioxide   Date Value Ref Range Status   06/25/2021 26 20 - 32 mmol/L Final     Carbon Dioxide (CO2)   Date Value Ref Range Status   12/07/2023 26 22 - 29 mmol/L Final   04/21/2023 34 (H) 20 - 32 mmol/L Final     Anion Gap   Date Value Ref Range Status   12/07/2023 12 7 - 15 mmol/L Final   04/21/2023 3 3 - 14 mmol/L Final "   06/25/2021 6 3 - 14 mmol/L Final     Glucose   Date Value Ref Range Status   12/07/2023 111 (H) 70 - 99 mg/dL Final   04/21/2023 110 (H) 70 - 99 mg/dL Final   06/25/2021 102 (H) 70 - 99 mg/dL Final     Urea Nitrogen   Date Value Ref Range Status   12/07/2023 14.9 8.0 - 23.0 mg/dL Final   04/21/2023 23 7 - 30 mg/dL Final   06/25/2021 16 7 - 30 mg/dL Final     Creatinine   Date Value Ref Range Status   12/07/2023 1.37 (H) 0.67 - 1.17 mg/dL Final   06/25/2021 1.09 0.66 - 1.25 mg/dL Final     GFR Estimate   Date Value Ref Range Status   12/07/2023 53 (L) >60 mL/min/1.73m2 Final   06/25/2021 66 >60 mL/min/[1.73_m2] Final     Comment:     Non  GFR Calc  Starting 12/18/2018, serum creatinine based estimated GFR (eGFR) will be   calculated using the Chronic Kidney Disease Epidemiology Collaboration   (CKD-EPI) equation.       GFR, ESTIMATED POCT   Date Value Ref Range Status   10/27/2023 34 (L) >60 mL/min/1.73m2 Final     Calcium   Date Value Ref Range Status   12/07/2023 9.3 8.8 - 10.2 mg/dL Final   06/25/2021 8.4 (L) 8.5 - 10.1 mg/dL Final     Phosphorus   Date Value Ref Range Status   12/07/2023 2.3 (L) 2.5 - 4.5 mg/dL Final   06/11/2021 2.8 2.5 - 4.5 mg/dL Final     Albumin   Date Value Ref Range Status   12/07/2023 4.6 3.5 - 5.2 g/dL Final   04/21/2023 4.0 3.4 - 5.0 g/dL Final   06/25/2021 3.5 3.4 - 5.0 g/dL Final       Imaging:  I reviewed imaging studies.     Assessment & Recommendations:   Problem list  # CKD stage 3 likely secondary to chronic hypertension; possible prior effect of Vemurafenib; Cr fluctuated likely in the setting of diuretics use  The patient has been noted to have creatinine elevation in the range of 1.2-1.6 since 2016.  He received nivolumab between 6/22-2/22. Cr while taking Nivolumab was normal. Cr 1.0-1.1 in 8/26/22 but increased to 1.39 in 1/23 and now ranging 1.3-1.5.. I suspect that that his chronic kidney disease is likely from chronic hypertension for which he has for 30  years and possible effect from Vemurafenib (reports of KEENAN, CKD and AIN).  Imaging showed normal kidneys without any stone or mass or hydronephrosis.  The patient is also on omeprazole 20 mg twice daily for GERD. His UA has been bland and UPCR is normal so less likely Gns, MGRS.  At this time creatinine is 1.37 which is stable with eGFr in the 50s.  - Monitor kidney function in 6 months  - Stay well hydrated; drink 70 Oz per day  # Scalp melanoma followed by Dr. Hernández  The patient was diagnosed with scalp melanoma after presetned with lesional enlargement and abnormal sensation.  Bx showed nodular and nevoid type melanoma, non-ulcerated, Breslow depth up to 1.3 mm, Saurabh's level IV, and up to 3 mitoses per mm2. 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 He was started vemurafenib and cobimetinib, ~2/12/21 held for diarrhea, then resumed. In 2/18/2021, he was started atezolizumab, last on 4/29/21. In 5/24/2021, underwent wide local excision of the scalp melanoma and left latissimus free flap for scalp reconstruction.  Surgical pathology showed features consistent with scar and tumoral melanosis. No definite residual melanoma is seen. Tumoral melanosis (which extends to a depth of around 1.1 mm) is believed to be equivalent to a diagnosis of regressed melanoma. In 6/25/21, he starts adjuvant Nivolumab, last dose 2/14/22. CT headneck from 7/22/23 showed possibly hypermetabolic lesion at scalp but repeat PET on 10/23 showed no hypermetabolic lesion. Now in remission.  # BMD  # Secondary hyperparathyroidism  # Vitamin D deficiency  His calcium is 9.3 and phosphorus is 2.3.  Patient is 8110 vitamin D is only 13.  I will start vitamin D at 5000 unit daily.  # Hypertension; well conreolled  On lisinopril 10 mg  1.5 tab (15 mg) per day, hydrochlorothiazide 25 mg once a day (dose was raise and prazosin HCL 1 mg take 2 capsules in the morning and 1 mg bed time.  - BP has been good and today is 112/80, no signs of  hypotension  - Monitor BP regularly if SBP <100 mmHg, please let us know  # Leukopenia; resolved  # Anemia surveillance  Hb 14.9; no issue     Follow-up in 6 months with labs    I spent  40 minutes on the date of the encounter doing chart review, history and exam, documentation and further activities as noted above. 14 (1.33-1.47) minutes of this visit is dedicated to direct patient interaction via video.     Malgorzata Alarcon MD on 12/11/2023

## 2023-12-11 NOTE — PATIENT INSTRUCTIONS
Your kidney function has been stable so we will continue same regimen for now  Please make sure that you are stable hydrated and drink is close to 70 ounces a day is much as possible  See you in 6  months with labs  Start taking vitamin D 5000 units a day because your vitamin D is low

## 2023-12-11 NOTE — LETTER
12/11/2023       RE: Ahmet Coleman  [address]  [place] [address]     Dear Colleague,    Thank you for referring your patient, Ahmet Coleman, to the [facility] [place] at [facility]. Please see a copy of my visit note below.    Virtual Visit Details    Type of service:  Video Visit   Video Start Time:  1.33  Video End Time: 1.47    Originating Location (pt. Location): Home    Distant Location (provider location):  On-site  Platform used for Video Visit: Ranjith        Nephrology Progress Note  12/11/2023   Chief complaint:  History of Present Illness:    Ahmet Coleman is a 78 year old M with HX of hypertension, GERD, IBS, hypertension, HLD,  scalp melanoma who is here for CKD follow-up.     Oncologic history, the patient was diagnosed with scalp melanoma after presetned with lesional enlargement and abnormal sensation.  Bx showed nodular and nevoid type melanoma, non-ulcerated, Breslow depth up to 1.3 mm, Saurabh's level IV, and up to 3 mitoses per mm2. 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 He was started vemurafenib and cobimetinib, ~2/12/21 held for diarrhea, then resumed.  In 2/18/2021, he was started atezolizumab, last on 4/29/21. In 5/24/2021, underwent wide local excision of the scalp melanoma and left latissimus free flap for scalp reconstruction.  Surgical pathology showed features consistent with scar and tumoral melanosis. No definite residual melanoma is seen. Tumoral melanosis (which extends to a depth of around 1.1 mm) is believed to be equivalent to a diagnosis of regressed melanoma. In 6/25/21, he starts adjuvant Nivolumab, last dose 2/14/22     Renal history, he has cr of 1 up until 2016. Since then Cr has fluctuated between 1.2-1.5. Last UA in 2016 showed bland UA. His PET CT scan on 4/23 showed left kidney cyst, 2.5 cm.       7/24/23: Seen for consultation. He is doing well. He was recently told that  they are keeping an eyes on his kidneys since Jan 2023.  He reports having kidney infection when he was very young (high school) but he could not remember any detail. Presently, he has no problem with urination. He has no leg swelling. He has been diagnosed with high blood pressure for about 30 years. He has been told by home care nurse that his blood pressure good. He does not feel lightheadedness. He takes lisinopril 10 mg  1.5 tab (15 mg) per day, hydrochlorothiazide 25 mg once a day (dose was raise and prazosin HCL 1 mg take 2 capsules in the morning and 1 mg bed time. He has reflux and is taking omeprazole 20 mg twice a day for a long time. He has no prostate problem. He does not take NSAIDs currently. He does not smoke or drink. His dad has kidney problem, he has when he was 94.He lives in [place] near [place].  He takes simvastatin  (80 mg per tab) 40 mg per day for HLD. [facility]      /74. He does not smoke now or drink.  He used smoke intermittently in the past. He is a [place] vet. Labs on 7/21/23 showed Cr 1.55, GFR 46, BUN 15.6, K 3.6, CO2 329, Albumin 4.6. WBC 3.3, Hb 14.1. Last UA in 2016.     12/11/23:  In the interim, he has been doing well. PET on 10/27/23 showed metastatic disease. Has not been on Nivolumab since 2/14/22. He just got a cold and last night he got sick. His BP is 112/80 mmHg today. He has a home health nurse coming in every 2 weeks. His PCP just changed to a different doctor, carlyle Pacheco. He has no fall or leg swelling.   Hydrochlorothiazide 25 mg once a day, lisinopril 15 mg daily and Prazosin 2 mg in the morning and 1 tab.  Labs on 12/7/23 showed creatinine 1.37, GFR 53, BUN 14.9, potassium 3.6,, dioxide 26, calcium 9.3, phosphorus 2.3, albumin 4.6.  Vitamin D 13, PTH 81. Hemoglobin 14.9, white cell 4.2 and platelet 203. UA is bland without blood or protein. UPCR 0.07 g/g.     Past medical history  Past Medical History:   Diagnosis Date    Anxiety      Benign hypertension 12/11/2013    Cervicalgia     Chronic kidney disease     Depressive disorder, not elsewhere classified     Diverticulitis of colon 07/10/2013    Esophageal reflux     Irritable bowel syndrome 10/03/2003    Lumbago     Malignant melanoma of scalp (H) 12/29/2020    Migraine, unspecified, without mention of intractable migraine without mention of status migrainosus     Other kyphoscoliosis and scoliosis     Other specified gastritis without mention of hemorrhage     PTSD (post-traumatic stress disorder)     Tobacco abuse since 1965    on and off     Past surgical history  Past Surgical History:   Procedure Laterality Date    COLONOSCOPY N/A 6/8/2016    Procedure: COMBINED COLONOSCOPY, SINGLE OR MULTIPLE BIOPSY/POLYPECTOMY BY BIOPSY;  Surgeon: Mahesh Amanda MD;  Location: [facility]    ESOPHAGOSCOPY, GASTROSCOPY, DUODENOSCOPY (EGD), COMBINED  9/6/2011    Procedure:COMBINED ESOPHAGOSCOPY, GASTROSCOPY, DUODENOSCOPY (EGD), BIOPSY SINGLE OR MULTIPLE; Esophagogastroduodenoscopy with multiple Biopsy's; Surgeon:MARINA DYE; Location:[facility]    EXCISE MASS HEAD N/A 5/24/2021    Procedure: wide local excision of scalp melanoma;  Surgeon: Paulo Garcia MD;  Location: [facility]    GRAFT FREE VASCULARIZED LATISSIMUS DORSI Left 5/24/2021    Procedure: left latissimus free flap for scalp reconstruction\. skin graft from left back to scalp;  Surgeon: Kathryn Machado MD;  Location: [facility]    ZZC APPENDECTOMY      ZZC NONSPECIFIC PROCEDURE      nasal septal fx and a devitation needing plastic surgery         Review of Systems:   14 systems were reviewed and all negative except as mentioned above.   Current Medications:  Current Outpatient Medications   Medication    acetaminophen (TYLENOL) 500 MG tablet    diphenoxylate-atropine (LOMOTIL) 2.5-0.025 MG tablet    docusate sodium (COLACE) 100 MG capsule    fluvoxaMINE (LUVOX) 100 MG tablet    hydrochlorothiazide (HYDRODIURIL) 25 MG tablet    Hypromellose  "(ARTIFICIAL TEARS OP)    Lactobacillus-Inulin (CULTURELLE DIGESTIVE HEALTH) CAPS    Magnesium Oxide 420 MG TABS    methocarbamol (ROBAXIN) 500 MG tablet    omeprazole (PRILOSEC) 20 MG capsule    polyethylene glycol (MIRALAX) 17 GM/Dose powder    potassium chloride ER (KLOR-CON M) 20 MEQ CR tablet    pregabalin (LYRICA) 150 MG capsule    QUEtiapine (SEROQUEL) 300 MG tablet    senna-docusate (SENOKOT-S/PERICOLACE) 8.6-50 MG tablet    simvastatin (ZOCOR) 80 MG tablet    traZODone (DESYREL) 100 MG tablet    vitamin D3 (CHOLECALCIFEROL) 1000 units (25 mcg) tablet    vortioxetine (TRINTELLIX) 20 MG tablet     Current Facility-Administered Medications   Medication    lidocaine 1% with EPINEPHrine 1:100,000 injection 1.5 mL    lidocaine 1% with EPINEPHrine 1:100,000 injection 1.5 mL     Facility-Administered Medications Ordered in Other Visits   Medication    fluorodeoxyglucose F-18 (FDG) radioisotope injection 10-18 mCi    iopamidol (ISOVUE-370) solution  mL    sodium chloride (PF) 0.9% PF flush 10 mL    sodium chloride (PF) 0.9% PF flush 10-60 mL    sodium chloride (PF) 0.9% PF flush 60 mL       Physical Exam:   Ht 1.74 m (5' 8.5\")   Wt 83.9 kg (185 lb)   BMI 27.72 kg/m     Body mass index is 27.72 kg/m .    On video, he is alert and not in acute distress.  He has no lower extremity edema. He has some mild cough from cold.     Labs:   All labs reviewed by me  Last Renal Panel:  Sodium   Date Value Ref Range Status   12/07/2023 137 135 - 145 mmol/L Final     Comment:     Reference intervals for this test were updated on 09/26/2023 to more accurately reflect our healthy population. There may be differences in the flagging of prior results with similar values performed with this method. Interpretation of those prior results can be made in the context of the updated reference intervals.    06/25/2021 139 133 - 144 mmol/L Final     Potassium   Date Value Ref Range Status   12/07/2023 3.6 3.4 - 5.3 mmol/L Final "   04/21/2023 3.9 3.4 - 5.3 mmol/L Final   06/25/2021 3.8 3.4 - 5.3 mmol/L Final     Chloride   Date Value Ref Range Status   12/07/2023 99 98 - 107 mmol/L Final   04/21/2023 103 94 - 109 mmol/L Final   06/25/2021 106 94 - 109 mmol/L Final     Carbon Dioxide   Date Value Ref Range Status   06/25/2021 26 20 - 32 mmol/L Final     Carbon Dioxide (CO2)   Date Value Ref Range Status   12/07/2023 26 22 - 29 mmol/L Final   04/21/2023 34 (H) 20 - 32 mmol/L Final     Anion Gap   Date Value Ref Range Status   12/07/2023 12 7 - 15 mmol/L Final   04/21/2023 3 3 - 14 mmol/L Final   06/25/2021 6 3 - 14 mmol/L Final     Glucose   Date Value Ref Range Status   12/07/2023 111 (H) 70 - 99 mg/dL Final   04/21/2023 110 (H) 70 - 99 mg/dL Final   06/25/2021 102 (H) 70 - 99 mg/dL Final     Urea Nitrogen   Date Value Ref Range Status   12/07/2023 14.9 8.0 - 23.0 mg/dL Final   04/21/2023 23 7 - 30 mg/dL Final   06/25/2021 16 7 - 30 mg/dL Final     Creatinine   Date Value Ref Range Status   12/07/2023 1.37 (H) 0.67 - 1.17 mg/dL Final   06/25/2021 1.09 0.66 - 1.25 mg/dL Final     GFR Estimate   Date Value Ref Range Status   12/07/2023 53 (L) >60 mL/min/1.73m2 Final   06/25/2021 66 >60 mL/min/[1.73_m2] Final     Comment:     Non  GFR Calc  Starting 12/18/2018, serum creatinine based estimated GFR (eGFR) will be   calculated using the Chronic Kidney Disease Epidemiology Collaboration   (CKD-EPI) equation.       GFR, ESTIMATED POCT   Date Value Ref Range Status   10/27/2023 34 (L) >60 mL/min/1.73m2 Final     Calcium   Date Value Ref Range Status   12/07/2023 9.3 8.8 - 10.2 mg/dL Final   06/25/2021 8.4 (L) 8.5 - 10.1 mg/dL Final     Phosphorus   Date Value Ref Range Status   12/07/2023 2.3 (L) 2.5 - 4.5 mg/dL Final   06/11/2021 2.8 2.5 - 4.5 mg/dL Final     Albumin   Date Value Ref Range Status   12/07/2023 4.6 3.5 - 5.2 g/dL Final   04/21/2023 4.0 3.4 - 5.0 g/dL Final   06/25/2021 3.5 3.4 - 5.0 g/dL Final       Imaging:  I  reviewed imaging studies.     Assessment & Recommendations:   Problem list  # CKD stage 3 likely secondary to chronic hypertension; possible prior effect of Vemurafenib; Cr fluctuated likely in the setting of diuretics use  The patient has been noted to have creatinine elevation in the range of 1.2-1.6 since 2016.  He received nivolumab between 6/22-2/22. Cr while taking Nivolumab was normal. Cr 1.0-1.1 in 8/26/22 but increased to 1.39 in 1/23 and now ranging 1.3-1.5.. I suspect that that his chronic kidney disease is likely from chronic hypertension for which he has for 30 years and possible effect from Vemurafenib (reports of KEENAN, CKD and AIN).  Imaging showed normal kidneys without any stone or mass or hydronephrosis.  The patient is also on omeprazole 20 mg twice daily for GERD. His UA has been bland and UPCR is normal so less likely Gns, MGRS.  At this time creatinine is 1.37 which is stable with eGFr in the 50s.  - Monitor kidney function in 6 months  - Stay well hydrated; drink 70 Oz per day  # Scalp melanoma followed by Dr. Hernández  The patient was diagnosed with scalp melanoma after presetned with lesional enlargement and abnormal sensation.  Bx showed nodular and nevoid type melanoma, non-ulcerated, Breslow depth up to 1.3 mm, Saurabh's level IV, and up to 3 mitoses per mm2. 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 He was started vemurafenib and cobimetinib, ~2/12/21 held for diarrhea, then resumed. In 2/18/2021, he was started atezolizumab, last on 4/29/21. In 5/24/2021, underwent wide local excision of the scalp melanoma and left latissimus free flap for scalp reconstruction.  Surgical pathology showed features consistent with scar and tumoral melanosis. No definite residual melanoma is seen. Tumoral melanosis (which extends to a depth of around 1.1 mm) is believed to be equivalent to a diagnosis of regressed melanoma. In 6/25/21, he starts adjuvant Nivolumab, last dose 2/14/22. CT headneck from  7/22/23 showed possibly hypermetabolic lesion at scalp but repeat PET on 10/23 showed no hypermetabolic lesion. Now in remission.  # BMD  # Secondary hyperparathyroidism  # Vitamin D deficiency  His calcium is 9.3 and phosphorus is 2.3.  Patient is 8110 vitamin D is only 13.  I will start vitamin D at 5000 unit daily.  # Hypertension; well conreolled  On lisinopril 10 mg  1.5 tab (15 mg) per day, hydrochlorothiazide 25 mg once a day (dose was raise and prazosin HCL 1 mg take 2 capsules in the morning and 1 mg bed time.  - BP has been good and today is 112/80, no signs of hypotension  - Monitor BP regularly if SBP <100 mmHg, please let us know  # Leukopenia; resolved  # Anemia surveillance  Hb 14.9; no issue     Follow-up in 6 months with labs    I spent  40 minutes on the date of the encounter doing chart review, history and exam, documentation and further activities as noted above. 14 (1.33-1.47) minutes of this visit is dedicated to direct patient interaction via video.     Malgorzata Alarcon MD on 12/11/2023

## 2023-12-11 NOTE — NURSING NOTE
Is the patient currently in the state of [place]? YES    Visit mode:VIDEO    If the visit is dropped, the patient can be reconnected by: TELEPHONE VISIT: Phone number: [phone]    Will anyone else be joining the visit? NO  (If patient encounters technical issues they should call [phone]    How would you like to obtain your AVS? MyChart    Are changes needed to the allergy or medication list? No    Reason for visit: ZACHARIAHECK    Fang MONSALVE

## 2024-01-03 PROBLEM — D49.2 NEOPLASM OF UNSPECIFIED BEHAVIOR OF BONE, SOFT TISSUE, AND SKIN: Status: ACTIVE | Noted: 2024-01-03

## 2024-01-29 ENCOUNTER — TELEPHONE (OUTPATIENT)
Dept: NEPHROLOGY | Facility: CLINIC | Age: 79
End: 2024-01-29
Payer: MEDICARE

## 2024-02-29 ENCOUNTER — TELEPHONE (OUTPATIENT)
Dept: NEPHROLOGY | Facility: CLINIC | Age: 79
End: 2024-02-29
Payer: MEDICARE

## 2024-03-01 ENCOUNTER — LAB (OUTPATIENT)
Dept: LAB | Facility: CLINIC | Age: 79
End: 2024-03-01
Payer: MEDICARE

## 2024-03-01 ENCOUNTER — ANCILLARY PROCEDURE (OUTPATIENT)
Dept: PET IMAGING | Facility: CLINIC | Age: 79
End: 2024-03-01
Attending: REGISTERED NURSE
Payer: MEDICARE

## 2024-03-01 DIAGNOSIS — C43.4 MELANOMA OF SCALP (H): ICD-10-CM

## 2024-03-01 LAB
ALBUMIN SERPL BCG-MCNC: 4.4 G/DL (ref 3.5–5.2)
ALP SERPL-CCNC: 55 U/L (ref 40–150)
ALT SERPL W P-5'-P-CCNC: 19 U/L (ref 0–70)
ANION GAP SERPL CALCULATED.3IONS-SCNC: 9 MMOL/L (ref 7–15)
AST SERPL W P-5'-P-CCNC: 24 U/L (ref 0–45)
BASOPHILS # BLD AUTO: 0 10E3/UL (ref 0–0.2)
BASOPHILS NFR BLD AUTO: 1 %
BILIRUB SERPL-MCNC: 0.4 MG/DL
BUN SERPL-MCNC: 23.1 MG/DL (ref 8–23)
CALCIUM SERPL-MCNC: 9 MG/DL (ref 8.8–10.2)
CHLORIDE SERPL-SCNC: 100 MMOL/L (ref 98–107)
CREAT BLD-MCNC: 1.6 MG/DL (ref 0.7–1.3)
CREAT SERPL-MCNC: 1.53 MG/DL (ref 0.67–1.17)
DEPRECATED HCO3 PLAS-SCNC: 30 MMOL/L (ref 22–29)
EGFRCR SERPLBLD CKD-EPI 2021: 44 ML/MIN/1.73M2
EGFRCR SERPLBLD CKD-EPI 2021: 47 ML/MIN/1.73M2
EOSINOPHIL # BLD AUTO: 0.1 10E3/UL (ref 0–0.7)
EOSINOPHIL NFR BLD AUTO: 4 %
ERYTHROCYTE [DISTWIDTH] IN BLOOD BY AUTOMATED COUNT: 12.7 % (ref 10–15)
GLUCOSE SERPL-MCNC: 120 MG/DL (ref 70–99)
HCT VFR BLD AUTO: 37.1 % (ref 40–53)
HGB BLD-MCNC: 12.7 G/DL (ref 13.3–17.7)
IMM GRANULOCYTES # BLD: 0 10E3/UL
IMM GRANULOCYTES NFR BLD: 0 %
LYMPHOCYTES # BLD AUTO: 1.3 10E3/UL (ref 0–5.3)
LYMPHOCYTES NFR BLD AUTO: 38 %
MCH RBC QN AUTO: 28.9 PG (ref 26.5–33)
MCHC RBC AUTO-ENTMCNC: 34.2 G/DL (ref 31.5–36.5)
MCV RBC AUTO: 84 FL (ref 78–100)
MONOCYTES # BLD AUTO: 0.3 10E3/UL (ref 0–1.3)
MONOCYTES NFR BLD AUTO: 8 %
NEUTROPHILS # BLD AUTO: 1.6 10E3/UL (ref 1.6–8.3)
NEUTROPHILS NFR BLD AUTO: 49 %
NRBC # BLD AUTO: 0 10E3/UL
NRBC BLD AUTO-RTO: 0 /100
PLATELET # BLD AUTO: 187 10E3/UL (ref 150–450)
POTASSIUM SERPL-SCNC: 4.2 MMOL/L (ref 3.4–5.3)
PROT SERPL-MCNC: 7.4 G/DL (ref 6.4–8.3)
RBC # BLD AUTO: 4.4 10E6/UL (ref 4.4–5.9)
SODIUM SERPL-SCNC: 139 MMOL/L (ref 135–145)
WBC # BLD AUTO: 3.3 10E3/UL (ref 4–11)

## 2024-03-01 PROCEDURE — A9552 F18 FDG: HCPCS | Performed by: RADIOLOGY

## 2024-03-01 PROCEDURE — 36415 COLL VENOUS BLD VENIPUNCTURE: CPT

## 2024-03-01 PROCEDURE — 71260 CT THORAX DX C+: CPT | Mod: GC | Performed by: RADIOLOGY

## 2024-03-01 PROCEDURE — G1010 CDSM STANSON: HCPCS | Performed by: RADIOLOGY

## 2024-03-01 PROCEDURE — 82565 ASSAY OF CREATININE: CPT

## 2024-03-01 PROCEDURE — 78816 PET IMAGE W/CT FULL BODY: CPT | Mod: MG | Performed by: RADIOLOGY

## 2024-03-01 PROCEDURE — 85025 COMPLETE CBC W/AUTO DIFF WBC: CPT

## 2024-03-01 PROCEDURE — 80053 COMPREHEN METABOLIC PANEL: CPT

## 2024-03-01 PROCEDURE — 74177 CT ABD & PELVIS W/CONTRAST: CPT | Mod: GC | Performed by: RADIOLOGY

## 2024-03-01 RX ORDER — IOPAMIDOL 755 MG/ML
10-135 INJECTION, SOLUTION INTRAVASCULAR ONCE
Status: COMPLETED | OUTPATIENT
Start: 2024-03-01 | End: 2024-03-01

## 2024-03-01 RX ADMIN — IOPAMIDOL 114 ML: 755 INJECTION, SOLUTION INTRAVASCULAR at 12:11

## 2024-03-04 ENCOUNTER — VIRTUAL VISIT (OUTPATIENT)
Dept: ONCOLOGY | Facility: CLINIC | Age: 79
End: 2024-03-04
Attending: REGISTERED NURSE
Payer: MEDICARE

## 2024-03-04 VITALS
DIASTOLIC BLOOD PRESSURE: 80 MMHG | HEIGHT: 69 IN | SYSTOLIC BLOOD PRESSURE: 130 MMHG | WEIGHT: 185 LBS | BODY MASS INDEX: 27.4 KG/M2

## 2024-03-04 DIAGNOSIS — C43.4 MELANOMA OF SCALP (H): Primary | ICD-10-CM

## 2024-03-04 DIAGNOSIS — G93.9 BRAIN LESION: ICD-10-CM

## 2024-03-04 PROCEDURE — 99214 OFFICE O/P EST MOD 30 MIN: CPT | Mod: 95 | Performed by: PHYSICIAN ASSISTANT

## 2024-03-04 ASSESSMENT — PAIN SCALES - GENERAL: PAINLEVEL: NO PAIN (0)

## 2024-03-04 NOTE — LETTER
3/4/2024         RE: Ahmet Coleman  [address]  [place] [address]        Dear Colleague,    Thank you for referring your patient, Ahmet Coleman, to the [facility] CANC[facility]. Please see a copy of my visit note below.    Virtual Visit Details    Type of service:  Video Visit   Video Start Time: 12:15 PM  Video End Time: 12:24 PM    Originating Location (pt. Location): Home  Distant Location (provider location):  On-site  Platform used for Video Visit: [facility] ONCOLOGY PROGRESS NOTE  Melanoma Clinic  Mar 4, 2024    CHIEF COMPLAINT: Scalp melanoma    Melanoma History:  1. He has a small pigmented scalp lesion present for over 30 years. The lesion was biopsied in 1992 and was benign.  2. October 2020, he presented to [facility] with 1 year of progressive enlargement of the lesion and new onset burning, itching, and stinging sensation in the affected scalp.  3. 10/26/20, He was seen at [facility] and he had shave biopsies of A. left central parietal scalp, B. left central occipital scalp, C. Left posterior parietal scalp, and D. punch biopsy of the left superior occipital scalp. Pathology (specimen: [id number]) showed a nodular and nevoid type melanoma, non-ulcerated, Breslow depth up to 1.3 mm, Saurabh's level IV, and up to 3 mitoses per mm2. All margins were involved. Ki-67 10-20%. Sox10 stain is positive and highlights an atypical compound melanocytic proliferation with significant overlying confluence and pagetosis of intraepidermal melanocytes. T-stage: at least pT2a. PD-L1 staining shows PD-L1 TPS <1%. 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  4. 11/25/20, he was seen by Dr. Garcia and he took three 3mm punch biopsies, which returned as dermal melanocytic proliferations with melanophages and fibrosis, consistent with locoregional metastatic melanoma.  5. 12/8/2020 he had PET-CT, which showed FDG avid irregular skin thickening overlying  the left parietal region, with no FDG avid lymphadenopathy in the neck and no evidence of metastasis elsewhere in the body.  6. 1/22/2021 start vemurafenib and cobimetinib, ~2/12/21 held for diarrhea, then resumed.  7. 2/18/2021 Start atezolizumab, last on 4/29/21  8. 5/24/2021 Wide local excision of the scalp melanoma and left latissimus free flap for scalp reconstruction.    Surgical pathology showed features consistent with scar and tumoral melanosis. No definite residual melanoma is seen. Tumoral melanosis (which extends to a depth of around 1.1 mm) is believed to be equivalent to a diagnosis of regressed melanoma.   9. 6/25/21, he starts adjuvant Nivolumab, last dose 2/14/22    INTERVAL HISTORY  Mr. 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     -Doing okay overall.  -Will be getting new glasses.  -Skin is doing well.   -Energy is doing okay.  -Has not yet scheduled a colonoscopy.   -Mother is in nursing home.  -He had COVID over the holidays.   -Stools have been normal in consistency with eating shredded wheat.       Current Outpatient Medications   Medication Sig Dispense Refill    acetaminophen (TYLENOL) 500 MG tablet Take 2 tablets (1,000 mg) by mouth every 8 hours as needed for mild pain 100 tablet 0    diphenoxylate-atropine (LOMOTIL) 2.5-0.025 MG tablet Take 1-2 tablets by mouth 4 times daily as needed for diarrhea 120 tablet 5    docusate sodium (COLACE) 100 MG capsule TAKE ONE CAPSULE BY MOUTH EVERY DAY FOR STOOL SOFTENING (Patient not taking: Reported on 11/3/2023)      fluvoxaMINE (LUVOX) 100 MG tablet Take 150 mg at bedtime      hydrochlorothiazide (HYDRODIURIL) 25 MG tablet Take 25 mg by mouth daily.      Hypromellose (ARTIFICIAL TEARS OP) Apply  to eye. 2 drops in each eye twice a day as needed      Lactobacillus-Inulin ([facility] DIGESTIVE [facility]) CAPS 1 tab daily 30 capsule 1    lisinopril (ZESTRIL) 10 MG tablet Take 15 mg by mouth daily       Magnesium Oxide 420 MG TABS Take 420 mg by mouth daily      methocarbamol (ROBAXIN) 500 MG tablet Take 1 tablet (500 mg) by mouth 3 times daily as needed for muscle spasms 10 tablet 0    omeprazole (PRILOSEC) 20 MG capsule Take 1 capsule by mouth 2 times daily. 60 capsule prn    polyethylene glycol (MIRALAX) 17 GM/Dose powder Take 17 g by mouth daily 510 g 0    potassium chloride ER (KLOR-CON M) 20 MEQ CR tablet Take 1 tablet (20 mEq) by mouth daily 7 tablet 0    prazosin (MINIPRESS) 1 MG capsule Take 1 mg by mouth 2 Times Daily Takes 2 tabs in the morning and 1 tab at night      pregabalin (LYRICA) 150 MG capsule Take 150 mg by mouth 2 times daily      QUEtiapine (SEROQUEL) 300 MG tablet Take 150 mg by mouth At Bedtime       senna-docusate (SENOKOT-S/PERICOLACE) 8.6-50 MG tablet Take 1 tablet by mouth 2 times daily (Patient not taking: Reported on 11/3/2023) 30 tablet 0    simvastatin (ZOCOR) 80 MG tablet Take 40 mg by mouth At Bedtime       traZODone (DESYREL) 100 MG tablet Take 100 mg by mouth At Bedtime       vitamin D3 (CHOLECALCIFEROL) 1000 units (25 mcg) tablet Take 1,000 Units by mouth daily       vitamin D3 (CHOLECALCIFEROL) 125 MCG (5000 UT) tablet Take 1 tablet (125 mcg) by mouth daily 90 tablet 3    vortioxetine (TRINTELLIX) 20 MG tablet TAKE ONE TABLET BY MOUTH EVERY MORNING       Objective:  General: patient appears well in no acute distress, alert and oriented, speech clear and fluid  Skin: no visualized rash or lesions on visualized skin  Resp: Appears to be breathing comfortably without accessory muscle usage, speaking in full sentences, no audible wheezes or cough.  Psych: Coherent speech, normal rate and volume, able to articulate logical thoughts, able to abstract reason, no tangential thoughts, no hallucinations or delusions  Patient's affect is appropriate.    Labs:  Most Recent 3 CBC's:  Recent Labs   Lab Test 03/01/24  1138 12/07/23  1422 10/27/23  1135   WBC 3.3* 4.2 3.4*   HGB 12.7* 14.9  13.8   MCV 84 83 86    203 214   ANEUTAUTO 1.6 2.1 1.6     Most Recent 3 BMP's:  Recent Labs   Lab Test 03/01/24  1207 03/01/24  1138 12/07/23  1422 10/27/23  1158 10/27/23  1135 07/21/23  1347 07/21/23  1251 04/21/23  1108 04/21/23  1108   NA  --  139 137  --  139  --  139  --  140   POTASSIUM  --  4.2 3.6  --  3.5  --  3.6   < > 3.9   CHLORIDE  --  100 99  --  98  --  99   < > 103   CO2  --  30* 26  --  27  --  29   < > 34*   BUN  --  23.1* 14.9  --  18.3  --  15.6   < > 23   CR 1.6* 1.53* 1.37*   < > 1.84*   < > 1.55*  --  1.57*   ANIONGAP  --  9 12  --  14  --  11   < > 3   STEPHEN  --  9.0 9.3  --  9.6  --  9.3  --  9.1   GLC  --  120* 111*  --  109*  --  110*   < > 110*   PROTTOTAL  --  7.4  --   --   --   --  7.3  --  7.5   ALBUMIN  --  4.4 4.6  --  4.5  --  4.6   < > 4.0    < > = values in this interval not displayed.    Most Recent 3 LFT's:  Recent Labs   Lab Test 03/01/24  1138 07/21/23  1251 04/21/23  1108   AST 24 19 14   ALT 19 8 19   ALKPHOS 55 56 55   BILITOTAL 0.4 0.5 0.6   I reviewed the above labs today.    IMAGING  Imaging:  PET Oncology Whole Body  Narrative: Combined Report of: PET and CT on 3/1/2024 1:21 PM:    1. PET of the neck, chest, abdomen, and pelvis.  2. PET CT Fusion for Attenuation Correction and Anatomical  Localization.  3. Diagnostic CT of the chest, abdomen and pelvis with intravenous  contrast obtained for diagnostic interpretation.  4. 3D MIP and PET-CT fused images were processed on an independent  workstation and archived to PACS and reviewed by a radiologist.    Technique:    1. PET: The patient received 11.8 mCi of F-18-FDG. The serum glucose  was 112 mg/dL prior to administration. Body weight was 84 kg. Images  were evaluated in the axial, sagittal, and coronal planes as well as  the rotational whole body MIP. Images were acquired from the cranial  vertex to the feet.    UPTAKE WAS MEASURED AT 60 MINUTES.     BACKGROUND: Liver SUV max = 5.5, Aorta Blood SUV max = 3.5.      2. CT: Volumetric acquisition for clinical interpretation of the  chest, abdomen, and pelvis acquired at 3 mm sections. The chest,  abdomen, and pelvis were evaluated at 5 mm sections in bone, soft  tissue, and lung windows.    Contrast and Medications:  IV contrast: 114 mL of Isovue 370 intravenously.  PO contrast: None.  Additional Medications: None.    3. 3D MIP and PET-CT fused images were processed on an independent  workstation and archived to PACS and reviewed by a radiologist.    INDICATION: Melanoma of scalp (H).    ADDITIONAL INFORMATION OBTAINED FROM EMR: History of scalp melanoma:  *  10/26/2020 biopsy demonstrated a nodular knee.  *  5/24/2021 wide local excision of scalp melanoma followed by  adjuvant chemotherapy.  *  Treatment monitoring.    COMPARISON: PET/CT exams: 10/27/2023, 7/21/2023, 4/21/2023    FOLLOW-UP APPOINTMENT: 3/4/2024    FINDINGS:     HEAD/NECK:  No suspicious uptake in the head and neck.     Postsurgical changes of left temporoparietal periauricular scalp with  flap reconstruction. Stable hypodensity along the left cerebral  convexity, likely chronic hygroma.  No abnormal enhancement of the  brain parenchyma. Nasopharynx and palatine tonsils are within normal  limits.  Tongue base is normal.  The major salivary glands are  unremarkable. Right maxillary sinus mucosal thickening, otherwise  paranasal sinuses are clear. The mastoid air cells are clear. The  thyroid is unremarkable. The major vasculature of the neck are patent.    CHEST:  No suspicious uptake in the chest.     No central pulmonary emboli on this non-dedicated study. Mild right  subclavian vein stenosis secondary to right first rib mass effect with  asymmetric contrast opacification of the right upper extremity and  right thoracic girdle venous collaterals. Nonaneurysmal thoracic aorta  with scattered calcifications. Normal heart size. No pericardial  fluid. Moderate coronary calcifications.  Thoracic esophagus is  within  normal limits. No thoracic lymphadenopathy.    No acute focal parenchymal consolidations. Pleural spaces are clear.  Central tracheobronchial tree is patent without significant debris or  mucus plugging.     ABDOMEN/PELVIS:  No suspicious uptake in the abdomen or pelvis.    No suspicious hepatic lesions. No intrahepatic biliary dilatation.   Common bile duct within normal limits. Spleen within normal limits.  Pancreatic parenchymal fatty atrophy without mass or main pancreatic  ductal dilatation. Adrenal glands within normal limits.    No obstructing renal calculi, or hydronephrosis. Left superior pole  simple renal cyst. Partially distended urinary bladder is within  normal limits. Unchanged prostatomegaly. No pelvic mass.    Nondistended small and large bowel within normal limits. Appendix not  seen; no right lower quadrant inflammatory changes. Stomach within  normal limits. Patent major abdominal vasculature and branching  vessels with scattered atherosclerotic calcifications.    BONES/EXTREMITIES:  No suspicious uptake in the bones or spinal canal. No acute, abnormal  lytic or blastic osseous lesions. No evident canal compromise. Chronic  degenerative changes of hips and spine.    No suspicious uptake in the soft tissues. Unchanged right posterior  gluteal subcutaneous soft tissue calcifications, favoring dystrophic  subcutaneous soft tissue calcifications and nodular hyperdensity,  favoring injection site.       Impression: IMPRESSION:   In this patient with a history of scalp melanoma, status post wide  local excision and adjuvant chemotherapy:  1. No suspicious uptake concerning for recurrent or metastatic  melanoma.  2. Resolution of left eighth rib hypermetabolism since prior study.    Incidentals:   3. 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     I have personally reviewed the examination and initial interpretation  and I agree with the findings.    GONZALEZ LÓPEZ MD         SYSTEM ID:  [id number]  CT Chest/Abdomen/Pelvis w Contrast  Narrative: Combined Report of: PET and CT on 3/1/2024 1:21 PM:    1. PET of the neck, chest, abdomen, and pelvis.  2. PET CT Fusion for Attenuation Correction and Anatomical  Localization.  3. Diagnostic CT of the chest, abdomen and pelvis with intravenous  contrast obtained for diagnostic interpretation.  4. 3D MIP and PET-CT fused images were processed on an independent  workstation and archived to PACS and reviewed by a radiologist.    Technique:    1. PET: The patient received 11.8 mCi of F-18-FDG. The serum glucose  was 112 mg/dL prior to administration. Body weight was 84 kg. Images  were evaluated in the axial, sagittal, and coronal planes as well as  the rotational whole body MIP. Images were acquired from the cranial  vertex to the feet.    UPTAKE WAS MEASURED AT 60 MINUTES.     BACKGROUND: Liver SUV max = 5.5, Aorta Blood SUV max = 3.5.     2. CT: Volumetric acquisition for clinical interpretation of the  chest, abdomen, and pelvis acquired at 3 mm sections. The chest,  abdomen, and pelvis were evaluated at 5 mm sections in bone, soft  tissue, and lung windows.    Contrast and Medications:  IV contrast: 114 mL of Isovue 370 intravenously.  PO contrast: None.  Additional Medications: None.    3. 3D MIP and PET-CT fused images were processed on an independent  workstation and archived to PACS and reviewed by a radiologist.    INDICATION: Melanoma of scalp (H).    ADDITIONAL INFORMATION OBTAINED FROM EMR: History of scalp melanoma:  *  10/26/2020 biopsy demonstrated a nodular knee.  *  5/24/2021 wide local excision of scalp melanoma followed by  adjuvant chemotherapy.  *  Treatment monitoring.    COMPARISON: PET/CT exams: 10/27/2023, 7/21/2023, 4/21/2023    FOLLOW-UP APPOINTMENT:  3/4/2024    FINDINGS:     HEAD/NECK:  No suspicious uptake in the head and neck.     Postsurgical changes of left temporoparietal periauricular scalp with  flap reconstruction. Stable hypodensity along the left cerebral  convexity, likely chronic hygroma.  No abnormal enhancement of the  brain parenchyma. Nasopharynx and palatine tonsils are within normal  limits.  Tongue base is normal.  The major salivary glands are  unremarkable. Right maxillary sinus mucosal thickening, otherwise  paranasal sinuses are clear. The mastoid air cells are clear. The  thyroid is unremarkable. The major vasculature of the neck are patent.    CHEST:  No suspicious uptake in the chest.     No central pulmonary emboli on this non-dedicated study. Mild right  subclavian vein stenosis secondary to right first rib mass effect with  asymmetric contrast opacification of the right upper extremity and  right thoracic girdle venous collaterals. Nonaneurysmal thoracic aorta  with scattered calcifications. Normal heart size. No pericardial  fluid. Moderate coronary calcifications.  Thoracic esophagus is within  normal limits. No thoracic lymphadenopathy.    No acute focal parenchymal consolidations. Pleural spaces are clear.  Central tracheobronchial tree is patent without significant debris or  mucus plugging.     ABDOMEN/PELVIS:  No suspicious uptake in the abdomen or pelvis.    No suspicious hepatic lesions. No intrahepatic biliary dilatation.   Common bile duct within normal limits. Spleen within normal limits.  Pancreatic parenchymal fatty atrophy without mass or main pancreatic  ductal dilatation. Adrenal glands within normal limits.    No obstructing renal calculi, or hydronephrosis. Left superior pole  simple renal cyst. Partially distended urinary bladder is within  normal limits. Unchanged prostatomegaly. No pelvic mass.    Nondistended small and large bowel within normal limits. Appendix not  seen; no right lower quadrant inflammatory  changes. Stomach within  normal limits. Patent major abdominal vasculature and branching  vessels with scattered atherosclerotic calcifications.    BONES/EXTREMITIES:  No suspicious uptake in the bones or spinal canal. No acute, abnormal  lytic or blastic osseous lesions. No evident canal compromise. Chronic  degenerative changes of hips and spine.    No suspicious uptake in the soft tissues. Unchanged right posterior  gluteal subcutaneous soft tissue calcifications, favoring dystrophic  subcutaneous soft tissue calcifications and nodular hyperdensity,  favoring injection site.       Impression: IMPRESSION:   In this patient with a history of scalp melanoma, status post wide  local excision and adjuvant chemotherapy:  1. No suspicious uptake concerning for recurrent or metastatic  melanoma.  2. Resolution of left eighth rib hypermetabolism since prior study.    Incidentals:   3. 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     I have personally reviewed the examination and initial interpretation  and I agree with the findings.    GONZALEZ LÓPEZ MD         SYSTEM ID:  [id number]    I reviewed the above images today.    ASSESSMENT AND PLAN  Cutaneous melanoma, scalp primary, pT2a cN1c, clinical Stage IIIB  It was a pleasure to talk with Mr. Coleman today. He is a 78 year old  with agent orange cutaneous exposures in the [place] war and a diagnosis of malignant melanoma arising from a pre-existing pigmented lesion on the scalp. He had a partial response to neoadjuvant therapy with atezolizumab (3 cycles), vemurafenib, and cobimetinib (4 cycles). He received adjuvant nivolumab through 2/14/2022.    PET-CT shows no obvious evidence for recurrence or metastasis.He should continue routine dermatology follow-up every 6 months, next scheduled in  August. Will plan for next PET/CT in 6 months. He will call sooner for concerns.     Depressed mood, anxiety, PTSD  He has a longstanding history of PTSD. Takes occasional lorazepam. Continues to work with psychiatrist about every 2 months out of [place].     CKD, moderate, stage III  He has seen nephrology, Dr. Alarcon. CKD is likely due to chronic hypertension and possible effect from vemurafenib. Next nephrology follow-up is scheduled for April.     Health maintenance  Due for colonoscopy for screening. Last in 2016. Prefers to have this done in the [facility] system. Order previously placed, but patient did not schedule. He was given the number today to schedule.      Health care maintenance.  Eye exams: Recommend exams at least every 2 years. Recommend scheduling.  Dental exams: Recommend exams and cleanings every 6 months. Up to date  Primary care: Recommend follow up at least annually. Up to date  Skin care: Recommend the use of sunscreen with SPF of at least 30, reapplying every 2 hours with prolonged sun exposure.  Tobacco use: Recommend abstaining. Denies use.   Alcohol use: Recommend no more than 2/day for men. Denies use.   Physical activity: Recommend regular activity, ideally 150 minutes/week of moderate intensity activity. Walks around house and goes up and down stairs. Will plan to go for more walks when weather is better.    Sherine Cardoza PA-C  [facility]  [address] [place] [address]  [phone]    20 minutes spent on the date of the encounter doing chart review, review of test results, interpretation of tests, patient visit, and documentation

## 2024-03-04 NOTE — NURSING NOTE
Is the patient currently in the state of [place]? YES    Visit mode:VIDEO    If the visit is dropped, the patient can be reconnected by: VIDEO VISIT: Text to cell phone:   Telephone Information:   Mobile [phone]       Will anyone else be joining the visit? Yes, pt's daughter Huma is with the pt and will be joining the video per pt  (If patient encounters technical issues they should call [phone])    How would you like to obtain your AVS? MyChart    Are changes needed to the allergy or medication list? Pt stated no med changes    Reason for visit: RECHECK    No other vitals to report per pt    Kiesha KIRKF

## 2024-03-04 NOTE — PROGRESS NOTES
Virtual Visit Details    Type of service:  Video Visit   Video Start Time: 12:15 PM  Video End Time: 12:24 PM    Originating Location (pt. Location): Home  Distant Location (provider location):  On-site  Platform used for Video Visit: [facility] ONCOLOGY PROGRESS NOTE  Melanoma Clinic  Mar 4, 2024    CHIEF COMPLAINT: Scalp melanoma    Melanoma History:  1. He has a small pigmented scalp lesion present for over 30 years. The lesion was biopsied in 1992 and was benign.  2. October 2020, he presented to [facility] with 1 year of progressive enlargement of the lesion and new onset burning, itching, and stinging sensation in the affected scalp.  3. 10/26/20, He was seen at [facility] and he had shave biopsies of A. left central parietal scalp, B. left central occipital scalp, C. Left posterior parietal scalp, and D. punch biopsy of the left superior occipital scalp. Pathology (specimen: [id number]) showed a nodular and nevoid type melanoma, non-ulcerated, Breslow depth up to 1.3 mm, Saurabh's level IV, and up to 3 mitoses per mm2. All margins were involved. Ki-67 10-20%. Sox10 stain is positive and highlights an atypical compound melanocytic proliferation with significant overlying confluence and pagetosis of intraepidermal melanocytes. T-stage: at least pT2a. PD-L1 staining shows PD-L1 TPS <1%. 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  4. 11/25/20, he was seen by Dr. Garcia and he took three 3mm punch biopsies, which returned as dermal melanocytic proliferations with melanophages and fibrosis, consistent with locoregional metastatic melanoma.  5. 12/8/2020 he had PET-CT, which showed FDG avid irregular skin thickening overlying the left parietal region, with no FDG avid lymphadenopathy in the neck and no evidence of metastasis elsewhere in the body.  6. 1/22/2021 start vemurafenib and cobimetinib, ~2/12/21 held for diarrhea, then resumed.  7. 2/18/2021 Start atezolizumab, last on  4/29/21  8. 5/24/2021 Wide local excision of the scalp melanoma and left latissimus free flap for scalp reconstruction.    Surgical pathology showed features consistent with scar and tumoral melanosis. No definite residual melanoma is seen. Tumoral melanosis (which extends to a depth of around 1.1 mm) is believed to be equivalent to a diagnosis of regressed melanoma.   9. 6/25/21, he starts adjuvant Nivolumab, last dose 2/14/22    INTERVAL HISTORY  Mr. 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     -Doing okay overall.  -Will be getting new glasses.  -Skin is doing well.   -Energy is doing okay.  -Has not yet scheduled a colonoscopy.   -Mother is in nursing home.  -He had COVID over the holidays.   -Stools have been normal in consistency with eating shredded wheat.       Current Outpatient Medications   Medication Sig Dispense Refill    acetaminophen (TYLENOL) 500 MG tablet Take 2 tablets (1,000 mg) by mouth every 8 hours as needed for mild pain 100 tablet 0    diphenoxylate-atropine (LOMOTIL) 2.5-0.025 MG tablet Take 1-2 tablets by mouth 4 times daily as needed for diarrhea 120 tablet 5    docusate sodium (COLACE) 100 MG capsule TAKE ONE CAPSULE BY MOUTH EVERY DAY FOR STOOL SOFTENING (Patient not taking: Reported on 11/3/2023)      fluvoxaMINE (LUVOX) 100 MG tablet Take 150 mg at bedtime      hydrochlorothiazide (HYDRODIURIL) 25 MG tablet Take 25 mg by mouth daily.      Hypromellose (ARTIFICIAL TEARS OP) Apply  to eye. 2 drops in each eye twice a day as needed      Lactobacillus-Inulin ([facility] DIGESTIVE [facility]) CAPS 1 tab daily 30 capsule 1    lisinopril (ZESTRIL) 10 MG tablet Take 15 mg by mouth daily      Magnesium Oxide 420 MG TABS Take 420 mg by mouth daily      methocarbamol (ROBAXIN) 500 MG tablet Take 1 tablet (500 mg) by mouth 3 times daily as needed for muscle spasms 10 tablet 0    omeprazole (PRILOSEC) 20 MG capsule Take 1 capsule by mouth 2 times daily. 60  capsule prn    polyethylene glycol (MIRALAX) 17 GM/Dose powder Take 17 g by mouth daily 510 g 0    potassium chloride ER (KLOR-CON M) 20 MEQ CR tablet Take 1 tablet (20 mEq) by mouth daily 7 tablet 0    prazosin (MINIPRESS) 1 MG capsule Take 1 mg by mouth 2 Times Daily Takes 2 tabs in the morning and 1 tab at night      pregabalin (LYRICA) 150 MG capsule Take 150 mg by mouth 2 times daily      QUEtiapine (SEROQUEL) 300 MG tablet Take 150 mg by mouth At Bedtime       senna-docusate (SENOKOT-S/PERICOLACE) 8.6-50 MG tablet Take 1 tablet by mouth 2 times daily (Patient not taking: Reported on 11/3/2023) 30 tablet 0    simvastatin (ZOCOR) 80 MG tablet Take 40 mg by mouth At Bedtime       traZODone (DESYREL) 100 MG tablet Take 100 mg by mouth At Bedtime       vitamin D3 (CHOLECALCIFEROL) 1000 units (25 mcg) tablet Take 1,000 Units by mouth daily       vitamin D3 (CHOLECALCIFEROL) 125 MCG (5000 UT) tablet Take 1 tablet (125 mcg) by mouth daily 90 tablet 3    vortioxetine (TRINTELLIX) 20 MG tablet TAKE ONE TABLET BY MOUTH EVERY MORNING       Objective:  General: patient appears well in no acute distress, alert and oriented, speech clear and fluid  Skin: no visualized rash or lesions on visualized skin  Resp: Appears to be breathing comfortably without accessory muscle usage, speaking in full sentences, no audible wheezes or cough.  Psych: Coherent speech, normal rate and volume, able to articulate logical thoughts, able to abstract reason, no tangential thoughts, no hallucinations or delusions  Patient's affect is appropriate.    Labs:  Most Recent 3 CBC's:  Recent Labs   Lab Test 03/01/24  1138 12/07/23  1422 10/27/23  1135   WBC 3.3* 4.2 3.4*   HGB 12.7* 14.9 13.8   MCV 84 83 86    203 214   ANEUTAUTO 1.6 2.1 1.6     Most Recent 3 BMP's:  Recent Labs   Lab Test 03/01/24  1207 03/01/24  1138 12/07/23  1422 10/27/23  1158 10/27/23  1135 07/21/23  1347 07/21/23  1251 04/21/23  1108 04/21/23  1108   NA  --  139 137  --   139  --  139  --  140   POTASSIUM  --  4.2 3.6  --  3.5  --  3.6   < > 3.9   CHLORIDE  --  100 99  --  98  --  99   < > 103   CO2  --  30* 26  --  27  --  29   < > 34*   BUN  --  23.1* 14.9  --  18.3  --  15.6   < > 23   CR 1.6* 1.53* 1.37*   < > 1.84*   < > 1.55*  --  1.57*   ANIONGAP  --  9 12  --  14  --  11   < > 3   STEPHEN  --  9.0 9.3  --  9.6  --  9.3  --  9.1   GLC  --  120* 111*  --  109*  --  110*   < > 110*   PROTTOTAL  --  7.4  --   --   --   --  7.3  --  7.5   ALBUMIN  --  4.4 4.6  --  4.5  --  4.6   < > 4.0    < > = values in this interval not displayed.    Most Recent 3 LFT's:  Recent Labs   Lab Test 03/01/24  1138 07/21/23  1251 04/21/23  1108   AST 24 19 14   ALT 19 8 19   ALKPHOS 55 56 55   BILITOTAL 0.4 0.5 0.6   I reviewed the above labs today.    IMAGING  Imaging:  PET Oncology Whole Body  Narrative: Combined Report of: PET and CT on 3/1/2024 1:21 PM:    1. PET of the neck, chest, abdomen, and pelvis.  2. PET CT Fusion for Attenuation Correction and Anatomical  Localization.  3. Diagnostic CT of the chest, abdomen and pelvis with intravenous  contrast obtained for diagnostic interpretation.  4. 3D MIP and PET-CT fused images were processed on an independent  workstation and archived to PACS and reviewed by a radiologist.    Technique:    1. PET: The patient received 11.8 mCi of F-18-FDG. The serum glucose  was 112 mg/dL prior to administration. Body weight was 84 kg. Images  were evaluated in the axial, sagittal, and coronal planes as well as  the rotational whole body MIP. Images were acquired from the cranial  vertex to the feet.    UPTAKE WAS MEASURED AT 60 MINUTES.     BACKGROUND: Liver SUV max = 5.5, Aorta Blood SUV max = 3.5.     2. CT: Volumetric acquisition for clinical interpretation of the  chest, abdomen, and pelvis acquired at 3 mm sections. The chest,  abdomen, and pelvis were evaluated at 5 mm sections in bone, soft  tissue, and lung windows.    Contrast and Medications:  IV  contrast: 114 mL of Isovue 370 intravenously.  PO contrast: None.  Additional Medications: None.    3. 3D MIP and PET-CT fused images were processed on an independent  workstation and archived to PACS and reviewed by a radiologist.    INDICATION: Melanoma of scalp (H).    ADDITIONAL INFORMATION OBTAINED FROM EMR: History of scalp melanoma:  *  10/26/2020 biopsy demonstrated a nodular knee.  *  5/24/2021 wide local excision of scalp melanoma followed by  adjuvant chemotherapy.  *  Treatment monitoring.    COMPARISON: PET/CT exams: 10/27/2023, 7/21/2023, 4/21/2023    FOLLOW-UP APPOINTMENT: 3/4/2024    FINDINGS:     HEAD/NECK:  No suspicious uptake in the head and neck.     Postsurgical changes of left temporoparietal periauricular scalp with  flap reconstruction. Stable hypodensity along the left cerebral  convexity, likely chronic hygroma.  No abnormal enhancement of the  brain parenchyma. Nasopharynx and palatine tonsils are within normal  limits.  Tongue base is normal.  The major salivary glands are  unremarkable. Right maxillary sinus mucosal thickening, otherwise  paranasal sinuses are clear. The mastoid air cells are clear. The  thyroid is unremarkable. The major vasculature of the neck are patent.    CHEST:  No suspicious uptake in the chest.     No central pulmonary emboli on this non-dedicated study. Mild right  subclavian vein stenosis secondary to right first rib mass effect with  asymmetric contrast opacification of the right upper extremity and  right thoracic girdle venous collaterals. Nonaneurysmal thoracic aorta  with scattered calcifications. Normal heart size. No pericardial  fluid. Moderate coronary calcifications.  Thoracic esophagus is within  normal limits. No thoracic lymphadenopathy.    No acute focal parenchymal consolidations. Pleural spaces are clear.  Central tracheobronchial tree is patent without significant debris or  mucus plugging.     ABDOMEN/PELVIS:  No suspicious uptake in the  abdomen or pelvis.    No suspicious hepatic lesions. No intrahepatic biliary dilatation.   Common bile duct within normal limits. Spleen within normal limits.  Pancreatic parenchymal fatty atrophy without mass or main pancreatic  ductal dilatation. Adrenal glands within normal limits.    No obstructing renal calculi, or hydronephrosis. Left superior pole  simple renal cyst. Partially distended urinary bladder is within  normal limits. Unchanged prostatomegaly. No pelvic mass.    Nondistended small and large bowel within normal limits. Appendix not  seen; no right lower quadrant inflammatory changes. Stomach within  normal limits. Patent major abdominal vasculature and branching  vessels with scattered atherosclerotic calcifications.    BONES/EXTREMITIES:  No suspicious uptake in the bones or spinal canal. No acute, abnormal  lytic or blastic osseous lesions. No evident canal compromise. Chronic  degenerative changes of hips and spine.    No suspicious uptake in the soft tissues. Unchanged right posterior  gluteal subcutaneous soft tissue calcifications, favoring dystrophic  subcutaneous soft tissue calcifications and nodular hyperdensity,  favoring injection site.       Impression: IMPRESSION:   In this patient with a history of scalp melanoma, status post wide  local excision and adjuvant chemotherapy:  1. No suspicious uptake concerning for recurrent or metastatic  melanoma.  2. Resolution of left eighth rib hypermetabolism since prior study.    Incidentals:   3. 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     I have personally reviewed the examination and initial interpretation  and I agree with the findings.    GONZALEZ LÓPEZ MD         SYSTEM ID:  [id number]  CT Chest/Abdomen/Pelvis w Contrast  Narrative: Combined Report of: PET and CT on  3/1/2024 1:21 PM:    1. PET of the neck, chest, abdomen, and pelvis.  2. PET CT Fusion for Attenuation Correction and Anatomical  Localization.  3. Diagnostic CT of the chest, abdomen and pelvis with intravenous  contrast obtained for diagnostic interpretation.  4. 3D MIP and PET-CT fused images were processed on an independent  workstation and archived to PACS and reviewed by a radiologist.    Technique:    1. PET: The patient received 11.8 mCi of F-18-FDG. The serum glucose  was 112 mg/dL prior to administration. Body weight was 84 kg. Images  were evaluated in the axial, sagittal, and coronal planes as well as  the rotational whole body MIP. Images were acquired from the cranial  vertex to the feet.    UPTAKE WAS MEASURED AT 60 MINUTES.     BACKGROUND: Liver SUV max = 5.5, Aorta Blood SUV max = 3.5.     2. CT: Volumetric acquisition for clinical interpretation of the  chest, abdomen, and pelvis acquired at 3 mm sections. The chest,  abdomen, and pelvis were evaluated at 5 mm sections in bone, soft  tissue, and lung windows.    Contrast and Medications:  IV contrast: 114 mL of Isovue 370 intravenously.  PO contrast: None.  Additional Medications: None.    3. 3D MIP and PET-CT fused images were processed on an independent  workstation and archived to PACS and reviewed by a radiologist.    INDICATION: Melanoma of scalp (H).    ADDITIONAL INFORMATION OBTAINED FROM EMR: History of scalp melanoma:  *  10/26/2020 biopsy demonstrated a nodular knee.  *  5/24/2021 wide local excision of scalp melanoma followed by  adjuvant chemotherapy.  *  Treatment monitoring.    COMPARISON: PET/CT exams: 10/27/2023, 7/21/2023, 4/21/2023    FOLLOW-UP APPOINTMENT: 3/4/2024    FINDINGS:     HEAD/NECK:  No suspicious uptake in the head and neck.     Postsurgical changes of left temporoparietal periauricular scalp with  flap reconstruction. Stable hypodensity along the left cerebral  convexity, likely chronic hygroma.  No abnormal  enhancement of the  brain parenchyma. Nasopharynx and palatine tonsils are within normal  limits.  Tongue base is normal.  The major salivary glands are  unremarkable. Right maxillary sinus mucosal thickening, otherwise  paranasal sinuses are clear. The mastoid air cells are clear. The  thyroid is unremarkable. The major vasculature of the neck are patent.    CHEST:  No suspicious uptake in the chest.     No central pulmonary emboli on this non-dedicated study. Mild right  subclavian vein stenosis secondary to right first rib mass effect with  asymmetric contrast opacification of the right upper extremity and  right thoracic girdle venous collaterals. Nonaneurysmal thoracic aorta  with scattered calcifications. Normal heart size. No pericardial  fluid. Moderate coronary calcifications.  Thoracic esophagus is within  normal limits. No thoracic lymphadenopathy.    No acute focal parenchymal consolidations. Pleural spaces are clear.  Central tracheobronchial tree is patent without significant debris or  mucus plugging.     ABDOMEN/PELVIS:  No suspicious uptake in the abdomen or pelvis.    No suspicious hepatic lesions. No intrahepatic biliary dilatation.   Common bile duct within normal limits. Spleen within normal limits.  Pancreatic parenchymal fatty atrophy without mass or main pancreatic  ductal dilatation. Adrenal glands within normal limits.    No obstructing renal calculi, or hydronephrosis. Left superior pole  simple renal cyst. Partially distended urinary bladder is within  normal limits. Unchanged prostatomegaly. No pelvic mass.    Nondistended small and large bowel within normal limits. Appendix not  seen; no right lower quadrant inflammatory changes. Stomach within  normal limits. Patent major abdominal vasculature and branching  vessels with scattered atherosclerotic calcifications.    BONES/EXTREMITIES:  No suspicious uptake in the bones or spinal canal. No acute, abnormal  lytic or blastic osseous  lesions. No evident canal compromise. Chronic  degenerative changes of hips and spine.    No suspicious uptake in the soft tissues. Unchanged right posterior  gluteal subcutaneous soft tissue calcifications, favoring dystrophic  subcutaneous soft tissue calcifications and nodular hyperdensity,  favoring injection site.       Impression: IMPRESSION:   In this patient with a history of scalp melanoma, status post wide  local excision and adjuvant chemotherapy:  1. No suspicious uptake concerning for recurrent or metastatic  melanoma.  2. Resolution of left eighth rib hypermetabolism since prior study.    Incidentals:   3. 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     I have personally reviewed the examination and initial interpretation  and I agree with the findings.    GONZALEZ LÓPEZ MD         SYSTEM ID:  [id number]    I reviewed the above images today.    ASSESSMENT AND PLAN  Cutaneous melanoma, scalp primary, pT2a cN1c, clinical Stage IIIB  It was a pleasure to talk with Mr. Coleman today. He is a 78 year old  with agent orange cutaneous exposures in the [place] war and a diagnosis of malignant melanoma arising from a pre-existing pigmented lesion on the scalp. He had a partial response to neoadjuvant therapy with atezolizumab (3 cycles), vemurafenib, and cobimetinib (4 cycles). He received adjuvant nivolumab through 2/14/2022.    PET-CT shows no obvious evidence for recurrence or metastasis.He should continue routine dermatology follow-up every 6 months, next scheduled in August. Will plan for next PET/CT in 6 months. He will call sooner for concerns.     Depressed mood, anxiety, PTSD  He has a longstanding history of PTSD. Takes occasional lorazepam. Continues to work with psychiatrist about every 2 months out of [place].      CKD, moderate, stage III  He has seen nephrology, Dr. Alarcon. CKD is likely due to chronic hypertension and possible effect from vemurafenib. Next nephrology follow-up is scheduled for April.     Health maintenance  Due for colonoscopy for screening. Last in 2016. Prefers to have this done in the [facility]. Order previously placed, but patient did not schedule. He was given the number today to schedule.      Health care maintenance.  Eye exams: Recommend exams at least every 2 years. Recommend scheduling.  Dental exams: Recommend exams and cleanings every 6 months. Up to date  Primary care: Recommend follow up at least annually. Up to date  Skin care: Recommend the use of sunscreen with SPF of at least 30, reapplying every 2 hours with prolonged sun exposure.  Tobacco use: Recommend abstaining. Denies use.   Alcohol use: Recommend no more than 2/day for men. Denies use.   Physical activity: Recommend regular activity, ideally 150 minutes/week of moderate intensity activity. Walks around house and goes up and down stairs. Will plan to go for more walks when weather is better.    Sherine Cardoza PA-C  [facility]  [address] [place] [address]  [phone]    20 minutes spent on the date of the encounter doing chart review, review of test results, interpretation of tests, patient visit, and documentation

## 2024-03-27 ENCOUNTER — DOCUMENTATION ONLY (OUTPATIENT)
Dept: LAB | Facility: OTHER | Age: 79
End: 2024-03-27
Payer: MEDICARE

## 2024-03-27 NOTE — PROGRESS NOTES
Patient is coming in for labs.  Please place orders if needed.  The ones that are in there are expected in June and we are unable to release them early ;unless we get permission.  Thank you  Ne SANFORD) [facility]

## 2024-04-26 ASSESSMENT — PAIN SCALES - GENERAL: PAINLEVEL: NO PAIN (0)

## 2024-04-29 ENCOUNTER — VIRTUAL VISIT (OUTPATIENT)
Dept: NEPHROLOGY | Facility: CLINIC | Age: 79
End: 2024-04-29
Attending: INTERNAL MEDICINE
Payer: MEDICARE

## 2024-04-29 VITALS — WEIGHT: 185 LBS | BODY MASS INDEX: 27.32 KG/M2 | DIASTOLIC BLOOD PRESSURE: 70 MMHG | SYSTOLIC BLOOD PRESSURE: 163 MMHG

## 2024-04-29 DIAGNOSIS — E55.9 VITAMIN D DEFICIENCY: ICD-10-CM

## 2024-04-29 DIAGNOSIS — N18.31 STAGE 3A CHRONIC KIDNEY DISEASE (H): Primary | ICD-10-CM

## 2024-04-29 DIAGNOSIS — N25.81 SECONDARY HYPERPARATHYROIDISM (H): ICD-10-CM

## 2024-04-29 DIAGNOSIS — C43.4 MALIGNANT MELANOMA OF SCALP (H): ICD-10-CM

## 2024-04-29 DIAGNOSIS — D70.8 OTHER NEUTROPENIA (H): ICD-10-CM

## 2024-04-29 DIAGNOSIS — I10 HYPERTENSION, ESSENTIAL: ICD-10-CM

## 2024-04-29 PROCEDURE — 99214 OFFICE O/P EST MOD 30 MIN: CPT | Mod: 95 | Performed by: INTERNAL MEDICINE

## 2024-04-29 PROCEDURE — G2211 COMPLEX E/M VISIT ADD ON: HCPCS | Mod: 95 | Performed by: INTERNAL MEDICINE

## 2024-04-29 NOTE — PROGRESS NOTES
Virtual Visit Details    Type of service:  Video Visit   Video Start Time:  11.40  Video End Time: 11.55    Originating Location (pt. Location): Home    Distant Location (provider location):  On-site  Platform used for Video Visit: Ranjith

## 2024-04-29 NOTE — NURSING NOTE
33w2d.    /71   Wt 62.8 kg (138 lb 6.4 oz)   LMP 06/11/2023   BMI 27.03 kg/m    Tired.  No HA, visual changes, N/V   Declines RSV Vaccine   RTC 2 wk  Nicko Snyder MD     Is the patient currently in the state of [place]? YES    Visit mode:VIDEO    If the visit is dropped, the patient can be reconnected by: VIDEO VISIT: Send to e-mail at: [email or web address]    Will anyone else be joining the visit? NO  (If patient encounters technical issues they should call [phone])    How would you like to obtain your AVS? MyChart    Are changes needed to the allergy or medication list? No    Are refills needed on medications prescribed by this physician? YES    Reason for visit: JAYME MONSALVE

## 2024-04-29 NOTE — PROGRESS NOTES
Nephrology Progress Note  04/29/2024   Chief complaint:  History of Present Illness:    Ahmet Coleman is a 79 year old M with HX of hypertension, GERD, IBS, hypertension, HLD,  scalp melanoma who is here for CKD follow-up.     Oncologic history, the patient was diagnosed with scalp melanoma after presetned with lesional enlargement and abnormal sensation.  Bx showed nodular and nevoid type melanoma, non-ulcerated, Breslow depth up to 1.3 mm, Saurabh's level IV, and up to 3 mitoses per mm2. 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 He was started vemurafenib and cobimetinib, ~2/12/21 held for diarrhea, then resumed.  In 2/18/2021, he was started atezolizumab, last on 4/29/21. In 5/24/2021, underwent wide local excision of the scalp melanoma and left latissimus free flap for scalp reconstruction.  Surgical pathology showed features consistent with scar and tumoral melanosis. No definite residual melanoma is seen. Tumoral melanosis (which extends to a depth of around 1.1 mm) is believed to be equivalent to a diagnosis of regressed melanoma. In 6/25/21, he starts adjuvant Nivolumab, last dose 2/14/22     Renal history, he has cr of 1 up until 2016. Since then Cr has fluctuated between 1.2-1.5. Last UA in 2016 showed bland UA. His PET CT scan on 4/23 showed left kidney cyst, 2.5 cm.       7/24/23: Seen for consultation. He is doing well. He was recently told that they are keeping an eyes on his kidneys since Jan 2023.  He reports having kidney infection when he was very young (high school) but he could not remember any detail. Presently, he has no problem with urination. He has no leg swelling. He has been diagnosed with high blood pressure for about 30 years. He has been told by home care nurse that his blood pressure good. He does not feel lightheadedness. He takes lisinopril 10 mg  1.5 tab (15 mg) per day, hydrochlorothiazide 25 mg once a day (dose was raise and prazosin HCL 1 mg take 2 capsules in the morning  and 1 mg bed time. He has reflux and is taking omeprazole 20 mg twice a day for a long time. He has no prostate problem. He does not take NSAIDs currently. He does not smoke or drink. His dad has kidney problem, he has when he was 94.He lives in [place] near [place].  He takes simvastatin  (80 mg per tab) 40 mg per day for HLD. [facility]      /74. He does not smoke now or drink.  He used smoke intermittently in the past. He is a [employer] vet. Labs on 7/21/23 showed Cr 1.55, GFR 46, BUN 15.6, K 3.6, CO2 329, Albumin 4.6. WBC 3.3, Hb 14.1. Last UA in 2016.     12/11/23:  In the interim, he has been doing well. PET on 10/27/23 showed metastatic disease. Has not been on Nivolumab since 2/14/22. He just got a cold and last night he got sick. His BP is 112/80 mmHg today. He has a home health nurse coming in every 2 weeks. His PCP just changed to a different doctor, carlyle Pacheco. He has no fall or leg swelling.   Hydrochlorothiazide 25 mg once a day, lisinopril 15 mg daily and Prazosin 2 mg in the morning and 1 tab.  Labs on 12/7/23 showed creatinine 1.37, GFR 53, BUN 14.9, potassium 3.6,, dioxide 26, calcium 9.3, phosphorus 2.3, albumin 4.6.  Vitamin D 13, PTH 81. Hemoglobin 14.9, white cell 4.2 and platelet 203. UA is bland without blood or protein. UPCR 0.07 g/g.   4/29/24: He has been doing well. No change in medications. His home BP are good. Today is 98/70 mmHg. He does not have hypotensive symptoms. No swelling. Appetite is not good. His cancer is in remission not taking any thing at this time. He has not had labs done yet. However, Cr on 3/1/24 was 1.53, slightly increased from 1.35 in 12/24.     Past medical history  Past Medical History:   Diagnosis Date    Anxiety     Benign hypertension 12/11/2013    Cervicalgia     Chronic kidney disease     Depressive disorder, not elsewhere classified     Diverticulitis of colon 07/10/2013    Esophageal reflux     Irritable bowel syndrome 10/03/2003     Lumbago     Malignant melanoma of scalp (H) 12/29/2020    Migraine, unspecified, without mention of intractable migraine without mention of status migrainosus     Other kyphoscoliosis and scoliosis     Other specified gastritis without mention of hemorrhage     PTSD (post-traumatic stress disorder)     Tobacco abuse since 1965    on and off     Past surgical history  Past Surgical History:   Procedure Laterality Date    COLONOSCOPY N/A 6/8/2016    Procedure: COMBINED COLONOSCOPY, SINGLE OR MULTIPLE BIOPSY/POLYPECTOMY BY BIOPSY;  Surgeon: Mahesh Amanda MD;  Location: [facility]    ESOPHAGOSCOPY, GASTROSCOPY, DUODENOSCOPY (EGD), COMBINED  9/6/2011    Procedure:COMBINED ESOPHAGOSCOPY, GASTROSCOPY, DUODENOSCOPY (EGD), BIOPSY SINGLE OR MULTIPLE; Esophagogastroduodenoscopy with multiple Biopsy's; Surgeon:MARINA DYE; Location:[facility]    EXCISE MASS HEAD N/A 5/24/2021    Procedure: wide local excision of scalp melanoma;  Surgeon: Paulo Garcia MD;  Location: [facility]    GRAFT FREE VASCULARIZED LATISSIMUS DORSI Left 5/24/2021    Procedure: left latissimus free flap for scalp reconstruction\. skin graft from left back to scalp;  Surgeon: Kathryn Machado MD;  Location: [facility]    ZZC APPENDECTOMY      ZZC NONSPECIFIC PROCEDURE      nasal septal fx and a devitation needing plastic surgery       Review of Systems:   14 systems were reviewed and all negative except as mentioned above.   Current Medications:  Current Outpatient Medications   Medication Sig Dispense Refill    acetaminophen (TYLENOL) 500 MG tablet Take 2 tablets (1,000 mg) by mouth every 8 hours as needed for mild pain 100 tablet 0    diphenoxylate-atropine (LOMOTIL) 2.5-0.025 MG tablet Take 1-2 tablets by mouth 4 times daily as needed for diarrhea 120 tablet 5    docusate sodium (COLACE) 100 MG capsule TAKE ONE CAPSULE BY MOUTH EVERY DAY FOR STOOL SOFTENING (Patient not taking: Reported on 11/3/2023)      fluvoxaMINE (LUVOX) 100 MG tablet Take 150 mg at  bedtime      hydrochlorothiazide (HYDRODIURIL) 25 MG tablet Take 25 mg by mouth daily.      Hypromellose (ARTIFICIAL TEARS OP) Apply  to eye. 2 drops in each eye twice a day as needed      Lactobacillus-Inulin (CULTURELLE DIGESTIVE HEALTH) CAPS 1 tab daily 30 capsule 1    lisinopril (ZESTRIL) 10 MG tablet Take 15 mg by mouth daily      Magnesium Oxide 420 MG TABS Take 420 mg by mouth daily      methocarbamol (ROBAXIN) 500 MG tablet Take 1 tablet (500 mg) by mouth 3 times daily as needed for muscle spasms 10 tablet 0    omeprazole (PRILOSEC) 20 MG capsule Take 1 capsule by mouth 2 times daily. 60 capsule prn    polyethylene glycol (MIRALAX) 17 GM/Dose powder Take 17 g by mouth daily 510 g 0    potassium chloride ER (KLOR-CON M) 20 MEQ CR tablet Take 1 tablet (20 mEq) by mouth daily 7 tablet 0    prazosin (MINIPRESS) 1 MG capsule Take 1 mg by mouth 2 Times Daily Takes 2 tabs in the morning and 1 tab at night      pregabalin (LYRICA) 150 MG capsule Take 150 mg by mouth 2 times daily      QUEtiapine (SEROQUEL) 300 MG tablet Take 150 mg by mouth At Bedtime       senna-docusate (SENOKOT-S/PERICOLACE) 8.6-50 MG tablet Take 1 tablet by mouth 2 times daily (Patient not taking: Reported on 11/3/2023) 30 tablet 0    simvastatin (ZOCOR) 80 MG tablet Take 40 mg by mouth At Bedtime       traZODone (DESYREL) 100 MG tablet Take 100 mg by mouth At Bedtime       vitamin D3 (CHOLECALCIFEROL) 1000 units (25 mcg) tablet Take 1,000 Units by mouth daily       vitamin D3 (CHOLECALCIFEROL) 125 MCG (5000 UT) tablet Take 1 tablet (125 mcg) by mouth daily 90 tablet 3    vortioxetine (TRINTELLIX) 20 MG tablet TAKE ONE TABLET BY MOUTH EVERY MORNING       Current Facility-Administered Medications   Medication Dose Route Frequency Provider Last Rate Last Admin    lidocaine 1% with EPINEPHrine 1:100,000 injection 1.5 mL  1.5 mL Intradermal Once Sidra Esteban MD        lidocaine 1% with EPINEPHrine 1:100,000 injection 1.5 mL  1.5 mL Intradermal  Once Sidra Esteban MD         Facility-Administered Medications Ordered in Other Visits   Medication Dose Route Frequency Provider Last Rate Last Admin    fluorodeoxyglucose F-18 (FDG) radioisotope injection 10-18 mCi  10-18 mCi. Intravenous Once Oksana Ashton MD        iopamidol (ISOVUE-370) solution  mL   mL Intravenous Once Oksana Ashton MD        sodium chloride (PF) 0.9% PF flush 10 mL  10 mL Intravenous Once Loan Montana MD        sodium chloride (PF) 0.9% PF flush 10-60 mL  10-60 mL Intracatheter Q8H Sherine Cardoza PA-C   60 mL at 06/10/22 1021    sodium chloride (PF) 0.9% PF flush 60 mL  60 mL Intravenous Once Oksana Ashton MD           Physical Exam:   BP (!) 163/70   Wt 83.9 kg (185 lb)   BMI 27.32 kg/m     Body mass index is 27.32 kg/m .    On video, he is alert and not in acute distress. + hypopigment area at his left scalp.  He has no lower extremity edema.    Labs:   All labs reviewed by me  Last Renal Panel:  Sodium   Date Value Ref Range Status   03/01/2024 139 135 - 145 mmol/L Final     Comment:     Reference intervals for this test were updated on 09/26/2023 to more accurately reflect our healthy population. There may be differences in the flagging of prior results with similar values performed with this method. Interpretation of those prior results can be made in the context of the updated reference intervals.    06/25/2021 139 133 - 144 mmol/L Final     Potassium   Date Value Ref Range Status   03/01/2024 4.2 3.4 - 5.3 mmol/L Final   04/21/2023 3.9 3.4 - 5.3 mmol/L Final   06/25/2021 3.8 3.4 - 5.3 mmol/L Final     Chloride   Date Value Ref Range Status   03/01/2024 100 98 - 107 mmol/L Final   04/21/2023 103 94 - 109 mmol/L Final   06/25/2021 106 94 - 109 mmol/L Final     Carbon Dioxide   Date Value Ref Range Status   06/25/2021 26 20 - 32 mmol/L Final     Carbon Dioxide (CO2)   Date Value Ref Range Status   03/01/2024 30 (H) 22 - 29  mmol/L Final   04/21/2023 34 (H) 20 - 32 mmol/L Final     Anion Gap   Date Value Ref Range Status   03/01/2024 9 7 - 15 mmol/L Final   04/21/2023 3 3 - 14 mmol/L Final   06/25/2021 6 3 - 14 mmol/L Final     Glucose   Date Value Ref Range Status   03/01/2024 120 (H) 70 - 99 mg/dL Final   04/21/2023 110 (H) 70 - 99 mg/dL Final   06/25/2021 102 (H) 70 - 99 mg/dL Final     Urea Nitrogen   Date Value Ref Range Status   03/01/2024 23.1 (H) 8.0 - 23.0 mg/dL Final   04/21/2023 23 7 - 30 mg/dL Final   06/25/2021 16 7 - 30 mg/dL Final     Creatinine   Date Value Ref Range Status   03/01/2024 1.53 (H) 0.67 - 1.17 mg/dL Final   06/25/2021 1.09 0.66 - 1.25 mg/dL Final     Creatinine POCT   Date Value Ref Range Status   03/01/2024 1.6 (H) 0.7 - 1.3 mg/dL Final     GFR Estimate   Date Value Ref Range Status   03/01/2024 47 (L) >60 mL/min/1.73m2 Final   06/25/2021 66 >60 mL/min/[1.73_m2] Final     Comment:     Non  GFR Calc  Starting 12/18/2018, serum creatinine based estimated GFR (eGFR) will be   calculated using the Chronic Kidney Disease Epidemiology Collaboration   (CKD-EPI) equation.       GFR, ESTIMATED POCT   Date Value Ref Range Status   03/01/2024 44 (L) >60 mL/min/1.73m2 Final     Calcium   Date Value Ref Range Status   03/01/2024 9.0 8.8 - 10.2 mg/dL Final   06/25/2021 8.4 (L) 8.5 - 10.1 mg/dL Final     Phosphorus   Date Value Ref Range Status   12/07/2023 2.3 (L) 2.5 - 4.5 mg/dL Final   06/11/2021 2.8 2.5 - 4.5 mg/dL Final     Albumin   Date Value Ref Range Status   03/01/2024 4.4 3.5 - 5.2 g/dL Final   04/21/2023 4.0 3.4 - 5.0 g/dL Final   06/25/2021 3.5 3.4 - 5.0 g/dL Final       Imaging:  I reviewed imaging studies.     Assessment & Recommendations:   Problem list  # CKD stage 3 likely secondary to chronic hypertension; possible prior effect of Vemurafenib; Cr fluctuated likely in the setting of diuretics use  The patient has been noted to have creatinine elevation in the range of 1.2-1.6 since 2016.   He received nivolumab between 6/22-2/22. Cr while taking Nivolumab was normal. Cr 1.0-1.1 in 8/26/22 but increased to 1.39 in 1/23 and now ranging 1.3-1.5.. I suspect that that his chronic kidney disease is likely from chronic hypertension for which he has for 30 years and possible effect from Vemurafenib (reports of KEENAN, CKD and AIN).  Imaging showed normal kidneys without any stone or mass or hydronephrosis.  The patient is also on omeprazole 20 mg twice daily for GERD. His UA has been bland and UPCR is normal so less likely Gns, MGRS.  The patient does not have labs done at this time but creatinine on 3/1/2024 was 1.53 which is slightly increased from 1.37 on 12/7/2023.  Will need an updated labs.  -Need updated labs and urine test  - Stay well hydrated; drink 70 Oz per day  # Scalp melanoma followed by Dr. Hernández  The patient was diagnosed with scalp melanoma after presetned with lesional enlargement and abnormal sensation.  Bx showed nodular and nevoid type melanoma, non-ulcerated, Breslow depth up to 1.3 mm, Saurabh's level IV, and up to 3 mitoses per mm2. 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 He was started vemurafenib and cobimetinib, ~2/12/21 held for diarrhea, then resumed. In 2/18/2021, he was started atezolizumab, last on 4/29/21. In 5/24/2021, underwent wide local excision of the scalp melanoma and left latissimus free flap for scalp reconstruction.  Surgical pathology showed features consistent with scar and tumoral melanosis. No definite residual melanoma is seen. Tumoral melanosis (which extends to a depth of around 1.1 mm) is believed to be equivalent to a diagnosis of regressed melanoma. In 6/25/21, he starts adjuvant Nivolumab, last dose 2/14/22. CT headneck from 7/22/23 showed possibly hypermetabolic lesion at scalp but repeat PET on 10/23 showed no hypermetabolic lesion. Now in remission.  # BMD  # Secondary hyperparathyroidism  # Vitamin D deficiency  His calcium is 9.3 and phosphorus  is 2.3. vitamin D is only 13.  I will start vitamin D at 5000 unit daily.  -Need updated labs and urine test  # Hypertension; well conreolled  On lisinopril 10 mg  1.5 tab (15 mg) per day, hydrochlorothiazide 25 mg once a day (dose was raise and prazosin HCL 1 mg take 2 capsules in the morning and 1 mg bed time.  - BP has been good and today is 98/60, no signs of hypotension  - Monitor BP regularly if SBP <100 mmHg, please let us know  # Leukopenia; resolved  # Anemia surveillance  Need updated hemoglobin value.     Follow-up in 6 months with labs    The longitudinal plan of care for CKD 3A/3B, HTN, Hx of melanoma was addressed during this visit. Due to the added complexity in care, I will continue to support Hanny Coleman in the subsequent management of this condition(s) and with the ongoing continuity of care of this condition(s).     I spent  30 minutes on the date of the encounter doing chart review, history and exam, documentation and further activities as noted above. 15 (11.40-11.55 )minutes of this visit is dedicated to direct patient interaction via video.     Malgorzata Alarcon MD on 04/29/2024

## 2024-04-29 NOTE — LETTER
4/29/2024       RE: Ahmet Coleman  [address]  [place] [address]     Dear Colleague,    Thank you for referring your patient, Ahmet Coleman, to the [facility] [place] at [facility]. Please see a copy of my visit note below.          Nephrology Progress Note  04/29/2024   Chief complaint:  History of Present Illness:    Ahmet Coleman is a 79 year old M with HX of hypertension, GERD, IBS, hypertension, HLD,  scalp melanoma who is here for CKD follow-up.     Oncologic history, the patient was diagnosed with scalp melanoma after presetned with lesional enlargement and abnormal sensation.  Bx showed nodular and nevoid type melanoma, non-ulcerated, Breslow depth up to 1.3 mm, Saurabh's level IV, and up to 3 mitoses per mm2. 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 He was started vemurafenib and cobimetinib, ~2/12/21 held for diarrhea, then resumed.  In 2/18/2021, he was started atezolizumab, last on 4/29/21. In 5/24/2021, underwent wide local excision of the scalp melanoma and left latissimus free flap for scalp reconstruction.  Surgical pathology showed features consistent with scar and tumoral melanosis. No definite residual melanoma is seen. Tumoral melanosis (which extends to a depth of around 1.1 mm) is believed to be equivalent to a diagnosis of regressed melanoma. In 6/25/21, he starts adjuvant Nivolumab, last dose 2/14/22     Renal history, he has cr of 1 up until 2016. Since then Cr has fluctuated between 1.2-1.5. Last UA in 2016 showed bland UA. His PET CT scan on 4/23 showed left kidney cyst, 2.5 cm.       7/24/23: Seen for consultation. He is doing well. He was recently told that they are keeping an eyes on his kidneys since Jan 2023.  He reports having kidney infection when he was very young (high school) but he could not remember any detail. Presently, he has no problem with urination. He has no leg swelling. He  has been diagnosed with high blood pressure for about 30 years. He has been told by home care nurse that his blood pressure good. He does not feel lightheadedness. He takes lisinopril 10 mg  1.5 tab (15 mg) per day, hydrochlorothiazide 25 mg once a day (dose was raise and prazosin HCL 1 mg take 2 capsules in the morning and 1 mg bed time. He has reflux and is taking omeprazole 20 mg twice a day for a long time. He has no prostate problem. He does not take NSAIDs currently. He does not smoke or drink. His dad has kidney problem, he has when he was 94.He lives in [place] near [place].  He takes simvastatin  (80 mg per tab) 40 mg per day for HLD. [facility]      /74. He does not smoke now or drink.  He used smoke intermittently in the past. He is a [employer] vet. Labs on 7/21/23 showed Cr 1.55, GFR 46, BUN 15.6, K 3.6, CO2 329, Albumin 4.6. WBC 3.3, Hb 14.1. Last UA in 2016.     12/11/23:  In the interim, he has been doing well. PET on 10/27/23 showed metastatic disease. Has not been on Nivolumab since 2/14/22. He just got a cold and last night he got sick. His BP is 112/80 mmHg today. He has a home health nurse coming in every 2 weeks. His PCP just changed to a different doctor, not Adama Pacheco. He has no fall or leg swelling.   Hydrochlorothiazide 25 mg once a day, lisinopril 15 mg daily and Prazosin 2 mg in the morning and 1 tab.  Labs on 12/7/23 showed creatinine 1.37, GFR 53, BUN 14.9, potassium 3.6,, dioxide 26, calcium 9.3, phosphorus 2.3, albumin 4.6.  Vitamin D 13, PTH 81. Hemoglobin 14.9, white cell 4.2 and platelet 203. UA is bland without blood or protein. UPCR 0.07 g/g.   4/29/24: He has been doing well. No change in medications. His home BP are good. Today is 98/70 mmHg. He does not have hypotensive symptoms. No swelling. Appetite is not good. His cancer is in remission not taking any thing at this time. He has not had labs done yet. However, Cr on 3/1/24 was 1.53, slightly increased  from 1.35 in 12/24.     Past medical history  Past Medical History:   Diagnosis Date    Anxiety     Benign hypertension 12/11/2013    Cervicalgia     Chronic kidney disease     Depressive disorder, not elsewhere classified     Diverticulitis of colon 07/10/2013    Esophageal reflux     Irritable bowel syndrome 10/03/2003    Lumbago     Malignant melanoma of scalp (H) 12/29/2020    Migraine, unspecified, without mention of intractable migraine without mention of status migrainosus     Other kyphoscoliosis and scoliosis     Other specified gastritis without mention of hemorrhage     PTSD (post-traumatic stress disorder)     Tobacco abuse since 1965    on and off     Past surgical history  Past Surgical History:   Procedure Laterality Date    COLONOSCOPY N/A 6/8/2016    Procedure: COMBINED COLONOSCOPY, SINGLE OR MULTIPLE BIOPSY/POLYPECTOMY BY BIOPSY;  Surgeon: Mahesh Amanda MD;  Location: [facility]    ESOPHAGOSCOPY, GASTROSCOPY, DUODENOSCOPY (EGD), COMBINED  9/6/2011    Procedure:COMBINED ESOPHAGOSCOPY, GASTROSCOPY, DUODENOSCOPY (EGD), BIOPSY SINGLE OR MULTIPLE; Esophagogastroduodenoscopy with multiple Biopsy's; Surgeon:MARINA DYE; Location:[facility]    EXCISE MASS HEAD N/A 5/24/2021    Procedure: wide local excision of scalp melanoma;  Surgeon: Paulo Garcia MD;  Location: [facility]    GRAFT FREE VASCULARIZED LATISSIMUS DORSI Left 5/24/2021    Procedure: left latissimus free flap for scalp reconstruction\. skin graft from left back to scalp;  Surgeon: Kathryn Machado MD;  Location: [facility]    ZZC APPENDECTOMY      ZZC NONSPECIFIC PROCEDURE      nasal septal fx and a devitation needing plastic surgery       Review of Systems:   14 systems were reviewed and all negative except as mentioned above.   Current Medications:  Current Outpatient Medications   Medication Sig Dispense Refill    acetaminophen (TYLENOL) 500 MG tablet Take 2 tablets (1,000 mg) by mouth every 8 hours as needed for mild pain 100 tablet 0     diphenoxylate-atropine (LOMOTIL) 2.5-0.025 MG tablet Take 1-2 tablets by mouth 4 times daily as needed for diarrhea 120 tablet 5    docusate sodium (COLACE) 100 MG capsule TAKE ONE CAPSULE BY MOUTH EVERY DAY FOR STOOL SOFTENING (Patient not taking: Reported on 11/3/2023)      fluvoxaMINE (LUVOX) 100 MG tablet Take 150 mg at bedtime      hydrochlorothiazide (HYDRODIURIL) 25 MG tablet Take 25 mg by mouth daily.      Hypromellose (ARTIFICIAL TEARS OP) Apply  to eye. 2 drops in each eye twice a day as needed      Lactobacillus-Inulin (CULTURELLE DIGESTIVE HEALTH) CAPS 1 tab daily 30 capsule 1    lisinopril (ZESTRIL) 10 MG tablet Take 15 mg by mouth daily      Magnesium Oxide 420 MG TABS Take 420 mg by mouth daily      methocarbamol (ROBAXIN) 500 MG tablet Take 1 tablet (500 mg) by mouth 3 times daily as needed for muscle spasms 10 tablet 0    omeprazole (PRILOSEC) 20 MG capsule Take 1 capsule by mouth 2 times daily. 60 capsule prn    polyethylene glycol (MIRALAX) 17 GM/Dose powder Take 17 g by mouth daily 510 g 0    potassium chloride ER (KLOR-CON M) 20 MEQ CR tablet Take 1 tablet (20 mEq) by mouth daily 7 tablet 0    prazosin (MINIPRESS) 1 MG capsule Take 1 mg by mouth 2 Times Daily Takes 2 tabs in the morning and 1 tab at night      pregabalin (LYRICA) 150 MG capsule Take 150 mg by mouth 2 times daily      QUEtiapine (SEROQUEL) 300 MG tablet Take 150 mg by mouth At Bedtime       senna-docusate (SENOKOT-S/PERICOLACE) 8.6-50 MG tablet Take 1 tablet by mouth 2 times daily (Patient not taking: Reported on 11/3/2023) 30 tablet 0    simvastatin (ZOCOR) 80 MG tablet Take 40 mg by mouth At Bedtime       traZODone (DESYREL) 100 MG tablet Take 100 mg by mouth At Bedtime       vitamin D3 (CHOLECALCIFEROL) 1000 units (25 mcg) tablet Take 1,000 Units by mouth daily       vitamin D3 (CHOLECALCIFEROL) 125 MCG (5000 UT) tablet Take 1 tablet (125 mcg) by mouth daily 90 tablet 3    vortioxetine (TRINTELLIX) 20 MG tablet TAKE ONE TABLET  BY MOUTH EVERY MORNING       Current Facility-Administered Medications   Medication Dose Route Frequency Provider Last Rate Last Admin    lidocaine 1% with EPINEPHrine 1:100,000 injection 1.5 mL  1.5 mL Intradermal Once Sidra Esteban MD        lidocaine 1% with EPINEPHrine 1:100,000 injection 1.5 mL  1.5 mL Intradermal Once Sidra Esteban MD         Facility-Administered Medications Ordered in Other Visits   Medication Dose Route Frequency Provider Last Rate Last Admin    fluorodeoxyglucose F-18 (FDG) radioisotope injection 10-18 mCi  10-18 mCi. Intravenous Once Oksana Ashton MD        iopamidol (ISOVUE-370) solution  mL   mL Intravenous Once Oksana Ashton MD        sodium chloride (PF) 0.9% PF flush 10 mL  10 mL Intravenous Once Loan Montana MD        sodium chloride (PF) 0.9% PF flush 10-60 mL  10-60 mL Intracatheter Q8H Sherine Cardoza PA-C   60 mL at 06/10/22 1021    sodium chloride (PF) 0.9% PF flush 60 mL  60 mL Intravenous Once Oksana Ashton MD           Physical Exam:   BP (!) 163/70   Wt 83.9 kg (185 lb)   BMI 27.32 kg/m     Body mass index is 27.32 kg/m .    On video, he is alert and not in acute distress. + hypopigment area at his left scalp.  He has no lower extremity edema.    Labs:   All labs reviewed by me  Last Renal Panel:  Sodium   Date Value Ref Range Status   03/01/2024 139 135 - 145 mmol/L Final     Comment:     Reference intervals for this test were updated on 09/26/2023 to more accurately reflect our healthy population. There may be differences in the flagging of prior results with similar values performed with this method. Interpretation of those prior results can be made in the context of the updated reference intervals.    06/25/2021 139 133 - 144 mmol/L Final     Potassium   Date Value Ref Range Status   03/01/2024 4.2 3.4 - 5.3 mmol/L Final   04/21/2023 3.9 3.4 - 5.3 mmol/L Final   06/25/2021 3.8 3.4 - 5.3 mmol/L Final      Chloride   Date Value Ref Range Status   03/01/2024 100 98 - 107 mmol/L Final   04/21/2023 103 94 - 109 mmol/L Final   06/25/2021 106 94 - 109 mmol/L Final     Carbon Dioxide   Date Value Ref Range Status   06/25/2021 26 20 - 32 mmol/L Final     Carbon Dioxide (CO2)   Date Value Ref Range Status   03/01/2024 30 (H) 22 - 29 mmol/L Final   04/21/2023 34 (H) 20 - 32 mmol/L Final     Anion Gap   Date Value Ref Range Status   03/01/2024 9 7 - 15 mmol/L Final   04/21/2023 3 3 - 14 mmol/L Final   06/25/2021 6 3 - 14 mmol/L Final     Glucose   Date Value Ref Range Status   03/01/2024 120 (H) 70 - 99 mg/dL Final   04/21/2023 110 (H) 70 - 99 mg/dL Final   06/25/2021 102 (H) 70 - 99 mg/dL Final     Urea Nitrogen   Date Value Ref Range Status   03/01/2024 23.1 (H) 8.0 - 23.0 mg/dL Final   04/21/2023 23 7 - 30 mg/dL Final   06/25/2021 16 7 - 30 mg/dL Final     Creatinine   Date Value Ref Range Status   03/01/2024 1.53 (H) 0.67 - 1.17 mg/dL Final   06/25/2021 1.09 0.66 - 1.25 mg/dL Final     Creatinine POCT   Date Value Ref Range Status   03/01/2024 1.6 (H) 0.7 - 1.3 mg/dL Final     GFR Estimate   Date Value Ref Range Status   03/01/2024 47 (L) >60 mL/min/1.73m2 Final   06/25/2021 66 >60 mL/min/[1.73_m2] Final     Comment:     Non  GFR Calc  Starting 12/18/2018, serum creatinine based estimated GFR (eGFR) will be   calculated using the Chronic Kidney Disease Epidemiology Collaboration   (CKD-EPI) equation.       GFR, ESTIMATED POCT   Date Value Ref Range Status   03/01/2024 44 (L) >60 mL/min/1.73m2 Final     Calcium   Date Value Ref Range Status   03/01/2024 9.0 8.8 - 10.2 mg/dL Final   06/25/2021 8.4 (L) 8.5 - 10.1 mg/dL Final     Phosphorus   Date Value Ref Range Status   12/07/2023 2.3 (L) 2.5 - 4.5 mg/dL Final   06/11/2021 2.8 2.5 - 4.5 mg/dL Final     Albumin   Date Value Ref Range Status   03/01/2024 4.4 3.5 - 5.2 g/dL Final   04/21/2023 4.0 3.4 - 5.0 g/dL Final   06/25/2021 3.5 3.4 - 5.0 g/dL Final        Imaging:  I reviewed imaging studies.     Assessment & Recommendations:   Problem list  # CKD stage 3 likely secondary to chronic hypertension; possible prior effect of Vemurafenib; Cr fluctuated likely in the setting of diuretics use  The patient has been noted to have creatinine elevation in the range of 1.2-1.6 since 2016.  He received nivolumab between 6/22-2/22. Cr while taking Nivolumab was normal. Cr 1.0-1.1 in 8/26/22 but increased to 1.39 in 1/23 and now ranging 1.3-1.5.. I suspect that that his chronic kidney disease is likely from chronic hypertension for which he has for 30 years and possible effect from Vemurafenib (reports of KEENAN, CKD and AIN).  Imaging showed normal kidneys without any stone or mass or hydronephrosis.  The patient is also on omeprazole 20 mg twice daily for GERD. His UA has been bland and UPCR is normal so less likely Gns, MGRS.  The patient does not have labs done at this time but creatinine on 3/1/2024 was 1.53 which is slightly increased from 1.37 on 12/7/2023.  Will need an updated labs.  -Need updated labs and urine test  - Stay well hydrated; drink 70 Oz per day  # Scalp melanoma followed by Dr. Hernández  The patient was diagnosed with scalp melanoma after presetned with lesional enlargement and abnormal sensation.  Bx showed nodular and nevoid type melanoma, non-ulcerated, Breslow depth up to 1.3 mm, Saurabh's level IV, and up to 3 mitoses per mm2. 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 He was started vemurafenib and cobimetinib, ~2/12/21 held for diarrhea, then resumed. In 2/18/2021, he was started atezolizumab, last on 4/29/21. In 5/24/2021, underwent wide local excision of the scalp melanoma and left latissimus free flap for scalp reconstruction.  Surgical pathology showed features consistent with scar and tumoral melanosis. No definite residual melanoma is seen. Tumoral melanosis (which extends to a depth of around 1.1 mm) is believed to be equivalent to a  diagnosis of regressed melanoma. In 6/25/21, he starts adjuvant Nivolumab, last dose 2/14/22. CT headneck from 7/22/23 showed possibly hypermetabolic lesion at scalp but repeat PET on 10/23 showed no hypermetabolic lesion. Now in remission.  # BMD  # Secondary hyperparathyroidism  # Vitamin D deficiency  His calcium is 9.3 and phosphorus is 2.3. vitamin D is only 13.  I will start vitamin D at 5000 unit daily.  -Need updated labs and urine test  # Hypertension; well conreolled  On lisinopril 10 mg  1.5 tab (15 mg) per day, hydrochlorothiazide 25 mg once a day (dose was raise and prazosin HCL 1 mg take 2 capsules in the morning and 1 mg bed time.  - BP has been good and today is 98/60, no signs of hypotension  - Monitor BP regularly if SBP <100 mmHg, please let us know  # Leukopenia; resolved  # Anemia surveillance  Need updated hemoglobin value.     Follow-up in 6 months with labs    The longitudinal plan of care for CKD 3A/3B, HTN, Hx of melanoma was addressed during this visit. Due to the added complexity in care, I will continue to support Hanny Coleman in the subsequent management of this condition(s) and with the ongoing continuity of care of this condition(s).     I spent  30 minutes on the date of the encounter doing chart review, history and exam, documentation and further activities as noted above. 15 (11.40-11.55 )minutes of this visit is dedicated to direct patient interaction via video.     Malgorzata Alarcon MD on 04/29/2024

## 2024-04-29 NOTE — PATIENT INSTRUCTIONS
Please get labs done soon~! At [facility]  If blood pressure is low <100 or feel lightheadedness, please let us know  See you in 6 months with labs

## 2024-05-03 ENCOUNTER — TELEPHONE (OUTPATIENT)
Dept: NEPHROLOGY | Facility: CLINIC | Age: 79
End: 2024-05-03
Payer: MEDICARE

## 2024-05-03 NOTE — TELEPHONE ENCOUNTER
Left Voicemail (1st Attempt) and Sent Mychart (1st Attempt) for the patient to schedule:    Appointment type: Return Nephrology  Provider: Dr. Alarcon  Return date: Approx. 10/29  Phone number: [phone]  Add'l appt(s) needed: Lab 1-hour prior to clinic visit (or 1-week if video visit)

## 2024-05-06 ENCOUNTER — TELEPHONE (OUTPATIENT)
Dept: NEPHROLOGY | Facility: CLINIC | Age: 79
End: 2024-05-06
Payer: MEDICARE

## 2024-05-06 NOTE — TELEPHONE ENCOUNTER
Left Voicemail (2nd Attempt) for the patient to schedule:    Appointment type: Return Nephrology  Provider: Dr. Alarcon  Return date: Approx. 10/29  Phone number: [phone]  Add'l appt(s) needed: Lab 1-hour prior to clinic visit (or 1-week if video visit)

## 2024-05-25 ENCOUNTER — HEALTH MAINTENANCE LETTER (OUTPATIENT)
Age: 79
End: 2024-05-25

## 2024-09-13 ENCOUNTER — LAB (OUTPATIENT)
Dept: LAB | Facility: CLINIC | Age: 79
End: 2024-09-13
Payer: MEDICARE

## 2024-09-13 ENCOUNTER — ANCILLARY PROCEDURE (OUTPATIENT)
Dept: PET IMAGING | Facility: CLINIC | Age: 79
End: 2024-09-13
Attending: PHYSICIAN ASSISTANT
Payer: MEDICARE

## 2024-09-13 DIAGNOSIS — C43.4 MELANOMA OF SCALP (H): ICD-10-CM

## 2024-09-13 LAB
ALBUMIN SERPL BCG-MCNC: 4.4 G/DL (ref 3.5–5.2)
ALP SERPL-CCNC: 51 U/L (ref 40–150)
ALT SERPL W P-5'-P-CCNC: 9 U/L (ref 0–70)
ANION GAP SERPL CALCULATED.3IONS-SCNC: 11 MMOL/L (ref 7–15)
AST SERPL W P-5'-P-CCNC: 19 U/L (ref 0–45)
BASOPHILS # BLD AUTO: 0 10E3/UL (ref 0–0.2)
BASOPHILS NFR BLD AUTO: 0 %
BILIRUB SERPL-MCNC: 0.6 MG/DL
BUN SERPL-MCNC: 24.5 MG/DL (ref 8–23)
CALCIUM SERPL-MCNC: 9.7 MG/DL (ref 8.8–10.4)
CHLORIDE SERPL-SCNC: 102 MMOL/L (ref 98–107)
CREAT SERPL-MCNC: 1.44 MG/DL (ref 0.67–1.17)
EGFRCR SERPLBLD CKD-EPI 2021: 50 ML/MIN/1.73M2
EOSINOPHIL # BLD AUTO: 0.1 10E3/UL (ref 0–0.7)
EOSINOPHIL NFR BLD AUTO: 1 %
ERYTHROCYTE [DISTWIDTH] IN BLOOD BY AUTOMATED COUNT: 12.3 % (ref 10–15)
GLUCOSE SERPL-MCNC: 124 MG/DL (ref 70–99)
HCO3 SERPL-SCNC: 27 MMOL/L (ref 22–29)
HCT VFR BLD AUTO: 39.8 % (ref 40–53)
HGB BLD-MCNC: 13.7 G/DL (ref 13.3–17.7)
IMM GRANULOCYTES # BLD: 0 10E3/UL
IMM GRANULOCYTES NFR BLD: 0 %
LYMPHOCYTES # BLD AUTO: 1 10E3/UL (ref 0.8–5.3)
LYMPHOCYTES NFR BLD AUTO: 13 %
MCH RBC QN AUTO: 29.3 PG (ref 26.5–33)
MCHC RBC AUTO-ENTMCNC: 34.4 G/DL (ref 31.5–36.5)
MCV RBC AUTO: 85 FL (ref 78–100)
MONOCYTES # BLD AUTO: 0.4 10E3/UL (ref 0–1.3)
MONOCYTES NFR BLD AUTO: 5 %
NEUTROPHILS # BLD AUTO: 6.4 10E3/UL (ref 1.6–8.3)
NEUTROPHILS NFR BLD AUTO: 80 %
NRBC # BLD AUTO: 0 10E3/UL
NRBC BLD AUTO-RTO: 0 /100
PLATELET # BLD AUTO: 214 10E3/UL (ref 150–450)
POTASSIUM SERPL-SCNC: 4 MMOL/L (ref 3.4–5.3)
PROT SERPL-MCNC: 7.4 G/DL (ref 6.4–8.3)
RBC # BLD AUTO: 4.68 10E6/UL (ref 4.4–5.9)
SODIUM SERPL-SCNC: 140 MMOL/L (ref 135–145)
WBC # BLD AUTO: 8 10E3/UL (ref 4–11)

## 2024-09-13 PROCEDURE — 78813 PET IMAGE FULL BODY: CPT | Mod: MG | Performed by: RADIOLOGY

## 2024-09-13 PROCEDURE — 36415 COLL VENOUS BLD VENIPUNCTURE: CPT

## 2024-09-13 PROCEDURE — A9552 F18 FDG: HCPCS | Performed by: RADIOLOGY

## 2024-09-13 PROCEDURE — G1010 CDSM STANSON: HCPCS | Performed by: RADIOLOGY

## 2024-09-13 PROCEDURE — 71260 CT THORAX DX C+: CPT | Mod: GC | Performed by: RADIOLOGY

## 2024-09-13 PROCEDURE — 80053 COMPREHEN METABOLIC PANEL: CPT

## 2024-09-13 PROCEDURE — 85025 COMPLETE CBC W/AUTO DIFF WBC: CPT

## 2024-09-13 PROCEDURE — 74177 CT ABD & PELVIS W/CONTRAST: CPT | Mod: GC | Performed by: RADIOLOGY

## 2024-09-13 RX ORDER — IOPAMIDOL 755 MG/ML
10-135 INJECTION, SOLUTION INTRAVASCULAR ONCE
Status: COMPLETED | OUTPATIENT
Start: 2024-09-13 | End: 2024-09-13

## 2024-09-13 RX ORDER — FLUDEOXYGLUCOSE F 18 200 MCI/ML
10-18 INJECTION, SOLUTION INTRAVENOUS ONCE
Status: COMPLETED | OUTPATIENT
Start: 2024-09-13 | End: 2024-09-13

## 2024-09-13 RX ADMIN — FLUDEOXYGLUCOSE F 18 13 MILLICURIE: 200 INJECTION, SOLUTION INTRAVENOUS at 12:55

## 2024-09-13 RX ADMIN — IOPAMIDOL 127 ML: 755 INJECTION, SOLUTION INTRAVASCULAR at 12:55

## 2024-09-17 ENCOUNTER — PATIENT OUTREACH (OUTPATIENT)
Dept: ONCOLOGY | Facility: CLINIC | Age: 79
End: 2024-09-17
Payer: MEDICARE

## 2024-09-17 NOTE — PROGRESS NOTES
[facility]: Cancer Care                                                                                          Called patient per care team - reached , could not leave VM bc VM box is not set-up.    Called patient's daughter, went straight to , left message to return call ASAP.    Sent patient MyChart message to inform two brain lesions found in PET scan from 9/13 and recommend brain MRI for further assessment.  Urgent message sent to scheduling to follow-up.    Ashlee Steward RN  Cancer Care Coordinator  [facility]

## 2024-09-24 ENCOUNTER — ANCILLARY PROCEDURE (OUTPATIENT)
Dept: MRI IMAGING | Facility: CLINIC | Age: 79
End: 2024-09-24
Attending: PHYSICIAN ASSISTANT
Payer: MEDICARE

## 2024-09-24 DIAGNOSIS — C43.4 MELANOMA OF SCALP (H): ICD-10-CM

## 2024-09-24 DIAGNOSIS — G93.9 BRAIN LESION: ICD-10-CM

## 2024-09-24 PROCEDURE — 70553 MRI BRAIN STEM W/O & W/DYE: CPT | Mod: MG | Performed by: STUDENT IN AN ORGANIZED HEALTH CARE EDUCATION/TRAINING PROGRAM

## 2024-09-24 PROCEDURE — G1010 CDSM STANSON: HCPCS | Performed by: STUDENT IN AN ORGANIZED HEALTH CARE EDUCATION/TRAINING PROGRAM

## 2024-09-24 PROCEDURE — A9585 GADOBUTROL INJECTION: HCPCS | Performed by: STUDENT IN AN ORGANIZED HEALTH CARE EDUCATION/TRAINING PROGRAM

## 2024-09-24 RX ORDER — GADOBUTROL 604.72 MG/ML
8.5 INJECTION INTRAVENOUS ONCE
Status: COMPLETED | OUTPATIENT
Start: 2024-09-24 | End: 2024-09-24

## 2024-09-24 RX ADMIN — GADOBUTROL 8.5 ML: 604.72 INJECTION INTRAVENOUS at 14:49
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
## 2024-09-26 ENCOUNTER — VIRTUAL VISIT (OUTPATIENT)
Dept: ONCOLOGY | Facility: CLINIC | Age: 79
End: 2024-09-26
Attending: PHYSICIAN ASSISTANT
Payer: MEDICARE

## 2024-09-26 VITALS — HEIGHT: 69 IN | BODY MASS INDEX: 25.92 KG/M2 | WEIGHT: 175 LBS

## 2024-09-26 DIAGNOSIS — C43.4 MALIGNANT MELANOMA OF SCALP (H): Primary | ICD-10-CM

## 2024-09-26 DIAGNOSIS — C79.31 METASTASIS TO BRAIN (H): ICD-10-CM

## 2024-09-26 PROCEDURE — 99215 OFFICE O/P EST HI 40 MIN: CPT | Mod: 95 | Performed by: INTERNAL MEDICINE

## 2024-09-26 ASSESSMENT — PAIN SCALES - GENERAL: PAINLEVEL: NO PAIN (0)
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
## 2024-09-26 NOTE — NURSING NOTE
Current patient location: [address] [facility]  [place] [address]    Is the patient currently in the state of [place]? YES    Visit mode:VIDEO    If the visit is dropped, the patient can be reconnected by: VIDEO VISIT: Send to e-mail at: [email or web address]    Will anyone else be joining the visit? NO  (If patient encounters technical issues they should call [phone])    How would you like to obtain your AVS? MyChart    Are changes needed to the allergy or medication list? No    Are refills needed on medications prescribed by this physician? NO    Rooming Documentation:  Pt declined    Reason for visit: JAYME KIRKF

## 2024-09-27 ENCOUNTER — PATIENT OUTREACH (OUTPATIENT)
Dept: ONCOLOGY | Facility: CLINIC | Age: 79
End: 2024-09-27
Payer: MEDICARE

## 2024-09-27 NOTE — PROGRESS NOTES
New Patient Oncology Nurse Navigator Note     Referring provider: Hector Collins MD      Referring Clinic/Organization: [facility]      Referred to (specialty:) Radiation Oncology     Requested provider (if applicable): NA     Date Referral Received: September 27, 2024     Evaluation for:  History of stage IIIB melanoma of the scalp status post surgery and adjuvant nivolumab completed 04/2021.  There are 3 new brain metastases noted on 09/24/2024.   C43.4 (ICD-10-CM) - Malignant melanoma of scalp (H)   C79.31 (ICD-10-CM) - Metastasis to brain (H)        Clinical History (per Nurse review of records provided):      Radiation oncology consultation is requested for evaluation for Gamma Knife radiosurgery for the brain metastases. See Dr. Collins's note from 9/26/24 for more details.     Payor: MEDICARE / Plan: MEDICARE / Product Type: Medicare /     September 27, 2024  Referral received an reviewed. Referral is for Gamma Knife. Message sent to Gamma knife team.     Sherlyn SARABIAN, RN, OCN  Oncology Nurse Navigator   [facility]  Cancer Care Service Line   New Patient Hem/Onc Scheduling / Referrals: [phone] (fax: [phone] )

## 2024-09-30 DIAGNOSIS — C79.31 METASTASIS TO BRAIN (H): Primary | ICD-10-CM

## 2024-09-30 NOTE — TELEPHONE ENCOUNTER
MEDICAL RECORDS REQUEST   Radiation Oncology  [address] [place] [address]  Fax: [phone]          FUTURE VISIT INFORMATION                                                   Ahmet Coleman, : 1945 scheduled for future visit at [facility] Radiation Oncology    RECORDS REQUESTED FOR VISIT                                                     BRAIN STATUS DETAILS   OFFICE NOTE from neuro onc [facility] 2024 - Dr. Hector Collins   ED/HOSP ADMISSION NOTES [facility] 2021 - [facility]   OPERATIVE/BIOPSY REPORTS [facility] 2024 - wide local excision of scalp melanoma (Head)  left latissimus free flap for scalp reconstruction\. skin graft from left back to scalp       MEDICATION LIST [facility]    LABS     PATHOLOGY REPORTS Epic 2021 - [id number]   2020 - [id number]     Path Consult:  2020 - [id number]    ANYTHING RELATED TO DIAGNOSIS Epic 2024    IMAGING (NEED IMAGES & REPORT)     MRI PACS MR Brain: 2024   PET PACS PET Whole Body: 2024 - 2020

## 2024-10-01 ENCOUNTER — TELEPHONE (OUTPATIENT)
Dept: ONCOLOGY | Facility: CLINIC | Age: 79
End: 2024-10-01
Payer: MEDICARE

## 2024-10-01 NOTE — TELEPHONE ENCOUNTER
FMLA family forms received via email from patient's daughter.      Forms to be completed and put in folder for provider to approve.    Fax #:  [phone]    Cici [place]

## 2024-10-01 NOTE — TELEPHONE ENCOUNTER
FMLA family forms filled out and put in providers folder for review and signature.      Cici B[place]

## 2024-10-03 ENCOUNTER — VIRTUAL VISIT (OUTPATIENT)
Dept: RADIATION ONCOLOGY | Facility: CLINIC | Age: 79
End: 2024-10-03
Attending: RADIOLOGY
Payer: MEDICARE

## 2024-10-03 DIAGNOSIS — C79.31 METASTASIS TO BRAIN (H): Primary | ICD-10-CM

## 2024-10-03 PROCEDURE — 77470 SPECIAL RADIATION TREATMENT: CPT | Mod: TEL | Performed by: RADIOLOGY

## 2024-10-03 PROCEDURE — 77470 SPECIAL RADIATION TREATMENT: CPT | Mod: 26 | Performed by: RADIOLOGY

## 2024-10-03 NOTE — PROGRESS NOTES
Radiation Oncology Telephone Consultation:  Date on this visit: 10/3/2024    Ahmet Coleman  is referred by Dr.Ian Evan Collins for a radiation oncology consultation. He requires evaluation for diagnosis of brain metastasis from melanoma      History Of Present Illness:  Mr. Coleman is a 79 year old male who presents with a diagnosis of  brain metastasis.           He had a small pigmented scalp lesion present for over 30 years, biopsied in 1992 and was benign.  However, in October 2020, he presented  with 1 year of progressive enlargement of the lesion and new onset burning, itching, and stinging sensation in the affected scalp.  On   10/26/20, he had shave biopsies of A. left central parietal scalp, B. left central occipital scalp, C. Left posterior parietal scalp, and D. punch biopsy of the left superior occipital scalp.     Pathology (specimen: [id number]) showed a nodular and nevoid type melanoma, non-ulcerated, Breslow depth up to 1.3 mm, Saurabh's level IV, and up to 3 mitoses per mm2. All margins were involved. Ki-67 10-20%. Sox10 stain is positive and highlights an atypical compound melanocytic proliferation with significant overlying confluence and pagetosis of intraepidermal melanocytes.     T-stage: at least pT2a. PD-L1 staining shows PD-L1 TPS <1%. 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      On 11/25/20, he was seen by Dr. Garcia and he took three 3mm punch biopsies, which returned as dermal melanocytic proliferations with melanophages and fibrosis, consistent with locoregional metastatic melanoma.      Staging on  12/8/2020  with  PET-CT, which showed FDG avid irregular skin thickening overlying the left parietal region, with no FDG avid lymphadenopathy in the neck and no evidence of metastasis elsewhere in the body.    He was started vemurafenib and cobimetinib, on   1/22/2021 start  ( ~2/12/21 held for diarrhea, then resumed.)  Changed to atezolizumab,on 2/18/2021 - last on 4/29/21    On   5/24/2021  he  underwent   Wide local excision of the scalp melanoma and left latissimus free flap for scalp reconstruction.  Surgical pathology showed features consistent with scar and tumoral melanosis. No definite residual melanoma was seen. Tumoral melanosis (which extends to a depth of around 1.1 mm) was  believed to be equivalent to a diagnosis of regressed melanoma.       He started  adjuvant Nivolumab on  6/25/21,  which he continued until  last dose 2/14/22    Routine PET CT scan on 3/1/24  was clear but another PET CT on  9/13/24 revealed two  9 mm enhancing hypermetabolic nodules in the brain, likely  metastases.   No other evidence of systemic disease.     MRI on 9/24 revealed 3 intracranial metastatic foci (9 mm enhancing lesion in the left ventral superior frontal gyrus.12 x 6 mm enhancing lesion in the right pericallosal gyrus and a  4 mm enhancing lesion in the right dorsal cingulate gyrus)    He is referred now for consideration of GK radiosurgery to these presumed metastatic lesions.      Past Medical/Surgical History:  Past Medical History:   Diagnosis Date    Anxiety     Benign hypertension 12/11/2013    Cervicalgia     Chronic kidney disease     Depressive disorder, not elsewhere classified     Diverticulitis of colon 07/10/2013    Esophageal reflux     Irritable bowel syndrome 10/03/2003    Lumbago     Malignant melanoma of scalp (H) 12/29/2020    Migraine, unspecified, without mention of intractable migraine without mention of status migrainosus     Other kyphoscoliosis and scoliosis     Other specified gastritis without mention of hemorrhage     PTSD (post-traumatic stress disorder)     Tobacco abuse since 1965    on and off     Past Surgical History:   Procedure Laterality Date    COLONOSCOPY N/A 6/8/2016    Procedure: COMBINED COLONOSCOPY, SINGLE OR MULTIPLE BIOPSY/POLYPECTOMY BY BIOPSY;  Surgeon: Mahesh Amanda MD;  [facility]    ESOPHAGOSCOPY, GASTROSCOPY, DUODENOSCOPY (EGD),  COMBINED  9/6/2011    Procedure:COMBINED ESOPHAGOSCOPY, GASTROSCOPY, DUODENOSCOPY (EGD), BIOPSY SINGLE OR MULTIPLE; Esophagogastroduodenoscopy with multiple Biopsy's; Surgeon:MARINA DYE; Location:[facility]    EXCISE MASS HEAD N/A 5/24/2021    Procedure: wide local excision of scalp melanoma;  Surgeon: Paulo Garcia MD;  [facility]    GRAFT FREE VASCULARIZED LATISSIMUS DORSI Left 5/24/2021    Procedure: left latissimus free flap for scalp reconstruction\. skin graft from left back to scalp;  Surgeon: Kathryn Machado MD;  Location: [facility]    ZZC APPENDECTOMY      ZZC NONSPECIFIC PROCEDURE      nasal septal fx and a devitation needing plastic surgery       Past Radiation History: none  Past Chemotherapy History:as above  ( immunotherapy )   Implanted Cardiac device:   none      Review of Systems:  Reviewed.     Allergies:  Allergies as of 10/03/2024 - Reviewed 09/26/2024   Allergen Reaction Noted    Aspirin  08/06/2013    Motrin [ibuprofen micronized]  03/22/2012       Current Medications:     Current Outpatient Medications:     acetaminophen (TYLENOL) 500 MG tablet, Take 2 tablets (1,000 mg) by mouth every 8 hours as needed for mild pain, Disp: 100 tablet, Rfl: 0    diphenoxylate-atropine (LOMOTIL) 2.5-0.025 MG tablet, Take 1-2 tablets by mouth 4 times daily as needed for diarrhea, Disp: 120 tablet, Rfl: 5    docusate sodium (COLACE) 100 MG capsule, TAKE ONE CAPSULE BY MOUTH EVERY DAY FOR STOOL SOFTENING (Patient not taking: Reported on 11/3/2023), Disp: , Rfl:     fluvoxaMINE (LUVOX) 100 MG tablet, Take 150 mg at bedtime, Disp: , Rfl:     hydrochlorothiazide (HYDRODIURIL) 25 MG tablet, Take 25 mg by mouth daily., Disp: , Rfl:     Hypromellose (ARTIFICIAL TEARS OP), Apply  to eye. 2 drops in each eye twice a day as needed, Disp: , Rfl:     Lactobacillus-Inulin ([facility] DIGESTIVE [facility]) CAPS, 1 tab daily, Disp: 30 capsule, Rfl: 1    lisinopril (ZESTRIL) 10 MG tablet, Take 15 mg by mouth daily,  Disp: , Rfl:     Magnesium Oxide 420 MG TABS, Take 420 mg by mouth daily, Disp: , Rfl:     methocarbamol (ROBAXIN) 500 MG tablet, Take 1 tablet (500 mg) by mouth 3 times daily as needed for muscle spasms, Disp: 10 tablet, Rfl: 0    omeprazole (PRILOSEC) 20 MG capsule, Take 1 capsule by mouth 2 times daily., Disp: 60 capsule, Rfl: prn    polyethylene glycol (MIRALAX) 17 GM/Dose powder, Take 17 g by mouth daily, Disp: 510 g, Rfl: 0    potassium chloride ER (KLOR-CON M) 20 MEQ CR tablet, Take 1 tablet (20 mEq) by mouth daily, Disp: 7 tablet, Rfl: 0    prazosin (MINIPRESS) 1 MG capsule, Take 1 mg by mouth 2 Times Daily Takes 2 tabs in the morning and 1 tab at night, Disp: , Rfl:     pregabalin (LYRICA) 150 MG capsule, Take 150 mg by mouth 2 times daily, Disp: , Rfl:     QUEtiapine (SEROQUEL) 300 MG tablet, Take 150 mg by mouth At Bedtime , Disp: , Rfl:     senna-docusate (SENOKOT-S/PERICOLACE) 8.6-50 MG tablet, Take 1 tablet by mouth 2 times daily (Patient not taking: Reported on 11/3/2023), Disp: 30 tablet, Rfl: 0    simvastatin (ZOCOR) 80 MG tablet, Take 40 mg by mouth At Bedtime , Disp: , Rfl:     traZODone (DESYREL) 100 MG tablet, Take 100 mg by mouth At Bedtime , Disp: , Rfl:     vitamin D3 (CHOLECALCIFEROL) 1000 units (25 mcg) tablet, Take 1,000 Units by mouth daily , Disp: , Rfl:     vitamin D3 (CHOLECALCIFEROL) 125 MCG (5000 UT) tablet, Take 1 tablet (125 mcg) by mouth daily, Disp: 90 tablet, Rfl: 3    vortioxetine (TRINTELLIX) 20 MG tablet, TAKE ONE TABLET BY MOUTH EVERY MORNING, Disp: , Rfl:     Current Facility-Administered Medications:     lidocaine 1% with EPINEPHrine 1:100,000 injection 1.5 mL, 1.5 mL, Intradermal, Once, Sidra Esteban MD    lidocaine 1% with EPINEPHrine 1:100,000 injection 1.5 mL, 1.5 mL, Intradermal, Once, Sidra Esteban MD    Facility-Administered Medications Ordered in Other Visits:     fluorodeoxyglucose F-18 (FDG) radioisotope injection 10-18 mCi, 10-18 mCi., Intravenous, Once,  Oksana Ashton MD    iopamidol (ISOVUE-370) solution  mL,  mL, Intravenous, Once, Oksana Ashton MD    sodium chloride (PF) 0.9% PF flush 10 mL, 10 mL, Intravenous, Once, Loan Montana MD    sodium chloride (PF) 0.9% PF flush 10-60 mL, 10-60 mL, Intracatheter, Q8H, Sherine Cardoza PA-C, 60 mL at 06/10/22 1021    sodium chloride (PF) 0.9% PF flush 60 mL, 60 mL, Intravenous, Once, Oksana Ashton MD    Family History:  Family History   Problem Relation Age of Onset    Diabetes Father     Heart Disease Brother     Skin Cancer No family hx of     Melanoma No family hx of        Social History:  Social History     Socioeconomic History    Marital status:      Spouse name: Not on file    Number of children: Not on file    Years of education: Not on file    Highest education level: Not on file   Occupational History    Not on file   Tobacco Use    Smoking status: Former     Current packs/day: 0.00     Average packs/day: 0.5 packs/day for 45.0 years (22.5 ttl pk-yrs)     Types: Cigarettes     Start date: 1968     Quit date: 2013     Years since quittin.1     Passive exposure: Past (per pt)    Smokeless tobacco: Never    Tobacco comments:     since , on and off in there 1/2 pack per day   Substance and Sexual Activity    Alcohol use: No    Drug use: Not Currently     Types: Marijuana    Sexual activity: Never     Partners: Female     Comment: not currently active   Other Topics Concern    Parent/sibling w/ CABG, MI or angioplasty before 65F 55M? No   Social History Narrative    Not on file     Social Determinants of Health     Financial Resource Strain: Not on file   Food Insecurity: Not on file   Transportation Needs: Not on file   Physical Activity: Not on file   Stress: Not on file   Social Connections: Not on file   Interpersonal Safety: Not on file   Housing Stability: Not on file       Physical Exam:  There were no vitals taken for this  visit.  Telephone visit.     Laboratory/Imaging Studies  Reviewed PET and MRI     ASSESSMENT:    Presumed brain metastais  2.5 years after discontinuing Nivolumab for high risk melanoma of the scalp.    RECOMMENDATION:      I would recommend GK radiosurgery for these 3 small lesions.  Radiosurgery has a good chance of controlling these metastatic deposits .  I explained that he will then go on to surveillence MRI scans every 3 months.    I anticipate that nivolumab may be re- instituted, but that decsion will be made by medical oncology       The risks and benefits of  Gk  radiosurgery rwere discussed with the patient.    Potential acute and long term side effects were explained.   The patient agrees to proceed with radiation therapy.  A telephone consent was obtained.    Thank you for allowing me to participate in Ahmet Coleman 's care .  Please do not hesitate to call me with questions.    Telephone converstation 25 minutes.  Review of chart 20 minutes.     Patient was at home at the time of the call    Saranya Ramirez MD  Professor Kelli   [phone]   Pager   [phone]  [facility]   [phone] [place]  [phone] [facility]  Primary Physician: Adama Pacheco   Patient Care Team:  Adama Pacheco MD as PCP - General (Family Medicine)  Keith Alonzo MD as Assigned Surgical Provider  Malgorzata Alarcon MD as MD (Nephrology)  Malgorzata Alarcon MD as Assigned Nephrology Provider  Ashlee Steward RN as Specialty Care Coordinator (Hematology & Oncology)  Sherine Cardoza PA-C as Assigned Cancer Care Provider  BRITTANY DUGGAN

## 2024-10-03 NOTE — TELEPHONE ENCOUNTER
[facility] family paperwork completed, checked for accuracy, signed and faxed to  Kris @ [phone]. A copy was made, put in the family file and original emailed to patient's daughter at [email or web address].    Successful transmission verified in Right Fax.      Rita Bermeo

## 2024-10-08 ENCOUNTER — OFFICE VISIT (OUTPATIENT)
Dept: RADIATION ONCOLOGY | Facility: CLINIC | Age: 79
End: 2024-10-08
Attending: RADIOLOGY
Payer: MEDICARE

## 2024-10-08 ENCOUNTER — HOSPITAL ENCOUNTER (OUTPATIENT)
Dept: MRI IMAGING | Facility: CLINIC | Age: 79
Discharge: HOME | End: 2024-10-08
Attending: RADIOLOGY
Payer: MEDICARE

## 2024-10-08 VITALS
HEART RATE: 50 BPM | SYSTOLIC BLOOD PRESSURE: 115 MMHG | OXYGEN SATURATION: 94 % | RESPIRATION RATE: 18 BRPM | DIASTOLIC BLOOD PRESSURE: 62 MMHG

## 2024-10-08 DIAGNOSIS — C79.31 METASTASIS TO BRAIN (H): Primary | ICD-10-CM

## 2024-10-08 DIAGNOSIS — C79.31 METASTASIS TO BRAIN (H): ICD-10-CM

## 2024-10-08 PROCEDURE — 77370 RADIATION PHYSICS CONSULT: CPT | Performed by: RADIOLOGY

## 2024-10-08 PROCEDURE — 77432 STEREOTACTIC RADIATION TRMT: CPT | Performed by: RADIOLOGY

## 2024-10-08 PROCEDURE — 77263 THER RADIOLOGY TX PLNG CPLX: CPT | Performed by: RADIOLOGY

## 2024-10-08 PROCEDURE — 250N000013 HC RX MED GY IP 250 OP 250 PS 637: Performed by: RADIOLOGY

## 2024-10-08 PROCEDURE — 61797 SRS CRAN LES SIMPLE ADDL: CPT | Performed by: NEUROLOGICAL SURGERY

## 2024-10-08 PROCEDURE — 77371 SRS MULTISOURCE: CPT | Performed by: RADIOLOGY

## 2024-10-08 PROCEDURE — 70552 MRI BRAIN STEM W/DYE: CPT | Mod: 26 | Performed by: RADIOLOGY

## 2024-10-08 PROCEDURE — 250N000009 HC RX 250: Performed by: RADIOLOGY

## 2024-10-08 PROCEDURE — 77334 RADIATION TREATMENT AID(S): CPT | Performed by: RADIOLOGY

## 2024-10-08 PROCEDURE — 250N000011 HC RX IP 250 OP 636: Performed by: RADIOLOGY

## 2024-10-08 PROCEDURE — 77300 RADIATION THERAPY DOSE PLAN: CPT | Mod: 26 | Performed by: RADIOLOGY

## 2024-10-08 PROCEDURE — 77295 3-D RADIOTHERAPY PLAN: CPT | Mod: 26 | Performed by: RADIOLOGY

## 2024-10-08 PROCEDURE — G1010 CDSM STANSON: HCPCS | Mod: GC | Performed by: RADIOLOGY

## 2024-10-08 PROCEDURE — 61796 SRS CRANIAL LESION SIMPLE: CPT | Performed by: NEUROLOGICAL SURGERY

## 2024-10-08 PROCEDURE — A9585 GADOBUTROL INJECTION: HCPCS | Performed by: RADIOLOGY

## 2024-10-08 PROCEDURE — G1010 CDSM STANSON: HCPCS

## 2024-10-08 PROCEDURE — 70552 MRI BRAIN STEM W/DYE: CPT | Mod: MG

## 2024-10-08 PROCEDURE — 61800 APPLY SRS HEADFRAME ADD-ON: CPT | Performed by: NEUROLOGICAL SURGERY

## 2024-10-08 PROCEDURE — 77300 RADIATION THERAPY DOSE PLAN: CPT | Performed by: RADIOLOGY

## 2024-10-08 PROCEDURE — 77295 3-D RADIOTHERAPY PLAN: CPT | Performed by: RADIOLOGY

## 2024-10-08 PROCEDURE — 255N000002 HC RX 255 OP 636: Performed by: RADIOLOGY

## 2024-10-08 PROCEDURE — 77334 RADIATION TREATMENT AID(S): CPT | Mod: 26 | Performed by: RADIOLOGY

## 2024-10-08 RX ORDER — ONDANSETRON 2 MG/ML
4 INJECTION INTRAMUSCULAR; INTRAVENOUS
Status: DISCONTINUED | OUTPATIENT
Start: 2024-10-08 | End: 2024-10-08 | Stop reason: HOSPADM

## 2024-10-08 RX ORDER — ACETAMINOPHEN 325 MG/1
650 TABLET ORAL EVERY 4 HOURS PRN
Status: DISCONTINUED | OUTPATIENT
Start: 2024-10-08 | End: 2024-10-08 | Stop reason: HOSPADM

## 2024-10-08 RX ORDER — ACETAMINOPHEN AND CODEINE PHOSPHATE 300; 30 MG/1; MG/1
1 TABLET ORAL EVERY 4 HOURS PRN
Status: DISCONTINUED | OUTPATIENT
Start: 2024-10-08 | End: 2024-10-08 | Stop reason: HOSPADM

## 2024-10-08 RX ORDER — HYDROCODONE BITARTRATE AND ACETAMINOPHEN 5; 325 MG/1; MG/1
1-2 TABLET ORAL EVERY 6 HOURS PRN
Status: DISCONTINUED | OUTPATIENT
Start: 2024-10-08 | End: 2024-10-08 | Stop reason: HOSPADM

## 2024-10-08 RX ORDER — ONDANSETRON 4 MG/1
8 TABLET, ORALLY DISINTEGRATING ORAL EVERY 6 HOURS PRN
Status: DISCONTINUED | OUTPATIENT
Start: 2024-10-08 | End: 2024-10-08 | Stop reason: HOSPADM

## 2024-10-08 RX ORDER — LORAZEPAM 0.5 MG/1
.5-1 TABLET ORAL
Status: DISCONTINUED | OUTPATIENT
Start: 2024-10-08 | End: 2024-10-08 | Stop reason: HOSPADM

## 2024-10-08 RX ORDER — LORAZEPAM 0.5 MG/1
.5-2 TABLET ORAL
Status: COMPLETED | OUTPATIENT
Start: 2024-10-08 | End: 2024-10-08

## 2024-10-08 RX ORDER — GADOBUTROL 604.72 MG/ML
10 INJECTION INTRAVENOUS ONCE
Status: COMPLETED | OUTPATIENT
Start: 2024-10-08 | End: 2024-10-08

## 2024-10-08 RX ORDER — LIDOCAINE 40 MG/G
1 CREAM TOPICAL SEE ADMIN INSTRUCTIONS
Status: COMPLETED | OUTPATIENT
Start: 2024-10-08 | End: 2024-10-08

## 2024-10-08 RX ADMIN — LORAZEPAM 1.5 MG: 0.5 TABLET ORAL at 05:46

## 2024-10-08 RX ADMIN — GADOBUTROL 8 ML: 604.72 INJECTION INTRAVENOUS at 07:07

## 2024-10-08 RX ADMIN — LIDOCAINE HYDROCHLORIDE,EPINEPHRINE BITARTRATE 30 ML: 20; .01 INJECTION, SOLUTION INFILTRATION; PERINEURAL at 05:47

## 2024-10-08 RX ADMIN — LIDOCAINE 1 G: 40 CREAM TOPICAL at 05:47

## 2024-10-08 ASSESSMENT — PAIN SCALES - GENERAL: PAINLEVEL: SEVERE PAIN (6)

## 2024-10-08 NOTE — PROGRESS NOTES
Name: Ahmet Coleman  : 1945 Medical Record #: [id number]  Diagnosis: Brain Mets  Date of Treatment: 2024  Referring Physicians: Hector Collins, Tumor Registry      GAMMA KNIFE PROCEDURE NOTE and TREATMENT SUMMARY    Treatment Summary:     Treatment Site Dose Modality Collimators Shots   Rt sup parasagittal 22 Gy to 75% isodose Cobalt 60 4,8,16mm 15   Lt sup parasagittal 22 Gy to 60% isodose Cobalt 60 4,8,16mm 14   Rt inf parasagittal 22 Gy to 80% isodose Cobalt 60 4,8mm 3             DESCRIPTION OF PROCEDURE:  On 10/08/2024 the patient was brought to the Gamma Knife suite at the [facility].      HEAD IMMOBILIZATION: Head frame put on by the neurosurgeon  Dr Keller   MEDICATION: Sedation and local anesthetic    The patient was then taken to the Department of Radiology where a stereotactic brain MRI was performed.  The patient was admitted to the  care area while treatment planning was completed.      These Images were then sent to the [employer] Gamma Knife computer system where a three dimensional stereotactic plan was generated.   The patient was then treated on the [employer] Gamma Knife.  Treatment involved 3 targets.    The [employer] Gamma Knife IconTM Plan software was used to create a highly conformal dose distribution using the number and size collimators detailed above.     TREATMENT:    The patient was brought to the Gamma Knife suite.  The patient was identified by 2 methods. A timeout was performed to confirm the correct patient and correct procedure. The site was identified by an MRI image and treatment planning software, which defined the treatment area.     A cone beam CT was done and compared to the MRI to look for frame motion  We evaluated the frame manually to ensure it was still secure.     The treatment was delivered using the [employer] Gamma Knife IconTM without complication.  The head immobilization was removed and the patient was  discharged home in stable condition.  The patient tolerated the treatment well and had no complications.        PAIN MANAGEMENT: None required      FOLLOW-UP PLANS:  Follow up: 3 Months      Imaging Before Follow Up: Brain MRI    Approved by:  Saranya Ramirez MD    PHYSICIST: Katie Verduzco, PhD

## 2024-10-08 NOTE — LETTER
10/8/2024      Ahmet Coleman  [address]  [place] [address]      Dear Colleague,    Thank you for referring your patient, Ahmet Coleman, to the [facility] RADIATION ONCOLOGY. Please see a copy of my visit note below.    Neurosurgery Gamma Knife Note    Reason for Visit: Gamma Knife Planning and Treatment    History of Present Illness  Ahmet Coleman is a 79-year-old male with a history of melanoma, now presenting with three metastatic brain lesions identified on MRI as a part of routine surveillance following prior treatment. He has been referred for Gamma Knife radiosurgery to address these intracranial metastases, identified as a 9 mm lesion in the left ventral superior frontal gyrus, a 12 x 6 mm lesion in the right pericallosal gyrus, and a 4 mm lesion in the right dorsal cingulate gyrus. He tells me that he has had small headaches but nothing else.         Past Medical History:   Diagnosis Date     Anxiety      Benign hypertension 12/11/2013     Cervicalgia      Chronic kidney disease      Depressive disorder, not elsewhere classified      Diverticulitis of colon 07/10/2013     Esophageal reflux      Irritable bowel syndrome 10/03/2003     Lumbago      Malignant melanoma of scalp (H) 12/29/2020     Migraine, unspecified, without mention of intractable migraine without mention of status migrainosus      Other kyphoscoliosis and scoliosis      Other specified gastritis without mention of hemorrhage      PTSD (post-traumatic stress disorder)      Tobacco abuse since 1965    on and off       Past Surgical History:   Procedure Laterality Date     COLONOSCOPY N/A 6/8/2016    Procedure: COMBINED COLONOSCOPY, SINGLE OR MULTIPLE BIOPSY/POLYPECTOMY BY BIOPSY;  Surgeon: Mahesh Amanda MD;  Location:  GI     ESOPHAGOSCOPY, GASTROSCOPY, DUODENOSCOPY (EGD), COMBINED  9/6/2011    Procedure:COMBINED ESOPHAGOSCOPY, GASTROSCOPY, DUODENOSCOPY (EGD), BIOPSY SINGLE OR MULTIPLE;  Esophagogastroduodenoscopy with multiple Biopsy's; Surgeon:MARINA DYE; Location:[facility]     EXCISE MASS HEAD N/A 5/24/2021    Procedure: wide local excision of scalp melanoma;  Surgeon: Paulo Garcia MD;  Location: [facility]     GRAFT FREE VASCULARIZED LATISSIMUS DORSI Left 5/24/2021    Procedure: left latissimus free flap for scalp reconstruction\. skin graft from left back to scalp;  Surgeon: Kathryn Machado MD;  Location: [facility]     ZZC APPENDECTOMY       ZZC NONSPECIFIC PROCEDURE      nasal septal fx and a devitation needing plastic surgery       Allergies   Allergen Reactions     Aspirin      Motrin [Ibuprofen Micronized]      And ASA  Cramps  diarrhea       Current Outpatient Medications   Medication Sig Dispense Refill     acetaminophen (TYLENOL) 500 MG tablet Take 2 tablets (1,000 mg) by mouth every 8 hours as needed for mild pain (Patient not taking: Reported on 10/8/2024) 100 tablet 0     diphenoxylate-atropine (LOMOTIL) 2.5-0.025 MG tablet Take 1-2 tablets by mouth 4 times daily as needed for diarrhea (Patient not taking: Reported on 10/8/2024) 120 tablet 5     docusate sodium (COLACE) 100 MG capsule        fluvoxaMINE (LUVOX) 100 MG tablet Take 150 mg at bedtime       hydrochlorothiazide (HYDRODIURIL) 25 MG tablet Take 25 mg by mouth daily.       Hypromellose (ARTIFICIAL TEARS OP) Apply  to eye. 2 drops in each eye twice a day as needed       Lactobacillus-Inulin ([facility] DIGESTIVE [facility]) CAPS 1 tab daily (Patient not taking: Reported on 10/8/2024) 30 capsule 1     lisinopril (ZESTRIL) 10 MG tablet Take 15 mg by mouth daily       Magnesium Oxide 420 MG TABS Take 420 mg by mouth daily       methocarbamol (ROBAXIN) 500 MG tablet Take 1 tablet (500 mg) by mouth 3 times daily as needed for muscle spasms (Patient not taking: Reported on 10/8/2024) 10 tablet 0     omeprazole (PRILOSEC) 20 MG capsule Take 1 capsule by mouth 2 times daily. 60 capsule prn     polyethylene glycol (MIRALAX) 17 GM/Dose  powder Take 17 g by mouth daily (Patient not taking: Reported on 10/8/2024) 510 g 0     potassium chloride ER (KLOR-CON M) 20 MEQ CR tablet Take 1 tablet (20 mEq) by mouth daily 7 tablet 0     prazosin (MINIPRESS) 1 MG capsule Take 1 mg by mouth 2 Times Daily Takes 2 tabs in the morning and 1 tab at night       pregabalin (LYRICA) 150 MG capsule Take 150 mg by mouth 2 times daily       QUEtiapine (SEROQUEL) 300 MG tablet Take 150 mg by mouth At Bedtime        senna-docusate (SENOKOT-S/PERICOLACE) 8.6-50 MG tablet Take 1 tablet by mouth 2 times daily (Patient not taking: Reported on 11/3/2023) 30 tablet 0     simvastatin (ZOCOR) 80 MG tablet Take 40 mg by mouth At Bedtime        traZODone (DESYREL) 100 MG tablet Take 100 mg by mouth At Bedtime        vitamin D3 (CHOLECALCIFEROL) 1000 units (25 mcg) tablet Take 1,000 Units by mouth daily        vitamin D3 (CHOLECALCIFEROL) 125 MCG (5000 UT) tablet Take 1 tablet (125 mcg) by mouth daily 90 tablet 3     vortioxetine (TRINTELLIX) 20 MG tablet TAKE ONE TABLET BY MOUTH EVERY MORNING       Current Facility-Administered Medications   Medication Dose Route Frequency Provider Last Rate Last Admin     lidocaine 1% with EPINEPHrine 1:100,000 injection 1.5 mL  1.5 mL Intradermal Once Sidra Esteban MD         lidocaine 1% with EPINEPHrine 1:100,000 injection 1.5 mL  1.5 mL Intradermal Once Sidra Esteban MD         Facility-Administered Medications Ordered in Other Visits   Medication Dose Route Frequency Provider Last Rate Last Admin     fluorodeoxyglucose F-18 (FDG) radioisotope injection 10-18 mCi  10-18 mCi. Intravenous Once Oksana Ashton MD         iopamidol (ISOVUE-370) solution  mL   mL Intravenous Once Oksana Ashton MD         sodium chloride (PF) 0.9% PF flush 10 mL  10 mL Intravenous Once Loan Montana MD         sodium chloride (PF) 0.9% PF flush 10-60 mL  10-60 mL Intracatheter Q8H Sherine Cardoza PA-C   60 mL at 06/10/22  1021     sodium chloride (PF) 0.9% PF flush 60 mL  60 mL Intravenous Once Oksana Ashton MD                 Physical Exam  There were no vitals taken for this visit.      General: Awake, alert, oriented. Well nourished, well developed, no acute distress.  HEENT: Head normocephalic, atraumatic.  Extremity: Warm with no clubbing or cyanosis. No lower extremity edema.    Neurological  Awake, alert and oriented to date, time, place and person. Speech fluent.   Pupils equal, round, reactive to light.  Extraocular movement intact.  Face symmetric.  Tongue midline.  Uvula elevates equally.    Motor: full strength throughout.  Sensation: intact to light touch    ROS: 10 point ROS neg other than the symptoms noted above in the HPI.    Assessment and Plan   Ahmet Coleman is a 79 year old male with metastatic melanoma to the brain. Today we went through the informed consent process to discuss the alternatives, potential benefits, and risks of gamma knife radiosurgery.  In particular we discussed the risks of pin site pain, pin site infection, cognitive dysfunction, brain swelling, radiation necrosis, headache, nausea and vomiting, numbness/tingling of the pin sites, hair loss, seizures, and rarely bleeding into the brain. They asked excellent questions, which were answered. They would like to move forward with gamma knife treatment.      Gamma Knife treatment with stacey Keller MD  Department of Neurosurgery  [facility]      Again, thank you for allowing me to participate in the care of your patient.        Sincerely,        Mahesh Keller MD

## 2024-10-08 NOTE — LETTER
10/8/2024      Ahmet Coleman  [address]  [place] [address]      Dear Colleague,    Thank you for referring your patient, Ahmet Coleman, to the [facility] RADIATION ONCOLOGY. Please see a copy of my visit note below.      Name: Ahmet Coleman.  : 1945 Medical Record #: [id number]  Diagnosis: Brain Mets  Date of Treatment: 2024  Referring Physicians: Hector Collins, Tumor Registry      GAMMA KNIFE PROCEDURE NOTE and TREATMENT SUMMARY    Treatment Summary:     Treatment Site Dose Modality Collimators Shots   Rt sup parasagittal 22 Gy to 75% isodose Cobalt 60 4,8,16mm 15   Lt sup parasagittal 22 Gy to 60% isodose Cobalt 60 4,8,16mm 14   Rt inf parasagittal 22 Gy to 80% isodose Cobalt 60 4,8mm 3             DESCRIPTION OF PROCEDURE:  On 10/08/2024 the patient was brought to the Gamma Knife suite at the [facility].      HEAD IMMOBILIZATION: Head frame put on by the neurosurgeon  Dr Keller   MEDICATION: Sedation and local anesthetic    The patient was then taken to the Department of Radiology where a stereotactic brain MRI was performed.  The patient was admitted to the 1A care area while treatment planning was completed.      These Images were then sent to the [employer] Gamma Knife computer system where a three dimensional stereotactic plan was generated.   The patient was then treated on the [employer] Gamma Knife.  Treatment involved 3 targets.    The [employer] Gamma Knife IconTM Plan software was used to create a highly conformal dose distribution using the number and size collimators detailed above.     TREATMENT:    The patient was brought to the Gamma Knife suite.  The patient was identified by 2 methods. A timeout was performed to confirm the correct patient and correct procedure. The site was identified by an MRI image and treatment planning software, which defined the treatment area.     A cone beam CT was done and compared to the MRI  to look for frame motion  We evaluated the frame manually to ensure it was still secure.     The treatment was delivered using the [employer] Gamma Knife IconTM without complication.  The head immobilization was removed and the patient was discharged home in stable condition.  The patient tolerated the treatment well and had no complications.        PAIN MANAGEMENT: None required      FOLLOW-UP PLANS:  Follow up: 3 Months      Imaging Before Follow Up: Brain MRI    Approved by:  Saranya Ramirez MD    PHYSICIST: Katie Verduzco, PhD    Again, thank you for allowing me to participate in the care of your patient.        Sincerely,        Saranya Ramirez MD

## 2024-10-09 NOTE — PROGRESS NOTES
Neurosurgery Gamma Knife Note    Reason for Visit: Gamma Knife Planning and Treatment    History of Present Illness  Ahmet Coleman is a 79-year-old male with a history of melanoma, now presenting with three metastatic brain lesions identified on MRI as a part of routine surveillance following prior treatment. He has been referred for Gamma Knife radiosurgery to address these intracranial metastases, identified as a 9 mm lesion in the left ventral superior frontal gyrus, a 12 x 6 mm lesion in the right pericallosal gyrus, and a 4 mm lesion in the right dorsal cingulate gyrus. He tells me that he has had small headaches but nothing else.         Past Medical History:   Diagnosis Date    Anxiety     Benign hypertension 12/11/2013    Cervicalgia     Chronic kidney disease     Depressive disorder, not elsewhere classified     Diverticulitis of colon 07/10/2013    Esophageal reflux     Irritable bowel syndrome 10/03/2003    Lumbago     Malignant melanoma of scalp (H) 12/29/2020    Migraine, unspecified, without mention of intractable migraine without mention of status migrainosus     Other kyphoscoliosis and scoliosis     Other specified gastritis without mention of hemorrhage     PTSD (post-traumatic stress disorder)     Tobacco abuse since 1965    on and off       Past Surgical History:   Procedure Laterality Date    COLONOSCOPY N/A 6/8/2016    Procedure: COMBINED COLONOSCOPY, SINGLE OR MULTIPLE BIOPSY/POLYPECTOMY BY BIOPSY;  Surgeon: Mahesh Amanda MD;  Location: [facility]    ESOPHAGOSCOPY, GASTROSCOPY, DUODENOSCOPY (EGD), COMBINED  9/6/2011    Procedure:COMBINED ESOPHAGOSCOPY, GASTROSCOPY, DUODENOSCOPY (EGD), BIOPSY SINGLE OR MULTIPLE; Esophagogastroduodenoscopy with multiple Biopsy's; Surgeon:MARINA DYE; Location:[facility]    EXCISE MASS HEAD N/A 5/24/2021    Procedure: wide local excision of scalp melanoma;  Surgeon: Paulo Garcia MD;  Location: [facility]    GRAFT FREE VASCULARIZED LATISSIMUS DORSI Left  5/24/2021    Procedure: left latissimus free flap for scalp reconstruction\. skin graft from left back to scalp;  Surgeon: Kathryn Machado MD;  Location: [facility]    ZZC APPENDECTOMY      ZZC NONSPECIFIC PROCEDURE      nasal septal fx and a devitation needing plastic surgery       Allergies   Allergen Reactions    Aspirin     Motrin [Ibuprofen Micronized]      And ASA  Cramps  diarrhea       Current Outpatient Medications   Medication Sig Dispense Refill    acetaminophen (TYLENOL) 500 MG tablet Take 2 tablets (1,000 mg) by mouth every 8 hours as needed for mild pain (Patient not taking: Reported on 10/8/2024) 100 tablet 0    diphenoxylate-atropine (LOMOTIL) 2.5-0.025 MG tablet Take 1-2 tablets by mouth 4 times daily as needed for diarrhea (Patient not taking: Reported on 10/8/2024) 120 tablet 5    docusate sodium (COLACE) 100 MG capsule       fluvoxaMINE (LUVOX) 100 MG tablet Take 150 mg at bedtime      hydrochlorothiazide (HYDRODIURIL) 25 MG tablet Take 25 mg by mouth daily.      Hypromellose (ARTIFICIAL TEARS OP) Apply  to eye. 2 drops in each eye twice a day as needed      Lactobacillus-Inulin ([facility] DIGESTIVE [facility]) CAPS 1 tab daily (Patient not taking: Reported on 10/8/2024) 30 capsule 1    lisinopril (ZESTRIL) 10 MG tablet Take 15 mg by mouth daily      Magnesium Oxide 420 MG TABS Take 420 mg by mouth daily      methocarbamol (ROBAXIN) 500 MG tablet Take 1 tablet (500 mg) by mouth 3 times daily as needed for muscle spasms (Patient not taking: Reported on 10/8/2024) 10 tablet 0    omeprazole (PRILOSEC) 20 MG capsule Take 1 capsule by mouth 2 times daily. 60 capsule prn    polyethylene glycol (MIRALAX) 17 GM/Dose powder Take 17 g by mouth daily (Patient not taking: Reported on 10/8/2024) 510 g 0    potassium chloride ER (KLOR-CON M) 20 MEQ CR tablet Take 1 tablet (20 mEq) by mouth daily 7 tablet 0    prazosin (MINIPRESS) 1 MG capsule Take 1 mg by mouth 2 Times Daily Takes 2 tabs in the morning and 1 tab  at night      pregabalin (LYRICA) 150 MG capsule Take 150 mg by mouth 2 times daily      QUEtiapine (SEROQUEL) 300 MG tablet Take 150 mg by mouth At Bedtime       senna-docusate (SENOKOT-S/PERICOLACE) 8.6-50 MG tablet Take 1 tablet by mouth 2 times daily (Patient not taking: Reported on 11/3/2023) 30 tablet 0    simvastatin (ZOCOR) 80 MG tablet Take 40 mg by mouth At Bedtime       traZODone (DESYREL) 100 MG tablet Take 100 mg by mouth At Bedtime       vitamin D3 (CHOLECALCIFEROL) 1000 units (25 mcg) tablet Take 1,000 Units by mouth daily       vitamin D3 (CHOLECALCIFEROL) 125 MCG (5000 UT) tablet Take 1 tablet (125 mcg) by mouth daily 90 tablet 3    vortioxetine (TRINTELLIX) 20 MG tablet TAKE ONE TABLET BY MOUTH EVERY MORNING       Current Facility-Administered Medications   Medication Dose Route Frequency Provider Last Rate Last Admin    lidocaine 1% with EPINEPHrine 1:100,000 injection 1.5 mL  1.5 mL Intradermal Once Sidra Esteban MD        lidocaine 1% with EPINEPHrine 1:100,000 injection 1.5 mL  1.5 mL Intradermal Once Sidra Esteban MD         Facility-Administered Medications Ordered in Other Visits   Medication Dose Route Frequency Provider Last Rate Last Admin    fluorodeoxyglucose F-18 (FDG) radioisotope injection 10-18 mCi  10-18 mCi. Intravenous Once Oksana Ashton MD        iopamidol (ISOVUE-370) solution  mL   mL Intravenous Once Oksana Ashton MD        sodium chloride (PF) 0.9% PF flush 10 mL  10 mL Intravenous Once Loan Montana MD        sodium chloride (PF) 0.9% PF flush 10-60 mL  10-60 mL Intracatheter Q8H Sherine Cardoza PA-C   60 mL at 06/10/22 1021    sodium chloride (PF) 0.9% PF flush 60 mL  60 mL Intravenous Once Oksana Ashton MD                 Physical Exam  There were no vitals taken for this visit.      General: Awake, alert, oriented. Well nourished, well developed, no acute distress.  HEENT: Head normocephalic,  atraumatic.  Extremity: Warm with no clubbing or cyanosis. No lower extremity edema.    Neurological  Awake, alert and oriented to date, time, place and person. Speech fluent.   Pupils equal, round, reactive to light.  Extraocular movement intact.  Face symmetric.  Tongue midline.  Uvula elevates equally.    Motor: full strength throughout.  Sensation: intact to light touch    ROS: 10 point ROS neg other than the symptoms noted above in the HPI.    Assessment and Plan   Ahmet Coleman is a 79 year old male with metastatic melanoma to the brain. Today we went through the informed consent process to discuss the alternatives, potential benefits, and risks of gamma knife radiosurgery.  In particular we discussed the risks of pin site pain, pin site infection, cognitive dysfunction, brain swelling, radiation necrosis, headache, nausea and vomiting, numbness/tingling of the pin sites, hair loss, seizures, and rarely bleeding into the brain. They asked excellent questions, which were answered. They would like to move forward with gamma knife treatment.      Gamma Knife treatment with stacey Keller MD  Department of Neurosurgery  [facility]

## 2024-10-09 NOTE — PROCEDURES
PATIENT NAME: Ahmet Coleman  YOB: 1945  MRN: [id number]    DATE OF PROCEDURE:  10/08/2024    PREOPERATIVE DIAGNOSIS:     1.  metastatic melanoma  2.  brain metastasis  2.  Cerebral edema    POSTOPERATIVE DIAGNOSIS:     1.  metastatic melanoma  2.  brain metastasis  2.  Cerebral edema    PROCEDURES PERFORMED:    1.  Placement of Leksell stereotactic head frame.  2.  Stereotactic MRI of the brain.      3.  Generation of stereotactic treatment plan.    4.  Gamma knife treatment of three lesions, single fractionation.    ATTENDING SURGEON:  Mahesh Keller MD    ASSISTANT: Katina Rob MD    RADIATION ONCOLOGIST:    Saranya Ramirez MD    RADIATION PHYSICIST:  Katie Verduzco MD    INDICATIONS FOR PROCEDURE:    Ahmet Coleman is a 79-year-old male with a history of melanoma, now presenting with three metastatic brain lesions identified on MRI as a part of routine surveillance following prior treatment. He has been referred for Gamma Knife radiosurgery to address these intracranial metastases, identified as a 9 mm lesion in the left ventral superior frontal gyrus, a 12 x 6 mm lesion in the right pericallosal gyrus, and a 4 mm lesion in the right dorsal cingulate gyrus. Today he presents for gamma knife treatment with a frame.     DESCRIPTION OF PROCEDURE: After obtaining informed consent, the patient was brought to the Gamma Knife suite and correctly identified.  Time out was performed confirming the patient s identity, procedure to be performed and the site and side of the procedure.  Oral Ativan was administered for the head frame placement.  Subsequently, the Leksell stereotactic head frame was secured to the patient's head using 4 pins.  Intended pin sites were prepared in the usual sterile fashion and infiltrated with local anesthetic.  Care was taken not to over tighten the pins and therefore avoid torquing the frame.  Accurate and secure placement of the frame was verified.  Skull  geometry measurements were tested with cone and a fiducial localizer box affixed to the head frame.    The patient was then transferred to the MRI scanner and a stereotactic MRI of the brain with contrast was obtained.  MRI images were transferred to the gamma knife treatment planning station and 3-dimensional stereotactic treatment plan was generated.   Target 1A was named Right Superior Parasagittal. The dimension of the targeted lesion was 1.164 cm3. We created a treatment plan that incorporated 100% of the lesion with the 75% isodose set to 22 Gy isodose line with 4, 8, and 16 mm collimators, delivered in 15 shots.  Target 1B was named Left Superior Parasagittal. The dimension of the targeted lesion was 0.934 cm3. We created a treatment plan that incorporated 100% of the lesion with the 60% isodose line set to 22 Gy with 4, 8, and 16 mm collimators, delivered in 14 shots.  Target 1C was named Right Inferior Parasagittal. The dimension of the targeted lesion was 0.934 cm3. We created a treatment plan that incorporated 100% of the lesion with the 80% isodose line set to 22 Gy line with 4 and 8 mm collimators, delivered in 3 shots.  The patient was brought to the Gamma Knife unit and laid down supine on the table where the frame was attached.  A cone beam CT was done and fused to the previously approved plan.  The fusion was evaluated and target coverage was confirmed to be good after auto-registration.  The treatment was delivered using ICON Gamma Knife.    At the completion of the treatment, the frame was removed and the patient was given discharge instructions for home and a plan for follow-up.  No immediate complications were noted.      Mahesh AZAR MD, Neurosurgery Attending, was present for the key neurosurgical portions of the procedure and immediately available throughout the entire procedure.    Mahesh Keller MD

## 2024-10-15 ENCOUNTER — PRE VISIT (OUTPATIENT)
Dept: RADIATION ONCOLOGY | Facility: CLINIC | Age: 79
End: 2024-10-15
Payer: MEDICARE

## 2024-10-15 DIAGNOSIS — C43.4 MALIGNANT MELANOMA OF SCALP (H): Primary | ICD-10-CM

## 2024-10-15 DIAGNOSIS — C79.31 METASTASIS TO BRAIN (H): ICD-10-CM

## 2024-10-15 RX ORDER — EPINEPHRINE 1 MG/ML
0.3 INJECTION, SOLUTION INTRAMUSCULAR; SUBCUTANEOUS EVERY 5 MIN PRN
Status: CANCELLED | OUTPATIENT
Start: 2024-10-22

## 2024-10-15 RX ORDER — MEPERIDINE HYDROCHLORIDE 25 MG/ML
25 INJECTION INTRAMUSCULAR; INTRAVENOUS; SUBCUTANEOUS EVERY 30 MIN PRN
Status: CANCELLED | OUTPATIENT
Start: 2024-10-22

## 2024-10-15 RX ORDER — HEPARIN SODIUM,PORCINE 10 UNIT/ML
5-20 VIAL (ML) INTRAVENOUS DAILY PRN
Status: CANCELLED | OUTPATIENT
Start: 2024-10-22

## 2024-10-15 RX ORDER — LORAZEPAM 2 MG/ML
0.5 INJECTION INTRAMUSCULAR EVERY 4 HOURS PRN
Status: CANCELLED | OUTPATIENT
Start: 2024-10-22

## 2024-10-15 RX ORDER — ALBUTEROL SULFATE 90 UG/1
1-2 INHALANT RESPIRATORY (INHALATION)
Status: CANCELLED
Start: 2024-10-22

## 2024-10-15 RX ORDER — ALBUTEROL SULFATE 0.83 MG/ML
2.5 SOLUTION RESPIRATORY (INHALATION)
Status: CANCELLED | OUTPATIENT
Start: 2024-10-22

## 2024-10-15 RX ORDER — DIPHENHYDRAMINE HYDROCHLORIDE 50 MG/ML
50 INJECTION INTRAMUSCULAR; INTRAVENOUS
Status: CANCELLED
Start: 2024-10-22

## 2024-10-15 RX ORDER — HEPARIN SODIUM (PORCINE) LOCK FLUSH IV SOLN 100 UNIT/ML 100 UNIT/ML
5 SOLUTION INTRAVENOUS
Status: CANCELLED | OUTPATIENT
Start: 2024-10-22

## 2024-10-15 RX ORDER — METHYLPREDNISOLONE SODIUM SUCCINATE 125 MG/2ML
125 INJECTION INTRAMUSCULAR; INTRAVENOUS
Status: CANCELLED
Start: 2024-10-22

## 2024-10-16 ENCOUNTER — PATIENT OUTREACH (OUTPATIENT)
Dept: ONCOLOGY | Facility: CLINIC | Age: 79
End: 2024-10-16
Payer: MEDICARE

## 2024-10-16 NOTE — PROGRESS NOTES
[facility]: Cancer Care Plan of Care Education Note                                    Discussion with Patient:                                                      Provided patient with [facility] patient education on Pembrolizumab.  Reviewed administration, side effects (including myelosuppression, nausea/vomiting, diarrhea/constipation, hair loss, mouth sores) and ongoing symptom management by APPs in clinic. Provided phone numbers for triage and after hours care and when to call clinic. Highlighted steps to expect when getting treatment (check in, labs, pre- meds, infusion).      Assessment:                                                      Assessment completed with:: Patient    Plan of Care Education   Diagnosis:: Melanoma  Tx plan/regimen:: Pembrolizumab  Preparing for treatment:: Reviewed treatment preparation information with patient (vascular access, day of chemo, visitor policy, what to bring, etc.)  Vascular access education provided for:: Peripheral IV  Side effect education:: Lab value monitoring (anemia, neutropenia, thrombocytopenia);Diarrhea/Constipation;Endocrine effects;Fatigue;Immune-mediated effects;Infection;Mylosuppression;Nausea/Vomiting;Sexual health;Skin changes;Urinary  Plan of Care:: LEVY follow-up appointment;Lab appointment;MD follow-up appointment;Treatment schedule  When to call provider:: Bleeding;Increased shortness of breath;New/worsening pain;Shaking chills;Temperature >100.4F;Uncontrolled diarrhea/constipation;Uncontrolled nausea/vomiting    Evaluation of Learning  Patient Education Provided: Yes  Method:: Booklet/Handout;Literature    Intervention/Education provided during outreach:                                                       Patient to follow up as scheduled at next appt  Patient to call/An[employer] message with updates  Confirmed patient has clinic and triage numbers    Ashlee CHOW RN  Cancer Care Coordinator  [facility]

## 2024-10-28 RX ORDER — EPINEPHRINE 1 MG/ML
0.3 INJECTION, SOLUTION, CONCENTRATE INTRAVENOUS EVERY 5 MIN PRN
Status: CANCELLED | OUTPATIENT
Start: 2024-10-28

## 2024-10-28 RX ORDER — MEPERIDINE HYDROCHLORIDE 25 MG/ML
25 INJECTION INTRAMUSCULAR; INTRAVENOUS; SUBCUTANEOUS
Status: CANCELLED | OUTPATIENT
Start: 2024-10-28

## 2024-10-28 RX ORDER — DIPHENHYDRAMINE HYDROCHLORIDE 50 MG/ML
50 INJECTION INTRAMUSCULAR; INTRAVENOUS
Status: CANCELLED
Start: 2024-10-28

## 2024-10-28 RX ORDER — METHYLPREDNISOLONE SODIUM SUCCINATE 40 MG/ML
40 INJECTION INTRAMUSCULAR; INTRAVENOUS
Status: CANCELLED
Start: 2024-10-28

## 2024-10-28 RX ORDER — ALBUTEROL SULFATE 90 UG/1
1-2 INHALANT RESPIRATORY (INHALATION)
Status: CANCELLED
Start: 2024-10-28

## 2024-10-28 RX ORDER — DIPHENHYDRAMINE HYDROCHLORIDE 50 MG/ML
25 INJECTION INTRAMUSCULAR; INTRAVENOUS
Status: CANCELLED
Start: 2024-10-28

## 2024-10-28 RX ORDER — ALBUTEROL SULFATE 0.83 MG/ML
2.5 SOLUTION RESPIRATORY (INHALATION)
Status: CANCELLED | OUTPATIENT
Start: 2024-10-28

## 2024-10-28 NOTE — PROGRESS NOTES
Virtual Visit Details    Type of service:  Video Visit   Video Start Time: 11:32 AM  Video End Time:11:49 AM    Originating Location (pt. 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  Patient was diagnosed with stage IIIB melanoma of the left scalp (pT2a, cN1c, cM0) and presented in primary care clinic 10/14/2020, with a 6.5 x 7 cm variably pigmented, tender plaque on the left scalp, Shave biopsy of the left central parietal scalp, left central occipital scalp, and left posterior parietal scalp 10/26/2020, revealed nonulcerated nodular and nevoid melanoma with up to 1.3 mm depth of invasion.  PD-L1 expression () was negative with TPS <1%.  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  There was no detected alteration in GNA11, GNAQ, KIT, MAP2K1, NRAS, PDGFRA. Punch biopsy of the right anterior temple, and posterior scalp left of midline 11/25/2020, revealed dermal melanocytic proliferation with melanophages and fibrosis without evidence of malignancy, and punch biopsy of the left occipital scalp 11/25/2020, revealed metastatic melanoma metastatic melanoma positive for SOX10 with rare mitotic activity, dense nodular inflammatory moderate and focal regression. 18-F FDG PET/CT scan 12/08/2020, revealed FDG avid irregular skin thickening spanning 5.4 cm overlying the left parietal bone, without invasion of the bone there was no hypermetabolic lymphadenopathy or distant metastases. Patient received neoadjuvant vemurafenib 960 mg BID day 1-21, then 720 mg BID day 22-28 for cycle 1 day and 720 mg BID days 1-28 plus cobimetinib 60 mg daily day 1-21, and atezolizumab  840 mg IV day 1 repeated every 28 days x 4 cycles from 01/13/2021 through 04/15/2021.  There was a vemurafenib 720 mg and cobimetinib 40 mg dose reduction beginning with cycle 2 (02/18/2021) due to worsening diarrhea.  Restaging 18-F FDG PET/CT scan 03/26/2021, revealed decrease in cutaneous thickening and diffusely decreased FDG uptake in the left parietal scalp lesion, with a small focus of residual skin thickening at the hide left vertex with FDG max 2.9 (previously FDG max 5.9).  There were no other hypermetabolic lesions. Patient underwent wide local excision of the left scalp lesion with left latissimus free flap for scalp reconstruction 05/24/2021, that revealed tumoral melanosis extending to a depth of 1.1 mm without residual melanoma in the wide excision specimen consistent with regressed melanoma.  Patient received adjuvant nivolumab 480 mg IV every 28 days x8 cycles from 06/25/2021 through 02/14/2022.     Restaging 18-F FDG PET/CT scan 08/25/2021, 11/23/2021, 03/18/2022, 06/10/2022, 08/26/2022, 01/13/2023, 04/21/2023, 07/21/2023, 10/27/2023, was negative for hypermetabolic lesions.      Restaging 18-F FDG PET/CT scan 09/13/2024, revealed postsurgical changes in the left temporoparietal periauricular scalp with reconstruction.  There was a 9 mm enhancing hypermetabolic lesion within the left frontal lobe, a 9 mm lesion along the medial aspect of the right frontal lobe suggestive of brain metastases.  There were scattered non-FDG-avid groundglass lung opacities in the left upper lobe and left lower lobe that appeared inflammatory in nature.  There were no other hypermetabolic lesions.  MRI brain 09/24/2024, revealed a 9 mm enhancing lesion in the left ventral superior frontal gyrus, a 12 x 6 mm enhancing lesion in the right pericallosal gyrus, and a 4 mm enhancing lesion in the right dorsal cingulate gyrus.  There were stable postsurgical changes and reconstruction in the left frontal parietal scalp without  evidence of local recurrence in the surgical region. Patient received gamma knife radiosurgery to the right superior parasagittal metastasis (22 Gy to 75% isodose), left superior parasagittal metastasis (22 Gy to 60% isodose), and right inferior parasagittal metastasis (22 Gy to 80% isodose) on 10/08/2024. Patient is receiving adjuvant pembrolizumab 200 mg IV every 21 days x1 year beginning 11/04/2024.  Patient received 1 cycle of pembrolizumab thus far.  Patient had fatigue lasting 1 day after the pembrolizumab infusion.  There are no other new symptoms or events since the prior clinic visit (09/26/2024). He denies fever, weight loss, headache, visual changes, slurred speech, cough, dyspnea, chest pain, abdominal pain, nausea, vomiting, constipation, diarrhea, bleeding, or bone pain.     PAST HISTORY: Past history was reviewed and unchanged from the clinic note 09/26/2024.     MEDICATIONS:   Current Outpatient Medications   Medication Sig Dispense Refill    acetaminophen (TYLENOL) 500 MG tablet Take 2 tablets (1,000 mg) by mouth every 8 hours as needed for mild pain (Patient not taking: Reported on 10/8/2024) 100 tablet 0    diphenoxylate-atropine (LOMOTIL) 2.5-0.025 MG tablet Take 1-2 tablets by mouth 4 times daily as needed for diarrhea (Patient not taking: Reported on 10/8/2024) 120 tablet 5    docusate sodium (COLACE) 100 MG capsule       fluvoxaMINE (LUVOX) 100 MG tablet Take 150 mg at bedtime      hydrochlorothiazide (HYDRODIURIL) 25 MG tablet Take 25 mg by mouth daily.      Hypromellose (ARTIFICIAL TEARS OP) Apply  to eye. 2 drops in each eye twice a day as needed      Lactobacillus-Inulin ([facility] DIGESTIVE [facility]) CAPS 1 tab daily (Patient not taking: Reported on 10/8/2024) 30 capsule 1    lisinopril (ZESTRIL) 10 MG tablet Take 15 mg by mouth daily      Magnesium Oxide 420 MG TABS Take 420 mg by mouth daily      methocarbamol (ROBAXIN) 500 MG tablet Take 1 tablet (500 mg) by mouth 3 times daily as  needed for muscle spasms (Patient not taking: Reported on 10/8/2024) 10 tablet 0    omeprazole (PRILOSEC) 20 MG capsule Take 1 capsule by mouth 2 times daily. 60 capsule prn    polyethylene glycol (MIRALAX) 17 GM/Dose powder Take 17 g by mouth daily (Patient not taking: Reported on 10/8/2024) 510 g 0    potassium chloride ER (KLOR-CON M) 20 MEQ CR tablet Take 1 tablet (20 mEq) by mouth daily 7 tablet 0    prazosin (MINIPRESS) 1 MG capsule Take 1 mg by mouth 2 Times Daily Takes 2 tabs in the morning and 1 tab at night      pregabalin (LYRICA) 150 MG capsule Take 150 mg by mouth 2 times daily      QUEtiapine (SEROQUEL) 300 MG tablet Take 150 mg by mouth At Bedtime       senna-docusate (SENOKOT-S/PERICOLACE) 8.6-50 MG tablet Take 1 tablet by mouth 2 times daily (Patient not taking: Reported on 11/3/2023) 30 tablet 0    simvastatin (ZOCOR) 80 MG tablet Take 40 mg by mouth At Bedtime       traZODone (DESYREL) 100 MG tablet Take 100 mg by mouth At Bedtime       vitamin D3 (CHOLECALCIFEROL) 1000 units (25 mcg) tablet Take 1,000 Units by mouth daily       vitamin D3 (CHOLECALCIFEROL) 125 MCG (5000 UT) tablet Take 1 tablet (125 mcg) by mouth daily 90 tablet 3    vortioxetine (TRINTELLIX) 20 MG tablet TAKE ONE TABLET BY MOUTH EVERY MORNING         REVIEW OF SYSTEMS: Review of systems reviewed with the patient and otherwise negative except for those detailed above.    PHYSICAL EXAM: Not done. Telehealth visit. There were no vitals taken for this visit..  Patient appears well. Breathing is normal and nonlabored.     LABS REVIEWED:   Component      Latest Ref Rng 11/4/2024  8:58 AM   Sodium      135 - 145 mmol/L 137    Potassium      3.4 - 5.3 mmol/L 3.6    Carbon Dioxide (CO2)      22 - 29 mmol/L 27    Anion Gap      7 - 15 mmol/L 11    Urea Nitrogen      8.0 - 23.0 mg/dL 22.8    Creatinine      0.67 - 1.17 mg/dL 1.87 (H)    GFR Estimate      >60 mL/min/1.73m2 37 (L)    Calcium      8.8 - 10.4 mg/dL 9.4    Chloride      98 -  107 mmol/L 99    Glucose      70 - 99 mg/dL 91    Alkaline Phosphatase      40 - 150 U/L 49    AST      0 - 45 U/L 17    ALT      0 - 70 U/L 12    Protein Total      6.4 - 8.3 g/dL 7.1    Albumin      3.5 - 5.2 g/dL 4.3    Bilirubin Total      <=1.2 mg/dL 0.4    TSH      0.30 - 4.20 uIU/mL 2.90        IMPRESSION/PLAN: Stage IIIB melanoma of the left scalp.  Melanoma is a nonulcerated, up to 1.3 mm depth of invasion BRAF p.V600K-positive nodular and nevoid melanoma on the left scalp.  There is no evidence of regional or distant metastases noted on FDG PET/CT scan (12/08/2020).  Patient received neoadjuvant atezolizumab 840 mg IV day 1, vemurafenib BID day 1-28, and cobimetinib daily day 1-21 repeated every 28 days x4 cycles (from 01/13/2021 through 04/15/2021) followed by definitive wide local excision of the left scalp lesion (05/24/2021) that revealed a complete response.  Patient received adjuvant nivolumab IV every 28 days x8 cycles (from 06/25/2021 through 02/14/2022).  There are brain metastases noted on restaging FDG PET/CT scan (09/13/2024) and MRI brain, but no evidence of systemic metastases.  Patient received gamma knife radiosurgery to the right superior parasagittal, left superior parasagittal, and right inferior parasagittal metastases (10/08/2024).  Patient return to clinic in 6 weeks.  Patient is receiving adjuvant pembrolizumab IV every 21 days x1 year (beginning 11/04/2024).  There is grade 1 fatigue.  Pembrolizumab will be continued without modification.  Patient will return to the outpatient infusion center 11/25/2024, for cycle 2 of pembrolizumab. I reviewed the risks and side effects of pembrolizumab with the patient, which include fatigue, weakness, anorexia, weight loss, rash, pruritus, dry eyes, dry mouth, headache, cough, dyspnea, abdominal pain, nausea, vomiting, diarrhea, hypothyroidism, adrenal insufficiency, other endocrine disorders, cardiomyopathy, colitis, nephritis, pancreatitis,  myositis, arthritis, neuropathy, cerebritis.  Patient understood the indication and risks and agreed to continue therapy.  Patient will return to clinic in approximately 5 weeks with CBC, metabolic panel, amylase, lipase, TSH, free T4, cortisol, and for pembrolizumab cycle 3.  The current and past history, clinical evaluation, reviewing diagnostic tests with the patient, and assessment and planning occurred over 30 minutes.       Hector Collins MD    cc: MD Keith Trevino MD Nattawat Klomjit, MD Samir S Khariwala, MD

## 2024-10-29 NOTE — PROGRESS NOTES
Virtual Visit Details    Patient unable to connect to [employer] or [employer] with video capability. Visit transitioned to a telephone visit. Total telephone visit time: 11 minutes    Originating Location (pt. Location): Home    Distant Location (provider location):  Off-site    MEDICAL ONCOLOGY PROGRESS NOTE  Melanoma Clinic  Oct 30, 2024    CHIEF COMPLAINT: Scalp melanoma    Melanoma History:  1. He has a small pigmented scalp lesion present for over 30 years. The lesion was biopsied in 1992 and was benign.  2. October 2020, he presented to [facility] with 1 year of progressive enlargement of the lesion and new onset burning, itching, and stinging sensation in the affected scalp.  3. 10/26/20, He was seen at [facility] dermatology and he had shave biopsies of A. left central parietal scalp, B. left central occipital scalp, C. Left posterior parietal scalp, and D. punch biopsy of the left superior occipital scalp. Pathology (specimen: [id number]) showed a nodular and nevoid type melanoma, non-ulcerated, Breslow depth up to 1.3 mm, Saurabh's level IV, and up to 3 mitoses per mm2. All margins were involved. Ki-67 10-20%. Sox10 stain is positive and highlights an atypical compound melanocytic proliferation with significant overlying confluence and pagetosis of intraepidermal melanocytes. T-stage: at least pT2a. PD-L1 staining shows PD-L1 TPS <1%. 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  4. 11/25/20, he was seen by Dr. Garcia and he took three 3mm punch biopsies, which returned as dermal melanocytic proliferations with melanophages and fibrosis, consistent with locoregional metastatic melanoma.  5. 12/8/2020 he had PET-CT, which showed FDG avid irregular skin thickening overlying the left parietal region, with no FDG avid lymphadenopathy in the neck and no evidence of metastasis elsewhere in the body.  6. 1/22/2021 start vemurafenib and cobimetinib, ~2/12/21 held for diarrhea, then resumed.  7.  2/18/2021 Start atezolizumab, last on 4/29/21  8. 5/24/2021 Wide local excision of the scalp melanoma and left latissimus free flap for scalp reconstruction.    Surgical pathology showed features consistent with scar and tumoral melanosis. No definite residual melanoma is seen. Tumoral melanosis (which extends to a depth of around 1.1 mm) is believed to be equivalent to a diagnosis of regressed melanoma.   9. 6/25/21, he starts adjuvant Nivolumab, last dose 2/14/22  10.  9/13/2024, PET/CT shows to 9 mm enhancing hypermetabolic nodules in the brain consistent with metastasis.   11.9/24/2024, MRI brain showing 3 intracranial metastatic foci  12.10/8/2024, completes gamma knife to intracranial metastases    INTERVAL HISTORY  Mr. 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  - Completed gamma knife treatment 10/8/2024.  Feels this went well.  Denies any significant fatigue.  - Feels like he has been stuttering a little bit and is slightly offkilter since completing gamma knife  - Notes an increase in depression related to multiple health issues with multiple family members.  His mother-in-law was recently diagnosed with cancer.  He also has a nephew with health issues  - On a positive note he has a new great grandson, Ho who was born yesterday today he is spending time with his 3-year-old great grandson as well  - He endorses occasional headaches which come and go, typically present behind his eyes  - Denies any significant questions or concerns related to the new pembrolizumab infusion      Current Outpatient Medications   Medication Sig Dispense Refill    acetaminophen (TYLENOL) 500 MG tablet Take 2 tablets (1,000 mg) by mouth every 8 hours as needed for mild pain (Patient not taking: Reported on 10/8/2024) 100 tablet 0    diphenoxylate-atropine (LOMOTIL) 2.5-0.025 MG tablet Take 1-2 tablets by mouth 4 times daily as  needed for diarrhea (Patient not taking: Reported on 10/8/2024) 120 tablet 5    docusate sodium (COLACE) 100 MG capsule       fluvoxaMINE (LUVOX) 100 MG tablet Take 150 mg at bedtime      hydrochlorothiazide (HYDRODIURIL) 25 MG tablet Take 25 mg by mouth daily.      Hypromellose (ARTIFICIAL TEARS OP) Apply  to eye. 2 drops in each eye twice a day as needed      Lactobacillus-Inulin (C[employer] DIGESTIVE HEALTH) CAPS 1 tab daily (Patient not taking: Reported on 10/8/2024) 30 capsule 1    lisinopril (ZESTRIL) 10 MG tablet Take 15 mg by mouth daily      Magnesium Oxide 420 MG TABS Take 420 mg by mouth daily      methocarbamol (ROBAXIN) 500 MG tablet Take 1 tablet (500 mg) by mouth 3 times daily as needed for muscle spasms (Patient not taking: Reported on 10/8/2024) 10 tablet 0    omeprazole (PRILOSEC) 20 MG capsule Take 1 capsule by mouth 2 times daily. 60 capsule prn    polyethylene glycol (MIRALAX) 17 GM/Dose powder Take 17 g by mouth daily (Patient not taking: Reported on 10/8/2024) 510 g 0    potassium chloride ER (KLOR-CON M) 20 MEQ CR tablet Take 1 tablet (20 mEq) by mouth daily 7 tablet 0    prazosin (MINIPRESS) 1 MG capsule Take 1 mg by mouth 2 Times Daily Takes 2 tabs in the morning and 1 tab at night      pregabalin (LYRICA) 150 MG capsule Take 150 mg by mouth 2 times daily      QUEtiapine (SEROQUEL) 300 MG tablet Take 150 mg by mouth At Bedtime       senna-docusate (SENOKOT-S/PERICOLACE) 8.6-50 MG tablet Take 1 tablet by mouth 2 times daily (Patient not taking: Reported on 11/3/2023) 30 tablet 0    simvastatin (ZOCOR) 80 MG tablet Take 40 mg by mouth At Bedtime       traZODone (DESYREL) 100 MG tablet Take 100 mg by mouth At Bedtime       vitamin D3 (CHOLECALCIFEROL) 1000 units (25 mcg) tablet Take 1,000 Units by mouth daily       vitamin D3 (CHOLECALCIFEROL) 125 MCG (5000 UT) tablet Take 1 tablet (125 mcg) by mouth daily 90 tablet 3    vortioxetine (TRINTELLIX) 20 MG tablet TAKE ONE TABLET BY MOUTH EVERY  MORNING       Objective:  Unable to do physical exam 2/ telephone visit. Well sounding in no distress. Normal speech and thought process. Good voice quality. No audible wheezing or cough.    Labs:  Most Recent 3 CBC's:  Recent Labs   Lab Test 24  1225 24  1138 23  1422   WBC 8.0 3.3* 4.2   HGB 13.7 12.7* 14.9   MCV 85 84 83    187 203   ANEUTAUTO 6.4 1.6 2.1     Most Recent 3 BMP's:  Recent Labs   Lab Test 24  1225 24  1207 24  1138 23  1422 23  1347 23  1251     --  139 137   < > 139   POTASSIUM 4.0  --  4.2 3.6   < > 3.6   CHLORIDE 102  --  100 99   < > 99   CO2 27  --  30* 26   < > 29   BUN 24.5*  --  23.1* 14.9   < > 15.6   CR 1.44* 1.6* 1.53* 1.37*   < > 1.55*   ANIONGAP 11  --  9 12   < > 11   STEPHEN 9.7  --  9.0 9.3   < > 9.3   *  --  120* 111*   < > 110*   PROTTOTAL 7.4  --  7.4  --   --  7.3   ALBUMIN 4.4  --  4.4 4.6   < > 4.6    < > = values in this interval not displayed.    Most Recent 3 LFT's:  Recent Labs   Lab Test 24  1225 24  1138 23  1251   AST 19 24 19   ALT 9 19 8   ALKPHOS 51 55 56   BILITOTAL 0.6 0.4 0.5   I reviewed the above labs today.    IMAGING  Imaging:  MRI Brain w contrast  Narrative: EXAM: MR BRAIN W CONTRAST  10/8/2024 7:38 AM     HISTORY:  Metastasis to brain (H)       COMPARISON:  2024 MRI Brain and 2024 PET CT    TECHNIQUE: Sagittal, Coronal, and Axial T1-weighted images were  obtained following intravenous gadolinium-based contrast  administration.     CONTRAST: 8mL Gadavist.    FINDINGS:  Redemonstration of enhancing metastatic foci that has slightly  increased in size and comparison to 2024 MRI brain includin. 11 mm lesion with mild surrounding vasogenic edema unchanged from  prior in the left ventral superior frontal gyrus, previously measuring  up to 9 mm.  2. 13 x 7 mm lesion in the right pericallosal gyrus, previously  measuring 12 x 6 mm.  3. 4 mm lesion in the  right dorsal single gyrus, unchanged from prior.    Postsurgical changes of local excision and graft ligament in the left  frontoparietal scalp. No evidence of local recurrence at the surgical  resection bed.    There is no midline shift or intracranial hemorrhage. The ventricles  are proportionate to the cerebral sulci. Major intracranial vascular  structures are within normal limits.    No suspicious abnormality of the skull marrow signal. Unchanged  polypoid mucosal thickening of the right maxillary sinus. The  remainder of the paranasal sinuses are clear.. Mastoid air cells are  clear. The orbits are normal.  Impression: IMPRESSION:   1. Redemonstration of enhancing intracranial metastatic foci, two  which have mildly increased in size in comparison to 9/24/2024.  2. Postsurgical changes in the left frontoparietal skull. No evidence  of local recurrence.     I have personally reviewed the examination and initial interpretation  and I agree with the findings.    LISA QUINONEZ MD         SYSTEM ID:  [id number]    I reviewed the above images today.    ASSESSMENT AND PLAN  Cutaneous melanoma, scalp primary, pT2a cN1c, clinical Stage IIIB  Brain metastases  It was a pleasure to talk with Mr. Coleman today. He is a 79 year old  with agent orange cutaneous exposures in the [place] war and a diagnosis of malignant melanoma arising from a pre-existing pigmented lesion on the scalp. He had a partial response to neoadjuvant therapy with atezolizumab (3 cycles), vemurafenib, and cobimetinib (4 cycles). He received adjuvant nivolumab through 2/14/2022.  Surveillance imaging in September 2024 revealed new intracranial lesions consistent with metastatic disease.  He is now s/p gamma knife treatment on 10/8/2024.  He will be starting pembrolizumab 200 mg every 3 weeks x 1 year on 11/4/2024.    We reviewed potential side effects of pembrolizumab including but not limited to colitis, pneumonitis, rash, thyroid  dysfunction, fatigue along with other immune mediated complications.  Reviewed the need for lab monitoring every 3 weeks with infusion as well as provider visits prior to infusion for evaluation of side effects.  Lloyd is agreeable to start pembrolizumab as planned    Plan:  - Proceed with cycle 1 pembrolizumab 200 mg 11/4 pending labs  - RTC every 3 weeks for pembrolizumab and labs with LEVY/MD visit prior    Depressed mood, anxiety, PTSD  He has a longstanding history of PTSD. Takes occasional lorazepam. Continues to work with psychiatrist about every 2 months out of [place].  Depressive symptoms recently exacerbated by cancer recurrence as well as health issues amongst family members    CKD, moderate, stage III  He has seen nephrology, Dr. Alarcon. CKD is likely due to chronic hypertension and possible effect from vemurafenib.  Most recent nephrology visit from 4/29/2024 reviewed.     Sherine Meza CNP  ---  20 minutes spent on the date of the encounter doing chart review, review of test results, interpretation of tests, and documentation, in addition to 11 minutes spent on the phone with the patient.

## 2024-10-30 ENCOUNTER — VIRTUAL VISIT (OUTPATIENT)
Dept: ONCOLOGY | Facility: CLINIC | Age: 79
End: 2024-10-30
Attending: INTERNAL MEDICINE
Payer: MEDICARE

## 2024-10-30 VITALS — HEIGHT: 69 IN | WEIGHT: 175 LBS | BODY MASS INDEX: 25.92 KG/M2

## 2024-10-30 DIAGNOSIS — N18.9 CHRONIC KIDNEY DISEASE, UNSPECIFIED CKD STAGE: ICD-10-CM

## 2024-10-30 DIAGNOSIS — C79.31 METASTASIS TO BRAIN (H): ICD-10-CM

## 2024-10-30 DIAGNOSIS — C43.4 MALIGNANT MELANOMA OF SCALP (H): Primary | ICD-10-CM

## 2024-10-30 PROCEDURE — 99442 PR PHYSICIAN TELEPHONE EVALUATION 11-20 MIN: CPT | Mod: 95 | Performed by: REGISTERED NURSE

## 2024-10-30 ASSESSMENT — PAIN SCALES - GENERAL: PAINLEVEL_OUTOF10: MILD PAIN (2)

## 2024-10-30 NOTE — Clinical Note
10/30/2024      Ahmet Coleman  [address]  [place] [address]      Dear Colleague,    Thank you for referring your patient, Ahmet Coleman, to the [facility]. Please see a copy of my visit note below.    Virtual Visit Details    Patient unable to connect to [employer] or [employer] with video capability. Visit transitioned to a telephone visit. Total telephone visit time: 11 minutes    Originating Location (pt. Location): Home  {PROVIDER LOCATION On-site should be selected for visits conducted from your clinic location or adjoining [facility] hospital, academic office, or other nearby [facility] building. Off-site should be selected for all other provider locations, including home:[id number]}  Distant Location (provider location):  Off-site    MEDICAL ONCOLOGY PROGRESS NOTE  Melanoma Clinic  Oct 30, 2024    CHIEF COMPLAINT: Scalp melanoma    Melanoma History:  1. He has a small pigmented scalp lesion present for over 30 years. The lesion was biopsied in 1992 and was benign.  2. October 2020, he presented to [facility] with 1 year of progressive enlargement of the lesion and new onset burning, itching, and stinging sensation in the affected scalp.  3. 10/26/20, He was seen at [facility] and he had shave biopsies of A. left central parietal scalp, B. left central occipital scalp, C. Left posterior parietal scalp, and D. punch biopsy of the left superior occipital scalp. Pathology (specimen: [id number]) showed a nodular and nevoid type melanoma, non-ulcerated, Breslow depth up to 1.3 mm, Saurabh's level IV, and up to 3 mitoses per mm2. All margins were involved. Ki-67 10-20%. Sox10 stain is positive and highlights an atypical compound melanocytic proliferation with significant overlying confluence and pagetosis of intraepidermal melanocytes. T-stage: at least pT2a. PD-L1 staining shows PD-L1 TPS <1%. 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  4. 11/25/20, he was seen by  Dr. Garcia and he took three 3mm punch biopsies, which returned as dermal melanocytic proliferations with melanophages and fibrosis, consistent with locoregional metastatic melanoma.  5. 12/8/2020 he had PET-CT, which showed FDG avid irregular skin thickening overlying the left parietal region, with no FDG avid lymphadenopathy in the neck and no evidence of metastasis elsewhere in the body.  6. 1/22/2021 start vemurafenib and cobimetinib, ~2/12/21 held for diarrhea, then resumed.  7. 2/18/2021 Start atezolizumab, last on 4/29/21  8. 5/24/2021 Wide local excision of the scalp melanoma and left latissimus free flap for scalp reconstruction.    Surgical pathology showed features consistent with scar and tumoral melanosis. No definite residual melanoma is seen. Tumoral melanosis (which extends to a depth of around 1.1 mm) is believed to be equivalent to a diagnosis of regressed melanoma.   9. 6/25/21, he starts adjuvant Nivolumab, last dose 2/14/22  10.  9/13/2024, PET/CT shows to 9 mm enhancing hypermetabolic nodules in the brain consistent with metastasis.   11.9/24/2024, MRI brain showing 3 intracranial metastatic foci  12.10/8/2024, completes gamma knife to intracranial metastases    INTERVAL HISTORY  Mr. 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  - Completed gamma knife treatment 10/8/2024.  Feels this went well.  Denies any significant fatigue.  - Feels like he has been stuttering a little bit and is slightly offkilter since completing gamma knife  - Notes an increase in depression related to multiple health issues with multiple family members.  His mother-in-law was recently diagnosed with cancer.  He also has a nephew with health issues  - On a positive note he has a new great grandson, Ho who was born yesterday today he is spending time with his 3-year-old great grandson as well  - He endorses occasional  headaches which come and go, typically present behind his eyes  - Denies any significant questions or concerns related to the new pembrolizumab infusion      Current Outpatient Medications   Medication Sig Dispense Refill    acetaminophen (TYLENOL) 500 MG tablet Take 2 tablets (1,000 mg) by mouth every 8 hours as needed for mild pain (Patient not taking: Reported on 10/8/2024) 100 tablet 0    diphenoxylate-atropine (LOMOTIL) 2.5-0.025 MG tablet Take 1-2 tablets by mouth 4 times daily as needed for diarrhea (Patient not taking: Reported on 10/8/2024) 120 tablet 5    docusate sodium (COLACE) 100 MG capsule       fluvoxaMINE (LUVOX) 100 MG tablet Take 150 mg at bedtime      hydrochlorothiazide (HYDRODIURIL) 25 MG tablet Take 25 mg by mouth daily.      Hypromellose (ARTIFICIAL TEARS OP) Apply  to eye. 2 drops in each eye twice a day as needed      Lactobacillus-Inulin ([facility] DIGESTIVE [facility]) CAPS 1 tab daily (Patient not taking: Reported on 10/8/2024) 30 capsule 1    lisinopril (ZESTRIL) 10 MG tablet Take 15 mg by mouth daily      Magnesium Oxide 420 MG TABS Take 420 mg by mouth daily      methocarbamol (ROBAXIN) 500 MG tablet Take 1 tablet (500 mg) by mouth 3 times daily as needed for muscle spasms (Patient not taking: Reported on 10/8/2024) 10 tablet 0    omeprazole (PRILOSEC) 20 MG capsule Take 1 capsule by mouth 2 times daily. 60 capsule prn    polyethylene glycol (MIRALAX) 17 GM/Dose powder Take 17 g by mouth daily (Patient not taking: Reported on 10/8/2024) 510 g 0    potassium chloride ER (KLOR-CON M) 20 MEQ CR tablet Take 1 tablet (20 mEq) by mouth daily 7 tablet 0    prazosin (MINIPRESS) 1 MG capsule Take 1 mg by mouth 2 Times Daily Takes 2 tabs in the morning and 1 tab at night      pregabalin (LYRICA) 150 MG capsule Take 150 mg by mouth 2 times daily      QUEtiapine (SEROQUEL) 300 MG tablet Take 150 mg by mouth At Bedtime       senna-docusate (SENOKOT-S/PERICOLACE) 8.6-50 MG tablet Take 1 tablet by  mouth 2 times daily (Patient not taking: Reported on 11/3/2023) 30 tablet 0    simvastatin (ZOCOR) 80 MG tablet Take 40 mg by mouth At Bedtime       traZODone (DESYREL) 100 MG tablet Take 100 mg by mouth At Bedtime       vitamin D3 (CHOLECALCIFEROL) 1000 units (25 mcg) tablet Take 1,000 Units by mouth daily       vitamin D3 (CHOLECALCIFEROL) 125 MCG (5000 UT) tablet Take 1 tablet (125 mcg) by mouth daily 90 tablet 3    vortioxetine (TRINTELLIX) 20 MG tablet TAKE ONE TABLET BY MOUTH EVERY MORNING       Objective:  Unable to do physical exam 2/2 telephone visit. Well sounding in no distress. Normal speech and thought process. Good voice quality. No audible wheezing or cough.    Labs:  Most Recent 3 CBC's:  Recent Labs   Lab Test 09/13/24 1225 03/01/24  1138 12/07/23  1422   WBC 8.0 3.3* 4.2   HGB 13.7 12.7* 14.9   MCV 85 84 83    187 203   ANEUTAUTO 6.4 1.6 2.1     Most Recent 3 BMP's:  Recent Labs   Lab Test 09/13/24  1225 03/01/24  1207 03/01/24  1138 12/07/23  1422 07/21/23  1347 07/21/23  1251     --  139 137   < > 139   POTASSIUM 4.0  --  4.2 3.6   < > 3.6   CHLORIDE 102  --  100 99   < > 99   CO2 27  --  30* 26   < > 29   BUN 24.5*  --  23.1* 14.9   < > 15.6   CR 1.44* 1.6* 1.53* 1.37*   < > 1.55*   ANIONGAP 11  --  9 12   < > 11   STEPHEN 9.7  --  9.0 9.3   < > 9.3   *  --  120* 111*   < > 110*   PROTTOTAL 7.4  --  7.4  --   --  7.3   ALBUMIN 4.4  --  4.4 4.6   < > 4.6    < > = values in this interval not displayed.    Most Recent 3 LFT's:  Recent Labs   Lab Test 09/13/24 1225 03/01/24  1138 07/21/23  1251   AST 19 24 19   ALT 9 19 8   ALKPHOS 51 55 56   BILITOTAL 0.6 0.4 0.5   I reviewed the above labs today.    IMAGING  Imaging:  MRI Brain w contrast  Narrative: EXAM: MR BRAIN W CONTRAST  10/8/2024 7:38 AM     HISTORY:  Metastasis to brain (H)       COMPARISON:  9/24/2024 MRI Brain and 9/13/2024 PET CT    TECHNIQUE: Sagittal, Coronal, and Axial T1-weighted images were  obtained following  intravenous gadolinium-based contrast  administration.     CONTRAST: 8mL Gadavist.    FINDINGS:  Redemonstration of enhancing metastatic foci that has slightly  increased in size and comparison to 2024 MRI brain includin. 11 mm lesion with mild surrounding vasogenic edema unchanged from  prior in the left ventral superior frontal gyrus, previously measuring  up to 9 mm.  2. 13 x 7 mm lesion in the right pericallosal gyrus, previously  measuring 12 x 6 mm.  3. 4 mm lesion in the right dorsal single gyrus, unchanged from prior.    Postsurgical changes of local excision and graft ligament in the left  frontoparietal scalp. No evidence of local recurrence at the surgical  resection bed.    There is no midline shift or intracranial hemorrhage. The ventricles  are proportionate to the cerebral sulci. Major intracranial vascular  structures are within normal limits.    No suspicious abnormality of the skull marrow signal. Unchanged  polypoid mucosal thickening of the right maxillary sinus. The  remainder of the paranasal sinuses are clear.. Mastoid air cells are  clear. The orbits are normal.  Impression: IMPRESSION:   1. Redemonstration of enhancing intracranial metastatic foci, two  which have mildly increased in size in comparison to 2024.  2. Postsurgical changes in the left frontoparietal skull. No evidence  of local recurrence.     I have personally reviewed the examination and initial interpretation  and I agree with the findings.    LISA QUINONEZ MD         SYSTEM ID:  [id number]    I reviewed the above images today.    ASSESSMENT AND PLAN  Cutaneous melanoma, scalp primary, pT2a cN1c, clinical Stage IIIB  Brain metastases  It was a pleasure to talk with Mr. Coleman today. He is a 79 year old  with agent orange cutaneous exposures in the [place] war and a diagnosis of malignant melanoma arising from a pre-existing pigmented lesion on the scalp. He had a partial response to neoadjuvant  therapy with atezolizumab (3 cycles), vemurafenib, and cobimetinib (4 cycles). He received adjuvant nivolumab through 2/14/2022.  Surveillance imaging in September 2024 revealed new intracranial lesions consistent with metastatic disease.  He is now s/p gamma knife treatment on 10/8/2024.  He will be starting pembrolizumab 200 mg every 3 weeks x 1 year on 11/4/2024.    We reviewed potential side effects of pembrolizumab including but not limited to colitis, pneumonitis, rash, thyroid dysfunction, fatigue along with other immune mediated complications.  Reviewed the need for lab monitoring every 3 weeks with infusion as well as provider visits prior to infusion for evaluation of side effects.  Lloyd is agreeable to start pembrolizumab as planned    Plan:  - Proceed with cycle 1 pembrolizumab 200 mg 11/4 pending labs  - RTC every 3 weeks for pembrolizumab and labs with LEVY/MD visit prior    Depressed mood, anxiety, PTSD  He has a longstanding history of PTSD. Takes occasional lorazepam. Continues to work with psychiatrist about every 2 months out of [place].  Depressive symptoms recently exacerbated by cancer recurrence as well as health issues amongst family members    CKD, moderate, stage III  He has seen nephrology, Dr. Alarcon. CKD is likely due to chronic hypertension and possible effect from vemurafenib.  Most recent nephrology visit from 4/29/2024 reviewed.     Sherine Meza CNP  ---  20 minutes spent on the date of the encounter doing chart review, review of test results, interpretation of tests, and documentation, in addition to 11 minutes spent on the phone with the patient.               Again, thank you for allowing me to participate in the care of your patient.        Sincerely,        Sherine Meza CNP

## 2024-10-30 NOTE — NURSING NOTE
Patient confirms medications and allergies are accurate via patients echeck in completion, and or denies any changes since last reviewed/verified.       Current patient location: [address] [facility] [address]    Is the patient currently in the state of [place]? YES    Visit mode:VIDEO    If the visit is dropped, the patient can be reconnected by: VIDEO VISIT: Text to cell phone:   Telephone Information:   Mobile [phone]       Will anyone else be joining the visit? NO  (If patient encounters technical issues they should call [phone])    Are changes needed to the allergy or medication list? No    Are refills needed on medications prescribed by this physician? NO    Rooming Documentation:  Questionnaire(s) completed    Reason for visit: RECHECK    Gretchen MONSALVE

## 2024-11-04 ENCOUNTER — INFUSION THERAPY VISIT (OUTPATIENT)
Dept: ONCOLOGY | Facility: CLINIC | Age: 79
End: 2024-11-04
Attending: INTERNAL MEDICINE
Payer: MEDICARE

## 2024-11-04 ENCOUNTER — APPOINTMENT (OUTPATIENT)
Dept: LAB | Facility: CLINIC | Age: 79
End: 2024-11-04
Attending: INTERNAL MEDICINE
Payer: MEDICARE

## 2024-11-04 VITALS
HEART RATE: 62 BPM | DIASTOLIC BLOOD PRESSURE: 68 MMHG | SYSTOLIC BLOOD PRESSURE: 108 MMHG | TEMPERATURE: 97.3 F | BODY MASS INDEX: 26.81 KG/M2 | OXYGEN SATURATION: 94 % | RESPIRATION RATE: 16 BRPM | WEIGHT: 181 LBS | HEIGHT: 69 IN

## 2024-11-04 DIAGNOSIS — C43.4 MALIGNANT MELANOMA OF SCALP (H): Primary | ICD-10-CM

## 2024-11-04 DIAGNOSIS — N18.31 STAGE 3A CHRONIC KIDNEY DISEASE (H): ICD-10-CM

## 2024-11-04 DIAGNOSIS — C79.31 METASTASIS TO BRAIN (H): ICD-10-CM

## 2024-11-04 LAB
ALBUMIN SERPL BCG-MCNC: 4.3 G/DL (ref 3.5–5.2)
ALP SERPL-CCNC: 49 U/L (ref 40–150)
ALT SERPL W P-5'-P-CCNC: 12 U/L (ref 0–70)
ANION GAP SERPL CALCULATED.3IONS-SCNC: 11 MMOL/L (ref 7–15)
AST SERPL W P-5'-P-CCNC: 17 U/L (ref 0–45)
BILIRUB SERPL-MCNC: 0.4 MG/DL
BUN SERPL-MCNC: 22.8 MG/DL (ref 8–23)
CALCIUM SERPL-MCNC: 9.4 MG/DL (ref 8.8–10.4)
CHLORIDE SERPL-SCNC: 99 MMOL/L (ref 98–107)
CREAT SERPL-MCNC: 1.87 MG/DL (ref 0.67–1.17)
EGFRCR SERPLBLD CKD-EPI 2021: 37 ML/MIN/1.73M2
GLUCOSE SERPL-MCNC: 91 MG/DL (ref 70–99)
HCO3 SERPL-SCNC: 27 MMOL/L (ref 22–29)
POTASSIUM SERPL-SCNC: 3.6 MMOL/L (ref 3.4–5.3)
PROT SERPL-MCNC: 7.1 G/DL (ref 6.4–8.3)
SODIUM SERPL-SCNC: 137 MMOL/L (ref 135–145)
TSH SERPL DL<=0.005 MIU/L-ACNC: 2.9 UIU/ML (ref 0.3–4.2)

## 2024-11-04 PROCEDURE — 36415 COLL VENOUS BLD VENIPUNCTURE: CPT | Performed by: INTERNAL MEDICINE

## 2024-11-04 PROCEDURE — 82310 ASSAY OF CALCIUM: CPT | Performed by: INTERNAL MEDICINE

## 2024-11-04 PROCEDURE — 250N000011 HC RX IP 250 OP 636: Mod: JZ | Performed by: INTERNAL MEDICINE

## 2024-11-04 PROCEDURE — 84443 ASSAY THYROID STIM HORMONE: CPT | Performed by: INTERNAL MEDICINE

## 2024-11-04 PROCEDURE — 258N000003 HC RX IP 258 OP 636: Performed by: INTERNAL MEDICINE

## 2024-11-04 PROCEDURE — 96413 CHEMO IV INFUSION 1 HR: CPT

## 2024-11-04 RX ADMIN — SODIUM CHLORIDE 200 MG: 9 INJECTION, SOLUTION INTRAVENOUS at 10:04

## 2024-11-04 RX ADMIN — SODIUM CHLORIDE 100 ML: 9 INJECTION, SOLUTION INTRAVENOUS at 10:04

## 2024-11-04 ASSESSMENT — PAIN SCALES - GENERAL: PAINLEVEL_OUTOF10: MODERATE PAIN (4)

## 2024-11-04 NOTE — NURSING NOTE
Chief Complaint   Patient presents with    Blood Draw     Labs drawn from PIV placed by RN. Line flushed with saline. Vitals taken. Pt checked in for appointment(s).      Labs drawn from PIV placed by RN. Line flushed with saline. Vitals taken. Pt checked in for appointment(s).     Jazmin Lubin RN

## 2024-11-04 NOTE — PROGRESS NOTES
Infusion Nursing Note:  Ahmet Coleman presents today for Cycle 1 Day 1 Pembrolizumab.    Patient seen by provider today: No   present during visit today: Not Applicable.    Note: Patient denies any signs or symptoms of infection. Patient reports baseline word finding difficulty (intermittent), fatigue, and constipation. Patient taking Miralax as needed at home. Patient reports 4/10 lower back pain; heat pack applied and patient declined any other need for pain intervention. Patient offers no additional complaints or concerns. Patient agreeable to treatment plan today.    Pt new to infusion today. Teaching completed previously by Ashlee Steward RNCC and reinforced by this RN. All medications reviewed and questions answered. Pt oriented to infusion room, call light, bathrooms and unit routines. Pt aware to call [facility] with chills and/or temperature >100.4F, uncontrolled nausea/vomiting/diarrhea, shortness of breath, chest pain, bleeding or any questions/concerns. Patient confirms having a thermometer at home.    TORB 11/04/24 @ 9:40am: Sherine Meza CNP/Melissa Mireles RN: Creatinine 1.87.  - Ok to proceed with Keytruda today.  - Encourage patient to push PO fluids at home.    Intravenous Access:  Peripheral IV placed.    Treatment Conditions:  Lab Results   Component Value Date     11/04/2024    POTASSIUM 3.6 11/04/2024    MAG 2.4 (H) 12/20/2021    CR 1.87 (H) 11/04/2024    STEPHEN 9.4 11/04/2024    BILITOTAL 0.4 11/04/2024    ALBUMIN 4.3 11/04/2024    ALT 12 11/04/2024    AST 17 11/04/2024     Results reviewed, labs did NOT meet treatment parameters: see TORB above.    Post Infusion Assessment:  Patient tolerated infusion without incident.  Blood return noted pre and post infusion.  Site patent and intact, free from redness, edema or discomfort.  No evidence of extravasations.  Access discontinued per protocol.     Discharge Plan:   Patient declined prescription refills.  Discharge  instructions reviewed with: Patient.  Patient and/or family verbalized understanding of discharge instructions and all questions answered.  Copy of AVS reviewed with patient and/or family.  Patient will return 11/25/24 for next appointment.  Patient discharged in stable condition accompanied by: self.  Departure Mode: Ambulatory.      Melissa Mireles RN

## 2024-11-04 NOTE — PATIENT INSTRUCTIONS
[facility] Triage and after hours / weekends / holidays:  [phone]    Please call the triage or after hours line if you experience a temperature greater than or equal to 100.4, shaking chills, have uncontrolled nausea, vomiting and/or diarrhea, dizziness, shortness of breath, chest pain, bleeding, unexplained bruising, or if you have any other new/concerning symptoms, questions or concerns.      If you are having any concerning symptoms or wish to speak to a provider before your next infusion visit, please call triage to notify them so we can adequately serve you.     If you need a refill on a narcotic prescription or other medication, please call before your infusion appointment.

## 2024-11-07 ENCOUNTER — VIRTUAL VISIT (OUTPATIENT)
Dept: ONCOLOGY | Facility: CLINIC | Age: 79
End: 2024-11-07
Attending: INTERNAL MEDICINE
Payer: MEDICARE

## 2024-11-07 VITALS — HEIGHT: 69 IN | BODY MASS INDEX: 25.92 KG/M2 | WEIGHT: 175 LBS

## 2024-11-07 DIAGNOSIS — C43.4 MALIGNANT MELANOMA OF SCALP (H): Primary | ICD-10-CM

## 2024-11-07 DIAGNOSIS — C79.31 METASTASIS TO BRAIN (H): ICD-10-CM

## 2024-11-07 DIAGNOSIS — Z79.899 HIGH RISK MEDICATION USE: ICD-10-CM

## 2024-11-07 PROCEDURE — 99214 OFFICE O/P EST MOD 30 MIN: CPT | Mod: 95 | Performed by: INTERNAL MEDICINE

## 2024-11-07 ASSESSMENT — PAIN SCALES - GENERAL: PAINLEVEL_OUTOF10: MODERATE PAIN (4)

## 2024-11-07 NOTE — NURSING NOTE
Current patient location: [address] [facility]  [place] [address]    Is the patient currently in the state of [place]? YES    Visit mode:VIDEO    If the visit is dropped, the patient can be reconnected by: VIDEO VISIT: Text to cell phone:   Telephone Information:   Mobile [phone]       Will anyone else be joining the visit? NO  (If patient encounters technical issues they should call [phone]    Are changes needed to the allergy or medication list? Pt stated no changes to allergies and Pt stated no med changes    Are refills needed on medications prescribed by this physician? NO    Rooming Documentation:  Not applicable    Reason for visit: JAYME KIRKF

## 2024-11-07 NOTE — LETTER
11/7/2024      Ahmet Coleman  [address]  [place] [address]      Dear Colleague,    Thank you for referring your patient, Ahmet Coleman, to the [facility]. Please see a copy of my visit note below.    Virtual Visit Details    Type of service:  Video Visit   Video Start Time: 11:32 AM  Video End Time:11:49 AM    Originating Location (pt. 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  Patient was diagnosed with stage IIIB melanoma of the left scalp (pT2a, cN1c, cM0) and presented in primary care clinic 10/14/2020, with a 6.5 x 7 cm variably pigmented, tender plaque on the left scalp, Shave biopsy of the left central parietal scalp, left central occipital scalp, and left posterior parietal scalp 10/26/2020, revealed nonulcerated nodular and nevoid melanoma with up to 1.3 mm depth of invasion.  PD-L1 expression () was negative with TPS <1%.  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  There was no detected alteration in GNA11, GNAQ, KIT, MAP2K1, NRAS, PDGFRA. Punch biopsy of the right anterior temple, and posterior scalp left of midline 11/25/2020, revealed dermal melanocytic proliferation with melanophages and fibrosis without evidence of malignancy, and punch biopsy of the left occipital scalp 11/25/2020, revealed metastatic melanoma metastatic melanoma positive for SOX10 with rare mitotic activity, dense nodular inflammatory moderate and focal regression. 18-F FDG PET/CT scan 12/08/2020, revealed FDG avid irregular skin thickening spanning 5.4 cm overlying the left parietal bone, without invasion of the bone there  was no hypermetabolic lymphadenopathy or distant metastases. Patient received neoadjuvant vemurafenib 960 mg BID day 1-21, then 720 mg BID day 22-28 for cycle 1 day and 720 mg BID days 1-28 plus cobimetinib 60 mg daily day 1-21, and atezolizumab 840 mg IV day 1 repeated every 28 days x 4 cycles from 01/13/2021 through 04/15/2021.  There was a vemurafenib 720 mg and cobimetinib 40 mg dose reduction beginning with cycle 2 (02/18/2021) due to worsening diarrhea.  Restaging 18-F FDG PET/CT scan 03/26/2021, revealed decrease in cutaneous thickening and diffusely decreased FDG uptake in the left parietal scalp lesion, with a small focus of residual skin thickening at the hide left vertex with FDG max 2.9 (previously FDG max 5.9).  There were no other hypermetabolic lesions. Patient underwent wide local excision of the left scalp lesion with left latissimus free flap for scalp reconstruction 05/24/2021, that revealed tumoral melanosis extending to a depth of 1.1 mm without residual melanoma in the wide excision specimen consistent with regressed melanoma.  Patient received adjuvant nivolumab 480 mg IV every 28 days x8 cycles from 06/25/2021 through 02/14/2022.     Restaging 18-F FDG PET/CT scan 08/25/2021, 11/23/2021, 03/18/2022, 06/10/2022, 08/26/2022, 01/13/2023, 04/21/2023, 07/21/2023, 10/27/2023, was negative for hypermetabolic lesions.      Restaging 18-F FDG PET/CT scan 09/13/2024, revealed postsurgical changes in the left temporoparietal periauricular scalp with reconstruction.  There was a 9 mm enhancing hypermetabolic lesion within the left frontal lobe, a 9 mm lesion along the medial aspect of the right frontal lobe suggestive of brain metastases.  There were scattered non-FDG-avid groundglass lung opacities in the left upper lobe and left lower lobe that appeared inflammatory in nature.  There were no other hypermetabolic lesions.  MRI brain 09/24/2024, revealed a 9 mm enhancing lesion in the left ventral  superior frontal gyrus, a 12 x 6 mm enhancing lesion in the right pericallosal gyrus, and a 4 mm enhancing lesion in the right dorsal cingulate gyrus.  There were stable postsurgical changes and reconstruction in the left frontal parietal scalp without evidence of local recurrence in the surgical region. Patient received gamma knife radiosurgery to the right superior parasagittal metastasis (22 Gy to 75% isodose), left superior parasagittal metastasis (22 Gy to 60% isodose), and right inferior parasagittal metastasis (22 Gy to 80% isodose) on 10/08/2024. Patient is receiving adjuvant pembrolizumab 200 mg IV every 21 days x1 year beginning 11/04/2024.  Patient received 1 cycle of pembrolizumab thus far.  Patient had fatigue lasting 1 day after the pembrolizumab infusion.  There are no other new symptoms or events since the prior clinic visit (09/26/2024). He denies fever, weight loss, headache, visual changes, slurred speech, cough, dyspnea, chest pain, abdominal pain, nausea, vomiting, constipation, diarrhea, bleeding, or bone pain.     PAST HISTORY: Past history was reviewed and unchanged from the clinic note 09/26/2024.     MEDICATIONS:   Current Outpatient Medications   Medication Sig Dispense Refill     acetaminophen (TYLENOL) 500 MG tablet Take 2 tablets (1,000 mg) by mouth every 8 hours as needed for mild pain (Patient not taking: Reported on 10/8/2024) 100 tablet 0     diphenoxylate-atropine (LOMOTIL) 2.5-0.025 MG tablet Take 1-2 tablets by mouth 4 times daily as needed for diarrhea (Patient not taking: Reported on 10/8/2024) 120 tablet 5     docusate sodium (COLACE) 100 MG capsule        fluvoxaMINE (LUVOX) 100 MG tablet Take 150 mg at bedtime       hydrochlorothiazide (HYDRODIURIL) 25 MG tablet Take 25 mg by mouth daily.       Hypromellose (ARTIFICIAL TEARS OP) Apply  to eye. 2 drops in each eye twice a day as needed       Lactobacillus-Inulin ([facility] DIGESTIVE [facility]) CAPS 1 tab daily (Patient not  taking: Reported on 10/8/2024) 30 capsule 1     lisinopril (ZESTRIL) 10 MG tablet Take 15 mg by mouth daily       Magnesium Oxide 420 MG TABS Take 420 mg by mouth daily       methocarbamol (ROBAXIN) 500 MG tablet Take 1 tablet (500 mg) by mouth 3 times daily as needed for muscle spasms (Patient not taking: Reported on 10/8/2024) 10 tablet 0     omeprazole (PRILOSEC) 20 MG capsule Take 1 capsule by mouth 2 times daily. 60 capsule prn     polyethylene glycol (MIRALAX) 17 GM/Dose powder Take 17 g by mouth daily (Patient not taking: Reported on 10/8/2024) 510 g 0     potassium chloride ER (KLOR-CON M) 20 MEQ CR tablet Take 1 tablet (20 mEq) by mouth daily 7 tablet 0     prazosin (MINIPRESS) 1 MG capsule Take 1 mg by mouth 2 Times Daily Takes 2 tabs in the morning and 1 tab at night       pregabalin (LYRICA) 150 MG capsule Take 150 mg by mouth 2 times daily       QUEtiapine (SEROQUEL) 300 MG tablet Take 150 mg by mouth At Bedtime        senna-docusate (SENOKOT-S/PERICOLACE) 8.6-50 MG tablet Take 1 tablet by mouth 2 times daily (Patient not taking: Reported on 11/3/2023) 30 tablet 0     simvastatin (ZOCOR) 80 MG tablet Take 40 mg by mouth At Bedtime        traZODone (DESYREL) 100 MG tablet Take 100 mg by mouth At Bedtime        vitamin D3 (CHOLECALCIFEROL) 1000 units (25 mcg) tablet Take 1,000 Units by mouth daily        vitamin D3 (CHOLECALCIFEROL) 125 MCG (5000 UT) tablet Take 1 tablet (125 mcg) by mouth daily 90 tablet 3     vortioxetine (TRINTELLIX) 20 MG tablet TAKE ONE TABLET BY MOUTH EVERY MORNING         REVIEW OF SYSTEMS: Review of systems reviewed with the patient and otherwise negative except for those detailed above.    PHYSICAL EXAM: Not done. Telehealth visit. There were no vitals taken for this visit..  Patient appears well. Breathing is normal and nonlabored.     LABS REVIEWED:   Component      Latest Ref Rng 11/4/2024  8:58 AM   Sodium      135 - 145 mmol/L 137    Potassium      3.4 - 5.3 mmol/L 3.6     Carbon Dioxide (CO2)      22 - 29 mmol/L 27    Anion Gap      7 - 15 mmol/L 11    Urea Nitrogen      8.0 - 23.0 mg/dL 22.8    Creatinine      0.67 - 1.17 mg/dL 1.87 (H)    GFR Estimate      >60 mL/min/1.73m2 37 (L)    Calcium      8.8 - 10.4 mg/dL 9.4    Chloride      98 - 107 mmol/L 99    Glucose      70 - 99 mg/dL 91    Alkaline Phosphatase      40 - 150 U/L 49    AST      0 - 45 U/L 17    ALT      0 - 70 U/L 12    Protein Total      6.4 - 8.3 g/dL 7.1    Albumin      3.5 - 5.2 g/dL 4.3    Bilirubin Total      <=1.2 mg/dL 0.4    TSH      0.30 - 4.20 uIU/mL 2.90        IMPRESSION/PLAN: Stage IIIB melanoma of the left scalp.  Melanoma is a nonulcerated, up to 1.3 mm depth of invasion BRAF p.V600K-positive nodular and nevoid melanoma on the left scalp.  There is no evidence of regional or distant metastases noted on FDG PET/CT scan (12/08/2020).  Patient received neoadjuvant atezolizumab 840 mg IV day 1, vemurafenib BID day 1-28, and cobimetinib daily day 1-21 repeated every 28 days x4 cycles (from 01/13/2021 through 04/15/2021) followed by definitive wide local excision of the left scalp lesion (05/24/2021) that revealed a complete response.  Patient received adjuvant nivolumab IV every 28 days x8 cycles (from 06/25/2021 through 02/14/2022).  There are brain metastases noted on restaging FDG PET/CT scan (09/13/2024) and MRI brain, but no evidence of systemic metastases.  Patient received gamma knife radiosurgery to the right superior parasagittal, left superior parasagittal, and right inferior parasagittal metastases (10/08/2024).  Patient return to clinic in 6 weeks.  Patient is receiving adjuvant pembrolizumab IV every 21 days x1 year (beginning 11/04/2024).  There is grade 1 fatigue.  Pembrolizumab will be continued without modification.  Patient will return to the outpatient infusion center 11/25/2024, for cycle 2 of pembrolizumab. I reviewed the risks and side effects of pembrolizumab with the patient, which  include fatigue, weakness, anorexia, weight loss, rash, pruritus, dry eyes, dry mouth, headache, cough, dyspnea, abdominal pain, nausea, vomiting, diarrhea, hypothyroidism, adrenal insufficiency, other endocrine disorders, cardiomyopathy, colitis, nephritis, pancreatitis, myositis, arthritis, neuropathy, cerebritis.  Patient understood the indication and risks and agreed to continue therapy.  Patient will return to clinic in approximately 5 weeks with CBC, metabolic panel, amylase, lipase, TSH, free T4, cortisol, and for pembrolizumab cycle 3.  The current and past history, clinical evaluation, reviewing diagnostic tests with the patient, and assessment and planning occurred over 30 minutes.       Hector Collins MD    cc: MD Keith Trevino MD Nattawat Klomjit, MD Samir S Khariwala, MD      Again, thank you for allowing me to participate in the care of your patient.        Sincerely,        Hector Collins MD

## 2024-11-21 RX ORDER — MEPERIDINE HYDROCHLORIDE 25 MG/ML
25 INJECTION INTRAMUSCULAR; INTRAVENOUS; SUBCUTANEOUS
OUTPATIENT
Start: 2024-11-21

## 2024-11-21 RX ORDER — HEPARIN SODIUM (PORCINE) LOCK FLUSH IV SOLN 100 UNIT/ML 100 UNIT/ML
5 SOLUTION INTRAVENOUS
OUTPATIENT
Start: 2024-11-21

## 2024-11-21 RX ORDER — ALBUTEROL SULFATE 90 UG/1
1-2 INHALANT RESPIRATORY (INHALATION)
Start: 2024-11-21

## 2024-11-21 RX ORDER — METHYLPREDNISOLONE SODIUM SUCCINATE 40 MG/ML
40 INJECTION INTRAMUSCULAR; INTRAVENOUS
Start: 2024-11-21

## 2024-11-21 RX ORDER — DIPHENHYDRAMINE HYDROCHLORIDE 50 MG/ML
25 INJECTION INTRAMUSCULAR; INTRAVENOUS
Start: 2024-11-21

## 2024-11-21 RX ORDER — HEPARIN SODIUM,PORCINE 10 UNIT/ML
5-20 VIAL (ML) INTRAVENOUS DAILY PRN
OUTPATIENT
Start: 2024-11-21

## 2024-11-21 RX ORDER — ALBUTEROL SULFATE 0.83 MG/ML
2.5 SOLUTION RESPIRATORY (INHALATION)
OUTPATIENT
Start: 2024-11-21

## 2024-11-21 RX ORDER — EPINEPHRINE 1 MG/ML
0.3 INJECTION, SOLUTION INTRAMUSCULAR; SUBCUTANEOUS EVERY 5 MIN PRN
OUTPATIENT
Start: 2024-11-21

## 2024-11-21 RX ORDER — LORAZEPAM 2 MG/ML
0.5 INJECTION INTRAMUSCULAR EVERY 4 HOURS PRN
OUTPATIENT
Start: 2024-11-21

## 2024-11-21 RX ORDER — DIPHENHYDRAMINE HYDROCHLORIDE 50 MG/ML
50 INJECTION INTRAMUSCULAR; INTRAVENOUS
Start: 2024-11-21

## 2024-11-25 ENCOUNTER — INFUSION THERAPY VISIT (OUTPATIENT)
Dept: ONCOLOGY | Facility: CLINIC | Age: 79
End: 2024-11-25
Attending: INTERNAL MEDICINE
Payer: MEDICARE

## 2024-11-25 VITALS
TEMPERATURE: 97.8 F | HEART RATE: 67 BPM | OXYGEN SATURATION: 94 % | RESPIRATION RATE: 16 BRPM | SYSTOLIC BLOOD PRESSURE: 126 MMHG | WEIGHT: 182 LBS | BODY MASS INDEX: 26.88 KG/M2 | DIASTOLIC BLOOD PRESSURE: 76 MMHG

## 2024-11-25 DIAGNOSIS — C43.4 MALIGNANT MELANOMA OF SCALP (H): Primary | ICD-10-CM

## 2024-11-25 DIAGNOSIS — C79.31 METASTASIS TO BRAIN (H): ICD-10-CM

## 2024-11-25 DIAGNOSIS — Z79.899 HIGH RISK MEDICATION USE: ICD-10-CM

## 2024-11-25 LAB
ALBUMIN MFR UR ELPH: 8.3 MG/DL
ALBUMIN SERPL BCG-MCNC: 4.2 G/DL (ref 3.5–5.2)
ALBUMIN UR-MCNC: NEGATIVE MG/DL
ALP SERPL-CCNC: 64 U/L (ref 40–150)
ALT SERPL W P-5'-P-CCNC: 18 U/L (ref 0–70)
AMYLASE SERPL-CCNC: 105 U/L (ref 28–100)
ANION GAP SERPL CALCULATED.3IONS-SCNC: 11 MMOL/L (ref 7–15)
APPEARANCE UR: CLEAR
AST SERPL W P-5'-P-CCNC: 21 U/L (ref 0–45)
BASOPHILS # BLD AUTO: 0.1 10E3/UL (ref 0–0.2)
BASOPHILS NFR BLD AUTO: 1 %
BILIRUB SERPL-MCNC: 0.4 MG/DL
BILIRUB UR QL STRIP: NEGATIVE
BUN SERPL-MCNC: 18.4 MG/DL (ref 8–23)
CALCIUM SERPL-MCNC: 9.5 MG/DL (ref 8.8–10.4)
CHLORIDE SERPL-SCNC: 101 MMOL/L (ref 98–107)
COLOR UR AUTO: NORMAL
CORTIS SERPL-MCNC: 5 UG/DL
CREAT SERPL-MCNC: 1.64 MG/DL (ref 0.67–1.17)
CREAT UR-MCNC: 133 MG/DL
EGFRCR SERPLBLD CKD-EPI 2021: 43 ML/MIN/1.73M2
EOSINOPHIL # BLD AUTO: 0.2 10E3/UL (ref 0–0.7)
EOSINOPHIL NFR BLD AUTO: 5 %
ERYTHROCYTE [DISTWIDTH] IN BLOOD BY AUTOMATED COUNT: 12.4 % (ref 10–15)
GLUCOSE SERPL-MCNC: 110 MG/DL (ref 70–99)
GLUCOSE UR STRIP-MCNC: NEGATIVE MG/DL
HCO3 SERPL-SCNC: 28 MMOL/L (ref 22–29)
HCT VFR BLD AUTO: 39.3 % (ref 40–53)
HGB BLD-MCNC: 13.5 G/DL (ref 13.3–17.7)
HGB UR QL STRIP: NEGATIVE
IMM GRANULOCYTES # BLD: 0 10E3/UL
IMM GRANULOCYTES NFR BLD: 0 %
KETONES UR STRIP-MCNC: NEGATIVE MG/DL
LEUKOCYTE ESTERASE UR QL STRIP: NEGATIVE
LIPASE SERPL-CCNC: 55 U/L (ref 13–60)
LYMPHOCYTES # BLD AUTO: 1.3 10E3/UL (ref 0.8–5.3)
LYMPHOCYTES NFR BLD AUTO: 33 %
MCH RBC QN AUTO: 28.7 PG (ref 26.5–33)
MCHC RBC AUTO-ENTMCNC: 34.4 G/DL (ref 31.5–36.5)
MCV RBC AUTO: 84 FL (ref 78–100)
MONOCYTES # BLD AUTO: 0.5 10E3/UL (ref 0–1.3)
MONOCYTES NFR BLD AUTO: 11 %
NEUTROPHILS # BLD AUTO: 2 10E3/UL (ref 1.6–8.3)
NEUTROPHILS NFR BLD AUTO: 50 %
NITRATE UR QL: NEGATIVE
NRBC # BLD AUTO: 0 10E3/UL
NRBC BLD AUTO-RTO: 0 /100
PH UR STRIP: 5.5 [PH] (ref 5–7)
PHOSPHATE SERPL-MCNC: 3.6 MG/DL (ref 2.5–4.5)
PLATELET # BLD AUTO: 229 10E3/UL (ref 150–450)
POTASSIUM SERPL-SCNC: 3.7 MMOL/L (ref 3.4–5.3)
PROT SERPL-MCNC: 7.1 G/DL (ref 6.4–8.3)
PROT/CREAT 24H UR: 0.06 MG/MG CR (ref 0–0.2)
PTH-INTACT SERPL-MCNC: 29 PG/ML (ref 15–65)
RBC # BLD AUTO: 4.7 10E6/UL (ref 4.4–5.9)
RBC URINE: <1 /HPF
SODIUM SERPL-SCNC: 140 MMOL/L (ref 135–145)
SP GR UR STRIP: 1.01 (ref 1–1.03)
T4 FREE SERPL-MCNC: 1.25 NG/DL (ref 0.9–1.7)
TSH SERPL DL<=0.005 MIU/L-ACNC: 3.27 UIU/ML (ref 0.3–4.2)
UROBILINOGEN UR STRIP-MCNC: NORMAL MG/DL
VIT D+METAB SERPL-MCNC: 57 NG/ML (ref 20–50)
WBC # BLD AUTO: 4.1 10E3/UL (ref 4–11)
WBC URINE: <1 /HPF

## 2024-11-25 PROCEDURE — 83970 ASSAY OF PARATHORMONE: CPT

## 2024-11-25 PROCEDURE — 82306 VITAMIN D 25 HYDROXY: CPT

## 2024-11-25 PROCEDURE — 84443 ASSAY THYROID STIM HORMONE: CPT

## 2024-11-25 PROCEDURE — 258N000003 HC RX IP 258 OP 636: Performed by: INTERNAL MEDICINE

## 2024-11-25 PROCEDURE — 36415 COLL VENOUS BLD VENIPUNCTURE: CPT

## 2024-11-25 PROCEDURE — 250N000011 HC RX IP 250 OP 636: Mod: JZ | Performed by: INTERNAL MEDICINE

## 2024-11-25 PROCEDURE — 96413 CHEMO IV INFUSION 1 HR: CPT

## 2024-11-25 PROCEDURE — 84100 ASSAY OF PHOSPHORUS: CPT

## 2024-11-25 PROCEDURE — 83690 ASSAY OF LIPASE: CPT

## 2024-11-25 PROCEDURE — 81001 URINALYSIS AUTO W/SCOPE: CPT

## 2024-11-25 PROCEDURE — 96361 HYDRATE IV INFUSION ADD-ON: CPT

## 2024-11-25 PROCEDURE — 85018 HEMOGLOBIN: CPT

## 2024-11-25 PROCEDURE — 85041 AUTOMATED RBC COUNT: CPT

## 2024-11-25 PROCEDURE — 84439 ASSAY OF FREE THYROXINE: CPT

## 2024-11-25 PROCEDURE — 82150 ASSAY OF AMYLASE: CPT

## 2024-11-25 PROCEDURE — 85004 AUTOMATED DIFF WBC COUNT: CPT

## 2024-11-25 PROCEDURE — 82533 TOTAL CORTISOL: CPT

## 2024-11-25 PROCEDURE — 84156 ASSAY OF PROTEIN URINE: CPT

## 2024-11-25 PROCEDURE — 80053 COMPREHEN METABOLIC PANEL: CPT

## 2024-11-25 PROCEDURE — 258N000003 HC RX IP 258 OP 636: Performed by: REGISTERED NURSE

## 2024-11-25 RX ADMIN — SODIUM CHLORIDE 1000 ML: 9 INJECTION, SOLUTION INTRAVENOUS at 09:27

## 2024-11-25 RX ADMIN — SODIUM CHLORIDE 200 MG: 9 INJECTION, SOLUTION INTRAVENOUS at 10:25

## 2024-11-25 ASSESSMENT — PAIN SCALES - GENERAL: PAINLEVEL_OUTOF10: MODERATE PAIN (4)

## 2024-11-25 NOTE — PROGRESS NOTES
"Infusion Nursing Note:  Ahmet Coleman presents today for Cycle 2 Day 1 Keytruda.    Patient seen by provider today: No   present during visit today: Not Applicable.    Note: Pt reports the following:    - Intermittent right \"side\", low back and left shoulder pain the last few days. Is limited with pain meds he can take and doesn't find Tylenol effective. Denies need for intervention  - Poor water intake - drinks a lot of coffee. Feels \"off balance\" at times. Endorses intermittent headaches and blurred vision. R eye > L. Visual changes not new since last summer per pt  - Constipation - takes Miralax when he remembers. Last BM 2 days ago. Usually goes every other day  - Having chills/body aches/night sweats the last few days. States he has a longstanding history of these symptoms but it's been a while since last occurrence. No fevers that he's aware of    Notified Sherine Meza CNP of above symptoms and suggested additional IV hydration. Creatinine improved from last cycle.    Per written communication Sherine Meza CNP/Darleen Mitchell RN:  - Yes, okay to give a liter of fluids (NS)    Pt encouraged to push more non-caffeinated fluids at home and to call triage if his status worsens before next visit.    Intravenous Access:  Peripheral IV placed.    Treatment Conditions:  Lab Results   Component Value Date    HGB 13.5 11/25/2024    WBC 4.1 11/25/2024    ANEU 2.6 06/25/2021    ANEUTAUTO 2.0 11/25/2024     11/25/2024        Lab Results   Component Value Date     11/25/2024    POTASSIUM 3.7 11/25/2024    MAG 2.4 (H) 12/20/2021    CR 1.64 (H) 11/25/2024    STEPHEN 9.5 11/25/2024    BILITOTAL 0.4 11/25/2024    ALBUMIN 4.2 11/25/2024    ALT 18 11/25/2024    AST 21 11/25/2024       Results reviewed, labs MET treatment parameters, ok to proceed with treatment.      Post Infusion Assessment:  Patient tolerated infusion without incident.  Blood return noted pre and post infusion.  Site patent and intact, " free from redness, edema or discomfort.  Access discontinued per protocol.       Discharge Plan:   Patient declined prescription refills.  Discharge instructions reviewed with: Patient.  Patient and/or family verbalized understanding of discharge instructions and all questions answered.  AVS to patient via [employer].  Patient will return 12/16 for next appointment.   Patient discharged in stable condition accompanied by: self.  Departure Mode: Ambulatory.      Darleen Mitchell RN

## 2024-12-11 ENCOUNTER — TELEPHONE (OUTPATIENT)
Dept: DERMATOLOGY | Facility: CLINIC | Age: 79
End: 2024-12-11
Payer: MEDICARE

## 2024-12-11 NOTE — PROGRESS NOTES
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  Patient was diagnosed with stage IIIB melanoma of the left scalp (pT2a, cN1c, cM0) and presented in primary care clinic 10/14/2020, with a 6.5 x 7 cm variably pigmented, tender plaque on the left scalp, Shave biopsy of the left central parietal scalp, left central occipital scalp, and left posterior parietal scalp 10/26/2020, revealed nonulcerated nodular and nevoid melanoma with up to 1.3 mm depth of invasion.  PD-L1 expression () was negative with TPS <1%.  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  There was no detected alteration in GNA11, GNAQ, KIT, MAP2K1, NRAS, PDGFRA. Punch biopsy of the right anterior temple, and posterior scalp left of midline 11/25/2020, revealed dermal melanocytic proliferation with melanophages and fibrosis without evidence of malignancy, and punch biopsy of the left occipital scalp 11/25/2020, revealed metastatic melanoma metastatic melanoma positive for SOX10 with rare mitotic activity, dense nodular inflammatory moderate and focal regression. 18-F FDG PET/CT scan 12/08/2020, revealed FDG avid irregular skin thickening spanning 5.4 cm overlying the left parietal bone, without invasion of the bone there was no hypermetabolic lymphadenopathy or distant metastases. Patient received neoadjuvant vemurafenib 960 mg BID day 1-21, then 720 mg BID day 22-28 for cycle 1 day and 720 mg BID days 1-28 plus cobimetinib 60 mg daily day 1-21, and atezolizumab 840 mg IV day 1 repeated every 28 days x 4 cycles from 01/13/2021 through 04/15/2021.  There was a vemurafenib 720 mg and cobimetinib 40 mg dose reduction beginning with cycle 2 (02/18/2021) due to worsening diarrhea.  Restaging 18-F FDG PET/CT  scan 03/26/2021, revealed decrease in cutaneous thickening and diffusely decreased FDG uptake in the left parietal scalp lesion, with a small focus of residual skin thickening at the hide left vertex with FDG max 2.9 (previously FDG max 5.9).  There were no other hypermetabolic lesions. Patient underwent wide local excision of the left scalp lesion with left latissimus free flap for scalp reconstruction 05/24/2021, that revealed tumoral melanosis extending to a depth of 1.1 mm without residual melanoma in the wide excision specimen consistent with regressed melanoma.  Patient received adjuvant nivolumab 480 mg IV every 28 days x8 cycles from 06/25/2021 through 02/14/2022.     Restaging 18-F FDG PET/CT scan 08/25/2021, 11/23/2021, 03/18/2022, 06/10/2022, 08/26/2022, 01/13/2023, 04/21/2023, 07/21/2023, 10/27/2023, was negative for hypermetabolic lesions.      Restaging 18-F FDG PET/CT scan 09/13/2024, revealed postsurgical changes in the left temporoparietal periauricular scalp with reconstruction.  There was a 9 mm enhancing hypermetabolic lesion within the left frontal lobe, a 9 mm lesion along the medial aspect of the right frontal lobe suggestive of brain metastases.  There were scattered non-FDG-avid groundglass lung opacities in the left upper lobe and left lower lobe that appeared inflammatory in nature.  There were no other hypermetabolic lesions.  MRI brain 09/24/2024, revealed a 9 mm enhancing lesion in the left ventral superior frontal gyrus, a 12 x 6 mm enhancing lesion in the right pericallosal gyrus, and a 4 mm enhancing lesion in the right dorsal cingulate gyrus.  There were stable postsurgical changes and reconstruction in the left frontal parietal scalp without evidence of local recurrence in the surgical region. Patient received gamma knife radiosurgery to the right superior parasagittal metastasis (22 Gy to 75% isodose), left superior parasagittal metastasis (22 Gy to 60% isodose), and right  inferior parasagittal metastasis (22 Gy to 80% isodose) on 10/08/2024. Patient is receiving adjuvant pembrolizumab 200 mg IV every 21 days x1 year beginning 11/04/2024.  Patient received 2 cycles of pembrolizumab thus far.  Patient had fatigue lasting 1 day after the pembrolizumab infusion, intermittent frontal headache, occasional itching, and occasional hot flashes.  There are no other new symptoms or events since the prior clinic visit (11/07/2024). He denies fever, weight loss, headache, visual changes, slurred speech, cough, dyspnea, chest pain, abdominal pain, nausea, vomiting, constipation, diarrhea, bleeding, or bone pain.     PAST HISTORY: Past history was reviewed and unchanged from the clinic note 09/26/2024.     MEDICATIONS:   Current Outpatient Medications   Medication Sig Dispense Refill    acetaminophen (TYLENOL) 500 MG tablet Take 2 tablets (1,000 mg) by mouth every 8 hours as needed for mild pain (Patient not taking: Reported on 11/25/2024) 100 tablet 0    diphenoxylate-atropine (LOMOTIL) 2.5-0.025 MG tablet Take 1-2 tablets by mouth 4 times daily as needed for diarrhea (Patient not taking: Reported on 10/8/2024) 120 tablet 5    docusate sodium (COLACE) 100 MG capsule       fluvoxaMINE (LUVOX) 100 MG tablet Take 150 mg at bedtime      hydrochlorothiazide (HYDRODIURIL) 25 MG tablet Take 25 mg by mouth daily.      Hypromellose (ARTIFICIAL TEARS OP) Apply  to eye. 2 drops in each eye twice a day as needed      Lactobacillus-Inulin (CULTURELLE DIGESTIVE HEALTH) CAPS 1 tab daily (Patient not taking: Reported on 10/8/2024) 30 capsule 1    lisinopril (ZESTRIL) 10 MG tablet Take 15 mg by mouth daily      Magnesium Oxide 420 MG TABS Take 420 mg by mouth daily      methocarbamol (ROBAXIN) 500 MG tablet Take 1 tablet (500 mg) by mouth 3 times daily as needed for muscle spasms (Patient not taking: Reported on 10/8/2024) 10 tablet 0    omeprazole (PRILOSEC) 20 MG capsule Take 1 capsule by mouth 2 times daily. 60  "capsule prn    polyethylene glycol (MIRALAX) 17 GM/Dose powder Take 17 g by mouth daily (Patient not taking: Reported on 10/8/2024) 510 g 0    potassium chloride ER (KLOR-CON M) 20 MEQ CR tablet Take 1 tablet (20 mEq) by mouth daily 7 tablet 0    prazosin (MINIPRESS) 1 MG capsule Take 1 mg by mouth 2 Times Daily Takes 2 tabs in the morning and 1 tab at night      pregabalin (LYRICA) 150 MG capsule Take 150 mg by mouth 2 times daily      QUEtiapine (SEROQUEL) 300 MG tablet Take 150 mg by mouth At Bedtime       senna-docusate (SENOKOT-S/PERICOLACE) 8.6-50 MG tablet Take 1 tablet by mouth 2 times daily (Patient not taking: Reported on 11/3/2023) 30 tablet 0    simvastatin (ZOCOR) 80 MG tablet Take 40 mg by mouth At Bedtime       traZODone (DESYREL) 100 MG tablet Take 100 mg by mouth At Bedtime       vitamin D3 (CHOLECALCIFEROL) 1000 units (25 mcg) tablet Take 1,000 Units by mouth daily       vitamin D3 (CHOLECALCIFEROL) 125 MCG (5000 UT) tablet Take 1 tablet (125 mcg) by mouth daily 90 tablet 3    vortioxetine (TRINTELLIX) 20 MG tablet TAKE ONE TABLET BY MOUTH EVERY MORNING         REVIEW OF SYSTEMS: Review of systems reviewed with the patient and otherwise negative except for those detailed above.    PHYSICAL EXAM: BP (!) 140/75 (BP Location: Right arm, Patient Position: Sitting, Cuff Size: Adult Regular)   Pulse 61   Temp 97.6  F (36.4  C) (Oral)   Resp 16   Ht 1.753 m (5' 9.02\")   Wt 83.6 kg (184 lb 6.4 oz)   SpO2 97%   BMI 27.22 kg/m   ECOG performance status: 1. Physical exam was unchanged from prior clinic visit 11/07/2024.  Skin: No erythema or rash.  HEENT: Sclera nonicteric. Oropharynx without lesions or ulceration, mucosa pink and moist.  Nodes: No cervical, supraclavicular, axillary, or inguinal adenopathy.  Lungs: No dullness to percussion.  No rales, wheezes, rhonchi.  Heart: Regular rate and rhythm.  Abdomen: Bowel sounds present.  Soft, nontender, no hepatosplenomegaly or mass.  Extremities: No " edema.     LABS REVIEWED:   Component      Latest Ref Rng 11/25/2024  8:24 AM 12/16/2024  9:53 AM   WBC      4.0 - 11.0 10e3/uL  4.9    RBC Count      4.40 - 5.90 10e6/uL  4.58    Hemoglobin      13.3 - 17.7 g/dL  13.2 (L)    Hematocrit      40.0 - 53.0 %  37.7 (L)    MCV      78 - 100 fL  82    MCH      26.5 - 33.0 pg  28.8    MCHC      31.5 - 36.5 g/dL  35.0    RDW      10.0 - 15.0 %  12.4    Platelet Count      150 - 450 10e3/uL  231    % Neutrophils      %  57    % Lymphocytes      %  27    % Monocytes      %  11    % Eosinophils      %  3    % Basophils      %  1    % Immature Granulocytes      %  0    NRBCs per 100 WBC      <1 /100  0    Absolute Neutrophils      1.6 - 8.3 10e3/uL  2.8    Absolute Lymphocytes      0.8 - 5.3 10e3/uL  1.3    Absolute Monocytes      0.0 - 1.3 10e3/uL  0.5    Absolute Eosinophils      0.0 - 0.7 10e3/uL  0.2    Absolute Basophils      0.0 - 0.2 10e3/uL  0.1    Absolute Immature Granulocytes      <=0.4 10e3/uL  0.0    Absolute NRBCs      10e3/uL  0.0    Sodium      135 - 145 mmol/L 140  141    Potassium      3.4 - 5.3 mmol/L 3.7  4.0    Carbon Dioxide (CO2)      22 - 29 mmol/L 28  30 (H)    Anion Gap      7 - 15 mmol/L 11  9    Urea Nitrogen      8.0 - 23.0 mg/dL 18.4  18.9    Creatinine      0.67 - 1.17 mg/dL 1.64 (H)  1.64 (H)    GFR Estimate      >60 mL/min/1.73m2 43 (L)  43 (L)    Calcium      8.8 - 10.4 mg/dL 9.5  9.1    Chloride      98 - 107 mmol/L 101  102    Glucose      70 - 99 mg/dL 110 (H)  104 (H)    Alkaline Phosphatase      40 - 150 U/L 64  56    AST      0 - 45 U/L 21  22    ALT      0 - 70 U/L 18  18    Protein Total      6.4 - 8.3 g/dL 7.1  6.9    Albumin      3.5 - 5.2 g/dL 4.2  4.0    Bilirubin Total      <=1.2 mg/dL 0.4  0.3    Cortisol Serum        ug/dL 5.0     T4 Free      0.90 - 1.70 ng/dL  1.12    TSH      0.30 - 4.20 uIU/mL  2.49    Lipase      13 - 60 U/L  54    Amylase      28 - 100 U/L  74        IMPRESSION/PLAN: Stage IIIB melanoma of the left scalp.   Melanoma is a nonulcerated, up to 1.3 mm depth of invasion BRAF p.V600K-positive nodular and nevoid melanoma on the left scalp.  There is no evidence of regional or distant metastases noted on FDG PET/CT scan (12/08/2020).  Patient received neoadjuvant atezolizumab 840 mg IV day 1, vemurafenib BID day 1-28, and cobimetinib daily day 1-21 repeated every 28 days x4 cycles (from 01/13/2021 through 04/15/2021) followed by definitive wide local excision of the left scalp lesion (05/24/2021) that revealed a complete response.  Patient received adjuvant nivolumab IV every 28 days x8 cycles (from 06/25/2021 through 02/14/2022).  There are brain metastases noted on restaging FDG PET/CT scan (09/13/2024) and MRI brain (09/24/2024), but no evidence of systemic metastases.  Patient received gamma knife radiosurgery to the right superior parasagittal, left superior parasagittal, and right inferior parasagittal metastases (10/08/2024).  Patient is receiving adjuvant pembrolizumab IV every 21 days x1 year (beginning 11/04/2024).  There is grade 1 fatigue, headache, pruritus, and vasomotor symptoms.  Pembrolizumab will be continued without modification.  Patient will return to the outpatient infusion center 12/16/2024, for cycle 3 of pembrolizumab. I reviewed the risks and side effects of pembrolizumab with the patient, which include fatigue, weakness, anorexia, weight loss, rash, pruritus, dry eyes, dry mouth, headache, cough, dyspnea, abdominal pain, nausea, vomiting, diarrhea, hypothyroidism, adrenal insufficiency, other endocrine disorders, cardiomyopathy, colitis, nephritis, pancreatitis, myositis, arthritis, neuropathy, cerebritis.  Patient understood the indication and risks and agreed to continue therapy.  Testosterone level will be tested due to the hot flashes, and triamcinolone 0.1% cream was prescribed for rash and pruritus.  Patient will return to clinic in 3 weeks with CBC, metabolic panel, amylase, lipase, TSH, free  T4, cortisol, and for pembrolizumab cycle 4.  Restaging MRI brain and CT chest, abdomen, pelvis will be performed in 01/2025. The current and past history, clinical evaluation, reviewing diagnostic tests with the patient, and assessment and planning occurred over 30 minutes.       Hector Collins MD    cc: MD Keith Trevino MD Nattawat Klomjit, MD Samir S Khariwala, MD

## 2024-12-11 NOTE — TELEPHONE ENCOUNTER
12/11 Patient confirmed scheduled appointment:  Date: 3/27/2025  Time: 11:00 am  Visit type: Return Dermatology  Provider: Clinton  Location: [facility]  Testing/imaging: n/a  Additional notes: ok marta Shell to double book via pt call

## 2024-12-16 ENCOUNTER — ONCOLOGY VISIT (OUTPATIENT)
Dept: ONCOLOGY | Facility: CLINIC | Age: 79
End: 2024-12-16
Attending: INTERNAL MEDICINE
Payer: MEDICARE

## 2024-12-16 VITALS
TEMPERATURE: 97.6 F | WEIGHT: 184.4 LBS | HEART RATE: 61 BPM | OXYGEN SATURATION: 97 % | BODY MASS INDEX: 27.31 KG/M2 | DIASTOLIC BLOOD PRESSURE: 75 MMHG | HEIGHT: 69 IN | SYSTOLIC BLOOD PRESSURE: 140 MMHG | RESPIRATION RATE: 16 BRPM

## 2024-12-16 DIAGNOSIS — R21 RASH: ICD-10-CM

## 2024-12-16 DIAGNOSIS — C43.4 MALIGNANT MELANOMA OF SCALP (H): Primary | ICD-10-CM

## 2024-12-16 DIAGNOSIS — C79.31 METASTASIS TO BRAIN (H): Primary | ICD-10-CM

## 2024-12-16 DIAGNOSIS — C43.4 MALIGNANT MELANOMA OF SCALP (H): ICD-10-CM

## 2024-12-16 DIAGNOSIS — C79.31 METASTASIS TO BRAIN (H): ICD-10-CM

## 2024-12-16 DIAGNOSIS — Z79.899 HIGH RISK MEDICATION USE: ICD-10-CM

## 2024-12-16 LAB
ALBUMIN SERPL BCG-MCNC: 4 G/DL (ref 3.5–5.2)
ALP SERPL-CCNC: 56 U/L (ref 40–150)
ALT SERPL W P-5'-P-CCNC: 18 U/L (ref 0–70)
AMYLASE SERPL-CCNC: 74 U/L (ref 28–100)
ANION GAP SERPL CALCULATED.3IONS-SCNC: 9 MMOL/L (ref 7–15)
AST SERPL W P-5'-P-CCNC: 22 U/L (ref 0–45)
BASOPHILS # BLD AUTO: 0.1 10E3/UL (ref 0–0.2)
BASOPHILS NFR BLD AUTO: 1 %
BILIRUB SERPL-MCNC: 0.3 MG/DL
BUN SERPL-MCNC: 18.9 MG/DL (ref 8–23)
CALCIUM SERPL-MCNC: 9.1 MG/DL (ref 8.8–10.4)
CHLORIDE SERPL-SCNC: 102 MMOL/L (ref 98–107)
CORTIS SERPL-MCNC: 8.3 UG/DL
CREAT SERPL-MCNC: 1.64 MG/DL (ref 0.67–1.17)
EGFRCR SERPLBLD CKD-EPI 2021: 43 ML/MIN/1.73M2
EOSINOPHIL # BLD AUTO: 0.2 10E3/UL (ref 0–0.7)
EOSINOPHIL NFR BLD AUTO: 3 %
ERYTHROCYTE [DISTWIDTH] IN BLOOD BY AUTOMATED COUNT: 12.4 % (ref 10–15)
GLUCOSE SERPL-MCNC: 104 MG/DL (ref 70–99)
HCO3 SERPL-SCNC: 30 MMOL/L (ref 22–29)
HCT VFR BLD AUTO: 37.7 % (ref 40–53)
HGB BLD-MCNC: 13.2 G/DL (ref 13.3–17.7)
IMM GRANULOCYTES # BLD: 0 10E3/UL
IMM GRANULOCYTES NFR BLD: 0 %
LIPASE SERPL-CCNC: 54 U/L (ref 13–60)
LYMPHOCYTES # BLD AUTO: 1.3 10E3/UL (ref 0.8–5.3)
LYMPHOCYTES NFR BLD AUTO: 27 %
MCH RBC QN AUTO: 28.8 PG (ref 26.5–33)
MCHC RBC AUTO-ENTMCNC: 35 G/DL (ref 31.5–36.5)
MCV RBC AUTO: 82 FL (ref 78–100)
MONOCYTES # BLD AUTO: 0.5 10E3/UL (ref 0–1.3)
MONOCYTES NFR BLD AUTO: 11 %
NEUTROPHILS # BLD AUTO: 2.8 10E3/UL (ref 1.6–8.3)
NEUTROPHILS NFR BLD AUTO: 57 %
NRBC # BLD AUTO: 0 10E3/UL
NRBC BLD AUTO-RTO: 0 /100
PLATELET # BLD AUTO: 231 10E3/UL (ref 150–450)
POTASSIUM SERPL-SCNC: 4 MMOL/L (ref 3.4–5.3)
PROT SERPL-MCNC: 6.9 G/DL (ref 6.4–8.3)
RBC # BLD AUTO: 4.58 10E6/UL (ref 4.4–5.9)
SODIUM SERPL-SCNC: 141 MMOL/L (ref 135–145)
T4 FREE SERPL-MCNC: 1.12 NG/DL (ref 0.9–1.7)
TSH SERPL DL<=0.005 MIU/L-ACNC: 2.49 UIU/ML (ref 0.3–4.2)
WBC # BLD AUTO: 4.9 10E3/UL (ref 4–11)

## 2024-12-16 PROCEDURE — 258N000003 HC RX IP 258 OP 636: Mod: JZ | Performed by: INTERNAL MEDICINE

## 2024-12-16 PROCEDURE — G0463 HOSPITAL OUTPT CLINIC VISIT: HCPCS | Performed by: INTERNAL MEDICINE

## 2024-12-16 PROCEDURE — 99214 OFFICE O/P EST MOD 30 MIN: CPT | Performed by: INTERNAL MEDICINE

## 2024-12-16 PROCEDURE — 84443 ASSAY THYROID STIM HORMONE: CPT

## 2024-12-16 PROCEDURE — 82150 ASSAY OF AMYLASE: CPT

## 2024-12-16 PROCEDURE — 96413 CHEMO IV INFUSION 1 HR: CPT

## 2024-12-16 PROCEDURE — 250N000011 HC RX IP 250 OP 636: Mod: JZ | Performed by: INTERNAL MEDICINE

## 2024-12-16 PROCEDURE — 84439 ASSAY OF FREE THYROXINE: CPT

## 2024-12-16 PROCEDURE — 83690 ASSAY OF LIPASE: CPT

## 2024-12-16 PROCEDURE — 85004 AUTOMATED DIFF WBC COUNT: CPT

## 2024-12-16 PROCEDURE — 82040 ASSAY OF SERUM ALBUMIN: CPT

## 2024-12-16 PROCEDURE — 82533 TOTAL CORTISOL: CPT

## 2024-12-16 PROCEDURE — 36415 COLL VENOUS BLD VENIPUNCTURE: CPT

## 2024-12-16 RX ORDER — MEPERIDINE HYDROCHLORIDE 25 MG/ML
25 INJECTION INTRAMUSCULAR; INTRAVENOUS; SUBCUTANEOUS
Status: CANCELLED | OUTPATIENT
Start: 2024-12-16

## 2024-12-16 RX ORDER — LORAZEPAM 2 MG/ML
0.5 INJECTION INTRAMUSCULAR EVERY 4 HOURS PRN
Status: CANCELLED | OUTPATIENT
Start: 2024-12-16

## 2024-12-16 RX ORDER — ALBUTEROL SULFATE 0.83 MG/ML
2.5 SOLUTION RESPIRATORY (INHALATION)
Status: CANCELLED | OUTPATIENT
Start: 2024-12-16

## 2024-12-16 RX ORDER — ALBUTEROL SULFATE 90 UG/1
1-2 INHALANT RESPIRATORY (INHALATION)
Status: CANCELLED
Start: 2024-12-16

## 2024-12-16 RX ORDER — DIPHENHYDRAMINE HYDROCHLORIDE 50 MG/ML
25 INJECTION INTRAMUSCULAR; INTRAVENOUS
Status: CANCELLED
Start: 2024-12-16

## 2024-12-16 RX ORDER — HEPARIN SODIUM (PORCINE) LOCK FLUSH IV SOLN 100 UNIT/ML 100 UNIT/ML
5 SOLUTION INTRAVENOUS
Status: CANCELLED | OUTPATIENT
Start: 2024-12-16

## 2024-12-16 RX ORDER — DIPHENHYDRAMINE HYDROCHLORIDE 50 MG/ML
50 INJECTION INTRAMUSCULAR; INTRAVENOUS
Status: CANCELLED
Start: 2024-12-16

## 2024-12-16 RX ORDER — EPINEPHRINE 1 MG/ML
0.3 INJECTION, SOLUTION INTRAMUSCULAR; SUBCUTANEOUS EVERY 5 MIN PRN
Status: CANCELLED | OUTPATIENT
Start: 2024-12-16

## 2024-12-16 RX ORDER — HEPARIN SODIUM,PORCINE 10 UNIT/ML
5-20 VIAL (ML) INTRAVENOUS DAILY PRN
Status: CANCELLED | OUTPATIENT
Start: 2024-12-16

## 2024-12-16 RX ORDER — TRIAMCINOLONE ACETONIDE 1 MG/G
CREAM TOPICAL 2 TIMES DAILY PRN
Qty: 80 G | Refills: 2 | Status: SHIPPED | OUTPATIENT
Start: 2024-12-16

## 2024-12-16 RX ORDER — METHYLPREDNISOLONE SODIUM SUCCINATE 40 MG/ML
40 INJECTION INTRAMUSCULAR; INTRAVENOUS
Status: CANCELLED
Start: 2024-12-16

## 2024-12-16 RX ADMIN — SODIUM CHLORIDE 200 MG: 9 INJECTION, SOLUTION INTRAVENOUS at 11:29

## 2024-12-16 RX ADMIN — SODIUM CHLORIDE 50 ML: 9 INJECTION, SOLUTION INTRAVENOUS at 11:29

## 2024-12-16 ASSESSMENT — PAIN SCALES - GENERAL: PAINLEVEL_OUTOF10: SEVERE PAIN (6)

## 2024-12-16 NOTE — PROGRESS NOTES
Infusion Nursing Note:  Ahmet Coleman presents today for Cycle 3, Day 1- [facility] Infusion.    Patient seen by provider today: Yes: Dr. Collins   present during visit today: Not Applicable.    Note: Patient reports feeling well on arrival to infusion suite today. Denies signs of infection. No new concerns or questions following provider visit. Consents to treatment today.         Intravenous Access:  Peripheral IV placed.    Treatment Conditions:  Lab Results   Component Value Date    HGB 13.2 (L) 12/16/2024    WBC 4.9 12/16/2024    ANEU 2.6 06/25/2021    ANEUTAUTO 2.8 12/16/2024     12/16/2024        Lab Results   Component Value Date     12/16/2024    POTASSIUM 4.0 12/16/2024    MAG 2.4 (H) 12/20/2021    CR 1.64 (H) 12/16/2024    STEPHEN 9.1 12/16/2024    BILITOTAL 0.3 12/16/2024    ALBUMIN 4.0 12/16/2024    ALT 18 12/16/2024    AST 22 12/16/2024       Results reviewed, labs MET treatment parameters, ok to proceed with treatment.      Post Infusion Assessment:  Patient tolerated infusion without incident.  Blood return noted pre and post infusion.  Site patent and intact, free from redness, edema or discomfort.  No evidence of extravasations.  Access discontinued per protocol.       Discharge Plan:   Patient declined prescription refills.  Discharge instructions reviewed with: Patient.  Patient and/or family verbalized understanding of discharge instructions and all questions answered.  AVS to patient via [employer].  Patient will return 1/6/25 for next appointment.   Patient discharged in stable condition accompanied by: self.  Departure Mode: Ambulatory.      Melva Urena RN

## 2024-12-16 NOTE — NURSING NOTE
"Oncology Rooming Note    December 16, 2024 10:03 AM   Ahmet Coleman is a 79 year old male who presents for:    Chief Complaint   Patient presents with    Oncology Clinic Visit     Malignant melanoma of scalp; Metastatic to brain    Blood Draw     Labs drawn via PIV placed by RN. VS taken.     Initial Vitals: BP (!) 140/75 (BP Location: Right arm, Patient Position: Sitting, Cuff Size: Adult Regular)   Pulse 61   Temp 97.6  F (36.4  C) (Oral)   Resp 16   Ht 1.753 m (5' 9.02\")   Wt 83.6 kg (184 lb 6.4 oz)   SpO2 97%   BMI 27.22 kg/m   Estimated body mass index is 27.22 kg/m  as calculated from the following:    Height as of this encounter: 1.753 m (5' 9.02\").    Weight as of this encounter: 83.6 kg (184 lb 6.4 oz). Body surface area is 2.02 meters squared.  Severe Pain (6) Comment: Data Unavailable   No LMP for male patient.  Allergies reviewed: Yes  Medications reviewed: Yes    Medications: Medication refills not needed today.  Pharmacy name entered into [employer]OLED:    [facility] - [place], [place] - [address] [id number] - [place], [place] - [address] [facility] - [place], [place] - [address] [facility]  [facility], [place] - [address]    Frailty Screening:   Is the patient here for a new oncology consult visit in cancer care? 2. No      Clinical concerns:        Kiesha Carmen              "

## 2024-12-16 NOTE — LETTER
12/16/2024      Ahmet Coleman  [address]   [place] [address]      Dear Colleague,    Thank you for referring your patient, Ahmet Coleman, to the [facility]. Please see a copy of my visit note below.      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  Patient was diagnosed with stage IIIB melanoma of the left scalp (pT2a, cN1c, cM0) and presented in primary care clinic 10/14/2020, with a 6.5 x 7 cm variably pigmented, tender plaque on the left scalp, Shave biopsy of the left central parietal scalp, left central occipital scalp, and left posterior parietal scalp 10/26/2020, revealed nonulcerated nodular and nevoid melanoma with up to 1.3 mm depth of invasion.  PD-L1 expression () was negative with TPS <1%.  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  There was no detected alteration in GNA11, GNAQ, KIT, MAP2K1, NRAS, PDGFRA. Punch biopsy of the right anterior temple, and posterior scalp left of midline 11/25/2020, revealed dermal melanocytic proliferation with melanophages and fibrosis without evidence of malignancy, and punch biopsy of the left occipital scalp 11/25/2020, revealed metastatic melanoma metastatic melanoma positive for SOX10 with rare mitotic activity, dense nodular inflammatory moderate and focal regression. 18-F FDG PET/CT scan 12/08/2020, revealed FDG avid irregular skin thickening spanning 5.4 cm overlying the left parietal bone, without invasion of the bone there was no hypermetabolic lymphadenopathy or distant metastases. Patient received neoadjuvant vemurafenib 960 mg BID day 1-21, then 720 mg BID day 22-28 for cycle 1 day and 720 mg BID days 1-28 plus cobimetinib 60 mg daily day 1-21, and atezolizumab  840 mg IV day 1 repeated every 28 days x 4 cycles from 01/13/2021 through 04/15/2021.  There was a vemurafenib 720 mg and cobimetinib 40 mg dose reduction beginning with cycle 2 (02/18/2021) due to worsening diarrhea.  Restaging 18-F FDG PET/CT scan 03/26/2021, revealed decrease in cutaneous thickening and diffusely decreased FDG uptake in the left parietal scalp lesion, with a small focus of residual skin thickening at the hide left vertex with FDG max 2.9 (previously FDG max 5.9).  There were no other hypermetabolic lesions. Patient underwent wide local excision of the left scalp lesion with left latissimus free flap for scalp reconstruction 05/24/2021, that revealed tumoral melanosis extending to a depth of 1.1 mm without residual melanoma in the wide excision specimen consistent with regressed melanoma.  Patient received adjuvant nivolumab 480 mg IV every 28 days x8 cycles from 06/25/2021 through 02/14/2022.     Restaging 18-F FDG PET/CT scan 08/25/2021, 11/23/2021, 03/18/2022, 06/10/2022, 08/26/2022, 01/13/2023, 04/21/2023, 07/21/2023, 10/27/2023, was negative for hypermetabolic lesions.      Restaging 18-F FDG PET/CT scan 09/13/2024, revealed postsurgical changes in the left temporoparietal periauricular scalp with reconstruction.  There was a 9 mm enhancing hypermetabolic lesion within the left frontal lobe, a 9 mm lesion along the medial aspect of the right frontal lobe suggestive of brain metastases.  There were scattered non-FDG-avid groundglass lung opacities in the left upper lobe and left lower lobe that appeared inflammatory in nature.  There were no other hypermetabolic lesions.  MRI brain 09/24/2024, revealed a 9 mm enhancing lesion in the left ventral superior frontal gyrus, a 12 x 6 mm enhancing lesion in the right pericallosal gyrus, and a 4 mm enhancing lesion in the right dorsal cingulate gyrus.  There were stable postsurgical changes and reconstruction in the left frontal parietal scalp without  evidence of local recurrence in the surgical region. Patient received gamma knife radiosurgery to the right superior parasagittal metastasis (22 Gy to 75% isodose), left superior parasagittal metastasis (22 Gy to 60% isodose), and right inferior parasagittal metastasis (22 Gy to 80% isodose) on 10/08/2024. Patient is receiving adjuvant pembrolizumab 200 mg IV every 21 days x1 year beginning 11/04/2024.  Patient received 2 cycles of pembrolizumab thus far.  Patient had fatigue lasting 1 day after the pembrolizumab infusion, intermittent frontal headache, occasional itching, and occasional hot flashes.  There are no other new symptoms or events since the prior clinic visit (11/07/2024). He denies fever, weight loss, headache, visual changes, slurred speech, cough, dyspnea, chest pain, abdominal pain, nausea, vomiting, constipation, diarrhea, bleeding, or bone pain.     PAST HISTORY: Past history was reviewed and unchanged from the clinic note 09/26/2024.     MEDICATIONS:   Current Outpatient Medications   Medication Sig Dispense Refill     acetaminophen (TYLENOL) 500 MG tablet Take 2 tablets (1,000 mg) by mouth every 8 hours as needed for mild pain (Patient not taking: Reported on 11/25/2024) 100 tablet 0     diphenoxylate-atropine (LOMOTIL) 2.5-0.025 MG tablet Take 1-2 tablets by mouth 4 times daily as needed for diarrhea (Patient not taking: Reported on 10/8/2024) 120 tablet 5     docusate sodium (COLACE) 100 MG capsule        fluvoxaMINE (LUVOX) 100 MG tablet Take 150 mg at bedtime       hydrochlorothiazide (HYDRODIURIL) 25 MG tablet Take 25 mg by mouth daily.       Hypromellose (ARTIFICIAL TEARS OP) Apply  to eye. 2 drops in each eye twice a day as needed       Lactobacillus-Inulin ([facility] DIGESTIVE [facility]) CAPS 1 tab daily (Patient not taking: Reported on 10/8/2024) 30 capsule 1     lisinopril (ZESTRIL) 10 MG tablet Take 15 mg by mouth daily       Magnesium Oxide 420 MG TABS Take 420 mg by mouth daily        "methocarbamol (ROBAXIN) 500 MG tablet Take 1 tablet (500 mg) by mouth 3 times daily as needed for muscle spasms (Patient not taking: Reported on 10/8/2024) 10 tablet 0     omeprazole (PRILOSEC) 20 MG capsule Take 1 capsule by mouth 2 times daily. 60 capsule prn     polyethylene glycol (MIRALAX) 17 GM/Dose powder Take 17 g by mouth daily (Patient not taking: Reported on 10/8/2024) 510 g 0     potassium chloride ER (KLOR-CON M) 20 MEQ CR tablet Take 1 tablet (20 mEq) by mouth daily 7 tablet 0     prazosin (MINIPRESS) 1 MG capsule Take 1 mg by mouth 2 Times Daily Takes 2 tabs in the morning and 1 tab at night       pregabalin (LYRICA) 150 MG capsule Take 150 mg by mouth 2 times daily       QUEtiapine (SEROQUEL) 300 MG tablet Take 150 mg by mouth At Bedtime        senna-docusate (SENOKOT-S/PERICOLACE) 8.6-50 MG tablet Take 1 tablet by mouth 2 times daily (Patient not taking: Reported on 11/3/2023) 30 tablet 0     simvastatin (ZOCOR) 80 MG tablet Take 40 mg by mouth At Bedtime        traZODone (DESYREL) 100 MG tablet Take 100 mg by mouth At Bedtime        vitamin D3 (CHOLECALCIFEROL) 1000 units (25 mcg) tablet Take 1,000 Units by mouth daily        vitamin D3 (CHOLECALCIFEROL) 125 MCG (5000 UT) tablet Take 1 tablet (125 mcg) by mouth daily 90 tablet 3     vortioxetine (TRINTELLIX) 20 MG tablet TAKE ONE TABLET BY MOUTH EVERY MORNING         REVIEW OF SYSTEMS: Review of systems reviewed with the patient and otherwise negative except for those detailed above.    PHYSICAL EXAM: BP (!) 140/75 (BP Location: Right arm, Patient Position: Sitting, Cuff Size: Adult Regular)   Pulse 61   Temp 97.6  F (36.4  C) (Oral)   Resp 16   Ht 1.753 m (5' 9.02\")   Wt 83.6 kg (184 lb 6.4 oz)   SpO2 97%   BMI 27.22 kg/m   ECOG performance status: 1. Physical exam was unchanged from prior clinic visit 11/07/2024.  Skin: No erythema or rash.  HEENT: Sclera nonicteric. Oropharynx without lesions or ulceration, mucosa pink and moist.  Nodes: " No cervical, supraclavicular, axillary, or inguinal adenopathy.  Lungs: No dullness to percussion.  No rales, wheezes, rhonchi.  Heart: Regular rate and rhythm.  Abdomen: Bowel sounds present.  Soft, nontender, no hepatosplenomegaly or mass.  Extremities: No edema.     LABS REVIEWED:   Component      Latest Ref Rng 11/25/2024  8:24 AM 12/16/2024  9:53 AM   WBC      4.0 - 11.0 10e3/uL  4.9    RBC Count      4.40 - 5.90 10e6/uL  4.58    Hemoglobin      13.3 - 17.7 g/dL  13.2 (L)    Hematocrit      40.0 - 53.0 %  37.7 (L)    MCV      78 - 100 fL  82    MCH      26.5 - 33.0 pg  28.8    MCHC      31.5 - 36.5 g/dL  35.0    RDW      10.0 - 15.0 %  12.4    Platelet Count      150 - 450 10e3/uL  231    % Neutrophils      %  57    % Lymphocytes      %  27    % Monocytes      %  11    % Eosinophils      %  3    % Basophils      %  1    % Immature Granulocytes      %  0    NRBCs per 100 WBC      <1 /100  0    Absolute Neutrophils      1.6 - 8.3 10e3/uL  2.8    Absolute Lymphocytes      0.8 - 5.3 10e3/uL  1.3    Absolute Monocytes      0.0 - 1.3 10e3/uL  0.5    Absolute Eosinophils      0.0 - 0.7 10e3/uL  0.2    Absolute Basophils      0.0 - 0.2 10e3/uL  0.1    Absolute Immature Granulocytes      <=0.4 10e3/uL  0.0    Absolute NRBCs      10e3/uL  0.0    Sodium      135 - 145 mmol/L 140  141    Potassium      3.4 - 5.3 mmol/L 3.7  4.0    Carbon Dioxide (CO2)      22 - 29 mmol/L 28  30 (H)    Anion Gap      7 - 15 mmol/L 11  9    Urea Nitrogen      8.0 - 23.0 mg/dL 18.4  18.9    Creatinine      0.67 - 1.17 mg/dL 1.64 (H)  1.64 (H)    GFR Estimate      >60 mL/min/1.73m2 43 (L)  43 (L)    Calcium      8.8 - 10.4 mg/dL 9.5  9.1    Chloride      98 - 107 mmol/L 101  102    Glucose      70 - 99 mg/dL 110 (H)  104 (H)    Alkaline Phosphatase      40 - 150 U/L 64  56    AST      0 - 45 U/L 21  22    ALT      0 - 70 U/L 18  18    Protein Total      6.4 - 8.3 g/dL 7.1  6.9    Albumin      3.5 - 5.2 g/dL 4.2  4.0    Bilirubin Total       <=1.2 mg/dL 0.4  0.3    Cortisol Serum        ug/dL 5.0     T4 Free      0.90 - 1.70 ng/dL  1.12    TSH      0.30 - 4.20 uIU/mL  2.49    Lipase      13 - 60 U/L  54    Amylase      28 - 100 U/L  74        IMPRESSION/PLAN: Stage IIIB melanoma of the left scalp.  Melanoma is a nonulcerated, up to 1.3 mm depth of invasion BRAF p.V600K-positive nodular and nevoid melanoma on the left scalp.  There is no evidence of regional or distant metastases noted on FDG PET/CT scan (12/08/2020).  Patient received neoadjuvant atezolizumab 840 mg IV day 1, vemurafenib BID day 1-28, and cobimetinib daily day 1-21 repeated every 28 days x4 cycles (from 01/13/2021 through 04/15/2021) followed by definitive wide local excision of the left scalp lesion (05/24/2021) that revealed a complete response.  Patient received adjuvant nivolumab IV every 28 days x8 cycles (from 06/25/2021 through 02/14/2022).  There are brain metastases noted on restaging FDG PET/CT scan (09/13/2024) and MRI brain (09/24/2024), but no evidence of systemic metastases.  Patient received gamma knife radiosurgery to the right superior parasagittal, left superior parasagittal, and right inferior parasagittal metastases (10/08/2024).  Patient is receiving adjuvant pembrolizumab IV every 21 days x1 year (beginning 11/04/2024).  There is grade 1 fatigue, headache, pruritus, and vasomotor symptoms.  Pembrolizumab will be continued without modification.  Patient will return to the outpatient infusion center 12/16/2024, for cycle 3 of pembrolizumab. I reviewed the risks and side effects of pembrolizumab with the patient, which include fatigue, weakness, anorexia, weight loss, rash, pruritus, dry eyes, dry mouth, headache, cough, dyspnea, abdominal pain, nausea, vomiting, diarrhea, hypothyroidism, adrenal insufficiency, other endocrine disorders, cardiomyopathy, colitis, nephritis, pancreatitis, myositis, arthritis, neuropathy, cerebritis.  Patient understood the indication  and risks and agreed to continue therapy.  Testosterone level will be tested due to the hot flashes, and triamcinolone 0.1% cream was prescribed for rash and pruritus.  Patient will return to clinic in 3 weeks with CBC, metabolic panel, amylase, lipase, TSH, free T4, cortisol, and for pembrolizumab cycle 4.  Restaging MRI brain and CT chest, abdomen, pelvis will be performed in 01/2025. The current and past history, clinical evaluation, reviewing diagnostic tests with the patient, and assessment and planning occurred over 30 minutes.       Hector Collins MD    cc: MD Keith Trevino MD Nattawat Klomjit, MD Samir S Khariwala, MD      Again, thank you for allowing me to participate in the care of your patient.        Sincerely,        Hector Collins MD

## 2024-12-16 NOTE — NURSING NOTE
Chief Complaint   Patient presents with    Oncology Clinic Visit     Malignant melanoma of scalp; Metastatic to brain    Blood Draw     Labs drawn via PIV placed by RN. VS taken.     Labs drawn from PIV placed by RN. Line flushed with saline. Vitals taken. Pt checked in for appointment(s).     Suzie Dos Santos RN

## 2025-01-06 ENCOUNTER — INFUSION THERAPY VISIT (OUTPATIENT)
Dept: ONCOLOGY | Facility: CLINIC | Age: 80
End: 2025-01-06
Attending: INTERNAL MEDICINE
Payer: MEDICARE

## 2025-01-06 ENCOUNTER — PATIENT OUTREACH (OUTPATIENT)
Dept: CARE COORDINATION | Facility: CLINIC | Age: 80
End: 2025-01-06

## 2025-01-06 VITALS
HEART RATE: 73 BPM | WEIGHT: 184.2 LBS | RESPIRATION RATE: 18 BRPM | SYSTOLIC BLOOD PRESSURE: 145 MMHG | TEMPERATURE: 98.1 F | OXYGEN SATURATION: 95 % | BODY MASS INDEX: 27.2 KG/M2 | DIASTOLIC BLOOD PRESSURE: 83 MMHG

## 2025-01-06 DIAGNOSIS — C43.4 MALIGNANT MELANOMA OF SCALP (H): Primary | ICD-10-CM

## 2025-01-06 DIAGNOSIS — C79.31 METASTASIS TO BRAIN (H): ICD-10-CM

## 2025-01-06 DIAGNOSIS — Z79.899 HIGH RISK MEDICATION USE: ICD-10-CM

## 2025-01-06 LAB
ALBUMIN SERPL BCG-MCNC: 4.1 G/DL (ref 3.5–5.2)
ALP SERPL-CCNC: 48 U/L (ref 40–150)
ALT SERPL W P-5'-P-CCNC: 17 U/L (ref 0–70)
AMYLASE SERPL-CCNC: 58 U/L (ref 28–100)
ANION GAP SERPL CALCULATED.3IONS-SCNC: 11 MMOL/L (ref 7–15)
AST SERPL W P-5'-P-CCNC: 20 U/L (ref 0–45)
BASOPHILS # BLD AUTO: 0 10E3/UL (ref 0–0.2)
BASOPHILS NFR BLD AUTO: 1 %
BILIRUB SERPL-MCNC: 0.3 MG/DL
BUN SERPL-MCNC: 18.7 MG/DL (ref 8–23)
CALCIUM SERPL-MCNC: 9.1 MG/DL (ref 8.8–10.4)
CHLORIDE SERPL-SCNC: 102 MMOL/L (ref 98–107)
CORTIS SERPL-MCNC: 12.3 UG/DL
CREAT SERPL-MCNC: 1.55 MG/DL (ref 0.67–1.17)
EGFRCR SERPLBLD CKD-EPI 2021: 46 ML/MIN/1.73M2
EOSINOPHIL # BLD AUTO: 0.1 10E3/UL (ref 0–0.7)
EOSINOPHIL NFR BLD AUTO: 2 %
ERYTHROCYTE [DISTWIDTH] IN BLOOD BY AUTOMATED COUNT: 12.7 % (ref 10–15)
GLUCOSE SERPL-MCNC: 116 MG/DL (ref 70–99)
HCO3 SERPL-SCNC: 28 MMOL/L (ref 22–29)
HCT VFR BLD AUTO: 38.4 % (ref 40–53)
HGB BLD-MCNC: 13.3 G/DL (ref 13.3–17.7)
IMM GRANULOCYTES # BLD: 0 10E3/UL
IMM GRANULOCYTES NFR BLD: 0 %
LIPASE SERPL-CCNC: 52 U/L (ref 13–60)
LYMPHOCYTES # BLD AUTO: 1.2 10E3/UL (ref 0.8–5.3)
LYMPHOCYTES NFR BLD AUTO: 28 %
MCH RBC QN AUTO: 28.9 PG (ref 26.5–33)
MCHC RBC AUTO-ENTMCNC: 34.6 G/DL (ref 31.5–36.5)
MCV RBC AUTO: 84 FL (ref 78–100)
MONOCYTES # BLD AUTO: 0.4 10E3/UL (ref 0–1.3)
MONOCYTES NFR BLD AUTO: 9 %
NEUTROPHILS # BLD AUTO: 2.6 10E3/UL (ref 1.6–8.3)
NEUTROPHILS NFR BLD AUTO: 59 %
NRBC # BLD AUTO: 0 10E3/UL
NRBC BLD AUTO-RTO: 0 /100
PLATELET # BLD AUTO: 226 10E3/UL (ref 150–450)
POTASSIUM SERPL-SCNC: 3.8 MMOL/L (ref 3.4–5.3)
PROT SERPL-MCNC: 6.9 G/DL (ref 6.4–8.3)
RBC # BLD AUTO: 4.6 10E6/UL (ref 4.4–5.9)
SODIUM SERPL-SCNC: 141 MMOL/L (ref 135–145)
TSH SERPL DL<=0.005 MIU/L-ACNC: 2.6 UIU/ML (ref 0.3–4.2)
WBC # BLD AUTO: 4.4 10E3/UL (ref 4–11)

## 2025-01-06 PROCEDURE — 84443 ASSAY THYROID STIM HORMONE: CPT | Performed by: INTERNAL MEDICINE

## 2025-01-06 PROCEDURE — 82533 TOTAL CORTISOL: CPT

## 2025-01-06 PROCEDURE — 83690 ASSAY OF LIPASE: CPT

## 2025-01-06 PROCEDURE — 258N000003 HC RX IP 258 OP 636: Performed by: INTERNAL MEDICINE

## 2025-01-06 PROCEDURE — 96413 CHEMO IV INFUSION 1 HR: CPT

## 2025-01-06 PROCEDURE — 84403 ASSAY OF TOTAL TESTOSTERONE: CPT

## 2025-01-06 PROCEDURE — 85004 AUTOMATED DIFF WBC COUNT: CPT

## 2025-01-06 PROCEDURE — 82150 ASSAY OF AMYLASE: CPT

## 2025-01-06 PROCEDURE — 82040 ASSAY OF SERUM ALBUMIN: CPT | Performed by: INTERNAL MEDICINE

## 2025-01-06 PROCEDURE — 85014 HEMATOCRIT: CPT

## 2025-01-06 PROCEDURE — 36415 COLL VENOUS BLD VENIPUNCTURE: CPT

## 2025-01-06 PROCEDURE — 250N000011 HC RX IP 250 OP 636: Performed by: INTERNAL MEDICINE

## 2025-01-06 RX ADMIN — SODIUM CHLORIDE 200 MG: 9 INJECTION, SOLUTION INTRAVENOUS at 12:46

## 2025-01-06 ASSESSMENT — PAIN SCALES - GENERAL: PAINLEVEL_OUTOF10: MODERATE PAIN (5)

## 2025-01-06 NOTE — PROGRESS NOTES
Social Work - Distress Screen Intervention  [facility]    Identified Concern and Score from Distress Screenin. How concerned are you about your ability to eat? 5     2. How concerned are you about unintended weight loss or your current weight? 3     3. How concerned are you about feeling depressed or very sad?  (!) 9     4. How concerned are you about feeling anxious or very scared?  2     5. Do you struggle with the loss of meaning and ulysses in your life?  Quite a bit     6. How concerned are you about work and home life issues that may be affected by your cancer?  0     7. How concerned are you about knowing what resources are available to help you?  0     8. Do you currently have what you would describe as Christian or spiritual struggles? Somewhat     9. If you want to be contacted by one of our professionals, I can send a message to them right now.  -- (none)       Date of Distress Screen: 1/3/24  Data: At time of last visit, patient scored positive on distress screening.  outreached to patient today to follow up on elevated distress and introduce psychosocial services and support.  Intervention/Education provided:  contacted patient by phone to discuss distress screening results.  SW introduced role of SW. Educated on why the phone call. Lloyd said he is coping well - did not need any resources for support at this time.  Follow-up Required:  will remain available to patient for support as needed    MITUL Reynoso, [facility]   Adult Oncology - [place]  [phone]  Onsite [place] on    *Please note does not work on .   Support Groups at [facility]: Social Work Services for Cancer Patients ([email or web address])

## 2025-01-06 NOTE — PROGRESS NOTES
Infusion Nursing Note:  Ahmet Coleman presents today for cycle 4 day 1 Keytruda.    Patient seen by provider today: No   present during visit today: Not Applicable.    Note: Patient arrives feeling well, denies any new concerns/complaints. Had a provider visit on 1/3/25      Intravenous Access:  Peripheral IV placed.    Treatment Conditions:  Lab Results   Component Value Date    HGB 13.3 01/06/2025    WBC 4.4 01/06/2025    ANEU 2.6 06/25/2021    ANEUTAUTO 2.6 01/06/2025     01/06/2025        Lab Results   Component Value Date     01/06/2025    POTASSIUM 3.8 01/06/2025    MAG 2.4 (H) 12/20/2021    CR 1.55 (H) 01/06/2025    STEPHEN 9.1 01/06/2025    BILITOTAL 0.3 01/06/2025    ALBUMIN 4.1 01/06/2025    ALT 17 01/06/2025    AST 20 01/06/2025       Results reviewed, labs MET treatment parameters, ok to proceed with treatment.      Post Infusion Assessment:  Patient tolerated infusion without incident.  Blood return noted pre and post infusion.  Site patent and intact, free from redness, edema or discomfort.  No evidence of extravasations.  Access discontinued per protocol.       Discharge Plan:   Discharge instructions reviewed with: Patient.  Patient and/or family verbalized understanding of discharge instructions and all questions answered.  AVS to patient via Dr. TATTOFFHART.  Patient will return 1/27/25 for next appointment.   Patient discharged in stable condition accompanied by: self.  Departure Mode: Ambulatory.      Kiesha Wong RN

## 2025-01-06 NOTE — PATIENT INSTRUCTIONS
[facility] Triage and after hours / weekends / holidays:  [phone]    Please call the triage or after hours line if you experience a temperature greater than or equal to 100.4, shaking chills, have uncontrolled nausea, vomiting and/or diarrhea, dizziness, shortness of breath, chest pain, bleeding, unexplained bruising, or if you have any other new/concerning symptoms, questions or concerns.      If you are having any concerning symptoms or wish to speak to a provider before your next infusion visit, please call triage to notify them so we can adequately serve you.     If you need a refill on a narcotic prescription or other medication, please call before your infusion appointment.                January 2025 Sunday Monday Tuesday Wednesday Thursday Friday Saturday                  1     2     3    RETURN CCSL   1:15 PM   (30 min.)   Hector Collins MD   [facility] 4       5     6    LAB PERIPHERAL  11:00 AM   (15 min.)   [facility] LAB DRAW   [facility]    ONC INFUSION 1 HR (60 MIN)  11:30 AM   (60 min.)    ONC INFUSION NURSE   [facility] 7     8     9     10     11       12     13    MR BRAIN WWO   1:30 PM   (45 min.)   UUMR2   [facility]    RETURN RADIATION ONCOLOGY   3:30 PM   (30 min.)   Lilia Palacios APRN CNP   [facility]iation [facility] 14     15     16     17     18       19     20     21     22     23     24     25       26     27    LAB PERIPHERAL  10:30 AM   (15 min.)   [facility] LAB DRAW   [facility]    ONC INFUSION 1 HR (60 MIN)  11:00 AM   (60 min.)    ONC INFUSION NURSE   [facility] 28 29     30     31 February 2025 Sunday Monday Tuesday Wednesday Thursday Friday Saturday                                 1       2     3     4     5     6     7     8       9     10     11      12     13    MYC RETURN DERMATOLOGY   1:30 PM   (15 min.)   Keith Alonzo MD   [facility] [place] 14     15       16     17    LAB PERIPHERAL  10:45 AM   (15 min.)   [facility] LAB DRAW   [facility]    ONC INFUSION 1 HR (60 MIN)  11:30 AM   (60 min.)    ONC INFUSION NURSE   [facility] 18     19     20     21     22       23     24     25     26     27     28                          Lab Results:  Recent Results (from the past 12 hours)   Comprehensive metabolic panel    Collection Time: 01/06/25 11:33 AM   Result Value Ref Range    Sodium 141 135 - 145 mmol/L    Potassium 3.8 3.4 - 5.3 mmol/L    Carbon Dioxide (CO2) 28 22 - 29 mmol/L    Anion Gap 11 7 - 15 mmol/L    Urea Nitrogen 18.7 8.0 - 23.0 mg/dL    Creatinine 1.55 (H) 0.67 - 1.17 mg/dL    GFR Estimate 46 (L) >60 mL/min/1.73m2    Calcium 9.1 8.8 - 10.4 mg/dL    Chloride 102 98 - 107 mmol/L    Glucose 116 (H) 70 - 99 mg/dL    Alkaline Phosphatase 48 40 - 150 U/L    AST 20 0 - 45 U/L    ALT 17 0 - 70 U/L    Protein Total 6.9 6.4 - 8.3 g/dL    Albumin 4.1 3.5 - 5.2 g/dL    Bilirubin Total 0.3 <=1.2 mg/dL   TSH with free T4 reflex    Collection Time: 01/06/25 11:33 AM   Result Value Ref Range    TSH 2.60 0.30 - 4.20 uIU/mL   Amylase    Collection Time: 01/06/25 11:33 AM   Result Value Ref Range    Amylase 58 28 - 100 U/L   Lipase    Collection Time: 01/06/25 11:33 AM   Result Value Ref Range    Lipase 52 13 - 60 U/L   CBC with platelets and differential    Collection Time: 01/06/25 11:33 AM   Result Value Ref Range    WBC Count 4.4 4.0 - 11.0 10e3/uL    RBC Count 4.60 4.40 - 5.90 10e6/uL    Hemoglobin 13.3 13.3 - 17.7 g/dL    Hematocrit 38.4 (L) 40.0 - 53.0 %    MCV 84 78 - 100 fL    MCH 28.9 26.5 - 33.0 pg    MCHC 34.6 31.5 - 36.5 g/dL    RDW 12.7 10.0 - 15.0 %    Platelet Count 226 150 - 450 10e3/uL    % Neutrophils 59 %    % Lymphocytes 28 %    % Monocytes 9 %    %  Eosinophils 2 %    % Basophils 1 %    % Immature Granulocytes 0 %    NRBCs per 100 WBC 0 <1 /100    Absolute Neutrophils 2.6 1.6 - 8.3 10e3/uL    Absolute Lymphocytes 1.2 0.8 - 5.3 10e3/uL    Absolute Monocytes 0.4 0.0 - 1.3 10e3/uL    Absolute Eosinophils 0.1 0.0 - 0.7 10e3/uL    Absolute Basophils 0.0 0.0 - 0.2 10e3/uL    Absolute Immature Granulocytes 0.0 <=0.4 10e3/uL    Absolute NRBCs 0.0 10e3/uL

## 2025-01-06 NOTE — NURSING NOTE
Chief Complaint   Patient presents with    Blood Draw     Vitals, blood drawn and PIV placed by LPN. Pt checked into appt.      LAMAR Russell LPN

## 2025-01-08 LAB — TESTOST SERPL-MCNC: 302 NG/DL (ref 240–950)

## 2025-01-13 ENCOUNTER — OFFICE VISIT (OUTPATIENT)
Dept: RADIATION ONCOLOGY | Facility: CLINIC | Age: 80
End: 2025-01-13
Attending: RADIOLOGY
Payer: MEDICARE

## 2025-01-13 ENCOUNTER — HOSPITAL ENCOUNTER (OUTPATIENT)
Dept: MRI IMAGING | Facility: CLINIC | Age: 80
Discharge: HOME | End: 2025-01-13
Payer: MEDICARE

## 2025-01-13 VITALS
HEART RATE: 77 BPM | DIASTOLIC BLOOD PRESSURE: 71 MMHG | WEIGHT: 183.3 LBS | OXYGEN SATURATION: 93 % | SYSTOLIC BLOOD PRESSURE: 115 MMHG | BODY MASS INDEX: 27.07 KG/M2 | RESPIRATION RATE: 16 BRPM

## 2025-01-13 DIAGNOSIS — C79.31 METASTASIS TO BRAIN (H): ICD-10-CM

## 2025-01-13 DIAGNOSIS — C79.31 METASTASIS TO BRAIN (H): Primary | ICD-10-CM

## 2025-01-13 PROCEDURE — G0463 HOSPITAL OUTPT CLINIC VISIT: HCPCS

## 2025-01-13 PROCEDURE — 99214 OFFICE O/P EST MOD 30 MIN: CPT

## 2025-01-13 PROCEDURE — 255N000002 HC RX 255 OP 636

## 2025-01-13 PROCEDURE — A9585 GADOBUTROL INJECTION: HCPCS

## 2025-01-13 PROCEDURE — 70553 MRI BRAIN STEM W/O & W/DYE: CPT | Mod: 26 | Performed by: RADIOLOGY

## 2025-01-13 PROCEDURE — 70553 MRI BRAIN STEM W/O & W/DYE: CPT

## 2025-01-13 RX ORDER — GADOBUTROL 604.72 MG/ML
8 INJECTION INTRAVENOUS ONCE
Status: COMPLETED | OUTPATIENT
Start: 2025-01-13 | End: 2025-01-13

## 2025-01-13 RX ADMIN — GADOBUTROL 8 ML: 604.72 INJECTION INTRAVENOUS at 14:26

## 2025-01-13 NOTE — PROGRESS NOTES
"Oncology Rooming Note    2025 3:06 PM   Ahmet Coleman is a 79 year old male who presents for:    Chief Complaint   Patient presents with    Oncology Clinic Visit     Radiation Oncology Follow-Up       Initial Vitals: /71 (BP Location: Right arm)   Pulse 77   Resp 16   Wt 83.1 kg (183 lb 4.8 oz)   SpO2 93%   BMI 27.07 kg/m   Estimated body mass index is 27.07 kg/m  as calculated from the following:    Height as of 1/3/25: 1.753 m (5' 9\").    Weight as of this encounter: 83.1 kg (183 lb 4.8 oz). Body surface area is 2.01 meters squared.  Data Unavailable Comment: Data Unavailable   No LMP for male patient.    Allergies reviewed: Yes    Medications reviewed: Yes    Medications: Medication refills not needed today.  Pharmacy name entered into [employer]:    [id number] - [place], [place] - [address]  [facility] [id number] - [place], [place] - [address] [facility] - [place] [place] [facility] [facility]    Frailty Screening:   Is the patient here for a new oncology consult visit in cancer care? 2. No    FOLLOW-UP VISIT    Patient Name: Ahmet Coleman      : 1945     Age: 79 year old        ______________________________________________________________________________     Chief Complaint   Patient presents with    Oncology Clinic Visit     Radiation Oncology Follow-Up       /71 (BP Location: Right arm)   Pulse 77   Resp 16   Wt 83.1 kg (183 lb 4.8 oz)   SpO2 93%   BMI 27.07 kg/m       Date Radiation Completed: Metastatic Melanoma. 10/8/24 Gamma Knife 22 Gy to right superior parasagittal, left superior parasagittal, and right inferior parasagittal metastases.     Pain  Current history of pain associated with this visit:   Intensity: 5/10  Current: aching  Location: Headaches, pain behind bilat eyes, and joint pain/stiffness (bilat groin, low back, R hip, L shoulder)  Treatment: Lyrica, Robaxin, PRN " Tylenol    Labs  1/6/25 - CBC w/ diff, CMP, lipase, testosterone total, TSH    Imaging  1/13/25 - MRI brain wo/w contrast      Other Appointments:   MD Name: Dr. Collins Appointment Date: 1/27/25   MD Name: Dr. Alonzo Appointment Date: 2/13/25   MD Name: Dr. Alonzo Appointment Date: 3/27/25   Other Appointment Notes:   1/17/25 - CT CAP, labs, infusion  2/17/25 - labs, infusion  3/10/25 - labs, infusion  3/31/25 - labs, infusion    Residual Radiation side effect: Fatigue, daily headaches, pain behind bilat eyes, decreased vision bilat eyes L>R       Clinical concerns: Patient is here for a Radiation Oncology Follow Up.     Lilia Palacios NP was notified.      Marsha Dotson RN  Radiation Oncology

## 2025-01-13 NOTE — PROGRESS NOTES
Department of Radiation Oncology  Radiation Therapy Center  [facility]         Radiation Oncology Follow-up Visit  2025      Ahmet Coleman  MRN: [id number]   : 1945     DISEASE TREATED:   Brain metastasis    RADIATION THERAPY DELIVERED AND TREATMENT SITE:   Treatment Site Dose Modality Collimators Shots   Rt sup parasagittal 22 Gy to 75% isodose Cobalt 60 4,8,16mm 15   Lt sup parasagittal 22 Gy to 60% isodose Cobalt 60 4,8,16mm 14   Rt inf parasagittal 22 Gy to 80% isodose Cobalt 60 4,8mm 3     SYSTEMIC THERAPY:  None    INTERVAL SINCE COMPLETION OF RADIATION THERAPY:   3 months (completed 10/08/2024)    SUBJECTIVE:   Ahmet Coleman is a 79 year old male with a PMH significant for melanoma. He was seen by Dr. Ramirez last 10/03/2024 with a diagnosis of brain metastasis.     He had a small pigmented scalp lesion present for over 30 years, biopsied in  and was benign.  However, in 2020, he presented  with 1 year of progressive enlargement of the lesion and new onset burning, itching, and stinging sensation in the affected scalp.  On   10/26/20, he had shave biopsies of A. left central parietal scalp, B. left central occipital scalp, C. Left posterior parietal scalp, and D. punch biopsy of the left superior occipital scalp.      Pathology (specimen: [id number]) showed a nodular and nevoid type melanoma, non-ulcerated, Breslow depth up to 1.3 mm, Saurabh's level IV, and up to 3 mitoses per mm2. All margins were involved. Ki-67 10-20%. Sox10 stain is positive and highlights an atypical compound melanocytic proliferation with significant overlying confluence and pagetosis of intraepidermal melanocytes.      T-stage: at least pT2a. PD-L1 staining shows PD-L1 TPS <1%. 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        On 20, he was seen by Dr. Garcia and he took three 3mm punch biopsies, which returned as dermal melanocytic proliferations with  melanophages and fibrosis, consistent with locoregional metastatic melanoma.        Staging on  12/8/2020  with  PET-CT, which showed FDG avid irregular skin thickening overlying the left parietal region, with no FDG avid lymphadenopathy in the neck and no evidence of metastasis elsewhere in the body.     He was started vemurafenib and cobimetinib, on   1/22/2021 start  ( ~2/12/21 held for diarrhea, then resumed.)  Changed to atezolizumab,on 2/18/2021 - last on 4/29/21     On  5/24/2021  he  underwent   Wide local excision of the scalp melanoma and left latissimus free flap for scalp reconstruction.  Surgical pathology showed features consistent with scar and tumoral melanosis. No definite residual melanoma was seen. Tumoral melanosis (which extends to a depth of around 1.1 mm) was  believed to be equivalent to a diagnosis of regressed melanoma.         He started  adjuvant Nivolumab on  6/25/21,  which he continued until  last dose 2/14/22     Routine PET CT scan on 3/1/24  was clear but another PET CT on  9/13/24 revealed two  9 mm enhancing hypermetabolic nodules in the brain, likely  metastases.   No other evidence of systemic disease.     MRI on 9/24 revealed 3 intracranial metastatic foci (9 mm enhancing lesion in the left ventral superior frontal gyrus.12 x 6 mm enhancing lesion in the right pericallosal gyrus and a  4 mm enhancing lesion in the right dorsal cingulate gyrus)     He was referred to radiation oncology for consideration of GK radiosurgery to these presumed metastatic lesions.      INTERVAL HISTORY:  Today's MRI:  IMPRESSION:  Decreased conspicuity of the previously described  metastatic foci including near resolution of the 4 mm lesion along the  right dorsal cingulate gyrus.     I have personally reviewed the examination and initial interpretation  and I agree with the findings.     JEWEL LIU MD     Today, [facility], specifically reports the following symptoms:  Brain ROS:  Headaches- yes,  reported mild HA, pain behind his eyes. Recently saw his eye doctor with new prescription.   Seizures- denies  Nausea/vomiting-  denies  Changes in cognition/behavior-  denies  Speech-  denies  Vision/hearing changes- Yes, blurry vision, both eyes, more on his left. Just saw his eye doctor and have a new eye prescription. Still need to be pick-up.  Gait symptoms or imbalance-  denies. Patient have baseline neuropathy.  Other focal neuro deficits-  denies  Pain: Reported generalized joint pain, patient associate it with his keytruda  Other: Patient reported some mild fatigue, loss of appetite.    PHYSICAL EXAM:  /71 (BP Location: Right arm)   Pulse 77   Resp 16   Wt 83.1 kg (183 lb 4.8 oz)   SpO2 93%   BMI 27.07 kg/m    Gen: Alert, not in acute distress.  HEENT: Normocephalic, atraumatic. No visible scleral icterus. No lymphadenopathy.   Neck: Apparent, full range of motion.  CV: Appears well perfused, with no visible cyanosis.  Lungs: Breathing easily in room air, with no difficulty completing full sentences. Lung sounds audible and clear. No wheezing, crackles.  Extremities: No visible edema of the upper extremities. Full ROM at the shoulders and elbows.  Neuro: Alert and oriented, grossly non focal. Normal speech. Moving upper extremities equally.  Skin: No visible jaundice. No suspicious lesions of the visualized integuments.  Psychiatric: Appropriate mood and affect to given situation. Answers questions appropriately.   MS: AAO x 3, appropriately interactive, normal affect, speech fluent  CN: II,III -- PERRLA, no visual field cut;   III,IV,VI -- EOMI, no ptosis;   V -- sensation intact to LT/PP; masseter function intact  VII -- no facial weakness/droop;   VIII -- hears finger rub equally bilaterally;   IX,X -- voice normal, palate elevates symmetrically,   XI -- SCM/trapezii 5/5 strength bilaterally;   XII -- tongue protrudes midline, no atrophy or fasciculation.  Motor: Normal tone, no tremor.    Sensation: Normal sensation to LT intact bilaterally upper and lower extremities  Coordination: Rapid alternating movements intact  Gait: Natural gait intact. Tandem gait intact.     LABS AND IMAGING:  MRI Brain w/o & w contrast 1/13/2024  IMPRESSION:  Decreased conspicuity of the previously described  metastatic foci including near resolution of the 4 mm lesion along the  right dorsal cingulate gyrus.     I have personally reviewed the examination and initial interpretation  and I agree with the findings.     JEWEL LIU MD     Assessment/Plan:  Ahmet Coleman is a 79 year old male with a PMH significant for melanoma. He was seen by Dr. Ramirez last 10/03/2024 with a diagnosis of brain metastasis 2.5 years after discontinuing Nivolumab, now 3 months off GK radiosurgery for 3 small lesions. Patient did not have unexpected side-effects post radiation therapy. MRI of the brain today showed good response to the treatment. There is a decrease in the sizes of the 3 lesions treated. The patient presents without evidence of disease recurrence.     1) RTC in the clinic in 3 months  2) MRI Brain (W/WO Contrast) in 3 months    I appreciate the opportunity to participate in Mr. Luna's care. All the patient's questions were answered to verbalized satisfaction. We instructed the patient to call with any questions or concerns in the interim.      Laboratory data, imaging and pathology as noted above are reviewed. I reviewed previous medical records, which are summarized in the HPI. A total of 30 minutes were spent (including face to face time) during this visit and more than 50% of the time was spent in counseling and coordination of care.        RAE Jara, CNP  Department of Radiation Oncology  [facility]     CC  Patient Care Team:  Adama Pacheco MD as PCP - General (Family Medicine)  Keith Alonzo MD as Assigned Surgical Provider  Malgorzata Alarcon MD as MD (Nephrology)  Dorian  MD Malgorzata as Assigned Nephrology Provider  Ashlee Steward, RN as Specialty Care Coordinator (Hematology & Oncology)  Mahesh Keller MD as Assigned Neuroscience Provider  Hector Collins MD as Assigned Cancer Care Provider  HECTOR COLLINS

## 2025-01-13 NOTE — LETTER
"2025      Ahmet Coleman  [address] [facility] [address]      Dear Colleague,    Thank you for referring your patient, Ahmet Coleman, to the [facility] RADIATION ONCOLOGY. Please see a copy of my visit note below.    Oncology Rooming Note    2025 3:06 PM   Ahmet Coleman is a 79 year old male who presents for:    Chief Complaint   Patient presents with     Oncology Clinic Visit     Radiation Oncology Follow-Up       Initial Vitals: /71 (BP Location: Right arm)   Pulse 77   Resp 16   Wt 83.1 kg (183 lb 4.8 oz)   SpO2 93%   BMI 27.07 kg/m   Estimated body mass index is 27.07 kg/m  as calculated from the following:    Height as of 1/3/25: 1.753 m (5' 9\").    Weight as of this encounter: 83.1 kg (183 lb 4.8 oz). Body surface area is 2.01 meters squared.  Data Unavailable Comment: Data Unavailable   No LMP for male patient.    Allergies reviewed: Yes    Medications reviewed: Yes    Medications: Medication refills not needed today.  Pharmacy name entered into [employer]:    [id number] - [place], [place] - [address] [id number] [place], [place] [facility] [address] [facility] - [place], [place] - [address] [facility], [place] [address] [address] [facility] [address]    Frailty Screening:   Is the patient here for a new oncology consult visit in cancer care? 2. No    FOLLOW-UP VISIT    Patient Name: Ahmet Coleman      : 1945     Age: 79 year old        ______________________________________________________________________________     Chief Complaint   Patient presents with     Oncology Clinic Visit     Radiation Oncology Follow-Up       /71 (BP Location: Right arm)   Pulse 77   Resp 16   Wt 83.1 kg (183 lb 4.8 oz)   SpO2 93%   BMI 27.07 kg/m       Date Radiation Completed: Metastatic Melanoma. 10/8/24 Gamma Knife 22 Gy to right superior parasagittal, left superior parasagittal, and right inferior parasagittal metastases. "     Pain  Current history of pain associated with this visit:   Intensity: 5/10  Current: aching  Location: Headaches, pain behind bilat eyes, and joint pain/stiffness (bilat groin, low back, R hip, L shoulder)  Treatment: Lyrica, Robaxin, PRN Tylenol    Labs  25 - CBC w/ diff, CMP, lipase, testosterone total, TSH    Imaging  25 - MRI brain wo/w contrast      Other Appointments:   MD Name: Dr. Collins Appointment Date: 25   MD Name: Dr. Alonzo Appointment Date: 25   MD Name: Dr. Alonzo Appointment Date: 3/27/25   Other Appointment Notes:   25 - CT CAP, labs, infusion  25 - labs, infusion  3/10/25 - labs, infusion  3/31/25 - labs, infusion    Residual Radiation side effect: Fatigue, daily headaches, pain behind bilat eyes, decreased vision bilat eyes L>R       Clinical concerns: Patient is here for a Radiation Oncology Follow Up.     Lilia Palacios NP was notified.      Marsha Dotson RN  Radiation Oncology                   Department of Radiation Oncology  Radiation Therapy Center  [facility]         Radiation Oncology Follow-up Visit  2025      Ahmet Coleman  MRN: [id number]   : 1945     DISEASE TREATED:   Brain metastasis    RADIATION THERAPY DELIVERED AND TREATMENT SITE:   Treatment Site Dose Modality Collimators Shots   Rt sup parasagittal 22 Gy to 75% isodose Cobalt 60 4,8,16mm 15   Lt sup parasagittal 22 Gy to 60% isodose Cobalt 60 4,8,16mm 14   Rt inf parasagittal 22 Gy to 80% isodose Cobalt 60 4,8mm 3     SYSTEMIC THERAPY:  None    INTERVAL SINCE COMPLETION OF RADIATION THERAPY:   3 months (completed 10/08/2024)    SUBJECTIVE:   Ahmet Coleman is a 79 year old male with a PMH significant for melanoma. He was seen by Dr. Ramirez last 10/03/2024 with a diagnosis of brain metastasis.     He had a small pigmented scalp lesion present for over 30 years, biopsied in  and was benign.  However, in 2020, he presented  with 1  year of progressive enlargement of the lesion and new onset burning, itching, and stinging sensation in the affected scalp.  On   10/26/20, he had shave biopsies of A. left central parietal scalp, B. left central occipital scalp, C. Left posterior parietal scalp, and D. punch biopsy of the left superior occipital scalp.      Pathology (specimen: [id number]) showed a nodular and nevoid type melanoma, non-ulcerated, Breslow depth up to 1.3 mm, Saurabh's level IV, and up to 3 mitoses per mm2. All margins were involved. Ki-67 10-20%. Sox10 stain is positive and highlights an atypical compound melanocytic proliferation with significant overlying confluence and pagetosis of intraepidermal melanocytes.      T-stage: at least pT2a. PD-L1 staining shows PD-L1 TPS <1%. 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        On 11/25/20, he was seen by Dr. Garcia and he took three 3mm punch biopsies, which returned as dermal melanocytic proliferations with melanophages and fibrosis, consistent with locoregional metastatic melanoma.        Staging on  12/8/2020  with  PET-CT, which showed FDG avid irregular skin thickening overlying the left parietal region, with no FDG avid lymphadenopathy in the neck and no evidence of metastasis elsewhere in the body.     He was started vemurafenib and cobimetinib, on   1/22/2021 start  ( ~2/12/21 held for diarrhea, then resumed.)  Changed to atezolizumab,on 2/18/2021 - last on 4/29/21     On  5/24/2021  he  underwent   Wide local excision of the scalp melanoma and left latissimus free flap for scalp reconstruction.  Surgical pathology showed features consistent with scar and tumoral melanosis. No definite residual melanoma was seen. Tumoral melanosis (which extends to a depth of around 1.1 mm) was  believed to be equivalent to a diagnosis of regressed melanoma.         He started  adjuvant Nivolumab on  6/25/21,  which he continued until  last dose 2/14/22     Routine PET CT scan on 3/1/24   was clear but another PET CT on  9/13/24 revealed two  9 mm enhancing hypermetabolic nodules in the brain, likely  metastases.   No other evidence of systemic disease.     MRI on 9/24 revealed 3 intracranial metastatic foci (9 mm enhancing lesion in the left ventral superior frontal gyrus.12 x 6 mm enhancing lesion in the right pericallosal gyrus and a  4 mm enhancing lesion in the right dorsal cingulate gyrus)     He was referred to radiation oncology for consideration of GK radiosurgery to these presumed metastatic lesions.      INTERVAL HISTORY:  Today's MRI:  IMPRESSION:  Decreased conspicuity of the previously described  metastatic foci including near resolution of the 4 mm lesion along the  right dorsal cingulate gyrus.     I have personally reviewed the examination and initial interpretation  and I agree with the findings.     JEWEL LIU MD     Today, [facility], specifically reports the following symptoms:  Brain ROS:  Headaches- yes, reported mild HA, pain behind his eyes. Recently saw his eye doctor with new prescription.   Seizures- denies  Nausea/vomiting-  denies  Changes in cognition/behavior-  denies  Speech-  denies  Vision/hearing changes- Yes, blurry vision, both eyes, more on his left. Just saw his eye doctor and have a new eye prescription. Still need to be pick-up.  Gait symptoms or imbalance-  denies. Patient have baseline neuropathy.  Other focal neuro deficits-  denies  Pain: Reported generalized joint pain, patient associate it with his keytruda  Other: Patient reported some mild fatigue, loss of appetite.    PHYSICAL EXAM:  /71 (BP Location: Right arm)   Pulse 77   Resp 16   Wt 83.1 kg (183 lb 4.8 oz)   SpO2 93%   BMI 27.07 kg/m    Gen: Alert, not in acute distress.  HEENT: Normocephalic, atraumatic. No visible scleral icterus. No lymphadenopathy.   Neck: Apparent, full range of motion.  CV: Appears well perfused, with no visible cyanosis.  Lungs: Breathing easily in room air,  with no difficulty completing full sentences. Lung sounds audible and clear. No wheezing, crackles.  Extremities: No visible edema of the upper extremities. Full ROM at the shoulders and elbows.  Neuro: Alert and oriented, grossly non focal. Normal speech. Moving upper extremities equally.  Skin: No visible jaundice. No suspicious lesions of the visualized integuments.  Psychiatric: Appropriate mood and affect to given situation. Answers questions appropriately.   MS: AAO x 3, appropriately interactive, normal affect, speech fluent  CN: II,III -- PERRLA, no visual field cut;   III,IV,VI -- EOMI, no ptosis;   V -- sensation intact to LT/PP; masseter function intact  VII -- no facial weakness/droop;   VIII -- hears finger rub equally bilaterally;   IX,X -- voice normal, palate elevates symmetrically,   XI -- SCM/trapezii 5/5 strength bilaterally;   XII -- tongue protrudes midline, no atrophy or fasciculation.  Motor: Normal tone, no tremor.   Sensation: Normal sensation to LT intact bilaterally upper and lower extremities  Coordination: Rapid alternating movements intact  Gait: Natural gait intact. Tandem gait intact.     LABS AND IMAGING:  MRI Brain w/o & w contrast 1/13/2024  IMPRESSION:  Decreased conspicuity of the previously described  metastatic foci including near resolution of the 4 mm lesion along the  right dorsal cingulate gyrus.     I have personally reviewed the examination and initial interpretation  and I agree with the findings.     JEWEL LIU MD     Assessment/Plan:  Ahmet Coleman is a 79 year old male with a PMH significant for melanoma. He was seen by Dr. Ramirez last 10/03/2024 with a diagnosis of brain metastasis 2.5 years after discontinuing Nivolumab, now 3 months off GK radiosurgery for 3 small lesions. Patient did not have unexpected side-effects post radiation therapy. MRI of the brain today showed good response to the treatment. There is a decrease in the sizes of the 3 lesions  treated. The patient presents without evidence of disease recurrence.     1) RTC in the clinic in 3 months  2) MRI Brain (W/WO Contrast) in 3 months    I appreciate the opportunity to participate in Mr. Luna's care. All the patient's questions were answered to verbalized satisfaction. We instructed the patient to call with any questions or concerns in the interim.      Laboratory data, imaging and pathology as noted above are reviewed. I reviewed previous medical records, which are summarized in the HPI. A total of 30 minutes were spent (including face to face time) during this visit and more than 50% of the time was spent in counseling and coordination of care.        RAE Jara, CNP  Department of Radiation Oncology  [facility]     CC  Patient Care Team:  Adama Pacheco MD as PCP - General (Family Medicine)  Keith Alonzo MD as Assigned Surgical Provider  Malgorzata Alarcon MD as MD (Nephrology)  Malgorzata Alarcon MD as Assigned Nephrology Provider  Ashlee Steward RN as Specialty Care Coordinator (Hematology & Oncology)  Mahesh Keller MD as Assigned Neuroscience Provider  Hector Collins MD as Assigned Cancer Care Provider  HECTOR COLLINS        Again, thank you for allowing me to participate in the care of your patient.        Sincerely,        RAE Jara CNP    Electronically signed

## 2025-01-17 ENCOUNTER — ANCILLARY PROCEDURE (OUTPATIENT)
Dept: CT IMAGING | Facility: CLINIC | Age: 80
End: 2025-01-17
Attending: INTERNAL MEDICINE
Payer: MEDICARE

## 2025-01-17 DIAGNOSIS — Z79.899 HIGH RISK MEDICATION USE: ICD-10-CM

## 2025-01-17 DIAGNOSIS — C43.4 MALIGNANT MELANOMA OF SCALP (H): ICD-10-CM

## 2025-01-17 DIAGNOSIS — C79.31 METASTASIS TO BRAIN (H): ICD-10-CM

## 2025-01-17 PROCEDURE — 71260 CT THORAX DX C+: CPT | Performed by: STUDENT IN AN ORGANIZED HEALTH CARE EDUCATION/TRAINING PROGRAM

## 2025-01-17 PROCEDURE — 74177 CT ABD & PELVIS W/CONTRAST: CPT | Performed by: STUDENT IN AN ORGANIZED HEALTH CARE EDUCATION/TRAINING PROGRAM

## 2025-01-17 RX ORDER — IOPAMIDOL 755 MG/ML
101 INJECTION, SOLUTION INTRAVASCULAR ONCE
Status: COMPLETED | OUTPATIENT
Start: 2025-01-17 | End: 2025-01-17

## 2025-01-17 RX ADMIN — IOPAMIDOL 101 ML: 755 INJECTION, SOLUTION INTRAVASCULAR at 13:07

## 2025-01-20 NOTE — PROGRESS NOTES
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  Patient was diagnosed with stage IIIB melanoma of the left scalp (pT2a, cN1c, cM0) and presented in primary care clinic 10/14/2020, with a 6.5 x 7 cm variably pigmented, tender plaque on the left scalp, Shave biopsy of the left central parietal scalp, left central occipital scalp, and left posterior parietal scalp 10/26/2020, revealed nonulcerated nodular and nevoid melanoma with up to 1.3 mm depth of invasion.  PD-L1 expression () was negative with TPS <1%.  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  There was no detected alteration in GNA11, GNAQ, KIT, MAP2K1, NRAS, PDGFRA. Punch biopsy of the right anterior temple, and posterior scalp left of midline 11/25/2020, revealed dermal melanocytic proliferation with melanophages and fibrosis without evidence of malignancy, and punch biopsy of the left occipital scalp 11/25/2020, revealed metastatic melanoma metastatic melanoma positive for SOX10 with rare mitotic activity, dense nodular inflammatory moderate and focal regression. 18-F FDG PET/CT scan 12/08/2020, revealed FDG avid irregular skin thickening spanning 5.4 cm overlying the left parietal bone, without invasion of the bone there was no hypermetabolic lymphadenopathy or distant metastases. Patient received neoadjuvant vemurafenib 960 mg BID day 1-21, then 720 mg BID day 22-28 for cycle 1 day and 720 mg BID days 1-28 plus cobimetinib 60 mg daily day 1-21, and atezolizumab 840 mg IV day 1 repeated every 28 days x 4 cycles from 01/13/2021 through 04/15/2021.  There was a vemurafenib 720 mg and cobimetinib 40 mg dose reduction beginning with cycle 2 (02/18/2021) due to worsening diarrhea.  Restaging 18-F FDG PET/CT  scan 03/26/2021, revealed decrease in cutaneous thickening and diffusely decreased FDG uptake in the left parietal scalp lesion, with a small focus of residual skin thickening at the hide left vertex with FDG max 2.9 (previously FDG max 5.9).  There were no other hypermetabolic lesions. Patient underwent wide local excision of the left scalp lesion with left latissimus free flap for scalp reconstruction 05/24/2021, that revealed tumoral melanosis extending to a depth of 1.1 mm without residual melanoma in the wide excision specimen consistent with regressed melanoma.  Patient received adjuvant nivolumab 480 mg IV every 28 days x8 cycles from 06/25/2021 through 02/14/2022.     Restaging 18-F FDG PET/CT scan 08/25/2021, 11/23/2021, 03/18/2022, 06/10/2022, 08/26/2022, 01/13/2023, 04/21/2023, 07/21/2023, 10/27/2023, was negative for hypermetabolic lesions.      Restaging 18-F FDG PET/CT scan 09/13/2024, revealed postsurgical changes in the left temporoparietal periauricular scalp with reconstruction.  There was a 9 mm enhancing hypermetabolic lesion within the left frontal lobe, a 9 mm lesion along the medial aspect of the right frontal lobe suggestive of brain metastases.  There were scattered non-FDG-avid groundglass lung opacities in the left upper lobe and left lower lobe that appeared inflammatory in nature.  There were no other hypermetabolic lesions.  MRI brain 09/24/2024, revealed a 9 mm enhancing lesion in the left ventral superior frontal gyrus, a 12 x 6 mm enhancing lesion in the right pericallosal gyrus, and a 4 mm enhancing lesion in the right dorsal cingulate gyrus.  There were stable postsurgical changes and reconstruction in the left frontal parietal scalp without evidence of local recurrence in the surgical region. Patient received gamma knife radiosurgery to the right superior parasagittal metastasis (22 Gy to 75% isodose), left superior parasagittal metastasis (22 Gy to 60% isodose), and right  inferior parasagittal metastasis (22 Gy to 80% isodose) on 10/08/2024. Patient is receiving adjuvant pembrolizumab 200 mg IV every 21 days x1 year beginning 11/04/2024.  Patient received 4 cycles of pembrolizumab thus far (01/06/2025).  Patient has fatigue, intermittent mild frontal headache, dry skin, occasional itching, dry mouth, occasional hot flashes, and worsening joint pain in the shoulders, back, and hips.  Patient is unable to take nonsteroidal anti-inflammatories  due to allergies.  There are no other new symptoms or events since the prior clinic visit (01/03/2025). He denies fever, weight loss, headache, visual changes, slurred speech, cough, dyspnea, chest pain, abdominal pain, nausea, vomiting, constipation, diarrhea, bleeding, or bone pain.     PAST HISTORY: Past history was reviewed and unchanged from the clinic note 09/26/2024.     MEDICATIONS:   Current Outpatient Medications   Medication Sig Dispense Refill    acetaminophen (TYLENOL) 500 MG tablet Take 2 tablets (1,000 mg) by mouth every 8 hours as needed for mild pain 100 tablet 0    diphenoxylate-atropine (LOMOTIL) 2.5-0.025 MG tablet Take 1-2 tablets by mouth 4 times daily as needed for diarrhea (Patient not taking: Reported on 1/13/2025) 120 tablet 5    docusate sodium (COLACE) 100 MG capsule  (Patient not taking: Reported on 1/13/2025)      fluvoxaMINE (LUVOX) 100 MG tablet Take 150 mg at bedtime      hydrochlorothiazide (HYDRODIURIL) 25 MG tablet Take 25 mg by mouth daily.      Hypromellose (ARTIFICIAL TEARS OP) Apply  to eye. 2 drops in each eye twice a day as needed      Lactobacillus-Inulin ([facility] DIGESTIVE [facility]) CAPS 1 tab daily (Patient not taking: Reported on 1/13/2025) 30 capsule 1    lisinopril (ZESTRIL) 10 MG tablet Take 15 mg by mouth daily      Magnesium Oxide 420 MG TABS Take 420 mg by mouth daily      methocarbamol (ROBAXIN) 500 MG tablet Take 1 tablet (500 mg) by mouth 3 times daily as needed for muscle spasms 10 tablet 0     omeprazole (PRILOSEC) 20 MG capsule Take 1 capsule by mouth 2 times daily. 60 capsule prn    polyethylene glycol (MIRALAX) 17 GM/Dose powder Take 17 g by mouth daily 510 g 0    potassium chloride ER (KLOR-CON M) 20 MEQ CR tablet Take 1 tablet (20 mEq) by mouth daily (Patient not taking: Reported on 1/13/2025) 7 tablet 0    prazosin (MINIPRESS) 1 MG capsule Take 1 mg by mouth 2 Times Daily Takes 2 tabs in the morning and 1 tab at night      pregabalin (LYRICA) 150 MG capsule Take 150 mg by mouth 2 times daily      QUEtiapine (SEROQUEL) 300 MG tablet Take 150 mg by mouth At Bedtime       senna-docusate (SENOKOT-S/PERICOLACE) 8.6-50 MG tablet Take 1 tablet by mouth 2 times daily (Patient not taking: Reported on 1/13/2025) 30 tablet 0    simvastatin (ZOCOR) 80 MG tablet Take 40 mg by mouth At Bedtime       traZODone (DESYREL) 100 MG tablet Take 100 mg by mouth At Bedtime       triamcinolone (KENALOG) 0.1 % external cream Apply topically 2 times daily as needed for irritation (Rash). 80 g 2    vitamin D3 (CHOLECALCIFEROL) 1000 units (25 mcg) tablet Take 1,000 Units by mouth daily       vitamin D3 (CHOLECALCIFEROL) 125 MCG (5000 UT) tablet Take 1 tablet (125 mcg) by mouth daily 90 tablet 3    vortioxetine (TRINTELLIX) 20 MG tablet TAKE ONE TABLET BY MOUTH EVERY MORNING         REVIEW OF SYSTEMS: Review of systems reviewed with the patient and otherwise negative except for those detailed above.    PHYSICAL EXAM: /66   Pulse 70   Temp 97.6  F (36.4  C) (Oral)   Resp 16   Wt 84.2 kg (185 lb 11.2 oz)   SpO2 93%   BMI 27.42 kg/m   ECOG performance status: 1. Physical exam was unchanged from prior clinic visit 12/16/2024.  Skin: No erythema or rash.  HEENT: Sclera nonicteric. Oropharynx without lesions or ulceration, mucosa pink and moist.  Nodes: No cervical, supraclavicular, axillary, or inguinal adenopathy.  Lungs: No dullness to percussion.  No rales, wheezes, rhonchi.  Heart: Regular rate and rhythm.   Abdomen: Bowel sounds present.  Soft, nontender, no hepatosplenomegaly or mass.  Extremities: No edema.     LABS REVIEWED:   Component      Latest Ref Rng 1/6/2025  11:33 AM 1/27/2025  10:45 AM   WBC      4.0 - 11.0 10e3/uL  4.4    RBC Count      4.40 - 5.90 10e6/uL  4.61    Hemoglobin      13.3 - 17.7 g/dL  13.3    Hematocrit      40.0 - 53.0 %  38.4 (L)    MCV      78 - 100 fL  83    MCH      26.5 - 33.0 pg  28.9    MCHC      31.5 - 36.5 g/dL  34.6    RDW      10.0 - 15.0 %  12.7    Platelet Count      150 - 450 10e3/uL  211    % Neutrophils      %  53    % Lymphocytes      %  33    % Monocytes      %  9    % Eosinophils      %  5    % Basophils      %  1    % Immature Granulocytes      %  0    NRBCs per 100 WBC      <1 /100  0    Absolute Neutrophils      1.6 - 8.3 10e3/uL  2.3    Absolute Lymphocytes      0.8 - 5.3 10e3/uL  1.5    Absolute Monocytes      0.0 - 1.3 10e3/uL  0.4    Absolute Eosinophils      0.0 - 0.7 10e3/uL  0.2    Absolute Basophils      0.0 - 0.2 10e3/uL  0.1    Absolute Immature Granulocytes      <=0.4 10e3/uL  0.0    Absolute NRBCs      10e3/uL  0.0    Sodium      135 - 145 mmol/L  138    Potassium      3.4 - 5.3 mmol/L  3.9    Carbon Dioxide (CO2)      22 - 29 mmol/L  29    Anion Gap      7 - 15 mmol/L  9    Urea Nitrogen      8.0 - 23.0 mg/dL  18.3    Creatinine      0.67 - 1.17 mg/dL  1.56 (H)    GFR Estimate      >60 mL/min/1.73m2  45 (L)    Calcium      8.8 - 10.4 mg/dL  9.2    Chloride      98 - 107 mmol/L  100    Glucose      70 - 99 mg/dL  103 (H)    Alkaline Phosphatase      40 - 150 U/L  49    AST      0 - 45 U/L  21    ALT      0 - 70 U/L  16    Protein Total      6.4 - 8.3 g/dL  6.9    Albumin      3.5 - 5.2 g/dL  4.1    Bilirubin Total      <=1.2 mg/dL  0.4    Cortisol Serum        ug/dL 12.3     TSH      0.30 - 4.20 uIU/mL  5.02 (H)    Lipase      13 - 60 U/L  38    Amylase      28 - 100 U/L  57        IMAGING REVIEWED: MRI brain 01/13/2025, revealed hyperintense T1 foci with  minimal to no enhancement that were decreased in conspicuity compared to the prior MRI (09/24/2024) including a 7 x 5 mm left parasagittal lesion (previously 10 x 9 mm), and a 5 x 4 mm right parasagittal lesion (previously 13 x 7 mm).  The previously noted 4 mm enhancing lesion along the right dorsal cingulate gyrus was no longer visualized.  There were no new enhancing foci.  CT chest, abdomen, pelvis 01/17/2025, was negative for adenopathy or metastases.    Recent Results (from the past 744 hours)   MRI Brain w & w/o contrast    Narrative    EXAM: MR BRAIN W/O & W CONTRAST  1/13/2025 2:25 PM     HISTORY:  Metastasis to brain (H)       COMPARISON:  MRI 10/8/2024, 9/24/2024    TECHNIQUE: Multiplanar multisequence MRI of the brain performed with  and without contrast.    CONTRAST: 8mL Gadavist.    FINDINGS:  Decreased conspicuity of enhancing metastatic lesions when compared to  9/24/2024 MRI. At site of prior lesions, there is intrinsically  hyperintense T1 signal with foci of susceptibility artifact; minimal  to no enhancement is present on postcontrast images. For example:  -7 x 5 mm left parasagittal lesion (series 15, image 148), previously  10 x 9 mm  -5 x 4 mm right parasagittal lesion (series 15, image 128), previously  13 x 7 mm   -The previously noted 4 mm enhancing lesion along the right dorsal  cingulate gyrus is no longer visualized    No new intracranial enhancing foci. Postoperative changes of flap  reconstruction of the left scalp is better seen on today's study, but  is little changed.    Unchanged thin extra-axial collection along the left cerebral  convexity. There is no mass effect, midline shift, or intracranial  hemorrhage. The ventricles are proportionate to the cerebral sulci.  Diffusion and susceptibility weighted images are negative for acute  abnormality. Scattered punctate foci of subcortical, periventricular  white matter T2 hyperintensities. Normal major intracranial vascular  flow  voids.    Mucus retention cyst of the right maxillary sinus with additional  scattered paranasal sinus mucosal thickening. Bilateral mastoid  effusions. No focal abnormality of the pituitary gland, sella, skull  base and upper cervical spinal structures on sagittal images. The  orbits are normal.      Impression    IMPRESSION:  Decreased conspicuity of the previously described  metastatic foci including near resolution of the 4 mm lesion along the  right dorsal cingulate gyrus.    I have personally reviewed the examination and initial interpretation  and I agree with the findings.    JEWEL LIU MD         SYSTEM ID:  [id number]       CT Chest/Abdomen/Pelvis w Contrast    Narrative    EXAM: CT CHEST/ABDOMEN/PELVIS W CONTRAST  LOCATION: [facility]  DATE: 1/17/2025    INDICATION: Stage IV melanoma treated with pembrolizumab.    COMPARISON: CT chest 11/19/2021, CT chest, abdomen and pelvis 4/25/2018  TECHNIQUE: CT scan of the chest, abdomen, and pelvis was performed following injection of IV contrast. Multiplanar reformats were obtained. Dose reduction techniques were used.   CONTRAST: 101ml isovue 370    FINDINGS:   LUNGS AND PLEURA: Central airways are patent. No worrisome lung nodules. No areas of consolidation. No pleural effusion.    MEDIASTINUM/AXILLAE: Normal heart size. No pericardial effusion. Thoracic aorta is nonaneurysmal. No thoracic lymphadenopathy. Postsurgical changes in the left axilla.    CORONARY ARTERY CALCIFICATION: Moderate.    HEPATOBILIARY: Normal liver contours. Focal fat deposition adjacent to the falciform and benign low-attenuation lesion adjacent to the gallbladder fossa, unchanged. No suspicious liver lesions. Normal gallbladder.    PANCREAS: Normal.    SPLEEN: Normal.    ADRENAL GLANDS: Normal.    KIDNEYS/BLADDER: Kidneys enhance symmetrically. Benign left renal cyst, which appears no follow-up. No urolithiasis or hydronephrosis. Urinary bladder is  unremarkable.    BOWEL: Normal caliber small and large bowel. Scattered colonic diverticulosis without evidence of diverticulitis. No ascites.    LYMPH NODES: No lymphadenopathy.    VASCULATURE: The abdominal aorta is nonaneurysmal. Moderate atheromatous disease.    PELVIC ORGANS: Normal contours.    MUSCULOSKELETAL: No worrisome bone lesions. Probable injection granulomas in the gluteal soft tissues, unchanged.      Impression    IMPRESSION:  No findings of metastatic disease in the chest, abdomen or pelvis.       IMPRESSION/PLAN: Stage IIIB melanoma of the left scalp.  Melanoma is a nonulcerated, up to 1.3 mm depth of invasion BRAF p.V600K-positive nodular and nevoid melanoma on the left scalp.  There is no evidence of regional or distant metastases noted on FDG PET/CT scan (12/08/2020).  Patient received neoadjuvant atezolizumab 840 mg IV day 1, vemurafenib BID day 1-28, and cobimetinib daily day 1-21 repeated every 28 days x4 cycles (from 01/13/2021 through 04/15/2021) followed by definitive wide local excision of the left scalp lesion (05/24/2021) that revealed a complete response.  Patient received adjuvant nivolumab IV every 28 days x8 cycles (from 06/25/2021 through 02/14/2022).  There are brain metastases noted on restaging FDG PET/CT scan (09/13/2024) and MRI brain (09/24/2024), but no evidence of systemic metastases.  Patient received gamma knife radiosurgery to the right superior parasagittal, left superior parasagittal, and right inferior parasagittal metastases (10/08/2024).  Patient is receiving adjuvant pembrolizumab IV every 21 days x1 year (beginning 11/04/2024).  There is a good response to gamma knife radiosurgery and no new metastases brain (01/13/2025) and no systemic metastases on restaging CT scan (01/17/2025).  There is grade 1 fatigue, headache, dry skin, pruritus, xerostomia, vasomotor symptoms, and arthritis.  Pembrolizumab will be continued without modification.  Patient will return to  the outpatient infusion center 01/27/2024, for cycle 5 of pembrolizumab. I reviewed the risks and side effects of pembrolizumab with the patient, which include fatigue, weakness, anorexia, weight loss, rash, pruritus, dry eyes, dry mouth, headache, cough, dyspnea, abdominal pain, nausea, vomiting, diarrhea, hypothyroidism, adrenal insufficiency, other endocrine disorders, cardiomyopathy, colitis, nephritis, pancreatitis, myositis, arthritis, neuropathy, cerebritis.  Patient understood the indication and risks and agreed to continue therapy.  Patient wishes to avoid prednisone for the arthritis, and a prescription for hydrocodone /APAP 5/325 mg #60 was sent to the pharmacy today.  Prednisone 10 mg daily would be recommended if the arthritis persists or worsens. Triamcinolone 0.1% cream will be continued as needed for rash and pruritus.  Patient will return to clinic in 3 weeks with CBC, metabolic panel, amylase, lipase, TSH, free T4, cortisol.  Restaging MRI brain and CT chest, abdomen, pelvis will be performed in 3 months (04/2025).  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       Hector Collins MD    cc: MD Keith Trevino MD Nattawat Klomjit, MD Samir S Khariwala, MD

## 2025-01-27 ENCOUNTER — INFUSION THERAPY VISIT (OUTPATIENT)
Dept: ONCOLOGY | Facility: CLINIC | Age: 80
End: 2025-01-27
Attending: INTERNAL MEDICINE
Payer: MEDICARE

## 2025-01-27 VITALS
WEIGHT: 185.7 LBS | DIASTOLIC BLOOD PRESSURE: 66 MMHG | SYSTOLIC BLOOD PRESSURE: 122 MMHG | HEIGHT: 69 IN | TEMPERATURE: 97.6 F | RESPIRATION RATE: 16 BRPM | HEART RATE: 70 BPM | BODY MASS INDEX: 27.5 KG/M2 | OXYGEN SATURATION: 93 %

## 2025-01-27 DIAGNOSIS — C79.31 METASTASIS TO BRAIN (H): ICD-10-CM

## 2025-01-27 DIAGNOSIS — Z79.899 HIGH RISK MEDICATION USE: ICD-10-CM

## 2025-01-27 DIAGNOSIS — C43.4 MALIGNANT MELANOMA OF SCALP (H): Primary | ICD-10-CM

## 2025-01-27 DIAGNOSIS — G89.3 CANCER-RELATED PAIN: ICD-10-CM

## 2025-01-27 LAB
ALBUMIN SERPL BCG-MCNC: 4.1 G/DL (ref 3.5–5.2)
ALP SERPL-CCNC: 49 U/L (ref 40–150)
ALT SERPL W P-5'-P-CCNC: 16 U/L (ref 0–70)
AMYLASE SERPL-CCNC: 57 U/L (ref 28–100)
ANION GAP SERPL CALCULATED.3IONS-SCNC: 9 MMOL/L (ref 7–15)
AST SERPL W P-5'-P-CCNC: 21 U/L (ref 0–45)
BASOPHILS # BLD AUTO: 0.1 10E3/UL (ref 0–0.2)
BASOPHILS NFR BLD AUTO: 1 %
BILIRUB SERPL-MCNC: 0.4 MG/DL
BUN SERPL-MCNC: 18.3 MG/DL (ref 8–23)
CALCIUM SERPL-MCNC: 9.2 MG/DL (ref 8.8–10.4)
CHLORIDE SERPL-SCNC: 100 MMOL/L (ref 98–107)
CORTIS SERPL-MCNC: 12.6 UG/DL
CREAT SERPL-MCNC: 1.56 MG/DL (ref 0.67–1.17)
EGFRCR SERPLBLD CKD-EPI 2021: 45 ML/MIN/1.73M2
EOSINOPHIL # BLD AUTO: 0.2 10E3/UL (ref 0–0.7)
EOSINOPHIL NFR BLD AUTO: 5 %
ERYTHROCYTE [DISTWIDTH] IN BLOOD BY AUTOMATED COUNT: 12.7 % (ref 10–15)
GLUCOSE SERPL-MCNC: 103 MG/DL (ref 70–99)
HCO3 SERPL-SCNC: 29 MMOL/L (ref 22–29)
HCT VFR BLD AUTO: 38.4 % (ref 40–53)
HGB BLD-MCNC: 13.3 G/DL (ref 13.3–17.7)
IMM GRANULOCYTES # BLD: 0 10E3/UL
IMM GRANULOCYTES NFR BLD: 0 %
LIPASE SERPL-CCNC: 38 U/L (ref 13–60)
LYMPHOCYTES # BLD AUTO: 1.5 10E3/UL (ref 0.8–5.3)
LYMPHOCYTES NFR BLD AUTO: 33 %
MCH RBC QN AUTO: 28.9 PG (ref 26.5–33)
MCHC RBC AUTO-ENTMCNC: 34.6 G/DL (ref 31.5–36.5)
MCV RBC AUTO: 83 FL (ref 78–100)
MONOCYTES # BLD AUTO: 0.4 10E3/UL (ref 0–1.3)
MONOCYTES NFR BLD AUTO: 9 %
NEUTROPHILS # BLD AUTO: 2.3 10E3/UL (ref 1.6–8.3)
NEUTROPHILS NFR BLD AUTO: 53 %
NRBC # BLD AUTO: 0 10E3/UL
NRBC BLD AUTO-RTO: 0 /100
PLATELET # BLD AUTO: 211 10E3/UL (ref 150–450)
POTASSIUM SERPL-SCNC: 3.9 MMOL/L (ref 3.4–5.3)
PROT SERPL-MCNC: 6.9 G/DL (ref 6.4–8.3)
RBC # BLD AUTO: 4.61 10E6/UL (ref 4.4–5.9)
SODIUM SERPL-SCNC: 138 MMOL/L (ref 135–145)
T4 FREE SERPL-MCNC: 1 NG/DL (ref 0.9–1.7)
TSH SERPL DL<=0.005 MIU/L-ACNC: 5.02 UIU/ML (ref 0.3–4.2)
WBC # BLD AUTO: 4.4 10E3/UL (ref 4–11)

## 2025-01-27 PROCEDURE — G0463 HOSPITAL OUTPT CLINIC VISIT: HCPCS | Mod: 25 | Performed by: INTERNAL MEDICINE

## 2025-01-27 PROCEDURE — 84443 ASSAY THYROID STIM HORMONE: CPT | Performed by: INTERNAL MEDICINE

## 2025-01-27 PROCEDURE — 82310 ASSAY OF CALCIUM: CPT | Performed by: INTERNAL MEDICINE

## 2025-01-27 PROCEDURE — 82947 ASSAY GLUCOSE BLOOD QUANT: CPT | Performed by: INTERNAL MEDICINE

## 2025-01-27 PROCEDURE — 99214 OFFICE O/P EST MOD 30 MIN: CPT | Performed by: INTERNAL MEDICINE

## 2025-01-27 PROCEDURE — 83690 ASSAY OF LIPASE: CPT | Performed by: INTERNAL MEDICINE

## 2025-01-27 PROCEDURE — 84439 ASSAY OF FREE THYROXINE: CPT | Performed by: INTERNAL MEDICINE

## 2025-01-27 PROCEDURE — 82533 TOTAL CORTISOL: CPT | Performed by: INTERNAL MEDICINE

## 2025-01-27 PROCEDURE — 36415 COLL VENOUS BLD VENIPUNCTURE: CPT | Performed by: INTERNAL MEDICINE

## 2025-01-27 PROCEDURE — 85018 HEMOGLOBIN: CPT | Performed by: INTERNAL MEDICINE

## 2025-01-27 PROCEDURE — 82150 ASSAY OF AMYLASE: CPT | Performed by: INTERNAL MEDICINE

## 2025-01-27 PROCEDURE — 85041 AUTOMATED RBC COUNT: CPT | Performed by: INTERNAL MEDICINE

## 2025-01-27 PROCEDURE — 258N000003 HC RX IP 258 OP 636: Performed by: INTERNAL MEDICINE

## 2025-01-27 PROCEDURE — 96413 CHEMO IV INFUSION 1 HR: CPT

## 2025-01-27 PROCEDURE — 85004 AUTOMATED DIFF WBC COUNT: CPT | Performed by: INTERNAL MEDICINE

## 2025-01-27 PROCEDURE — 250N000011 HC RX IP 250 OP 636: Performed by: INTERNAL MEDICINE

## 2025-01-27 PROCEDURE — 84460 ALANINE AMINO (ALT) (SGPT): CPT | Performed by: INTERNAL MEDICINE

## 2025-01-27 RX ORDER — DIPHENHYDRAMINE HYDROCHLORIDE 50 MG/ML
50 INJECTION INTRAMUSCULAR; INTRAVENOUS
Status: CANCELLED
Start: 2025-01-27

## 2025-01-27 RX ORDER — EPINEPHRINE 1 MG/ML
0.3 INJECTION, SOLUTION INTRAMUSCULAR; SUBCUTANEOUS EVERY 5 MIN PRN
Status: CANCELLED | OUTPATIENT
Start: 2025-01-27

## 2025-01-27 RX ORDER — DIPHENHYDRAMINE HYDROCHLORIDE 50 MG/ML
25 INJECTION INTRAMUSCULAR; INTRAVENOUS
Status: CANCELLED
Start: 2025-01-27

## 2025-01-27 RX ORDER — ALBUTEROL SULFATE 0.83 MG/ML
2.5 SOLUTION RESPIRATORY (INHALATION)
Status: CANCELLED | OUTPATIENT
Start: 2025-01-27

## 2025-01-27 RX ORDER — HEPARIN SODIUM (PORCINE) LOCK FLUSH IV SOLN 100 UNIT/ML 100 UNIT/ML
5 SOLUTION INTRAVENOUS
Status: CANCELLED | OUTPATIENT
Start: 2025-01-27

## 2025-01-27 RX ORDER — HYDROCODONE BITARTRATE AND ACETAMINOPHEN 5; 325 MG/1; MG/1
1 TABLET ORAL EVERY 6 HOURS PRN
Qty: 60 TABLET | Refills: 0 | Status: SHIPPED | OUTPATIENT
Start: 2025-01-27

## 2025-01-27 RX ORDER — LORAZEPAM 2 MG/ML
0.5 INJECTION INTRAMUSCULAR EVERY 4 HOURS PRN
Status: CANCELLED | OUTPATIENT
Start: 2025-01-27

## 2025-01-27 RX ORDER — MEPERIDINE HYDROCHLORIDE 25 MG/ML
25 INJECTION INTRAMUSCULAR; INTRAVENOUS; SUBCUTANEOUS
Status: CANCELLED | OUTPATIENT
Start: 2025-01-27

## 2025-01-27 RX ORDER — HEPARIN SODIUM,PORCINE 10 UNIT/ML
5-20 VIAL (ML) INTRAVENOUS DAILY PRN
Status: CANCELLED | OUTPATIENT
Start: 2025-01-27

## 2025-01-27 RX ORDER — ALBUTEROL SULFATE 90 UG/1
1-2 INHALANT RESPIRATORY (INHALATION)
Status: CANCELLED
Start: 2025-01-27

## 2025-01-27 RX ORDER — METHYLPREDNISOLONE SODIUM SUCCINATE 40 MG/ML
40 INJECTION INTRAMUSCULAR; INTRAVENOUS
Status: CANCELLED
Start: 2025-01-27

## 2025-01-27 RX ADMIN — SODIUM CHLORIDE 50 ML: 9 INJECTION, SOLUTION INTRAVENOUS at 12:02

## 2025-01-27 RX ADMIN — SODIUM CHLORIDE 200 MG: 9 INJECTION, SOLUTION INTRAVENOUS at 12:02

## 2025-01-27 ASSESSMENT — PAIN SCALES - GENERAL: PAINLEVEL_OUTOF10: SEVERE PAIN (7)

## 2025-01-27 NOTE — NURSING NOTE
"Oncology Rooming Note    January 27, 2025 10:51 AM   Ahmet Coleman is a 79 year old male who presents for:    Chief Complaint   Patient presents with    Blood Draw     Labs drawn via PIV by RN in lab.  VS taken    Oncology Clinic Visit     Malignant melanoma of scalp      Initial Vitals: /66   Pulse 70   Temp 97.6  F (36.4  C) (Oral)   Resp 16   Wt 84.2 kg (185 lb 11.2 oz)   SpO2 93%   BMI 27.42 kg/m   Estimated body mass index is 27.42 kg/m  as calculated from the following:    Height as of 1/3/25: 1.753 m (5' 9\").    Weight as of this encounter: 84.2 kg (185 lb 11.2 oz). Body surface area is 2.02 meters squared.  Severe Pain (7) Comment: Data Unavailable   No LMP for male patient.  Allergies reviewed: Yes  Medications reviewed: Yes    Medications: Medication refills not needed today.  Pharmacy name entered into [employer]:    [id number] - [place], [place] - [address] [id number] [place] [place] - [address] [facility] - [place], [place] - [facility]  [facility] - [place], [place] - [address]    Frailty Screening:   Is the patient here for a new oncology consult visit in cancer care? 2. No      Clinical concerns:  none      Radha Walker              "

## 2025-01-27 NOTE — PATIENT INSTRUCTIONS
[facility] Triage and after hours / weekends / holidays:  [phone]    Please call the triage or after hours line if you experience a temperature greater than or equal to 100.4, shaking chills, have uncontrolled nausea, vomiting and/or diarrhea, dizziness, shortness of breath, chest pain, bleeding, unexplained bruising, or if you have any other new/concerning symptoms, questions or concerns.      If you are having any concerning symptoms or wish to speak to a provider before your next infusion visit, please call triage to notify them so we can adequately serve you.     If you need a refill on a narcotic prescription or other medication, please call before your infusion appointment.                January 2025 Sunday Monday Tuesday Wednesday Thursday Friday Saturday                  1     2     3    RETURN CCSL   1:15 PM   (30 min.)   Hector Collins MD   [facility] 4       5     6    LAB PERIPHERAL  11:00 AM   (15 min.)   [facility] LAB DRAW   [facility]    ONC INFUSION 1 HR (60 MIN)  11:30 AM   (60 min.)    ONC INFUSION NURSE   [facility] 7     8     9     10     11       12     13    MR BRAIN WWO   1:30 PM   (45 min.)   UUMR2   [facility]    RETURN RADIATION ONCOLOGY   3:30 PM   (30 min.)   Lilia Palacios APRN CNP   [facility] Radiation Oncology 14     15     16     17    CT CHEST/ABDOMEN/PELVIS W  12:50 PM   (20 min.)   MGCT1   [facility] 18       19     20     21     22     23     24     25       26     27    LAB PERIPHERAL  10:00 AM   (15 min.)   [facility] LAB DRAW   [facility]    RETURN CCSL  10:15 AM   (30 min.)   Hector Collins MD   [facility]    ONC INFUSION 1 HR (60 MIN)  11:00 AM   (60 min.)   [facility] ONC INFUSION NURSE   [facility] 28     29     30     31                       February 2025 Sunday Monday Tuesday Wednesday Thursday Friday Saturday                                 1       2     3     4     5     6     7     8       9     10     11     12     13    MYC RETURN DERMATOLOGY   1:30 PM   (15 min.)   Keith Alonzo MD   [facility] 14     15       16     17    LAB PERIPHERAL  10:45 AM   (15 min.)   [facility] LAB DRAW   [facility]    ONC INFUSION 1 HR (60 MIN)  11:30 AM   (60 min.)    ONC INFUSION NURSE   [facility] 18     19     20     21     22       23     24     25     26     27     28                          Lab Results:  Recent Results (from the past 12 hours)   Comprehensive metabolic panel    Collection Time: 01/27/25 10:45 AM   Result Value Ref Range    Sodium 138 135 - 145 mmol/L    Potassium 3.9 3.4 - 5.3 mmol/L    Carbon Dioxide (CO2) 29 22 - 29 mmol/L    Anion Gap 9 7 - 15 mmol/L    Urea Nitrogen 18.3 8.0 - 23.0 mg/dL    Creatinine 1.56 (H) 0.67 - 1.17 mg/dL    GFR Estimate 45 (L) >60 mL/min/1.73m2    Calcium 9.2 8.8 - 10.4 mg/dL    Chloride 100 98 - 107 mmol/L    Glucose 103 (H) 70 - 99 mg/dL    Alkaline Phosphatase 49 40 - 150 U/L    AST 21 0 - 45 U/L    ALT 16 0 - 70 U/L    Protein Total 6.9 6.4 - 8.3 g/dL    Albumin 4.1 3.5 - 5.2 g/dL    Bilirubin Total 0.4 <=1.2 mg/dL   TSH with free T4 reflex    Collection Time: 01/27/25 10:45 AM   Result Value Ref Range    TSH 5.02 (H) 0.30 - 4.20 uIU/mL   Cortisol    Collection Time: 01/27/25 10:45 AM   Result Value Ref Range    Cortisol 12.6   ug/dL   Lipase    Collection Time: 01/27/25 10:45 AM   Result Value Ref Range    Lipase 38 13 - 60 U/L   Amylase    Collection Time: 01/27/25 10:45 AM   Result Value Ref Range    Amylase 57 28 - 100 U/L   CBC with platelets and differential    Collection Time: 01/27/25 10:45 AM   Result Value Ref Range    WBC Count 4.4 4.0 - 11.0 10e3/uL    RBC Count 4.61 4.40 - 5.90  10e6/uL    Hemoglobin 13.3 13.3 - 17.7 g/dL    Hematocrit 38.4 (L) 40.0 - 53.0 %    MCV 83 78 - 100 fL    MCH 28.9 26.5 - 33.0 pg    MCHC 34.6 31.5 - 36.5 g/dL    RDW 12.7 10.0 - 15.0 %    Platelet Count 211 150 - 450 10e3/uL    % Neutrophils 53 %    % Lymphocytes 33 %    % Monocytes 9 %    % Eosinophils 5 %    % Basophils 1 %    % Immature Granulocytes 0 %    NRBCs per 100 WBC 0 <1 /100    Absolute Neutrophils 2.3 1.6 - 8.3 10e3/uL    Absolute Lymphocytes 1.5 0.8 - 5.3 10e3/uL    Absolute Monocytes 0.4 0.0 - 1.3 10e3/uL    Absolute Eosinophils 0.2 0.0 - 0.7 10e3/uL    Absolute Basophils 0.1 0.0 - 0.2 10e3/uL    Absolute Immature Granulocytes 0.0 <=0.4 10e3/uL    Absolute NRBCs 0.0 10e3/uL   T4 free    Collection Time: 01/27/25 10:45 AM   Result Value Ref Range    Free T4 1.00 0.90 - 1.70 ng/dL

## 2025-01-27 NOTE — NURSING NOTE
Chief Complaint   Patient presents with    Blood Draw     Labs drawn via PIV by RN in lab.  VS taken       Labs drawn from PIV placed by RN. Line flushed with saline. Vitals taken. Pt checked in for appointment(s).    Harriett Gee RN

## 2025-01-27 NOTE — PROGRESS NOTES
Infusion Nursing Note:  Ahmet Coleman presents today for cycle 5 day 1 keytruda.    Patient seen by provider today: Yes: Dr. Collins   present during visit today: Not Applicable.    Note: Patient arrives feeling well, denies any new concerns after seeing Dr. Collins today and wishes to proceed with treatment.      Intravenous Access:  Peripheral IV placed.    Treatment Conditions:  Lab Results   Component Value Date    HGB 13.3 01/27/2025    WBC 4.4 01/27/2025    ANEU 2.6 06/25/2021    ANEUTAUTO 2.3 01/27/2025     01/27/2025        Lab Results   Component Value Date     01/27/2025    POTASSIUM 3.9 01/27/2025    MAG 2.4 (H) 12/20/2021    CR 1.56 (H) 01/27/2025    STEPHEN 9.2 01/27/2025    BILITOTAL 0.4 01/27/2025    ALBUMIN 4.1 01/27/2025    ALT 16 01/27/2025    AST 21 01/27/2025       Results reviewed, labs MET treatment parameters, ok to proceed with treatment.      Post Infusion Assessment:  Patient tolerated infusion without incident.  Blood return noted pre and post infusion.  Site patent and intact, free from redness, edema or discomfort.  No evidence of extravasations.  Access discontinued per protocol.       Discharge Plan:   Patient declined prescription refills.  Patient and/or family verbalized understanding of discharge instructions and all questions answered.  AVS to patient via [employer].  Patient will return 2/17/25 for next appointment.   Patient discharged in stable condition accompanied by: self.  Departure Mode: Ambulatory.      Kiesha Wong RN

## 2025-01-27 NOTE — LETTER
1/27/2025      Ahmet Coleman  [address]  [place] [address]      Dear Colleague,    Thank you for referring your patient, Ahmet Coleman, to the [facility]. Please see a copy of my visit note below.      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  Patient was diagnosed with stage IIIB melanoma of the left scalp (pT2a, cN1c, cM0) and presented in primary care clinic 10/14/2020, with a 6.5 x 7 cm variably pigmented, tender plaque on the left scalp, Shave biopsy of the left central parietal scalp, left central occipital scalp, and left posterior parietal scalp 10/26/2020, revealed nonulcerated nodular and nevoid melanoma with up to 1.3 mm depth of invasion.  PD-L1 expression () was negative with TPS <1%.  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  There was no detected alteration in GNA11, GNAQ, KIT, MAP2K1, NRAS, PDGFRA. Punch biopsy of the right anterior temple, and posterior scalp left of midline 11/25/2020, revealed dermal melanocytic proliferation with melanophages and fibrosis without evidence of malignancy, and punch biopsy of the left occipital scalp 11/25/2020, revealed metastatic melanoma metastatic melanoma positive for SOX10 with rare mitotic activity, dense nodular inflammatory moderate and focal regression. 18-F FDG PET/CT scan 12/08/2020, revealed FDG avid irregular skin thickening spanning 5.4 cm overlying the left parietal bone, without invasion of the bone there was no hypermetabolic lymphadenopathy or distant metastases. Patient received neoadjuvant vemurafenib 960 mg BID day 1-21, then 720 mg BID day 22-28 for cycle 1 day and 720 mg BID days 1-28 plus cobimetinib 60 mg daily day 1-21, and atezolizumab  840 mg IV day 1 repeated every 28 days x 4 cycles from 01/13/2021 through 04/15/2021.  There was a vemurafenib 720 mg and cobimetinib 40 mg dose reduction beginning with cycle 2 (02/18/2021) due to worsening diarrhea.  Restaging 18-F FDG PET/CT scan 03/26/2021, revealed decrease in cutaneous thickening and diffusely decreased FDG uptake in the left parietal scalp lesion, with a small focus of residual skin thickening at the hide left vertex with FDG max 2.9 (previously FDG max 5.9).  There were no other hypermetabolic lesions. Patient underwent wide local excision of the left scalp lesion with left latissimus free flap for scalp reconstruction 05/24/2021, that revealed tumoral melanosis extending to a depth of 1.1 mm without residual melanoma in the wide excision specimen consistent with regressed melanoma.  Patient received adjuvant nivolumab 480 mg IV every 28 days x8 cycles from 06/25/2021 through 02/14/2022.     Restaging 18-F FDG PET/CT scan 08/25/2021, 11/23/2021, 03/18/2022, 06/10/2022, 08/26/2022, 01/13/2023, 04/21/2023, 07/21/2023, 10/27/2023, was negative for hypermetabolic lesions.      Restaging 18-F FDG PET/CT scan 09/13/2024, revealed postsurgical changes in the left temporoparietal periauricular scalp with reconstruction.  There was a 9 mm enhancing hypermetabolic lesion within the left frontal lobe, a 9 mm lesion along the medial aspect of the right frontal lobe suggestive of brain metastases.  There were scattered non-FDG-avid groundglass lung opacities in the left upper lobe and left lower lobe that appeared inflammatory in nature.  There were no other hypermetabolic lesions.  MRI brain 09/24/2024, revealed a 9 mm enhancing lesion in the left ventral superior frontal gyrus, a 12 x 6 mm enhancing lesion in the right pericallosal gyrus, and a 4 mm enhancing lesion in the right dorsal cingulate gyrus.  There were stable postsurgical changes and reconstruction in the left frontal parietal scalp without  evidence of local recurrence in the surgical region. Patient received gamma knife radiosurgery to the right superior parasagittal metastasis (22 Gy to 75% isodose), left superior parasagittal metastasis (22 Gy to 60% isodose), and right inferior parasagittal metastasis (22 Gy to 80% isodose) on 10/08/2024. Patient is receiving adjuvant pembrolizumab 200 mg IV every 21 days x1 year beginning 11/04/2024.  Patient received 4 cycles of pembrolizumab thus far (01/06/2025).  Patient has fatigue, intermittent mild frontal headache, dry skin, occasional itching, dry mouth, occasional hot flashes, and worsening joint pain in the shoulders, back, and hips.  Patient is unable to take nonsteroidal anti-inflammatories  due to allergies.  There are no other new symptoms or events since the prior clinic visit (01/03/2025). He denies fever, weight loss, headache, visual changes, slurred speech, cough, dyspnea, chest pain, abdominal pain, nausea, vomiting, constipation, diarrhea, bleeding, or bone pain.     PAST HISTORY: Past history was reviewed and unchanged from the clinic note 09/26/2024.     MEDICATIONS:   Current Outpatient Medications   Medication Sig Dispense Refill     acetaminophen (TYLENOL) 500 MG tablet Take 2 tablets (1,000 mg) by mouth every 8 hours as needed for mild pain 100 tablet 0     diphenoxylate-atropine (LOMOTIL) 2.5-0.025 MG tablet Take 1-2 tablets by mouth 4 times daily as needed for diarrhea (Patient not taking: Reported on 1/13/2025) 120 tablet 5     docusate sodium (COLACE) 100 MG capsule  (Patient not taking: Reported on 1/13/2025)       fluvoxaMINE (LUVOX) 100 MG tablet Take 150 mg at bedtime       hydrochlorothiazide (HYDRODIURIL) 25 MG tablet Take 25 mg by mouth daily.       Hypromellose (ARTIFICIAL TEARS OP) Apply  to eye. 2 drops in each eye twice a day as needed       Lactobacillus-Inulin ([facility] DIGESTIVE [facility]) CAPS 1 tab daily (Patient not taking: Reported on 1/13/2025) 30 capsule 1      lisinopril (ZESTRIL) 10 MG tablet Take 15 mg by mouth daily       Magnesium Oxide 420 MG TABS Take 420 mg by mouth daily       methocarbamol (ROBAXIN) 500 MG tablet Take 1 tablet (500 mg) by mouth 3 times daily as needed for muscle spasms 10 tablet 0     omeprazole (PRILOSEC) 20 MG capsule Take 1 capsule by mouth 2 times daily. 60 capsule prn     polyethylene glycol (MIRALAX) 17 GM/Dose powder Take 17 g by mouth daily 510 g 0     potassium chloride ER (KLOR-CON M) 20 MEQ CR tablet Take 1 tablet (20 mEq) by mouth daily (Patient not taking: Reported on 1/13/2025) 7 tablet 0     prazosin (MINIPRESS) 1 MG capsule Take 1 mg by mouth 2 Times Daily Takes 2 tabs in the morning and 1 tab at night       pregabalin (LYRICA) 150 MG capsule Take 150 mg by mouth 2 times daily       QUEtiapine (SEROQUEL) 300 MG tablet Take 150 mg by mouth At Bedtime        senna-docusate (SENOKOT-S/PERICOLACE) 8.6-50 MG tablet Take 1 tablet by mouth 2 times daily (Patient not taking: Reported on 1/13/2025) 30 tablet 0     simvastatin (ZOCOR) 80 MG tablet Take 40 mg by mouth At Bedtime        traZODone (DESYREL) 100 MG tablet Take 100 mg by mouth At Bedtime        triamcinolone (KENALOG) 0.1 % external cream Apply topically 2 times daily as needed for irritation (Rash). 80 g 2     vitamin D3 (CHOLECALCIFEROL) 1000 units (25 mcg) tablet Take 1,000 Units by mouth daily        vitamin D3 (CHOLECALCIFEROL) 125 MCG (5000 UT) tablet Take 1 tablet (125 mcg) by mouth daily 90 tablet 3     vortioxetine (TRINTELLIX) 20 MG tablet TAKE ONE TABLET BY MOUTH EVERY MORNING         REVIEW OF SYSTEMS: Review of systems reviewed with the patient and otherwise negative except for those detailed above.    PHYSICAL EXAM: /66   Pulse 70   Temp 97.6  F (36.4  C) (Oral)   Resp 16   Wt 84.2 kg (185 lb 11.2 oz)   SpO2 93%   BMI 27.42 kg/m   ECOG performance status: 1. Physical exam was unchanged from prior clinic visit 12/16/2024.  Skin: No erythema or rash.   HEENT: Sclera nonicteric. Oropharynx without lesions or ulceration, mucosa pink and moist.  Nodes: No cervical, supraclavicular, axillary, or inguinal adenopathy.  Lungs: No dullness to percussion.  No rales, wheezes, rhonchi.  Heart: Regular rate and rhythm.  Abdomen: Bowel sounds present.  Soft, nontender, no hepatosplenomegaly or mass.  Extremities: No edema.     LABS REVIEWED:   Component      Latest Ref Rng 1/6/2025  11:33 AM 1/27/2025  10:45 AM   WBC      4.0 - 11.0 10e3/uL  4.4    RBC Count      4.40 - 5.90 10e6/uL  4.61    Hemoglobin      13.3 - 17.7 g/dL  13.3    Hematocrit      40.0 - 53.0 %  38.4 (L)    MCV      78 - 100 fL  83    MCH      26.5 - 33.0 pg  28.9    MCHC      31.5 - 36.5 g/dL  34.6    RDW      10.0 - 15.0 %  12.7    Platelet Count      150 - 450 10e3/uL  211    % Neutrophils      %  53    % Lymphocytes      %  33    % Monocytes      %  9    % Eosinophils      %  5    % Basophils      %  1    % Immature Granulocytes      %  0    NRBCs per 100 WBC      <1 /100  0    Absolute Neutrophils      1.6 - 8.3 10e3/uL  2.3    Absolute Lymphocytes      0.8 - 5.3 10e3/uL  1.5    Absolute Monocytes      0.0 - 1.3 10e3/uL  0.4    Absolute Eosinophils      0.0 - 0.7 10e3/uL  0.2    Absolute Basophils      0.0 - 0.2 10e3/uL  0.1    Absolute Immature Granulocytes      <=0.4 10e3/uL  0.0    Absolute NRBCs      10e3/uL  0.0    Sodium      135 - 145 mmol/L  138    Potassium      3.4 - 5.3 mmol/L  3.9    Carbon Dioxide (CO2)      22 - 29 mmol/L  29    Anion Gap      7 - 15 mmol/L  9    Urea Nitrogen      8.0 - 23.0 mg/dL  18.3    Creatinine      0.67 - 1.17 mg/dL  1.56 (H)    GFR Estimate      >60 mL/min/1.73m2  45 (L)    Calcium      8.8 - 10.4 mg/dL  9.2    Chloride      98 - 107 mmol/L  100    Glucose      70 - 99 mg/dL  103 (H)    Alkaline Phosphatase      40 - 150 U/L  49    AST      0 - 45 U/L  21    ALT      0 - 70 U/L  16    Protein Total      6.4 - 8.3 g/dL  6.9    Albumin      3.5 - 5.2 g/dL  4.1     Bilirubin Total      <=1.2 mg/dL  0.4    Cortisol Serum        ug/dL 12.3     TSH      0.30 - 4.20 uIU/mL  5.02 (H)    Lipase      13 - 60 U/L  38    Amylase      28 - 100 U/L  57        IMAGING REVIEWED: MRI brain 01/13/2025, revealed hyperintense T1 foci with minimal to no enhancement that were decreased in conspicuity compared to the prior MRI (09/24/2024) including a 7 x 5 mm left parasagittal lesion (previously 10 x 9 mm), and a 5 x 4 mm right parasagittal lesion (previously 13 x 7 mm).  The previously noted 4 mm enhancing lesion along the right dorsal cingulate gyrus was no longer visualized.  There were no new enhancing foci.  CT chest, abdomen, pelvis 01/17/2025, was negative for adenopathy or metastases.    Recent Results (from the past 744 hours)   MRI Brain w & w/o contrast    Narrative    EXAM: MR BRAIN W/O & W CONTRAST  1/13/2025 2:25 PM     HISTORY:  Metastasis to brain (H)       COMPARISON:  MRI 10/8/2024, 9/24/2024    TECHNIQUE: Multiplanar multisequence MRI of the brain performed with  and without contrast.    CONTRAST: 8mL Gadavist.    FINDINGS:  Decreased conspicuity of enhancing metastatic lesions when compared to  9/24/2024 MRI. At site of prior lesions, there is intrinsically  hyperintense T1 signal with foci of susceptibility artifact; minimal  to no enhancement is present on postcontrast images. For example:  -7 x 5 mm left parasagittal lesion (series 15, image 148), previously  10 x 9 mm  -5 x 4 mm right parasagittal lesion (series 15, image 128), previously  13 x 7 mm   -The previously noted 4 mm enhancing lesion along the right dorsal  cingulate gyrus is no longer visualized    No new intracranial enhancing foci. Postoperative changes of flap  reconstruction of the left scalp is better seen on today's study, but  is little changed.    Unchanged thin extra-axial collection along the left cerebral  convexity. There is no mass effect, midline shift, or intracranial  hemorrhage. The  ventricles are proportionate to the cerebral sulci.  Diffusion and susceptibility weighted images are negative for acute  abnormality. Scattered punctate foci of subcortical, periventricular  white matter T2 hyperintensities. Normal major intracranial vascular  flow voids.    Mucus retention cyst of the right maxillary sinus with additional  scattered paranasal sinus mucosal thickening. Bilateral mastoid  effusions. No focal abnormality of the pituitary gland, sella, skull  base and upper cervical spinal structures on sagittal images. The  orbits are normal.      Impression    IMPRESSION:  Decreased conspicuity of the previously described  metastatic foci including near resolution of the 4 mm lesion along the  right dorsal cingulate gyrus.    I have personally reviewed the examination and initial interpretation  and I agree with the findings.    JEWEL LIU MD         SYSTEM ID:  [id number]       CT Chest/Abdomen/Pelvis w Contrast    Narrative    EXAM: CT CHEST/ABDOMEN/PELVIS W CONTRAST  LOCATION: [facility]  DATE: 1/17/2025    INDICATION: Stage IV melanoma treated with pembrolizumab.    COMPARISON: CT chest 11/19/2021, CT chest, abdomen and pelvis 4/25/2018  TECHNIQUE: CT scan of the chest, abdomen, and pelvis was performed following injection of IV contrast. Multiplanar reformats were obtained. Dose reduction techniques were used.   CONTRAST: 101ml isovue 370    FINDINGS:   LUNGS AND PLEURA: Central airways are patent. No worrisome lung nodules. No areas of consolidation. No pleural effusion.    MEDIASTINUM/AXILLAE: Normal heart size. No pericardial effusion. Thoracic aorta is nonaneurysmal. No thoracic lymphadenopathy. Postsurgical changes in the left axilla.    CORONARY ARTERY CALCIFICATION: Moderate.    HEPATOBILIARY: Normal liver contours. Focal fat deposition adjacent to the falciform and benign low-attenuation lesion adjacent to the gallbladder fossa, unchanged. No suspicious liver  lesions. Normal gallbladder.    PANCREAS: Normal.    SPLEEN: Normal.    ADRENAL GLANDS: Normal.    KIDNEYS/BLADDER: Kidneys enhance symmetrically. Benign left renal cyst, which appears no follow-up. No urolithiasis or hydronephrosis. Urinary bladder is unremarkable.    BOWEL: Normal caliber small and large bowel. Scattered colonic diverticulosis without evidence of diverticulitis. No ascites.    LYMPH NODES: No lymphadenopathy.    VASCULATURE: The abdominal aorta is nonaneurysmal. Moderate atheromatous disease.    PELVIC ORGANS: Normal contours.    MUSCULOSKELETAL: No worrisome bone lesions. Probable injection granulomas in the gluteal soft tissues, unchanged.      Impression    IMPRESSION:  No findings of metastatic disease in the chest, abdomen or pelvis.       IMPRESSION/PLAN: Stage IIIB melanoma of the left scalp.  Melanoma is a nonulcerated, up to 1.3 mm depth of invasion BRAF p.V600K-positive nodular and nevoid melanoma on the left scalp.  There is no evidence of regional or distant metastases noted on FDG PET/CT scan (12/08/2020).  Patient received neoadjuvant atezolizumab 840 mg IV day 1, vemurafenib BID day 1-28, and cobimetinib daily day 1-21 repeated every 28 days x4 cycles (from 01/13/2021 through 04/15/2021) followed by definitive wide local excision of the left scalp lesion (05/24/2021) that revealed a complete response.  Patient received adjuvant nivolumab IV every 28 days x8 cycles (from 06/25/2021 through 02/14/2022).  There are brain metastases noted on restaging FDG PET/CT scan (09/13/2024) and MRI brain (09/24/2024), but no evidence of systemic metastases.  Patient received gamma knife radiosurgery to the right superior parasagittal, left superior parasagittal, and right inferior parasagittal metastases (10/08/2024).  Patient is receiving adjuvant pembrolizumab IV every 21 days x1 year (beginning 11/04/2024).  There is a good response to gamma knife radiosurgery and no new metastases brain  (01/13/2025) and no systemic metastases on restaging CT scan (01/17/2025).  There is grade 1 fatigue, headache, dry skin, pruritus, xerostomia, vasomotor symptoms, and arthritis.  Pembrolizumab will be continued without modification.  Patient will return to the outpatient infusion center 01/27/2024, for cycle 5 of pembrolizumab. I reviewed the risks and side effects of pembrolizumab with the patient, which include fatigue, weakness, anorexia, weight loss, rash, pruritus, dry eyes, dry mouth, headache, cough, dyspnea, abdominal pain, nausea, vomiting, diarrhea, hypothyroidism, adrenal insufficiency, other endocrine disorders, cardiomyopathy, colitis, nephritis, pancreatitis, myositis, arthritis, neuropathy, cerebritis.  Patient understood the indication and risks and agreed to continue therapy.  Patient wishes to avoid prednisone for the arthritis, and a prescription for hydrocodone /APAP 5/325 mg #60 was sent to the pharmacy today.  Prednisone 10 mg daily would be recommended if the arthritis persists or worsens. Triamcinolone 0.1% cream will be continued as needed for rash and pruritus.  Patient will return to clinic in 3 weeks with CBC, metabolic panel, amylase, lipase, TSH, free T4, cortisol.  Restaging MRI brain and CT chest, abdomen, pelvis will be performed in 3 months (04/2025).  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       Hector Collins MD    cc: MD Keith Trevino MD Nattawat Klomjit, MD Samir S Khariwala, MD      Again, thank you for allowing me to participate in the care of your patient.        Sincerely,        Hector Collins MD    Electronically signed

## 2025-02-17 ENCOUNTER — PATIENT OUTREACH (OUTPATIENT)
Dept: ONCOLOGY | Facility: CLINIC | Age: 80
End: 2025-02-17

## 2025-02-17 NOTE — PROGRESS NOTES
[facility]: Cancer Care                                                                                          Patient's daughter left voicemail to cancel today's appts.  Message sent to care teams.  Sent patient/daughter MyChart message reminding them to request to speak to the scheduling team regarding appointment requests (scheduling and cancellations).    Ashlee CHOW RN  Cancer Care Coordinator  [facility]

## 2025-02-19 NOTE — PROGRESS NOTES
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  Patient was diagnosed with stage IIIB melanoma of the left scalp (pT2a, cN1c, cM0) and presented in primary care clinic 10/14/2020, with a 6.5 x 7 cm variably pigmented, tender plaque on the left scalp, Shave biopsy of the left central parietal scalp, left central occipital scalp, and left posterior parietal scalp 10/26/2020, revealed nonulcerated nodular and nevoid melanoma with up to 1.3 mm depth of invasion.  PD-L1 expression () was negative with TPS <1%.  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  There was no detected alteration in GNA11, GNAQ, KIT, MAP2K1, NRAS, PDGFRA. Punch biopsy of the right anterior temple, and posterior scalp left of midline 11/25/2020, revealed dermal melanocytic proliferation with melanophages and fibrosis without evidence of malignancy, and punch biopsy of the left occipital scalp 11/25/2020, revealed metastatic melanoma metastatic melanoma positive for SOX10 with rare mitotic activity, dense nodular inflammatory moderate and focal regression. 18-F FDG PET/CT scan 12/08/2020, revealed FDG avid irregular skin thickening spanning 5.4 cm overlying the left parietal bone, without invasion of the bone there was no hypermetabolic lymphadenopathy or distant metastases. Patient received neoadjuvant vemurafenib 960 mg BID day 1-21, then 720 mg BID day 22-28 for cycle 1 day and 720 mg BID days 1-28 plus cobimetinib 60 mg daily day 1-21, and atezolizumab 840 mg IV day 1 repeated every 28 days x 4 cycles from 01/13/2021 through 04/15/2021.  There was a vemurafenib 720 mg and cobimetinib 40 mg dose reduction beginning with cycle 2 (02/18/2021) due to worsening diarrhea.  Restaging 18-F FDG PET/CT  scan 03/26/2021, revealed decrease in cutaneous thickening and diffusely decreased FDG uptake in the left parietal scalp lesion, with a small focus of residual skin thickening at the hide left vertex with FDG max 2.9 (previously FDG max 5.9).  There were no other hypermetabolic lesions. Patient underwent wide local excision of the left scalp lesion with left latissimus free flap for scalp reconstruction 05/24/2021, that revealed tumoral melanosis extending to a depth of 1.1 mm without residual melanoma in the wide excision specimen consistent with regressed melanoma.  Patient received adjuvant nivolumab 480 mg IV every 28 days x8 cycles from 06/25/2021 through 02/14/2022.     Restaging 18-F FDG PET/CT scan 08/25/2021, 11/23/2021, 03/18/2022, 06/10/2022, 08/26/2022, 01/13/2023, 04/21/2023, 07/21/2023, 10/27/2023, was negative for hypermetabolic lesions.      Restaging 18-F FDG PET/CT scan 09/13/2024, revealed postsurgical changes in the left temporoparietal periauricular scalp with reconstruction.  There was a 9 mm enhancing hypermetabolic lesion within the left frontal lobe, a 9 mm lesion along the medial aspect of the right frontal lobe suggestive of brain metastases.  There were scattered non-FDG-avid groundglass lung opacities in the left upper lobe and left lower lobe that appeared inflammatory in nature.  There were no other hypermetabolic lesions.  MRI brain 09/24/2024, revealed a 9 mm enhancing lesion in the left ventral superior frontal gyrus, a 12 x 6 mm enhancing lesion in the right pericallosal gyrus, and a 4 mm enhancing lesion in the right dorsal cingulate gyrus.  There were stable postsurgical changes and reconstruction in the left frontal parietal scalp without evidence of local recurrence in the surgical region. Patient received gamma knife radiosurgery to the right superior parasagittal metastasis (22 Gy to 75% isodose), left superior parasagittal metastasis (22 Gy to 60% isodose), and right  inferior parasagittal metastasis (22 Gy to 80% isodose) on 10/08/2024. Patient is receiving adjuvant pembrolizumab 200 mg IV every 21 days x1 year beginning 11/04/2024.  Patient received 5 cycles of pembrolizumab thus far (01/27/2025).  Restaging MRI brain 01/13/2025, revealed hyperintense T1 foci with minimal to no enhancement that were decreased in conspicuity compared to the prior MRI (09/24/2024) including a 7 x 5 mm left parasagittal lesion (previously 10 x 9 mm), and a 5 x 4 mm right parasagittal lesion (previously 13 x 7 mm).  The previously noted 4 mm enhancing lesion along the right dorsal cingulate gyrus was no longer visualized.  There were no new enhancing foci.  CT chest, abdomen, pelvis 01/17/2025, was negative for adenopathy or metastases.  Patient has fatigue, intermittent mild frontal headache, dry skin, occasional itching, dry mouth, occasional hot flashes, and joint pain and stiffness in the shoulders, back, and hips.  Patient is unable to take nonsteroidal anti-inflammatories  due to allergies.  There are no other new symptoms or events since the prior clinic visit (01/27/2025). He denies fever, weight loss, headache, visual changes, slurred speech, cough, dyspnea, chest pain, abdominal pain, nausea, vomiting, constipation, diarrhea, bleeding, or bone pain.     PAST HISTORY: Past history was reviewed and unchanged from the clinic note 09/26/2024.     MEDICATIONS:   Current Outpatient Medications   Medication Sig Dispense Refill    acetaminophen (TYLENOL) 500 MG tablet Take 2 tablets (1,000 mg) by mouth every 8 hours as needed for mild pain 100 tablet 0    diphenoxylate-atropine (LOMOTIL) 2.5-0.025 MG tablet Take 1-2 tablets by mouth 4 times daily as needed for diarrhea (Patient not taking: Reported on 10/8/2024) 120 tablet 5    docusate sodium (COLACE) 100 MG capsule  (Patient not taking: Reported on 1/27/2025)      fluvoxaMINE (LUVOX) 100 MG tablet Take 150 mg at bedtime      hydrochlorothiazide  (HYDRODIURIL) 25 MG tablet Take 25 mg by mouth daily.      HYDROcodone-acetaminophen (NORCO) 5-325 MG tablet Take 1 tablet by mouth every 6 hours as needed for pain. 60 tablet 0    Hypromellose (ARTIFICIAL TEARS OP) Apply  to eye. 2 drops in each eye twice a day as needed      Lactobacillus-Inulin (CULTURELLE DIGESTIVE HEALTH) CAPS 1 tab daily (Patient not taking: Reported on 10/8/2024) 30 capsule 1    lisinopril (ZESTRIL) 10 MG tablet Take 15 mg by mouth daily      Magnesium Oxide 420 MG TABS Take 420 mg by mouth daily      methocarbamol (ROBAXIN) 500 MG tablet Take 1 tablet (500 mg) by mouth 3 times daily as needed for muscle spasms 10 tablet 0    omeprazole (PRILOSEC) 20 MG capsule Take 1 capsule by mouth 2 times daily. 60 capsule prn    polyethylene glycol (MIRALAX) 17 GM/Dose powder Take 17 g by mouth daily 510 g 0    potassium chloride ER (KLOR-CON M) 20 MEQ CR tablet Take 1 tablet (20 mEq) by mouth daily (Patient not taking: Reported on 1/27/2025) 7 tablet 0    prazosin (MINIPRESS) 1 MG capsule Take 1 mg by mouth 2 Times Daily Takes 2 tabs in the morning and 1 tab at night      pregabalin (LYRICA) 150 MG capsule Take 150 mg by mouth 2 times daily      QUEtiapine (SEROQUEL) 300 MG tablet Take 150 mg by mouth At Bedtime       senna-docusate (SENOKOT-S/PERICOLACE) 8.6-50 MG tablet Take 1 tablet by mouth 2 times daily (Patient not taking: Reported on 11/3/2023) 30 tablet 0    simvastatin (ZOCOR) 80 MG tablet Take 40 mg by mouth At Bedtime       traZODone (DESYREL) 100 MG tablet Take 100 mg by mouth At Bedtime       triamcinolone (KENALOG) 0.1 % external cream Apply topically 2 times daily as needed for irritation (Rash). 80 g 2    vitamin D3 (CHOLECALCIFEROL) 1000 units (25 mcg) tablet Take 1,000 Units by mouth daily       vitamin D3 (CHOLECALCIFEROL) 125 MCG (5000 UT) tablet Take 1 tablet (125 mcg) by mouth daily 90 tablet 3    vortioxetine (TRINTELLIX) 20 MG tablet TAKE ONE TABLET BY MOUTH EVERY MORNING    "      REVIEW OF SYSTEMS: Review of systems reviewed with the patient and otherwise negative except for those detailed above.    PHYSICAL EXAM: /66 (BP Location: Right arm, Patient Position: Sitting, Cuff Size: Adult Regular)   Pulse 52   Temp 97.4  F (36.3  C) (Oral)   Resp 16   Ht 1.753 m (5' 9.02\")   Wt 84.3 kg (185 lb 12.8 oz)   SpO2 93%   BMI 27.43 kg/m   ECOG performance status: 1. Physical exam was unchanged from prior clinic visit 01/27/2025.  Skin: No erythema or rash.  HEENT: Sclera nonicteric. Oropharynx without lesions or ulceration, mucosa pink and moist.  Nodes: No cervical, supraclavicular, axillary, or inguinal adenopathy.  Lungs: No dullness to percussion.  No rales, wheezes, rhonchi.  Heart: Regular rate and rhythm.  Abdomen: Bowel sounds present.  Soft, nontender, no hepatosplenomegaly or mass.  Extremities: No edema.     LABS REVIEWED:   Component      Latest Ref Rng 1/27/2025  10:45 AM 2/24/2025  9:08 AM   WBC      4.0 - 11.0 10e3/uL  5.3    RBC Count      4.40 - 5.90 10e6/uL  4.58    Hemoglobin      13.3 - 17.7 g/dL  13.1 (L)    Hematocrit      40.0 - 53.0 %  38.2 (L)    MCV      78 - 100 fL  83    MCH      26.5 - 33.0 pg  28.6    MCHC      31.5 - 36.5 g/dL  34.3    RDW      10.0 - 15.0 %  12.5    Platelet Count      150 - 450 10e3/uL  238    % Neutrophils      %  58    % Lymphocytes      %  24    % Monocytes      %  10    % Eosinophils      %  6    % Basophils      %  1    % Immature Granulocytes      %  0    NRBCs per 100 WBC      <1 /100  0    Absolute Neutrophils      1.6 - 8.3 10e3/uL  3.1    Absolute Lymphocytes      0.8 - 5.3 10e3/uL  1.3    Absolute Monocytes      0.0 - 1.3 10e3/uL  0.5    Absolute Eosinophils      0.0 - 0.7 10e3/uL  0.3    Absolute Basophils      0.0 - 0.2 10e3/uL  0.1    Absolute Immature Granulocytes      <=0.4 10e3/uL  0.0    Absolute NRBCs      10e3/uL  0.0    Sodium      135 - 145 mmol/L  140    Potassium      3.4 - 5.3 mmol/L  4.1    Carbon Dioxide " (CO2)      22 - 29 mmol/L  31 (H)    Anion Gap      7 - 15 mmol/L  8    Urea Nitrogen      8.0 - 23.0 mg/dL  27.7 (H)    Creatinine      0.67 - 1.17 mg/dL  2.25 (H)    GFR Estimate      >60 mL/min/1.73m2  29 (L)    Calcium      8.8 - 10.4 mg/dL  9.4    Chloride      98 - 107 mmol/L  101    Glucose      70 - 99 mg/dL  102 (H)    Alkaline Phosphatase      40 - 150 U/L  56    AST      0 - 45 U/L  18    ALT      0 - 70 U/L  13    Protein Total      6.4 - 8.3 g/dL  7.0    Albumin      3.5 - 5.2 g/dL  4.1    Bilirubin Total      <=1.2 mg/dL  0.3    Cortisol Serum        ug/dL 12.6     T4 Free      0.90 - 1.70 ng/dL  0.91    TSH      0.30 - 4.20 uIU/mL  2.37    Lipase      13 - 60 U/L  42    Amylase      28 - 100 U/L  49        IMPRESSION/PLAN: Stage IIIB melanoma of the left scalp.  Melanoma is a nonulcerated, up to 1.3 mm depth of invasion BRAF p.V600K-positive nodular and nevoid melanoma on the left scalp.  There is no evidence of regional or distant metastases noted on FDG PET/CT scan (12/08/2020).  Patient received neoadjuvant atezolizumab 840 mg IV day 1, vemurafenib BID day 1-28, and cobimetinib daily day 1-21 repeated every 28 days x4 cycles (from 01/13/2021 through 04/15/2021) followed by definitive wide local excision of the left scalp lesion (05/24/2021) that revealed a complete response.  Patient received adjuvant nivolumab IV every 28 days x8 cycles (from 06/25/2021 through 02/14/2022).  There are brain metastases noted on restaging FDG PET/CT scan (09/13/2024) and MRI brain (09/24/2024), but no evidence of systemic metastases.  Patient received gamma knife radiosurgery to the right superior parasagittal, left superior parasagittal, and right inferior parasagittal metastases (10/08/2024).  Patient is receiving adjuvant pembrolizumab IV every 21 days x1 year (beginning 11/04/2024).  There is a good response to gamma knife radiosurgery and no new metastases brain (01/13/2025) and no systemic metastases on  restaging CT scan (01/17/2025).  There is grade 1 fatigue, headache, dry skin, pruritus, xerostomia, vasomotor symptoms, and arthritis.  There is also worsening of renal function possibly related to decrease in fluid intake.  Pembrolizumab will be continued without modification.  Patient will return to the outpatient infusion center 02/24/2024, for cycle 6 of pembrolizumab. I reviewed the risks and side effects of pembrolizumab with the patient, which include fatigue, weakness, anorexia, weight loss, rash, pruritus, dry eyes, dry mouth, headache, cough, dyspnea, abdominal pain, nausea, vomiting, diarrhea, hypothyroidism, adrenal insufficiency, other endocrine disorders, cardiomyopathy, colitis, nephritis, pancreatitis, myositis, arthritis, neuropathy, cerebritis.  Patient understood the indication and risks and agreed to continue therapy.  Patient will receive normal saline 500 mL IV hydration today to treat the renal insufficiency.  Hydrocodone /APAP 5/325 mg may be used as needed for myalgias and arthralgias.  Prednisone 10 mg daily would be recommended if the arthritis persists or worsens. Triamcinolone 0.1% cream will be continued as needed for rash and pruritus.  Patient will return to clinic in 3 weeks with CBC, metabolic panel, amylase, lipase, TSH, free T4, cortisol.  Restaging MRI brain and CT chest, abdomen, pelvis will be performed in after cycle 8 (04/2025).  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       Hector Collins MD    cc: MD Keith Trevino MD Nattawat Klomjit, MD Samir S Khariwala, MD

## 2025-02-24 ENCOUNTER — APPOINTMENT (OUTPATIENT)
Dept: LAB | Facility: CLINIC | Age: 80
End: 2025-02-24
Payer: MEDICARE

## 2025-02-24 ENCOUNTER — ONCOLOGY VISIT (OUTPATIENT)
Dept: ONCOLOGY | Facility: CLINIC | Age: 80
End: 2025-02-24
Attending: INTERNAL MEDICINE
Payer: MEDICARE

## 2025-02-24 VITALS
OXYGEN SATURATION: 93 % | SYSTOLIC BLOOD PRESSURE: 100 MMHG | DIASTOLIC BLOOD PRESSURE: 66 MMHG | HEART RATE: 52 BPM | RESPIRATION RATE: 16 BRPM | HEIGHT: 69 IN | BODY MASS INDEX: 27.52 KG/M2 | TEMPERATURE: 97.4 F | WEIGHT: 185.8 LBS

## 2025-02-24 DIAGNOSIS — C43.4 MALIGNANT MELANOMA OF SCALP (H): ICD-10-CM

## 2025-02-24 DIAGNOSIS — C43.4 MALIGNANT MELANOMA OF SCALP (H): Primary | ICD-10-CM

## 2025-02-24 DIAGNOSIS — C79.31 METASTASIS TO BRAIN (H): ICD-10-CM

## 2025-02-24 DIAGNOSIS — Z79.899 HIGH RISK MEDICATION USE: ICD-10-CM

## 2025-02-24 DIAGNOSIS — E86.0 DEHYDRATION: ICD-10-CM

## 2025-02-24 DIAGNOSIS — C79.31 METASTASIS TO BRAIN (H): Primary | ICD-10-CM

## 2025-02-24 LAB
ALBUMIN SERPL BCG-MCNC: 4.1 G/DL (ref 3.5–5.2)
ALP SERPL-CCNC: 56 U/L (ref 40–150)
ALT SERPL W P-5'-P-CCNC: 13 U/L (ref 0–70)
AMYLASE SERPL-CCNC: 49 U/L (ref 28–100)
ANION GAP SERPL CALCULATED.3IONS-SCNC: 8 MMOL/L (ref 7–15)
AST SERPL W P-5'-P-CCNC: 18 U/L (ref 0–45)
BASOPHILS # BLD AUTO: 0.1 10E3/UL (ref 0–0.2)
BASOPHILS NFR BLD AUTO: 1 %
BILIRUB SERPL-MCNC: 0.3 MG/DL
BUN SERPL-MCNC: 27.7 MG/DL (ref 8–23)
CALCIUM SERPL-MCNC: 9.4 MG/DL (ref 8.8–10.4)
CHLORIDE SERPL-SCNC: 101 MMOL/L (ref 98–107)
CORTIS SERPL-MCNC: 11.4 UG/DL
CREAT SERPL-MCNC: 2.25 MG/DL (ref 0.67–1.17)
EGFRCR SERPLBLD CKD-EPI 2021: 29 ML/MIN/1.73M2
EOSINOPHIL # BLD AUTO: 0.3 10E3/UL (ref 0–0.7)
EOSINOPHIL NFR BLD AUTO: 6 %
ERYTHROCYTE [DISTWIDTH] IN BLOOD BY AUTOMATED COUNT: 12.5 % (ref 10–15)
GLUCOSE SERPL-MCNC: 102 MG/DL (ref 70–99)
HCO3 SERPL-SCNC: 31 MMOL/L (ref 22–29)
HCT VFR BLD AUTO: 38.2 % (ref 40–53)
HGB BLD-MCNC: 13.1 G/DL (ref 13.3–17.7)
IMM GRANULOCYTES # BLD: 0 10E3/UL
IMM GRANULOCYTES NFR BLD: 0 %
LIPASE SERPL-CCNC: 42 U/L (ref 13–60)
LYMPHOCYTES # BLD AUTO: 1.3 10E3/UL (ref 0.8–5.3)
LYMPHOCYTES NFR BLD AUTO: 24 %
MCH RBC QN AUTO: 28.6 PG (ref 26.5–33)
MCHC RBC AUTO-ENTMCNC: 34.3 G/DL (ref 31.5–36.5)
MCV RBC AUTO: 83 FL (ref 78–100)
MONOCYTES # BLD AUTO: 0.5 10E3/UL (ref 0–1.3)
MONOCYTES NFR BLD AUTO: 10 %
NEUTROPHILS # BLD AUTO: 3.1 10E3/UL (ref 1.6–8.3)
NEUTROPHILS NFR BLD AUTO: 58 %
NRBC # BLD AUTO: 0 10E3/UL
NRBC BLD AUTO-RTO: 0 /100
PLATELET # BLD AUTO: 238 10E3/UL (ref 150–450)
POTASSIUM SERPL-SCNC: 4.1 MMOL/L (ref 3.4–5.3)
PROT SERPL-MCNC: 7 G/DL (ref 6.4–8.3)
RBC # BLD AUTO: 4.58 10E6/UL (ref 4.4–5.9)
SODIUM SERPL-SCNC: 140 MMOL/L (ref 135–145)
T4 FREE SERPL-MCNC: 0.91 NG/DL (ref 0.9–1.7)
TSH SERPL DL<=0.005 MIU/L-ACNC: 2.37 UIU/ML (ref 0.3–4.2)
WBC # BLD AUTO: 5.3 10E3/UL (ref 4–11)

## 2025-02-24 PROCEDURE — 84443 ASSAY THYROID STIM HORMONE: CPT

## 2025-02-24 PROCEDURE — 85025 COMPLETE CBC W/AUTO DIFF WBC: CPT

## 2025-02-24 PROCEDURE — 258N000003 HC RX IP 258 OP 636: Performed by: INTERNAL MEDICINE

## 2025-02-24 PROCEDURE — 83690 ASSAY OF LIPASE: CPT

## 2025-02-24 PROCEDURE — 82247 BILIRUBIN TOTAL: CPT

## 2025-02-24 PROCEDURE — 84439 ASSAY OF FREE THYROXINE: CPT

## 2025-02-24 PROCEDURE — 96413 CHEMO IV INFUSION 1 HR: CPT

## 2025-02-24 PROCEDURE — 36415 COLL VENOUS BLD VENIPUNCTURE: CPT

## 2025-02-24 PROCEDURE — 85014 HEMATOCRIT: CPT

## 2025-02-24 PROCEDURE — 84132 ASSAY OF SERUM POTASSIUM: CPT

## 2025-02-24 PROCEDURE — 82533 TOTAL CORTISOL: CPT

## 2025-02-24 PROCEDURE — 250N000011 HC RX IP 250 OP 636: Mod: JZ | Performed by: INTERNAL MEDICINE

## 2025-02-24 PROCEDURE — G0463 HOSPITAL OUTPT CLINIC VISIT: HCPCS | Performed by: INTERNAL MEDICINE

## 2025-02-24 PROCEDURE — 99214 OFFICE O/P EST MOD 30 MIN: CPT | Performed by: INTERNAL MEDICINE

## 2025-02-24 PROCEDURE — 82150 ASSAY OF AMYLASE: CPT

## 2025-02-24 RX ORDER — HEPARIN SODIUM,PORCINE 10 UNIT/ML
5-20 VIAL (ML) INTRAVENOUS DAILY PRN
Status: CANCELLED | OUTPATIENT
Start: 2025-02-24

## 2025-02-24 RX ORDER — ALBUTEROL SULFATE 0.83 MG/ML
2.5 SOLUTION RESPIRATORY (INHALATION)
OUTPATIENT
Start: 2025-02-24

## 2025-02-24 RX ORDER — ALBUTEROL SULFATE 90 UG/1
1-2 INHALANT RESPIRATORY (INHALATION)
Start: 2025-02-24

## 2025-02-24 RX ORDER — DIPHENHYDRAMINE HYDROCHLORIDE 50 MG/ML
50 INJECTION INTRAMUSCULAR; INTRAVENOUS
Status: CANCELLED
Start: 2025-02-24

## 2025-02-24 RX ORDER — HEPARIN SODIUM (PORCINE) LOCK FLUSH IV SOLN 100 UNIT/ML 100 UNIT/ML
5 SOLUTION INTRAVENOUS
OUTPATIENT
Start: 2025-02-24

## 2025-02-24 RX ORDER — MEPERIDINE HYDROCHLORIDE 25 MG/ML
25 INJECTION INTRAMUSCULAR; INTRAVENOUS; SUBCUTANEOUS
OUTPATIENT
Start: 2025-02-24

## 2025-02-24 RX ORDER — HEPARIN SODIUM (PORCINE) LOCK FLUSH IV SOLN 100 UNIT/ML 100 UNIT/ML
5 SOLUTION INTRAVENOUS
Status: CANCELLED | OUTPATIENT
Start: 2025-02-24

## 2025-02-24 RX ORDER — ALBUTEROL SULFATE 90 UG/1
1-2 INHALANT RESPIRATORY (INHALATION)
Status: CANCELLED
Start: 2025-02-24

## 2025-02-24 RX ORDER — EPINEPHRINE 1 MG/ML
0.3 INJECTION, SOLUTION INTRAMUSCULAR; SUBCUTANEOUS EVERY 5 MIN PRN
Status: CANCELLED | OUTPATIENT
Start: 2025-02-24

## 2025-02-24 RX ORDER — DIPHENHYDRAMINE HYDROCHLORIDE 50 MG/ML
25 INJECTION INTRAMUSCULAR; INTRAVENOUS
Start: 2025-02-24

## 2025-02-24 RX ORDER — METHYLPREDNISOLONE SODIUM SUCCINATE 40 MG/ML
40 INJECTION INTRAMUSCULAR; INTRAVENOUS
Status: CANCELLED
Start: 2025-02-24

## 2025-02-24 RX ORDER — HEPARIN SODIUM,PORCINE 10 UNIT/ML
5-20 VIAL (ML) INTRAVENOUS DAILY PRN
OUTPATIENT
Start: 2025-02-24

## 2025-02-24 RX ORDER — LORAZEPAM 2 MG/ML
0.5 INJECTION INTRAMUSCULAR EVERY 4 HOURS PRN
Status: CANCELLED | OUTPATIENT
Start: 2025-02-24

## 2025-02-24 RX ORDER — METHYLPREDNISOLONE SODIUM SUCCINATE 40 MG/ML
40 INJECTION INTRAMUSCULAR; INTRAVENOUS
Start: 2025-02-24

## 2025-02-24 RX ORDER — DIPHENHYDRAMINE HYDROCHLORIDE 50 MG/ML
50 INJECTION INTRAMUSCULAR; INTRAVENOUS
Start: 2025-02-24

## 2025-02-24 RX ORDER — DIPHENHYDRAMINE HYDROCHLORIDE 50 MG/ML
25 INJECTION INTRAMUSCULAR; INTRAVENOUS
Status: CANCELLED
Start: 2025-02-24

## 2025-02-24 RX ORDER — EPINEPHRINE 1 MG/ML
0.3 INJECTION, SOLUTION INTRAMUSCULAR; SUBCUTANEOUS EVERY 5 MIN PRN
OUTPATIENT
Start: 2025-02-24

## 2025-02-24 RX ORDER — ALBUTEROL SULFATE 0.83 MG/ML
2.5 SOLUTION RESPIRATORY (INHALATION)
Status: CANCELLED | OUTPATIENT
Start: 2025-02-24

## 2025-02-24 RX ORDER — MEPERIDINE HYDROCHLORIDE 25 MG/ML
25 INJECTION INTRAMUSCULAR; INTRAVENOUS; SUBCUTANEOUS
Status: CANCELLED | OUTPATIENT
Start: 2025-02-24

## 2025-02-24 RX ADMIN — SODIUM CHLORIDE 200 MG: 9 INJECTION, SOLUTION INTRAVENOUS at 11:26

## 2025-02-24 RX ADMIN — SODIUM CHLORIDE 500 ML: 9 INJECTION, SOLUTION INTRAVENOUS at 11:25

## 2025-02-24 ASSESSMENT — PAIN SCALES - GENERAL: PAINLEVEL_OUTOF10: NO PAIN (0)

## 2025-02-24 NOTE — NURSING NOTE
"Oncology Rooming Note    February 24, 2025 9:24 AM   Ahmet Coleman is a 79 year old male who presents for:    Chief Complaint   Patient presents with    Oncology Clinic Visit     Malignant melanoma of scalp     Initial Vitals: /66 (BP Location: Right arm, Patient Position: Sitting, Cuff Size: Adult Regular)   Pulse 52   Temp 97.4  F (36.3  C) (Oral)   Resp 14   Wt 84.3 kg (185 lb 12.8 oz)   SpO2 93%   BMI 27.43 kg/m   Estimated body mass index is 27.43 kg/m  as calculated from the following:    Height as of 1/27/25: 1.753 m (5' 9.02\").    Weight as of this encounter: 84.3 kg (185 lb 12.8 oz). Body surface area is 2.03 meters squared.  No Pain (0) Comment: Data Unavailable   No LMP for male patient.  Allergies reviewed: Yes  Medications reviewed: Yes    Medications: Medication refills not needed today.  Pharmacy name entered into "[employer]:    [id number] - [place], [place] - [address] [id number] - [place], [place] - [address] [facility] - [place], [place] - [address] [facility]  [facility] - [place], [place] - [address]    Frailty Screening:   Is the patient here for a new oncology consult visit in cancer care? 2. No    PHQ9:  Did this patient require a PHQ9?: No      Clinical concerns: Patient states no new concerns to discuss with provider.       Kevin Carter, EMT            "

## 2025-02-24 NOTE — LETTER
2/24/2025      Ahmet Coleman  [address]  [place] [address]      Dear Colleague,    Thank you for referring your patient, Ahmet Coleman, to the [facility]. Please see a copy of my visit note below.      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  Patient was diagnosed with stage IIIB melanoma of the left scalp (pT2a, cN1c, cM0) and presented in primary care clinic 10/14/2020, with a 6.5 x 7 cm variably pigmented, tender plaque on the left scalp, Shave biopsy of the left central parietal scalp, left central occipital scalp, and left posterior parietal scalp 10/26/2020, revealed nonulcerated nodular and nevoid melanoma with up to 1.3 mm depth of invasion.  PD-L1 expression () was negative with TPS <1%.  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  There was no detected alteration in GNA11, GNAQ, KIT, MAP2K1, NRAS, PDGFRA. Punch biopsy of the right anterior temple, and posterior scalp left of midline 11/25/2020, revealed dermal melanocytic proliferation with melanophages and fibrosis without evidence of malignancy, and punch biopsy of the left occipital scalp 11/25/2020, revealed metastatic melanoma metastatic melanoma positive for SOX10 with rare mitotic activity, dense nodular inflammatory moderate and focal regression. 18-F FDG PET/CT scan 12/08/2020, revealed FDG avid irregular skin thickening spanning 5.4 cm overlying the left parietal bone, without invasion of the bone there was no hypermetabolic lymphadenopathy or distant metastases. Patient received neoadjuvant vemurafenib 960 mg BID day 1-21, then 720 mg BID day 22-28 for cycle 1 day and 720 mg BID days 1-28 plus cobimetinib 60 mg daily day 1-21, and atezolizumab  840 mg IV day 1 repeated every 28 days x 4 cycles from 01/13/2021 through 04/15/2021.  There was a vemurafenib 720 mg and cobimetinib 40 mg dose reduction beginning with cycle 2 (02/18/2021) due to worsening diarrhea.  Restaging 18-F FDG PET/CT scan 03/26/2021, revealed decrease in cutaneous thickening and diffusely decreased FDG uptake in the left parietal scalp lesion, with a small focus of residual skin thickening at the hide left vertex with FDG max 2.9 (previously FDG max 5.9).  There were no other hypermetabolic lesions. Patient underwent wide local excision of the left scalp lesion with left latissimus free flap for scalp reconstruction 05/24/2021, that revealed tumoral melanosis extending to a depth of 1.1 mm without residual melanoma in the wide excision specimen consistent with regressed melanoma.  Patient received adjuvant nivolumab 480 mg IV every 28 days x8 cycles from 06/25/2021 through 02/14/2022.     Restaging 18-F FDG PET/CT scan 08/25/2021, 11/23/2021, 03/18/2022, 06/10/2022, 08/26/2022, 01/13/2023, 04/21/2023, 07/21/2023, 10/27/2023, was negative for hypermetabolic lesions.      Restaging 18-F FDG PET/CT scan 09/13/2024, revealed postsurgical changes in the left temporoparietal periauricular scalp with reconstruction.  There was a 9 mm enhancing hypermetabolic lesion within the left frontal lobe, a 9 mm lesion along the medial aspect of the right frontal lobe suggestive of brain metastases.  There were scattered non-FDG-avid groundglass lung opacities in the left upper lobe and left lower lobe that appeared inflammatory in nature.  There were no other hypermetabolic lesions.  MRI brain 09/24/2024, revealed a 9 mm enhancing lesion in the left ventral superior frontal gyrus, a 12 x 6 mm enhancing lesion in the right pericallosal gyrus, and a 4 mm enhancing lesion in the right dorsal cingulate gyrus.  There were stable postsurgical changes and reconstruction in the left frontal parietal scalp without  evidence of local recurrence in the surgical region. Patient received gamma knife radiosurgery to the right superior parasagittal metastasis (22 Gy to 75% isodose), left superior parasagittal metastasis (22 Gy to 60% isodose), and right inferior parasagittal metastasis (22 Gy to 80% isodose) on 10/08/2024. Patient is receiving adjuvant pembrolizumab 200 mg IV every 21 days x1 year beginning 11/04/2024.  Patient received 5 cycles of pembrolizumab thus far (01/27/2025).  Restaging MRI brain 01/13/2025, revealed hyperintense T1 foci with minimal to no enhancement that were decreased in conspicuity compared to the prior MRI (09/24/2024) including a 7 x 5 mm left parasagittal lesion (previously 10 x 9 mm), and a 5 x 4 mm right parasagittal lesion (previously 13 x 7 mm).  The previously noted 4 mm enhancing lesion along the right dorsal cingulate gyrus was no longer visualized.  There were no new enhancing foci.  CT chest, abdomen, pelvis 01/17/2025, was negative for adenopathy or metastases.  Patient has fatigue, intermittent mild frontal headache, dry skin, occasional itching, dry mouth, occasional hot flashes, and joint pain and stiffness in the shoulders, back, and hips.  Patient is unable to take nonsteroidal anti-inflammatories  due to allergies.  There are no other new symptoms or events since the prior clinic visit (01/27/2025). He denies fever, weight loss, headache, visual changes, slurred speech, cough, dyspnea, chest pain, abdominal pain, nausea, vomiting, constipation, diarrhea, bleeding, or bone pain.     PAST HISTORY: Past history was reviewed and unchanged from the clinic note 09/26/2024.     MEDICATIONS:   Current Outpatient Medications   Medication Sig Dispense Refill     acetaminophen (TYLENOL) 500 MG tablet Take 2 tablets (1,000 mg) by mouth every 8 hours as needed for mild pain 100 tablet 0     diphenoxylate-atropine (LOMOTIL) 2.5-0.025 MG tablet Take 1-2 tablets by mouth 4 times daily as needed for  diarrhea (Patient not taking: Reported on 10/8/2024) 120 tablet 5     docusate sodium (COLACE) 100 MG capsule  (Patient not taking: Reported on 1/27/2025)       fluvoxaMINE (LUVOX) 100 MG tablet Take 150 mg at bedtime       hydrochlorothiazide (HYDRODIURIL) 25 MG tablet Take 25 mg by mouth daily.       HYDROcodone-acetaminophen (NORCO) 5-325 MG tablet Take 1 tablet by mouth every 6 hours as needed for pain. 60 tablet 0     Hypromellose (ARTIFICIAL TEARS OP) Apply  to eye. 2 drops in each eye twice a day as needed       Lactobacillus-Inulin (CULTURELLE DIGESTIVE HEALTH) CAPS 1 tab daily (Patient not taking: Reported on 10/8/2024) 30 capsule 1     lisinopril (ZESTRIL) 10 MG tablet Take 15 mg by mouth daily       Magnesium Oxide 420 MG TABS Take 420 mg by mouth daily       methocarbamol (ROBAXIN) 500 MG tablet Take 1 tablet (500 mg) by mouth 3 times daily as needed for muscle spasms 10 tablet 0     omeprazole (PRILOSEC) 20 MG capsule Take 1 capsule by mouth 2 times daily. 60 capsule prn     polyethylene glycol (MIRALAX) 17 GM/Dose powder Take 17 g by mouth daily 510 g 0     potassium chloride ER (KLOR-CON M) 20 MEQ CR tablet Take 1 tablet (20 mEq) by mouth daily (Patient not taking: Reported on 1/27/2025) 7 tablet 0     prazosin (MINIPRESS) 1 MG capsule Take 1 mg by mouth 2 Times Daily Takes 2 tabs in the morning and 1 tab at night       pregabalin (LYRICA) 150 MG capsule Take 150 mg by mouth 2 times daily       QUEtiapine (SEROQUEL) 300 MG tablet Take 150 mg by mouth At Bedtime        senna-docusate (SENOKOT-S/PERICOLACE) 8.6-50 MG tablet Take 1 tablet by mouth 2 times daily (Patient not taking: Reported on 11/3/2023) 30 tablet 0     simvastatin (ZOCOR) 80 MG tablet Take 40 mg by mouth At Bedtime        traZODone (DESYREL) 100 MG tablet Take 100 mg by mouth At Bedtime        triamcinolone (KENALOG) 0.1 % external cream Apply topically 2 times daily as needed for irritation (Rash). 80 g 2     vitamin D3  "(CHOLECALCIFEROL) 1000 units (25 mcg) tablet Take 1,000 Units by mouth daily        vitamin D3 (CHOLECALCIFEROL) 125 MCG (5000 UT) tablet Take 1 tablet (125 mcg) by mouth daily 90 tablet 3     vortioxetine (TRINTELLIX) 20 MG tablet TAKE ONE TABLET BY MOUTH EVERY MORNING         REVIEW OF SYSTEMS: Review of systems reviewed with the patient and otherwise negative except for those detailed above.    PHYSICAL EXAM: /66 (BP Location: Right arm, Patient Position: Sitting, Cuff Size: Adult Regular)   Pulse 52   Temp 97.4  F (36.3  C) (Oral)   Resp 16   Ht 1.753 m (5' 9.02\")   Wt 84.3 kg (185 lb 12.8 oz)   SpO2 93%   BMI 27.43 kg/m   ECOG performance status: 1. Physical exam was unchanged from prior clinic visit 01/27/2025.  Skin: No erythema or rash.  HEENT: Sclera nonicteric. Oropharynx without lesions or ulceration, mucosa pink and moist.  Nodes: No cervical, supraclavicular, axillary, or inguinal adenopathy.  Lungs: No dullness to percussion.  No rales, wheezes, rhonchi.  Heart: Regular rate and rhythm.  Abdomen: Bowel sounds present.  Soft, nontender, no hepatosplenomegaly or mass.  Extremities: No edema.     LABS REVIEWED:   Component      Latest Ref Rng 1/27/2025  10:45 AM 2/24/2025  9:08 AM   WBC      4.0 - 11.0 10e3/uL  5.3    RBC Count      4.40 - 5.90 10e6/uL  4.58    Hemoglobin      13.3 - 17.7 g/dL  13.1 (L)    Hematocrit      40.0 - 53.0 %  38.2 (L)    MCV      78 - 100 fL  83    MCH      26.5 - 33.0 pg  28.6    MCHC      31.5 - 36.5 g/dL  34.3    RDW      10.0 - 15.0 %  12.5    Platelet Count      150 - 450 10e3/uL  238    % Neutrophils      %  58    % Lymphocytes      %  24    % Monocytes      %  10    % Eosinophils      %  6    % Basophils      %  1    % Immature Granulocytes      %  0    NRBCs per 100 WBC      <1 /100  0    Absolute Neutrophils      1.6 - 8.3 10e3/uL  3.1    Absolute Lymphocytes      0.8 - 5.3 10e3/uL  1.3    Absolute Monocytes      0.0 - 1.3 10e3/uL  0.5    Absolute " Eosinophils      0.0 - 0.7 10e3/uL  0.3    Absolute Basophils      0.0 - 0.2 10e3/uL  0.1    Absolute Immature Granulocytes      <=0.4 10e3/uL  0.0    Absolute NRBCs      10e3/uL  0.0    Sodium      135 - 145 mmol/L  140    Potassium      3.4 - 5.3 mmol/L  4.1    Carbon Dioxide (CO2)      22 - 29 mmol/L  31 (H)    Anion Gap      7 - 15 mmol/L  8    Urea Nitrogen      8.0 - 23.0 mg/dL  27.7 (H)    Creatinine      0.67 - 1.17 mg/dL  2.25 (H)    GFR Estimate      >60 mL/min/1.73m2  29 (L)    Calcium      8.8 - 10.4 mg/dL  9.4    Chloride      98 - 107 mmol/L  101    Glucose      70 - 99 mg/dL  102 (H)    Alkaline Phosphatase      40 - 150 U/L  56    AST      0 - 45 U/L  18    ALT      0 - 70 U/L  13    Protein Total      6.4 - 8.3 g/dL  7.0    Albumin      3.5 - 5.2 g/dL  4.1    Bilirubin Total      <=1.2 mg/dL  0.3    Cortisol Serum        ug/dL 12.6     T4 Free      0.90 - 1.70 ng/dL  0.91    TSH      0.30 - 4.20 uIU/mL  2.37    Lipase      13 - 60 U/L  42    Amylase      28 - 100 U/L  49        IMPRESSION/PLAN: Stage IIIB melanoma of the left scalp.  Melanoma is a nonulcerated, up to 1.3 mm depth of invasion BRAF p.V600K-positive nodular and nevoid melanoma on the left scalp.  There is no evidence of regional or distant metastases noted on FDG PET/CT scan (12/08/2020).  Patient received neoadjuvant atezolizumab 840 mg IV day 1, vemurafenib BID day 1-28, and cobimetinib daily day 1-21 repeated every 28 days x4 cycles (from 01/13/2021 through 04/15/2021) followed by definitive wide local excision of the left scalp lesion (05/24/2021) that revealed a complete response.  Patient received adjuvant nivolumab IV every 28 days x8 cycles (from 06/25/2021 through 02/14/2022).  There are brain metastases noted on restaging FDG PET/CT scan (09/13/2024) and MRI brain (09/24/2024), but no evidence of systemic metastases.  Patient received gamma knife radiosurgery to the right superior parasagittal, left superior parasagittal, and  right inferior parasagittal metastases (10/08/2024).  Patient is receiving adjuvant pembrolizumab IV every 21 days x1 year (beginning 11/04/2024).  There is a good response to gamma knife radiosurgery and no new metastases brain (01/13/2025) and no systemic metastases on restaging CT scan (01/17/2025).  There is grade 1 fatigue, headache, dry skin, pruritus, xerostomia, vasomotor symptoms, and arthritis.  There is also worsening of renal function possibly related to decrease in fluid intake.  Pembrolizumab will be continued without modification.  Patient will return to the outpatient infusion center 02/24/2024, for cycle 6 of pembrolizumab. I reviewed the risks and side effects of pembrolizumab with the patient, which include fatigue, weakness, anorexia, weight loss, rash, pruritus, dry eyes, dry mouth, headache, cough, dyspnea, abdominal pain, nausea, vomiting, diarrhea, hypothyroidism, adrenal insufficiency, other endocrine disorders, cardiomyopathy, colitis, nephritis, pancreatitis, myositis, arthritis, neuropathy, cerebritis.  Patient understood the indication and risks and agreed to continue therapy.  Patient will receive normal saline 500 mL IV hydration today to treat the renal insufficiency.  Hydrocodone /APAP 5/325 mg may be used as needed for myalgias and arthralgias.  Prednisone 10 mg daily would be recommended if the arthritis persists or worsens. Triamcinolone 0.1% cream will be continued as needed for rash and pruritus.  Patient will return to clinic in 3 weeks with CBC, metabolic panel, amylase, lipase, TSH, free T4, cortisol.  Restaging MRI brain and CT chest, abdomen, pelvis will be performed in after cycle 8 (04/2025).  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       Hector Collins MD    cc: MD Keith Trevino,  MD Paulo Pierre MD      Again, thank you for allowing me to participate in the care of your patient.        Sincerely,        Hector Collins MD    Electronically signed

## 2025-02-24 NOTE — PATIENT INSTRUCTIONS
[facility] Triage and after hours / weekends / holidays:  [phone]    Please call the triage or after hours line if you experience a temperature greater than or equal to 100.4, shaking chills, have uncontrolled nausea, vomiting and/or diarrhea, dizziness, shortness of breath, chest pain, bleeding, unexplained bruising, or if you have any other new/concerning symptoms, questions or concerns.      If you are having any concerning symptoms or wish to speak to a provider before your next infusion visit, please call triage to notify them so we can adequately serve you.     If you need a refill on a narcotic prescription or other medication, please call before your infusion appointment.                February 2025 Sunday Monday Tuesday Wednesday Thursday Friday Saturday                                 1       2     3     4     5     6     7     8       9     10     11     12     13     14     15       16     17    LAB PERIPHERAL  10:15 AM   (15 min.)   [facility] DRAW   [facility] 18     19     20     21     22       23     24    LAB   8:45 AM   (15 min.)    LAB   [facility] [place]    RETURN CCSL   9:15 AM   (30 min.)   Hector Collins MD   [facility]    ONC INFUSION 1 HR (60 MIN)  11:30 AM   (60 min.)    ONC INFUSION NURSE   [facility] 25     26     27     28                      March 2025 Sunday Monday Tuesday Wednesday Thursday Friday Saturday                                 1       2     3     4     5     6     7     8       9     10     11     12     13     14     15       16     17    LAB PERIPHERAL  11:30 AM   (15 min.)   [facility] DRAW   [facility]    ONC INFUSION 1 HR (60 MIN)  12:00 PM   (60 min.)    ONC INFUSION NURSE   [facility] 18     19     20     21     22       23     24  Happy Birthday!     25     26     27      28     29       30     31                                              Lab Results:  Recent Results (from the past 12 hours)   Comprehensive metabolic panel    Collection Time: 02/24/25  9:08 AM   Result Value Ref Range    Sodium 140 135 - 145 mmol/L    Potassium 4.1 3.4 - 5.3 mmol/L    Carbon Dioxide (CO2) 31 (H) 22 - 29 mmol/L    Anion Gap 8 7 - 15 mmol/L    Urea Nitrogen 27.7 (H) 8.0 - 23.0 mg/dL    Creatinine 2.25 (H) 0.67 - 1.17 mg/dL    GFR Estimate 29 (L) >60 mL/min/1.73m2    Calcium 9.4 8.8 - 10.4 mg/dL    Chloride 101 98 - 107 mmol/L    Glucose 102 (H) 70 - 99 mg/dL    Alkaline Phosphatase 56 40 - 150 U/L    AST 18 0 - 45 U/L    ALT 13 0 - 70 U/L    Protein Total 7.0 6.4 - 8.3 g/dL    Albumin 4.1 3.5 - 5.2 g/dL    Bilirubin Total 0.3 <=1.2 mg/dL   T4 free    Collection Time: 02/24/25  9:08 AM   Result Value Ref Range    Free T4 0.91 0.90 - 1.70 ng/dL   TSH    Collection Time: 02/24/25  9:08 AM   Result Value Ref Range    TSH 2.37 0.30 - 4.20 uIU/mL   Lipase    Collection Time: 02/24/25  9:08 AM   Result Value Ref Range    Lipase 42 13 - 60 U/L   Amylase    Collection Time: 02/24/25  9:08 AM   Result Value Ref Range    Amylase 49 28 - 100 U/L   CBC with platelets and differential    Collection Time: 02/24/25  9:08 AM   Result Value Ref Range    WBC Count 5.3 4.0 - 11.0 10e3/uL    RBC Count 4.58 4.40 - 5.90 10e6/uL    Hemoglobin 13.1 (L) 13.3 - 17.7 g/dL    Hematocrit 38.2 (L) 40.0 - 53.0 %    MCV 83 78 - 100 fL    MCH 28.6 26.5 - 33.0 pg    MCHC 34.3 31.5 - 36.5 g/dL    RDW 12.5 10.0 - 15.0 %    Platelet Count 238 150 - 450 10e3/uL    % Neutrophils 58 %    % Lymphocytes 24 %    % Monocytes 10 %    % Eosinophils 6 %    % Basophils 1 %    % Immature Granulocytes 0 %    NRBCs per 100 WBC 0 <1 /100    Absolute Neutrophils 3.1 1.6 - 8.3 10e3/uL    Absolute Lymphocytes 1.3 0.8 - 5.3 10e3/uL    Absolute Monocytes 0.5 0.0 - 1.3 10e3/uL    Absolute Eosinophils 0.3 0.0 - 0.7 10e3/uL    Absolute Basophils 0.1 0.0 - 0.2  10e3/uL    Absolute Immature Granulocytes 0.0 <=0.4 10e3/uL    Absolute NRBCs 0.0 10e3/uL

## 2025-02-24 NOTE — PROGRESS NOTES
Infusion Nursing Note:  Ahmet Coleman presents today for cycle 6 day 1 Keytruda.    Patient seen by provider today: Yes: Dr. Collins   present during visit today: Not Applicable.    Note: Per secure chat with Dr. Collins- ok to go ahead today with creatinine 2.25, give 500 ml NS.      Intravenous Access:  Peripheral IV placed.    Treatment Conditions:  Lab Results   Component Value Date    HGB 13.1 (L) 02/24/2025    WBC 5.3 02/24/2025    ANEU 2.6 06/25/2021    ANEUTAUTO 3.1 02/24/2025     02/24/2025        Lab Results   Component Value Date     02/24/2025    POTASSIUM 4.1 02/24/2025    MAG 2.4 (H) 12/20/2021    CR 2.25 (H) 02/24/2025    STEPHEN 9.4 02/24/2025    BILITOTAL 0.3 02/24/2025    ALBUMIN 4.1 02/24/2025    ALT 13 02/24/2025    AST 18 02/24/2025       Results reviewed, labs MET treatment parameters, ok to proceed with treatment.      Post Infusion Assessment:  Patient tolerated infusion without incident.  Blood return noted pre and post infusion.  Site patent and intact, free from redness, edema or discomfort.  No evidence of extravasations.  Access discontinued per protocol.       Discharge Plan:   Patient declined prescription refills.  Discharge instructions reviewed with: Patient.  Patient and/or family verbalized understanding of discharge instructions and all questions answered.  AVS to patient via comScoreHART.  Patient will return 3/17/25 for next appointment.   Patient discharged in stable condition accompanied by: self.  Departure Mode: Ambulatory.      Kiesha Wong RN

## 2025-03-14 NOTE — PROGRESS NOTES
MEDICAL ONCOLOGY PROGRESS NOTE  Melanoma Clinic  Mar 17, 2025    CHIEF COMPLAINT: Scalp melanoma    Melanoma History:  1. He has a small pigmented scalp lesion present for over 30 years. The lesion was biopsied in 1992 and was benign.  2. October 2020, he presented to [facility] with 1 year of progressive enlargement of the lesion and new onset burning, itching, and stinging sensation in the affected scalp.  3. 10/26/20, He was seen at [facility] and he had shave biopsies of A. left central parietal scalp, B. left central occipital scalp, C. Left posterior parietal scalp, and D. punch biopsy of the left superior occipital scalp. Pathology (specimen: [id number]) showed a nodular and nevoid type melanoma, non-ulcerated, Breslow depth up to 1.3 mm, Saurabh's level IV, and up to 3 mitoses per mm2. All margins were involved. Ki-67 10-20%. Sox10 stain is positive and highlights an atypical compound melanocytic proliferation with significant overlying confluence and pagetosis of intraepidermal melanocytes. T-stage: at least pT2a. PD-L1 staining shows PD-L1 TPS <1%. 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  4. 11/25/20, he was seen by Dr. Garcia and he took three 3mm punch biopsies, which returned as dermal melanocytic proliferations with melanophages and fibrosis, consistent with locoregional metastatic melanoma.  5. 12/8/2020 he had PET-CT, which showed FDG avid irregular skin thickening overlying the left parietal region, with no FDG avid lymphadenopathy in the neck and no evidence of metastasis elsewhere in the body.  6. 1/22/2021 start vemurafenib and cobimetinib, ~2/12/21 held for diarrhea, then resumed.  7. 2/18/2021 Start atezolizumab, last on 4/29/21  8. 5/24/2021 Wide local excision of the scalp melanoma and left latissimus free flap for scalp reconstruction.    Surgical pathology showed features consistent with scar and tumoral melanosis. No definite residual melanoma is seen.  Tumoral melanosis (which extends to a depth of around 1.1 mm) is believed to be equivalent to a diagnosis of regressed melanoma.   9. 6/25/21, he starts adjuvant Nivolumab, last dose 2/14/22  10.  9/13/2024, PET/CT shows to 9 mm enhancing hypermetabolic nodules in the brain consistent with metastasis.   11.9/24/2024, MRI brain showing 3 intracranial metastatic foci  12.10/8/2024, completes gamma knife to intracranial metastases    INTERVAL HISTORY  Mr. 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  -Tolerating Keytruda well overall. Typically experiences fatigue for a few days following each infusion. Did not sleep well last night  -Skin diffusely dry. Using CeraVe. No rash  -Joint pain improving. Notes left shoulder discomfort but only noticeable when reaching overhead  -Denies diarrhea, shortness of breath, cough  -No difficulty voiding. Admits he's not drinking as much water as he probably should  -Mood stable  -[facility]      Current Outpatient Medications   Medication Sig Dispense Refill    acetaminophen (TYLENOL) 500 MG tablet Take 2 tablets (1,000 mg) by mouth every 8 hours as needed for mild pain 100 tablet 0    diphenoxylate-atropine (LOMOTIL) 2.5-0.025 MG tablet Take 1-2 tablets by mouth 4 times daily as needed for diarrhea 120 tablet 5    docusate sodium (COLACE) 100 MG capsule as needed.      fluvoxaMINE (LUVOX) 100 MG tablet Take 150 mg at bedtime      hydrochlorothiazide (HYDRODIURIL) 25 MG tablet Take 25 mg by mouth daily.      HYDROcodone-acetaminophen (NORCO) 5-325 MG tablet Take 1 tablet by mouth every 6 hours as needed for pain. 60 tablet 0    Hypromellose (ARTIFICIAL TEARS OP) Apply to eye 2 times daily as needed. 2 drops in each eye twice a day as needed      Lactobacillus-Inulin ([facility] MyScreen) CAPS 1 tab daily (Patient not taking: Reported on 2/24/2025) 30 capsule 1    lisinopril  (ZESTRIL) 10 MG tablet Take 15 mg by mouth daily      Magnesium Oxide 420 MG TABS Take 420 mg by mouth daily      methocarbamol (ROBAXIN) 500 MG tablet Take 1 tablet (500 mg) by mouth 3 times daily as needed for muscle spasms 10 tablet 0    omeprazole (PRILOSEC) 20 MG capsule Take 1 capsule by mouth 2 times daily. 60 capsule prn    polyethylene glycol (MIRALAX) 17 GM/Dose powder Take 17 g by mouth daily (Patient taking differently: Take 17 g by mouth as needed.) 510 g 0    potassium chloride ER (KLOR-CON M) 20 MEQ CR tablet Take 1 tablet (20 mEq) by mouth daily (Patient not taking: Reported on 1/13/2025) 7 tablet 0    prazosin (MINIPRESS) 1 MG capsule Take 1 mg by mouth 2 Times Daily Takes 2 tabs in the morning and 1 tab at night      pregabalin (LYRICA) 150 MG capsule Take 150 mg by mouth 2 times daily      QUEtiapine (SEROQUEL) 300 MG tablet Take 150 mg by mouth At Bedtime       senna-docusate (SENOKOT-S/PERICOLACE) 8.6-50 MG tablet Take 1 tablet by mouth 2 times daily (Patient not taking: Reported on 2/24/2025) 30 tablet 0    simvastatin (ZOCOR) 80 MG tablet Take 40 mg by mouth At Bedtime       traZODone (DESYREL) 100 MG tablet Take 100 mg by mouth At Bedtime       triamcinolone (KENALOG) 0.1 % external cream Apply topically 2 times daily as needed for irritation (Rash). 80 g 2    vitamin D3 (CHOLECALCIFEROL) 1000 units (25 mcg) tablet Take 1,000 Units by mouth daily  (Patient not taking: Reported on 2/24/2025)      vitamin D3 (CHOLECALCIFEROL) 125 MCG (5000 UT) tablet Take 1 tablet (125 mcg) by mouth daily 90 tablet 3    vortioxetine (TRINTELLIX) 20 MG tablet TAKE ONE TABLET BY MOUTH EVERY MORNING       Objective:  Unable to do physical exam 2/2 telephone visit. Well sounding in no distress. Normal speech and thought process. Good voice quality. No audible wheezing or cough.    Labs:  Most Recent 3 CBC's:  Recent Labs   Lab Test 02/24/25  0908 01/27/25  1045 01/06/25  1133   WBC 5.3 4.4 4.4   HGB 13.1* 13.3 13.3    MCV 83 83 84    211 226   ANEUTAUTO 3.1 2.3 2.6     Most Recent 3 BMP's:  Recent Labs   Lab Test 02/24/25  0908 01/27/25  1045 01/06/25  1133    138 141   POTASSIUM 4.1 3.9 3.8   CHLORIDE 101 100 102   CO2 31* 29 28   BUN 27.7* 18.3 18.7   CR 2.25* 1.56* 1.55*   ANIONGAP 8 9 11   STEPHEN 9.4 9.2 9.1   * 103* 116*   PROTTOTAL 7.0 6.9 6.9   ALBUMIN 4.1 4.1 4.1    Most Recent 3 LFT's:  Recent Labs   Lab Test 02/24/25  0908 01/27/25  1045 01/06/25  1133   AST 18 21 20   ALT 13 16 17   ALKPHOS 56 49 48   BILITOTAL 0.3 0.4 0.3   I reviewed the above labs today.    IMAGING  N/A    ASSESSMENT AND PLAN  Cutaneous melanoma, scalp primary, pT2a cN1c, clinical Stage IIIB  Brain metastases  Lloyd Coleman is 79 year old  with agent orange cutaneous exposures in the [place] war and a diagnosis of malignant melanoma arising from a pre-existing pigmented lesion on the scalp. He had a partial response to neoadjuvant therapy with atezolizumab (3 cycles), vemurafenib, and cobimetinib (4 cycles). He received adjuvant nivolumab through 2/14/2022.  Surveillance imaging in September 2024 revealed new intracranial lesions consistent with metastatic disease.  He is now s/p gamma knife treatment on 10/8/2024.  He started pembrolizumab 200 mg every 3 weeks x 1 year on 11/4/2024.    The most recent CT-CAP 1/17/25 was negative for metastatic disease. A restaging MRI brain 1/13/25 revealed hyperintense T1 foci with minimal to no enhancement that were decreased in conspicuity compared to the prior MRI (09/24/2024) including a 7 x 5 mm left parasagittal lesion (previously 10 x 9 mm), and a 5 x 4 mm right parasagittal lesion (previously 13 x 7 mm). The previously noted 4 mm enhancing lesion along the right dorsal cingulate gyrus was no longer visualized.     He is tolerating pembrolizumab well with mild fatigue, pruritus and grade 1 arthralgias which are improving. Labs today are stable. Potassium mildly low at 3.3 which we  will replace in infusion     Plan:  - Proceed with routine pembrolizumab  -RTC every 3 weeks for labs, LEVY/MD and infusion  -Repeat CT-CAP and brain MRI every 3 months (next in April 2025)    Depressed mood, anxiety, PTSD  He has a longstanding history of PTSD. Takes occasional lorazepam. Continues to work with psychiatrist.    CKD, moderate, stage III  He has seen nephrology, Dr. Alarcon. Creatinine improved from last infusion. CKD is likely due to chronic hypertension and possible effect from vemurafenib    Sherine Meza CNP  ---  32 minutes spent on the date of the encounter doing chart review, review of test results, interpretation of tests, patient visit, and documentation.

## 2025-03-17 ENCOUNTER — ONCOLOGY VISIT (OUTPATIENT)
Dept: ONCOLOGY | Facility: CLINIC | Age: 80
End: 2025-03-17
Attending: INTERNAL MEDICINE
Payer: MEDICARE

## 2025-03-17 VITALS
SYSTOLIC BLOOD PRESSURE: 119 MMHG | RESPIRATION RATE: 16 BRPM | TEMPERATURE: 97.8 F | DIASTOLIC BLOOD PRESSURE: 77 MMHG | HEART RATE: 76 BPM | OXYGEN SATURATION: 93 % | BODY MASS INDEX: 26.61 KG/M2 | WEIGHT: 180.3 LBS

## 2025-03-17 DIAGNOSIS — C79.31 METASTASIS TO BRAIN (H): ICD-10-CM

## 2025-03-17 DIAGNOSIS — C43.4 MALIGNANT MELANOMA OF SCALP (H): Primary | ICD-10-CM

## 2025-03-17 DIAGNOSIS — N18.9 CHRONIC KIDNEY DISEASE, UNSPECIFIED CKD STAGE: ICD-10-CM

## 2025-03-17 DIAGNOSIS — Z79.899 HIGH RISK MEDICATION USE: ICD-10-CM

## 2025-03-17 LAB
ALBUMIN SERPL BCG-MCNC: 4.2 G/DL (ref 3.5–5.2)
ALP SERPL-CCNC: 73 U/L (ref 40–150)
ALT SERPL W P-5'-P-CCNC: 12 U/L (ref 0–70)
AMYLASE SERPL-CCNC: 54 U/L (ref 28–100)
ANION GAP SERPL CALCULATED.3IONS-SCNC: 13 MMOL/L (ref 7–15)
AST SERPL W P-5'-P-CCNC: 18 U/L (ref 0–45)
BASOPHILS # BLD AUTO: 0.1 10E3/UL (ref 0–0.2)
BASOPHILS NFR BLD AUTO: 1 %
BILIRUB SERPL-MCNC: 0.4 MG/DL
BUN SERPL-MCNC: 13.2 MG/DL (ref 8–23)
CALCIUM SERPL-MCNC: 9.9 MG/DL (ref 8.8–10.4)
CHLORIDE SERPL-SCNC: 97 MMOL/L (ref 98–107)
CORTIS SERPL-MCNC: 8.5 UG/DL
CREAT SERPL-MCNC: 1.37 MG/DL (ref 0.67–1.17)
EGFRCR SERPLBLD CKD-EPI 2021: 53 ML/MIN/1.73M2
EOSINOPHIL # BLD AUTO: 0.4 10E3/UL (ref 0–0.7)
EOSINOPHIL NFR BLD AUTO: 6 %
ERYTHROCYTE [DISTWIDTH] IN BLOOD BY AUTOMATED COUNT: 12.1 % (ref 10–15)
GLUCOSE SERPL-MCNC: 128 MG/DL (ref 70–99)
HCO3 SERPL-SCNC: 26 MMOL/L (ref 22–29)
HCT VFR BLD AUTO: 38.2 % (ref 40–53)
HGB BLD-MCNC: 13.7 G/DL (ref 13.3–17.7)
IMM GRANULOCYTES # BLD: 0 10E3/UL
IMM GRANULOCYTES NFR BLD: 0 %
LIPASE SERPL-CCNC: 44 U/L (ref 13–60)
LYMPHOCYTES # BLD AUTO: 1.3 10E3/UL (ref 0.8–5.3)
LYMPHOCYTES NFR BLD AUTO: 21 %
MCH RBC QN AUTO: 28.6 PG (ref 26.5–33)
MCHC RBC AUTO-ENTMCNC: 35.9 G/DL (ref 31.5–36.5)
MCV RBC AUTO: 80 FL (ref 78–100)
MONOCYTES # BLD AUTO: 0.6 10E3/UL (ref 0–1.3)
MONOCYTES NFR BLD AUTO: 10 %
NEUTROPHILS # BLD AUTO: 3.7 10E3/UL (ref 1.6–8.3)
NEUTROPHILS NFR BLD AUTO: 61 %
NRBC # BLD AUTO: 0 10E3/UL
NRBC BLD AUTO-RTO: 0 /100
PLATELET # BLD AUTO: 245 10E3/UL (ref 150–450)
POTASSIUM SERPL-SCNC: 3.3 MMOL/L (ref 3.4–5.3)
PROT SERPL-MCNC: 7.2 G/DL (ref 6.4–8.3)
RBC # BLD AUTO: 4.79 10E6/UL (ref 4.4–5.9)
SODIUM SERPL-SCNC: 136 MMOL/L (ref 135–145)
T4 FREE SERPL-MCNC: 1.32 NG/DL (ref 0.9–1.7)
TSH SERPL DL<=0.005 MIU/L-ACNC: 2.1 UIU/ML (ref 0.3–4.2)
WBC # BLD AUTO: 6.1 10E3/UL (ref 4–11)

## 2025-03-17 PROCEDURE — 250N000011 HC RX IP 250 OP 636: Mod: JZ | Performed by: REGISTERED NURSE

## 2025-03-17 PROCEDURE — 83690 ASSAY OF LIPASE: CPT

## 2025-03-17 PROCEDURE — 82533 TOTAL CORTISOL: CPT

## 2025-03-17 PROCEDURE — 85048 AUTOMATED LEUKOCYTE COUNT: CPT

## 2025-03-17 PROCEDURE — 84155 ASSAY OF PROTEIN SERUM: CPT | Performed by: REGISTERED NURSE

## 2025-03-17 PROCEDURE — 96413 CHEMO IV INFUSION 1 HR: CPT

## 2025-03-17 PROCEDURE — 82150 ASSAY OF AMYLASE: CPT

## 2025-03-17 PROCEDURE — 99214 OFFICE O/P EST MOD 30 MIN: CPT | Performed by: REGISTERED NURSE

## 2025-03-17 PROCEDURE — 84443 ASSAY THYROID STIM HORMONE: CPT | Performed by: REGISTERED NURSE

## 2025-03-17 PROCEDURE — 84439 ASSAY OF FREE THYROXINE: CPT | Performed by: REGISTERED NURSE

## 2025-03-17 PROCEDURE — G0463 HOSPITAL OUTPT CLINIC VISIT: HCPCS | Performed by: REGISTERED NURSE

## 2025-03-17 PROCEDURE — 258N000003 HC RX IP 258 OP 636: Performed by: REGISTERED NURSE

## 2025-03-17 PROCEDURE — 36415 COLL VENOUS BLD VENIPUNCTURE: CPT

## 2025-03-17 PROCEDURE — 82565 ASSAY OF CREATININE: CPT | Performed by: REGISTERED NURSE

## 2025-03-17 PROCEDURE — G2211 COMPLEX E/M VISIT ADD ON: HCPCS | Performed by: REGISTERED NURSE

## 2025-03-17 PROCEDURE — 85004 AUTOMATED DIFF WBC COUNT: CPT

## 2025-03-17 PROCEDURE — 250N000013 HC RX MED GY IP 250 OP 250 PS 637: Performed by: REGISTERED NURSE

## 2025-03-17 RX ORDER — DIPHENHYDRAMINE HYDROCHLORIDE 50 MG/ML
50 INJECTION, SOLUTION INTRAMUSCULAR; INTRAVENOUS
Status: CANCELLED
Start: 2025-03-17

## 2025-03-17 RX ORDER — POTASSIUM CHLORIDE 1500 MG/1
40 TABLET, EXTENDED RELEASE ORAL ONCE
Status: COMPLETED | OUTPATIENT
Start: 2025-03-17 | End: 2025-03-17

## 2025-03-17 RX ORDER — METHYLPREDNISOLONE SODIUM SUCCINATE 40 MG/ML
40 INJECTION INTRAMUSCULAR; INTRAVENOUS
Status: CANCELLED
Start: 2025-03-17

## 2025-03-17 RX ORDER — MEPERIDINE HYDROCHLORIDE 25 MG/ML
25 INJECTION INTRAMUSCULAR; INTRAVENOUS; SUBCUTANEOUS
Status: CANCELLED | OUTPATIENT
Start: 2025-03-17

## 2025-03-17 RX ORDER — LORAZEPAM 2 MG/ML
0.5 INJECTION INTRAMUSCULAR EVERY 4 HOURS PRN
Status: CANCELLED | OUTPATIENT
Start: 2025-03-17

## 2025-03-17 RX ORDER — ALBUTEROL SULFATE 90 UG/1
1-2 INHALANT RESPIRATORY (INHALATION)
Status: CANCELLED
Start: 2025-03-17

## 2025-03-17 RX ORDER — HEPARIN SODIUM,PORCINE 10 UNIT/ML
5-20 VIAL (ML) INTRAVENOUS DAILY PRN
Status: CANCELLED | OUTPATIENT
Start: 2025-03-17

## 2025-03-17 RX ORDER — ALBUTEROL SULFATE 0.83 MG/ML
2.5 SOLUTION RESPIRATORY (INHALATION)
Status: CANCELLED | OUTPATIENT
Start: 2025-03-17

## 2025-03-17 RX ORDER — EPINEPHRINE 1 MG/ML
0.3 INJECTION, SOLUTION INTRAMUSCULAR; SUBCUTANEOUS EVERY 5 MIN PRN
Status: CANCELLED | OUTPATIENT
Start: 2025-03-17

## 2025-03-17 RX ORDER — HEPARIN SODIUM (PORCINE) LOCK FLUSH IV SOLN 100 UNIT/ML 100 UNIT/ML
5 SOLUTION INTRAVENOUS
Status: CANCELLED | OUTPATIENT
Start: 2025-03-17

## 2025-03-17 RX ORDER — DIPHENHYDRAMINE HYDROCHLORIDE 50 MG/ML
25 INJECTION, SOLUTION INTRAMUSCULAR; INTRAVENOUS
Status: CANCELLED
Start: 2025-03-17

## 2025-03-17 RX ADMIN — POTASSIUM CHLORIDE 40 MEQ: 1500 TABLET, EXTENDED RELEASE ORAL at 12:11

## 2025-03-17 RX ADMIN — SODIUM CHLORIDE 250 ML: 0.9 INJECTION, SOLUTION INTRAVENOUS at 12:08

## 2025-03-17 RX ADMIN — SODIUM CHLORIDE 200 MG: 9 INJECTION, SOLUTION INTRAVENOUS at 12:08

## 2025-03-17 ASSESSMENT — PAIN SCALES - GENERAL: PAINLEVEL_OUTOF10: MILD PAIN (3)

## 2025-03-17 NOTE — NURSING NOTE
Chief Complaint   Patient presents with    Oncology Clinic Visit     Malignant melanoma of scalp    Blood Draw     Labs drawn via PIV placed by RN. VS taken.     Labs drawn from PIV placed by RN. Line flushed with saline. Vitals taken. Pt checked in for appointment(s).     Suzie Dos Santos RN     No retinal holes, tears, or detachment at this time.

## 2025-03-17 NOTE — NURSING NOTE
"Oncology Rooming Note    March 17, 2025 11:01 AM   Ahmet Coleman is a 79 year old male who presents for:    Chief Complaint   Patient presents with    Oncology Clinic Visit     Malignant melanoma of scalp    Blood Draw     Labs drawn via PIV placed by RN. VS taken.     Initial Vitals: /77 (BP Location: Right arm, Patient Position: Sitting, Cuff Size: Adult Regular)   Pulse 76   Temp 97.8  F (36.6  C) (Oral)   Resp 16   Wt 81.8 kg (180 lb 4.8 oz)   SpO2 93%   BMI 26.61 kg/m   Estimated body mass index is 26.61 kg/m  as calculated from the following:    Height as of 2/24/25: 1.753 m (5' 9.02\").    Weight as of this encounter: 81.8 kg (180 lb 4.8 oz). Body surface area is 2 meters squared.  Mild Pain (3) Comment: Data Unavailable   No LMP for male patient.  Allergies reviewed: Yes  Medications reviewed: Yes    Medications: Medication refills not needed today.  Pharmacy name entered into [employer]:    [id number] - [place], [place] - [address] [facility] [id number] - [place], [place] - [address] [facility] - [place], [place] - [address] [facility]  [facility] - [place], [place] - [address]    Frailty Screening:   Is the patient here for a new oncology consult visit in cancer care? 2. No    PHQ9:  Did this patient require a PHQ9?: No      Clinical concerns: Patient has had left flank off and on. Currently does not have pain.       Marion Acevedo"

## 2025-03-17 NOTE — Clinical Note
3/17/2025      Ahmet Coleman  [address]  [place] [address]      Dear Colleague,    Thank you for referring your patient, Ahmet Coleman, to the [facility]. Please see a copy of my visit note below.    MEDICAL ONCOLOGY PROGRESS NOTE  Melanoma Clinic  Mar 17, 2025    CHIEF COMPLAINT: Scalp melanoma    Melanoma History:  1. He has a small pigmented scalp lesion present for over 30 years. The lesion was biopsied in 1992 and was benign.  2. October 2020, he presented to [facility] with 1 year of progressive enlargement of the lesion and new onset burning, itching, and stinging sensation in the affected scalp.  3. 10/26/20, He was seen at [facility] and he had shave biopsies of A. left central parietal scalp, B. left central occipital scalp, C. Left posterior parietal scalp, and D. punch biopsy of the left superior occipital scalp. Pathology (specimen: [id number]) showed a nodular and nevoid type melanoma, non-ulcerated, Breslow depth up to 1.3 mm, Saurabh's level IV, and up to 3 mitoses per mm2. All margins were involved. Ki-67 10-20%. Sox10 stain is positive and highlights an atypical compound melanocytic proliferation with significant overlying confluence and pagetosis of intraepidermal melanocytes. T-stage: at least pT2a. PD-L1 staining shows PD-L1 TPS <1%. 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  4. 11/25/20, he was seen by Dr. Garcia and he took three 3mm punch biopsies, which returned as dermal melanocytic proliferations with melanophages and fibrosis, consistent with locoregional metastatic melanoma.  5. 12/8/2020 he had PET-CT, which showed FDG avid irregular skin thickening overlying the left parietal region, with no FDG avid lymphadenopathy in the neck and no evidence of metastasis elsewhere in the body.  6. 1/22/2021 start vemurafenib and cobimetinib, ~2/12/21 held for diarrhea, then resumed.  7. 2/18/2021 Start atezolizumab, last on  4/29/21  8. 5/24/2021 Wide local excision of the scalp melanoma and left latissimus free flap for scalp reconstruction.    Surgical pathology showed features consistent with scar and tumoral melanosis. No definite residual melanoma is seen. Tumoral melanosis (which extends to a depth of around 1.1 mm) is believed to be equivalent to a diagnosis of regressed melanoma.   9. 6/25/21, he starts adjuvant Nivolumab, last dose 2/14/22  10.  9/13/2024, PET/CT shows to 9 mm enhancing hypermetabolic nodules in the brain consistent with metastasis.   11.9/24/2024, MRI brain showing 3 intracranial metastatic foci  12.10/8/2024, completes gamma knife to intracranial metastases    INTERVAL HISTORY  Mr. 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  -Tolerating Keytruda well overall. Typically experiences fatigue for a few days following each infusion. Did not sleep well last night  -Skin diffusely dry. Using CeraVe. No rash  -Joint pain improving. Notes left shoulder discomfort but only noticeable when reaching overhead  -Denies diarrhea, shortness of breath, cough  -No difficulty voiding. Admits he's not drinking as much water as he probably should  -Mood stable  -[facility]      Current Outpatient Medications   Medication Sig Dispense Refill    acetaminophen (TYLENOL) 500 MG tablet Take 2 tablets (1,000 mg) by mouth every 8 hours as needed for mild pain 100 tablet 0    diphenoxylate-atropine (LOMOTIL) 2.5-0.025 MG tablet Take 1-2 tablets by mouth 4 times daily as needed for diarrhea 120 tablet 5    docusate sodium (COLACE) 100 MG capsule as needed.      fluvoxaMINE (LUVOX) 100 MG tablet Take 150 mg at bedtime      hydrochlorothiazide (HYDRODIURIL) 25 MG tablet Take 25 mg by mouth daily.      HYDROcodone-acetaminophen (NORCO) 5-325 MG tablet Take 1 tablet by mouth every 6 hours as needed for pain. 60 tablet 0    Hypromellose  (ARTIFICIAL TEARS OP) Apply to eye 2 times daily as needed. 2 drops in each eye twice a day as needed      Lactobacillus-Inulin ([facility] DIGESTIVE [facility]) CAPS 1 tab daily (Patient not taking: Reported on 2/24/2025) 30 capsule 1    lisinopril (ZESTRIL) 10 MG tablet Take 15 mg by mouth daily      Magnesium Oxide 420 MG TABS Take 420 mg by mouth daily      methocarbamol (ROBAXIN) 500 MG tablet Take 1 tablet (500 mg) by mouth 3 times daily as needed for muscle spasms 10 tablet 0    omeprazole (PRILOSEC) 20 MG capsule Take 1 capsule by mouth 2 times daily. 60 capsule prn    polyethylene glycol (MIRALAX) 17 GM/Dose powder Take 17 g by mouth daily (Patient taking differently: Take 17 g by mouth as needed.) 510 g 0    potassium chloride ER (KLOR-CON M) 20 MEQ CR tablet Take 1 tablet (20 mEq) by mouth daily (Patient not taking: Reported on 1/13/2025) 7 tablet 0    prazosin (MINIPRESS) 1 MG capsule Take 1 mg by mouth 2 Times Daily Takes 2 tabs in the morning and 1 tab at night      pregabalin (LYRICA) 150 MG capsule Take 150 mg by mouth 2 times daily      QUEtiapine (SEROQUEL) 300 MG tablet Take 150 mg by mouth At Bedtime       senna-docusate (SENOKOT-S/PERICOLACE) 8.6-50 MG tablet Take 1 tablet by mouth 2 times daily (Patient not taking: Reported on 2/24/2025) 30 tablet 0    simvastatin (ZOCOR) 80 MG tablet Take 40 mg by mouth At Bedtime       traZODone (DESYREL) 100 MG tablet Take 100 mg by mouth At Bedtime       triamcinolone (KENALOG) 0.1 % external cream Apply topically 2 times daily as needed for irritation (Rash). 80 g 2    vitamin D3 (CHOLECALCIFEROL) 1000 units (25 mcg) tablet Take 1,000 Units by mouth daily  (Patient not taking: Reported on 2/24/2025)      vitamin D3 (CHOLECALCIFEROL) 125 MCG (5000 UT) tablet Take 1 tablet (125 mcg) by mouth daily 90 tablet 3    vortioxetine (TRINTELLIX) 20 MG tablet TAKE ONE TABLET BY MOUTH EVERY MORNING       Objective:  Unable to do physical exam 2/2 telephone visit. Well  sounding in no distress. Normal speech and thought process. Good voice quality. No audible wheezing or cough.    Labs:  Most Recent 3 CBC's:  Recent Labs   Lab Test 02/24/25  0908 01/27/25  1045 01/06/25  1133   WBC 5.3 4.4 4.4   HGB 13.1* 13.3 13.3   MCV 83 83 84    211 226   ANEUTAUTO 3.1 2.3 2.6     Most Recent 3 BMP's:  Recent Labs   Lab Test 02/24/25  0908 01/27/25  1045 01/06/25  1133    138 141   POTASSIUM 4.1 3.9 3.8   CHLORIDE 101 100 102   CO2 31* 29 28   BUN 27.7* 18.3 18.7   CR 2.25* 1.56* 1.55*   ANIONGAP 8 9 11   STEPHEN 9.4 9.2 9.1   * 103* 116*   PROTTOTAL 7.0 6.9 6.9   ALBUMIN 4.1 4.1 4.1    Most Recent 3 LFT's:  Recent Labs   Lab Test 02/24/25  0908 01/27/25  1045 01/06/25  1133   AST 18 21 20   ALT 13 16 17   ALKPHOS 56 49 48   BILITOTAL 0.3 0.4 0.3   I reviewed the above labs today.    IMAGING  N/A    ASSESSMENT AND PLAN  Cutaneous melanoma, scalp primary, pT2a cN1c, clinical Stage IIIB  Brain metastases  Lloyd Coleman is 79 year old  with agent orange cutaneous exposures in the [place] war and a diagnosis of malignant melanoma arising from a pre-existing pigmented lesion on the scalp. He had a partial response to neoadjuvant therapy with atezolizumab (3 cycles), vemurafenib, and cobimetinib (4 cycles). He received adjuvant nivolumab through 2/14/2022.  Surveillance imaging in September 2024 revealed new intracranial lesions consistent with metastatic disease.  He is now s/p gamma knife treatment on 10/8/2024.  He started pembrolizumab 200 mg every 3 weeks x 1 year on 11/4/2024.    The most recent CT-CAP 1/17/25 was negative for metastatic disease. A restaging MRI brain 1/13/25 revealed hyperintense T1 foci with minimal to no enhancement that were decreased in conspicuity compared to the prior MRI (09/24/2024) including a 7 x 5 mm left parasagittal lesion (previously 10 x 9 mm), and a 5 x 4 mm right parasagittal lesion (previously 13 x 7 mm). The previously noted 4 mm  enhancing lesion along the right dorsal cingulate gyrus was no longer visualized.     He is tolerating pembrolizumab well with mild fatigue, pruritus and grade 1 arthralgias which are improving. Labs today are stable. Potassium mildly low at 3.3 which we will replace in infusion     Plan:  - Proceed with routine pembrolizumab  -RTC every 3 weeks for labs, LEVY/MD and infusion  -Repeat CT-CAP and brain MRI every 3 months (next in April 2025)    Depressed mood, anxiety, PTSD  He has a longstanding history of PTSD. Takes occasional lorazepam. Continues to work with psychiatrist.    CKD, moderate, stage III  He has seen nephrology, Dr. Alarcon. Creatinine improved from last infusion. CKD is likely due to chronic hypertension and possible effect from vemurafenib    Sherine Meza CNP  ---  32 minutes spent on the date of the encounter doing chart review, review of test results, interpretation of tests, patient visit, and documentation.                Again, thank you for allowing me to participate in the care of your patient.        Sincerely,        Sherine Meza CNP    Electronically signed

## 2025-03-17 NOTE — PATIENT INSTRUCTIONS
Contact Numbers    [facility] [place] (for Scheduling/Triage/After Hours Nurse Line): [phone]    Please call the [facility] nurse triage or the after hours nurse line if you experience a temperature greater than or equal to 100.4, shaking chills, have uncontrolled nausea, vomiting and/or diarrhea, dizziness, lightheadedness, shortness of breath, chest pain, bleeding, unexplained bruising, or if you have any other new/concerning symptoms, questions or concerns.     If you are having any concerning symptoms or wish to speak to a provider before your next infusion visit, please call your care coordinator or triage to notify them so we can adequately serve you.     If you need any refills on medications (narcotics or other medications), please call before your infusion appointment.    Lab Results:  Recent Results (from the past 12 hours)   Comprehensive metabolic panel    Collection Time: 03/17/25 10:40 AM   Result Value Ref Range    Sodium 136 135 - 145 mmol/L    Potassium 3.3 (L) 3.4 - 5.3 mmol/L    Carbon Dioxide (CO2) 26 22 - 29 mmol/L    Anion Gap 13 7 - 15 mmol/L    Urea Nitrogen 13.2 8.0 - 23.0 mg/dL    Creatinine 1.37 (H) 0.67 - 1.17 mg/dL    GFR Estimate 53 (L) >60 mL/min/1.73m2    Calcium 9.9 8.8 - 10.4 mg/dL    Chloride 97 (L) 98 - 107 mmol/L    Glucose 128 (H) 70 - 99 mg/dL    Alkaline Phosphatase 73 40 - 150 U/L    AST 18 0 - 45 U/L    ALT 12 0 - 70 U/L    Protein Total 7.2 6.4 - 8.3 g/dL    Albumin 4.2 3.5 - 5.2 g/dL    Bilirubin Total 0.4 <=1.2 mg/dL   Lipase    Collection Time: 03/17/25 10:40 AM   Result Value Ref Range    Lipase 44 13 - 60 U/L   Amylase    Collection Time: 03/17/25 10:40 AM   Result Value Ref Range    Amylase 54 28 - 100 U/L   CBC with platelets and differential    Collection Time: 03/17/25 10:40 AM   Result Value Ref Range    WBC Count 6.1 4.0 - 11.0 10e3/uL    RBC Count 4.79 4.40 - 5.90 10e6/uL    Hemoglobin 13.7 13.3 - 17.7 g/dL    Hematocrit 38.2 (L) 40.0 - 53.0 %    MCV 80 78 - 100 fL     MCH 28.6 26.5 - 33.0 pg    MCHC 35.9 31.5 - 36.5 g/dL    RDW 12.1 10.0 - 15.0 %    Platelet Count 245 150 - 450 10e3/uL    % Neutrophils 61 %    % Lymphocytes 21 %    % Monocytes 10 %    % Eosinophils 6 %    % Basophils 1 %    % Immature Granulocytes 0 %    NRBCs per 100 WBC 0 <1 /100    Absolute Neutrophils 3.7 1.6 - 8.3 10e3/uL    Absolute Lymphocytes 1.3 0.8 - 5.3 10e3/uL    Absolute Monocytes 0.6 0.0 - 1.3 10e3/uL    Absolute Eosinophils 0.4 0.0 - 0.7 10e3/uL    Absolute Basophils 0.1 0.0 - 0.2 10e3/uL    Absolute Immature Granulocytes 0.0 <=0.4 10e3/uL    Absolute NRBCs 0.0 10e3/uL

## 2025-03-17 NOTE — PROGRESS NOTES
Infusion Nursing Note:  Ahmet Coleman presents today for C7D1 Pembrolizumab.    Patient seen by provider today: Yes: Sherine Meza CNP   present during visit today: Not Applicable.    Note: Patient presents to Infusion Clinic feeling well today. Denies fever, chills, or any other symptoms of infection/illness. Pain reported as 3/10 in low back and left shoulder; heat applied. Patient wishes to proceed with treatment today.    Written communication 3/17/25 @1128 Sherine Meza CNP/Ngoc Zamora RN - Potassium 3.3 OK to replace per protocol.     Intravenous Access:  Peripheral IV placed.    Treatment Conditions:  Lab Results   Component Value Date    HGB 13.7 03/17/2025    WBC 6.1 03/17/2025    ANEU 2.6 06/25/2021    ANEUTAUTO 3.7 03/17/2025     03/17/2025     Lab Results   Component Value Date     03/17/2025    POTASSIUM 3.3 (L) 03/17/2025    MAG 2.4 (H) 12/20/2021    CR 1.37 (H) 03/17/2025    STEPHEN 9.9 03/17/2025    BILITOTAL 0.4 03/17/2025    ALBUMIN 4.2 03/17/2025    ALT 12 03/17/2025    AST 18 03/17/2025   Results reviewed, labs MET treatment parameters, ok to proceed with treatment.    Post Infusion Assessment:  Patient tolerated infusion without incident.  Blood return noted pre and post infusion.  Site patent and intact, free from redness, edema or discomfort.  No evidence of extravasations.  Access discontinued per protocol.     Discharge Plan:   Patient declined prescription refills.  Discharge instructions reviewed with: Patient.  Patient and/or family verbalized understanding of discharge instructions and all questions answered.  AVS to patient via [employer].  Patient will return 4/7 for next appointment.   Patient discharged in stable condition accompanied by: self.  Departure Mode: Ambulatory.    Ngoc Zamora RN

## 2025-04-07 ENCOUNTER — INFUSION THERAPY VISIT (OUTPATIENT)
Dept: ONCOLOGY | Facility: CLINIC | Age: 80
End: 2025-04-07
Attending: INTERNAL MEDICINE
Payer: MEDICARE

## 2025-04-07 VITALS
WEIGHT: 185 LBS | SYSTOLIC BLOOD PRESSURE: 123 MMHG | DIASTOLIC BLOOD PRESSURE: 74 MMHG | OXYGEN SATURATION: 96 % | RESPIRATION RATE: 16 BRPM | HEART RATE: 72 BPM | BODY MASS INDEX: 27.31 KG/M2

## 2025-04-07 DIAGNOSIS — C79.31 METASTASIS TO BRAIN (H): ICD-10-CM

## 2025-04-07 DIAGNOSIS — C43.4 MALIGNANT MELANOMA OF SCALP (H): Primary | ICD-10-CM

## 2025-04-07 DIAGNOSIS — Z79.899 HIGH RISK MEDICATION USE: ICD-10-CM

## 2025-04-07 LAB
ALBUMIN SERPL BCG-MCNC: 4 G/DL (ref 3.5–5.2)
ALP SERPL-CCNC: 58 U/L (ref 40–150)
ALT SERPL W P-5'-P-CCNC: 17 U/L (ref 0–70)
AMYLASE SERPL-CCNC: 54 U/L (ref 28–100)
ANION GAP SERPL CALCULATED.3IONS-SCNC: 12 MMOL/L (ref 7–15)
AST SERPL W P-5'-P-CCNC: 25 U/L (ref 0–45)
BASOPHILS # BLD AUTO: 0.1 10E3/UL (ref 0–0.2)
BASOPHILS NFR BLD AUTO: 1 %
BILIRUB SERPL-MCNC: 0.4 MG/DL
BUN SERPL-MCNC: 17 MG/DL (ref 8–23)
CALCIUM SERPL-MCNC: 9.4 MG/DL (ref 8.8–10.4)
CHLORIDE SERPL-SCNC: 99 MMOL/L (ref 98–107)
CORTIS SERPL-MCNC: 11.6 UG/DL
CREAT SERPL-MCNC: 1.46 MG/DL (ref 0.67–1.17)
EGFRCR SERPLBLD CKD-EPI 2021: 49 ML/MIN/1.73M2
EOSINOPHIL # BLD AUTO: 0.3 10E3/UL (ref 0–0.7)
EOSINOPHIL NFR BLD AUTO: 7 %
ERYTHROCYTE [DISTWIDTH] IN BLOOD BY AUTOMATED COUNT: 12.5 % (ref 10–15)
GLUCOSE SERPL-MCNC: 124 MG/DL (ref 70–99)
HCO3 SERPL-SCNC: 27 MMOL/L (ref 22–29)
HCT VFR BLD AUTO: 36.8 % (ref 40–53)
HGB BLD-MCNC: 12.8 G/DL (ref 13.3–17.7)
IMM GRANULOCYTES # BLD: 0 10E3/UL
IMM GRANULOCYTES NFR BLD: 0 %
LIPASE SERPL-CCNC: 39 U/L (ref 13–60)
LYMPHOCYTES # BLD AUTO: 1.1 10E3/UL (ref 0.8–5.3)
LYMPHOCYTES NFR BLD AUTO: 23 %
MCH RBC QN AUTO: 28.4 PG (ref 26.5–33)
MCHC RBC AUTO-ENTMCNC: 34.8 G/DL (ref 31.5–36.5)
MCV RBC AUTO: 82 FL (ref 78–100)
MONOCYTES # BLD AUTO: 0.5 10E3/UL (ref 0–1.3)
MONOCYTES NFR BLD AUTO: 10 %
NEUTROPHILS # BLD AUTO: 2.8 10E3/UL (ref 1.6–8.3)
NEUTROPHILS NFR BLD AUTO: 59 %
NRBC # BLD AUTO: 0 10E3/UL
NRBC BLD AUTO-RTO: 0 /100
PLATELET # BLD AUTO: 224 10E3/UL (ref 150–450)
POTASSIUM SERPL-SCNC: 3.8 MMOL/L (ref 3.4–5.3)
PROT SERPL-MCNC: 6.8 G/DL (ref 6.4–8.3)
RBC # BLD AUTO: 4.5 10E6/UL (ref 4.4–5.9)
SODIUM SERPL-SCNC: 138 MMOL/L (ref 135–145)
T4 FREE SERPL-MCNC: 0.91 NG/DL (ref 0.9–1.7)
TSH SERPL DL<=0.005 MIU/L-ACNC: 0.95 UIU/ML (ref 0.3–4.2)
WBC # BLD AUTO: 4.8 10E3/UL (ref 4–11)

## 2025-04-07 PROCEDURE — 83690 ASSAY OF LIPASE: CPT

## 2025-04-07 PROCEDURE — 82040 ASSAY OF SERUM ALBUMIN: CPT | Performed by: INTERNAL MEDICINE

## 2025-04-07 PROCEDURE — 85004 AUTOMATED DIFF WBC COUNT: CPT

## 2025-04-07 PROCEDURE — 85014 HEMATOCRIT: CPT

## 2025-04-07 PROCEDURE — 82533 TOTAL CORTISOL: CPT

## 2025-04-07 PROCEDURE — 258N000003 HC RX IP 258 OP 636: Performed by: INTERNAL MEDICINE

## 2025-04-07 PROCEDURE — 250N000011 HC RX IP 250 OP 636: Mod: JZ | Performed by: INTERNAL MEDICINE

## 2025-04-07 PROCEDURE — 82150 ASSAY OF AMYLASE: CPT

## 2025-04-07 PROCEDURE — 84443 ASSAY THYROID STIM HORMONE: CPT

## 2025-04-07 PROCEDURE — 96413 CHEMO IV INFUSION 1 HR: CPT

## 2025-04-07 PROCEDURE — 36415 COLL VENOUS BLD VENIPUNCTURE: CPT

## 2025-04-07 PROCEDURE — 84155 ASSAY OF PROTEIN SERUM: CPT | Performed by: INTERNAL MEDICINE

## 2025-04-07 PROCEDURE — 80051 ELECTROLYTE PANEL: CPT | Performed by: INTERNAL MEDICINE

## 2025-04-07 PROCEDURE — 84439 ASSAY OF FREE THYROXINE: CPT

## 2025-04-07 RX ADMIN — SODIUM CHLORIDE 200 MG: 9 INJECTION, SOLUTION INTRAVENOUS at 11:41

## 2025-04-07 RX ADMIN — SODIUM CHLORIDE 250 ML: 0.9 INJECTION, SOLUTION INTRAVENOUS at 11:40

## 2025-04-07 ASSESSMENT — PAIN SCALES - GENERAL: PAINLEVEL_OUTOF10: MODERATE PAIN (5)

## 2025-04-07 NOTE — PATIENT INSTRUCTIONS
[facility] Triage and after hours / weekends / holidays:  [phone]    Please call the triage or after hours line if you experience a temperature greater than or equal to 100.4, shaking chills, have uncontrolled nausea, vomiting and/or diarrhea, dizziness, shortness of breath, chest pain, bleeding, unexplained bruising, or if you have any other new/concerning symptoms, questions or concerns.      If you are having any concerning symptoms or wish to speak to a provider before your next infusion visit, please call triage to notify them so we can adequately serve you.     If you need a refill on a narcotic prescription or other medication, please call before your infusion appointment.

## 2025-04-07 NOTE — PROGRESS NOTES
Infusion Nursing Note:  Ahmet Coleman presents today for Cycle 8, Day 1- Keytruda Infusion .    Patient seen by provider today: No, VV with Dr. Collins on 4/4/25   present during visit today: Not Applicable.    Note: Patient reports feeling well on arrival to infusion suite today. Denies the following signs of infection: no fever, cough, chills, rash, chest pain, shortness of breath, diarrhea, or urinary symptoms. Reports baseline fatigue and generalized weakness noted. Reports his baseline, chronic, daily headaches remain unchanged. Encouraged patient to drink more fluids, states he is working of staying hydrated. No new concerns or changes since provider visit on 4/4. Consents to treatment today.       Intravenous Access:  Peripheral IV placed.    Treatment Conditions:  Lab Results   Component Value Date    HGB 12.8 (L) 04/07/2025    WBC 4.8 04/07/2025    ANEU 2.6 06/25/2021    ANEUTAUTO 2.8 04/07/2025     04/07/2025        Lab Results   Component Value Date     04/07/2025    POTASSIUM 3.8 04/07/2025    MAG 2.4 (H) 12/20/2021    CR 1.46 (H) 04/07/2025    STEPHEN 9.4 04/07/2025    BILITOTAL 0.4 04/07/2025    ALBUMIN 4.0 04/07/2025    ALT 17 04/07/2025    AST 25 04/07/2025       Results reviewed, labs MET treatment parameters, ok to proceed with treatment.      Post Infusion Assessment:  Patient tolerated infusion without incident.  Blood return noted pre and post infusion.  Site patent and intact, free from redness, edema or discomfort.  No evidence of extravasations.  Access discontinued per protocol.       Discharge Plan:   Patient declined prescription refills.  Discharge instructions reviewed with: Patient.  Patient and/or family verbalized understanding of discharge instructions and all questions answered.  AVS to patient via [employer].  Patient will return 4/21/25 for next appointment.   Patient discharged in stable condition accompanied by: self.  Departure Mode: Ambulatory.      Melva  NEREYDA Urena, RN

## 2025-04-07 NOTE — NURSING NOTE
Chief Complaint   Patient presents with    Blood Draw     Labs drawn via PIV     Labs drawn from PIV placed by RN. Line flushed with saline. Vitals taken. Pt checked in for appointment(s).     Aleida HUBBARD RN PHN BSN  BMT/Oncology Lab

## 2025-04-21 ENCOUNTER — NURSE TRIAGE (OUTPATIENT)
Dept: ONCOLOGY | Facility: CLINIC | Age: 80
End: 2025-04-21
Payer: MEDICARE

## 2025-04-21 NOTE — TELEPHONE ENCOUNTER
Pt daughter, Huma, calling regarding pt appts today. Pt provided consent for writer to talk to Huma.  Huma reports pt would like to cancel his appts due to him not feeling well.    States pt has had a stomach bug since last Wednesday. Reports this is not what he typically experiences after his infusions or right before. That is why he thinks its a stomach bug.    He has had around 3 episodes of diarrhea in the last 24 hrs.   Experiencing some nausea and had 1 episode of emesis.    Denies blood in stool/urine, F/C, SOB, chest pain, lightheaded/dizzy (no different from baseline)    He is just uncomfortable leaving the house since he is making more trips to the bathroom.  Requesting to reschedule.    [id number] Vocera page to     0921  stating okay to postpone infusion.

## 2025-04-21 NOTE — TELEPHONE ENCOUNTER
Huma called back, 4/28 appointment works for labs starting 9:00am and provider visit with Teresita.   Patient/Huma will wait to hear back about adding infusion for that day.   Educated Huma to continue supportive care for patient at home. Call back triage anytime later in week if symptoms get worse.

## 2025-04-28 ENCOUNTER — LAB (OUTPATIENT)
Dept: LAB | Facility: CLINIC | Age: 80
End: 2025-04-28
Attending: INTERNAL MEDICINE
Payer: MEDICARE

## 2025-04-28 ENCOUNTER — INFUSION THERAPY VISIT (OUTPATIENT)
Dept: ONCOLOGY | Facility: CLINIC | Age: 80
End: 2025-04-28
Attending: INTERNAL MEDICINE
Payer: MEDICARE

## 2025-04-28 VITALS
SYSTOLIC BLOOD PRESSURE: 97 MMHG | BODY MASS INDEX: 27.47 KG/M2 | WEIGHT: 186.1 LBS | HEART RATE: 57 BPM | TEMPERATURE: 97.9 F | RESPIRATION RATE: 16 BRPM | OXYGEN SATURATION: 94 % | DIASTOLIC BLOOD PRESSURE: 60 MMHG

## 2025-04-28 VITALS — DIASTOLIC BLOOD PRESSURE: 74 MMHG | SYSTOLIC BLOOD PRESSURE: 140 MMHG

## 2025-04-28 DIAGNOSIS — C43.4 MALIGNANT MELANOMA OF SCALP (H): ICD-10-CM

## 2025-04-28 DIAGNOSIS — I95.9 HYPOTENSION, UNSPECIFIED HYPOTENSION TYPE: ICD-10-CM

## 2025-04-28 DIAGNOSIS — Z79.899 HIGH RISK MEDICATION USE: ICD-10-CM

## 2025-04-28 DIAGNOSIS — C79.31 METASTASIS TO BRAIN (H): ICD-10-CM

## 2025-04-28 DIAGNOSIS — C79.31 METASTASIS TO BRAIN (H): Primary | ICD-10-CM

## 2025-04-28 DIAGNOSIS — C43.4 MALIGNANT MELANOMA OF SCALP (H): Primary | ICD-10-CM

## 2025-04-28 DIAGNOSIS — R74.8 ELEVATED PANCREATIC ENZYME: ICD-10-CM

## 2025-04-28 DIAGNOSIS — R11.0 NAUSEA: ICD-10-CM

## 2025-04-28 LAB
ALBUMIN SERPL BCG-MCNC: 3.9 G/DL (ref 3.5–5.2)
ALP SERPL-CCNC: 48 U/L (ref 40–150)
ALT SERPL W P-5'-P-CCNC: 12 U/L (ref 0–70)
AMYLASE SERPL-CCNC: 69 U/L (ref 28–100)
ANION GAP SERPL CALCULATED.3IONS-SCNC: 10 MMOL/L (ref 7–15)
AST SERPL W P-5'-P-CCNC: 19 U/L (ref 0–45)
BASOPHILS # BLD AUTO: 0.1 10E3/UL (ref 0–0.2)
BASOPHILS NFR BLD AUTO: 1 %
BILIRUB SERPL-MCNC: 0.3 MG/DL
BUN SERPL-MCNC: 19.1 MG/DL (ref 8–23)
CALCIUM SERPL-MCNC: 9.5 MG/DL (ref 8.8–10.4)
CHLORIDE SERPL-SCNC: 97 MMOL/L (ref 98–107)
CORTIS SERPL-MCNC: 3.4 UG/DL
CORTIS SERPL-MCNC: 3.5 UG/DL
CREAT SERPL-MCNC: 1.36 MG/DL (ref 0.67–1.17)
EGFRCR SERPLBLD CKD-EPI 2021: 53 ML/MIN/1.73M2
EOSINOPHIL # BLD AUTO: 0.3 10E3/UL (ref 0–0.7)
EOSINOPHIL NFR BLD AUTO: 6 %
ERYTHROCYTE [DISTWIDTH] IN BLOOD BY AUTOMATED COUNT: 13 % (ref 10–15)
GLUCOSE SERPL-MCNC: 93 MG/DL (ref 70–99)
HCO3 SERPL-SCNC: 28 MMOL/L (ref 22–29)
HCT VFR BLD AUTO: 37.3 % (ref 40–53)
HGB BLD-MCNC: 12.8 G/DL (ref 13.3–17.7)
IMM GRANULOCYTES # BLD: 0 10E3/UL
IMM GRANULOCYTES NFR BLD: 0 %
LIPASE SERPL-CCNC: 124 U/L (ref 13–60)
LYMPHOCYTES # BLD AUTO: 1.6 10E3/UL (ref 0.8–5.3)
LYMPHOCYTES NFR BLD AUTO: 38 %
MCH RBC QN AUTO: 28.1 PG (ref 26.5–33)
MCHC RBC AUTO-ENTMCNC: 34.3 G/DL (ref 31.5–36.5)
MCV RBC AUTO: 82 FL (ref 78–100)
MONOCYTES # BLD AUTO: 0.5 10E3/UL (ref 0–1.3)
MONOCYTES NFR BLD AUTO: 12 %
NEUTROPHILS # BLD AUTO: 1.8 10E3/UL (ref 1.6–8.3)
NEUTROPHILS NFR BLD AUTO: 43 %
NRBC # BLD AUTO: 0 10E3/UL
NRBC BLD AUTO-RTO: 0 /100
PLATELET # BLD AUTO: 201 10E3/UL (ref 150–450)
POTASSIUM SERPL-SCNC: 3.4 MMOL/L (ref 3.4–5.3)
PROT SERPL-MCNC: 6.5 G/DL (ref 6.4–8.3)
RBC # BLD AUTO: 4.55 10E6/UL (ref 4.4–5.9)
SODIUM SERPL-SCNC: 135 MMOL/L (ref 135–145)
T4 FREE SERPL-MCNC: 1.22 NG/DL (ref 0.9–1.7)
TSH SERPL DL<=0.005 MIU/L-ACNC: 5.88 UIU/ML (ref 0.3–4.2)
WBC # BLD AUTO: 4.2 10E3/UL (ref 4–11)

## 2025-04-28 PROCEDURE — 85004 AUTOMATED DIFF WBC COUNT: CPT | Performed by: STUDENT IN AN ORGANIZED HEALTH CARE EDUCATION/TRAINING PROGRAM

## 2025-04-28 PROCEDURE — 96413 CHEMO IV INFUSION 1 HR: CPT

## 2025-04-28 PROCEDURE — 82533 TOTAL CORTISOL: CPT | Performed by: STUDENT IN AN ORGANIZED HEALTH CARE EDUCATION/TRAINING PROGRAM

## 2025-04-28 PROCEDURE — 258N000003 HC RX IP 258 OP 636: Performed by: STUDENT IN AN ORGANIZED HEALTH CARE EDUCATION/TRAINING PROGRAM

## 2025-04-28 PROCEDURE — 96361 HYDRATE IV INFUSION ADD-ON: CPT

## 2025-04-28 PROCEDURE — 258N000003 HC RX IP 258 OP 636: Performed by: INTERNAL MEDICINE

## 2025-04-28 PROCEDURE — 83690 ASSAY OF LIPASE: CPT | Performed by: STUDENT IN AN ORGANIZED HEALTH CARE EDUCATION/TRAINING PROGRAM

## 2025-04-28 PROCEDURE — 99215 OFFICE O/P EST HI 40 MIN: CPT | Performed by: STUDENT IN AN ORGANIZED HEALTH CARE EDUCATION/TRAINING PROGRAM

## 2025-04-28 PROCEDURE — 84443 ASSAY THYROID STIM HORMONE: CPT | Performed by: STUDENT IN AN ORGANIZED HEALTH CARE EDUCATION/TRAINING PROGRAM

## 2025-04-28 PROCEDURE — 250N000011 HC RX IP 250 OP 636: Mod: JZ | Performed by: INTERNAL MEDICINE

## 2025-04-28 PROCEDURE — G0463 HOSPITAL OUTPT CLINIC VISIT: HCPCS | Mod: 25 | Performed by: STUDENT IN AN ORGANIZED HEALTH CARE EDUCATION/TRAINING PROGRAM

## 2025-04-28 PROCEDURE — 84439 ASSAY OF FREE THYROXINE: CPT | Performed by: STUDENT IN AN ORGANIZED HEALTH CARE EDUCATION/TRAINING PROGRAM

## 2025-04-28 PROCEDURE — 82947 ASSAY GLUCOSE BLOOD QUANT: CPT | Performed by: STUDENT IN AN ORGANIZED HEALTH CARE EDUCATION/TRAINING PROGRAM

## 2025-04-28 PROCEDURE — 36415 COLL VENOUS BLD VENIPUNCTURE: CPT | Performed by: STUDENT IN AN ORGANIZED HEALTH CARE EDUCATION/TRAINING PROGRAM

## 2025-04-28 PROCEDURE — 82150 ASSAY OF AMYLASE: CPT | Performed by: STUDENT IN AN ORGANIZED HEALTH CARE EDUCATION/TRAINING PROGRAM

## 2025-04-28 RX ORDER — ONDANSETRON 4 MG/1
4 TABLET, ORALLY DISINTEGRATING ORAL EVERY 8 HOURS PRN
Qty: 20 TABLET | Refills: 1 | Status: SHIPPED | OUTPATIENT
Start: 2025-04-28

## 2025-04-28 RX ORDER — HEPARIN SODIUM,PORCINE 10 UNIT/ML
5-20 VIAL (ML) INTRAVENOUS DAILY PRN
OUTPATIENT
Start: 2025-04-28

## 2025-04-28 RX ORDER — HEPARIN SODIUM (PORCINE) LOCK FLUSH IV SOLN 100 UNIT/ML 100 UNIT/ML
5 SOLUTION INTRAVENOUS
Status: CANCELLED | OUTPATIENT
Start: 2025-04-28

## 2025-04-28 RX ORDER — OLOPATADINE HYDROCHLORIDE 1 MG/ML
SOLUTION/ DROPS OPHTHALMIC
COMMUNITY
Start: 2024-06-20

## 2025-04-28 RX ORDER — HEPARIN SODIUM (PORCINE) LOCK FLUSH IV SOLN 100 UNIT/ML 100 UNIT/ML
5 SOLUTION INTRAVENOUS
OUTPATIENT
Start: 2025-04-28

## 2025-04-28 RX ORDER — HEPARIN SODIUM,PORCINE 10 UNIT/ML
5-20 VIAL (ML) INTRAVENOUS DAILY PRN
Status: CANCELLED | OUTPATIENT
Start: 2025-04-28

## 2025-04-28 RX ADMIN — SODIUM CHLORIDE 1000 ML: 0.9 INJECTION, SOLUTION INTRAVENOUS at 10:46

## 2025-04-28 RX ADMIN — SODIUM CHLORIDE 250 ML: 0.9 INJECTION, SOLUTION INTRAVENOUS at 11:44

## 2025-04-28 RX ADMIN — SODIUM CHLORIDE 200 MG: 9 INJECTION, SOLUTION INTRAVENOUS at 11:44

## 2025-04-28 ASSESSMENT — PAIN SCALES - GENERAL: PAINLEVEL_OUTOF10: NO PAIN (0)

## 2025-04-28 NOTE — LETTER
4/28/2025      Ahmet Coleman  [address]  [place] [address]      Dear Colleague,    Thank you for referring your patient, Ahmet Coleman, to the [facility]. Please see a copy of my visit note below.    MEDICAL ONCOLOGY PROGRESS NOTE  Melanoma Clinic  Apr 28, 2025    CHIEF COMPLAINT: Scalp melanoma, metastatic    Melanoma History:  1. He has a small pigmented scalp lesion present for over 30 years. The lesion was biopsied in 1992 and was benign.  2. October 2020, he presented to [facility] with 1 year of progressive enlargement of the lesion and new onset burning, itching, and stinging sensation in the affected scalp.  3. 10/26/20, He was seen at [facility] and he had shave biopsies of A. left central parietal scalp, B. left central occipital scalp, C. Left posterior parietal scalp, and D. punch biopsy of the left superior occipital scalp. Pathology (specimen: [id number]) showed a nodular and nevoid type melanoma, non-ulcerated, Breslow depth up to 1.3 mm, Saurabh's level IV, and up to 3 mitoses per mm2. All margins were involved. Ki-67 10-20%. Sox10 stain is positive and highlights an atypical compound melanocytic proliferation with significant overlying confluence and pagetosis of intraepidermal melanocytes. T-stage: at least pT2a. PD-L1 staining shows PD-L1 TPS <1%. 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  4. 11/25/20, he was seen by Dr. Garcia and he took three 3mm punch biopsies, which returned as dermal melanocytic proliferations with melanophages and fibrosis, consistent with locoregional metastatic melanoma.  5. 12/8/2020 he had PET-CT, which showed FDG avid irregular skin thickening overlying the left parietal region, with no FDG avid lymphadenopathy in the neck and no evidence of metastasis elsewhere in the body.  6. 1/22/2021 start vemurafenib and cobimetinib, ~2/12/21 held for diarrhea, then resumed.  7. 2/18/2021 Start  atezolizumab, last on 4/29/21  8. 5/24/2021 Wide local excision of the scalp melanoma and left latissimus free flap for scalp reconstruction.    Surgical pathology showed features consistent with scar and tumoral melanosis. No definite residual melanoma is seen. Tumoral melanosis (which extends to a depth of around 1.1 mm) is believed to be equivalent to a diagnosis of regressed melanoma.   9. 6/25/21, he starts adjuvant Nivolumab, last dose 2/14/22  10.  9/13/2024, PET/CT shows to 9 mm enhancing hypermetabolic nodules in the brain consistent with metastasis.   11.9/24/2024, MRI brain showing 3 intracranial metastatic foci  12.10/8/2024, completes gamma knife to intracranial metastases  13. 11/4/24, Starts pembrolizumab (Keytruda) 200mg every 21 days  14. Restaging MRI brain 01/13/2025, revealed hyperintense T1 foci with minimal to no enhancement that were decreased in conspicuity compared to the prior MRI (09/24/2024) including a 7 x 5 mm left parasagittal lesion (previously 10 x 9 mm), and a 5 x 4 mm right parasagittal lesion (previously 13 x 7 mm).  The previously noted 4 mm enhancing lesion along the right dorsal cingulate gyrus was no longer visualized.  There were no new enhancing foci.  CT chest, abdomen, pelvis 01/17/2025, was negative for adenopathy or metastases.     INTERVAL HISTORY  Mr. 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  -he presents for evaluation prior to C9 Keytruda. He is accompanied by his daughter, Huma.   -I reviewed triage note for GI symptoms 4/21/25.  -Lloyd tells me he is doing about his usual.  -For many years, since 1967, he has has intermittent bouts (flares) of loose stools followed by constipation. He's had a flare in the last couple weeks. This occurs at minimum once per month. He's not sure how often. He takes miralax if constipated. If stools are loose, he has lomotil  but doesn't  really take this. At most, no more than 2 loose stools per day. He's back to baseline of 1 stool per day at present. He gets cramping when these episodes occur. These flares are not any more frequent since starting keytruda.   -intermittent nausea also occurs with these flares. Wondering if he can get zofran refilled to have on hand. Has not had emesis. Not nauseous today  -He has some brain fogginess in the mornings.   -no dizziness  -Takes trazodone at bedtime. Sleep quality if variable  -He does deal with some fatigue but manageable  -Feels joint pain is generally improving. Not taking norco for this  -no cough or SOB  -No known fevers  -Gets itchy in various locations but no visible rash. Applies kenaolog topically which helps. Does not require this daily.  -He doesn't notice much for side effects with keytruda just more tired  -His mood is up and down.They have a lot of family health stressors currently. He [facility] this [facility].     Objective:  BP 97/60 (BP Location: Right arm, Patient Position: Sitting, Cuff Size: Adult Large)   Pulse 57   Temp 97.9  F (36.6  C) (Oral)   Resp 16   Wt 84.4 kg (186 lb 1.6 oz)   SpO2 94%   BMI 27.47 kg/m     General: male in no acute distress. Dressed appropriately for weather  Eyes: EOMI. No scleral icterus or conjunctival injection.  Cardiovascular: RRR No murmurs. No peripheral edema.  Respiratory: CTA bilaterally. No wheezes or crackles.  Gastrointestinal: BS +. Abdomen soft, non-tender.   Neurologic: Cranial nerves II through XII are grossly intact.  Skin: No rashes, petechiae, or bruising noted on exposed skin.  Psych: Affect slightly flat. pleasant      Labs:  Most Recent 3 CBC's:  Recent Labs   Lab Test 04/28/25  0936 04/07/25  1029 03/17/25  1040   WBC 4.2 4.8 6.1   HGB 12.8* 12.8* 13.7   MCV 82 82 80    224 245     Most Recent 3 BMP's:  Recent Labs   Lab Test 04/07/25  1029 03/17/25  1040 02/24/25  0908    136 140   POTASSIUM 3.8 3.3* 4.1    CHLORIDE 99 97* 101   CO2 27 26 31*   BUN 17.0 13.2 27.7*   CR 1.46* 1.37* 2.25*   ANIONGAP 12 13 8   STEPHEN 9.4 9.9 9.4   * 128* 102*   PROTTOTAL 6.8 7.2 7.0   ALBUMIN 4.0 4.2 4.1    Most Recent 3 LFT's:  Recent Labs   Lab Test 04/07/25  1029 03/17/25  1040 02/24/25  0908   AST 25 18 18   ALT 17 12 13   ALKPHOS 58 73 56   BILITOTAL 0.4 0.4 0.3    Most Recent 2 TSH and T4:  Recent Labs   Lab Test 04/07/25  1029 03/17/25  1040   TSH 0.95 2.10   T4 0.91 1.32     Lab Results   Component Value Date    AMYLASE 54 04/07/2025    AMYLASE 54 03/17/2025    AMYLASE 49 02/24/2025    LIPASE 39 04/07/2025    LIPASE 44 03/17/2025    LIPASE 42 02/24/2025        Todays CMP, cortisol, lipase, amylase and TSH are pending    I reviewed the above labs today.       IMAGING  N/A    ASSESSMENT AND PLAN  Cutaneous melanoma, scalp primary, pT2a cN1c, clinical Stage IIIB  Brain metastases  Lloyd Coleman is 80 year old  with agent orange cutaneous exposures in the [place] war and a diagnosis of malignant melanoma arising from a pre-existing pigmented lesion on the scalp. He had a partial response to neoadjuvant therapy with atezolizumab (3 cycles), vemurafenib, and cobimetinib (4 cycles). He received adjuvant nivolumab through 2/14/2022.  Surveillance imaging in September 2024 revealed new intracranial lesions consistent with metastatic disease.  He is now s/p gamma knife treatment on 10/8/2024.  He started pembrolizumab 200 mg every 3 weeks x 1 year on 11/4/2024.    The most recent CT-CAP 1/17/25 was negative for metastatic disease. A restaging MRI brain 1/13/25 revealed hyperintense T1 foci with minimal to no enhancement that were decreased in conspicuity compared to the prior MRI (09/24/2024) including a 7 x 5 mm left parasagittal lesion (previously 10 x 9 mm), and a 5 x 4 mm right parasagittal lesion (previously 13 x 7 mm). The previously noted 4 mm enhancing lesion along the right dorsal cingulate gyrus was no longer  visualized.     He is tolerating pembrolizumab fairly well with manageable pruritus. He has some chronic fluctuating stool function which is not appreciably changed from what he tells me.     Plan:  - Proceed with routine pembrolizumab today pending labs  -RTC every 3 weeks for labs, LEVY/MD and infusion  -Repeat CT-CAP and brain MRI every 3 months (scheduled 5/16/25)  Addendum: Labs returned. He has a lot of chronic GI symptoms. So I dont think his elevated lipase is causing his GI upset. We can proceed with Keytruda today. He should notify us if any worsening: nausea, bloating, belching, abdominal pain, or back pain as these can be signs of pancreatitis. Ill request a repeat lipase in a week. TSH bears monitoring but no changes today. Infusion RN will communicate this to patient     Soft BP  BP varies per chart review  No dizziness today  He's not eaten today  Ok to give 1LNS today if patient desires (communicated to infusion RN)    Altered bowel motility  Intermittent nausea  -Chronic issue for many years  -Stable and not increasing on keytruda  -Ok for miralax prn for constipation  -Lomotil prn for diarrhea  -Zofran prn for nausea  -monitor for worsening on immunotherapy    Depressed mood, anxiety, PTSD  He has a longstanding history of PTSD. Takes occasional lorazepam. Continues to work with psychiatrist. Has follow up this week    CKD, moderate, stage III  He has seen nephrology, Dr. Alarcon. Creatinine improved from last infusion. CKD is likely due to chronic hypertension and possible effect from vemurafenib  -pre keytruda, baseline creat around 1.4-1.5 but ranges as much as 1.39-1.84  -Creat today is pending.     Supportive care  -Per pt/family request, I have updated imaging orders to hold [facility] release of imaging results    45 minutes spent on the date of the encounter doing chart review, review of test results, interpretation of tests, patient visit, and documentation     Amber Scheierl, CNP [facility]  [address] [place] [id number]  [phone]        Again, thank you for allowing me to participate in the care of your patient.        Sincerely,        Amber J. Scheierl, APRN CNP    Electronically signed

## 2025-04-28 NOTE — PATIENT INSTRUCTIONS
Your Lipase level was elevated today. Please monitor for the following symptoms and call triage if symptoms present or worsen: Nausea, bloating, belching, abdominal pain or back pain, vomiting, or diarrhea. We will re-check your labs in one week.     Please  Zofran from your preferred Pharmacy today.     Contact Numbers    [facility] Main Line (for Scheduling/Triage/After Hours Nurse Line): [phone]    Please call the [facility] nurse triage or the after hours nurse line if you experience a temperature greater than or equal to 100.4, shaking chills, have uncontrolled nausea, vomiting and/or diarrhea, dizziness, lightheadedness, shortness of breath, chest pain, bleeding, unexplained bruising, or if you have any other new/concerning symptoms, questions or concerns.     If you are having any concerning symptoms or wish to speak to a provider before your next infusion visit, please call your care coordinator or triage to notify them so we can adequately serve you.     If you need any refills on medications (narcotics or other medications), please call before your infusion appointment.      Lab Results:  Recent Results (from the past 12 hours)   Comprehensive metabolic panel    Collection Time: 04/28/25  9:36 AM   Result Value Ref Range    Sodium 135 135 - 145 mmol/L    Potassium 3.4 3.4 - 5.3 mmol/L    Carbon Dioxide (CO2) 28 22 - 29 mmol/L    Anion Gap 10 7 - 15 mmol/L    Urea Nitrogen 19.1 8.0 - 23.0 mg/dL    Creatinine 1.36 (H) 0.67 - 1.17 mg/dL    GFR Estimate 53 (L) >60 mL/min/1.73m2    Calcium 9.5 8.8 - 10.4 mg/dL    Chloride 97 (L) 98 - 107 mmol/L    Glucose 93 70 - 99 mg/dL    Alkaline Phosphatase 48 40 - 150 U/L    AST 19 0 - 45 U/L    ALT 12 0 - 70 U/L    Protein Total 6.5 6.4 - 8.3 g/dL    Albumin 3.9 3.5 - 5.2 g/dL    Bilirubin Total 0.3 <=1.2 mg/dL   T4 free    Collection Time: 04/28/25  9:36 AM   Result Value Ref Range    Free T4 1.22 0.90 - 1.70 ng/dL   TSH    Collection Time: 04/28/25  9:36 AM   Result  Value Ref Range    TSH 5.88 (H) 0.30 - 4.20 uIU/mL   Lipase    Collection Time: 04/28/25  9:36 AM   Result Value Ref Range    Lipase 124 (H) 13 - 60 U/L   Amylase    Collection Time: 04/28/25  9:36 AM   Result Value Ref Range    Amylase 69 28 - 100 U/L   CBC with platelets and differential    Collection Time: 04/28/25  9:36 AM   Result Value Ref Range    WBC Count 4.2 4.0 - 11.0 10e3/uL    RBC Count 4.55 4.40 - 5.90 10e6/uL    Hemoglobin 12.8 (L) 13.3 - 17.7 g/dL    Hematocrit 37.3 (L) 40.0 - 53.0 %    MCV 82 78 - 100 fL    MCH 28.1 26.5 - 33.0 pg    MCHC 34.3 31.5 - 36.5 g/dL    RDW 13.0 10.0 - 15.0 %    Platelet Count 201 150 - 450 10e3/uL    % Neutrophils 43 %    % Lymphocytes 38 %    % Monocytes 12 %    % Eosinophils 6 %    % Basophils 1 %    % Immature Granulocytes 0 %    NRBCs per 100 WBC 0 <1 /100    Absolute Neutrophils 1.8 1.6 - 8.3 10e3/uL    Absolute Lymphocytes 1.6 0.8 - 5.3 10e3/uL    Absolute Monocytes 0.5 0.0 - 1.3 10e3/uL    Absolute Eosinophils 0.3 0.0 - 0.7 10e3/uL    Absolute Basophils 0.1 0.0 - 0.2 10e3/uL    Absolute Immature Granulocytes 0.0 <=0.4 10e3/uL    Absolute NRBCs 0.0 10e3/uL

## 2025-04-28 NOTE — PROGRESS NOTES
Infusion Nursing Note:  Ahmet Coleman presents today for C9D1 Pembrolizumab/IV Fluids.    Patient seen by provider today: Yes: Teresita Cruz CNP   present during visit today: Not Applicable.    Note: Patient presents today for infusion. He reports worse fatigue today. He reports ongoing diarrhea at home. Stated he has 2 episodes per day. Patient reported he still takes Miralax because something the diarrhea alternates with constipation. Patient instructed to hold Miralax if he is having diarrhea. Per patient report, treatment was delayed by one week due to not feeling well. Reported he was having diarrhea/fatigue. Patient denies any dizziness/lightheadedness. Denies any recent falls. Reported he drinks 6 cups of water per day. Patient would like additional fluids today. Patient instructed to call triage if symptoms above worsen or if any new symptoms or concerns present following infusion today. Patient otherwise denies any recent fever/chills, N/V, pain, or any symptoms of infection/illness. Patient wishes to proceed with treatment today. Patient instructed to call care team if any new or worsening symptoms present.     Written communication 4/28/25 @ 1049 Teresita Cruz CNP/Ngoc Zamora RN - Patient presents with low BP, fatigue, diarrhea 2 episodes per day. OK to give 1 L NS per patient request.     Written communication 4/28/25 @ 1109 Teresita Cruz CNP/Ngoc Zamora RN - Creatinine 1.36, Lipase 124, TSH 5.88 - I dont think his elevated lipase is causing his GI upset. We can proceed today. He should notify us if any worsening: nausea, bloating, belching, abdominal pain, or back pain as these can be signs of pancreatits. Ill request a repeat lab in a week. TSH bears monitoring but no changes today. OK to proceed with treatment today.     Additional Comments:  - Patient given 1 L NS IV.   - I personally discussed s/sx to monitor at home regarding elevated lipase level. This was discussed  with patient/family. Instructions for monitoring given to patient/family today.   - Blood pressure following bolus: BP (!) 140/74     Pre-Medications Administered:  No pre-medications ordered.     Intravenous Access:  Peripheral IV placed.    Treatment Conditions:  Lab Results   Component Value Date    HGB 12.8 (L) 04/28/2025    WBC 4.2 04/28/2025    ANEU 1.8 04/28/2025     04/28/2025     Lab Results   Component Value Date     04/28/2025    POTASSIUM 3.4 04/28/2025    MAG 2.4 (H) 12/20/2021    CR 1.36 (H) 04/28/2025    STEPHEN 9.5 04/28/2025    BILITOTAL 0.3 04/28/2025    ALBUMIN 3.9 04/28/2025    ALT 12 04/28/2025    AST 19 04/28/2025   Results reviewed, labs MET treatment parameters, ok to proceed with treatment.    Electrolytes not needed today.     Treatment plan verified via [employer]. Patient to receive Pembrolizumab every 21 days.     Post Infusion Assessment:  Patient tolerated infusion without incident.  Blood return noted pre and post infusion.  Site patent and intact, free from redness, edema or discomfort.  No evidence of extravasations.  Access discontinued per protocol.     Discharge Plan:   Prescription refills given for Zofran - sent to preferred pharmacy.   Discharge instructions reviewed with: Patient and Family.  Patient and/or family verbalized understanding of discharge instructions and all questions answered.  AVS to patient via [employer].  Additional hard copy given to patient.   Patient discharged in stable condition accompanied by: daughter.  Departure Mode: Ambulatory.    Ngoc Zamora RN

## 2025-04-28 NOTE — NURSING NOTE
"Oncology Rooming Note    April 28, 2025 9:46 AM   Ahmet Coleman is a 80 year old male who presents for:    Chief Complaint   Patient presents with    Oncology Clinic Visit     Metastasis to brain    Blood Draw     Labs drawn via piv placed by RN in lab. VS taken.      Initial Vitals: BP 97/60 (BP Location: Right arm, Patient Position: Sitting, Cuff Size: Adult Large)   Pulse 57   Temp 97.9  F (36.6  C) (Oral)   Resp 16   Wt 84.4 kg (186 lb 1.6 oz)   SpO2 94%   BMI 27.47 kg/m   Estimated body mass index is 27.47 kg/m  as calculated from the following:    Height as of 2/24/25: 1.753 m (5' 9.02\").    Weight as of this encounter: 84.4 kg (186 lb 1.6 oz). Body surface area is 2.03 meters squared.  No Pain (0) Comment: Data Unavailable   No LMP for male patient.  Allergies reviewed: Yes  Medications reviewed: Yes    Medications: Medication refills not needed today.  Pharmacy name entered into wedgies:    [facility] [address] [place], [place] - [address] [facility] [id number] - [place], [place] - [address] [facility] - [place], [place] - [address] [facility]  [facility] [place] - [address] [place] [address] N.    Frailty Screening:   Is the patient here for a new oncology consult visit in cancer care? 2. No    PHQ9:  Did this patient require a PHQ9?: No      Clinical concerns: none      Radha Henderson"
Chief Complaint   Patient presents with    Oncology Clinic Visit     Metastasis to brain    Blood Draw     Labs drawn via piv placed by RN in lab. VS taken.      Labs drawn from PIV placed by RN. Line flushed with saline. Vitals taken. Pt checked in for appointment(s).    Remedios Patel RN    
Vaishali Grant (Daughter )[phone]

## 2025-04-28 NOTE — PROGRESS NOTES
MEDICAL ONCOLOGY PROGRESS NOTE  [facility]oma [facility]  Apr 28, 2025    CHIEF COMPLAINT: Scalp melanoma, metastatic    Melanoma History:  1. He has a small pigmented scalp lesion present for over 30 years. The lesion was biopsied in 1992 and was benign.  2. October 2020, he presented to [facility] with 1 year of progressive enlargement of the lesion and new onset burning, itching, and stinging sensation in the affected scalp.  3. 10/26/20, He was seen at [facility] and he had shave biopsies of A. left central parietal scalp, B. left central occipital scalp, C. Left posterior parietal scalp, and D. punch biopsy of the left superior occipital scalp. Pathology (specimen: [id number]) showed a nodular and nevoid type melanoma, non-ulcerated, Breslow depth up to 1.3 mm, Saurabh's level IV, and up to 3 mitoses per mm2. All margins were involved. Ki-67 10-20%. Sox10 stain is positive and highlights an atypical compound melanocytic proliferation with significant overlying confluence and pagetosis of intraepidermal melanocytes. T-stage: at least pT2a. PD-L1 staining shows PD-L1 TPS <1%. 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  4. 11/25/20, he was seen by Dr. Garcia and he took three 3mm punch biopsies, which returned as dermal melanocytic proliferations with melanophages and fibrosis, consistent with locoregional metastatic melanoma.  5. 12/8/2020 he had PET-CT, which showed FDG avid irregular skin thickening overlying the left parietal region, with no FDG avid lymphadenopathy in the neck and no evidence of metastasis elsewhere in the body.  6. 1/22/2021 start vemurafenib and cobimetinib, ~2/12/21 held for diarrhea, then resumed.  7. 2/18/2021 Start atezolizumab, last on 4/29/21  8. 5/24/2021 Wide local excision of the scalp melanoma and left latissimus free flap for scalp reconstruction.    Surgical pathology showed features consistent with scar and tumoral melanosis. No definite residual melanoma  is seen. Tumoral melanosis (which extends to a depth of around 1.1 mm) is believed to be equivalent to a diagnosis of regressed melanoma.   9. 6/25/21, he starts adjuvant Nivolumab, last dose 2/14/22  10.  9/13/2024, PET/CT shows to 9 mm enhancing hypermetabolic nodules in the brain consistent with metastasis.   11.9/24/2024, MRI brain showing 3 intracranial metastatic foci  12.10/8/2024, completes gamma knife to intracranial metastases  13. 11/4/24, Starts pembrolizumab (Keytruda) 200mg every 21 days  14. Restaging MRI brain 01/13/2025, revealed hyperintense T1 foci with minimal to no enhancement that were decreased in conspicuity compared to the prior MRI (09/24/2024) including a 7 x 5 mm left parasagittal lesion (previously 10 x 9 mm), and a 5 x 4 mm right parasagittal lesion (previously 13 x 7 mm).  The previously noted 4 mm enhancing lesion along the right dorsal cingulate gyrus was no longer visualized.  There were no new enhancing foci.  CT chest, abdomen, pelvis 01/17/2025, was negative for adenopathy or metastases.     INTERVAL HISTORY  Mr. 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  -he presents for evaluation prior to C9 Keytruda. He is accompanied by his daughter, Huma.   -I reviewed triage note for GI symptoms 4/21/25.  -Lloyd tells me he is doing about his usual.  -For many years, since 1967, he has has intermittent bouts (flares) of loose stools followed by constipation. He's had a flare in the last couple weeks. This occurs at minimum once per month. He's not sure how often. He takes miralax if constipated. If stools are loose, he has lomotil  but doesn't really take this. At most, no more than 2 loose stools per day. He's back to baseline of 1 stool per day at present. He gets cramping when these episodes occur. These flares are not any more frequent since starting keytruda.   -intermittent nausea also  occurs with these flares. Wondering if he can get zofran refilled to have on hand. Has not had emesis. Not nauseous today  -He has some brain fogginess in the mornings.   -no dizziness  -Takes trazodone at bedtime. Sleep quality if variable  -He does deal with some fatigue but manageable  -Feels joint pain is generally improving. Not taking norco for this  -no cough or SOB  -No known fevers  -Gets itchy in various locations but no visible rash. Applies kenaolog topically which helps. Does not require this daily.  -He doesn't notice much for side effects with keytruda just more tired  -His mood is up and down.They have a lot of family health stressors currently. He [facility].     Objective:  BP 97/60 (BP Location: Right arm, Patient Position: Sitting, Cuff Size: Adult Large)   Pulse 57   Temp 97.9  F (36.6  C) (Oral)   Resp 16   Wt 84.4 kg (186 lb 1.6 oz)   SpO2 94%   BMI 27.47 kg/m     General: male in no acute distress. Dressed appropriately for weather  Eyes: EOMI. No scleral icterus or conjunctival injection.  Cardiovascular: RRR No murmurs. No peripheral edema.  Respiratory: CTA bilaterally. No wheezes or crackles.  Gastrointestinal: BS +. Abdomen soft, non-tender.   Neurologic: Cranial nerves II through XII are grossly intact.  Skin: No rashes, petechiae, or bruising noted on exposed skin.  Psych: Affect slightly flat. pleasant      Labs:  Most Recent 3 CBC's:  Recent Labs   Lab Test 04/28/25  0936 04/07/25  1029 03/17/25  1040   WBC 4.2 4.8 6.1   HGB 12.8* 12.8* 13.7   MCV 82 82 80    224 245     Most Recent 3 BMP's:  Recent Labs   Lab Test 04/07/25  1029 03/17/25  1040 02/24/25  0908    136 140   POTASSIUM 3.8 3.3* 4.1   CHLORIDE 99 97* 101   CO2 27 26 31*   BUN 17.0 13.2 27.7*   CR 1.46* 1.37* 2.25*   ANIONGAP 12 13 8   STEPHEN 9.4 9.9 9.4   * 128* 102*   PROTTOTAL 6.8 7.2 7.0   ALBUMIN 4.0 4.2 4.1    Most Recent 3 LFT's:  Recent Labs   Lab Test 04/07/25  1025  03/17/25  1040 02/24/25  0908   AST 25 18 18   ALT 17 12 13   ALKPHOS 58 73 56   BILITOTAL 0.4 0.4 0.3    Most Recent 2 TSH and T4:  Recent Labs   Lab Test 04/07/25  1029 03/17/25  1040   TSH 0.95 2.10   T4 0.91 1.32     Lab Results   Component Value Date    AMYLASE 54 04/07/2025    AMYLASE 54 03/17/2025    AMYLASE 49 02/24/2025    LIPASE 39 04/07/2025    LIPASE 44 03/17/2025    LIPASE 42 02/24/2025        Todays CMP, cortisol, lipase, amylase and TSH are pending    I reviewed the above labs today.       IMAGING  N/A    ASSESSMENT AND PLAN  Cutaneous melanoma, scalp primary, pT2a cN1c, clinical Stage IIIB  Brain metastases  Lloyd Coleman is 80 year old  with agent orange cutaneous exposures in the [place] war and a diagnosis of malignant melanoma arising from a pre-existing pigmented lesion on the scalp. He had a partial response to neoadjuvant therapy with atezolizumab (3 cycles), vemurafenib, and cobimetinib (4 cycles). He received adjuvant nivolumab through 2/14/2022.  Surveillance imaging in September 2024 revealed new intracranial lesions consistent with metastatic disease.  He is now s/p gamma knife treatment on 10/8/2024.  He started pembrolizumab 200 mg every 3 weeks x 1 year on 11/4/2024.    The most recent CT-CAP 1/17/25 was negative for metastatic disease. A restaging MRI brain 1/13/25 revealed hyperintense T1 foci with minimal to no enhancement that were decreased in conspicuity compared to the prior MRI (09/24/2024) including a 7 x 5 mm left parasagittal lesion (previously 10 x 9 mm), and a 5 x 4 mm right parasagittal lesion (previously 13 x 7 mm). The previously noted 4 mm enhancing lesion along the right dorsal cingulate gyrus was no longer visualized.     He is tolerating pembrolizumab fairly well with manageable pruritus. He has some chronic fluctuating stool function which is not appreciably changed from what he tells me.     Plan:  - Proceed with routine pembrolizumab today pending  labs  -RTC every 3 weeks for labs, LEVY/MD and infusion  -Repeat CT-CAP and brain MRI every 3 months (scheduled 5/16/25)  Addendum: Labs returned. He has a lot of chronic GI symptoms. So I dont think his elevated lipase is causing his GI upset. We can proceed with Keytruda today. He should notify us if any worsening: nausea, bloating, belching, abdominal pain, or back pain as these can be signs of pancreatitis. Ill request a repeat lipase in a week. TSH bears monitoring but no changes today. Infusion RN will communicate this to patient     Soft BP  BP varies per chart review  No dizziness today  He's not eaten today  Ok to give 1LNS today if patient desires (communicated to infusion RN)    Altered bowel motility  Intermittent nausea  -Chronic issue for many years  -Stable and not increasing on keytruda  -Ok for miralax prn for constipation  -Lomotil prn for diarrhea  -Zofran prn for nausea  -monitor for worsening on immunotherapy    Depressed mood, anxiety, PTSD  He has a longstanding history of PTSD. Takes occasional lorazepam. Continues to work with psychiatrist. Has follow up this week    CKD, moderate, stage III  He has seen nephrology, Dr. Alarcon. Creatinine improved from last infusion. CKD is likely due to chronic hypertension and possible effect from vemurafenib  -pre keytruda, baseline creat around 1.4-1.5 but ranges as much as 1.39-1.84  -Creat today is pending.     Supportive care  -Per pt/family request, I have updated imaging orders to hold [facility] [facility] [employer] release of imaging results    45 minutes spent on the date of the encounter doing chart review, review of test results, interpretation of tests, patient visit, and documentation     Amber Scheierl, CNP  [facility]  [address] [place] [id number]  [phone]

## 2025-04-29 DIAGNOSIS — E27.40 ADRENAL INSUFFICIENCY: Primary | ICD-10-CM

## 2025-04-29 RX ORDER — HYDROCORTISONE 5 MG/1
5 TABLET ORAL DAILY
Qty: 90 TABLET | Refills: 1 | Status: SHIPPED | OUTPATIENT
Start: 2025-04-29

## 2025-04-29 RX ORDER — HYDROCORTISONE 10 MG/1
10 TABLET ORAL EVERY MORNING
Qty: 90 TABLET | Refills: 1 | Status: SHIPPED | OUTPATIENT
Start: 2025-04-29

## 2025-05-08 ENCOUNTER — LAB (OUTPATIENT)
Dept: LAB | Facility: OTHER | Age: 80
End: 2025-05-08
Payer: MEDICARE

## 2025-05-08 DIAGNOSIS — R74.8 ELEVATED PANCREATIC ENZYME: ICD-10-CM

## 2025-05-08 LAB
ALBUMIN SERPL BCG-MCNC: 4 G/DL (ref 3.5–5.2)
ALP SERPL-CCNC: 51 U/L (ref 40–150)
ALT SERPL W P-5'-P-CCNC: 12 U/L (ref 0–70)
AMYLASE SERPL-CCNC: 53 U/L (ref 28–100)
ANION GAP SERPL CALCULATED.3IONS-SCNC: 5 MMOL/L (ref 7–15)
AST SERPL W P-5'-P-CCNC: 14 U/L (ref 0–45)
BILIRUB SERPL-MCNC: 0.5 MG/DL
BUN SERPL-MCNC: 19.8 MG/DL (ref 8–23)
CALCIUM SERPL-MCNC: 9.5 MG/DL (ref 8.8–10.4)
CHLORIDE SERPL-SCNC: 99 MMOL/L (ref 98–107)
CREAT SERPL-MCNC: 1.91 MG/DL (ref 0.67–1.17)
EGFRCR SERPLBLD CKD-EPI 2021: 35 ML/MIN/1.73M2
GLUCOSE SERPL-MCNC: 131 MG/DL (ref 70–99)
HCO3 SERPL-SCNC: 35 MMOL/L (ref 22–29)
LIPASE SERPL-CCNC: 38 U/L (ref 13–60)
POTASSIUM SERPL-SCNC: 3.9 MMOL/L (ref 3.4–5.3)
PROT SERPL-MCNC: 6.7 G/DL (ref 6.4–8.3)
SODIUM SERPL-SCNC: 139 MMOL/L (ref 135–145)

## 2025-05-13 NOTE — PROGRESS NOTES
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  Patient was diagnosed with stage IIIB melanoma of the left scalp (pT2a, cN1c, cM0) and presented in primary care clinic 10/14/2020, with a 6.5 x 7 cm variably pigmented, tender plaque on the left scalp, Shave biopsy of the left central parietal scalp, left central occipital scalp, and left posterior parietal scalp 10/26/2020, revealed nonulcerated nodular and nevoid melanoma with up to 1.3 mm depth of invasion.  PD-L1 expression () was negative with TPS <1%.  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  There was no detected alteration in GNA11, GNAQ, KIT, MAP2K1, NRAS, PDGFRA. Punch biopsy of the right anterior temple, and posterior scalp left of midline 11/25/2020, revealed dermal melanocytic proliferation with melanophages and fibrosis without evidence of malignancy, and punch biopsy of the left occipital scalp 11/25/2020, revealed metastatic melanoma metastatic melanoma positive for SOX10 with rare mitotic activity, dense nodular inflammatory moderate and focal regression. 18-F FDG PET/CT scan 12/08/2020, revealed FDG avid irregular skin thickening spanning 5.4 cm overlying the left parietal bone, without invasion of the bone there was no hypermetabolic lymphadenopathy or distant metastases. Patient received neoadjuvant vemurafenib 960 mg BID day 1-21, then 720 mg BID day 22-28 for cycle 1 day and 720 mg BID days 1-28 plus cobimetinib 60 mg daily day 1-21, and atezolizumab 840 mg IV day 1 repeated every 28 days x 4 cycles from 01/13/2021 through 04/15/2021.  There was a vemurafenib 720 mg and cobimetinib 40 mg dose reduction beginning with cycle 2 (02/18/2021) due to worsening diarrhea.  Restaging 18-F FDG PET/CT  scan 03/26/2021, revealed decrease in cutaneous thickening and diffusely decreased FDG uptake in the left parietal scalp lesion, with a small focus of residual skin thickening at the hide left vertex with FDG max 2.9 (previously FDG max 5.9).  There were no other hypermetabolic lesions. Patient underwent wide local excision of the left scalp lesion with left latissimus free flap for scalp reconstruction 05/24/2021, that revealed tumoral melanosis extending to a depth of 1.1 mm without residual melanoma in the wide excision specimen consistent with regressed melanoma.  Patient received adjuvant nivolumab 480 mg IV every 28 days x8 cycles from 06/25/2021 through 02/14/2022.     Restaging 18-F FDG PET/CT scan 08/25/2021, 11/23/2021, 03/18/2022, 06/10/2022, 08/26/2022, 01/13/2023, 04/21/2023, 07/21/2023, 10/27/2023, was negative for hypermetabolic lesions.      Restaging 18-F FDG PET/CT scan 09/13/2024, revealed postsurgical changes in the left temporoparietal periauricular scalp with reconstruction.  There was a 9 mm enhancing hypermetabolic lesion within the left frontal lobe, a 9 mm lesion along the medial aspect of the right frontal lobe suggestive of brain metastases.  There were scattered non-FDG-avid groundglass lung opacities in the left upper lobe and left lower lobe that appeared inflammatory in nature.  There were no other hypermetabolic lesions.  MRI brain 09/24/2024, revealed a 9 mm enhancing lesion in the left ventral superior frontal gyrus, a 12 x 6 mm enhancing lesion in the right pericallosal gyrus, and a 4 mm enhancing lesion in the right dorsal cingulate gyrus.  There were stable postsurgical changes and reconstruction in the left frontal parietal scalp without evidence of local recurrence in the surgical region. Patient received gamma knife radiosurgery to the right superior parasagittal metastasis (22 Gy to 75% isodose), left superior parasagittal metastasis (22 Gy to 60% isodose), and right  inferior parasagittal metastasis (22 Gy to 80% isodose) on 10/08/2024. Patient is receiving adjuvant pembrolizumab 200 mg IV every 21 days x1 year beginning 11/04/2024.  Patient received 9 cycles of pembrolizumab thus far (04/28/2025).  Restaging MRI brain 01/13/2025, revealed hyperintense T1 foci with minimal to no enhancement that were decreased in conspicuity compared to the prior MRI (09/24/2024) including a 7 x 5 mm left parasagittal lesion (previously 10 x 9 mm), and a 5 x 4 mm right parasagittal lesion (previously 13 x 7 mm).  The previously noted 4 mm enhancing lesion along the right dorsal cingulate gyrus was no longer visualized.  There were no new enhancing foci.  CT chest, abdomen, pelvis 01/17/2025, was negative for adenopathy or metastases.  Patient has fatigue, intermittent mild frontal headache, dry mouth, occasional hot flashes, and joint pain and stiffness in the shoulders, back, and hips.  Patient is unable to take nonsteroidal anti-inflammatories  due to allergies.  There are no other new symptoms or events since the prior clinic visit (04/28/2025). He denies fever, weight loss, headache, visual changes, slurred speech, cough, dyspnea, chest pain, abdominal pain, nausea, vomiting, constipation, diarrhea, bleeding, or bone pain.     PAST HISTORY: Past history was reviewed and unchanged from the clinic note 09/26/2024.     MEDICATIONS:   Current Outpatient Medications   Medication Sig Dispense Refill    acetaminophen (TYLENOL) 500 MG tablet Take 2 tablets (1,000 mg) by mouth every 8 hours as needed for mild pain 100 tablet 0    diphenoxylate-atropine (LOMOTIL) 2.5-0.025 MG tablet Take 1-2 tablets by mouth 4 times daily as needed for diarrhea 120 tablet 5    docusate sodium (COLACE) 100 MG capsule as needed.      fluvoxaMINE (LUVOX) 100 MG tablet Take 150 mg at bedtime      hydrochlorothiazide (HYDRODIURIL) 25 MG tablet Take 25 mg by mouth daily.      HYDROcodone-acetaminophen (NORCO) 5-325 MG  tablet Take 1 tablet by mouth every 6 hours as needed for pain. 60 tablet 0    hydrocortisone (CORTEF) 10 MG tablet Take 1 tablet (10 mg) by mouth every morning. 90 tablet 1    hydrocortisone (CORTEF) 5 MG tablet Take 1 tablet (5 mg) by mouth daily. Take between 12 and 2pm 90 tablet 1    Hypromellose (ARTIFICIAL TEARS OP) Apply to eye 2 times daily as needed. 2 drops in each eye twice a day as needed      Lactobacillus-Inulin (CULTURELLE DIGESTIVE HEALTH) CAPS 1 tab daily 30 capsule 1    lisinopril (ZESTRIL) 10 MG tablet Take 15 mg by mouth daily      Magnesium Oxide 420 MG TABS Take 420 mg by mouth daily      methocarbamol (ROBAXIN) 500 MG tablet Take 1 tablet (500 mg) by mouth 3 times daily as needed for muscle spasms 10 tablet 0    olopatadine (PATANOL) 0.1 % ophthalmic solution Apply to eye.      omeprazole (PRILOSEC) 20 MG capsule Take 1 capsule by mouth 2 times daily. 60 capsule prn    ondansetron (ZOFRAN ODT) 4 MG ODT tab Take 1 tablet (4 mg) by mouth every 8 hours as needed for nausea. 20 tablet 1    polyethylene glycol (MIRALAX) 17 GM/Dose powder Take 17 g by mouth daily 510 g 0    potassium chloride ER (KLOR-CON M) 20 MEQ CR tablet Take 1 tablet (20 mEq) by mouth daily 7 tablet 0    prazosin (MINIPRESS) 1 MG capsule Take 1 mg by mouth 2 Times Daily Takes 2 tabs in the morning and 1 tab at night      pregabalin (LYRICA) 150 MG capsule Take 150 mg by mouth 2 times daily      QUEtiapine (SEROQUEL) 300 MG tablet Take 150 mg by mouth At Bedtime       senna-docusate (SENOKOT-S/PERICOLACE) 8.6-50 MG tablet Take 1 tablet by mouth 2 times daily (Patient not taking: Reported on 4/28/2025) 30 tablet 0    simvastatin (ZOCOR) 80 MG tablet Take 40 mg by mouth At Bedtime       traZODone (DESYREL) 100 MG tablet Take 100 mg by mouth At Bedtime       triamcinolone (KENALOG) 0.1 % external cream Apply topically 2 times daily as needed for irritation (Rash). 80 g 2    vitamin D3 (CHOLECALCIFEROL) 1000 units (25 mcg) tablet  "Take 1,000 Units by mouth daily  (Patient not taking: Reported on 4/28/2025)      vitamin D3 (CHOLECALCIFEROL) 125 MCG (5000 UT) tablet Take 1 tablet (125 mcg) by mouth daily 90 tablet 3    vortioxetine (TRINTELLIX) 20 MG tablet TAKE ONE TABLET BY MOUTH EVERY MORNING         REVIEW OF SYSTEMS: Review of systems reviewed with the patient and otherwise negative except for those detailed above.    PHYSICAL EXAM:   /82   Pulse 62   Temp 97.6  F (36.4  C) (Oral)   Resp 16   Ht 1.753 m (5' 9.02\")   Wt 85 kg (187 lb 6.3 oz)   SpO2 96%   BMI 27.66 kg/m   ECOG performance status: 1. Physical exam was unchanged from prior clinic visit 04/28/2025.  Skin: No erythema or rash.  HEENT: Sclera nonicteric. Oropharynx without lesions or ulceration, mucosa pink and moist.  Nodes: No cervical, supraclavicular, axillary, or inguinal adenopathy.  Lungs: No dullness to percussion.  No rales, wheezes, rhonchi.  Heart: Regular rate and rhythm.  Abdomen: Bowel sounds present.  Soft, nontender, no hepatosplenomegaly or mass.  Extremities: No edema.     LABORATORY REVIEWED:   Component      Latest Ref Rng 4/28/2025  9:36 AM 5/19/2025  8:44 AM   WBC      4.0 - 11.0 10e3/uL  6.1    RBC Count      4.40 - 5.90 10e6/uL  4.57    Hemoglobin      13.3 - 17.7 g/dL  13.0 (L)    Hematocrit      40.0 - 53.0 %  37.6 (L)    MCV      78 - 100 fL  82    MCH      26.5 - 33.0 pg  28.4    MCHC      31.5 - 36.5 g/dL  34.6    RDW      10.0 - 15.0 %  12.9    Platelet Count      150 - 450 10e3/uL  206    % Neutrophils      %  62    % Lymphocytes      %  25    % Monocytes      %  8    % Eosinophils      %  3    % Basophils      %  1    % Immature Granulocytes      %  0    NRBC/W      <1 /100  0    Absolute Neutrophil      1.6 - 8.3 10e3/uL  3.8    Absolute Lymphocytes      0.8 - 5.3 10e3/uL  1.5    Absolute Monocytes      0.0 - 1.3 10e3/uL  0.5    Absolute Eosinophils      0.0 - 0.7 10e3/uL  0.2    Absolute Basophils      0.0 - 0.2 10e3/uL  0.1  "   Absolute Immature Granulocytes      <=0.4 10e3/uL  0.0    Absolute NRBCs      10e3/uL  0.0    Sodium      135 - 145 mmol/L 135  136    Potassium      3.4 - 5.3 mmol/L 3.4  3.5    Carbon Dioxide (CO2)      22 - 29 mmol/L 28  26    Anion Gap      7 - 15 mmol/L 10  12    Urea Nitrogen      8.0 - 23.0 mg/dL 19.1  13.5    Creatinine      0.67 - 1.17 mg/dL 1.36 (H)  1.42 (H)    GFR Estimate      >60 mL/min/1.73m2 53 (L)  51 (L)    Calcium      8.8 - 10.4 mg/dL 9.5  9.2    Chloride      98 - 107 mmol/L 97 (L)  98    Glucose      70 - 99 mg/dL 93  116 (H)    Alkaline Phosphatase      40 - 150 U/L 48  47    AST      0 - 45 U/L 19  16    ALT      0 - 70 U/L 12  9    Protein Total      6.4 - 8.3 g/dL 6.5  7.0    Albumin      3.5 - 5.2 g/dL 3.9  4.2    Bilirubin Total      <=1.2 mg/dL 0.3  0.4    Cortisol Serum        ug/dL 3.5     T4 Free      0.90 - 1.70 ng/dL  1.16    TSH      0.30 - 4.20 uIU/mL  5.65 (H)    Lipase      13 - 60 U/L  47    Amylase      28 - 100 U/L  56        RADIOLOGY REVIEWED: MRI brain 05/16/2025, showed continued response to treatment with decrease conspicuity of the previously detected metastatic foci including near resolution of the 4 mm lesion along the right dorsal cingulate gyrus, decrease in size of the right parasagittal region, and stable left superior frontal gyrus lesion.  There were no new metastases. CT neck, chest, abdomen, pelvis 05/16/2025, was negative for adenopathy or metastases.    Recent Results (from the past 744 hours)   CT Soft tissue neck w contrast*    Narrative    CT SOFT TISSUE NECK W CONTRAST 5/16/2025 2:55 PM    History:  Stage IV melanoma of the left scalp metastatic to brain  status post gamma knife radiosurgery and receiving adjuvant  immunotherapy. MRI brain, CT neck, chest, abdomen, pelvis requested to  evaluate for disease progression.; Malignant melanoma of scalp (H);  Metastasis to brain (H)      Comparison:  7/21/2023 neck CT    Technique: Following intravenous  administration of nonionic iodinated  contrast medium, thin section helical CT images were obtained from the  skull base down to the level of the aortic arch.  Axial, coronal and  sagittal reformations were performed with 2-3 mm slice thickness  reconstruction. Images were reviewed in soft tissue, lung and bone  windows.    Contrast: 103 cc of Isovue-370     Findings: Partially visualized post surgical changes in the left  lateral scalp with fatty graft placement. No evidence of focal  enhancing masslike appearance to suggest local recurrence. No new or  enlarged lymph node in the head and neck to suggest metastatic nika  disease.  Evaluation of the mucosal space demonstrates no evident abnormality in  the nasopharynx, oropharynx, hypopharynx or the glottis. The tongue  base appears normal. Mild heterogeneous appearance of the  submandibular gland similar to prior. Parotid glands are normal.  Normal thyroid gland.    There is no evident cervical lymphadenopathy. The fascial spaces in  the neck are intact bilaterally. The major vascular structures in the  neck appear unremarkable.    Evaluation of the osseous structures demonstrate no worrisome lytic or  sclerotic lesion. No overt spinal canal or neuroforaminal stenosis.  The visualized paranasal sinuses are clear. The mastoid air cells are  clear.     The visualized lung apices are clear.      Impression    Impression:  No evidence of local recurrence in the left scalp within the  visualized field of view. No metastatic cervical nika disease. Please  refer to same-day brain MRI for detailed evaluation of intracranial  structures.    WILBUR EDMONDSON MD         SYSTEM ID:  [id number]       CT Chest/Abdomen/Pelvis w Contrast    Narrative    EXAMINATION: CT CHEST/ABDOMEN/PELVIS W CONTRAST, 5/16/2025 2:55 PM    INDICATION: Stage IV melanoma of the left scalp metastatic to brain  status post gamma knife radiosurgery and receiving adjuvant  immunotherapy. MRI brain, CT neck,  chest, abdomen, pelvis requested to  evaluate for disease progression.; Malignant melanoma of scalp (H);  Metastasis to brain (H)    COMPARISON STUDY: 1/17/2025    TECHNIQUE: CT scan of the chest, abdomen and pelvis was performed on  multidetector CT scanner using volumetric acquisition technique and  images were reconstructed in multiple planes with variable thickness  and reviewed on dedicated workstations.     CONTRAST: 103ML Isovue 370 injected IV without oral contrast    CT scan radiation dose is optimized to minimum requisite dose using  automated dose modulation techniques.    FINDINGS:    Lungs/pleura: No pneumothorax or pleural effusion. A few scattered  sub-6 mm pulmonary nodules are seen. No suspicious nodules or masses.  No infiltrate    Mediastinum: Unremarkable thyroid. Normal cardiac size. No pericardial  effusion. Significant coronary calcification. Unremarkable major  thoracic vessels. No mediastinal lymphadenopathy. Unremarkable  esophagus.    Liver: No suspicious mass. Small hepatic cyst in the right hepatic  lobe. Focal fatty infiltration by the falciform ligament. No  intrahepatic biliary ductal dilation.    Biliary System: Normal gallbladder. No extrahepatic biliary ductal  dilation.    Pancreas: No mass or pancreatic ductal dilation.    Adrenal glands: No mass or nodules    Spleen: Normal.    Kidneys: No suspicious mass, obstructing calculus or hydronephrosis.   Simple renal cysts.    Gastrointestinal tract: Post-operative changes of appendectomy. Normal  caliber small bowel.  Colonic diverticulosis with no diverticulitis.    Mesentery/peritoneum/retroperitoneum: No mass. No free fluid or air.    Lymph nodes: No significant lymphadenopathy.    Vasculature: Patent major abdominal vasculature.    Pelvis: Urinary bladder is normal.    Osseous structures: No aggressive or acute osseous lesion.      Soft tissues: Tiny subcutaneous calcifications in the gluteal regions,  likely sequela of prior  injections/fat necrosis.      Impression    IMPRESSION:     1. No new masslike lesion in the chest, abdomen and pelvis.  2. Incidental findings as in the body of the report.       MR Brain w/o & w Contrast    Narrative    Brain MRI without and with contrast    History: Stage IV melanoma of the left scalp metastatic to brain  status post gamma knife radiosurgery and receiving adjuvant  immunotherapy.  MRI brain, CT neck, chest, abdomen, pelvis requested  to evaluate for disease progression.; Malignant melanoma of scalp (H);  Metastasis to brain (H).  ICD-10: Malignant melanoma of scalp (H); Metastasis to brain (H)    Comparison: Brain MRI 1/13/2025 and MRI from 10/8/2024    Technique: Sagittal T1, axial DWI, axial SWI, axial fat-saturated T2  FLAIR, axial fat-saturated T2 sequences were obtained without  contrast. Following contrast administration, fat saturated axial T1  turbo spin-echo and 3-D T1 MPRAGE images with multiplanar  reconstructions were obtained.    Dose: 8.5 ml gadavist    Findings:   Treated enhancing metastatic foci, smaller compared with MRI from  10/8/2024, stable or markedly decreased when compared with immediate  post treatment MRI from 1/13/2025.   -7 x 5 mm left superior frontal gyrus lesion (series 13, image 134),  previously 11 mm on 10/8/2024  - punctate right parasagittal lesion (series 13, image 118),  previously 13 x 7 mm on 10/18/2024   -The previously noted 4 mm enhancing lesion along the right dorsal  cingulate gyrus is no longer visualized.    No new intracranial enhancing foci. Postoperative changes of flap  reconstruction of the left scalp.    Unchanged thin extra-axial collection along the left cerebral  convexity. There is no mass effect, midline shift, or intracranial  hemorrhage. The ventricles are proportionate to the cerebral sulci.  Diffusion and susceptibility weighted images are negative for acute  abnormality. Scattered punctate foci of subcortical, periventricular  white  matter T2 hyperintensities. Normal major intracranial vascular  flow voids.    Mucus retention cyst of the right maxillary sinus with additional  scattered paranasal sinus mucosal thickening. Bilateral mastoid  effusions. No focal abnormality of the pituitary gland, sella, skull  base and upper cervical spinal structures on sagittal images. The  orbits are normal.      Impression    IMPRESSION: Continued positive response to treatment with marked  decreased conspicuity of the previously described metastatic foci  including near resolution of the 4 mm lesion along the right dorsal  cingulate gyrus, decreased size of right parasagittal lesion and  grossly unchanged left superior frontal gyrus lesion. No new lesion  identified.    WILBUR EDMONDSON MD         SYSTEM ID:  [id number]       IMPRESSION/PLAN: Stage IV melanoma of the left scalp.  Melanoma is a nonulcerated, up to 1.3 mm depth of invasion BRAF p.V600K-positive nodular and nevoid melanoma on the left scalp.  There is no evidence of regional or distant metastases noted on FDG PET/CT scan (12/08/2020).  Patient received neoadjuvant atezolizumab 840 mg IV day 1, vemurafenib BID day 1-28, and cobimetinib daily day 1-21 repeated every 28 days x4 cycles (from 01/13/2021 through 04/15/2021) followed by definitive wide local excision of the left scalp lesion (05/24/2021) that revealed a complete response.  Patient received adjuvant nivolumab IV every 28 days x8 cycles (from 06/25/2021 through 02/14/2022).  There are brain metastases noted on restaging FDG PET/CT scan (09/13/2024) and MRI brain (09/24/2024), but no evidence of systemic metastases.  Patient received gamma knife radiosurgery to the right superior parasagittal, left superior parasagittal, and right inferior parasagittal metastases (10/08/2024).  Patient is receiving adjuvant pembrolizumab IV every 21 days x1 year (beginning 11/04/2024).  There is a good response to gamma knife radiosurgery and no new  metastases brain (01/13/2025, 05/16/2025) and no systemic metastases on restaging CT scan (05/16/2025).  There is grade 1 fatigue, headache, xerostomia, vasomotor symptoms, and arthritis.  Pembrolizumab will be continued without modification.  Patient will return to the outpatient infusion center 05/19/2025, for cycle 10 of pembrolizumab. I reviewed the risks and side effects of pembrolizumab with the patient, which include fatigue, weakness, anorexia, weight loss, rash, pruritus, dry eyes, dry mouth, headache, cough, dyspnea, abdominal pain, nausea, vomiting, diarrhea, hypothyroidism, adrenal insufficiency, other endocrine disorders, cardiomyopathy, colitis, nephritis, pancreatitis, myositis, arthritis, neuropathy, cerebritis.  Patient understood the indication and risks and agreed to continue therapy.  Hydrocodone/APAP 5/325 mg may be used as needed for myalgias and arthralgias.  Prednisone 5-10 mg daily would be recommended if the arthritis persists or worsens. Patient will return to clinic in 3 weeks with CBC, metabolic panel, amylase, lipase, TSH, free T4, cortisol.  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       Hector Collins MD    cc: MD Keith Trevino MD Nattawat Klomjit, MD Samir S Khariwala, MD

## 2025-05-16 ENCOUNTER — ANCILLARY PROCEDURE (OUTPATIENT)
Dept: CT IMAGING | Facility: CLINIC | Age: 80
End: 2025-05-16
Attending: INTERNAL MEDICINE
Payer: MEDICARE

## 2025-05-16 ENCOUNTER — ANCILLARY PROCEDURE (OUTPATIENT)
Dept: MRI IMAGING | Facility: CLINIC | Age: 80
End: 2025-05-16
Attending: INTERNAL MEDICINE
Payer: MEDICARE

## 2025-05-16 DIAGNOSIS — C43.4 MALIGNANT MELANOMA OF SCALP (H): ICD-10-CM

## 2025-05-16 DIAGNOSIS — C79.31 METASTASIS TO BRAIN (H): ICD-10-CM

## 2025-05-16 PROCEDURE — 74177 CT ABD & PELVIS W/CONTRAST: CPT | Mod: GC | Performed by: STUDENT IN AN ORGANIZED HEALTH CARE EDUCATION/TRAINING PROGRAM

## 2025-05-16 PROCEDURE — 70491 CT SOFT TISSUE NECK W/DYE: CPT | Performed by: STUDENT IN AN ORGANIZED HEALTH CARE EDUCATION/TRAINING PROGRAM

## 2025-05-16 PROCEDURE — A9585 GADOBUTROL INJECTION: HCPCS | Performed by: STUDENT IN AN ORGANIZED HEALTH CARE EDUCATION/TRAINING PROGRAM

## 2025-05-16 PROCEDURE — 71260 CT THORAX DX C+: CPT | Mod: GC | Performed by: STUDENT IN AN ORGANIZED HEALTH CARE EDUCATION/TRAINING PROGRAM

## 2025-05-16 PROCEDURE — 70553 MRI BRAIN STEM W/O & W/DYE: CPT | Performed by: STUDENT IN AN ORGANIZED HEALTH CARE EDUCATION/TRAINING PROGRAM

## 2025-05-16 RX ORDER — IOPAMIDOL 755 MG/ML
103 INJECTION, SOLUTION INTRAVASCULAR ONCE
Status: COMPLETED | OUTPATIENT
Start: 2025-05-16 | End: 2025-05-16

## 2025-05-16 RX ORDER — GADOBUTROL 604.72 MG/ML
8.5 INJECTION INTRAVENOUS ONCE
Status: COMPLETED | OUTPATIENT
Start: 2025-05-16 | End: 2025-05-16

## 2025-05-16 RX ADMIN — IOPAMIDOL 103 ML: 755 INJECTION, SOLUTION INTRAVASCULAR at 14:31

## 2025-05-16 RX ADMIN — GADOBUTROL 8.5 ML: 604.72 INJECTION INTRAVENOUS at 15:28

## 2025-05-19 ENCOUNTER — INFUSION THERAPY VISIT (OUTPATIENT)
Dept: ONCOLOGY | Facility: CLINIC | Age: 80
End: 2025-05-19
Attending: INTERNAL MEDICINE
Payer: MEDICARE

## 2025-05-19 ENCOUNTER — NURSE TRIAGE (OUTPATIENT)
Dept: ONCOLOGY | Facility: CLINIC | Age: 80
End: 2025-05-19

## 2025-05-19 VITALS
WEIGHT: 187.39 LBS | RESPIRATION RATE: 16 BRPM | HEART RATE: 62 BPM | BODY MASS INDEX: 27.66 KG/M2 | SYSTOLIC BLOOD PRESSURE: 133 MMHG | TEMPERATURE: 97.6 F | DIASTOLIC BLOOD PRESSURE: 82 MMHG | OXYGEN SATURATION: 96 %

## 2025-05-19 VITALS
TEMPERATURE: 97.6 F | BODY MASS INDEX: 27.76 KG/M2 | WEIGHT: 187.39 LBS | SYSTOLIC BLOOD PRESSURE: 133 MMHG | RESPIRATION RATE: 16 BRPM | HEART RATE: 62 BPM | DIASTOLIC BLOOD PRESSURE: 82 MMHG | OXYGEN SATURATION: 96 % | HEIGHT: 69 IN

## 2025-05-19 DIAGNOSIS — Z79.899 HIGH RISK MEDICATION USE: ICD-10-CM

## 2025-05-19 DIAGNOSIS — C79.31 METASTASIS TO BRAIN (H): ICD-10-CM

## 2025-05-19 DIAGNOSIS — C43.4 MALIGNANT MELANOMA OF SCALP (H): Primary | ICD-10-CM

## 2025-05-19 LAB
ALBUMIN SERPL BCG-MCNC: 4.2 G/DL (ref 3.5–5.2)
ALP SERPL-CCNC: 47 U/L (ref 40–150)
ALT SERPL W P-5'-P-CCNC: 9 U/L (ref 0–70)
AMYLASE SERPL-CCNC: 56 U/L (ref 28–100)
ANION GAP SERPL CALCULATED.3IONS-SCNC: 12 MMOL/L (ref 7–15)
AST SERPL W P-5'-P-CCNC: 16 U/L (ref 0–45)
BASOPHILS # BLD AUTO: 0.1 10E3/UL (ref 0–0.2)
BASOPHILS NFR BLD AUTO: 1 %
BILIRUB SERPL-MCNC: 0.4 MG/DL
BUN SERPL-MCNC: 13.5 MG/DL (ref 8–23)
CALCIUM SERPL-MCNC: 9.2 MG/DL (ref 8.8–10.4)
CHLORIDE SERPL-SCNC: 98 MMOL/L (ref 98–107)
CORTIS SERPL-MCNC: 15.4 UG/DL
CREAT SERPL-MCNC: 1.42 MG/DL (ref 0.67–1.17)
EGFRCR SERPLBLD CKD-EPI 2021: 51 ML/MIN/1.73M2
EOSINOPHIL # BLD AUTO: 0.2 10E3/UL (ref 0–0.7)
EOSINOPHIL NFR BLD AUTO: 3 %
ERYTHROCYTE [DISTWIDTH] IN BLOOD BY AUTOMATED COUNT: 12.9 % (ref 10–15)
GLUCOSE SERPL-MCNC: 116 MG/DL (ref 70–99)
HCO3 SERPL-SCNC: 26 MMOL/L (ref 22–29)
HCT VFR BLD AUTO: 37.6 % (ref 40–53)
HGB BLD-MCNC: 13 G/DL (ref 13.3–17.7)
IMM GRANULOCYTES # BLD: 0 10E3/UL
IMM GRANULOCYTES NFR BLD: 0 %
LIPASE SERPL-CCNC: 47 U/L (ref 13–60)
LYMPHOCYTES # BLD AUTO: 1.5 10E3/UL (ref 0.8–5.3)
LYMPHOCYTES NFR BLD AUTO: 25 %
MCH RBC QN AUTO: 28.4 PG (ref 26.5–33)
MCHC RBC AUTO-ENTMCNC: 34.6 G/DL (ref 31.5–36.5)
MCV RBC AUTO: 82 FL (ref 78–100)
MONOCYTES # BLD AUTO: 0.5 10E3/UL (ref 0–1.3)
MONOCYTES NFR BLD AUTO: 8 %
NEUTROPHILS # BLD AUTO: 3.8 10E3/UL (ref 1.6–8.3)
NEUTROPHILS NFR BLD AUTO: 62 %
NRBC # BLD AUTO: 0 10E3/UL
NRBC BLD AUTO-RTO: 0 /100
PLATELET # BLD AUTO: 206 10E3/UL (ref 150–450)
POTASSIUM SERPL-SCNC: 3.5 MMOL/L (ref 3.4–5.3)
PROT SERPL-MCNC: 7 G/DL (ref 6.4–8.3)
RADIOLOGIST FLAGS: NORMAL
RBC # BLD AUTO: 4.57 10E6/UL (ref 4.4–5.9)
SODIUM SERPL-SCNC: 136 MMOL/L (ref 135–145)
T4 FREE SERPL-MCNC: 1.16 NG/DL (ref 0.9–1.7)
TSH SERPL DL<=0.005 MIU/L-ACNC: 5.65 UIU/ML (ref 0.3–4.2)
WBC # BLD AUTO: 6.1 10E3/UL (ref 4–11)

## 2025-05-19 PROCEDURE — 82150 ASSAY OF AMYLASE: CPT

## 2025-05-19 PROCEDURE — 99214 OFFICE O/P EST MOD 30 MIN: CPT | Performed by: INTERNAL MEDICINE

## 2025-05-19 PROCEDURE — 258N000003 HC RX IP 258 OP 636: Performed by: INTERNAL MEDICINE

## 2025-05-19 PROCEDURE — 82533 TOTAL CORTISOL: CPT

## 2025-05-19 PROCEDURE — 84443 ASSAY THYROID STIM HORMONE: CPT

## 2025-05-19 PROCEDURE — 82247 BILIRUBIN TOTAL: CPT | Performed by: INTERNAL MEDICINE

## 2025-05-19 PROCEDURE — 96409 CHEMO IV PUSH SNGL DRUG: CPT

## 2025-05-19 PROCEDURE — G0463 HOSPITAL OUTPT CLINIC VISIT: HCPCS | Performed by: INTERNAL MEDICINE

## 2025-05-19 PROCEDURE — 83690 ASSAY OF LIPASE: CPT

## 2025-05-19 PROCEDURE — 36415 COLL VENOUS BLD VENIPUNCTURE: CPT

## 2025-05-19 PROCEDURE — 250N000011 HC RX IP 250 OP 636: Mod: JZ | Performed by: INTERNAL MEDICINE

## 2025-05-19 PROCEDURE — 84439 ASSAY OF FREE THYROXINE: CPT

## 2025-05-19 PROCEDURE — 85004 AUTOMATED DIFF WBC COUNT: CPT

## 2025-05-19 RX ORDER — METHYLPREDNISOLONE SODIUM SUCCINATE 40 MG/ML
40 INJECTION INTRAMUSCULAR; INTRAVENOUS
Status: CANCELLED
Start: 2025-05-19

## 2025-05-19 RX ORDER — ALBUTEROL SULFATE 90 UG/1
1-2 INHALANT RESPIRATORY (INHALATION)
Status: CANCELLED
Start: 2025-05-19

## 2025-05-19 RX ORDER — DIPHENHYDRAMINE HYDROCHLORIDE 50 MG/ML
25 INJECTION, SOLUTION INTRAMUSCULAR; INTRAVENOUS
Status: CANCELLED
Start: 2025-05-19

## 2025-05-19 RX ORDER — DIPHENHYDRAMINE HYDROCHLORIDE 50 MG/ML
50 INJECTION, SOLUTION INTRAMUSCULAR; INTRAVENOUS
Status: CANCELLED
Start: 2025-05-19

## 2025-05-19 RX ORDER — MEPERIDINE HYDROCHLORIDE 25 MG/ML
25 INJECTION INTRAMUSCULAR; INTRAVENOUS; SUBCUTANEOUS
Status: CANCELLED | OUTPATIENT
Start: 2025-05-19

## 2025-05-19 RX ORDER — ALBUTEROL SULFATE 0.83 MG/ML
2.5 SOLUTION RESPIRATORY (INHALATION)
Status: CANCELLED | OUTPATIENT
Start: 2025-05-19

## 2025-05-19 RX ORDER — EPINEPHRINE 1 MG/ML
0.3 INJECTION, SOLUTION INTRAMUSCULAR; SUBCUTANEOUS EVERY 5 MIN PRN
Status: CANCELLED | OUTPATIENT
Start: 2025-05-19

## 2025-05-19 RX ORDER — HEPARIN SODIUM,PORCINE 10 UNIT/ML
5-20 VIAL (ML) INTRAVENOUS DAILY PRN
Status: CANCELLED | OUTPATIENT
Start: 2025-05-19

## 2025-05-19 RX ORDER — HEPARIN SODIUM (PORCINE) LOCK FLUSH IV SOLN 100 UNIT/ML 100 UNIT/ML
5 SOLUTION INTRAVENOUS
Status: CANCELLED | OUTPATIENT
Start: 2025-05-19

## 2025-05-19 RX ORDER — LORAZEPAM 2 MG/ML
0.5 INJECTION INTRAMUSCULAR EVERY 4 HOURS PRN
Status: CANCELLED | OUTPATIENT
Start: 2025-05-19

## 2025-05-19 RX ADMIN — SODIUM CHLORIDE 250 ML: 0.9 INJECTION, SOLUTION INTRAVENOUS at 09:47

## 2025-05-19 RX ADMIN — SODIUM CHLORIDE 200 MG: 9 INJECTION, SOLUTION INTRAVENOUS at 09:57

## 2025-05-19 ASSESSMENT — PAIN SCALES - GENERAL
PAINLEVEL_OUTOF10: NO PAIN (0)
PAINLEVEL_OUTOF10: NO PAIN (0)

## 2025-05-19 NOTE — TELEPHONE ENCOUNTER
"Incidental finding for CT chest, abd, pelvis from 5/16/25, cut and pasted from Epic,   \"1. Too small to characterize hypodensity in segment 7 of the liver,  not conspicuous on prior, indeterminate, neoplastic etiology is not  entirely excluded. Recommend attention on short-term follow-up or MRI liver for further evaluation as clinically desired.  2. Few patchy pulmonary densities, favor atelectatic. Consider  attention on follow-up.  3. Additional findings similar to prior, as detailed above.\"  "

## 2025-05-19 NOTE — NURSING NOTE
Chief Complaint   Patient presents with    Blood Draw     Labs drawn by RN in Lab after having placed Right Arm PIV.      Rita Haley RN

## 2025-05-19 NOTE — NURSING NOTE
"Oncology Rooming Note    May 19, 2025 8:55 AM   Ahmet Coleman is a 80 year old male who presents for:    Chief Complaint   Patient presents with    Oncology Clinic Visit     Malignant melanoma of scalp     Initial Vitals: /82   Pulse 62   Temp 97.6  F (36.4  C) (Oral)   Resp 16   Ht 1.753 m (5' 9.02\")   Wt 85 kg (187 lb 6.3 oz)   SpO2 96%   BMI 27.66 kg/m   Estimated body mass index is 27.66 kg/m  as calculated from the following:    Height as of this encounter: 1.753 m (5' 9.02\").    Weight as of this encounter: 85 kg (187 lb 6.3 oz). Body surface area is 2.03 meters squared.  No Pain (0) Comment: Data Unavailable   No LMP for male patient.  Allergies reviewed: Yes  Medications reviewed: Yes    Medications: Medication refills not needed today.  Pharmacy name entered into [employer] Direct ([employer]):    [id number] - [place], [place] - [address] [id number] [place] [address] [facility] - [place], [place] - [address] [facility]  [facility] - [place], [place] - [address]    Frailty Screening:   Is the patient here for a new oncology consult visit in cancer care? 2. No    PHQ9:  Did this patient require a PHQ9?: No      Clinical concerns: none.       Marion Acevedo              "

## 2025-05-19 NOTE — PROGRESS NOTES
Infusion Nursing Note:  Ahmet Coleman presents today for C10D1 Keytruda.    Patient seen by provider today: Yes: Dr. Collins   present during visit today: Not Applicable.    Note: No complaints.      Intravenous Access:  Peripheral IV placed.    Treatment Conditions:  Lab Results   Component Value Date    HGB 13.0 (L) 05/19/2025    WBC 6.1 05/19/2025    ANEU 3.8 05/19/2025     05/19/2025        Lab Results   Component Value Date     05/19/2025    POTASSIUM 3.5 05/19/2025    MAG 2.4 (H) 12/20/2021    CR 1.42 (H) 05/19/2025    STEPHEN 9.2 05/19/2025    BILITOTAL 0.4 05/19/2025    ALBUMIN 4.2 05/19/2025    ALT 9 05/19/2025    AST 16 05/19/2025       Results reviewed, labs MET treatment parameters, ok to proceed with treatment.      Post Infusion Assessment:  Patient tolerated infusion without incident.  Blood return noted pre and post infusion.  Site patent and intact, free from redness, edema or discomfort.  No evidence of extravasations.  Access discontinued per protocol.       Discharge Plan:   Patient declined prescription refills.  Discharge instructions reviewed with: Patient.  Patient and/or family verbalized understanding of discharge instructions and all questions answered.  AVS to patient via [facility] [employer].  Patient will return 6/9 for next appointment.   Patient discharged in stable condition accompanied by: self.  Departure Mode: Ambulatory.      JOY MCKEON RN

## 2025-05-19 NOTE — LETTER
5/19/2025      Ahmet Coleman  [address]  [place] [address]      Dear Colleague,    Thank you for referring your patient, Ahmet Coleman, to the [facility]. Please see a copy of my visit note below.      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  Patient was diagnosed with stage IIIB melanoma of the left scalp (pT2a, cN1c, cM0) and presented in primary care clinic 10/14/2020, with a 6.5 x 7 cm variably pigmented, tender plaque on the left scalp, Shave biopsy of the left central parietal scalp, left central occipital scalp, and left posterior parietal scalp 10/26/2020, revealed nonulcerated nodular and nevoid melanoma with up to 1.3 mm depth of invasion.  PD-L1 expression () was negative with TPS <1%.  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  There was no detected alteration in GNA11, GNAQ, KIT, MAP2K1, NRAS, PDGFRA. Punch biopsy of the right anterior temple, and posterior scalp left of midline 11/25/2020, revealed dermal melanocytic proliferation with melanophages and fibrosis without evidence of malignancy, and punch biopsy of the left occipital scalp 11/25/2020, revealed metastatic melanoma metastatic melanoma positive for SOX10 with rare mitotic activity, dense nodular inflammatory moderate and focal regression. 18-F FDG PET/CT scan 12/08/2020, revealed FDG avid irregular skin thickening spanning 5.4 cm overlying the left parietal bone, without invasion of the bone there was no hypermetabolic lymphadenopathy or distant metastases. Patient received neoadjuvant vemurafenib 960 mg BID day 1-21, then 720 mg BID day 22-28 for cycle 1 day and 720 mg BID days 1-28 plus cobimetinib 60 mg daily day 1-21, and atezolizumab  840 mg IV day 1 repeated every 28 days x 4 cycles from 01/13/2021 through 04/15/2021.  There was a vemurafenib 720 mg and cobimetinib 40 mg dose reduction beginning with cycle 2 (02/18/2021) due to worsening diarrhea.  Restaging 18-F FDG PET/CT scan 03/26/2021, revealed decrease in cutaneous thickening and diffusely decreased FDG uptake in the left parietal scalp lesion, with a small focus of residual skin thickening at the hide left vertex with FDG max 2.9 (previously FDG max 5.9).  There were no other hypermetabolic lesions. Patient underwent wide local excision of the left scalp lesion with left latissimus free flap for scalp reconstruction 05/24/2021, that revealed tumoral melanosis extending to a depth of 1.1 mm without residual melanoma in the wide excision specimen consistent with regressed melanoma.  Patient received adjuvant nivolumab 480 mg IV every 28 days x8 cycles from 06/25/2021 through 02/14/2022.     Restaging 18-F FDG PET/CT scan 08/25/2021, 11/23/2021, 03/18/2022, 06/10/2022, 08/26/2022, 01/13/2023, 04/21/2023, 07/21/2023, 10/27/2023, was negative for hypermetabolic lesions.      Restaging 18-F FDG PET/CT scan 09/13/2024, revealed postsurgical changes in the left temporoparietal periauricular scalp with reconstruction.  There was a 9 mm enhancing hypermetabolic lesion within the left frontal lobe, a 9 mm lesion along the medial aspect of the right frontal lobe suggestive of brain metastases.  There were scattered non-FDG-avid groundglass lung opacities in the left upper lobe and left lower lobe that appeared inflammatory in nature.  There were no other hypermetabolic lesions.  MRI brain 09/24/2024, revealed a 9 mm enhancing lesion in the left ventral superior frontal gyrus, a 12 x 6 mm enhancing lesion in the right pericallosal gyrus, and a 4 mm enhancing lesion in the right dorsal cingulate gyrus.  There were stable postsurgical changes and reconstruction in the left frontal parietal scalp without  evidence of local recurrence in the surgical region. Patient received gamma knife radiosurgery to the right superior parasagittal metastasis (22 Gy to 75% isodose), left superior parasagittal metastasis (22 Gy to 60% isodose), and right inferior parasagittal metastasis (22 Gy to 80% isodose) on 10/08/2024. Patient is receiving adjuvant pembrolizumab 200 mg IV every 21 days x1 year beginning 11/04/2024.  Patient received 9 cycles of pembrolizumab thus far (04/28/2025).  Restaging MRI brain 01/13/2025, revealed hyperintense T1 foci with minimal to no enhancement that were decreased in conspicuity compared to the prior MRI (09/24/2024) including a 7 x 5 mm left parasagittal lesion (previously 10 x 9 mm), and a 5 x 4 mm right parasagittal lesion (previously 13 x 7 mm).  The previously noted 4 mm enhancing lesion along the right dorsal cingulate gyrus was no longer visualized.  There were no new enhancing foci.  CT chest, abdomen, pelvis 01/17/2025, was negative for adenopathy or metastases.  Patient has fatigue, intermittent mild frontal headache, dry mouth, occasional hot flashes, and joint pain and stiffness in the shoulders, back, and hips.  Patient is unable to take nonsteroidal anti-inflammatories  due to allergies.  There are no other new symptoms or events since the prior clinic visit (04/28/2025). He denies fever, weight loss, headache, visual changes, slurred speech, cough, dyspnea, chest pain, abdominal pain, nausea, vomiting, constipation, diarrhea, bleeding, or bone pain.     PAST HISTORY: Past history was reviewed and unchanged from the clinic note 09/26/2024.     MEDICATIONS:   Current Outpatient Medications   Medication Sig Dispense Refill     acetaminophen (TYLENOL) 500 MG tablet Take 2 tablets (1,000 mg) by mouth every 8 hours as needed for mild pain 100 tablet 0     diphenoxylate-atropine (LOMOTIL) 2.5-0.025 MG tablet Take 1-2 tablets by mouth 4 times daily as needed for diarrhea 120 tablet 5      docusate sodium (COLACE) 100 MG capsule as needed.       fluvoxaMINE (LUVOX) 100 MG tablet Take 150 mg at bedtime       hydrochlorothiazide (HYDRODIURIL) 25 MG tablet Take 25 mg by mouth daily.       HYDROcodone-acetaminophen (NORCO) 5-325 MG tablet Take 1 tablet by mouth every 6 hours as needed for pain. 60 tablet 0     hydrocortisone (CORTEF) 10 MG tablet Take 1 tablet (10 mg) by mouth every morning. 90 tablet 1     hydrocortisone (CORTEF) 5 MG tablet Take 1 tablet (5 mg) by mouth daily. Take between 12 and 2pm 90 tablet 1     Hypromellose (ARTIFICIAL TEARS OP) Apply to eye 2 times daily as needed. 2 drops in each eye twice a day as needed       Lactobacillus-Inulin (CULTURELLE DIGESTIVE HEALTH) CAPS 1 tab daily 30 capsule 1     lisinopril (ZESTRIL) 10 MG tablet Take 15 mg by mouth daily       Magnesium Oxide 420 MG TABS Take 420 mg by mouth daily       methocarbamol (ROBAXIN) 500 MG tablet Take 1 tablet (500 mg) by mouth 3 times daily as needed for muscle spasms 10 tablet 0     olopatadine (PATANOL) 0.1 % ophthalmic solution Apply to eye.       omeprazole (PRILOSEC) 20 MG capsule Take 1 capsule by mouth 2 times daily. 60 capsule prn     ondansetron (ZOFRAN ODT) 4 MG ODT tab Take 1 tablet (4 mg) by mouth every 8 hours as needed for nausea. 20 tablet 1     polyethylene glycol (MIRALAX) 17 GM/Dose powder Take 17 g by mouth daily 510 g 0     potassium chloride ER (KLOR-CON M) 20 MEQ CR tablet Take 1 tablet (20 mEq) by mouth daily 7 tablet 0     prazosin (MINIPRESS) 1 MG capsule Take 1 mg by mouth 2 Times Daily Takes 2 tabs in the morning and 1 tab at night       pregabalin (LYRICA) 150 MG capsule Take 150 mg by mouth 2 times daily       QUEtiapine (SEROQUEL) 300 MG tablet Take 150 mg by mouth At Bedtime        senna-docusate (SENOKOT-S/PERICOLACE) 8.6-50 MG tablet Take 1 tablet by mouth 2 times daily (Patient not taking: Reported on 4/28/2025) 30 tablet 0     simvastatin (ZOCOR) 80 MG tablet Take 40 mg by mouth At  "Bedtime        traZODone (DESYREL) 100 MG tablet Take 100 mg by mouth At Bedtime        triamcinolone (KENALOG) 0.1 % external cream Apply topically 2 times daily as needed for irritation (Rash). 80 g 2     vitamin D3 (CHOLECALCIFEROL) 1000 units (25 mcg) tablet Take 1,000 Units by mouth daily  (Patient not taking: Reported on 4/28/2025)       vitamin D3 (CHOLECALCIFEROL) 125 MCG (5000 UT) tablet Take 1 tablet (125 mcg) by mouth daily 90 tablet 3     vortioxetine (TRINTELLIX) 20 MG tablet TAKE ONE TABLET BY MOUTH EVERY MORNING         REVIEW OF SYSTEMS: Review of systems reviewed with the patient and otherwise negative except for those detailed above.    PHYSICAL EXAM:   /82   Pulse 62   Temp 97.6  F (36.4  C) (Oral)   Resp 16   Ht 1.753 m (5' 9.02\")   Wt 85 kg (187 lb 6.3 oz)   SpO2 96%   BMI 27.66 kg/m   ECOG performance status: 1. Physical exam was unchanged from prior clinic visit 04/28/2025.  Skin: No erythema or rash.  HEENT: Sclera nonicteric. Oropharynx without lesions or ulceration, mucosa pink and moist.  Nodes: No cervical, supraclavicular, axillary, or inguinal adenopathy.  Lungs: No dullness to percussion.  No rales, wheezes, rhonchi.  Heart: Regular rate and rhythm.  Abdomen: Bowel sounds present.  Soft, nontender, no hepatosplenomegaly or mass.  Extremities: No edema.     LABORATORY REVIEWED:   Component      Latest Ref [facility] 4/28/2025  9:36 AM 5/19/2025  8:44 AM   WBC      4.0 - 11.0 10e3/uL  6.1    RBC Count      4.40 - 5.90 10e6/uL  4.57    Hemoglobin      13.3 - 17.7 g/dL  13.0 (L)    Hematocrit      40.0 - 53.0 %  37.6 (L)    MCV      78 - 100 fL  82    MCH      26.5 - 33.0 pg  28.4    MCHC      31.5 - 36.5 g/dL  34.6    RDW      10.0 - 15.0 %  12.9    Platelet Count      150 - 450 10e3/uL  206    % Neutrophils      %  62    % Lymphocytes      %  25    % Monocytes      %  8    % Eosinophils      %  3    % Basophils      %  1    % Immature Granulocytes      %  0    NRBC/W      <1 /100  " 0    Absolute Neutrophil      1.6 - 8.3 10e3/uL  3.8    Absolute Lymphocytes      0.8 - 5.3 10e3/uL  1.5    Absolute Monocytes      0.0 - 1.3 10e3/uL  0.5    Absolute Eosinophils      0.0 - 0.7 10e3/uL  0.2    Absolute Basophils      0.0 - 0.2 10e3/uL  0.1    Absolute Immature Granulocytes      <=0.4 10e3/uL  0.0    Absolute NRBCs      10e3/uL  0.0    Sodium      135 - 145 mmol/L 135  136    Potassium      3.4 - 5.3 mmol/L 3.4  3.5    Carbon Dioxide (CO2)      22 - 29 mmol/L 28  26    Anion Gap      7 - 15 mmol/L 10  12    Urea Nitrogen      8.0 - 23.0 mg/dL 19.1  13.5    Creatinine      0.67 - 1.17 mg/dL 1.36 (H)  1.42 (H)    GFR Estimate      >60 mL/min/1.73m2 53 (L)  51 (L)    Calcium      8.8 - 10.4 mg/dL 9.5  9.2    Chloride      98 - 107 mmol/L 97 (L)  98    Glucose      70 - 99 mg/dL 93  116 (H)    Alkaline Phosphatase      40 - 150 U/L 48  47    AST      0 - 45 U/L 19  16    ALT      0 - 70 U/L 12  9    Protein Total      6.4 - 8.3 g/dL 6.5  7.0    Albumin      3.5 - 5.2 g/dL 3.9  4.2    Bilirubin Total      <=1.2 mg/dL 0.3  0.4    Cortisol Serum        ug/dL 3.5     T4 Free      0.90 - 1.70 ng/dL  1.16    TSH      0.30 - 4.20 uIU/mL  5.65 (H)    Lipase      13 - 60 U/L  47    Amylase      28 - 100 U/L  56        RADIOLOGY REVIEWED: MRI brain 05/16/2025, showed continued response to treatment with decrease conspicuity of the previously detected metastatic foci including near resolution of the 4 mm lesion along the right dorsal cingulate gyrus, decrease in size of the right parasagittal region, and stable left superior frontal gyrus lesion.  There were no new metastases. CT neck, chest, abdomen, pelvis 05/16/2025, was negative for adenopathy or metastases.    Recent Results (from the past 744 hours)   CT Soft tissue neck w contrast*    Narrative    CT SOFT TISSUE NECK W CONTRAST 5/16/2025 2:55 PM    History:  Stage IV melanoma of the left scalp metastatic to brain  status post gamma knife radiosurgery and  receiving adjuvant  immunotherapy. MRI brain, CT neck, chest, abdomen, pelvis requested to  evaluate for disease progression.; Malignant melanoma of scalp (H);  Metastasis to brain (H)      Comparison:  7/21/2023 neck CT    Technique: Following intravenous administration of nonionic iodinated  contrast medium, thin section helical CT images were obtained from the  skull base down to the level of the aortic arch.  Axial, coronal and  sagittal reformations were performed with 2-3 mm slice thickness  reconstruction. Images were reviewed in soft tissue, lung and bone  windows.    Contrast: 103 cc of Isovue-370     Findings: Partially visualized post surgical changes in the left  lateral scalp with fatty graft placement. No evidence of focal  enhancing masslike appearance to suggest local recurrence. No new or  enlarged lymph node in the head and neck to suggest metastatic nika  disease.  Evaluation of the mucosal space demonstrates no evident abnormality in  the nasopharynx, oropharynx, hypopharynx or the glottis. The tongue  base appears normal. Mild heterogeneous appearance of the  submandibular gland similar to prior. Parotid glands are normal.  Normal thyroid gland.    There is no evident cervical lymphadenopathy. The fascial spaces in  the neck are intact bilaterally. The major vascular structures in the  neck appear unremarkable.    Evaluation of the osseous structures demonstrate no worrisome lytic or  sclerotic lesion. No overt spinal canal or neuroforaminal stenosis.  The visualized paranasal sinuses are clear. The mastoid air cells are  clear.     The visualized lung apices are clear.      Impression    Impression:  No evidence of local recurrence in the left scalp within the  visualized field of view. No metastatic cervical nika disease. Please  refer to same-day brain MRI for detailed evaluation of intracranial  structures.    WILBUR EDMONDSON MD         SYSTEM ID:  [id number]       CT Chest/Abdomen/Pelvis w  Contrast    Narrative    EXAMINATION: CT CHEST/ABDOMEN/PELVIS W CONTRAST, 5/16/2025 2:55 PM    INDICATION: Stage IV melanoma of the left scalp metastatic to brain  status post gamma knife radiosurgery and receiving adjuvant  immunotherapy. MRI brain, CT neck, chest, abdomen, pelvis requested to  evaluate for disease progression.; Malignant melanoma of scalp (H);  Metastasis to brain (H)    COMPARISON STUDY: 1/17/2025    TECHNIQUE: CT scan of the chest, abdomen and pelvis was performed on  multidetector CT scanner using volumetric acquisition technique and  images were reconstructed in multiple planes with variable thickness  and reviewed on dedicated workstations.     CONTRAST: 103ML Isovue 370 injected IV without oral contrast    CT scan radiation dose is optimized to minimum requisite dose using  automated dose modulation techniques.    FINDINGS:    Lungs/pleura: No pneumothorax or pleural effusion. A few scattered  sub-6 mm pulmonary nodules are seen. No suspicious nodules or masses.  No infiltrate    Mediastinum: Unremarkable thyroid. Normal cardiac size. No pericardial  effusion. Significant coronary calcification. Unremarkable major  thoracic vessels. No mediastinal lymphadenopathy. Unremarkable  esophagus.    Liver: No suspicious mass. Small hepatic cyst in the right hepatic  lobe. Focal fatty infiltration by the falciform ligament. No  intrahepatic biliary ductal dilation.    Biliary System: Normal gallbladder. No extrahepatic biliary ductal  dilation.    Pancreas: No mass or pancreatic ductal dilation.    Adrenal glands: No mass or nodules    Spleen: Normal.    Kidneys: No suspicious mass, obstructing calculus or hydronephrosis.   Simple renal cysts.    Gastrointestinal tract: Post-operative changes of appendectomy. Normal  caliber small bowel.  Colonic diverticulosis with no diverticulitis.    Mesentery/peritoneum/retroperitoneum: No mass. No free fluid or air.    Lymph nodes: No significant  lymphadenopathy.    Vasculature: Patent major abdominal vasculature.    Pelvis: Urinary bladder is normal.    Osseous structures: No aggressive or acute osseous lesion.      Soft tissues: Tiny subcutaneous calcifications in the gluteal regions,  likely sequela of prior injections/fat necrosis.      Impression    IMPRESSION:     1. No new masslike lesion in the chest, abdomen and pelvis.  2. Incidental findings as in the body of the report.       MR Brain w/o & w Contrast    Narrative    Brain MRI without and with contrast    History: Stage IV melanoma of the left scalp metastatic to brain  status post gamma knife radiosurgery and receiving adjuvant  immunotherapy.  MRI brain, CT neck, chest, abdomen, pelvis requested  to evaluate for disease progression.; Malignant melanoma of scalp (H);  Metastasis to brain (H).  ICD-10: Malignant melanoma of scalp (H); Metastasis to brain (H)    Comparison: Brain MRI 1/13/2025 and MRI from 10/8/2024    Technique: Sagittal T1, axial DWI, axial SWI, axial fat-saturated T2  FLAIR, axial fat-saturated T2 sequences were obtained without  contrast. Following contrast administration, fat saturated axial T1  turbo spin-echo and 3-D T1 MPRAGE images with multiplanar  reconstructions were obtained.    Dose: 8.5 ml gadavist    Findings:   Treated enhancing metastatic foci, smaller compared with MRI from  10/8/2024, stable or markedly decreased when compared with immediate  post treatment MRI from 1/13/2025.   -7 x 5 mm left superior frontal gyrus lesion (series 13, image 134),  previously 11 mm on 10/8/2024  - punctate right parasagittal lesion (series 13, image 118),  previously 13 x 7 mm on 10/18/2024   -The previously noted 4 mm enhancing lesion along the right dorsal  cingulate gyrus is no longer visualized.    No new intracranial enhancing foci. Postoperative changes of flap  reconstruction of the left scalp.    Unchanged thin extra-axial collection along the left cerebral  convexity.  There is no mass effect, midline shift, or intracranial  hemorrhage. The ventricles are proportionate to the cerebral sulci.  Diffusion and susceptibility weighted images are negative for acute  abnormality. Scattered punctate foci of subcortical, periventricular  white matter T2 hyperintensities. Normal major intracranial vascular  flow voids.    Mucus retention cyst of the right maxillary sinus with additional  scattered paranasal sinus mucosal thickening. Bilateral mastoid  effusions. No focal abnormality of the pituitary gland, sella, skull  base and upper cervical spinal structures on sagittal images. The  orbits are normal.      Impression    IMPRESSION: Continued positive response to treatment with marked  decreased conspicuity of the previously described metastatic foci  including near resolution of the 4 mm lesion along the right dorsal  cingulate gyrus, decreased size of right parasagittal lesion and  grossly unchanged left superior frontal gyrus lesion. No new lesion  identified.    WILBUR EDMONDSON MD         SYSTEM ID:  [id number]       IMPRESSION/PLAN: Stage IV melanoma of the left scalp.  Melanoma is a nonulcerated, up to 1.3 mm depth of invasion BRAF p.V600K-positive nodular and nevoid melanoma on the left scalp.  There is no evidence of regional or distant metastases noted on FDG PET/CT scan (12/08/2020).  Patient received neoadjuvant atezolizumab 840 mg IV day 1, vemurafenib BID day 1-28, and cobimetinib daily day 1-21 repeated every 28 days x4 cycles (from 01/13/2021 through 04/15/2021) followed by definitive wide local excision of the left scalp lesion (05/24/2021) that revealed a complete response.  Patient received adjuvant nivolumab IV every 28 days x8 cycles (from 06/25/2021 through 02/14/2022).  There are brain metastases noted on restaging FDG PET/CT scan (09/13/2024) and MRI brain (09/24/2024), but no evidence of systemic metastases.  Patient received gamma knife radiosurgery to the right  superior parasagittal, left superior parasagittal, and right inferior parasagittal metastases (10/08/2024).  Patient is receiving adjuvant pembrolizumab IV every 21 days x1 year (beginning 11/04/2024).  There is a good response to gamma knife radiosurgery and no new metastases brain (01/13/2025, 05/16/2025) and no systemic metastases on restaging CT scan (05/16/2025).  There is grade 1 fatigue, headache, xerostomia, vasomotor symptoms, and arthritis.  Pembrolizumab will be continued without modification.  Patient will return to the outpatient infusion center 05/19/2025, for cycle 10 of pembrolizumab. I reviewed the risks and side effects of pembrolizumab with the patient, which include fatigue, weakness, anorexia, weight loss, rash, pruritus, dry eyes, dry mouth, headache, cough, dyspnea, abdominal pain, nausea, vomiting, diarrhea, hypothyroidism, adrenal insufficiency, other endocrine disorders, cardiomyopathy, colitis, nephritis, pancreatitis, myositis, arthritis, neuropathy, cerebritis.  Patient understood the indication and risks and agreed to continue therapy.  Hydrocodone/APAP 5/325 mg may be used as needed for myalgias and arthralgias.  Prednisone 5-10 mg daily would be recommended if the arthritis persists or worsens. Patient will return to clinic in 3 weeks with CBC, metabolic panel, amylase, lipase, TSH, free T4, cortisol.  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       Hector Collins MD    cc: MD Keith Trevino MD Nattawat Klomjit, MD Samir S Khariwala, MD    Again, thank you for allowing me to participate in the care of your patient.        Sincerely,        Hector Collins MD    Electronically signed

## 2025-06-09 ENCOUNTER — PATIENT OUTREACH (OUTPATIENT)
Dept: ONCOLOGY | Facility: CLINIC | Age: 80
End: 2025-06-09

## 2025-06-09 ENCOUNTER — INFUSION THERAPY VISIT (OUTPATIENT)
Dept: ONCOLOGY | Facility: CLINIC | Age: 80
End: 2025-06-09
Attending: INTERNAL MEDICINE
Payer: MEDICARE

## 2025-06-09 ENCOUNTER — LAB (OUTPATIENT)
Dept: LAB | Facility: CLINIC | Age: 80
End: 2025-06-09
Attending: STUDENT IN AN ORGANIZED HEALTH CARE EDUCATION/TRAINING PROGRAM
Payer: MEDICARE

## 2025-06-09 VITALS
WEIGHT: 189 LBS | SYSTOLIC BLOOD PRESSURE: 130 MMHG | TEMPERATURE: 97.6 F | RESPIRATION RATE: 17 BRPM | OXYGEN SATURATION: 94 % | DIASTOLIC BLOOD PRESSURE: 71 MMHG | BODY MASS INDEX: 27.9 KG/M2 | HEART RATE: 66 BPM

## 2025-06-09 DIAGNOSIS — Z79.899 HIGH RISK MEDICATION USE: ICD-10-CM

## 2025-06-09 DIAGNOSIS — M25.50 MULTIPLE JOINT PAIN: ICD-10-CM

## 2025-06-09 DIAGNOSIS — C43.4 MALIGNANT MELANOMA OF SCALP (H): Primary | ICD-10-CM

## 2025-06-09 DIAGNOSIS — G89.3 CANCER-RELATED PAIN: ICD-10-CM

## 2025-06-09 DIAGNOSIS — C79.31 METASTASIS TO BRAIN (H): ICD-10-CM

## 2025-06-09 DIAGNOSIS — C43.4 MALIGNANT MELANOMA OF SCALP (H): ICD-10-CM

## 2025-06-09 DIAGNOSIS — M25.50 MULTIPLE JOINT PAIN: Primary | ICD-10-CM

## 2025-06-09 LAB
ALBUMIN SERPL BCG-MCNC: 4.2 G/DL (ref 3.5–5.2)
ALP SERPL-CCNC: 40 U/L (ref 40–150)
ALT SERPL W P-5'-P-CCNC: 10 U/L (ref 0–70)
AMYLASE SERPL-CCNC: 57 U/L (ref 28–100)
ANION GAP SERPL CALCULATED.3IONS-SCNC: 12 MMOL/L (ref 7–15)
AST SERPL W P-5'-P-CCNC: 15 U/L (ref 0–45)
BASOPHILS # BLD AUTO: 0.1 10E3/UL (ref 0–0.2)
BASOPHILS NFR BLD AUTO: 1 %
BILIRUB SERPL-MCNC: 0.3 MG/DL
BUN SERPL-MCNC: 21.8 MG/DL (ref 8–23)
CALCIUM SERPL-MCNC: 9.3 MG/DL (ref 8.8–10.4)
CHLORIDE SERPL-SCNC: 101 MMOL/L (ref 98–107)
CORTIS SERPL-MCNC: 18.3 UG/DL
CREAT SERPL-MCNC: 1.34 MG/DL (ref 0.67–1.17)
CRP SERPL-MCNC: <3 MG/L
EGFRCR SERPLBLD CKD-EPI 2021: 54 ML/MIN/1.73M2
EOSINOPHIL # BLD AUTO: 0.2 10E3/UL (ref 0–0.7)
EOSINOPHIL NFR BLD AUTO: 4 %
ERYTHROCYTE [DISTWIDTH] IN BLOOD BY AUTOMATED COUNT: 13.2 % (ref 10–15)
ERYTHROCYTE [SEDIMENTATION RATE] IN BLOOD BY WESTERGREN METHOD: 11 MM/HR (ref 0–20)
GLUCOSE SERPL-MCNC: 109 MG/DL (ref 70–99)
HCO3 SERPL-SCNC: 26 MMOL/L (ref 22–29)
HCT VFR BLD AUTO: 38.2 % (ref 40–53)
HGB BLD-MCNC: 13.1 G/DL (ref 13.3–17.7)
IMM GRANULOCYTES # BLD: 0 10E3/UL
IMM GRANULOCYTES NFR BLD: 1 %
LIPASE SERPL-CCNC: 46 U/L (ref 13–60)
LYMPHOCYTES # BLD AUTO: 1.3 10E3/UL (ref 0.8–5.3)
LYMPHOCYTES NFR BLD AUTO: 24 %
MCH RBC QN AUTO: 28.5 PG (ref 26.5–33)
MCHC RBC AUTO-ENTMCNC: 34.3 G/DL (ref 31.5–36.5)
MCV RBC AUTO: 83 FL (ref 78–100)
MONOCYTES # BLD AUTO: 0.5 10E3/UL (ref 0–1.3)
MONOCYTES NFR BLD AUTO: 9 %
NEUTROPHILS # BLD AUTO: 3.4 10E3/UL (ref 1.6–8.3)
NEUTROPHILS NFR BLD AUTO: 62 %
NRBC # BLD AUTO: 0 10E3/UL
NRBC BLD AUTO-RTO: 0 /100
PLATELET # BLD AUTO: 188 10E3/UL (ref 150–450)
POTASSIUM SERPL-SCNC: 3.8 MMOL/L (ref 3.4–5.3)
PROT SERPL-MCNC: 6.8 G/DL (ref 6.4–8.3)
RBC # BLD AUTO: 4.59 10E6/UL (ref 4.4–5.9)
RHEUMATOID FACT SERPL-ACNC: 78 IU/ML
SODIUM SERPL-SCNC: 139 MMOL/L (ref 135–145)
T4 FREE SERPL-MCNC: 1.17 NG/DL (ref 0.9–1.7)
TSH SERPL DL<=0.005 MIU/L-ACNC: 3.36 UIU/ML (ref 0.3–4.2)
WBC # BLD AUTO: 5.5 10E3/UL (ref 4–11)

## 2025-06-09 PROCEDURE — 84439 ASSAY OF FREE THYROXINE: CPT

## 2025-06-09 PROCEDURE — 99215 OFFICE O/P EST HI 40 MIN: CPT | Performed by: STUDENT IN AN ORGANIZED HEALTH CARE EDUCATION/TRAINING PROGRAM

## 2025-06-09 PROCEDURE — 86140 C-REACTIVE PROTEIN: CPT | Performed by: STUDENT IN AN ORGANIZED HEALTH CARE EDUCATION/TRAINING PROGRAM

## 2025-06-09 PROCEDURE — 83690 ASSAY OF LIPASE: CPT

## 2025-06-09 PROCEDURE — 250N000011 HC RX IP 250 OP 636: Mod: JZ | Performed by: STUDENT IN AN ORGANIZED HEALTH CARE EDUCATION/TRAINING PROGRAM

## 2025-06-09 PROCEDURE — G2211 COMPLEX E/M VISIT ADD ON: HCPCS | Performed by: STUDENT IN AN ORGANIZED HEALTH CARE EDUCATION/TRAINING PROGRAM

## 2025-06-09 PROCEDURE — 82533 TOTAL CORTISOL: CPT

## 2025-06-09 PROCEDURE — 36415 COLL VENOUS BLD VENIPUNCTURE: CPT

## 2025-06-09 PROCEDURE — 96413 CHEMO IV INFUSION 1 HR: CPT

## 2025-06-09 PROCEDURE — 82150 ASSAY OF AMYLASE: CPT

## 2025-06-09 PROCEDURE — 85652 RBC SED RATE AUTOMATED: CPT

## 2025-06-09 PROCEDURE — 86431 RHEUMATOID FACTOR QUANT: CPT | Performed by: STUDENT IN AN ORGANIZED HEALTH CARE EDUCATION/TRAINING PROGRAM

## 2025-06-09 PROCEDURE — 86200 CCP ANTIBODY: CPT | Performed by: STUDENT IN AN ORGANIZED HEALTH CARE EDUCATION/TRAINING PROGRAM

## 2025-06-09 PROCEDURE — 85025 COMPLETE CBC W/AUTO DIFF WBC: CPT

## 2025-06-09 PROCEDURE — G0463 HOSPITAL OUTPT CLINIC VISIT: HCPCS | Performed by: STUDENT IN AN ORGANIZED HEALTH CARE EDUCATION/TRAINING PROGRAM

## 2025-06-09 PROCEDURE — 84443 ASSAY THYROID STIM HORMONE: CPT

## 2025-06-09 PROCEDURE — 80053 COMPREHEN METABOLIC PANEL: CPT | Performed by: STUDENT IN AN ORGANIZED HEALTH CARE EDUCATION/TRAINING PROGRAM

## 2025-06-09 PROCEDURE — 258N000003 HC RX IP 258 OP 636: Performed by: STUDENT IN AN ORGANIZED HEALTH CARE EDUCATION/TRAINING PROGRAM

## 2025-06-09 RX ORDER — DIPHENHYDRAMINE HYDROCHLORIDE 50 MG/ML
25 INJECTION, SOLUTION INTRAMUSCULAR; INTRAVENOUS
Status: CANCELLED
Start: 2025-06-09

## 2025-06-09 RX ORDER — HYDROCODONE BITARTRATE AND ACETAMINOPHEN 5; 325 MG/1; MG/1
1 TABLET ORAL EVERY 6 HOURS PRN
Qty: 60 TABLET | Refills: 0 | Status: SHIPPED | OUTPATIENT
Start: 2025-06-09

## 2025-06-09 RX ORDER — HEPARIN SODIUM (PORCINE) LOCK FLUSH IV SOLN 100 UNIT/ML 100 UNIT/ML
5 SOLUTION INTRAVENOUS
Status: CANCELLED | OUTPATIENT
Start: 2025-06-09

## 2025-06-09 RX ORDER — DIPHENHYDRAMINE HYDROCHLORIDE 50 MG/ML
50 INJECTION, SOLUTION INTRAMUSCULAR; INTRAVENOUS
Status: CANCELLED
Start: 2025-06-09

## 2025-06-09 RX ORDER — PREDNISONE 10 MG/1
10 TABLET ORAL DAILY
Qty: 21 TABLET | Refills: 1 | Status: SHIPPED | OUTPATIENT
Start: 2025-06-09

## 2025-06-09 RX ORDER — HEPARIN SODIUM,PORCINE 10 UNIT/ML
5-20 VIAL (ML) INTRAVENOUS DAILY PRN
Status: CANCELLED | OUTPATIENT
Start: 2025-06-09

## 2025-06-09 RX ORDER — EPINEPHRINE 1 MG/ML
0.3 INJECTION, SOLUTION INTRAMUSCULAR; SUBCUTANEOUS EVERY 5 MIN PRN
Status: CANCELLED | OUTPATIENT
Start: 2025-06-09

## 2025-06-09 RX ORDER — METHYLPREDNISOLONE SODIUM SUCCINATE 40 MG/ML
40 INJECTION INTRAMUSCULAR; INTRAVENOUS
Status: CANCELLED
Start: 2025-06-09

## 2025-06-09 RX ORDER — MEPERIDINE HYDROCHLORIDE 25 MG/ML
25 INJECTION INTRAMUSCULAR; INTRAVENOUS; SUBCUTANEOUS
Status: CANCELLED | OUTPATIENT
Start: 2025-06-09

## 2025-06-09 RX ORDER — LORAZEPAM 2 MG/ML
0.5 INJECTION INTRAMUSCULAR EVERY 4 HOURS PRN
Status: CANCELLED | OUTPATIENT
Start: 2025-06-09

## 2025-06-09 RX ORDER — ALBUTEROL SULFATE 0.83 MG/ML
2.5 SOLUTION RESPIRATORY (INHALATION)
Status: CANCELLED | OUTPATIENT
Start: 2025-06-09

## 2025-06-09 RX ORDER — ALBUTEROL SULFATE 90 UG/1
1-2 INHALANT RESPIRATORY (INHALATION)
Status: CANCELLED
Start: 2025-06-09

## 2025-06-09 RX ADMIN — SODIUM CHLORIDE 200 MG: 9 INJECTION, SOLUTION INTRAVENOUS at 11:41

## 2025-06-09 RX ADMIN — SODIUM CHLORIDE 250 ML: 0.9 INJECTION, SOLUTION INTRAVENOUS at 11:40

## 2025-06-09 ASSESSMENT — PAIN SCALES - GENERAL: PAINLEVEL_OUTOF10: NO PAIN (0)

## 2025-06-09 NOTE — NURSING NOTE
"Oncology Rooming Note    June 9, 2025 11:21 AM   Ahmet Coleman is a 80 year old male who presents for:    Chief Complaint   Patient presents with    Blood Draw     Labs drawn with PIV start by RN. Pt tolerated well. Vitals taken. Patient checked into next appointment.      Oncology Clinic Visit     Metastatic Melanoma     Initial Vitals: /71   Pulse 66   Temp 97.6  F (36.4  C) (Oral)   Resp 17   Wt 85.7 kg (189 lb)   SpO2 94%   BMI 27.90 kg/m   Estimated body mass index is 27.9 kg/m  as calculated from the following:    Height as of 5/19/25: 1.753 m (5' 9.02\").    Weight as of this encounter: 85.7 kg (189 lb). Body surface area is 2.04 meters squared.  No Pain (0) Comment: Data Unavailable   No LMP for male patient.  Allergies reviewed: Yes  Medications reviewed: Yes    Medications  Medication refill needed.  Provider is notified.  Pharmacy name entered into [email or web address]:    [id number] - [place], [place] - [address] [id number] - [place], [place] - [address] [facility] - [place], [place] - [address] [facility] - [place], [place] - [address]    Frailty Screening:   Is the patient here for a new oncology consult visit in cancer care? 2. No    PHQ9:  Did this patient require a PHQ9?: No      Clinical concerns: None       Brittany Nuñez LPN  6/9/2025              "

## 2025-06-09 NOTE — PROGRESS NOTES
MEDICAL ONCOLOGY PROGRESS NOTE  Melanoma Clinic  Jun 9, 2025    CHIEF COMPLAINT: Scalp melanoma, metastatic    Melanoma History:  1. He has a small pigmented scalp lesion present for over 30 years. The lesion was biopsied in 1992 and was benign.  2. October 2020, he presented to [facility] with 1 year of progressive enlargement of the lesion and new onset burning, itching, and stinging sensation in the affected scalp.  3. 10/26/20, He was seen at [facility] and he had shave biopsies of A. left central parietal scalp, B. left central occipital scalp, C. Left posterior parietal scalp, and D. punch biopsy of the left superior occipital scalp. Pathology (specimen: [id number]) showed a nodular and nevoid type melanoma, non-ulcerated, Breslow depth up to 1.3 mm, Saurabh's level IV, and up to 3 mitoses per mm2. All margins were involved. Ki-67 10-20%. Sox10 stain is positive and highlights an atypical compound melanocytic proliferation with significant overlying confluence and pagetosis of intraepidermal melanocytes. T-stage: at least pT2a. PD-L1 staining shows PD-L1 TPS <1%. 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  4. 11/25/20, he was seen by Dr. Garcia and he took three 3mm punch biopsies, which returned as dermal melanocytic proliferations with melanophages and fibrosis, consistent with locoregional metastatic melanoma.  5. 12/8/2020 he had PET-CT, which showed FDG avid irregular skin thickening overlying the left parietal region, with no FDG avid lymphadenopathy in the neck and no evidence of metastasis elsewhere in the body.  6. 1/22/2021 start vemurafenib and cobimetinib, ~2/12/21 held for diarrhea, then resumed.  7. 2/18/2021 Start atezolizumab, last on 4/29/21  8. 5/24/2021 Wide local excision of the scalp melanoma and left latissimus free flap for scalp reconstruction.    Surgical pathology showed features consistent with scar and tumoral melanosis. No definite residual melanoma  is seen. Tumoral melanosis (which extends to a depth of around 1.1 mm) is believed to be equivalent to a diagnosis of regressed melanoma.   9. 6/25/21, he starts adjuvant Nivolumab, last dose 2/14/22  10.  9/13/2024, PET/CT shows to 9 mm enhancing hypermetabolic nodules in the brain consistent with metastasis.   11.9/24/2024, MRI brain showing 3 intracranial metastatic foci  12.10/8/2024, completes gamma knife to intracranial metastases  13. 11/4/24, Starts pembrolizumab (Keytruda) 200mg every 21 days  14. Restaging MRI brain 01/13/2025, revealed hyperintense T1 foci with minimal to no enhancement that were decreased in conspicuity compared to the prior MRI (09/24/2024) including a 7 x 5 mm left parasagittal lesion (previously 10 x 9 mm), and a 5 x 4 mm right parasagittal lesion (previously 13 x 7 mm).  The previously noted 4 mm enhancing lesion along the right dorsal cingulate gyrus was no longer visualized.  There were no new enhancing foci.  CT chest, abdomen, pelvis 01/17/2025, was negative for adenopathy or metastases.   15. 4/29/25, starts hydrocortisone for adrenal insufficiency    INTERVAL HISTORY  Mr. 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  -he presents for evaluation prior to C11 Keytruda. He is accompanied by his daughter, Dahlia  -He had a good couple of days this weekend. He tends to have good and bad days with regard to mood, joints and even his irritable bowel.  -he continues to see his psychologist and finds this beneficial   -He confirms he is taking hydrocortisone BID. He does still deal with fatigue  -He has some flare in pain of joints (shoulder, hips, wrist) today. He takes norco about 1 tab every other day and this helps   -No joint swelling  -He intermittently has loose stools but these come and go. No abdominal pain unless cramping with stools  -He's had daily headaches for greater than 1 year  along with light sensitivity. He has a history of migraines but this is different.His headache today is a bit more bothersome than baseline  -Appetite is fine     Objective:  /71   Pulse 66   Temp 97.6  F (36.4  C) (Oral)   Resp 17   Wt 85.7 kg (189 lb)   SpO2 94%   BMI 27.90 kg/m       General: male in no acute distress.   Eyes: EOMI. No scleral icterus or conjunctival injection.  Cardiovascular: RRR No murmurs. No peripheral edema.  Respiratory: CTA bilaterally. No wheezes or crackles.  Gastrointestinal: BS +. Abdomen rounded, soft, non-tender.   Neurologic: Cranial nerves II through XII are grossly intact.  Skin: No rashes, petechiae, or bruising noted on exposed skin.  Psych: Affect appropriate; a bit brighter today. Pleasant      Labs:  Most Recent 3 CBC's:  Recent Labs   Lab Test 06/09/25 1035 05/19/25  0844 04/28/25  0936   WBC 5.5 6.1 4.2   HGB 13.1* 13.0* 12.8*   MCV 83 82 82    206 201     Most Recent 3 BMP's:  Recent Labs   Lab Test 06/09/25  1035 05/19/25  0844 05/08/25  1435    136 139   POTASSIUM 3.8 3.5 3.9   CHLORIDE 101 98 99   CO2 26 26 35*   BUN 21.8 13.5 19.8   CR 1.34* 1.42* 1.91*   ANIONGAP 12 12 5*   STEPHEN 9.3 9.2 9.5   * 116* 131*   PROTTOTAL 6.8 7.0 6.7   ALBUMIN 4.2 4.2 4.0    Most Recent 3 LFT's:  Recent Labs   Lab Test 06/09/25  1035 05/19/25  0844 05/08/25  1435   AST 15 16 14   ALT 10 9 12   ALKPHOS 40 47 51   BILITOTAL 0.3 0.4 0.5    Most Recent 2 TSH and T4:  Recent Labs   Lab Test 06/09/25 1035 05/19/25  0844   TSH 3.36 5.65*   T4 1.17 1.16     Lab Results   Component Value Date    AMYLASE 57 06/09/2025    AMYLASE 56 05/19/2025    AMYLASE 53 05/08/2025    LIPASE 46 06/09/2025    LIPASE 47 05/19/2025    LIPASE 38 05/08/2025        Todays  cortisol is pending    I reviewed the above labs today.       IMAGING  N/A    ASSESSMENT AND PLAN  Cutaneous melanoma, scalp primary, pT2a cN1c, clinical Stage IIIB  Brain metastases  Lloyd Coleman is 80 year old  " with agent orange cutaneous exposures in the [place] war and a diagnosis of malignant melanoma arising from a pre-existing pigmented lesion on the scalp. He had a partial response to neoadjuvant therapy with atezolizumab (3 cycles), vemurafenib, and cobimetinib (4 cycles). He received adjuvant nivolumab through 2/14/2022.  Surveillance imaging in September 2024 revealed new intracranial lesions consistent with metastatic disease.  He is now s/p gamma knife treatment on 10/8/2024.  He started pembrolizumab 200 mg every 3 weeks x 1 year on 11/4/2024.    The most recent CT-CAP 5/16/25 showed no clear evidence of metastatic disease. There is a \"too small to characterize hypodensity in segment 7 of the liver,\" which we will follow on imaging. His same day brain MRI showed treatment response with no new lesions. He will have repeat brain MRI with rad onc in 3 months.     He is tolerating pembrolizumab fair. He does have some joint achiness which vacillates but is generally controlled with norco. We will check CRP, ESR, anti ccp and RF today to evaluate but given no significant change in symptoms we will not await results to treat today.   Addendum: RF elevated at 78; anti ccp pending. We can trial prednisone 10mg daily and see if joint pains improve. I will ask RNCC to let patient know and to get symptom update on Friday. We will place rheumatology consult as well.     Plan:  - Proceed with pembrolizumab today  -RTC every 3 weeks for labs, LEVY/MD and infusion  -Repeat brain MRI 8/8/25  -I will clarify repeat CT CAP interval with Dr Collins (likely 3 months from last).     Adrenal insufficiency, immune mediated  Cortisol noted to be low on 4/29/25.   He began hydrocortisone 10mg in the morning and 5mg in the afternoon  -he has not scheduled endocrine follow up; I requested this again today    Headaches with light sensitivity  Chronic but a bit worse today  Monitor for now; notify us if worsening    #Joint pain  As " above; check CRP, ESR, anti ccp and RF  Continue norco 1 tab every other day prn  Pending work up, could consider prednisone 10mg daily if symptoms become poorly controlled    Altered bowel motility  Intermittent nausea  -Chronic issue for many years  -Stable and not increasing on keytruda  -Ok for miralax prn for constipation  -Lomotil prn for diarrhea  -Zofran prn for nausea  -monitor for worsening on immunotherapy    Depressed mood, anxiety, PTSD  He has a longstanding history of PTSD. Takes occasional lorazepam. Continues to work with psychiatrist.    CKD, moderate, stage III  He has seen nephrology, Dr. Alarcon. Creatinine improved from last infusion. CKD is likely due to chronic hypertension and possible effect from vemurafenib  -pre keytruda, baseline creat around 1.4-1.5 but ranges as much as 1.39-1.84  -Creat elevated to 1.91 on 5/8/25; gave 1LNS on 5/9/25. Creat back down to recent baseline    Supportive care  -Per pt/family request, I have updated imaging orders to hold [employer] release of imaging results    50 minutes spent on the date of the encounter doing chart review, review of test results, interpretation of tests, patient visit, and documentation     The longitudinal plan of care for the diagnosis(es)/condition(s) as documented were addressed during this visit. Due to the added complexity in care, I will continue to support Lloyd in the subsequent management and with ongoing continuity of care.     Amber Scheierl, CNP  [facility] [address] [id number] [phone]

## 2025-06-09 NOTE — NURSING NOTE
Chief Complaint   Patient presents with    Blood Draw     Labs drawn with PIV start by RN. Pt tolerated well. Vitals taken. Patient checked into next appointment.       Kiesha Baker RN

## 2025-06-09 NOTE — PROGRESS NOTES
[facility]: Cancer Care                                                                                      RNCC called patient to inform him that the RF blood test came back a bit elevated. Informed patient that Teresita prescribed 10 mg of Prednisone daily to see if it helps with the inflammation in his joints. Patient agreed to  from [facility] pharmacy and start taking daily once obtained.  He will call in case of worsening symptoms, otherwise writer will call patient on Friday for an update.    Sidra Leos, RN, BSN, OCN  Oncology RN Care Coordinator  [facility]

## 2025-06-09 NOTE — LETTER
6/9/2025      Ahmet Coleman  [address]  [place] [address]      Dear Colleague,    Thank you for referring your patient, Ahmet Coleman, to the [facility]. Please see a copy of my visit note below.    MEDICAL ONCOLOGY PROGRESS NOTE  Melanoma Clinic  Jun 9, 2025    CHIEF COMPLAINT: Scalp melanoma, metastatic    Melanoma History:  1. He has a small pigmented scalp lesion present for over 30 years. The lesion was biopsied in 1992 and was benign.  2. October 2020, he presented to [facility] with 1 year of progressive enlargement of the lesion and new onset burning, itching, and stinging sensation in the affected scalp.  3. 10/26/20, He was seen at [facility] and he had shave biopsies of A. left central parietal scalp, B. left central occipital scalp, C. Left posterior parietal scalp, and D. punch biopsy of the left superior occipital scalp. Pathology (specimen: [id number]) showed a nodular and nevoid type melanoma, non-ulcerated, Breslow depth up to 1.3 mm, Saurabh's level IV, and up to 3 mitoses per mm2. All margins were involved. Ki-67 10-20%. Sox10 stain is positive and highlights an atypical compound melanocytic proliferation with significant overlying confluence and pagetosis of intraepidermal melanocytes. T-stage: at least pT2a. PD-L1 staining shows PD-L1 TPS <1%. 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  4. 11/25/20, he was seen by Dr. Garcia and he took three 3mm punch biopsies, which returned as dermal melanocytic proliferations with melanophages and fibrosis, consistent with locoregional metastatic melanoma.  5. 12/8/2020 he had PET-CT, which showed FDG avid irregular skin thickening overlying the left parietal region, with no FDG avid lymphadenopathy in the neck and no evidence of metastasis elsewhere in the body.  6. 1/22/2021 start vemurafenib and cobimetinib, ~2/12/21 held for diarrhea, then resumed.  7. 2/18/2021 Start  atezolizumab, last on 4/29/21  8. 5/24/2021 Wide local excision of the scalp melanoma and left latissimus free flap for scalp reconstruction.    Surgical pathology showed features consistent with scar and tumoral melanosis. No definite residual melanoma is seen. Tumoral melanosis (which extends to a depth of around 1.1 mm) is believed to be equivalent to a diagnosis of regressed melanoma.   9. 6/25/21, he starts adjuvant Nivolumab, last dose 2/14/22  10.  9/13/2024, PET/CT shows to 9 mm enhancing hypermetabolic nodules in the brain consistent with metastasis.   11.9/24/2024, MRI brain showing 3 intracranial metastatic foci  12.10/8/2024, completes gamma knife to intracranial metastases  13. 11/4/24, Starts pembrolizumab (Keytruda) 200mg every 21 days  14. Restaging MRI brain 01/13/2025, revealed hyperintense T1 foci with minimal to no enhancement that were decreased in conspicuity compared to the prior MRI (09/24/2024) including a 7 x 5 mm left parasagittal lesion (previously 10 x 9 mm), and a 5 x 4 mm right parasagittal lesion (previously 13 x 7 mm).  The previously noted 4 mm enhancing lesion along the right dorsal cingulate gyrus was no longer visualized.  There were no new enhancing foci.  CT chest, abdomen, pelvis 01/17/2025, was negative for adenopathy or metastases.   15. 4/29/25, starts hydrocortisone for adrenal insufficiency    INTERVAL HISTORY  Mr. 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  -he presents for evaluation prior to C11 Keytruda. He is accompanied by his daughter, Dahlia  -He had a good couple of days this weekend. He tends to have good and bad days with regard to mood, joints and even his irritable bowel.  -he continues to see his psychologist and finds this beneficial   -He confirms he is taking hydrocortisone BID. He does still deal with fatigue  -He has some flare in pain of joints (shoulder,  hips, wrist) today. He takes norco about 1 tab every other day and this helps   -No joint swelling  -He intermittently has loose stools but these come and go. No abdominal pain unless cramping with stools  -He's had daily headaches for greater than 1 year along with light sensitivity. He has a history of migraines but this is different.His headache today is a bit more bothersome than baseline  -Appetite is fine     Objective:  /71   Pulse 66   Temp 97.6  F (36.4  C) (Oral)   Resp 17   Wt 85.7 kg (189 lb)   SpO2 94%   BMI 27.90 kg/m       General: male in no acute distress.   Eyes: EOMI. No scleral icterus or conjunctival injection.  Cardiovascular: RRR No murmurs. No peripheral edema.  Respiratory: CTA bilaterally. No wheezes or crackles.  Gastrointestinal: BS +. Abdomen rounded, soft, non-tender.   Neurologic: Cranial nerves II through XII are grossly intact.  Skin: No rashes, petechiae, or bruising noted on exposed skin.  Psych: Affect appropriate; a bit brighter today. Pleasant      Labs:  Most Recent 3 CBC's:  Recent Labs   Lab Test 06/09/25 1035 05/19/25  0844 04/28/25  0936   WBC 5.5 6.1 4.2   HGB 13.1* 13.0* 12.8*   MCV 83 82 82    206 201     Most Recent 3 BMP's:  Recent Labs   Lab Test 06/09/25 1035 05/19/25  0844 05/08/25  1435    136 139   POTASSIUM 3.8 3.5 3.9   CHLORIDE 101 98 99   CO2 26 26 35*   BUN 21.8 13.5 19.8   CR 1.34* 1.42* 1.91*   ANIONGAP 12 12 5*   STEPHEN 9.3 9.2 9.5   * 116* 131*   PROTTOTAL 6.8 7.0 6.7   ALBUMIN 4.2 4.2 4.0    Most Recent 3 LFT's:  Recent Labs   Lab Test 06/09/25 1035 05/19/25  0844 05/08/25  1435   AST 15 16 14   ALT 10 9 12   ALKPHOS 40 47 51   BILITOTAL 0.3 0.4 0.5    Most Recent 2 TSH and T4:  Recent Labs   Lab Test 06/09/25 1035 05/19/25  0844   TSH 3.36 5.65*   T4 1.17 1.16     Lab Results   Component Value Date    AMYLASE 57 06/09/2025    AMYLASE 56 05/19/2025    AMYLASE 53 05/08/2025    LIPASE 46 06/09/2025    LIPASE 47 05/19/2025  "   LIPASE 38 05/08/2025        Todays  cortisol is pending    I reviewed the above labs today.       IMAGING  N/A    ASSESSMENT AND PLAN  Cutaneous melanoma, scalp primary, pT2a cN1c, clinical Stage IIIB  Brain metastases  Lloyd Coleman is 80 year old  with agent orange cutaneous exposures in the [place] war and a diagnosis of malignant melanoma arising from a pre-existing pigmented lesion on the scalp. He had a partial response to neoadjuvant therapy with atezolizumab (3 cycles), vemurafenib, and cobimetinib (4 cycles). He received adjuvant nivolumab through 2/14/2022.  Surveillance imaging in September 2024 revealed new intracranial lesions consistent with metastatic disease.  He is now s/p gamma knife treatment on 10/8/2024.  He started pembrolizumab 200 mg every 3 weeks x 1 year on 11/4/2024.    The most recent CT-CAP 5/16/25 showed no clear evidence of metastatic disease. There is a \"too small to characterize hypodensity in segment 7 of the liver,\" which we will follow on imaging. His same day brain MRI showed treatment response with no new lesions. He will have repeat brain MRI with rad onc in 3 months.     He is tolerating pembrolizumab fair. He does have some joint achiness which vacillates but is generally controlled with norco. We will check CRP, ESR, anti ccp and RF today to evaluate but given no significant change in symptoms we will not await results to treat today.     Plan:  - Proceed with pembrolizumab today  -RTC every 3 weeks for labs, LEVY/MD and infusion  -Repeat brain MRI 8/8/25  -I will clarify repeat CT CAP interval with Dr Collins (likely 3 months from last).     Adrenal insufficiency, immune mediated  Cortisol noted to be low on 4/29/25.   He began hydrocortisone 10mg in the morning and 5mg in the afternoon  -he has not scheduled endocrine follow up; I requested this again today    Headaches with light sensitivity  Chronic but a bit worse today  Monitor for now; notify us if " worsening    #Joint pain  As above; check CRP, ESR, anti ccp and RF  Continue norco 1 tab every other day prn  Pending work up, could consider prednisone 10mg daily if symptoms become poorly controlled    Altered bowel motility  Intermittent nausea  -Chronic issue for many years  -Stable and not increasing on keytruda  -Ok for miralax prn for constipation  -Lomotil prn for diarrhea  -Zofran prn for nausea  -monitor for worsening on immunotherapy    Depressed mood, anxiety, PTSD  He has a longstanding history of PTSD. Takes occasional lorazepam. Continues to work with psychiatrist.    CKD, moderate, stage III  He has seen nephrology, Dr. Alarcon. Creatinine improved from last infusion. CKD is likely due to chronic hypertension and possible effect from vemurafenib  -pre keytruda, baseline creat around 1.4-1.5 but ranges as much as 1.39-1.84  -Creat elevated to 1.91 on 5/8/25; gave 1LNS on 5/9/25. Creat back down to recent baseline    Supportive care  -Per pt/family request, I have updated imaging orders to hold [employer] release of imaging results    50 minutes spent on the date of the encounter doing chart review, review of test results, interpretation of tests, patient visit, and documentation     The longitudinal plan of care for the diagnosis(es)/condition(s) as documented were addressed during this visit. Due to the added complexity in care, I will continue to support Lloyd in the subsequent management and with ongoing continuity of care.     Amber Scheierl, CNP  [facility] [address] [address]  [phone]          Again, thank you for allowing me to participate in the care of your patient.        Sincerely,        Amber J. Scheierl, APRN CNP    Electronically signed

## 2025-06-09 NOTE — PATIENT INSTRUCTIONS
Contact Numbers  [facility]: [phone]    After Hours:  [phone]  Triage: [phone]    Please call the [facility] Triage line if you experience a temperature greater than or equal to 100.5, shaking chills, have uncontrolled nausea, vomiting and/or diarrhea, dizziness, shortness of breath, chest pain, bleeding, unexplained bruising, or if you have any other new/concerning symptoms, questions or concerns.     If it is after hours, weekends, or holidays, please call the main hospital  at  [phone] and ask to speak to the Oncology doctor on call.     If you are having any concerning symptoms or wish to speak to a provider before your next infusion visit, please call your care coordinator or triage to notify them so we can adequately serve you.     If you need a refill on a narcotic prescription or other medication, please call triage before your infusion appointment.         June 2025 Sunday Monday Tuesday Wednesday Thursday Friday Saturday   1     2     3     4     5     6     7       8     9    Lab Peripheral  10:15 AM   (15 min.)   [facility]   [facility]    Return Patient  10:30 AM   (45 min.)   Scheierl, Amber J, APRN CNP   [facility]    Infusion 60  12:00 PM   (60 min.)   [facility]ION NURSE   [facility] 10     11     12     13     14       15     16     17     18     19     20     21       22     23     24     25     26     27     28       29     30    Lab Peripheral   9:15 AM   (15 min.)   [facility]   [facility]    Return Patient   9:30 AM   (45 min.)   Scheierl, Amber J, APRN CNP   [facility]    Infusion 60  11:00 AM   (60 min.)    ONC INFUSION NURSE   [facility]                                        July 2025 Sunday Monday Tuesday Wednesday Thursday Friday Saturday              1     2     3     4     5       6     7     8     9     10     11     12       13     14     15     16     17     18    Return Patient  10:00 AM   (45 min.)   Scheierl, Amber J, APRN CNP   [facility] 19       20     21    Lab Peripheral  10:45 AM   (15 min.)    [facility]   [facility]    Infusion 60  11:30 AM   (60 min.)    ONC INFUSION NURSE   [facility] 22     23     24     25     26       27     28     29     30     31                                 Lab Results:  Recent Results (from the past 12 hours)   Comprehensive metabolic panel    Collection Time: 06/09/25 10:35 AM   Result Value Ref Range    Sodium 139 135 - 145 mmol/L    Potassium 3.8 3.4 - 5.3 mmol/L    Carbon Dioxide (CO2) 26 22 - 29 mmol/L    Anion Gap 12 7 - 15 mmol/L    Urea Nitrogen 21.8 8.0 - 23.0 mg/dL    Creatinine 1.34 (H) 0.67 - 1.17 mg/dL    GFR Estimate 54 (L) >60 mL/min/1.73m2    Calcium 9.3 8.8 - 10.4 mg/dL    Chloride 101 98 - 107 mmol/L    Glucose 109 (H) 70 - 99 mg/dL    Alkaline Phosphatase 40 40 - 150 U/L    AST 15 0 - 45 U/L    ALT 10 0 - 70 U/L    Protein Total 6.8 6.4 - 8.3 g/dL    Albumin 4.2 3.5 - 5.2 g/dL    Bilirubin Total 0.3 <=1.2 mg/dL   T4 free    Collection Time: 06/09/25 10:35 AM   Result Value Ref Range    Free T4 1.17 0.90 - 1.70 ng/dL   TSH    Collection Time: 06/09/25 10:35 AM   Result Value Ref Range    TSH 3.36 0.30 - 4.20 uIU/mL   Lipase    Collection Time: 06/09/25 10:35 AM   Result Value Ref Range    Lipase 46 13 - 60 U/L   Amylase    Collection Time: 06/09/25 10:35 AM   Result Value Ref Range    Amylase 57 28 - 100 U/L   CBC with platelets and differential    Collection Time: 06/09/25 10:35 AM   Result Value Ref Range    WBC Count 5.5 4.0 - 11.0 10e3/uL    RBC Count 4.59 4.40 - 5.90 10e6/uL    Hemoglobin 13.1 (L) 13.3 - 17.7 g/dL    Hematocrit 38.2 (L) 40.0 - 53.0 %    MCV 83 78 - 100 fL    MCH 28.5 26.5 - 33.0 pg     MCHC 34.3 31.5 - 36.5 g/dL    RDW 13.2 10.0 - 15.0 %    Platelet Count 188 150 - 450 10e3/uL    % Neutrophils 62 %    % Lymphocytes 24 %    % Monocytes 9 %    % Eosinophils 4 %    % Basophils 1 %    % Immature Granulocytes 1 %    NRBCs per 100 WBC 0 <1 /100    Absolute Neutrophils 3.4 1.6 - 8.3 10e3/uL    Absolute Lymphocytes 1.3 0.8 - 5.3 10e3/uL    Absolute Monocytes 0.5 0.0 - 1.3 10e3/uL    Absolute Eosinophils 0.2 0.0 - 0.7 10e3/uL    Absolute Basophils 0.1 0.0 - 0.2 10e3/uL    Absolute Immature Granulocytes 0.0 <=0.4 10e3/uL    Absolute NRBCs 0.0 10e3/uL

## 2025-06-10 LAB — CCP AB SER IA-ACNC: 39 U/ML

## 2025-06-12 ENCOUNTER — TELEPHONE (OUTPATIENT)
Dept: ENDOCRINOLOGY | Facility: CLINIC | Age: 80
End: 2025-06-12

## 2025-06-14 ENCOUNTER — HEALTH MAINTENANCE LETTER (OUTPATIENT)
Age: 80
End: 2025-06-14

## 2025-06-16 ENCOUNTER — TELEPHONE (OUTPATIENT)
Dept: ENDOCRINOLOGY | Facility: CLINIC | Age: 80
End: 2025-06-16
Payer: MEDICARE

## 2025-06-16 NOTE — TELEPHONE ENCOUNTER
Patient confirmed scheduled appointment:  Date: 08/05/25  Time: 3:30  Visit type: NEW ENDOCRINE  Provider: Caitlin John MD  Location: [facility] DIABETES  Testing/imaging: N/A  Additional notes: Scheduled per pt. Pt was originally very adverse to an appt that time of day, on account of traffic/travel time, but is very open to video visits.    Marlena Parsons MD  [facility]; Deepa Alonzo RN  To schedulers : please offer to move forward on schedule with either  Dr Neil, Salo, Bernard Shannon Trost, Zachary, Jefe, Angelina, Deanna , Dora,  Te, Hailee, Yg, Fela, Alfa, Linda using NON-CALL week open new/ELDA (60 minutes) or 2 back to back 30 minute ELDA spaces.    Tamiko Grayson on 6/16/2025 at 3:25 PM

## 2025-06-30 ENCOUNTER — ONCOLOGY VISIT (OUTPATIENT)
Dept: ONCOLOGY | Facility: CLINIC | Age: 80
End: 2025-06-30
Attending: INTERNAL MEDICINE
Payer: MEDICARE

## 2025-06-30 VITALS
HEART RATE: 66 BPM | SYSTOLIC BLOOD PRESSURE: 100 MMHG | BODY MASS INDEX: 28 KG/M2 | WEIGHT: 189.7 LBS | RESPIRATION RATE: 16 BRPM | DIASTOLIC BLOOD PRESSURE: 66 MMHG | OXYGEN SATURATION: 93 % | TEMPERATURE: 97.6 F

## 2025-06-30 DIAGNOSIS — Z79.899 HIGH RISK MEDICATION USE: ICD-10-CM

## 2025-06-30 DIAGNOSIS — C79.31 METASTASIS TO BRAIN (H): ICD-10-CM

## 2025-06-30 DIAGNOSIS — C43.4 MALIGNANT MELANOMA OF SCALP (H): ICD-10-CM

## 2025-06-30 DIAGNOSIS — M25.50 MULTIPLE JOINT PAIN: Primary | ICD-10-CM

## 2025-06-30 DIAGNOSIS — C43.4 MALIGNANT MELANOMA OF SCALP (H): Primary | ICD-10-CM

## 2025-06-30 LAB
ALBUMIN SERPL BCG-MCNC: 4.2 G/DL (ref 3.5–5.2)
ALP SERPL-CCNC: 50 U/L (ref 40–150)
ALT SERPL W P-5'-P-CCNC: 12 U/L (ref 0–70)
AMYLASE SERPL-CCNC: 39 U/L (ref 28–100)
ANION GAP SERPL CALCULATED.3IONS-SCNC: 12 MMOL/L (ref 7–15)
AST SERPL W P-5'-P-CCNC: 15 U/L (ref 0–45)
BASOPHILS # BLD AUTO: 0.1 10E3/UL (ref 0–0.2)
BASOPHILS NFR BLD AUTO: 1 %
BILIRUB SERPL-MCNC: 0.3 MG/DL
BUN SERPL-MCNC: 11.3 MG/DL (ref 8–23)
CALCIUM SERPL-MCNC: 8.8 MG/DL (ref 8.8–10.4)
CHLORIDE SERPL-SCNC: 98 MMOL/L (ref 98–107)
CREAT SERPL-MCNC: 1.5 MG/DL (ref 0.67–1.17)
EGFRCR SERPLBLD CKD-EPI 2021: 47 ML/MIN/1.73M2
EOSINOPHIL # BLD AUTO: 0.2 10E3/UL (ref 0–0.7)
EOSINOPHIL NFR BLD AUTO: 4 %
ERYTHROCYTE [DISTWIDTH] IN BLOOD BY AUTOMATED COUNT: 13.2 % (ref 10–15)
GLUCOSE SERPL-MCNC: 96 MG/DL (ref 70–99)
HCO3 SERPL-SCNC: 27 MMOL/L (ref 22–29)
HCT VFR BLD AUTO: 37.9 % (ref 40–53)
HGB BLD-MCNC: 12.8 G/DL (ref 13.3–17.7)
IMM GRANULOCYTES # BLD: 0 10E3/UL
IMM GRANULOCYTES NFR BLD: 0 %
LIPASE SERPL-CCNC: 32 U/L (ref 13–60)
LYMPHOCYTES # BLD AUTO: 1.6 10E3/UL (ref 0.8–5.3)
LYMPHOCYTES NFR BLD AUTO: 33 %
MCH RBC QN AUTO: 28.1 PG (ref 26.5–33)
MCHC RBC AUTO-ENTMCNC: 33.8 G/DL (ref 31.5–36.5)
MCV RBC AUTO: 83 FL (ref 78–100)
MONOCYTES # BLD AUTO: 0.4 10E3/UL (ref 0–1.3)
MONOCYTES NFR BLD AUTO: 8 %
NEUTROPHILS # BLD AUTO: 2.6 10E3/UL (ref 1.6–8.3)
NEUTROPHILS NFR BLD AUTO: 54 %
NRBC # BLD AUTO: 0 10E3/UL
NRBC BLD AUTO-RTO: 0 /100
PLATELET # BLD AUTO: 219 10E3/UL (ref 150–450)
POTASSIUM SERPL-SCNC: 3.6 MMOL/L (ref 3.4–5.3)
PROT SERPL-MCNC: 6.9 G/DL (ref 6.4–8.3)
RBC # BLD AUTO: 4.56 10E6/UL (ref 4.4–5.9)
SODIUM SERPL-SCNC: 137 MMOL/L (ref 135–145)
T4 FREE SERPL-MCNC: 0.94 NG/DL (ref 0.9–1.7)
TSH SERPL DL<=0.005 MIU/L-ACNC: 1.81 UIU/ML (ref 0.3–4.2)
WBC # BLD AUTO: 4.8 10E3/UL (ref 4–11)

## 2025-06-30 PROCEDURE — 96361 HYDRATE IV INFUSION ADD-ON: CPT

## 2025-06-30 PROCEDURE — 82150 ASSAY OF AMYLASE: CPT | Performed by: STUDENT IN AN ORGANIZED HEALTH CARE EDUCATION/TRAINING PROGRAM

## 2025-06-30 PROCEDURE — 84439 ASSAY OF FREE THYROXINE: CPT | Performed by: STUDENT IN AN ORGANIZED HEALTH CARE EDUCATION/TRAINING PROGRAM

## 2025-06-30 PROCEDURE — 84443 ASSAY THYROID STIM HORMONE: CPT | Performed by: STUDENT IN AN ORGANIZED HEALTH CARE EDUCATION/TRAINING PROGRAM

## 2025-06-30 PROCEDURE — 250N000011 HC RX IP 250 OP 636: Mod: JZ | Performed by: STUDENT IN AN ORGANIZED HEALTH CARE EDUCATION/TRAINING PROGRAM

## 2025-06-30 PROCEDURE — 99215 OFFICE O/P EST HI 40 MIN: CPT | Performed by: STUDENT IN AN ORGANIZED HEALTH CARE EDUCATION/TRAINING PROGRAM

## 2025-06-30 PROCEDURE — G0463 HOSPITAL OUTPT CLINIC VISIT: HCPCS | Performed by: STUDENT IN AN ORGANIZED HEALTH CARE EDUCATION/TRAINING PROGRAM

## 2025-06-30 PROCEDURE — 85025 COMPLETE CBC W/AUTO DIFF WBC: CPT | Performed by: STUDENT IN AN ORGANIZED HEALTH CARE EDUCATION/TRAINING PROGRAM

## 2025-06-30 PROCEDURE — 83690 ASSAY OF LIPASE: CPT | Performed by: STUDENT IN AN ORGANIZED HEALTH CARE EDUCATION/TRAINING PROGRAM

## 2025-06-30 PROCEDURE — G2211 COMPLEX E/M VISIT ADD ON: HCPCS | Performed by: STUDENT IN AN ORGANIZED HEALTH CARE EDUCATION/TRAINING PROGRAM

## 2025-06-30 PROCEDURE — 80053 COMPREHEN METABOLIC PANEL: CPT

## 2025-06-30 PROCEDURE — 258N000003 HC RX IP 258 OP 636: Performed by: STUDENT IN AN ORGANIZED HEALTH CARE EDUCATION/TRAINING PROGRAM

## 2025-06-30 PROCEDURE — 36415 COLL VENOUS BLD VENIPUNCTURE: CPT | Performed by: STUDENT IN AN ORGANIZED HEALTH CARE EDUCATION/TRAINING PROGRAM

## 2025-06-30 PROCEDURE — 96413 CHEMO IV INFUSION 1 HR: CPT

## 2025-06-30 RX ORDER — PREDNISONE 5 MG/1
5 TABLET ORAL DAILY
Qty: 60 TABLET | Refills: 1 | Status: SHIPPED | OUTPATIENT
Start: 2025-06-30

## 2025-06-30 RX ORDER — HEPARIN SODIUM (PORCINE) LOCK FLUSH IV SOLN 100 UNIT/ML 100 UNIT/ML
5 SOLUTION INTRAVENOUS
Status: CANCELLED | OUTPATIENT
Start: 2025-06-30

## 2025-06-30 RX ORDER — DIPHENHYDRAMINE HYDROCHLORIDE 50 MG/ML
25 INJECTION, SOLUTION INTRAMUSCULAR; INTRAVENOUS
Status: CANCELLED
Start: 2025-06-30

## 2025-06-30 RX ORDER — HEPARIN SODIUM,PORCINE 10 UNIT/ML
5-20 VIAL (ML) INTRAVENOUS DAILY PRN
Status: CANCELLED | OUTPATIENT
Start: 2025-06-30

## 2025-06-30 RX ORDER — ALBUTEROL SULFATE 0.83 MG/ML
2.5 SOLUTION RESPIRATORY (INHALATION)
Status: CANCELLED | OUTPATIENT
Start: 2025-06-30

## 2025-06-30 RX ORDER — LORAZEPAM 2 MG/ML
0.5 INJECTION INTRAMUSCULAR EVERY 4 HOURS PRN
Status: CANCELLED | OUTPATIENT
Start: 2025-06-30

## 2025-06-30 RX ORDER — ALBUTEROL SULFATE 90 UG/1
1-2 INHALANT RESPIRATORY (INHALATION)
Status: CANCELLED
Start: 2025-06-30

## 2025-06-30 RX ORDER — METHYLPREDNISOLONE SODIUM SUCCINATE 40 MG/ML
40 INJECTION INTRAMUSCULAR; INTRAVENOUS
Status: CANCELLED
Start: 2025-06-30

## 2025-06-30 RX ORDER — EPINEPHRINE 1 MG/ML
0.3 INJECTION, SOLUTION INTRAMUSCULAR; SUBCUTANEOUS EVERY 5 MIN PRN
Status: CANCELLED | OUTPATIENT
Start: 2025-06-30

## 2025-06-30 RX ORDER — MEPERIDINE HYDROCHLORIDE 25 MG/ML
25 INJECTION INTRAMUSCULAR; INTRAVENOUS; SUBCUTANEOUS
Status: CANCELLED | OUTPATIENT
Start: 2025-06-30

## 2025-06-30 RX ORDER — DIPHENHYDRAMINE HYDROCHLORIDE 50 MG/ML
50 INJECTION, SOLUTION INTRAMUSCULAR; INTRAVENOUS
Status: CANCELLED
Start: 2025-06-30

## 2025-06-30 RX ADMIN — SODIUM CHLORIDE 1000 ML: 0.9 INJECTION, SOLUTION INTRAVENOUS at 10:58

## 2025-06-30 RX ADMIN — SODIUM CHLORIDE 200 MG: 9 INJECTION, SOLUTION INTRAVENOUS at 11:35

## 2025-06-30 ASSESSMENT — PAIN SCALES - GENERAL: PAINLEVEL_OUTOF10: NO PAIN (0)

## 2025-06-30 NOTE — NURSING NOTE
Chief Complaint   Patient presents with    Oncology Clinic Visit     Malignant melanoma of scalp    Blood Draw     Labs drawn via piv placed by RN in lab. VS taken.      Labs drawn from PIV placed by RN. Line flushed with saline. Vitals taken. Pt checked in for appointment(s).    Remedios Patel RN

## 2025-06-30 NOTE — NURSING NOTE
"Oncology Rooming Note    June 30, 2025 10:05 AM   Ahmet Coleman is a 80 year old male who presents for:    Chief Complaint   Patient presents with    Oncology Clinic Visit     Malignant melanoma of scalp    Blood Draw     Labs drawn via piv placed by RN in lab. VS taken.      Initial Vitals: /66 (BP Location: Right arm, Patient Position: Sitting, Cuff Size: Adult Regular)   Pulse 66   Temp 97.6  F (36.4  C) (Oral)   Resp 16   Wt 86 kg (189 lb 11.2 oz)   SpO2 93%   BMI 28.00 kg/m   Estimated body mass index is 28 kg/m  as calculated from the following:    Height as of 5/19/25: 1.753 m (5' 9.02\").    Weight as of this encounter: 86 kg (189 lb 11.2 oz). Body surface area is 2.05 meters squared.  No Pain (0) Comment: Data Unavailable   No LMP for male patient.  Allergies reviewed: Yes  Medications reviewed: Yes    Medications: Medication refills not needed today.  Pharmacy name entered into [employer]:    [id number] - [place], [place] - [address] [id number] - [place], [place] - [address] [facility] - [place], [place] - [address] [facility]  [facility] - [place], [place] - [address]    Frailty Screening:   Is the patient here for a new oncology consult visit in cancer care? 2. No    PHQ9:  Did this patient require a PHQ9?: No      Clinical concerns: Patient feeling lightheaded. Teresita was verbally notified.      Kiesha Carmen              "

## 2025-06-30 NOTE — LETTER
6/30/2025      Ahmet Coleman  [address]  [place] [address]      Dear Colleague,    Thank you for referring your patient, Ahmet Coleman, to the [facility]. Please see a copy of my visit note below.    MEDICAL ONCOLOGY PROGRESS NOTE  Melanoma Clinic  Jun 30, 2025    CHIEF COMPLAINT: Scalp melanoma, metastatic    Melanoma History:  1. He has a small pigmented scalp lesion present for over 30 years. The lesion was biopsied in 1992 and was benign.  2. October 2020, he presented to [facility] with 1 year of progressive enlargement of the lesion and new onset burning, itching, and stinging sensation in the affected scalp.  3. 10/26/20, He was seen at [facility] and he had shave biopsies of A. left central parietal scalp, B. left central occipital scalp, C. Left posterior parietal scalp, and D. punch biopsy of the left superior occipital scalp. Pathology (specimen: [id number]) showed a nodular and nevoid type melanoma, non-ulcerated, Breslow depth up to 1.3 mm, Saurabh's level IV, and up to 3 mitoses per mm2. All margins were involved. Ki-67 10-20%. Sox10 stain is positive and highlights an atypical compound melanocytic proliferation with significant overlying confluence and pagetosis of intraepidermal melanocytes. T-stage: at least pT2a. PD-L1 staining shows PD-L1 TPS <1%. 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  4. 11/25/20, he was seen by Dr. Garcia and he took three 3mm punch biopsies, which returned as dermal melanocytic proliferations with melanophages and fibrosis, consistent with locoregional metastatic melanoma.  5. 12/8/2020 he had PET-CT, which showed FDG avid irregular skin thickening overlying the left parietal region, with no FDG avid lymphadenopathy in the neck and no evidence of metastasis elsewhere in the body.  6. 1/22/2021 start vemurafenib and cobimetinib, ~2/12/21 held for diarrhea, then resumed.  7. 2/18/2021 Start  atezolizumab, last on 4/29/21  8. 5/24/2021 Wide local excision of the scalp melanoma and left latissimus free flap for scalp reconstruction.    Surgical pathology showed features consistent with scar and tumoral melanosis. No definite residual melanoma is seen. Tumoral melanosis (which extends to a depth of around 1.1 mm) is believed to be equivalent to a diagnosis of regressed melanoma.   9. 6/25/21, he starts adjuvant Nivolumab, last dose 2/14/22  10.  9/13/2024, PET/CT shows to 9 mm enhancing hypermetabolic nodules in the brain consistent with metastasis.   11.9/24/2024, MRI brain showing 3 intracranial metastatic foci  12.10/8/2024, completes gamma knife to intracranial metastases  13. 11/4/24, Starts pembrolizumab (Keytruda) 200mg every 21 days  14. Restaging MRI brain 01/13/2025, revealed hyperintense T1 foci with minimal to no enhancement that were decreased in conspicuity compared to the prior MRI (09/24/2024) including a 7 x 5 mm left parasagittal lesion (previously 10 x 9 mm), and a 5 x 4 mm right parasagittal lesion (previously 13 x 7 mm).  The previously noted 4 mm enhancing lesion along the right dorsal cingulate gyrus was no longer visualized.  There were no new enhancing foci.  CT chest, abdomen, pelvis 01/17/2025, was negative for adenopathy or metastases.   15. 4/29/25, starts hydrocortisone for adrenal insufficiency    INTERVAL HISTORY  Mr. 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  -he presents for evaluation prior to C12 Keytruda. He is accompanied by his daughter.  -He tells me he has a bit of brain fog today and feels a bit almost lightheaded. He tells me he has days like this but then some days he doesn't notice it.   -He had a busy weekend with a family graduation gathering. He was out in the heat but took breaks when he needed to. He did drink water and Gatorade.  -He checks his BP at home;  states its been about 120 for the top number  -He is taking his prednisone 10mg daily for joint pain and this has really helped. At present really only has some discomfort in the right shoulder. He has not needed norco for a few days.  -The headaches come and go. They are not daily and they pre date keytruda  -He has a BM every 2-3 days. Its been a bit harder followed by some liquid output  -Appetite is lower than baseline but there are certain foods he likes like cottage cheese  -no rashes  -Only an occasional cough. No SOB  -Sometimes gets a film over his eyes. Sees an eye doctor tomorrow and possibly getting an updated prescription.     Objective:  /66 (BP Location: Right arm, Patient Position: Sitting, Cuff Size: Adult Regular)   Pulse 66   Temp 97.6  F (36.4  C) (Oral)   Resp 16   Wt 86 kg (189 lb 11.2 oz)   SpO2 93%   BMI 28.00 kg/m       General: Male in no acute distress.   Eyes: EOMI. No scleral icterus or conjunctival injection.  Cardiovascular: RRR. No murmurs. No peripheral edema.  Respiratory: CTA bilaterally. No wheezes or crackles. No cough.   Gastrointestinal: BS +. Abdomen rounded, soft, non-tender.   Neurologic: Cranial nerves II through XII are grossly intact.  Skin: No rashes, petechiae, or bruising noted on exposed skin.  Psych: Affect appropriate. Pleasant      Labs:  Most Recent 3 CBC's:  Recent Labs   Lab Test 06/30/25  0957 06/09/25  1035 05/19/25  0844   WBC 4.8 5.5 6.1   HGB 12.8* 13.1* 13.0*   MCV 83 83 82    188 206     Most Recent 3 BMP's:  Recent Labs   Lab Test 06/30/25  0957 06/09/25  1035 05/19/25  0844    139 136   POTASSIUM 3.6 3.8 3.5   CHLORIDE 98 101 98   CO2 27 26 26   BUN 11.3 21.8 13.5   CR 1.50* 1.34* 1.42*   ANIONGAP 12 12 12   STEPHEN 8.8 9.3 9.2   GLC 96 109* 116*   PROTTOTAL 6.9 6.8 7.0   ALBUMIN 4.2 4.2 4.2    Most Recent 3 LFT's:  Recent Labs   Lab Test 06/30/25  0957 06/09/25  1035 05/19/25  0844   AST 15 15 16   ALT 12 10 9   ALKPHOS 50 40 47  "  BILITOTAL 0.3 0.3 0.4    Most Recent 2 TSH and T4:  Recent Labs   Lab Test 06/30/25  0957 06/09/25  1035   TSH 1.81 3.36   T4 0.94 1.17     Lab Results   Component Value Date    AMYLASE 39 06/30/2025    AMYLASE 57 06/09/2025    AMYLASE 56 05/19/2025    LIPASE 32 06/30/2025    LIPASE 46 06/09/2025    LIPASE 47 05/19/2025     I reviewed the above labs today.       IMAGING  N/A    ASSESSMENT AND PLAN  Cutaneous melanoma, scalp primary, pT2a cN1c, clinical Stage IIIB  Brain metastases  Lloyd Coleman is 80 year old  with agent orange cutaneous exposures in the [place] war and a diagnosis of malignant melanoma arising from a pre-existing pigmented lesion on the scalp. He had a partial response to neoadjuvant therapy with atezolizumab (3 cycles), vemurafenib, and cobimetinib (4 cycles). He received adjuvant nivolumab through 2/14/2022.  Surveillance imaging in September 2024 revealed new intracranial lesions consistent with metastatic disease.  He is now s/p gamma knife treatment on 10/8/2024.  He started pembrolizumab 200 mg every 3 weeks x 1 year on 11/4/2024.    The most recent CT-CAP 5/16/25 showed no clear evidence of metastatic disease. There is a \"too small to characterize hypodensity in segment 7 of the liver,\" which we will follow on imaging. His same day brain MRI showed treatment response with no new lesions. He will have repeat brain MRI with rad onc in 3 months.     He is tolerating pembrolizumab fairly well. His joint pain has improved with use of 10mg prednisone daily. We will continue Keytruda today. We will give him a liter of fluids today given soft BP, possible dizziness and monitor as below    Oncology Follow-Up/Plan:  - Proceed with pembrolizumab today  -RTC every 3 weeks for labs, LEVY/MD and infusion  -Repeat brain MRI 8/8/25  -Repeat CT CAP after cycle 17 followed by visit with Dr Dennis Ochoa Fog, chronic and intermittent  Dizziness  Hypertension now with Soft BP  Based on history sounds " chronic and intermittent though BP slightly softer today. Will give 1LNS in clinic today. Asked him to check his BP at least every other day at home for a few days. If SBP <100 for 3 or more readings, then notify us for consideration for decrease in 1 of his antihypertensives.     Adrenal insufficiency, immune mediated  Cortisol noted to be low on 4/29/25.   He began hydrocortisone 10mg in the morning and 5mg in the afternoon, he confirms he's takign this  -endocrine follow up scheduled 8/5/25    Joint pain  -6/9/25: CRP normal, ESR normal, anti ccp elevated but lower than 9 years ago (39 compared to 86), and RF elevated at 78  -Started prednisone 10mg daily with improvement; will trial decrease to 5mg daily, if pain returns, then can increase back to 10mg daily  -Continue norco 1 tab daily prn for breakthrough (hasn't required this as much lately)  -Rheumatology consult placed    Headaches with light sensitivity  Chronic and stable    Altered bowel motility  Intermittent nausea  -Chronic issue for many years  -Stable and not increasing on keytruda  -Trial docusate 1-2x daily for hard stool  -Lomotil prn for diarrhea  -Zofran prn for nausea  -monitor for worsening on immunotherapy    Depressed mood, anxiety, PTSD  He has a longstanding history of PTSD. Takes occasional lorazepam. Continues to work with psychiatrist.    CKD, moderate, stage III  He has seen nephrology, Dr. Alarcon. Creatinine improved from last infusion. CKD is likely due to chronic hypertension and possible effect from vemurafenib  -pre keytruda, baseline creat around 1.4-1.5 but ranges as much as 1.39-1.84  -Creat elevated to 1.91 on 5/8/25; gave 1LNS on 5/9/25. Creat trended back down. Slightly up from last week but within recent baseline. Ok for 1LNS today    Supportive care  -Pt prefers to hold MyChart release of imaging results until provider visit, appropriate orders for upcoming imaging placed    60 minutes spent on the date of the encounter  doing chart review, review of test results, interpretation of tests, patient visit, and documentation     The longitudinal plan of care for the diagnosis(es)/condition(s) as documented were addressed during this visit. Due to the added complexity in care, I will continue to support Lloyd in the subsequent management and with ongoing continuity of care.     Amber Scheierl, CNP  [facility] [address] [address]  [phone]          Again, thank you for allowing me to participate in the care of your patient.        Sincerely,        Amber J. Scheierl, APRN CNP    Electronically signed

## 2025-06-30 NOTE — PROGRESS NOTES
Infusion Nursing Note:  Ahmet Coleman presents today for Cycle 12, Day 1- Keytruda + IV Fluids.    Patient seen by provider today: Yes: Amber Scheierl, NP   present during visit today: Not Applicable.    Note: Patient reports feeling well on arrival to infusion suite. Denies signs of infection. No new concerns or questions following LEVY appointment. Consents to treatment.     Patient declining full liter of fluids. Message sent to LEVY.     Per Secure Chat: Amber Scheierl, NP/ Melva Urena RN   -Ok to administer Keytruda after .5L   -OK if patient only receives half liter of fluid.     Patient received total of 1L of NS.       Intravenous Access:  Peripheral IV placed.    Treatment Conditions:  Lab Results   Component Value Date    HGB 12.8 (L) 06/30/2025    WBC 4.8 06/30/2025    ANEU 2.6 06/30/2025     06/30/2025        Lab Results   Component Value Date     06/30/2025    POTASSIUM 3.6 06/30/2025    MAG 2.4 (H) 12/20/2021    CR 1.50 (H) 06/30/2025    STEPHEN 8.8 06/30/2025    BILITOTAL 0.3 06/30/2025    ALBUMIN 4.2 06/30/2025    ALT 12 06/30/2025    AST 15 06/30/2025       Results reviewed, labs MET treatment parameters, ok to proceed with treatment.      Post Infusion Assessment:  Patient tolerated infusion without incident.  Blood return noted pre and post infusion.  Site patent and intact, free from redness, edema or discomfort.  No evidence of extravasations.  Access discontinued per protocol.       Discharge Plan:   Patient declined prescription refills.  Discharge instructions reviewed with: Patient.  Patient and/or family verbalized understanding of discharge instructions and all questions answered.  AVS to patient via [employer].  Patient will return 7/21/25 for next appointment.   Patient discharged in stable condition accompanied by: daughter.  Departure Mode: Ambulatory.      Melva Urena RN

## 2025-06-30 NOTE — PROGRESS NOTES
MEDICAL ONCOLOGY PROGRESS NOTE  Melanoma Clinic  Jun 30, 2025    CHIEF COMPLAINT: Scalp melanoma, metastatic    Melanoma History:  1. He has a small pigmented scalp lesion present for over 30 years. The lesion was biopsied in 1992 and was benign.  2. October 2020, he presented to [facility] with 1 year of progressive enlargement of the lesion and new onset burning, itching, and stinging sensation in the affected scalp.  3. 10/26/20, He was seen at [facility] and he had shave biopsies of A. left central parietal scalp, B. left central occipital scalp, C. Left posterior parietal scalp, and D. punch biopsy of the left superior occipital scalp. Pathology (specimen: [id number]) showed a nodular and nevoid type melanoma, non-ulcerated, Breslow depth up to 1.3 mm, Saurabh's level IV, and up to 3 mitoses per mm2. All margins were involved. Ki-67 10-20%. Sox10 stain is positive and highlights an atypical compound melanocytic proliferation with significant overlying confluence and pagetosis of intraepidermal melanocytes. T-stage: at least pT2a. PD-L1 staining shows PD-L1 TPS <1%. 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  4. 11/25/20, he was seen by Dr. Garcia and he took three 3mm punch biopsies, which returned as dermal melanocytic proliferations with melanophages and fibrosis, consistent with locoregional metastatic melanoma.  5. 12/8/2020 he had PET-CT, which showed FDG avid irregular skin thickening overlying the left parietal region, with no FDG avid lymphadenopathy in the neck and no evidence of metastasis elsewhere in the body.  6. 1/22/2021 start vemurafenib and cobimetinib, ~2/12/21 held for diarrhea, then resumed.  7. 2/18/2021 Start atezolizumab, last on 4/29/21  8. 5/24/2021 Wide local excision of the scalp melanoma and left latissimus free flap for scalp reconstruction.    Surgical pathology showed features consistent with scar and tumoral melanosis. No definite residual melanoma  is seen. Tumoral melanosis (which extends to a depth of around 1.1 mm) is believed to be equivalent to a diagnosis of regressed melanoma.   9. 6/25/21, he starts adjuvant Nivolumab, last dose 2/14/22  10.  9/13/2024, PET/CT shows to 9 mm enhancing hypermetabolic nodules in the brain consistent with metastasis.   11.9/24/2024, MRI brain showing 3 intracranial metastatic foci  12.10/8/2024, completes gamma knife to intracranial metastases  13. 11/4/24, Starts pembrolizumab (Keytruda) 200mg every 21 days  14. Restaging MRI brain 01/13/2025, revealed hyperintense T1 foci with minimal to no enhancement that were decreased in conspicuity compared to the prior MRI (09/24/2024) including a 7 x 5 mm left parasagittal lesion (previously 10 x 9 mm), and a 5 x 4 mm right parasagittal lesion (previously 13 x 7 mm).  The previously noted 4 mm enhancing lesion along the right dorsal cingulate gyrus was no longer visualized.  There were no new enhancing foci.  CT chest, abdomen, pelvis 01/17/2025, was negative for adenopathy or metastases.   15. 4/29/25, starts hydrocortisone for adrenal insufficiency    INTERVAL HISTORY  Mr. 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  -he presents for evaluation prior to C12 Keytruda. He is accompanied by his daughter.  -He tells me he has a bit of brain fog today and feels a bit almost lightheaded. He tells me he has days like this but then some days he doesn't notice it.   -He had a busy weekend with a family graduation gathering. He was out in the heat but took breaks when he needed to. He did drink water and Gatorade.  -He checks his BP at home; states its been about 120 for the top number  -He is taking his prednisone 10mg daily for joint pain and this has really helped. At present really only has some discomfort in the right shoulder. He has not needed norco for a few days.  -The headaches come  and go. They are not daily and they pre date keytruda  -He has a BM every 2-3 days. Its been a bit harder followed by some liquid output  -Appetite is lower than baseline but there are certain foods he likes like cottage cheese  -no rashes  -Only an occasional cough. No SOB  -Sometimes gets a film over his eyes. Sees an eye doctor tomorrow and possibly getting an updated prescription.     Objective:  /66 (BP Location: Right arm, Patient Position: Sitting, Cuff Size: Adult Regular)   Pulse 66   Temp 97.6  F (36.4  C) (Oral)   Resp 16   Wt 86 kg (189 lb 11.2 oz)   SpO2 93%   BMI 28.00 kg/m       General: Male in no acute distress.   Eyes: EOMI. No scleral icterus or conjunctival injection.  Cardiovascular: RRR. No murmurs. No peripheral edema.  Respiratory: CTA bilaterally. No wheezes or crackles. No cough.   Gastrointestinal: BS +. Abdomen rounded, soft, non-tender.   Neurologic: Cranial nerves II through XII are grossly intact.  Skin: No rashes, petechiae, or bruising noted on exposed skin.  Psych: Affect appropriate. Pleasant      Labs:  Most Recent 3 CBC's:  Recent Labs   Lab Test 06/30/25  0957 06/09/25  1035 05/19/25  0844   WBC 4.8 5.5 6.1   HGB 12.8* 13.1* 13.0*   MCV 83 83 82    188 206     Most Recent 3 BMP's:  Recent Labs   Lab Test 06/30/25  0957 06/09/25  1035 05/19/25  0844    139 136   POTASSIUM 3.6 3.8 3.5   CHLORIDE 98 101 98   CO2 27 26 26   BUN 11.3 21.8 13.5   CR 1.50* 1.34* 1.42*   ANIONGAP 12 12 12   STEPHEN 8.8 9.3 9.2   GLC 96 109* 116*   PROTTOTAL 6.9 6.8 7.0   ALBUMIN 4.2 4.2 4.2    Most Recent 3 LFT's:  Recent Labs   Lab Test 06/30/25  0957 06/09/25  1035 05/19/25  0844   AST 15 15 16   ALT 12 10 9   ALKPHOS 50 40 47   BILITOTAL 0.3 0.3 0.4    Most Recent 2 TSH and T4:  Recent Labs   Lab Test 06/30/25  0957 06/09/25  1035   TSH 1.81 3.36   T4 0.94 1.17     Lab Results   Component Value Date    AMYLASE 39 06/30/2025    AMYLASE 57 06/09/2025    AMYLASE 56 05/19/2025     "LIPASE 32 06/30/2025    LIPASE 46 06/09/2025    LIPASE 47 05/19/2025     I reviewed the above labs today.       IMAGING  N/A    ASSESSMENT AND PLAN  Cutaneous melanoma, scalp primary, pT2a cN1c, clinical Stage IIIB  Brain metastases  Lloyd Coleman is 80 year old  with agent orange cutaneous exposures in the [place] war and a diagnosis of malignant melanoma arising from a pre-existing pigmented lesion on the scalp. He had a partial response to neoadjuvant therapy with atezolizumab (3 cycles), vemurafenib, and cobimetinib (4 cycles). He received adjuvant nivolumab through 2/14/2022.  Surveillance imaging in September 2024 revealed new intracranial lesions consistent with metastatic disease.  He is now s/p gamma knife treatment on 10/8/2024.  He started pembrolizumab 200 mg every 3 weeks x 1 year on 11/4/2024.    The most recent CT-CAP 5/16/25 showed no clear evidence of metastatic disease. There is a \"too small to characterize hypodensity in segment 7 of the liver,\" which we will follow on imaging. His same day brain MRI showed treatment response with no new lesions. He will have repeat brain MRI with rad onc in 3 months.     He is tolerating pembrolizumab fairly well. His joint pain has improved with use of 10mg prednisone daily. We will continue Keytruda today. We will give him a liter of fluids today given soft BP, possible dizziness and monitor as below    Oncology Follow-Up/Plan:  - Proceed with pembrolizumab today  -RTC every 3 weeks for labs, LEVY/MD and infusion  -Repeat brain MRI 8/8/25  -Repeat CT CAP after cycle 17 followed by visit with Dr Collins    Brain Fog, chronic and intermittent  Dizziness  Hypertension now with Soft BP  Based on history sounds chronic and intermittent though BP slightly softer today. Will give 1LNS in clinic today. Asked him to check his BP at least every other day at home for a few days. If SBP <100 for 3 or more readings, then notify us for consideration for decrease in 1 of " his antihypertensives.     Adrenal insufficiency, immune mediated  Cortisol noted to be low on 4/29/25.   He began hydrocortisone 10mg in the morning and 5mg in the afternoon, he confirms he's takign this  -endocrine follow up scheduled 8/5/25    Joint pain  -6/9/25: CRP normal, ESR normal, anti ccp elevated but lower than 9 years ago (39 compared to 86), and RF elevated at 78  -Started prednisone 10mg daily with improvement; will trial decrease to 5mg daily, if pain returns, then can increase back to 10mg daily  -Continue norco 1 tab daily prn for breakthrough (hasn't required this as much lately)  -Rheumatology consult placed    Headaches with light sensitivity  Chronic and stable    Altered bowel motility  Intermittent nausea  -Chronic issue for many years  -Stable and not increasing on keytruda  -Trial docusate 1-2x daily for hard stool  -Lomotil prn for diarrhea  -Zofran prn for nausea  -monitor for worsening on immunotherapy    Depressed mood, anxiety, PTSD  He has a longstanding history of PTSD. Takes occasional lorazepam. Continues to work with psychiatrist.    CKD, moderate, stage III  He has seen nephrology, Dr. Alarcon. Creatinine improved from last infusion. CKD is likely due to chronic hypertension and possible effect from vemurafenib  -pre keytruda, baseline creat around 1.4-1.5 but ranges as much as 1.39-1.84  -Creat elevated to 1.91 on 5/8/25; gave 1LNS on 5/9/25. Creat trended back down. Slightly up from last week but within recent baseline. Ok for 1LNS today    Supportive care  -Pt prefers to hold MyChart release of imaging results until provider visit, appropriate orders for upcoming imaging placed    60 minutes spent on the date of the encounter doing chart review, review of test results, interpretation of tests, patient visit, and documentation     The longitudinal plan of care for the diagnosis(es)/condition(s) as documented were addressed during this visit. Due to the added complexity in care,  I will continue to support Lloyd in the subsequent management and with ongoing continuity of care.     Amber Scheierl, CNP  [facility] [address] [address] [phone]

## 2025-06-30 NOTE — PATIENT INSTRUCTIONS
[facility] Triage and after hours / weekends / holidays:  [phone]    Please call the triage or after hours line if you experience a temperature greater than or equal to 100.4, shaking chills, have uncontrolled nausea, vomiting and/or diarrhea, dizziness, shortness of breath, chest pain, bleeding, unexplained bruising, or if you have any other new/concerning symptoms, questions or concerns.      If you are having any concerning symptoms or wish to speak to a provider before your next infusion visit, please call triage to notify them so we can adequately serve you.     If you need a refill on a narcotic prescription or other medication, please call before your infusion appointment.

## 2025-07-21 ENCOUNTER — APPOINTMENT (OUTPATIENT)
Dept: LAB | Facility: CLINIC | Age: 80
End: 2025-07-21
Attending: STUDENT IN AN ORGANIZED HEALTH CARE EDUCATION/TRAINING PROGRAM
Payer: MEDICARE

## 2025-07-21 ENCOUNTER — PATIENT OUTREACH (OUTPATIENT)
Dept: CARE COORDINATION | Facility: CLINIC | Age: 80
End: 2025-07-21

## 2025-07-21 ENCOUNTER — INFUSION THERAPY VISIT (OUTPATIENT)
Dept: ONCOLOGY | Facility: CLINIC | Age: 80
End: 2025-07-21
Attending: STUDENT IN AN ORGANIZED HEALTH CARE EDUCATION/TRAINING PROGRAM
Payer: MEDICARE

## 2025-07-21 VITALS
OXYGEN SATURATION: 95 % | TEMPERATURE: 97.7 F | HEART RATE: 86 BPM | BODY MASS INDEX: 27.88 KG/M2 | RESPIRATION RATE: 19 BRPM | SYSTOLIC BLOOD PRESSURE: 101 MMHG | DIASTOLIC BLOOD PRESSURE: 68 MMHG | WEIGHT: 188.9 LBS

## 2025-07-21 DIAGNOSIS — C43.4 MALIGNANT MELANOMA OF SCALP (H): Primary | ICD-10-CM

## 2025-07-21 DIAGNOSIS — Z79.899 HIGH RISK MEDICATION USE: ICD-10-CM

## 2025-07-21 DIAGNOSIS — C79.31 METASTASIS TO BRAIN (H): ICD-10-CM

## 2025-07-21 LAB
ALBUMIN SERPL BCG-MCNC: 4.1 G/DL (ref 3.5–5.2)
ALP SERPL-CCNC: 50 U/L (ref 40–150)
ALT SERPL W P-5'-P-CCNC: 11 U/L (ref 0–70)
AMYLASE SERPL-CCNC: 48 U/L (ref 28–100)
ANION GAP SERPL CALCULATED.3IONS-SCNC: 15 MMOL/L (ref 7–15)
AST SERPL W P-5'-P-CCNC: 15 U/L (ref 0–45)
BASOPHILS # BLD AUTO: 0.1 10E3/UL (ref 0–0.2)
BASOPHILS NFR BLD AUTO: 2 %
BILIRUB SERPL-MCNC: 0.4 MG/DL
BUN SERPL-MCNC: 18.5 MG/DL (ref 8–23)
CALCIUM SERPL-MCNC: 9.2 MG/DL (ref 8.8–10.4)
CHLORIDE SERPL-SCNC: 100 MMOL/L (ref 98–107)
CREAT SERPL-MCNC: 1.42 MG/DL (ref 0.67–1.17)
EGFRCR SERPLBLD CKD-EPI 2021: 51 ML/MIN/1.73M2
EOSINOPHIL # BLD AUTO: 0.2 10E3/UL (ref 0–0.7)
EOSINOPHIL NFR BLD AUTO: 4 %
ERYTHROCYTE [DISTWIDTH] IN BLOOD BY AUTOMATED COUNT: 13.2 % (ref 10–15)
GLUCOSE SERPL-MCNC: 135 MG/DL (ref 70–99)
HCO3 SERPL-SCNC: 25 MMOL/L (ref 22–29)
HCT VFR BLD AUTO: 38.7 % (ref 40–53)
HGB BLD-MCNC: 13.2 G/DL (ref 13.3–17.7)
IMM GRANULOCYTES # BLD: 0 10E3/UL
IMM GRANULOCYTES NFR BLD: 0 %
LIPASE SERPL-CCNC: 41 U/L (ref 13–60)
LYMPHOCYTES # BLD AUTO: 1.6 10E3/UL (ref 0.8–5.3)
LYMPHOCYTES NFR BLD AUTO: 34 %
MCH RBC QN AUTO: 28.2 PG (ref 26.5–33)
MCHC RBC AUTO-ENTMCNC: 34.1 G/DL (ref 31.5–36.5)
MCV RBC AUTO: 83 FL (ref 78–100)
MONOCYTES # BLD AUTO: 0.4 10E3/UL (ref 0–1.3)
MONOCYTES NFR BLD AUTO: 9 %
NEUTROPHILS # BLD AUTO: 2.4 10E3/UL (ref 1.6–8.3)
NEUTROPHILS NFR BLD AUTO: 52 %
NRBC # BLD AUTO: 0 10E3/UL
NRBC BLD AUTO-RTO: 0 /100
PLATELET # BLD AUTO: 230 10E3/UL (ref 150–450)
POTASSIUM SERPL-SCNC: 3.4 MMOL/L (ref 3.4–5.3)
PROT SERPL-MCNC: 6.9 G/DL (ref 6.4–8.3)
RBC # BLD AUTO: 4.68 10E6/UL (ref 4.4–5.9)
SODIUM SERPL-SCNC: 140 MMOL/L (ref 135–145)
T4 FREE SERPL-MCNC: 0.92 NG/DL (ref 0.9–1.7)
TSH SERPL DL<=0.005 MIU/L-ACNC: 1.85 UIU/ML (ref 0.3–4.2)
WBC # BLD AUTO: 4.6 10E3/UL (ref 4–11)

## 2025-07-21 PROCEDURE — 85004 AUTOMATED DIFF WBC COUNT: CPT

## 2025-07-21 PROCEDURE — 84439 ASSAY OF FREE THYROXINE: CPT

## 2025-07-21 PROCEDURE — 250N000011 HC RX IP 250 OP 636: Mod: JZ | Performed by: STUDENT IN AN ORGANIZED HEALTH CARE EDUCATION/TRAINING PROGRAM

## 2025-07-21 PROCEDURE — 83690 ASSAY OF LIPASE: CPT

## 2025-07-21 PROCEDURE — 80053 COMPREHEN METABOLIC PANEL: CPT | Performed by: STUDENT IN AN ORGANIZED HEALTH CARE EDUCATION/TRAINING PROGRAM

## 2025-07-21 PROCEDURE — 84443 ASSAY THYROID STIM HORMONE: CPT

## 2025-07-21 PROCEDURE — 258N000003 HC RX IP 258 OP 636: Performed by: STUDENT IN AN ORGANIZED HEALTH CARE EDUCATION/TRAINING PROGRAM

## 2025-07-21 PROCEDURE — 36415 COLL VENOUS BLD VENIPUNCTURE: CPT

## 2025-07-21 PROCEDURE — 82150 ASSAY OF AMYLASE: CPT

## 2025-07-21 RX ADMIN — SODIUM CHLORIDE 250 ML: 0.9 INJECTION, SOLUTION INTRAVENOUS at 12:30

## 2025-07-21 RX ADMIN — SODIUM CHLORIDE 200 MG: 9 INJECTION, SOLUTION INTRAVENOUS at 12:48

## 2025-07-21 ASSESSMENT — PAIN SCALES - GENERAL: PAINLEVEL_OUTOF10: NO PAIN (0)

## 2025-07-21 NOTE — PROGRESS NOTES
Infusion Nursing Note:  Ahmet Coleman presents today for Cycle 13 Day 1 P[place]bro.    Patient seen by provider today: No   present during visit today: Not Applicable.    Note:     Pt was seen by Teresita HUBBARD in 7/18; no new issues or concerns today.     - Proceed with pembrolizumab 7/21/25 pending labs  -RTC every 3 weeks for labs, LEVY/MD and infusion  -Repeat brain MRI 8/8/25  -Repeat CT CAP after cycle 17 followed by visit with Dr Collins     Intravenous Access:  Peripheral IV placed.    Treatment Conditions:  Lab Results   Component Value Date    HGB 13.2 (L) 07/21/2025    WBC 4.6 07/21/2025    ANEU 2.4 07/21/2025     07/21/2025        Lab Results   Component Value Date     07/21/2025    POTASSIUM 3.4 07/21/2025    MAG 2.4 (H) 12/20/2021    CR 1.42 (H) 07/21/2025    STEPHEN 9.2 07/21/2025    BILITOTAL 0.4 07/21/2025    ALBUMIN 4.1 07/21/2025    ALT 11 07/21/2025    AST 15 07/21/2025     Results reviewed, labs MET treatment parameters, ok to proceed with treatment.      Post Infusion Assessment:  Patient tolerated infusion without incident.  Blood return noted pre and post infusion.  No evidence of extravasations.  Access discontinued per protocol.       Discharge Plan:   Discharge instructions reviewed with: Patient.  Copy of AVS reviewed with patient and/or family.  Patient will return 8/11 for next appointment.  Patient discharged in stable condition accompanied by: self and daughter.  Departure Mode: Ambulatory.      Meka Willis RN

## 2025-07-21 NOTE — PROGRESS NOTES
Social Work - Distress Screen Intervention  [facility]    Identified Concern and Score from Distress Screenin. How concerned are you about your ability to eat? 5     2. How concerned are you about unintended weight loss or your current weight? 2     3. How concerned are you about feeling depressed or very sad?  6     4. How concerned are you about feeling anxious or very scared?  4     5. Do you struggle with the loss of meaning and ulysses in your life?  Quite a bit     6. How concerned are you about work and home life issues that may be affected by your cancer?  7     7. How concerned are you about knowing what resources are available to help you?  (!) 8     8. Do you currently have what you would describe as Mormon or spiritual struggles? Not at all     9. If you want to be contacted by one of our professionals, I can send a message to them right now.  No data recorded     Date of Distress Screen: 25  Data: At time of last visit, patient scored positive on distress screening.  outreached to patient today to follow up on elevated distress and introduce psychosocial services and support.  Intervention/Education provided:  contacted patient by phone to discuss distress screening results. Patient indicated no questions, or needs today.  Follow-up Required:  will remain available to patient for support as needed    MITUL Reynoso, [facility]   Adult Oncology - Only/[place]  [phone]  [employer] [place] on    *Please note does not work on .   Support Groups at [facility]: Social Work Services for Cancer Patients ([email or web address])

## 2025-08-05 ENCOUNTER — VIRTUAL VISIT (OUTPATIENT)
Dept: ENDOCRINOLOGY | Facility: CLINIC | Age: 80
End: 2025-08-05
Payer: MEDICARE

## 2025-08-05 DIAGNOSIS — E27.40 ADRENAL INSUFFICIENCY: ICD-10-CM

## 2025-08-05 PROCEDURE — 1125F AMNT PAIN NOTED PAIN PRSNT: CPT | Mod: 95 | Performed by: INTERNAL MEDICINE

## 2025-08-05 PROCEDURE — 98002 SYNCH AUDIO-VIDEO NEW MOD 45: CPT | Mod: 95 | Performed by: INTERNAL MEDICINE

## 2025-08-05 PROCEDURE — G2211 COMPLEX E/M VISIT ADD ON: HCPCS | Performed by: INTERNAL MEDICINE

## 2025-08-05 RX ORDER — HYDROCORTISONE 5 MG/1
TABLET ORAL
Qty: 270 TABLET | Refills: 0 | Status: SHIPPED | OUTPATIENT
Start: 2025-08-05

## 2025-08-05 ASSESSMENT — PAIN SCALES - GENERAL: PAINLEVEL_OUTOF10: MODERATE PAIN (5)

## 2025-08-08 ENCOUNTER — ANCILLARY PROCEDURE (OUTPATIENT)
Dept: MRI IMAGING | Facility: CLINIC | Age: 80
End: 2025-08-08
Attending: STUDENT IN AN ORGANIZED HEALTH CARE EDUCATION/TRAINING PROGRAM
Payer: MEDICARE

## 2025-08-08 DIAGNOSIS — C79.31 METASTASIS TO BRAIN (H): ICD-10-CM

## 2025-08-08 DIAGNOSIS — C43.4 MALIGNANT MELANOMA OF SCALP (H): ICD-10-CM

## 2025-08-08 PROCEDURE — A9585 GADOBUTROL INJECTION: HCPCS | Mod: JZ | Performed by: STUDENT IN AN ORGANIZED HEALTH CARE EDUCATION/TRAINING PROGRAM

## 2025-08-08 PROCEDURE — 70553 MRI BRAIN STEM W/O & W/DYE: CPT | Mod: GC | Performed by: STUDENT IN AN ORGANIZED HEALTH CARE EDUCATION/TRAINING PROGRAM

## 2025-08-08 RX ORDER — GADOBUTROL 604.72 MG/ML
8.5 INJECTION INTRAVENOUS ONCE
Status: COMPLETED | OUTPATIENT
Start: 2025-08-08 | End: 2025-08-08

## 2025-08-08 RX ADMIN — GADOBUTROL 8.5 ML: 604.72 INJECTION INTRAVENOUS at 15:06

## 2025-08-11 ENCOUNTER — ONCOLOGY VISIT (OUTPATIENT)
Dept: ONCOLOGY | Facility: CLINIC | Age: 80
End: 2025-08-11
Attending: INTERNAL MEDICINE
Payer: MEDICARE

## 2025-08-11 VITALS
SYSTOLIC BLOOD PRESSURE: 137 MMHG | WEIGHT: 192 LBS | RESPIRATION RATE: 16 BRPM | TEMPERATURE: 97.3 F | HEART RATE: 74 BPM | DIASTOLIC BLOOD PRESSURE: 79 MMHG | BODY MASS INDEX: 28.34 KG/M2 | OXYGEN SATURATION: 94 %

## 2025-08-11 DIAGNOSIS — Z79.899 HIGH RISK MEDICATION USE: ICD-10-CM

## 2025-08-11 DIAGNOSIS — C79.31 METASTASIS TO BRAIN (H): ICD-10-CM

## 2025-08-11 DIAGNOSIS — C43.4 MALIGNANT MELANOMA OF SCALP (H): ICD-10-CM

## 2025-08-11 DIAGNOSIS — C43.4 MALIGNANT MELANOMA OF SCALP (H): Primary | ICD-10-CM

## 2025-08-11 DIAGNOSIS — G89.3 CANCER-RELATED PAIN: ICD-10-CM

## 2025-08-11 LAB
ALBUMIN SERPL BCG-MCNC: 3.9 G/DL (ref 3.5–5.2)
ALP SERPL-CCNC: 49 U/L (ref 40–150)
ALT SERPL W P-5'-P-CCNC: 13 U/L (ref 0–70)
AMYLASE SERPL-CCNC: 46 U/L (ref 28–100)
ANION GAP SERPL CALCULATED.3IONS-SCNC: 9 MMOL/L (ref 7–15)
AST SERPL W P-5'-P-CCNC: 14 U/L (ref 0–45)
BASOPHILS # BLD AUTO: 0.1 10E3/UL (ref 0–0.2)
BASOPHILS NFR BLD AUTO: 1 %
BILIRUB SERPL-MCNC: 0.3 MG/DL
BUN SERPL-MCNC: 13.7 MG/DL (ref 8–23)
CALCIUM SERPL-MCNC: 8.9 MG/DL (ref 8.8–10.4)
CHLORIDE SERPL-SCNC: 101 MMOL/L (ref 98–107)
CREAT SERPL-MCNC: 1.45 MG/DL (ref 0.67–1.17)
EGFRCR SERPLBLD CKD-EPI 2021: 49 ML/MIN/1.73M2
EOSINOPHIL # BLD AUTO: 0.2 10E3/UL (ref 0–0.7)
EOSINOPHIL NFR BLD AUTO: 4 %
ERYTHROCYTE [DISTWIDTH] IN BLOOD BY AUTOMATED COUNT: 13.1 % (ref 10–15)
GLUCOSE SERPL-MCNC: 103 MG/DL (ref 70–99)
HCO3 SERPL-SCNC: 30 MMOL/L (ref 22–29)
HCT VFR BLD AUTO: 36.8 % (ref 40–53)
HGB BLD-MCNC: 12.6 G/DL (ref 13.3–17.7)
IMM GRANULOCYTES # BLD: 0 10E3/UL
IMM GRANULOCYTES NFR BLD: 0 %
LIPASE SERPL-CCNC: 39 U/L (ref 13–60)
LYMPHOCYTES # BLD AUTO: 1.4 10E3/UL (ref 0.8–5.3)
LYMPHOCYTES NFR BLD AUTO: 25 %
MCH RBC QN AUTO: 28.6 PG (ref 26.5–33)
MCHC RBC AUTO-ENTMCNC: 34.2 G/DL (ref 31.5–36.5)
MCV RBC AUTO: 83 FL (ref 78–100)
MONOCYTES # BLD AUTO: 0.4 10E3/UL (ref 0–1.3)
MONOCYTES NFR BLD AUTO: 7 %
NEUTROPHILS # BLD AUTO: 3.5 10E3/UL (ref 1.6–8.3)
NEUTROPHILS NFR BLD AUTO: 63 %
NRBC # BLD AUTO: 0 10E3/UL
NRBC BLD AUTO-RTO: 0 /100
PLATELET # BLD AUTO: 236 10E3/UL (ref 150–450)
POTASSIUM SERPL-SCNC: 3.7 MMOL/L (ref 3.4–5.3)
PROT SERPL-MCNC: 6.5 G/DL (ref 6.4–8.3)
RBC # BLD AUTO: 4.41 10E6/UL (ref 4.4–5.9)
SODIUM SERPL-SCNC: 140 MMOL/L (ref 135–145)
T4 FREE SERPL-MCNC: 0.92 NG/DL (ref 0.9–1.7)
TSH SERPL DL<=0.005 MIU/L-ACNC: 3.37 UIU/ML (ref 0.3–4.2)
WBC # BLD AUTO: 5.6 10E3/UL (ref 4–11)

## 2025-08-11 PROCEDURE — 85004 AUTOMATED DIFF WBC COUNT: CPT | Performed by: STUDENT IN AN ORGANIZED HEALTH CARE EDUCATION/TRAINING PROGRAM

## 2025-08-11 PROCEDURE — 84443 ASSAY THYROID STIM HORMONE: CPT | Performed by: STUDENT IN AN ORGANIZED HEALTH CARE EDUCATION/TRAINING PROGRAM

## 2025-08-11 PROCEDURE — 82150 ASSAY OF AMYLASE: CPT | Performed by: STUDENT IN AN ORGANIZED HEALTH CARE EDUCATION/TRAINING PROGRAM

## 2025-08-11 PROCEDURE — 250N000011 HC RX IP 250 OP 636: Mod: JZ | Performed by: STUDENT IN AN ORGANIZED HEALTH CARE EDUCATION/TRAINING PROGRAM

## 2025-08-11 PROCEDURE — 84439 ASSAY OF FREE THYROXINE: CPT | Performed by: STUDENT IN AN ORGANIZED HEALTH CARE EDUCATION/TRAINING PROGRAM

## 2025-08-11 PROCEDURE — G0463 HOSPITAL OUTPT CLINIC VISIT: HCPCS | Performed by: STUDENT IN AN ORGANIZED HEALTH CARE EDUCATION/TRAINING PROGRAM

## 2025-08-11 PROCEDURE — 99215 OFFICE O/P EST HI 40 MIN: CPT | Performed by: STUDENT IN AN ORGANIZED HEALTH CARE EDUCATION/TRAINING PROGRAM

## 2025-08-11 PROCEDURE — 96413 CHEMO IV INFUSION 1 HR: CPT

## 2025-08-11 PROCEDURE — 82040 ASSAY OF SERUM ALBUMIN: CPT | Performed by: STUDENT IN AN ORGANIZED HEALTH CARE EDUCATION/TRAINING PROGRAM

## 2025-08-11 PROCEDURE — 83690 ASSAY OF LIPASE: CPT | Performed by: STUDENT IN AN ORGANIZED HEALTH CARE EDUCATION/TRAINING PROGRAM

## 2025-08-11 PROCEDURE — 258N000003 HC RX IP 258 OP 636: Performed by: STUDENT IN AN ORGANIZED HEALTH CARE EDUCATION/TRAINING PROGRAM

## 2025-08-11 PROCEDURE — G2211 COMPLEX E/M VISIT ADD ON: HCPCS | Performed by: STUDENT IN AN ORGANIZED HEALTH CARE EDUCATION/TRAINING PROGRAM

## 2025-08-11 PROCEDURE — 36415 COLL VENOUS BLD VENIPUNCTURE: CPT | Performed by: STUDENT IN AN ORGANIZED HEALTH CARE EDUCATION/TRAINING PROGRAM

## 2025-08-11 RX ORDER — EPINEPHRINE 1 MG/ML
0.3 INJECTION, SOLUTION INTRAMUSCULAR; SUBCUTANEOUS EVERY 5 MIN PRN
Status: CANCELLED | OUTPATIENT
Start: 2025-08-11

## 2025-08-11 RX ORDER — MEPERIDINE HYDROCHLORIDE 25 MG/ML
25 INJECTION INTRAMUSCULAR; INTRAVENOUS; SUBCUTANEOUS
Status: CANCELLED | OUTPATIENT
Start: 2025-08-11

## 2025-08-11 RX ORDER — LORAZEPAM 2 MG/ML
0.5 INJECTION INTRAMUSCULAR EVERY 4 HOURS PRN
Status: CANCELLED | OUTPATIENT
Start: 2025-08-11

## 2025-08-11 RX ORDER — DIPHENHYDRAMINE HYDROCHLORIDE 50 MG/ML
25 INJECTION, SOLUTION INTRAMUSCULAR; INTRAVENOUS
Status: CANCELLED
Start: 2025-08-11

## 2025-08-11 RX ORDER — METHYLPREDNISOLONE SODIUM SUCCINATE 40 MG/ML
40 INJECTION INTRAMUSCULAR; INTRAVENOUS
Status: CANCELLED
Start: 2025-08-11

## 2025-08-11 RX ORDER — HEPARIN SODIUM,PORCINE 10 UNIT/ML
5-20 VIAL (ML) INTRAVENOUS DAILY PRN
Status: CANCELLED | OUTPATIENT
Start: 2025-08-11

## 2025-08-11 RX ORDER — DIPHENHYDRAMINE HYDROCHLORIDE 50 MG/ML
50 INJECTION, SOLUTION INTRAMUSCULAR; INTRAVENOUS
Status: CANCELLED
Start: 2025-08-11

## 2025-08-11 RX ORDER — ALBUTEROL SULFATE 90 UG/1
1-2 INHALANT RESPIRATORY (INHALATION)
Status: CANCELLED
Start: 2025-08-11

## 2025-08-11 RX ORDER — HYDROCODONE BITARTRATE AND ACETAMINOPHEN 5; 325 MG/1; MG/1
1 TABLET ORAL EVERY 6 HOURS PRN
Qty: 60 TABLET | Refills: 0 | Status: SHIPPED | OUTPATIENT
Start: 2025-08-11

## 2025-08-11 RX ORDER — HEPARIN SODIUM (PORCINE) LOCK FLUSH IV SOLN 100 UNIT/ML 100 UNIT/ML
5 SOLUTION INTRAVENOUS
Status: CANCELLED | OUTPATIENT
Start: 2025-08-11

## 2025-08-11 RX ORDER — ALBUTEROL SULFATE 0.83 MG/ML
2.5 SOLUTION RESPIRATORY (INHALATION)
Status: CANCELLED | OUTPATIENT
Start: 2025-08-11

## 2025-08-11 RX ADMIN — SODIUM CHLORIDE 250 ML: 0.9 INJECTION, SOLUTION INTRAVENOUS at 12:22

## 2025-08-11 RX ADMIN — SODIUM CHLORIDE 200 MG: 9 INJECTION, SOLUTION INTRAVENOUS at 12:25

## 2025-08-11 ASSESSMENT — PAIN SCALES - GENERAL: PAINLEVEL_OUTOF10: MODERATE PAIN (4)

## 2025-09-02 ENCOUNTER — ONCOLOGY VISIT (OUTPATIENT)
Dept: ONCOLOGY | Facility: CLINIC | Age: 80
End: 2025-09-02
Attending: STUDENT IN AN ORGANIZED HEALTH CARE EDUCATION/TRAINING PROGRAM
Payer: MEDICARE

## 2025-09-02 ENCOUNTER — INFUSION THERAPY VISIT (OUTPATIENT)
Dept: ONCOLOGY | Facility: CLINIC | Age: 80
End: 2025-09-02
Attending: INTERNAL MEDICINE
Payer: MEDICARE

## 2025-09-02 VITALS
TEMPERATURE: 97.6 F | BODY MASS INDEX: 28.06 KG/M2 | DIASTOLIC BLOOD PRESSURE: 72 MMHG | WEIGHT: 190.1 LBS | OXYGEN SATURATION: 95 % | SYSTOLIC BLOOD PRESSURE: 111 MMHG | RESPIRATION RATE: 14 BRPM | HEART RATE: 67 BPM

## 2025-09-02 DIAGNOSIS — Z79.899 HIGH RISK MEDICATION USE: ICD-10-CM

## 2025-09-02 DIAGNOSIS — C79.31 METASTASIS TO BRAIN (H): ICD-10-CM

## 2025-09-02 DIAGNOSIS — C43.4 MALIGNANT MELANOMA OF SCALP (H): Primary | ICD-10-CM

## 2025-09-02 LAB
ALBUMIN SERPL BCG-MCNC: 4.3 G/DL (ref 3.5–5.2)
ALP SERPL-CCNC: 46 U/L (ref 40–150)
ALT SERPL W P-5'-P-CCNC: 9 U/L (ref 0–70)
AMYLASE SERPL-CCNC: 48 U/L (ref 28–100)
ANION GAP SERPL CALCULATED.3IONS-SCNC: 13 MMOL/L (ref 7–15)
AST SERPL W P-5'-P-CCNC: 15 U/L (ref 0–45)
BASOPHILS # BLD AUTO: 0.06 10E3/UL (ref 0–0.2)
BASOPHILS NFR BLD AUTO: 1.3 %
BILIRUB SERPL-MCNC: 0.5 MG/DL
BUN SERPL-MCNC: 17.9 MG/DL (ref 8–23)
CALCIUM SERPL-MCNC: 9.7 MG/DL (ref 8.8–10.4)
CHLORIDE SERPL-SCNC: 96 MMOL/L (ref 98–107)
CREAT SERPL-MCNC: 1.46 MG/DL (ref 0.67–1.17)
EGFRCR SERPLBLD CKD-EPI 2021: 49 ML/MIN/1.73M2
EOSINOPHIL # BLD AUTO: 0.16 10E3/UL (ref 0–0.7)
EOSINOPHIL NFR BLD AUTO: 3.4 %
ERYTHROCYTE [DISTWIDTH] IN BLOOD BY AUTOMATED COUNT: 12.9 % (ref 10–15)
GLUCOSE SERPL-MCNC: 126 MG/DL (ref 70–99)
HCO3 SERPL-SCNC: 28 MMOL/L (ref 22–29)
HCT VFR BLD AUTO: 38.9 % (ref 40–53)
HGB BLD-MCNC: 13.4 G/DL (ref 13.3–17.7)
IMM GRANULOCYTES # BLD: <0.03 10E3/UL
IMM GRANULOCYTES NFR BLD: 0.2 %
LIPASE SERPL-CCNC: 45 U/L (ref 13–60)
LYMPHOCYTES # BLD AUTO: 1.1 10E3/UL (ref 0.8–5.3)
LYMPHOCYTES NFR BLD AUTO: 23.4 %
MCH RBC QN AUTO: 28.3 PG (ref 26.5–33)
MCHC RBC AUTO-ENTMCNC: 34.4 G/DL (ref 31.5–36.5)
MCV RBC AUTO: 82.1 FL (ref 78–100)
MONOCYTES # BLD AUTO: 0.32 10E3/UL (ref 0–1.3)
MONOCYTES NFR BLD AUTO: 6.8 %
NEUTROPHILS # BLD AUTO: 3.05 10E3/UL (ref 1.6–8.3)
NEUTROPHILS NFR BLD AUTO: 64.9 %
NRBC # BLD AUTO: <0.03 10E3/UL
NRBC BLD AUTO-RTO: 0 /100
PLATELET # BLD AUTO: 219 10E3/UL (ref 150–450)
POTASSIUM SERPL-SCNC: 3.6 MMOL/L (ref 3.4–5.3)
PROT SERPL-MCNC: 7.1 G/DL (ref 6.4–8.3)
RBC # BLD AUTO: 4.74 10E6/UL (ref 4.4–5.9)
SODIUM SERPL-SCNC: 137 MMOL/L (ref 135–145)
T4 FREE SERPL-MCNC: 1.02 NG/DL (ref 0.9–1.7)
TSH SERPL DL<=0.005 MIU/L-ACNC: 1.52 UIU/ML (ref 0.3–4.2)
WBC # BLD AUTO: 4.7 10E3/UL (ref 4–11)

## 2025-09-02 PROCEDURE — 36415 COLL VENOUS BLD VENIPUNCTURE: CPT

## 2025-09-02 PROCEDURE — 80053 COMPREHEN METABOLIC PANEL: CPT | Performed by: STUDENT IN AN ORGANIZED HEALTH CARE EDUCATION/TRAINING PROGRAM

## 2025-09-02 PROCEDURE — 84439 ASSAY OF FREE THYROXINE: CPT

## 2025-09-02 PROCEDURE — 83690 ASSAY OF LIPASE: CPT

## 2025-09-02 PROCEDURE — 85041 AUTOMATED RBC COUNT: CPT

## 2025-09-02 PROCEDURE — 258N000003 HC RX IP 258 OP 636: Performed by: STUDENT IN AN ORGANIZED HEALTH CARE EDUCATION/TRAINING PROGRAM

## 2025-09-02 PROCEDURE — 84443 ASSAY THYROID STIM HORMONE: CPT

## 2025-09-02 PROCEDURE — 82150 ASSAY OF AMYLASE: CPT

## 2025-09-02 PROCEDURE — G0463 HOSPITAL OUTPT CLINIC VISIT: HCPCS | Performed by: STUDENT IN AN ORGANIZED HEALTH CARE EDUCATION/TRAINING PROGRAM

## 2025-09-02 PROCEDURE — G2211 COMPLEX E/M VISIT ADD ON: HCPCS | Performed by: STUDENT IN AN ORGANIZED HEALTH CARE EDUCATION/TRAINING PROGRAM

## 2025-09-02 PROCEDURE — 250N000011 HC RX IP 250 OP 636: Mod: JZ | Performed by: STUDENT IN AN ORGANIZED HEALTH CARE EDUCATION/TRAINING PROGRAM

## 2025-09-02 PROCEDURE — 99214 OFFICE O/P EST MOD 30 MIN: CPT | Performed by: STUDENT IN AN ORGANIZED HEALTH CARE EDUCATION/TRAINING PROGRAM

## 2025-09-02 PROCEDURE — 96413 CHEMO IV INFUSION 1 HR: CPT

## 2025-09-02 RX ORDER — ALBUTEROL SULFATE 0.83 MG/ML
2.5 SOLUTION RESPIRATORY (INHALATION)
Status: CANCELLED | OUTPATIENT
Start: 2025-09-02

## 2025-09-02 RX ORDER — DIPHENHYDRAMINE HYDROCHLORIDE 50 MG/ML
25 INJECTION INTRAMUSCULAR; INTRAVENOUS
Status: CANCELLED
Start: 2025-09-02

## 2025-09-02 RX ORDER — HEPARIN SODIUM,PORCINE 10 UNIT/ML
5-20 VIAL (ML) INTRAVENOUS DAILY PRN
Status: CANCELLED | OUTPATIENT
Start: 2025-09-02

## 2025-09-02 RX ORDER — DIPHENHYDRAMINE HYDROCHLORIDE 50 MG/ML
50 INJECTION INTRAMUSCULAR; INTRAVENOUS
Status: CANCELLED
Start: 2025-09-02

## 2025-09-02 RX ORDER — MEPERIDINE HYDROCHLORIDE 25 MG/ML
25 INJECTION INTRAMUSCULAR; INTRAVENOUS; SUBCUTANEOUS
Status: CANCELLED | OUTPATIENT
Start: 2025-09-02

## 2025-09-02 RX ORDER — HEPARIN SODIUM (PORCINE) LOCK FLUSH IV SOLN 100 UNIT/ML 100 UNIT/ML
5 SOLUTION INTRAVENOUS
Status: CANCELLED | OUTPATIENT
Start: 2025-09-02

## 2025-09-02 RX ORDER — EPINEPHRINE 1 MG/ML
0.3 INJECTION, SOLUTION INTRAMUSCULAR; SUBCUTANEOUS EVERY 5 MIN PRN
Status: CANCELLED | OUTPATIENT
Start: 2025-09-02

## 2025-09-02 RX ORDER — LORAZEPAM 2 MG/ML
0.5 INJECTION INTRAMUSCULAR EVERY 4 HOURS PRN
Status: CANCELLED | OUTPATIENT
Start: 2025-09-02

## 2025-09-02 RX ORDER — ALBUTEROL SULFATE 90 UG/1
1-2 INHALANT RESPIRATORY (INHALATION)
Status: CANCELLED
Start: 2025-09-02

## 2025-09-02 RX ORDER — METHYLPREDNISOLONE SODIUM SUCCINATE 40 MG/ML
40 INJECTION INTRAMUSCULAR; INTRAVENOUS
Status: CANCELLED
Start: 2025-09-02

## 2025-09-02 RX ADMIN — SODIUM CHLORIDE 250 ML: 0.9 INJECTION, SOLUTION INTRAVENOUS at 15:44

## 2025-09-02 RX ADMIN — SODIUM CHLORIDE 200 MG: 9 INJECTION, SOLUTION INTRAVENOUS at 15:45

## 2025-09-02 ASSESSMENT — PAIN SCALES - GENERAL: PAINLEVEL_OUTOF10: MILD PAIN (3)

## (undated) DEVICE — EYE SPONGE SPEAR WECK CEL 0008685

## (undated) DEVICE — ESU ELEC BLADE 2.75" COATED/INSULATED E1455

## (undated) DEVICE — SU SILK 2-0 TIE 12X30" A305H

## (undated) DEVICE — ESU HOLSTER PLASTIC DISP E2400

## (undated) DEVICE — SU MONOSOF 9-0 MV135-4 N2546

## (undated) DEVICE — TUBING SUCTION MEDI-VAC SOFT 3/16"X20' N520A

## (undated) DEVICE — CLOSURE SYS SKIN PREMIERPRO EXOFINFUSION 4X60CM 3473

## (undated) DEVICE — SU PROLENE 3-0 FS-2 18" 8665G

## (undated) DEVICE — SPONGE SURGIFOAM 100 1974

## (undated) DEVICE — SU ETHIBOND 3-0 RB-1DA 36" X558H

## (undated) DEVICE — VESSEL LOOPS RED MINI 31145710

## (undated) DEVICE — Device

## (undated) DEVICE — LINEN TOWEL PACK X30 5481

## (undated) DEVICE — BNDG ESMARK 4" STERILE LF 820-3412

## (undated) DEVICE — CUP AND LID 2PK 2OZ STERILE  SSK9006A

## (undated) DEVICE — LABEL MEDICATION SYSTEM 3303-P

## (undated) DEVICE — CLIP HORIZON MED BLUE 002200

## (undated) DEVICE — WIPE INSTRUMENT MEROCEL 400200

## (undated) DEVICE — SU PDS II 4-0 RB-1 27" Z304H

## (undated) DEVICE — ESU CORD BIPOLAR GREEN 10-4000

## (undated) DEVICE — SU ETHILON 3-0 PS-1 18" 1663H

## (undated) DEVICE — SU SILK 4-0 TIE 12X30" A303H

## (undated) DEVICE — SU SILK 2-0 SH CR 5X18" C0125

## (undated) DEVICE — DRAPE MICRO ZEISS PENTERO 120X54" G650DL

## (undated) DEVICE — SOL NACL 0.9% IRRIG 1000ML BOTTLE 2F7124

## (undated) DEVICE — ESU CORD BIPOLAR AND IRR TUBING AESCULAP US355

## (undated) DEVICE — DRAIN JACKSON PRATT 10MM FLAT 4/4 PERF SU130-1311

## (undated) DEVICE — NDL COUNTER 20CT 31142493

## (undated) DEVICE — BLADE DERMATOME ZIMMER  00-8800-000-10

## (undated) DEVICE — CABLE DOPPLER FLOW EXTENSION  DP-CAB01

## (undated) DEVICE — SUCTION SLEEVE NEPTUNE 2 125MM 0703-005-125

## (undated) DEVICE — BLADE KNIFE SURG 15 371115

## (undated) DEVICE — SU VICRYL 3-0 SH 8X18" UND J864D

## (undated) DEVICE — NDL BLUNT 18GA 1" W/O FILTER 305181

## (undated) DEVICE — DRSG KERLIX 4 1/2"X4YDS ROLL 6715

## (undated) DEVICE — PREP POVIDONE IODINE SOLUTION 10% 4OZ

## (undated) DEVICE — VESSEL LOOPS YELLOW MAXI 31145694

## (undated) DEVICE — SPONGE RAY-TEC 4X8" 7318

## (undated) DEVICE — PREP POVIDONE IODINE SCRUB 7.5% 4OZ APL82212

## (undated) DEVICE — DRAPE EXTREMITY UPPER 120X76" 29414

## (undated) DEVICE — DRAPE MAYO STAND 23X54 8337

## (undated) DEVICE — PREP SKIN SCRUB TRAY 4461A

## (undated) DEVICE — DERMACARRIERS 3:1 ZIMMER 00-2195-013-00

## (undated) DEVICE — CLIP GEM MICRO GEM2431

## (undated) DEVICE — LINEN TOWEL PACK X6 WHITE 5487

## (undated) DEVICE — STRAP UNIVERSAL POSITIONING 2-PIECE 4X47X76" 91-287

## (undated) DEVICE — DRSG BIATAIN ALGINATE 4X4" 3710

## (undated) DEVICE — SU SILK 3-0 TIE 12X30" A304H

## (undated) DEVICE — GEL ULTRASOUND AQUASONIC 20GM 01-01

## (undated) DEVICE — ADPT 5 IN 1 360

## (undated) DEVICE — SPONGE KITTNER 30-101

## (undated) DEVICE — CATH TRAY FOLEY SURESTEP 16FR W/TMP PRB STLK LATEX A319416AM

## (undated) DEVICE — SU MONOCRYL 4-0 PS-2 27" UND Y426H

## (undated) DEVICE — DRSG ADAPTIC 3X16"  6114

## (undated) DEVICE — ESU PENCIL SMOKE EVAC W/ROCKER SWITCH 0703-047-000

## (undated) DEVICE — RETR ELASTIC STAYS LONE STAR BLUNT DUAL LEAD 3550-1G

## (undated) DEVICE — DRAPE WARMER 66X44" ORS-300

## (undated) DEVICE — SYR 03ML LL W/O NDL 309657

## (undated) DEVICE — DRAPE FLUID WARMING 52"X66" ORS-301

## (undated) DEVICE — BLADE KNIFE SURG 10 371110

## (undated) DEVICE — SU SILK 0 TIE 6X30" A306H

## (undated) DEVICE — CLIP HORIZON SM RED WIDE SLOT 001201

## (undated) DEVICE — DRAPE STOCKINETTE IMPERVIOUS 10" 21048

## (undated) DEVICE — SU CHROMIC 4-0 SH 27" G121H

## (undated) DEVICE — EYE INSTRUMENT WIPE MEROCEL W/ADHESIVE 223625

## (undated) DEVICE — DRAPE SHEET REV FOLD 3/4 9349

## (undated) DEVICE — SU VICRYL 2-0 SH 27" UND J417H

## (undated) DEVICE — SUCTION MANIFOLD NEPTUNE 2 SYS 4 PORT 0702-020-000

## (undated) DEVICE — SU PROLENE 2-0 V-7 36" 8977H

## (undated) DEVICE — SUCTION CARDIAC FRAZIER TIP 6FR STERILE 3" 10053

## (undated) DEVICE — PITCHER STERILE 1000ML  SSK9004A

## (undated) DEVICE — SU PLAIN FAST ABSORB 5-0 PC-1 18" 1915G

## (undated) DEVICE — SYR 05ML LL W/O NDL

## (undated) DEVICE — PACK SET-UP STD 9102

## (undated) DEVICE — SPONGE LAP 18X18" X8435

## (undated) DEVICE — DRAIN PENROSE 1X18" LATEX FREE GR207

## (undated) DEVICE — DRAIN JACKSON PRATT RESERVOIR 400ML SU130-1000

## (undated) DEVICE — PACK NEURO MINOR UMMC SNE32MNMU4

## (undated) RX ORDER — FENTANYL CITRATE 50 UG/ML
INJECTION, SOLUTION INTRAMUSCULAR; INTRAVENOUS
Status: DISPENSED
Start: 2021-05-24

## (undated) RX ORDER — HYDROMORPHONE HYDROCHLORIDE 1 MG/ML
INJECTION, SOLUTION INTRAMUSCULAR; INTRAVENOUS; SUBCUTANEOUS
Status: DISPENSED
Start: 2021-05-24

## (undated) RX ORDER — PROPOFOL 10 MG/ML
INJECTION, EMULSION INTRAVENOUS
Status: DISPENSED
Start: 2021-05-24

## (undated) RX ORDER — LIDOCAINE HYDROCHLORIDE 20 MG/ML
INJECTION, SOLUTION EPIDURAL; INFILTRATION; INTRACAUDAL; PERINEURAL
Status: DISPENSED
Start: 2021-05-24

## (undated) RX ORDER — ONDANSETRON 2 MG/ML
INJECTION INTRAMUSCULAR; INTRAVENOUS
Status: DISPENSED
Start: 2021-05-24

## (undated) RX ORDER — BACITRACIN 500 [USP'U]/G
OINTMENT OPHTHALMIC
Status: DISPENSED
Start: 2021-05-24

## (undated) RX ORDER — CEFAZOLIN SODIUM 1 G/3ML
INJECTION, POWDER, FOR SOLUTION INTRAMUSCULAR; INTRAVENOUS
Status: DISPENSED
Start: 2021-05-24

## (undated) RX ORDER — EPINEPHRINE NASAL SOLUTION 1 MG/ML
SOLUTION NASAL
Status: DISPENSED
Start: 2021-05-24

## (undated) RX ORDER — LIDOCAINE HYDROCHLORIDE AND EPINEPHRINE 10; 10 MG/ML; UG/ML
INJECTION, SOLUTION INFILTRATION; PERINEURAL
Status: DISPENSED
Start: 2020-11-25

## (undated) RX ORDER — ALBUMIN, HUMAN INJ 5% 5 %
SOLUTION INTRAVENOUS
Status: DISPENSED
Start: 2021-05-24

## (undated) RX ORDER — MINERAL OIL
OIL (ML) MISCELLANEOUS
Status: DISPENSED
Start: 2021-05-24

## (undated) RX ORDER — PAPAVERINE HYDROCHLORIDE 30 MG/ML
INJECTION INTRAMUSCULAR; INTRAVENOUS
Status: DISPENSED
Start: 2021-05-24